# Patient Record
Sex: MALE | NOT HISPANIC OR LATINO | Employment: OTHER | URBAN - METROPOLITAN AREA
[De-identification: names, ages, dates, MRNs, and addresses within clinical notes are randomized per-mention and may not be internally consistent; named-entity substitution may affect disease eponyms.]

---

## 2020-03-18 ENCOUNTER — APPOINTMENT (OUTPATIENT)
Dept: RADIOLOGY | Facility: MEDICAL CENTER | Age: 70
DRG: 313 | End: 2020-03-18
Payer: COMMERCIAL

## 2020-03-18 ENCOUNTER — APPOINTMENT (OUTPATIENT)
Dept: RADIOLOGY | Facility: MEDICAL CENTER | Age: 70
DRG: 313 | End: 2020-03-18
Attending: EMERGENCY MEDICINE
Payer: COMMERCIAL

## 2020-03-18 ENCOUNTER — APPOINTMENT (OUTPATIENT)
Dept: CARDIOLOGY | Facility: MEDICAL CENTER | Age: 70
DRG: 313 | End: 2020-03-18
Attending: STUDENT IN AN ORGANIZED HEALTH CARE EDUCATION/TRAINING PROGRAM
Payer: COMMERCIAL

## 2020-03-18 ENCOUNTER — HOSPITAL ENCOUNTER (INPATIENT)
Facility: MEDICAL CENTER | Age: 70
LOS: 1 days | DRG: 313 | End: 2020-03-19
Attending: EMERGENCY MEDICINE | Admitting: INTERNAL MEDICINE
Payer: COMMERCIAL

## 2020-03-18 DIAGNOSIS — M54.6 ACUTE LEFT-SIDED THORACIC BACK PAIN: ICD-10-CM

## 2020-03-18 PROBLEM — N40.0 BPH (BENIGN PROSTATIC HYPERPLASIA): Status: ACTIVE | Noted: 2020-03-18

## 2020-03-18 PROBLEM — R07.9 PAIN IN THE CHEST: Status: ACTIVE | Noted: 2020-03-18

## 2020-03-18 PROBLEM — E78.5 HLD (HYPERLIPIDEMIA): Status: ACTIVE | Noted: 2020-03-18

## 2020-03-18 PROBLEM — M10.9 GOUT: Status: ACTIVE | Noted: 2020-03-18

## 2020-03-18 PROBLEM — D64.9 ANEMIA: Status: ACTIVE | Noted: 2020-03-18

## 2020-03-18 PROBLEM — M54.9 BACK PAIN: Status: ACTIVE | Noted: 2020-03-18

## 2020-03-18 PROBLEM — N18.9 CKD (CHRONIC KIDNEY DISEASE): Status: ACTIVE | Noted: 2020-03-18

## 2020-03-18 PROBLEM — K25.9 ULCER, STOMACH PEPTIC: Status: ACTIVE | Noted: 2020-03-18

## 2020-03-18 PROBLEM — E87.6 HYPOKALEMIA: Status: ACTIVE | Noted: 2020-03-18

## 2020-03-18 PROBLEM — G47.00 INSOMNIA: Status: ACTIVE | Noted: 2020-03-18

## 2020-03-18 LAB
ALBUMIN SERPL BCP-MCNC: 4.1 G/DL (ref 3.2–4.9)
ALBUMIN/GLOB SERPL: 1.1 G/DL
ALP SERPL-CCNC: 122 U/L (ref 30–99)
ALT SERPL-CCNC: 29 U/L (ref 2–50)
ANION GAP SERPL CALC-SCNC: 18 MMOL/L (ref 7–16)
AST SERPL-CCNC: 56 U/L (ref 12–45)
BASOPHILS # BLD AUTO: 0.2 % (ref 0–1.8)
BASOPHILS # BLD: 0.01 K/UL (ref 0–0.12)
BILIRUB SERPL-MCNC: 0.8 MG/DL (ref 0.1–1.5)
BUN SERPL-MCNC: 65 MG/DL (ref 8–22)
CALCIUM SERPL-MCNC: 8.5 MG/DL (ref 8.5–10.5)
CHLORIDE SERPL-SCNC: 102 MMOL/L (ref 96–112)
CHOLEST SERPL-MCNC: 118 MG/DL (ref 100–199)
CO2 SERPL-SCNC: 18 MMOL/L (ref 20–33)
CREAT SERPL-MCNC: 3.2 MG/DL (ref 0.5–1.4)
EKG IMPRESSION: NORMAL
EOSINOPHIL # BLD AUTO: 0.13 K/UL (ref 0–0.51)
EOSINOPHIL NFR BLD: 2.5 % (ref 0–6.9)
ERYTHROCYTE [DISTWIDTH] IN BLOOD BY AUTOMATED COUNT: 48.1 FL (ref 35.9–50)
FERRITIN SERPL-MCNC: 1823 NG/ML (ref 22–322)
GLOBULIN SER CALC-MCNC: 3.7 G/DL (ref 1.9–3.5)
GLUCOSE SERPL-MCNC: 107 MG/DL (ref 65–99)
HCT VFR BLD AUTO: 30.8 % (ref 42–52)
HDLC SERPL-MCNC: 78 MG/DL
HGB BLD-MCNC: 10.4 G/DL (ref 14–18)
IMM GRANULOCYTES # BLD AUTO: 0.02 K/UL (ref 0–0.11)
IMM GRANULOCYTES NFR BLD AUTO: 0.4 % (ref 0–0.9)
IRON SERPL-MCNC: 65 UG/DL (ref 50–180)
LDLC SERPL CALC-MCNC: 23 MG/DL
LV EJECT FRACT  99904: 50
LV EJECT FRACT MOD 2C 99903: 63.28
LV EJECT FRACT MOD 4C 99902: 51.3
LV EJECT FRACT MOD BP 99901: 54.26
LYMPHOCYTES # BLD AUTO: 1.09 K/UL (ref 1–4.8)
LYMPHOCYTES NFR BLD: 20.8 % (ref 22–41)
MCH RBC QN AUTO: 30.5 PG (ref 27–33)
MCHC RBC AUTO-ENTMCNC: 33.8 G/DL (ref 33.7–35.3)
MCV RBC AUTO: 90.3 FL (ref 81.4–97.8)
MONOCYTES # BLD AUTO: 0.54 K/UL (ref 0–0.85)
MONOCYTES NFR BLD AUTO: 10.3 % (ref 0–13.4)
NEUTROPHILS # BLD AUTO: 3.46 K/UL (ref 1.82–7.42)
NEUTROPHILS NFR BLD: 65.8 % (ref 44–72)
NRBC # BLD AUTO: 0 K/UL
NRBC BLD-RTO: 0 /100 WBC
PLATELET # BLD AUTO: 124 K/UL (ref 164–446)
PMV BLD AUTO: 11.2 FL (ref 9–12.9)
POTASSIUM SERPL-SCNC: 3.1 MMOL/L (ref 3.6–5.5)
PROT SERPL-MCNC: 7.8 G/DL (ref 6–8.2)
RBC # BLD AUTO: 3.41 M/UL (ref 4.7–6.1)
SODIUM SERPL-SCNC: 138 MMOL/L (ref 135–145)
TRIGL SERPL-MCNC: 87 MG/DL (ref 0–149)
TROPONIN T SERPL-MCNC: 52 NG/L (ref 6–19)
TROPONIN T SERPL-MCNC: 53 NG/L (ref 6–19)
TROPONIN T SERPL-MCNC: 58 NG/L (ref 6–19)
WBC # BLD AUTO: 5.3 K/UL (ref 4.8–10.8)

## 2020-03-18 PROCEDURE — 700111 HCHG RX REV CODE 636 W/ 250 OVERRIDE (IP): Performed by: STUDENT IN AN ORGANIZED HEALTH CARE EDUCATION/TRAINING PROGRAM

## 2020-03-18 PROCEDURE — 99223 1ST HOSP IP/OBS HIGH 75: CPT | Mod: GC | Performed by: INTERNAL MEDICINE

## 2020-03-18 PROCEDURE — A9270 NON-COVERED ITEM OR SERVICE: HCPCS | Performed by: EMERGENCY MEDICINE

## 2020-03-18 PROCEDURE — 83036 HEMOGLOBIN GLYCOSYLATED A1C: CPT

## 2020-03-18 PROCEDURE — 96374 THER/PROPH/DIAG INJ IV PUSH: CPT

## 2020-03-18 PROCEDURE — 93306 TTE W/DOPPLER COMPLETE: CPT

## 2020-03-18 PROCEDURE — 93005 ELECTROCARDIOGRAM TRACING: CPT | Performed by: STUDENT IN AN ORGANIZED HEALTH CARE EDUCATION/TRAINING PROGRAM

## 2020-03-18 PROCEDURE — 71045 X-RAY EXAM CHEST 1 VIEW: CPT

## 2020-03-18 PROCEDURE — 93010 ELECTROCARDIOGRAM REPORT: CPT | Mod: 76 | Performed by: INTERNAL MEDICINE

## 2020-03-18 PROCEDURE — 700102 HCHG RX REV CODE 250 W/ 637 OVERRIDE(OP): Performed by: EMERGENCY MEDICINE

## 2020-03-18 PROCEDURE — 84484 ASSAY OF TROPONIN QUANT: CPT | Mod: 91

## 2020-03-18 PROCEDURE — 93005 ELECTROCARDIOGRAM TRACING: CPT | Performed by: EMERGENCY MEDICINE

## 2020-03-18 PROCEDURE — 700101 HCHG RX REV CODE 250: Performed by: EMERGENCY MEDICINE

## 2020-03-18 PROCEDURE — 36415 COLL VENOUS BLD VENIPUNCTURE: CPT

## 2020-03-18 PROCEDURE — 93306 TTE W/DOPPLER COMPLETE: CPT | Mod: 26 | Performed by: INTERNAL MEDICINE

## 2020-03-18 PROCEDURE — A9270 NON-COVERED ITEM OR SERVICE: HCPCS | Performed by: STUDENT IN AN ORGANIZED HEALTH CARE EDUCATION/TRAINING PROGRAM

## 2020-03-18 PROCEDURE — 80061 LIPID PANEL: CPT

## 2020-03-18 PROCEDURE — 80053 COMPREHEN METABOLIC PANEL: CPT

## 2020-03-18 PROCEDURE — 93005 ELECTROCARDIOGRAM TRACING: CPT

## 2020-03-18 PROCEDURE — 700102 HCHG RX REV CODE 250 W/ 637 OVERRIDE(OP): Performed by: STUDENT IN AN ORGANIZED HEALTH CARE EDUCATION/TRAINING PROGRAM

## 2020-03-18 PROCEDURE — 85025 COMPLETE CBC W/AUTO DIFF WBC: CPT

## 2020-03-18 PROCEDURE — 700105 HCHG RX REV CODE 258: Performed by: STUDENT IN AN ORGANIZED HEALTH CARE EDUCATION/TRAINING PROGRAM

## 2020-03-18 PROCEDURE — 82728 ASSAY OF FERRITIN: CPT

## 2020-03-18 PROCEDURE — 99285 EMERGENCY DEPT VISIT HI MDM: CPT

## 2020-03-18 PROCEDURE — 770020 HCHG ROOM/CARE - TELE (206)

## 2020-03-18 PROCEDURE — 93010 ELECTROCARDIOGRAM REPORT: CPT | Performed by: INTERNAL MEDICINE

## 2020-03-18 PROCEDURE — 83540 ASSAY OF IRON: CPT

## 2020-03-18 RX ORDER — LABETALOL HYDROCHLORIDE 5 MG/ML
10 INJECTION, SOLUTION INTRAVENOUS ONCE
Status: COMPLETED | OUTPATIENT
Start: 2020-03-18 | End: 2020-03-18

## 2020-03-18 RX ORDER — POTASSIUM CHLORIDE 20 MEQ/1
40 TABLET, EXTENDED RELEASE ORAL ONCE
Status: DISCONTINUED | OUTPATIENT
Start: 2020-03-18 | End: 2020-03-19

## 2020-03-18 RX ORDER — HYDROMORPHONE HYDROCHLORIDE 1 MG/ML
0.5 INJECTION, SOLUTION INTRAMUSCULAR; INTRAVENOUS; SUBCUTANEOUS
Status: DISCONTINUED | OUTPATIENT
Start: 2020-03-18 | End: 2020-03-19 | Stop reason: HOSPADM

## 2020-03-18 RX ORDER — ATORVASTATIN CALCIUM 80 MG/1
80 TABLET, FILM COATED ORAL EVERY EVENING
Status: DISCONTINUED | OUTPATIENT
Start: 2020-03-18 | End: 2020-03-19 | Stop reason: HOSPADM

## 2020-03-18 RX ORDER — LABETALOL HYDROCHLORIDE 5 MG/ML
10 INJECTION, SOLUTION INTRAVENOUS EVERY 4 HOURS PRN
Status: DISCONTINUED | OUTPATIENT
Start: 2020-03-18 | End: 2020-03-18

## 2020-03-18 RX ORDER — ISOSORBIDE MONONITRATE 30 MG/1
15 TABLET, EXTENDED RELEASE ORAL
Status: DISCONTINUED | OUTPATIENT
Start: 2020-03-18 | End: 2020-03-19 | Stop reason: HOSPADM

## 2020-03-18 RX ORDER — POTASSIUM CHLORIDE 20 MEQ/1
40 TABLET, EXTENDED RELEASE ORAL ONCE
Status: COMPLETED | OUTPATIENT
Start: 2020-03-18 | End: 2020-03-18

## 2020-03-18 RX ORDER — FERROUS GLUCONATE 324(38)MG
324 TABLET ORAL EVERY MORNING
Status: ON HOLD | COMMUNITY
End: 2020-12-01

## 2020-03-18 RX ORDER — SODIUM CHLORIDE, SODIUM LACTATE, POTASSIUM CHLORIDE, CALCIUM CHLORIDE 600; 310; 30; 20 MG/100ML; MG/100ML; MG/100ML; MG/100ML
INJECTION, SOLUTION INTRAVENOUS CONTINUOUS
Status: DISCONTINUED | OUTPATIENT
Start: 2020-03-18 | End: 2020-03-19 | Stop reason: HOSPADM

## 2020-03-18 RX ORDER — COLCHICINE 0.6 MG/1
0.6 TABLET ORAL 2 TIMES DAILY PRN
Status: DISCONTINUED | OUTPATIENT
Start: 2020-03-18 | End: 2020-03-18

## 2020-03-18 RX ORDER — DOCUSATE SODIUM 100 MG/1
100 CAPSULE, LIQUID FILLED ORAL
Status: ON HOLD | COMMUNITY
End: 2020-03-18

## 2020-03-18 RX ORDER — MORPHINE SULFATE 4 MG/ML
2 INJECTION, SOLUTION INTRAMUSCULAR; INTRAVENOUS
Status: DISCONTINUED | OUTPATIENT
Start: 2020-03-18 | End: 2020-03-18

## 2020-03-18 RX ORDER — COLCHICINE 0.6 MG/1
0.6 TABLET ORAL 2 TIMES DAILY PRN
Status: ON HOLD | COMMUNITY
End: 2020-03-19

## 2020-03-18 RX ORDER — ISOSORBIDE MONONITRATE 30 MG/1
15 TABLET, EXTENDED RELEASE ORAL
Status: ON HOLD | COMMUNITY
End: 2020-12-01

## 2020-03-18 RX ORDER — METOPROLOL SUCCINATE 50 MG/1
100 TABLET, EXTENDED RELEASE ORAL EVERY MORNING
Status: DISCONTINUED | OUTPATIENT
Start: 2020-03-18 | End: 2020-03-19 | Stop reason: HOSPADM

## 2020-03-18 RX ORDER — ASPIRIN 325 MG
325 TABLET ORAL ONCE
Status: COMPLETED | OUTPATIENT
Start: 2020-03-18 | End: 2020-03-18

## 2020-03-18 RX ORDER — OMEPRAZOLE 20 MG/1
40 CAPSULE, DELAYED RELEASE ORAL EVERY MORNING
Status: DISCONTINUED | OUTPATIENT
Start: 2020-03-18 | End: 2020-03-19 | Stop reason: HOSPADM

## 2020-03-18 RX ORDER — HYDRALAZINE HYDROCHLORIDE 20 MG/ML
20 INJECTION INTRAMUSCULAR; INTRAVENOUS ONCE
Status: ACTIVE | OUTPATIENT
Start: 2020-03-18 | End: 2020-03-19

## 2020-03-18 RX ORDER — ATORVASTATIN CALCIUM 80 MG/1
80 TABLET, FILM COATED ORAL EVERY EVENING
Status: ON HOLD | COMMUNITY
End: 2020-12-01

## 2020-03-18 RX ORDER — LIDOCAINE 50 MG/G
1 PATCH TOPICAL EVERY 12 HOURS
Status: ON HOLD | COMMUNITY
End: 2020-12-01

## 2020-03-18 RX ORDER — FERROUS GLUCONATE 324(38)MG
324 TABLET ORAL EVERY MORNING
Status: DISCONTINUED | OUTPATIENT
Start: 2020-03-19 | End: 2020-03-19 | Stop reason: HOSPADM

## 2020-03-18 RX ORDER — ALLOPURINOL 100 MG/1
100 TABLET ORAL
COMMUNITY
End: 2023-08-08

## 2020-03-18 RX ORDER — HYDRALAZINE HYDROCHLORIDE 20 MG/ML
20 INJECTION INTRAMUSCULAR; INTRAVENOUS EVERY 6 HOURS PRN
Status: DISCONTINUED | OUTPATIENT
Start: 2020-03-18 | End: 2020-03-19 | Stop reason: HOSPADM

## 2020-03-18 RX ORDER — FUROSEMIDE 20 MG/1
20 TABLET ORAL EVERY MORNING
Status: DISCONTINUED | OUTPATIENT
Start: 2020-03-19 | End: 2020-03-19 | Stop reason: HOSPADM

## 2020-03-18 RX ORDER — TRAZODONE HYDROCHLORIDE 50 MG/1
100 TABLET ORAL
Status: DISCONTINUED | OUTPATIENT
Start: 2020-03-18 | End: 2020-03-19 | Stop reason: HOSPADM

## 2020-03-18 RX ORDER — FUROSEMIDE 20 MG/1
20 TABLET ORAL EVERY MORNING
Status: ON HOLD | COMMUNITY
End: 2020-12-08

## 2020-03-18 RX ORDER — ALLOPURINOL 100 MG/1
100 TABLET ORAL EVERY MORNING
Status: DISCONTINUED | OUTPATIENT
Start: 2020-03-19 | End: 2020-03-19 | Stop reason: HOSPADM

## 2020-03-18 RX ORDER — TRAZODONE HYDROCHLORIDE 100 MG/1
100 TABLET ORAL
Status: ON HOLD | COMMUNITY
End: 2020-12-01

## 2020-03-18 RX ORDER — OMEPRAZOLE 20 MG/1
40 CAPSULE, DELAYED RELEASE ORAL EVERY MORNING
Status: ON HOLD | COMMUNITY
End: 2021-11-23

## 2020-03-18 RX ORDER — METOPROLOL SUCCINATE 100 MG/1
100 TABLET, EXTENDED RELEASE ORAL EVERY MORNING
Status: ON HOLD | COMMUNITY
End: 2020-12-01

## 2020-03-18 RX ORDER — TAMSULOSIN HYDROCHLORIDE 0.4 MG/1
0.4 CAPSULE ORAL EVERY MORNING
COMMUNITY
End: 2022-05-03

## 2020-03-18 RX ORDER — TAMSULOSIN HYDROCHLORIDE 0.4 MG/1
0.4 CAPSULE ORAL EVERY MORNING
Status: DISCONTINUED | OUTPATIENT
Start: 2020-03-19 | End: 2020-03-19 | Stop reason: HOSPADM

## 2020-03-18 RX ADMIN — OMEPRAZOLE 40 MG: 20 CAPSULE, DELAYED RELEASE ORAL at 17:40

## 2020-03-18 RX ADMIN — SODIUM CHLORIDE, POTASSIUM CHLORIDE, SODIUM LACTATE AND CALCIUM CHLORIDE: 600; 310; 30; 20 INJECTION, SOLUTION INTRAVENOUS at 18:06

## 2020-03-18 RX ADMIN — HYDROMORPHONE HYDROCHLORIDE 0.5 MG: 1 INJECTION, SOLUTION INTRAMUSCULAR; INTRAVENOUS; SUBCUTANEOUS at 21:13

## 2020-03-18 RX ADMIN — HYDROMORPHONE HYDROCHLORIDE 0.5 MG: 1 INJECTION, SOLUTION INTRAMUSCULAR; INTRAVENOUS; SUBCUTANEOUS at 17:37

## 2020-03-18 RX ADMIN — POTASSIUM CHLORIDE 40 MEQ: 1500 TABLET, EXTENDED RELEASE ORAL at 19:36

## 2020-03-18 RX ADMIN — TRAZODONE HYDROCHLORIDE 100 MG: 50 TABLET ORAL at 21:12

## 2020-03-18 RX ADMIN — ASPIRIN 81 MG: 81 TABLET, COATED ORAL at 17:40

## 2020-03-18 RX ADMIN — ISOSORBIDE MONONITRATE 15 MG: 30 TABLET, EXTENDED RELEASE ORAL at 17:40

## 2020-03-18 RX ADMIN — METOPROLOL SUCCINATE 100 MG: 50 TABLET, EXTENDED RELEASE ORAL at 17:39

## 2020-03-18 RX ADMIN — LABETALOL HYDROCHLORIDE 10 MG: 5 INJECTION INTRAVENOUS at 14:42

## 2020-03-18 RX ADMIN — ASPIRIN 325 MG: 325 TABLET, FILM COATED ORAL at 14:11

## 2020-03-18 RX ADMIN — ATORVASTATIN CALCIUM 80 MG: 80 TABLET, FILM COATED ORAL at 17:39

## 2020-03-18 ASSESSMENT — LIFESTYLE VARIABLES
ALCOHOL_USE: YES
TOTAL SCORE: 0
EVER HAD A DRINK FIRST THING IN THE MORNING TO STEADY YOUR NERVES TO GET RID OF A HANGOVER: NO
ON A TYPICAL DAY WHEN YOU DRINK ALCOHOL HOW MANY DRINKS DO YOU HAVE: 2
TOTAL SCORE: 0
HAVE YOU EVER FELT YOU SHOULD CUT DOWN ON YOUR DRINKING: NO
HAVE PEOPLE ANNOYED YOU BY CRITICIZING YOUR DRINKING: NO
EVER FELT BAD OR GUILTY ABOUT YOUR DRINKING: NO
EVER_SMOKED: NEVER
SUBSTANCE_ABUSE: 0
DOES PATIENT WANT TO STOP DRINKING: NO
TOTAL SCORE: 0
AVERAGE NUMBER OF DAYS PER WEEK YOU HAVE A DRINK CONTAINING ALCOHOL: 1
HOW MANY TIMES IN THE PAST YEAR HAVE YOU HAD 5 OR MORE DRINKS IN A DAY: 2
CONSUMPTION TOTAL: POSITIVE

## 2020-03-18 ASSESSMENT — COGNITIVE AND FUNCTIONAL STATUS - GENERAL
SUGGESTED CMS G CODE MODIFIER MOBILITY: CH
DAILY ACTIVITIY SCORE: 24
MOBILITY SCORE: 24
SUGGESTED CMS G CODE MODIFIER DAILY ACTIVITY: CH

## 2020-03-18 ASSESSMENT — FIBROSIS 4 INDEX: FIB4 SCORE: 5.62

## 2020-03-18 ASSESSMENT — ENCOUNTER SYMPTOMS
HEMOPTYSIS: 0
PHOTOPHOBIA: 0
NECK PAIN: 0
SPUTUM PRODUCTION: 0
BACK PAIN: 1
BRUISES/BLEEDS EASILY: 0
FEVER: 0
CHILLS: 0
DOUBLE VISION: 0
SHORTNESS OF BREATH: 1
NAUSEA: 0
ABDOMINAL PAIN: 0
TREMORS: 0
TINGLING: 0
PALPITATIONS: 1

## 2020-03-18 ASSESSMENT — PATIENT HEALTH QUESTIONNAIRE - PHQ9
SUM OF ALL RESPONSES TO PHQ9 QUESTIONS 1 AND 2: 0
2. FEELING DOWN, DEPRESSED, IRRITABLE, OR HOPELESS: NOT AT ALL
1. LITTLE INTEREST OR PLEASURE IN DOING THINGS: NOT AT ALL

## 2020-03-18 NOTE — ED PROVIDER NOTES
ED Provider Note    CHIEF COMPLAINT  Chief Complaint   Patient presents with   • Chest Pain     Onset this morning, previous Hx of MI ~ 6 months ago       HPI  Abran Proctor is a 69 y.o. male who presents for evaluation of chest discomfort.  The patient apparently has a history of hypertension dyslipidemia and prior heart disease.  He reports that around a year ago he was hospitalized in [place] for he reports as a mild heart attack.  He apparently did not undergo cardiac catheterization or stenting.  He reports 4 to 5 hours of dull heavy pressure over the left precordium and does not radiate to the shoulder blades.  No associated fever chills cough or congestion.  He also reports some dyspnea and leg pain with ambulation.  No report of back pain black or bloody stools    REVIEW OF SYSTEMS  See HPI for further details.  No high fevers chills productive cough all other systems are negative.     PAST MEDICAL HISTORY  Past Medical History:   Diagnosis Date   • Gout    • Hypertension    • Kidney disease    • MI (myocardial infarction) (HCC)        FAMILY HISTORY  History of heart disease    SOCIAL HISTORY  Social History     Socioeconomic History   • Marital status: Single     Spouse name: Not on file   • Number of children: Not on file   • Years of education: Not on file   • Highest education level: Not on file   Occupational History   • Not on file   Social Needs   • Financial resource strain: Not on file   • Food insecurity     Worry: Not on file     Inability: Not on file   • Transportation needs     Medical: Not on file     Non-medical: Not on file   Tobacco Use   • Smoking status: Former Smoker   • Smokeless tobacco: Never Used   Substance and Sexual Activity   • Alcohol use: Yes   • Drug use: Never   • Sexual activity: Not on file   Lifestyle   • Physical activity     Days per week: Not on file     Minutes per session: Not on file   • Stress: Not on file   Relationships   • Social connections     Talks on phone:  "Not on file     Gets together: Not on file     Attends Alevism service: Not on file     Active member of club or organization: Not on file     Attends meetings of clubs or organizations: Not on file     Relationship status: Not on file   • Intimate partner violence     Fear of current or ex partner: Not on file     Emotionally abused: Not on file     Physically abused: Not on file     Forced sexual activity: Not on file   Other Topics Concern   • Not on file   Social History Narrative   • Not on file     Former smoker  SURGICAL HISTORY  No past surgical history on file.    CURRENT MEDICATIONS  Home Medications    **Home medications have not yet been reviewed for this encounter**     Patient does not know his medications but he reports he is on medicine for hypertension as well as dyslipidemia    ALLERGIES  No Known Allergies    PHYSICAL EXAM  VITAL SIGNS: BP (!) 199/105   Pulse (!) 115   Temp 36.9 °C (98.4 °F) (Temporal)   Resp 19   Ht 1.727 m (5' 8\")   Wt 68 kg (150 lb)   SpO2 99%   BMI 22.81 kg/m²       Constitutional: Well developed, Well nourished, No acute distress, Non-toxic appearance.   HENT: Normocephalic, Atraumatic, Bilateral external ears normal, Oropharynx moist, No oral exudates, Nose normal.   Eyes: PERRLA, EOMI, Conjunctiva normal, No discharge.   Neck: Normal range of motion, No tenderness, Supple, No stridor.   Cardiovascular: Normal heart rate, Normal rhythm, No murmurs, No rubs, No gallops.   Thorax & Lungs: Normal breath sounds, No respiratory distress, No wheezing, No chest tenderness.   Abdomen: Bowel sounds normal, Soft, No tenderness, No masses, No pulsatile masses.   Skin: Warm, Dry, No erythema, No rash.   Extremities: Intact distal pulses, No edema, No tenderness, No cyanosis, No clubbing.   Musculoskeletal: Good range of motion in all major joints. No tenderness to palpation or major deformities noted.   Neurologic: Alert & oriented x 3, Normal motor function, Normal sensory " function, No focal deficits noted.   Psychiatric: Affect normal, Judgment normal, Mood normal.     Results for orders placed or performed during the hospital encounter of 03/18/20   CBC with Differential   Result Value Ref Range    WBC 5.3 4.8 - 10.8 K/uL    RBC 3.41 (L) 4.70 - 6.10 M/uL    Hemoglobin 10.4 (L) 14.0 - 18.0 g/dL    Hematocrit 30.8 (L) 42.0 - 52.0 %    MCV 90.3 81.4 - 97.8 fL    MCH 30.5 27.0 - 33.0 pg    MCHC 33.8 33.7 - 35.3 g/dL    RDW 48.1 35.9 - 50.0 fL    Platelet Count 124 (L) 164 - 446 K/uL    MPV 11.2 9.0 - 12.9 fL    Neutrophils-Polys 65.80 44.00 - 72.00 %    Lymphocytes 20.80 (L) 22.00 - 41.00 %    Monocytes 10.30 0.00 - 13.40 %    Eosinophils 2.50 0.00 - 6.90 %    Basophils 0.20 0.00 - 1.80 %    Immature Granulocytes 0.40 0.00 - 0.90 %    Nucleated RBC 0.00 /100 WBC    Neutrophils (Absolute) 3.46 1.82 - 7.42 K/uL    Lymphs (Absolute) 1.09 1.00 - 4.80 K/uL    Monos (Absolute) 0.54 0.00 - 0.85 K/uL    Eos (Absolute) 0.13 0.00 - 0.51 K/uL    Baso (Absolute) 0.01 0.00 - 0.12 K/uL    Immature Granulocytes (abs) 0.02 0.00 - 0.11 K/uL    NRBC (Absolute) 0.00 K/uL   Complete Metabolic Panel (CMP)   Result Value Ref Range    Sodium 138 135 - 145 mmol/L    Potassium 3.1 (L) 3.6 - 5.5 mmol/L    Chloride 102 96 - 112 mmol/L    Co2 18 (L) 20 - 33 mmol/L    Anion Gap 18.0 (H) 7.0 - 16.0    Glucose 107 (H) 65 - 99 mg/dL    Bun 65 (H) 8 - 22 mg/dL    Creatinine 3.20 (H) 0.50 - 1.40 mg/dL    Calcium 8.5 8.5 - 10.5 mg/dL    AST(SGOT) 56 (H) 12 - 45 U/L    ALT(SGPT) 29 2 - 50 U/L    Alkaline Phosphatase 122 (H) 30 - 99 U/L    Total Bilirubin 0.8 0.1 - 1.5 mg/dL    Albumin 4.1 3.2 - 4.9 g/dL    Total Protein 7.8 6.0 - 8.2 g/dL    Globulin 3.7 (H) 1.9 - 3.5 g/dL    A-G Ratio 1.1 g/dL   Troponin   Result Value Ref Range    Troponin T 53 (H) 6 - 19 ng/L   ESTIMATED GFR   Result Value Ref Range    GFR If  24 (A) >60 mL/min/1.73 m 2    GFR If Non  19 (A) >60 mL/min/1.73 m 2   EKG    Result Value Ref Range    Report       [facility] Emergency Dept.    Test Date:  2020  Pt Name:    JUAN STEINBERG                   Department: ER  MRN:        [id number]                      Room:       RD 06  Gender:     Male                         Technician: [id number]  :        1950                   Requested By:ER TRIAGE PROTOCOL  Order #:    [id number]                    Reading MD:    Measurements  Intervals                                Axis  Rate:       94                           P:          71  HI:         136                          QRS:        29  QRSD:       94                           T:          57  QT:         384  QTc:        481    Interpretive Statements  SINUS RHYTHM  CONSIDER LEFT VENTRICULAR HYPERTROPHY  BORDERLINE PROLONGED QT INTERVAL  No previous ECG available for comparison        DX-CHEST-PORTABLE (1 VIEW)   Final Result      No acute cardiopulmonary abnormality.      Atherosclerosis.          COURSE & MEDICAL DECISION MAKING  Pertinent Labs & Imaging studies reviewed. (See chart for details)  Patient was given an aspirin.  EKG does not clearly demonstrate STEMI but does suggest some possible subtle ischemia.  The patient primarily goes to [facility].  He apparently is aware that he has some kidney dysfunction but does not know any of his numbers including his GFR, baseline creatinine etc.  He will be admitted to [place] [facility] medicine for further work-up of chest pain and renal insufficiency.    FINAL IMPRESSION  1.  Chest pain  2.  Acute on chronic renal insufficiency         Electronically signed by: Antwon Faria M.D., 3/18/2020 1:39 PM

## 2020-03-18 NOTE — H&P
History & Physical Note    Date of Admission: 3/18/2020  Admission Status: Inpatient  [facility] Team: [facility]  Attending: Dr. Colón  Senior Resident: Dr. Newman  Intern: Dr. Chowdhury  Contact Number: [phone]    Chief Complaint: Chest Pain (Onset this morning, previous Hx of MI ~ 6 months ago)       History of Present Illness (HPI): This patient is a 69-year-old male with a past medical history of MI 6 months ago, no history of stents, echocardiogram that he states was done outside 6 months ago, presenting with a history of chest pain that began this morning at 6 AM.  Localizes it to his left sternal border, radiates to his upper back and below his left shoulder blade.  Describes pain as pressure-like and constant.  Was given nitrate he states prior to presenting to the ED.  Also endorsing dyspnea, back pain, chills, weakness, dizziness.  Denies fever, diaphoresis, headache.  Shifting right leg remains his symptoms.  Not aware of any alleviating factors, nor aggravating factors apart from the position changes.  Of note is that patient states that he had an episode of black stool , vomit, and diarrhea 2 days ago.      ED course-  Labs:  Cbc-hb 10.4, platelet 124K  Cmp-3.1 potassium, AG 18, co2 low at 18, gluc 107, bun/creatinine 65/3.2, AST^ at 56, gfr 24  Alk phos ^ at 122  Trop-53 (^)    Imaging:  CXR-unremarkable, atherosclerosis    Ekg- sinus, qtc 481, LVH,     ED meds- labetalol, aspirin    Review of Systems: Review of Systems   Constitutional: Positive for malaise/fatigue. Negative for chills and fever.   HENT: Negative for ear pain and tinnitus.    Eyes: Negative for double vision and photophobia.   Respiratory: Positive for shortness of breath. Negative for hemoptysis and sputum production.    Cardiovascular: Positive for chest pain and palpitations.   Gastrointestinal: Negative for abdominal pain and nausea.   Genitourinary: Negative for frequency and urgency.   Musculoskeletal: Positive for back  pain. Negative for neck pain.   Skin: Negative for itching and rash.   Neurological: Negative for tingling and tremors.   Endo/Heme/Allergies: Negative for environmental allergies. Does not bruise/bleed easily.   Psychiatric/Behavioral: Negative for substance abuse and suicidal ideas.        Past Medical History:    has a past medical history of Gout, Hypertension, Kidney disease, and MI (myocardial infarction) (HCC).    Past Surgical History:none  Medications:   Prior to Admission Medications   Prescriptions Last Dose Informant Patient Reported? Taking?   allopurinol (ZYLOPRIM) 100 MG Tab 3/16/2020 Patient Yes Yes   Sig: Take 100 mg by mouth every morning.   aspirin EC (ECOTRIN) 81 MG Tablet Delayed Response 3/16/2020 Patient Yes Yes   Sig: Take 81 mg by mouth every morning.   atorvastatin (LIPITOR) 80 MG tablet 3/16/2020 Patient Yes Yes   Sig: Take 80 mg by mouth every evening.   colchicine (COLCRYS) 0.6 MG Tab 3/16/2020 Patient Yes Yes   Sig: Take 0.6 mg by mouth 2 times a day as needed (gout attack).   docusate sodium (COLACE) 100 MG Cap 3/16/2020 Patient Yes Yes   Sig: Take 100 mg by mouth every day.   ferrous gluconate (FERGON) 324 (38 Fe) MG Tab 3/16/2020 Patient Yes Yes   Sig: Take 324 mg by mouth every morning.   furosemide (LASIX) 20 MG Tab 3/16/2020 Patient Yes Yes   Sig: Take 20 mg by mouth every morning.   isosorbide mononitrate SR (IMDUR) 30 MG TABLET SR 24 HR 3/16/2020 Patient Yes Yes   Sig: Take 15 mg by mouth every day. Half tablet every morning   lidocaine (LIDODERM) 5 % Patch 3/17/2020 at Unknown time Patient Yes Yes   Sig: Apply 1 Patch to skin as directed every 12 hours.   metoprolol SR (TOPROL XL) 100 MG TABLET SR 24 HR 3/16/2020 at Unknown time Patient Yes Yes   Sig: Take 100 mg by mouth every morning.   omeprazole (PRILOSEC) 20 MG delayed-release capsule unknown at Unknown time Patient Yes Yes   Sig: Take 40 mg by mouth every morning.   tamsulosin (FLOMAX) 0.4 MG capsule unknown at Unknown  time Patient Yes Yes   Sig: Take 0.4 mg by mouth every morning.   traZODone (DESYREL) 100 MG Tab 3/15/2020 at Unknown time Patient Yes Yes   Sig: Take 100 mg by mouth every bedtime.      Facility-Administered Medications: None        Allergies: No Known Allergies    Family History:   Father and maternal grandfather- MI  Mother- no cardiac hx    Social History:   Tobacco: quit 40 years ago   Alcohol: 3 x week 1 glass of wine   Recreational drugs (illegal and prescription):  none   Activity Level: fair   Living situation:  Lives at home by himself locally  Recent travel:  no  Primary Care Provider:  No primary care provider on file.      Vitals:  Temp:  [36.3 °C (97.3 °F)-36.9 °C (98.4 °F)] 36.3 °C (97.3 °F)  Pulse:  [] 92  Resp:  [16-20] 18  BP: (153-199)/() 181/110  SpO2:  [95 %-100 %] 98 %    Physical Exam  Constitutional:       Appearance: Normal appearance.   HENT:      Head: Normocephalic and atraumatic.      Right Ear: Tympanic membrane normal.      Left Ear: Tympanic membrane normal.      Nose: Nose normal.      Mouth/Throat:      Mouth: Mucous membranes are moist.      Pharynx: Oropharynx is clear.   Eyes:      Extraocular Movements: Extraocular movements intact.      Conjunctiva/sclera: Conjunctivae normal.      Pupils: Pupils are equal, round, and reactive to light.   Neck:      Musculoskeletal: No neck rigidity or muscular tenderness.   Cardiovascular:      Rate and Rhythm: Tachycardia present.      Heart sounds: No murmur. No gallop.    Pulmonary:      Effort: No respiratory distress.      Breath sounds: No stridor. No wheezing.   Abdominal:      General: There is no distension.      Tenderness: There is no abdominal tenderness. There is no rebound.   Musculoskeletal:         General: No deformity or signs of injury.      Comments: Significant difficulty with position changes due to back pain, spinal processes aligned   Skin:     Coloration: Skin is not jaundiced or pale.   Neurological:       Mental Status: He is alert.      Motor: No weakness.      Coordination: Coordination normal.         Labs:   Results for orders placed or performed during the hospital encounter of 03/18/20   CBC with Differential   Result Value Ref Range    WBC 5.3 4.8 - 10.8 K/uL    RBC 3.41 (L) 4.70 - 6.10 M/uL    Hemoglobin 10.4 (L) 14.0 - 18.0 g/dL    Hematocrit 30.8 (L) 42.0 - 52.0 %    MCV 90.3 81.4 - 97.8 fL    MCH 30.5 27.0 - 33.0 pg    MCHC 33.8 33.7 - 35.3 g/dL    RDW 48.1 35.9 - 50.0 fL    Platelet Count 124 (L) 164 - 446 K/uL    MPV 11.2 9.0 - 12.9 fL    Neutrophils-Polys 65.80 44.00 - 72.00 %    Lymphocytes 20.80 (L) 22.00 - 41.00 %    Monocytes 10.30 0.00 - 13.40 %    Eosinophils 2.50 0.00 - 6.90 %    Basophils 0.20 0.00 - 1.80 %    Immature Granulocytes 0.40 0.00 - 0.90 %    Nucleated RBC 0.00 /100 WBC    Neutrophils (Absolute) 3.46 1.82 - 7.42 K/uL    Lymphs (Absolute) 1.09 1.00 - 4.80 K/uL    Monos (Absolute) 0.54 0.00 - 0.85 K/uL    Eos (Absolute) 0.13 0.00 - 0.51 K/uL    Baso (Absolute) 0.01 0.00 - 0.12 K/uL    Immature Granulocytes (abs) 0.02 0.00 - 0.11 K/uL    NRBC (Absolute) 0.00 K/uL   Complete Metabolic Panel (CMP)   Result Value Ref Range    Sodium 138 135 - 145 mmol/L    Potassium 3.1 (L) 3.6 - 5.5 mmol/L    Chloride 102 96 - 112 mmol/L    Co2 18 (L) 20 - 33 mmol/L    Anion Gap 18.0 (H) 7.0 - 16.0    Glucose 107 (H) 65 - 99 mg/dL    Bun 65 (H) 8 - 22 mg/dL    Creatinine 3.20 (H) 0.50 - 1.40 mg/dL    Calcium 8.5 8.5 - 10.5 mg/dL    AST(SGOT) 56 (H) 12 - 45 U/L    ALT(SGPT) 29 2 - 50 U/L    Alkaline Phosphatase 122 (H) 30 - 99 U/L    Total Bilirubin 0.8 0.1 - 1.5 mg/dL    Albumin 4.1 3.2 - 4.9 g/dL    Total Protein 7.8 6.0 - 8.2 g/dL    Globulin 3.7 (H) 1.9 - 3.5 g/dL    A-G Ratio 1.1 g/dL   Troponin   Result Value Ref Range    Troponin T 53 (H) 6 - 19 ng/L   ESTIMATED GFR   Result Value Ref Range    GFR If  24 (A) >60 mL/min/1.73 m 2    GFR If Non  19 (A) >60 mL/min/1.73 m 2    EKG   Result Value Ref Range    Report       [facility] Emergency Dept.    Test Date:  2020  Pt Name:    JUAN STEINBERG                   Department: ER  MRN:        [id number]                      Room:       RD 06  Gender:     Male                         Technician: [id number]  :        1950                   Requested By:FUNMILAYO TRIAGE PROTOCOL  Order #:    [id number]                    Reading MD:    Measurements  Intervals                                Axis  Rate:       94                           P:          71  OK:         136                          QRS:        29  QRSD:       94                           T:          57  QT:         384  QTc:        481    Interpretive Statements  SINUS RHYTHM  CONSIDER LEFT VENTRICULAR HYPERTROPHY  BORDERLINE PROLONGED QT INTERVAL  No previous ECG available for comparison     EKG   Result Value Ref Range    Report       [facility]    Test Date:  2020  Pt Name:    JUAN STEINBERG                   Department: ER  MRN:        [id number]                      Room:       T715  Gender:     Male                         Technician: CFR  :        1950                   Requested By:TAO HELM  Order #:    [id number]                    Reading MD:    Measurements  Intervals                                Axis  Rate:       98                           P:          60  OK:         142                          QRS:        33  QRSD:       96                           T:          47  QT:         365  QTc:        467    Interpretive Statements  SINUS RHYTHM  CONSIDER LEFT ATRIAL ENLARGEMENT  PROBABLE LEFT VENTRICULAR HYPERTROPHY  Compared to ECG 2020 12:51:35  No significant changes          EKG:   Results for orders placed or performed during the hospital encounter of 20   EKG   Result Value Ref Range    Report       [facility] Emergency Dept.    Test Date:  2020  Pt Name:    JUAN STEINBERG                    Department: ER  MRN:        [id number]                      Room:       RD 06  Gender:     Male                         Technician: [id number]  :        1950                   Requested By:FUNMILAYO TRIAGE PROTOCOL  Order #:    [id number]                    Reading MD:    Measurements  Intervals                                Axis  Rate:       94                           P:          71  HI:         136                          QRS:        29  QRSD:       94                           T:          57  QT:         384  QTc:        481    Interpretive Statements  SINUS RHYTHM  CONSIDER LEFT VENTRICULAR HYPERTROPHY  BORDERLINE PROLONGED QT INTERVAL  No previous ECG available for comparison     EKG   Result Value Ref Range    Report       [facility]    Test Date:  2020  Pt Name:    JUAN STEINBERG                   Department: ER  MRN:        [id number]                      Room:       T715  Gender:     Male                         Technician: CFR  :        1950                   Requested By:TAO HELM  Order #:    [id number]                    Reading MD:    Measurements  Intervals                                Axis  Rate:       98                           P:          60  HI:         142                          QRS:        33  QRSD:       96                           T:          47  QT:         365  QTc:        467    Interpretive Statements  SINUS RHYTHM  CONSIDER LEFT ATRIAL ENLARGEMENT  PROBABLE LEFT VENTRICULAR HYPERTROPHY  Compared to ECG 2020 12:51:35  No significant changes          Imaging:   EC-ECHOCARDIOGRAM COMPLETE W/O CONT         DX-CHEST-PORTABLE (1 VIEW)   Final Result      No acute cardiopulmonary abnormality.      Atherosclerosis.      NM-CARDIAC STRESS TEST    (Results Pending)        Previous Data Review: Reviewed    * chest pain  Assessment & Plan  This patient is a 69-year-old male with a past medical history of MI 6 months ago, no history of stents, echocardiogram that he states  was done outside 6 months ago, presenting with a history of chest pain that began this morning at 6 AM.  Localizes it to his left sternal border, radiates to his upper back and below his left shoulder blade.  Describes pain as pressure-like and constant.  Was given nitrate he states prior to presenting to the ED.  Also endorsing dyspnea, back pain, chills, weakness, dizziness.  Denies fever, diaphoresis, headache.  Shifting right leg remains his symptoms.  Not aware of any alleviating factors, nor aggravating factors apart from the position changes.  Of note is that patient states that he had an episode of black stool , vomit, and diarrhea 2 days ago.        Trop-53 (^)  Imaging:  CXR-unremarkable, atherosclerosis  Ekg- sinus, qtc 481, LVH,   ED meds- labetalol, aspirin    Plan:  Echocardiogram  NPO at midnight forward  Stress test in A.M.  A1c  Lipid panel  Procalcitonin  Continue home aspirin, metoprolol, and loop diuretic, isosorbide mononitrate  Morphine for pain control switched to hydromorphone due to poor kidney function    Anemia  Assessment & Plan  On admission hb 10.4  Recent hx dark brown/black diarrhea 2 days ago (no bowel movement since)  DVT prophylaxis at this time-SCD  Ordering iron, ferritin, FOBT, reticulocyte, b12, folate    Hypokalemia  Assessment & Plan  On admission Cmp-3.1 potassium, following and repleting    CKD (chronic kidney disease)  Assessment & Plan  Hx CKD  States [facility], I am looking into this to follow up on his nephrology hx and baseline renal function  On admission bun/creatinine 65/3.2, gfr 24        Back pain  Assessment & Plan  Morphine for pain control switched to hydromorphone due to poor kidney function    Ulcer, stomach peptic  Assessment & Plan  PMH gastric ulcer  Continue home proton-pump-inhibitor    HLD (hyperlipidemia)  Assessment & Plan  Continue home atorvastatin    Insomnia  Assessment & Plan  Continue home trazodone    BPH (benign  prostatic hyperplasia)  Assessment & Plan  Continue home tamsulosin    Gout  Assessment & Plan  PMH gout   Continue home allopurinol  Hold colchicine in light of his renal function

## 2020-03-18 NOTE — ED TRIAGE NOTES
Chief Complaint   Patient presents with   • Chest Pain     Onset this morning, previous Hx of MI ~ 6 months ago     Patient has a HX of MI, started with Chest pain and Back pain this morning. States he had similar S/Sx with previous MI.

## 2020-03-18 NOTE — PROGRESS NOTES
Patient transported from ED. Patient a&ox4. Plan of care discussed with patient. Family at bedside. Patient oriented to floor and surroundings. Patient currently on room air. VSS. Patient currently resting in bed, BP: 180/110, paging [facility]. Patient is complaining of pain 8/10 chest pain. Tele box placed. Monitor room notified. 2 rn skin check performed. Patient educated to call for assistance before ambulating. Patient education regarding fall risk. Call light within reach. Fall precautions in place.

## 2020-03-18 NOTE — ASSESSMENT & PLAN NOTE
On admission hb 10.4  Recent hx dark brown/black diarrhea 2 days ago (no bowel movement since)  DVT prophylaxis at this time-SCD  Ordering iron, ferritin, FOBT, reticulocyte, b12, folate

## 2020-03-18 NOTE — ASSESSMENT & PLAN NOTE
Hx CKD  States is being seen by [facility]rology [facility], I am looking into this to follow up on his nephrology hx and baseline renal function  On admission bun/creatinine 65/3.2, gfr 24

## 2020-03-18 NOTE — ED NOTES
Med rec complete per pt at bedside. Phoned [facility] for med list confirmed last dose with pt. NKDA No ABX in last 14 days.  Pt stated can no take Ibuprofen or aspirin due to stomach ulcer.

## 2020-03-19 ENCOUNTER — PATIENT OUTREACH (OUTPATIENT)
Dept: HEALTH INFORMATION MANAGEMENT | Facility: OTHER | Age: 70
End: 2020-03-19

## 2020-03-19 ENCOUNTER — APPOINTMENT (OUTPATIENT)
Dept: RADIOLOGY | Facility: MEDICAL CENTER | Age: 70
DRG: 313 | End: 2020-03-19
Attending: STUDENT IN AN ORGANIZED HEALTH CARE EDUCATION/TRAINING PROGRAM
Payer: COMMERCIAL

## 2020-03-19 VITALS
BODY MASS INDEX: 21.18 KG/M2 | SYSTOLIC BLOOD PRESSURE: 130 MMHG | RESPIRATION RATE: 16 BRPM | DIASTOLIC BLOOD PRESSURE: 76 MMHG | HEIGHT: 68 IN | WEIGHT: 139.77 LBS | TEMPERATURE: 97.3 F | OXYGEN SATURATION: 97 % | HEART RATE: 93 BPM

## 2020-03-19 LAB
ALBUMIN SERPL BCP-MCNC: 3.7 G/DL (ref 3.2–4.9)
ALBUMIN SERPL BCP-MCNC: 3.7 G/DL (ref 3.2–4.9)
ALBUMIN/GLOB SERPL: 1.1 G/DL
ALP SERPL-CCNC: 96 U/L (ref 30–99)
ALT SERPL-CCNC: 27 U/L (ref 2–50)
ANION GAP SERPL CALC-SCNC: 12 MMOL/L (ref 7–16)
AST SERPL-CCNC: 54 U/L (ref 12–45)
BASOPHILS # BLD AUTO: 0.3 % (ref 0–1.8)
BASOPHILS # BLD: 0.02 K/UL (ref 0–0.12)
BILIRUB SERPL-MCNC: 0.7 MG/DL (ref 0.1–1.5)
BUN SERPL-MCNC: 61 MG/DL (ref 8–22)
BUN SERPL-MCNC: 63 MG/DL (ref 8–22)
CALCIUM SERPL-MCNC: 8.1 MG/DL (ref 8.5–10.5)
CALCIUM SERPL-MCNC: 8.2 MG/DL (ref 8.5–10.5)
CHLORIDE SERPL-SCNC: 101 MMOL/L (ref 96–112)
CHLORIDE SERPL-SCNC: 102 MMOL/L (ref 96–112)
CO2 SERPL-SCNC: 19 MMOL/L (ref 20–33)
CO2 SERPL-SCNC: 19 MMOL/L (ref 20–33)
CREAT SERPL-MCNC: 3.4 MG/DL (ref 0.5–1.4)
CREAT SERPL-MCNC: 3.4 MG/DL (ref 0.5–1.4)
CRP SERPL HS-MCNC: <0.3 MG/DL (ref 0–0.75)
EOSINOPHIL # BLD AUTO: 0.32 K/UL (ref 0–0.51)
EOSINOPHIL NFR BLD: 5.1 % (ref 0–6.9)
ERYTHROCYTE [DISTWIDTH] IN BLOOD BY AUTOMATED COUNT: 49.5 FL (ref 35.9–50)
ERYTHROCYTE [SEDIMENTATION RATE] IN BLOOD BY WESTERGREN METHOD: 8 MM/HOUR (ref 0–20)
EST. AVERAGE GLUCOSE BLD GHB EST-MCNC: 91 MG/DL
GLOBULIN SER CALC-MCNC: 3.3 G/DL (ref 1.9–3.5)
GLUCOSE SERPL-MCNC: 107 MG/DL (ref 65–99)
GLUCOSE SERPL-MCNC: 133 MG/DL (ref 65–99)
HBA1C MFR BLD: 4.8 % (ref 0–5.6)
HCT VFR BLD AUTO: 27.8 % (ref 42–52)
HGB BLD-MCNC: 9.1 G/DL (ref 14–18)
IMM GRANULOCYTES # BLD AUTO: 0.02 K/UL (ref 0–0.11)
IMM GRANULOCYTES NFR BLD AUTO: 0.3 % (ref 0–0.9)
LYMPHOCYTES # BLD AUTO: 1.86 K/UL (ref 1–4.8)
LYMPHOCYTES NFR BLD: 29.5 % (ref 22–41)
MAGNESIUM SERPL-MCNC: 1 MG/DL (ref 1.5–2.5)
MCH RBC QN AUTO: 30.3 PG (ref 27–33)
MCHC RBC AUTO-ENTMCNC: 32.7 G/DL (ref 33.7–35.3)
MCV RBC AUTO: 92.7 FL (ref 81.4–97.8)
MONOCYTES # BLD AUTO: 0.62 K/UL (ref 0–0.85)
MONOCYTES NFR BLD AUTO: 9.8 % (ref 0–13.4)
NEUTROPHILS # BLD AUTO: 3.47 K/UL (ref 1.82–7.42)
NEUTROPHILS NFR BLD: 55 % (ref 44–72)
NRBC # BLD AUTO: 0 K/UL
NRBC BLD-RTO: 0 /100 WBC
PHOSPHATE SERPL-MCNC: 2.7 MG/DL (ref 2.5–4.5)
PHOSPHATE SERPL-MCNC: 2.7 MG/DL (ref 2.5–4.5)
PLATELET # BLD AUTO: 103 K/UL (ref 164–446)
PMV BLD AUTO: 10.8 FL (ref 9–12.9)
POTASSIUM SERPL-SCNC: 3 MMOL/L (ref 3.6–5.5)
POTASSIUM SERPL-SCNC: 3.2 MMOL/L (ref 3.6–5.5)
PROT SERPL-MCNC: 7 G/DL (ref 6–8.2)
RBC # BLD AUTO: 3 M/UL (ref 4.7–6.1)
SODIUM SERPL-SCNC: 133 MMOL/L (ref 135–145)
SODIUM SERPL-SCNC: 133 MMOL/L (ref 135–145)
TROPONIN T SERPL-MCNC: 60 NG/L (ref 6–19)
TROPONIN T SERPL-MCNC: 65 NG/L (ref 6–19)
TSH SERPL DL<=0.005 MIU/L-ACNC: 1.4 UIU/ML (ref 0.38–5.33)
WBC # BLD AUTO: 6.3 K/UL (ref 4.8–10.8)

## 2020-03-19 PROCEDURE — 84100 ASSAY OF PHOSPHORUS: CPT

## 2020-03-19 PROCEDURE — 99239 HOSP IP/OBS DSCHRG MGMT >30: CPT | Mod: GC | Performed by: INTERNAL MEDICINE

## 2020-03-19 PROCEDURE — A9502 TC99M TETROFOSMIN: HCPCS

## 2020-03-19 PROCEDURE — 80053 COMPREHEN METABOLIC PANEL: CPT

## 2020-03-19 PROCEDURE — A9270 NON-COVERED ITEM OR SERVICE: HCPCS | Performed by: STUDENT IN AN ORGANIZED HEALTH CARE EDUCATION/TRAINING PROGRAM

## 2020-03-19 PROCEDURE — 84443 ASSAY THYROID STIM HORMONE: CPT

## 2020-03-19 PROCEDURE — 700111 HCHG RX REV CODE 636 W/ 250 OVERRIDE (IP): Performed by: STUDENT IN AN ORGANIZED HEALTH CARE EDUCATION/TRAINING PROGRAM

## 2020-03-19 PROCEDURE — 700111 HCHG RX REV CODE 636 W/ 250 OVERRIDE (IP)

## 2020-03-19 PROCEDURE — 83735 ASSAY OF MAGNESIUM: CPT

## 2020-03-19 PROCEDURE — 80069 RENAL FUNCTION PANEL: CPT

## 2020-03-19 PROCEDURE — 85652 RBC SED RATE AUTOMATED: CPT

## 2020-03-19 PROCEDURE — 36415 COLL VENOUS BLD VENIPUNCTURE: CPT

## 2020-03-19 PROCEDURE — 700105 HCHG RX REV CODE 258

## 2020-03-19 PROCEDURE — 84484 ASSAY OF TROPONIN QUANT: CPT | Mod: 91

## 2020-03-19 PROCEDURE — 86140 C-REACTIVE PROTEIN: CPT

## 2020-03-19 PROCEDURE — 85025 COMPLETE CBC W/AUTO DIFF WBC: CPT

## 2020-03-19 PROCEDURE — 700105 HCHG RX REV CODE 258: Performed by: STUDENT IN AN ORGANIZED HEALTH CARE EDUCATION/TRAINING PROGRAM

## 2020-03-19 PROCEDURE — 700102 HCHG RX REV CODE 250 W/ 637 OVERRIDE(OP): Performed by: STUDENT IN AN ORGANIZED HEALTH CARE EDUCATION/TRAINING PROGRAM

## 2020-03-19 RX ORDER — POTASSIUM CHLORIDE 20 MEQ/1
40 TABLET, EXTENDED RELEASE ORAL ONCE
Status: DISCONTINUED | OUTPATIENT
Start: 2020-03-19 | End: 2020-03-19 | Stop reason: HOSPADM

## 2020-03-19 RX ORDER — POTASSIUM CHLORIDE 7.45 MG/ML
10 INJECTION INTRAVENOUS
Status: DISCONTINUED | OUTPATIENT
Start: 2020-03-19 | End: 2020-03-19

## 2020-03-19 RX ORDER — MAGNESIUM SULFATE HEPTAHYDRATE 40 MG/ML
2 INJECTION, SOLUTION INTRAVENOUS ONCE
Status: COMPLETED | OUTPATIENT
Start: 2020-03-19 | End: 2020-03-19

## 2020-03-19 RX ORDER — OXYCODONE HYDROCHLORIDE AND ACETAMINOPHEN 5; 325 MG/1; MG/1
1-2 TABLET ORAL EVERY 4 HOURS PRN
Qty: 20 TAB | Refills: 0 | Status: SHIPPED | OUTPATIENT
Start: 2020-03-19 | End: 2020-03-29

## 2020-03-19 RX ORDER — REGADENOSON 0.08 MG/ML
0.4 INJECTION, SOLUTION INTRAVENOUS ONCE
Status: COMPLETED | OUTPATIENT
Start: 2020-03-19 | End: 2020-03-19

## 2020-03-19 RX ORDER — REGADENOSON 0.08 MG/ML
INJECTION, SOLUTION INTRAVENOUS
Status: COMPLETED
Start: 2020-03-19 | End: 2020-03-19

## 2020-03-19 RX ORDER — METHYLPREDNISOLONE 4 MG/1
TABLET ORAL
Qty: 21 TAB | Refills: 0 | Status: SHIPPED | OUTPATIENT
Start: 2020-03-19 | End: 2020-03-19

## 2020-03-19 RX ORDER — POTASSIUM CHLORIDE 20 MEQ/1
20 TABLET, EXTENDED RELEASE ORAL DAILY
Qty: 10 TAB | Refills: 0 | Status: SHIPPED | OUTPATIENT
Start: 2020-03-19 | End: 2020-03-19

## 2020-03-19 RX ORDER — POTASSIUM CHLORIDE 7.45 MG/ML
10 INJECTION INTRAVENOUS
Status: COMPLETED | OUTPATIENT
Start: 2020-03-19 | End: 2020-03-19

## 2020-03-19 RX ORDER — POTASSIUM CHLORIDE 20 MEQ/1
20 TABLET, EXTENDED RELEASE ORAL DAILY
Qty: 10 TAB | Refills: 0 | Status: ON HOLD | OUTPATIENT
Start: 2020-03-19 | End: 2020-12-01

## 2020-03-19 RX ORDER — AMOXICILLIN 250 MG
1 CAPSULE ORAL DAILY
Status: DISCONTINUED | OUTPATIENT
Start: 2020-03-19 | End: 2020-03-19 | Stop reason: HOSPADM

## 2020-03-19 RX ORDER — MAGNESIUM SULFATE HEPTAHYDRATE 40 MG/ML
4 INJECTION, SOLUTION INTRAVENOUS ONCE
Status: DISCONTINUED | OUTPATIENT
Start: 2020-03-19 | End: 2020-03-19

## 2020-03-19 RX ORDER — OXYCODONE HYDROCHLORIDE AND ACETAMINOPHEN 5; 325 MG/1; MG/1
1 TABLET ORAL ONCE
Status: COMPLETED | OUTPATIENT
Start: 2020-03-19 | End: 2020-03-19

## 2020-03-19 RX ORDER — OXYCODONE HYDROCHLORIDE AND ACETAMINOPHEN 5; 325 MG/1; MG/1
1-2 TABLET ORAL EVERY 4 HOURS PRN
Qty: 20 TAB | Refills: 0 | Status: SHIPPED | OUTPATIENT
Start: 2020-03-19 | End: 2020-03-19

## 2020-03-19 RX ORDER — METHYLPREDNISOLONE 4 MG/1
TABLET ORAL
Qty: 21 TAB | Refills: 0 | Status: ON HOLD | OUTPATIENT
Start: 2020-03-19 | End: 2020-12-01

## 2020-03-19 RX ORDER — POTASSIUM CHLORIDE 20 MEQ/1
40 TABLET, EXTENDED RELEASE ORAL TWICE DAILY
Status: DISCONTINUED | OUTPATIENT
Start: 2020-03-19 | End: 2020-03-19

## 2020-03-19 RX ORDER — SODIUM CHLORIDE 9 MG/ML
INJECTION, SOLUTION INTRAVENOUS
Status: COMPLETED
Start: 2020-03-19 | End: 2020-03-19

## 2020-03-19 RX ADMIN — TAMSULOSIN HYDROCHLORIDE 0.4 MG: 0.4 CAPSULE ORAL at 05:22

## 2020-03-19 RX ADMIN — SENNOSIDES AND DOCUSATE SODIUM 1 TABLET: 8.6; 5 TABLET ORAL at 06:57

## 2020-03-19 RX ADMIN — POTASSIUM CHLORIDE 10 MEQ: 7.46 INJECTION, SOLUTION INTRAVENOUS at 13:55

## 2020-03-19 RX ADMIN — ATORVASTATIN CALCIUM 80 MG: 80 TABLET, FILM COATED ORAL at 18:15

## 2020-03-19 RX ADMIN — MAGNESIUM SULFATE IN WATER 4 G: 40 INJECTION, SOLUTION INTRAVENOUS at 12:10

## 2020-03-19 RX ADMIN — HYDROMORPHONE HYDROCHLORIDE 0.5 MG: 1 INJECTION, SOLUTION INTRAMUSCULAR; INTRAVENOUS; SUBCUTANEOUS at 00:22

## 2020-03-19 RX ADMIN — HYDROMORPHONE HYDROCHLORIDE 0.5 MG: 1 INJECTION, SOLUTION INTRAMUSCULAR; INTRAVENOUS; SUBCUTANEOUS at 06:57

## 2020-03-19 RX ADMIN — ALLOPURINOL 100 MG: 100 TABLET ORAL at 05:21

## 2020-03-19 RX ADMIN — OMEPRAZOLE 40 MG: 20 CAPSULE, DELAYED RELEASE ORAL at 05:19

## 2020-03-19 RX ADMIN — HYDROMORPHONE HYDROCHLORIDE 0.5 MG: 1 INJECTION, SOLUTION INTRAMUSCULAR; INTRAVENOUS; SUBCUTANEOUS at 10:18

## 2020-03-19 RX ADMIN — OXYCODONE HYDROCHLORIDE AND ACETAMINOPHEN 1 TABLET: 5; 325 TABLET ORAL at 18:15

## 2020-03-19 RX ADMIN — FUROSEMIDE 20 MG: 20 TABLET ORAL at 05:21

## 2020-03-19 RX ADMIN — ISOSORBIDE MONONITRATE 15 MG: 30 TABLET, EXTENDED RELEASE ORAL at 05:19

## 2020-03-19 RX ADMIN — METOPROLOL SUCCINATE 100 MG: 50 TABLET, EXTENDED RELEASE ORAL at 05:23

## 2020-03-19 RX ADMIN — MAGNESIUM SULFATE 2 G: 2 INJECTION INTRAVENOUS at 06:57

## 2020-03-19 RX ADMIN — REGADENOSON 0.4 MG: 0.08 INJECTION, SOLUTION INTRAVENOUS at 08:17

## 2020-03-19 RX ADMIN — SODIUM CHLORIDE 1000 ML: 9 INJECTION, SOLUTION INTRAVENOUS at 16:11

## 2020-03-19 RX ADMIN — POTASSIUM CHLORIDE 10 MEQ: 7.46 INJECTION, SOLUTION INTRAVENOUS at 16:11

## 2020-03-19 RX ADMIN — HYDROMORPHONE HYDROCHLORIDE 0.5 MG: 1 INJECTION, SOLUTION INTRAMUSCULAR; INTRAVENOUS; SUBCUTANEOUS at 14:41

## 2020-03-19 RX ADMIN — SODIUM CHLORIDE, POTASSIUM CHLORIDE, SODIUM LACTATE AND CALCIUM CHLORIDE: 600; 310; 30; 20 INJECTION, SOLUTION INTRAVENOUS at 01:29

## 2020-03-19 RX ADMIN — Medication 400 MG: at 13:54

## 2020-03-19 RX ADMIN — ASPIRIN 81 MG: 81 TABLET, COATED ORAL at 05:23

## 2020-03-19 RX ADMIN — FERROUS GLUCONATE 324 MG: 324 TABLET ORAL at 10:35

## 2020-03-19 NOTE — DISCHARGE PLANNING
Hospital Care Management Discharge Planning       Action:   · This RN CM educated MD that patient will need to take his prescriptions to the [facility] ER to fill his prescriptions as he does not have a secondary insurance.   · Provided BS RN with house SW extension ([phone]) for [facility] transport to [facility] once patient is ready for discharge.       Thank you for allowing me the pleasure of participating in this patient's care coordination and discharge planning.

## 2020-03-19 NOTE — THERAPY
PT orders recieved. Pt currently pending stress test and further work up. Will initiate PT eval as medically appropriate. Thanks    Jasmine Bell, PT, DPT Pager: [phone]

## 2020-03-19 NOTE — CARE PLAN
Problem: Communication  Goal: The ability to communicate needs accurately and effectively will improve  Outcome: PROGRESSING AS EXPECTED  Intervention: [place] patient and significant other/support system to call light to alert staff of needs  Flowsheets (Taken 3/18/2020 1814)  Oriented to:: All of the Following : Location of Bathroom, Visiting Policy, Unit Routine, Call Light and Bedside Controls, Bedside Rail Policy, Smoking Policy, Rights and Responsibilities, Bedside Report, and Patient Education Notebook     Problem: Safety  Goal: Will remain free from injury  Outcome: PROGRESSING AS EXPECTED  Note: Pt safety precautions in place; bed in lowest lock position, 2 side rails up, non slip socks on, call light within reach- educated on when and how to use call light.

## 2020-03-19 NOTE — THERAPY
PT orders received, eval attempted. RN reports pt will be discharging today. Acute PT evaluation not warranted at this time. Current PT orders DC'ed. Thanks    Jasmine Bell, PT, DPT Pager: [phone]

## 2020-03-19 NOTE — PROGRESS NOTES
Assumed care of patient at 0700. Patient alert and oriented, tele on. Plan of care discussed with patient and he verbalized understanding. Electrolytes replaced per MAR. Clarified with MD that electrolytes do not need to be rechecked prior to discharge, patient will be discharged with electrolyte prescription for home. Ok to discharge per MD after electrolyte replacement complete. Bed in low and locked position, call light within reach. Will continue to monitor.

## 2020-03-19 NOTE — DISCHARGE INSTRUCTIONS
Mr. Proctor, thank you for your cooperation with our medical care during your hospital stay. We have done extensive diagnostic measures to determine if your initial symptoms were from acute coronary syndrome, including EKG, nuclear stress testing, and a cardiac echocardiogram. All of the results have come back unremarkable, as we have discussed.. Troponin levels consistent , consistent with poor renal function.  We advise that you follow up with a primary care provider for ongoing measures of blood pressure and heart medication control . A primary care provider may also be seen for ongoing issues of back pain. [facility] During your stay you have had low magnesium and potassium which we have been repleting ; we will be sending you home with Magnesium and Potassium to continue to take .Take medications given on discharge including dose pack + oxycodone-acetaminophen(as needed) + magnesium and potassium. For any acute,new chest pain concerns, advising that you go to the emergency department.     Discharge Instructions    Discharged to home by taxi with self. Discharged via wheelchair, hospital escort: Yes.  Special equipment needed: Not Applicable    Be sure to schedule a follow-up appointment with your primary care doctor or any specialists as instructed.     Discharge Plan:   Influenza Vaccine Indication: Patient Refuses    I understand that a diet low in cholesterol, fat, and sodium is recommended for good health. Unless I have been given specific instructions below for another diet, I accept this instruction as my diet prescription.   Other diet: Healthy    Special Instructions: None    · Is patient discharged on Warfarin / Coumadin?   No     Depression / Suicide Risk    As [facility] are [facility], it is important to learn how to keep safe from harming yourself.    Recognize the warning signs:  · Abrupt changes in personality, positive or negative- including  increase in energy   · Giving away possessions  · Change in eating patterns- significant weight changes-  positive or negative  · Change in sleeping patterns- unable to sleep or sleeping all the time   · Unwillingness or inability to communicate  · Depression  · Unusual sadness, discouragement and loneliness  · Talk of wanting to die  · Neglect of personal appearance   · Rebelliousness- reckless behavior  · Withdrawal from people/activities they love  · Confusion- inability to concentrate     If you or a loved one observes any of these behaviors or has concerns about self-harm, here's what you can do:  · Talk about it- your feelings and reasons for harming yourself  · Remove any means that you might use to hurt yourself (examples: pills, rope, extension cords, firearm)  · Get professional help from the community (Mental Health, Substance Abuse, psychological counseling)  · Do not be alone:Call your Safe Contact- someone whom you trust who will be there for you.  · Call your local CRISIS HOTLINE 144-0665 or 976-532-2881  · Call your local Children's Mobile Crisis Response Team [place] (916) 527-4420 or www.[employer]  · Call the toll free National Suicide Prevention Hotlines   · National Suicide Prevention Lifeline 320-606-GOCS (9925)  · National Hope Line Network 800-SUICIDE (964-3131)

## 2020-03-19 NOTE — DISCHARGE SUMMARY
Discharge Summary    Date of Admission: 3/18/2020  Date of Discharge: 3/19/2020  Discharging Attending: Felix Colón M.D.   Discharging Senior Resident: Dr. Newman  Discharging Intern: Dr. Chowdhury     CHIEF COMPLAINT ON ADMISSION  Chief Complaint   Patient presents with   • Chest Pain     Onset this morning, previous Hx of MI ~ 6 months ago       Reason for Admission  Chest pain     Admission Date  3/18/2020    CODE STATUS  Full Code    HPI & HOSPITAL COURSE  This is a 69 y.o. male here with PMHx of hypertension, hyperlipidemia, history of CAD, CKD stage III, BPH, Iron deficiency anemia presented to the emergency department with symptoms of chest pain.  Patient [facility] care at the [facility].  Patient reports he was admitted to [facility] 6 months ago with symptoms of MI, per patient reports that no cardiac catheterization was done at that time.  During this admission NM cardiac stress test was negative for any acute need for catheterization.  Patient is discharged with 20 pills of Vicodin.  Patient is also has symptoms of back pain, chronic in nature discharged with dose of Medrol pack after ESR, CRP negative.  Patient is discharged with potassium oral supplementation as BMP this morning is 3 mEq.  Patient refused oral potassium in the hospital.  Patient is currently on Lasix for heart failure. He's discharged with oral potassium 20 meq daily.  Patient reported dark stools prior to arrival, refused rectal exam and did not provide a stool sample.[facility]  Therefore, he is discharged in good and stable condition to home with close outpatient follow-up.    The patient recovered much more quickly than anticipated on admission.    Discharge Date  3/19/2020    FOLLOW UP ITEMS POST DISCHARGE  BMP  Chest X ray    DISCHARGE DIAGNOSES  Principal Problem:    chest pain POA: Unknown  Active Problems:    Anemia POA: Unknown    CKD  (chronic kidney disease) POA: Unknown    Hypokalemia POA: Unknown    Gout POA: Unknown    BPH (benign prostatic hyperplasia) POA: Unknown    Insomnia POA: Unknown    HLD (hyperlipidemia) POA: Unknown    Ulcer, stomach peptic POA: Unknown    Back pain POA: Unknown  Resolved Problems:    * No resolved hospital problems. *      FOLLOW UP  No future appointments.  No follow-up provider specified.    MEDICATIONS ON DISCHARGE     Medication List      START taking these medications      Instructions   magnesium chloride 64 MG Tbec  Commonly known as:  MAG-64   Take 1 Tab by mouth 2 Times a Day.  Dose:  64 mg     methylPREDNISolone 4 MG Tbpk  Commonly known as:  MEDROL DOSEPAK   Follow schedule on package instructions.     oxyCODONE-acetaminophen 5-325 MG Tabs  Commonly known as:  PERCOCET   Doctor's comments:  20 tabs total, pharm can adjust order to accomodate 20 tabs  Take 1-2 Tabs by mouth every four hours as needed for up to 10 days.  Dose:  1-2 Tab     potassium chloride SA 20 MEQ Tbcr  Commonly known as:  Kdur   Take 1 Tab by mouth every day.  Dose:  20 mEq        CONTINUE taking these medications      Instructions   allopurinol 100 MG Tabs  Commonly known as:  ZYLOPRIM   Take 100 mg by mouth every morning.  Dose:  100 mg     aspirin EC 81 MG Tbec  Commonly known as:  ECOTRIN   Take 81 mg by mouth every morning.  Dose:  81 mg     atorvastatin 80 MG tablet  Commonly known as:  LIPITOR   Take 80 mg by mouth every evening.  Dose:  80 mg     ferrous gluconate 324 (38 Fe) MG Tabs  Commonly known as:  FERGON   Take 324 mg by mouth every morning.  Dose:  324 mg     furosemide 20 MG Tabs  Commonly known as:  LASIX   Take 20 mg by mouth every morning.  Dose:  20 mg     isosorbide mononitrate SR 30 MG Tb24  Commonly known as:  IMDUR   Take 15 mg by mouth every day. Half tablet every morning  Dose:  15 mg     lidocaine 5 % Ptch  Commonly known as:  LIDODERM   Apply 1 Patch to skin as directed every 12 hours.  Dose:  1 Patch      metoprolol  MG Tb24  Commonly known as:  TOPROL XL   Take 100 mg by mouth every morning.  Dose:  100 mg     omeprazole 20 MG delayed-release capsule  Commonly known as:  PRILOSEC   Take 40 mg by mouth every morning.  Dose:  40 mg     tamsulosin 0.4 MG capsule  Commonly known as:  FLOMAX   Take 0.4 mg by mouth every morning.  Dose:  0.4 mg     traZODone 100 MG Tabs  Commonly known as:  DESYREL   Take 100 mg by mouth every bedtime.  Dose:  100 mg        STOP taking these medications    colchicine 0.6 MG Tabs  Commonly known as:  COLCRYS            Allergies  No Known Allergies    DIET  Orders Placed This Encounter   Procedures   • Diet Order Diabetic     Standing Status:   Standing     Number of Occurrences:   1     Order Specific Question:   Diet:     Answer:   Diabetic [3]       ACTIVITY  As tolerated.  Weight bearing as tolerated    CONSULTATIONS  None    PROCEDURES  None

## 2020-03-19 NOTE — PROGRESS NOTES
Assumed care this pm from day shift RN. Patient in bed . Call bell and personal items within reach, bed locked in low position. Bed exit alarm off . Hourly rounding in place.

## 2020-03-19 NOTE — PROGRESS NOTES
Patient refusing occult blood exam at bedside via rectal exam. Passed bowel movement just before but flushed it down toilet. Informed of reasons why we want to get sample to verify if having bleed (given anemia and history of dark stool 2 days prior to admission), but still refused.     Replenishing electrolytes, then to discharge with oral electrolyte (Mg and K) daily.

## 2020-03-20 NOTE — PROGRESS NOTES
Discharge information discussed with patient at bedside. Prescriptions given to patient. All questions answered at this time. Patient discharged with all belongings. [facility]. CNA escorted patient to front entrance via wheelchair.

## 2020-11-30 ENCOUNTER — HOSPITAL ENCOUNTER (INPATIENT)
Facility: MEDICAL CENTER | Age: 70
LOS: 7 days | DRG: 871 | End: 2020-12-08
Attending: EMERGENCY MEDICINE | Admitting: STUDENT IN AN ORGANIZED HEALTH CARE EDUCATION/TRAINING PROGRAM
Payer: COMMERCIAL

## 2020-11-30 DIAGNOSIS — R79.89 ELEVATED BRAIN NATRIURETIC PEPTIDE (BNP) LEVEL: ICD-10-CM

## 2020-11-30 DIAGNOSIS — N18.4 STAGE 4 CHRONIC KIDNEY DISEASE (HCC): ICD-10-CM

## 2020-11-30 PROCEDURE — 99285 EMERGENCY DEPT VISIT HI MDM: CPT

## 2020-11-30 PROCEDURE — 93005 ELECTROCARDIOGRAM TRACING: CPT

## 2020-11-30 PROCEDURE — 96365 THER/PROPH/DIAG IV INF INIT: CPT

## 2020-11-30 PROCEDURE — 96375 TX/PRO/DX INJ NEW DRUG ADDON: CPT

## 2020-11-30 PROCEDURE — 93005 ELECTROCARDIOGRAM TRACING: CPT | Performed by: EMERGENCY MEDICINE

## 2020-11-30 ASSESSMENT — FIBROSIS 4 INDEX: FIB4 SCORE: 6.86

## 2020-12-01 ENCOUNTER — APPOINTMENT (OUTPATIENT)
Dept: CARDIOLOGY | Facility: MEDICAL CENTER | Age: 70
DRG: 871 | End: 2020-12-01
Attending: STUDENT IN AN ORGANIZED HEALTH CARE EDUCATION/TRAINING PROGRAM
Payer: COMMERCIAL

## 2020-12-01 ENCOUNTER — APPOINTMENT (OUTPATIENT)
Dept: RADIOLOGY | Facility: MEDICAL CENTER | Age: 70
DRG: 871 | End: 2020-12-01
Attending: INTERNAL MEDICINE
Payer: COMMERCIAL

## 2020-12-01 ENCOUNTER — APPOINTMENT (OUTPATIENT)
Dept: RADIOLOGY | Facility: MEDICAL CENTER | Age: 70
DRG: 871 | End: 2020-12-01
Attending: EMERGENCY MEDICINE
Payer: COMMERCIAL

## 2020-12-01 PROBLEM — R79.89 ELEVATED D-DIMER: Status: ACTIVE | Noted: 2020-12-01

## 2020-12-01 PROBLEM — A41.9 SEPSIS (HCC): Status: ACTIVE | Noted: 2020-12-01

## 2020-12-01 PROBLEM — J12.82 PNEUMONIA DUE TO COVID-19 VIRUS: Status: ACTIVE | Noted: 2020-12-01

## 2020-12-01 PROBLEM — E83.42 HYPOMAGNESEMIA: Status: ACTIVE | Noted: 2020-12-01

## 2020-12-01 PROBLEM — U07.1 PNEUMONIA DUE TO COVID-19 VIRUS: Status: ACTIVE | Noted: 2020-12-01

## 2020-12-01 PROBLEM — R79.89 ELEVATED TROPONIN: Status: ACTIVE | Noted: 2020-12-01

## 2020-12-01 PROBLEM — E83.51 HYPOCALCEMIA: Status: ACTIVE | Noted: 2020-12-01

## 2020-12-01 PROBLEM — R79.89 ELEVATED BRAIN NATRIURETIC PEPTIDE (BNP) LEVEL: Status: ACTIVE | Noted: 2020-12-01

## 2020-12-01 LAB
ABO + RH BLD: NORMAL
ABO GROUP BLD: NORMAL
ALBUMIN SERPL BCP-MCNC: 3.5 G/DL (ref 3.2–4.9)
ALBUMIN/GLOB SERPL: 0.9 G/DL
ALP SERPL-CCNC: 144 U/L (ref 30–99)
ALT SERPL-CCNC: 12 U/L (ref 2–50)
ANION GAP SERPL CALC-SCNC: 11 MMOL/L (ref 7–16)
APPEARANCE UR: CLEAR
APTT PPP: 31.9 SEC (ref 24.7–36)
AST SERPL-CCNC: 27 U/L (ref 12–45)
BACTERIA #/AREA URNS HPF: NEGATIVE /HPF
BARCODED ABORH UBTYP: 600
BARCODED PRD CODE UBPRD: NORMAL
BARCODED UNIT NUM UBUNT: NORMAL
BASOPHILS # BLD AUTO: 0.8 % (ref 0–1.8)
BASOPHILS # BLD: 0.04 K/UL (ref 0–0.12)
BILIRUB SERPL-MCNC: 0.3 MG/DL (ref 0.1–1.5)
BILIRUB UR QL STRIP.AUTO: NEGATIVE
BLD GP AB SCN SERPL QL: NORMAL
BUN SERPL-MCNC: 46 MG/DL (ref 8–22)
CALCIUM SERPL-MCNC: 7.6 MG/DL (ref 8.5–10.5)
CHLORIDE SERPL-SCNC: 113 MMOL/L (ref 96–112)
CK SERPL-CCNC: 128 U/L (ref 0–154)
CO2 SERPL-SCNC: 15 MMOL/L (ref 20–33)
COLOR UR: YELLOW
COMPONENT R 8504R: NORMAL
COVID ORDER STATUS COVID19: NORMAL
CREAT SERPL-MCNC: 3.97 MG/DL (ref 0.5–1.4)
CRP SERPL HS-MCNC: 1.1 MG/DL (ref 0–0.75)
D DIMER PPP IA.FEU-MCNC: 2.94 UG/ML (FEU) (ref 0–0.5)
EKG IMPRESSION: NORMAL
EOSINOPHIL # BLD AUTO: 0.15 K/UL (ref 0–0.51)
EOSINOPHIL NFR BLD: 2.9 % (ref 0–6.9)
EPI CELLS #/AREA URNS HPF: NEGATIVE /HPF
ERYTHROCYTE [DISTWIDTH] IN BLOOD BY AUTOMATED COUNT: 63.3 FL (ref 35.9–50)
ERYTHROCYTE [SEDIMENTATION RATE] IN BLOOD BY WESTERGREN METHOD: 30 MM/HOUR (ref 0–20)
FERRITIN SERPL-MCNC: 1440 NG/ML (ref 22–322)
FLUAV RNA SPEC QL NAA+PROBE: NEGATIVE
FLUBV RNA SPEC QL NAA+PROBE: NEGATIVE
FOLATE SERPL-MCNC: 21 NG/ML
GLOBULIN SER CALC-MCNC: 3.7 G/DL (ref 1.9–3.5)
GLUCOSE SERPL-MCNC: 103 MG/DL (ref 65–99)
GLUCOSE UR STRIP.AUTO-MCNC: NEGATIVE MG/DL
HCT VFR BLD AUTO: 24.7 % (ref 42–52)
HGB BLD-MCNC: 7.5 G/DL (ref 14–18)
HYALINE CASTS #/AREA URNS LPF: ABNORMAL /LPF
IMM GRANULOCYTES # BLD AUTO: 0.03 K/UL (ref 0–0.11)
IMM GRANULOCYTES NFR BLD AUTO: 0.6 % (ref 0–0.9)
INR PPP: 1.1 (ref 0.87–1.13)
INR PPP: 1.16 (ref 0.87–1.13)
IRON SATN MFR SERPL: 7 % (ref 15–55)
IRON SERPL-MCNC: 12 UG/DL (ref 50–180)
KETONES UR STRIP.AUTO-MCNC: NEGATIVE MG/DL
LACTATE BLD-SCNC: 1.4 MMOL/L (ref 0.5–2)
LEUKOCYTE ESTERASE UR QL STRIP.AUTO: NEGATIVE
LV EJECT FRACT  99904: 55
LV EJECT FRACT MOD 2C 99903: 49.75
LV EJECT FRACT MOD 4C 99902: 65.09
LV EJECT FRACT MOD BP 99901: 57.07
LYMPHOCYTES # BLD AUTO: 0.72 K/UL (ref 1–4.8)
LYMPHOCYTES NFR BLD: 13.7 % (ref 22–41)
MAGNESIUM SERPL-MCNC: 0.8 MG/DL (ref 1.5–2.5)
MAGNESIUM SERPL-MCNC: 0.8 MG/DL (ref 1.5–2.5)
MAGNESIUM SERPL-MCNC: 1.5 MG/DL (ref 1.5–2.5)
MCH RBC QN AUTO: 29.6 PG (ref 27–33)
MCHC RBC AUTO-ENTMCNC: 30.4 G/DL (ref 33.7–35.3)
MCV RBC AUTO: 97.6 FL (ref 81.4–97.8)
MICRO URNS: ABNORMAL
MONOCYTES # BLD AUTO: 0.33 K/UL (ref 0–0.85)
MONOCYTES NFR BLD AUTO: 6.3 % (ref 0–13.4)
NEUTROPHILS # BLD AUTO: 3.97 K/UL (ref 1.82–7.42)
NEUTROPHILS NFR BLD: 75.7 % (ref 44–72)
NITRITE UR QL STRIP.AUTO: NEGATIVE
NRBC # BLD AUTO: 0 K/UL
NRBC BLD-RTO: 0 /100 WBC
NT-PROBNP SERPL IA-MCNC: ABNORMAL PG/ML (ref 0–125)
PH UR STRIP.AUTO: 5 [PH] (ref 5–8)
PHOSPHATE SERPL-MCNC: 3.1 MG/DL (ref 2.5–4.5)
PLATELET # BLD AUTO: 147 K/UL (ref 164–446)
PMV BLD AUTO: 10.4 FL (ref 9–12.9)
POTASSIUM SERPL-SCNC: 4.2 MMOL/L (ref 3.6–5.5)
PROCALCITONIN SERPL-MCNC: 0.51 NG/ML
PROCALCITONIN SERPL-MCNC: 1.31 NG/ML
PRODUCT TYPE UPROD: NORMAL
PROT SERPL-MCNC: 7.2 G/DL (ref 6–8.2)
PROT UR QL STRIP: 100 MG/DL
PROTHROMBIN TIME: 14.6 SEC (ref 12–14.6)
PROTHROMBIN TIME: 15.2 SEC (ref 12–14.6)
RBC # BLD AUTO: 2.53 M/UL (ref 4.7–6.1)
RBC # URNS HPF: ABNORMAL /HPF
RBC UR QL AUTO: NEGATIVE
RH BLD: NORMAL
RSV RNA SPEC QL NAA+PROBE: NEGATIVE
SARS-COV-2 RNA RESP QL NAA+PROBE: DETECTED
SODIUM SERPL-SCNC: 139 MMOL/L (ref 135–145)
SP GR UR STRIP.AUTO: 1.02
SPECIMEN SOURCE: ABNORMAL
T4 FREE SERPL-MCNC: 0.86 NG/DL (ref 0.93–1.7)
TIBC SERPL-MCNC: 177 UG/DL (ref 250–450)
TRANSFERRIN SERPL-MCNC: 125 MG/DL (ref 200–370)
TROPONIN T SERPL-MCNC: 69 NG/L (ref 6–19)
TROPONIN T SERPL-MCNC: 71 NG/L (ref 6–19)
TROPONIN T SERPL-MCNC: 74 NG/L (ref 6–19)
TSH SERPL DL<=0.005 MIU/L-ACNC: 1.38 UIU/ML (ref 0.38–5.33)
UIBC SERPL-MCNC: 165 UG/DL (ref 110–370)
UNIT STATUS USTAT: NORMAL
UROBILINOGEN UR STRIP.AUTO-MCNC: 0.2 MG/DL
VIT B12 SERPL-MCNC: 580 PG/ML (ref 211–911)
WBC # BLD AUTO: 5.2 K/UL (ref 4.8–10.8)
WBC #/AREA URNS HPF: ABNORMAL /HPF

## 2020-12-01 PROCEDURE — 84439 ASSAY OF FREE THYROXINE: CPT

## 2020-12-01 PROCEDURE — 82607 VITAMIN B-12: CPT

## 2020-12-01 PROCEDURE — 36415 COLL VENOUS BLD VENIPUNCTURE: CPT

## 2020-12-01 PROCEDURE — 86900 BLOOD TYPING SEROLOGIC ABO: CPT

## 2020-12-01 PROCEDURE — 99223 1ST HOSP IP/OBS HIGH 75: CPT | Performed by: STUDENT IN AN ORGANIZED HEALTH CARE EDUCATION/TRAINING PROGRAM

## 2020-12-01 PROCEDURE — A9270 NON-COVERED ITEM OR SERVICE: HCPCS | Performed by: INTERNAL MEDICINE

## 2020-12-01 PROCEDURE — 80053 COMPREHEN METABOLIC PANEL: CPT

## 2020-12-01 PROCEDURE — 86140 C-REACTIVE PROTEIN: CPT

## 2020-12-01 PROCEDURE — 93970 EXTREMITY STUDY: CPT

## 2020-12-01 PROCEDURE — 86901 BLOOD TYPING SEROLOGIC RH(D): CPT

## 2020-12-01 PROCEDURE — 85379 FIBRIN DEGRADATION QUANT: CPT

## 2020-12-01 PROCEDURE — A9270 NON-COVERED ITEM OR SERVICE: HCPCS | Performed by: EMERGENCY MEDICINE

## 2020-12-01 PROCEDURE — 82746 ASSAY OF FOLIC ACID SERUM: CPT

## 2020-12-01 PROCEDURE — 83540 ASSAY OF IRON: CPT

## 2020-12-01 PROCEDURE — 83735 ASSAY OF MAGNESIUM: CPT | Mod: 91

## 2020-12-01 PROCEDURE — 700102 HCHG RX REV CODE 250 W/ 637 OVERRIDE(OP): Performed by: EMERGENCY MEDICINE

## 2020-12-01 PROCEDURE — 83880 ASSAY OF NATRIURETIC PEPTIDE: CPT

## 2020-12-01 PROCEDURE — 83605 ASSAY OF LACTIC ACID: CPT

## 2020-12-01 PROCEDURE — 82550 ASSAY OF CK (CPK): CPT

## 2020-12-01 PROCEDURE — 87086 URINE CULTURE/COLONY COUNT: CPT

## 2020-12-01 PROCEDURE — 87040 BLOOD CULTURE FOR BACTERIA: CPT | Mod: 91

## 2020-12-01 PROCEDURE — 86850 RBC ANTIBODY SCREEN: CPT

## 2020-12-01 PROCEDURE — 700102 HCHG RX REV CODE 250 W/ 637 OVERRIDE(OP): Performed by: STUDENT IN AN ORGANIZED HEALTH CARE EDUCATION/TRAINING PROGRAM

## 2020-12-01 PROCEDURE — 84484 ASSAY OF TROPONIN QUANT: CPT | Mod: 91

## 2020-12-01 PROCEDURE — 700105 HCHG RX REV CODE 258: Performed by: EMERGENCY MEDICINE

## 2020-12-01 PROCEDURE — A9270 NON-COVERED ITEM OR SERVICE: HCPCS | Performed by: STUDENT IN AN ORGANIZED HEALTH CARE EDUCATION/TRAINING PROGRAM

## 2020-12-01 PROCEDURE — 85025 COMPLETE CBC W/AUTO DIFF WBC: CPT

## 2020-12-01 PROCEDURE — 0241U HCHG SARS-COV-2 COVID-19 NFCT DS RESP RNA 4 TRGT MIC: CPT

## 2020-12-01 PROCEDURE — 700111 HCHG RX REV CODE 636 W/ 250 OVERRIDE (IP): Performed by: EMERGENCY MEDICINE

## 2020-12-01 PROCEDURE — 85730 THROMBOPLASTIN TIME PARTIAL: CPT

## 2020-12-01 PROCEDURE — 84466 ASSAY OF TRANSFERRIN: CPT

## 2020-12-01 PROCEDURE — 82728 ASSAY OF FERRITIN: CPT

## 2020-12-01 PROCEDURE — 93306 TTE W/DOPPLER COMPLETE: CPT | Mod: 26 | Performed by: INTERNAL MEDICINE

## 2020-12-01 PROCEDURE — 84443 ASSAY THYROID STIM HORMONE: CPT

## 2020-12-01 PROCEDURE — 700102 HCHG RX REV CODE 250 W/ 637 OVERRIDE(OP): Performed by: INTERNAL MEDICINE

## 2020-12-01 PROCEDURE — 700111 HCHG RX REV CODE 636 W/ 250 OVERRIDE (IP): Performed by: STUDENT IN AN ORGANIZED HEALTH CARE EDUCATION/TRAINING PROGRAM

## 2020-12-01 PROCEDURE — 85652 RBC SED RATE AUTOMATED: CPT

## 2020-12-01 PROCEDURE — 93306 TTE W/DOPPLER COMPLETE: CPT

## 2020-12-01 PROCEDURE — 71045 X-RAY EXAM CHEST 1 VIEW: CPT

## 2020-12-01 PROCEDURE — 85610 PROTHROMBIN TIME: CPT | Mod: 91

## 2020-12-01 PROCEDURE — 83550 IRON BINDING TEST: CPT

## 2020-12-01 PROCEDURE — 700105 HCHG RX REV CODE 258: Performed by: STUDENT IN AN ORGANIZED HEALTH CARE EDUCATION/TRAINING PROGRAM

## 2020-12-01 PROCEDURE — 84100 ASSAY OF PHOSPHORUS: CPT

## 2020-12-01 PROCEDURE — 81001 URINALYSIS AUTO W/SCOPE: CPT

## 2020-12-01 PROCEDURE — 770020 HCHG ROOM/CARE - TELE (206)

## 2020-12-01 PROCEDURE — 84145 PROCALCITONIN (PCT): CPT | Mod: 91

## 2020-12-01 PROCEDURE — 83520 IMMUNOASSAY QUANT NOS NONAB: CPT

## 2020-12-01 RX ORDER — MAGNESIUM SULFATE HEPTAHYDRATE 40 MG/ML
2 INJECTION, SOLUTION INTRAVENOUS ONCE
Status: COMPLETED | OUTPATIENT
Start: 2020-12-01 | End: 2020-12-01

## 2020-12-01 RX ORDER — METOPROLOL SUCCINATE 50 MG/1
100 TABLET, EXTENDED RELEASE ORAL DAILY
Status: DISCONTINUED | OUTPATIENT
Start: 2020-12-01 | End: 2020-12-08 | Stop reason: HOSPADM

## 2020-12-01 RX ORDER — ACETAMINOPHEN 325 MG/1
650 TABLET ORAL ONCE
Status: COMPLETED | OUTPATIENT
Start: 2020-12-01 | End: 2020-12-01

## 2020-12-01 RX ORDER — METOPROLOL SUCCINATE 50 MG/1
100 TABLET, EXTENDED RELEASE ORAL EVERY MORNING
Status: DISCONTINUED | OUTPATIENT
Start: 2020-12-01 | End: 2020-12-01

## 2020-12-01 RX ORDER — ATORVASTATIN CALCIUM 20 MG/1
80 TABLET, FILM COATED ORAL EVERY EVENING
Status: DISCONTINUED | OUTPATIENT
Start: 2020-12-01 | End: 2020-12-01

## 2020-12-01 RX ORDER — METOPROLOL SUCCINATE 100 MG/1
100 TABLET, EXTENDED RELEASE ORAL DAILY
Status: ON HOLD | COMMUNITY
End: 2021-11-23

## 2020-12-01 RX ORDER — ALBUTEROL SULFATE 90 UG/1
2 AEROSOL, METERED RESPIRATORY (INHALATION) EVERY 4 HOURS PRN
Status: DISCONTINUED | OUTPATIENT
Start: 2020-12-01 | End: 2020-12-08 | Stop reason: HOSPADM

## 2020-12-01 RX ORDER — TRAZODONE HYDROCHLORIDE 100 MG/1
100 TABLET ORAL
Status: DISCONTINUED | OUTPATIENT
Start: 2020-12-01 | End: 2020-12-08 | Stop reason: HOSPADM

## 2020-12-01 RX ORDER — HEPARIN SODIUM 5000 [USP'U]/ML
5000 INJECTION, SOLUTION INTRAVENOUS; SUBCUTANEOUS EVERY 8 HOURS
Status: DISCONTINUED | OUTPATIENT
Start: 2020-12-01 | End: 2020-12-02

## 2020-12-01 RX ORDER — MAGNESIUM SULFATE HEPTAHYDRATE 40 MG/ML
2 INJECTION, SOLUTION INTRAVENOUS
Status: DISCONTINUED | OUTPATIENT
Start: 2020-12-01 | End: 2020-12-01

## 2020-12-01 RX ORDER — FUROSEMIDE 10 MG/ML
20 INJECTION INTRAMUSCULAR; INTRAVENOUS ONCE
Status: COMPLETED | OUTPATIENT
Start: 2020-12-01 | End: 2020-12-01

## 2020-12-01 RX ORDER — DEXAMETHASONE 4 MG/1
6 TABLET ORAL DAILY
Status: DISCONTINUED | OUTPATIENT
Start: 2020-12-01 | End: 2020-12-08 | Stop reason: HOSPADM

## 2020-12-01 RX ORDER — MAGNESIUM SULFATE HEPTAHYDRATE 40 MG/ML
2 INJECTION, SOLUTION INTRAVENOUS ONCE
Status: ACTIVE | OUTPATIENT
Start: 2020-12-01 | End: 2020-12-02

## 2020-12-01 RX ORDER — ISOSORBIDE MONONITRATE 30 MG/1
15 TABLET, EXTENDED RELEASE ORAL EVERY MORNING
Status: DISCONTINUED | OUTPATIENT
Start: 2020-12-01 | End: 2020-12-08 | Stop reason: HOSPADM

## 2020-12-01 RX ORDER — AMOXICILLIN 250 MG
2 CAPSULE ORAL 2 TIMES DAILY
Status: DISCONTINUED | OUTPATIENT
Start: 2020-12-01 | End: 2020-12-08 | Stop reason: HOSPADM

## 2020-12-01 RX ORDER — ISOSORBIDE MONONITRATE 30 MG/1
15 TABLET, EXTENDED RELEASE ORAL
Status: DISCONTINUED | OUTPATIENT
Start: 2020-12-01 | End: 2020-12-01

## 2020-12-01 RX ORDER — TRAMADOL HYDROCHLORIDE 50 MG/1
50 TABLET ORAL EVERY 6 HOURS PRN
Status: DISCONTINUED | OUTPATIENT
Start: 2020-12-01 | End: 2020-12-08 | Stop reason: HOSPADM

## 2020-12-01 RX ORDER — TRAZODONE HYDROCHLORIDE 50 MG/1
50 TABLET ORAL
COMMUNITY

## 2020-12-01 RX ORDER — POLYETHYLENE GLYCOL 3350 17 G/17G
1 POWDER, FOR SOLUTION ORAL
Status: DISCONTINUED | OUTPATIENT
Start: 2020-12-01 | End: 2020-12-08 | Stop reason: HOSPADM

## 2020-12-01 RX ORDER — FERROUS GLUCONATE 324(38)MG
324 TABLET ORAL EVERY MORNING
Status: DISCONTINUED | OUTPATIENT
Start: 2020-12-01 | End: 2020-12-01

## 2020-12-01 RX ORDER — ISOSORBIDE MONONITRATE 30 MG/1
15 TABLET, EXTENDED RELEASE ORAL EVERY MORNING
Status: ON HOLD | COMMUNITY
End: 2021-11-23

## 2020-12-01 RX ORDER — ALBUTEROL SULFATE 90 UG/1
2 AEROSOL, METERED RESPIRATORY (INHALATION)
Status: DISCONTINUED | OUTPATIENT
Start: 2020-12-01 | End: 2020-12-01

## 2020-12-01 RX ORDER — TAMSULOSIN HYDROCHLORIDE 0.4 MG/1
0.4 CAPSULE ORAL EVERY MORNING
Status: DISCONTINUED | OUTPATIENT
Start: 2020-12-01 | End: 2020-12-08 | Stop reason: HOSPADM

## 2020-12-01 RX ORDER — BISACODYL 10 MG
10 SUPPOSITORY, RECTAL RECTAL
Status: DISCONTINUED | OUTPATIENT
Start: 2020-12-01 | End: 2020-12-08 | Stop reason: HOSPADM

## 2020-12-01 RX ORDER — AZITHROMYCIN 250 MG/1
500 TABLET, FILM COATED ORAL DAILY
Status: COMPLETED | OUTPATIENT
Start: 2020-12-01 | End: 2020-12-03

## 2020-12-01 RX ORDER — ACETAMINOPHEN 500 MG
500 TABLET ORAL EVERY 6 HOURS PRN
Status: DISCONTINUED | OUTPATIENT
Start: 2020-12-01 | End: 2020-12-08 | Stop reason: HOSPADM

## 2020-12-01 RX ADMIN — MAGNESIUM SULFATE 2 G: 2 INJECTION INTRAVENOUS at 04:02

## 2020-12-01 RX ADMIN — TRAZODONE HYDROCHLORIDE 100 MG: 100 TABLET ORAL at 22:03

## 2020-12-01 RX ADMIN — ACETAMINOPHEN 650 MG: 325 TABLET, FILM COATED ORAL at 00:41

## 2020-12-01 RX ADMIN — TRAMADOL HYDROCHLORIDE 50 MG: 50 TABLET, COATED ORAL at 16:17

## 2020-12-01 RX ADMIN — FUROSEMIDE 20 MG: 10 INJECTION, SOLUTION INTRAMUSCULAR; INTRAVENOUS at 01:55

## 2020-12-01 RX ADMIN — DEXAMETHASONE 6 MG: 4 TABLET ORAL at 05:11

## 2020-12-01 RX ADMIN — TAMSULOSIN HYDROCHLORIDE 0.4 MG: 0.4 CAPSULE ORAL at 05:12

## 2020-12-01 RX ADMIN — METOPROLOL SUCCINATE 100 MG: 50 TABLET, EXTENDED RELEASE ORAL at 12:08

## 2020-12-01 RX ADMIN — MAGNESIUM SULFATE 2 G: 2 INJECTION INTRAVENOUS at 05:12

## 2020-12-01 RX ADMIN — AZITHROMYCIN MONOHYDRATE 500 MG: 250 TABLET ORAL at 05:12

## 2020-12-01 RX ADMIN — CEFTRIAXONE SODIUM 2 G: 2 INJECTION, POWDER, FOR SOLUTION INTRAMUSCULAR; INTRAVENOUS at 00:07

## 2020-12-01 RX ADMIN — SODIUM CHLORIDE 3 G: 900 INJECTION INTRAVENOUS at 17:03

## 2020-12-01 RX ADMIN — ISOSORBIDE MONONITRATE 15 MG: 30 TABLET, EXTENDED RELEASE ORAL at 12:07

## 2020-12-01 ASSESSMENT — ENCOUNTER SYMPTOMS
MYALGIAS: 1
DIZZINESS: 0
WEAKNESS: 0
SORE THROAT: 0
SHORTNESS OF BREATH: 1
LOSS OF CONSCIOUSNESS: 0
CHILLS: 1
CONSTIPATION: 0
VOMITING: 0
DIARRHEA: 0
SPUTUM PRODUCTION: 1
WEIGHT LOSS: 0
NAUSEA: 0
BLOOD IN STOOL: 0
ABDOMINAL PAIN: 0
HEMOPTYSIS: 0
PALPITATIONS: 0
COUGH: 1
EYE PAIN: 0
FEVER: 1
ORTHOPNEA: 0
WHEEZING: 0
DEPRESSION: 0
BLURRED VISION: 0

## 2020-12-01 ASSESSMENT — PAIN DESCRIPTION - PAIN TYPE: TYPE: ACUTE PAIN

## 2020-12-01 ASSESSMENT — FIBROSIS 4 INDEX: FIB4 SCORE: 3.61

## 2020-12-01 NOTE — ASSESSMENT & PLAN NOTE
AST//128--> trending down, monitor   Noted to be +ve for Hep C; need f/u as an op    -- USG of abdomen showed cholelithiasis with sludge, no Evans sign, no tenderness in the right lower quadrant; need follow-up as an outpatient

## 2020-12-01 NOTE — ASSESSMENT & PLAN NOTE
Resented with worsening of shortness of breath associated with green-colored sputum, fever, chills.     --Found to be hypoxic, COVID-19 positive   --We will continue Decadron, supportive care for COVID-19 infection  --Patient antibiotic was changed to Zosyn plus Zyvox once he continues to remain febrile despite being on Unasyn.  --- MRSA nasal swab negative for mRSA, will stop zyvox    We will monitor the patient on Unasyn and see how he does.  His oxygen requirement has significantly better.  Will obtain home O2 eval

## 2020-12-01 NOTE — ASSESSMENT & PLAN NOTE
Ca 7.6, correcting for albumin = 8.0  Pt reports noticing leg cramping for the past year  Mg 0.8  - will require correction of Mg level first as will otherwise prove difficult  - continue to monitor with Mg correction

## 2020-12-01 NOTE — ASSESSMENT & PLAN NOTE
Patient is noted to have iron deficiency anemia status post 1 unit of PRBC transfusion on 12/2   -Provide IV iron   -Need to follow-up with [facility] as an outpatient

## 2020-12-01 NOTE — H&P
"Hospital Medicine History & Physical Note    Date of Service  12/1/2020    Primary Care Physician  No primary care provider on file.    Consultants  None    Code Status  Full Code    Chief Complaint  Chief Complaint   Patient presents with   • Shortness of Breath     \"earlier this evening i started to feel short of breathe\", non productive cough noted   • Palpitations     complaints of \"heart beating fast\" tachycardia noted on arrival       History of Presenting Illness  70 y.o. male who presented 11/30/2020 with complaints of worsening SOB x1 day. Accompanying this SOB, pt reports a productive cough of green sputum that has now resolved,  fever, mild headache, and b/l LE edema. Pt reports that his symptoms have progressively worsened throughout the day until they became so severe he had to call EMS last night. On arrival, EMS noted pt was hypoxic at 69% on RA and required rebreather. Pt reports he has history of CKD, but does not follow a nephrologist. He moved to [place] 1 year ago, but does not follow a PCP.     ED: T101.1, , /106, RR 28, O2 sat 97% on nonrebreather. WBC wnl, BUN/Cr 46/3.97, Lactic acid 1.4, Trop 69,, BNP 29940, D-dimer 2.94, COVID+. CXR performed w/ pulm edema/infiltrates. He was given Acetaminophen 650mg, Rocephin 2g, and Lasix 20mg IV x1. Pt was able to transition to 6L NC, where he is currently saturating at 94%. Vitals have improved to normal limits.     Review of Systems  Review of Systems   Constitutional: Positive for chills, fever and malaise/fatigue. Negative for weight loss.   HENT: Negative for hearing loss and sore throat.    Eyes: Negative for blurred vision and pain.   Respiratory: Positive for cough, sputum production and shortness of breath. Negative for hemoptysis and wheezing.    Cardiovascular: Positive for chest pain (With coughing) and leg swelling. Negative for palpitations and orthopnea.   Gastrointestinal: Negative for abdominal pain, blood in stool, " constipation, diarrhea, nausea and vomiting.   Genitourinary: Negative for dysuria and hematuria.   Musculoskeletal: Positive for myalgias. Negative for joint pain.   Skin: Negative for rash.   Neurological: Negative for dizziness, loss of consciousness and weakness.   Psychiatric/Behavioral: Negative for depression and suicidal ideas.       Past Medical History   has a past medical history of Gout, Hypertension, Kidney disease, and MI (myocardial infarction) (HCC).    Surgical History   has no past surgical history on file.     Family History  family history includes Diabetes in his mother.     Social History   reports that he has quit smoking. He has never used smokeless tobacco. He reports current alcohol use. He reports that he does not use drugs.    Allergies  Allergies   Allergen Reactions   • Motrin [Ibuprofen]      hhx of stomach ulcers       Medications  Prior to Admission Medications   Prescriptions Last Dose Informant Patient Reported? Taking?   allopurinol (ZYLOPRIM) 100 MG Tab  Patient Yes No   Sig: Take 100 mg by mouth every morning.   aspirin EC (ECOTRIN) 81 MG Tablet Delayed Response  Patient Yes No   Sig: Take 81 mg by mouth every morning.   atorvastatin (LIPITOR) 80 MG tablet  Patient Yes No   Sig: Take 80 mg by mouth every evening.   ferrous gluconate (FERGON) 324 (38 Fe) MG Tab  Patient Yes No   Sig: Take 324 mg by mouth every morning.   furosemide (LASIX) 20 MG Tab  Patient Yes No   Sig: Take 20 mg by mouth every morning.   isosorbide mononitrate SR (IMDUR) 30 MG TABLET SR 24 HR  Patient Yes No   Sig: Take 15 mg by mouth every day. Half tablet every morning   lidocaine (LIDODERM) 5 % Patch  Patient Yes No   Sig: Apply 1 Patch to skin as directed every 12 hours.   magnesium chloride (MAG-64) 64 MG Tablet Delayed Response   No No   Sig: Take 1 Tab by mouth 2 Times a Day.   methylPREDNISolone (MEDROL DOSEPAK) 4 MG Tablet Therapy Pack   No No   Sig: Follow schedule on package instructions.    metoprolol SR (TOPROL XL) 100 MG TABLET SR 24 HR  Patient Yes No   Sig: Take 100 mg by mouth every morning.   omeprazole (PRILOSEC) 20 MG delayed-release capsule  Patient Yes No   Sig: Take 40 mg by mouth every morning.   potassium chloride SA (KDUR) 20 MEQ Tab CR   No No   Sig: Take 1 Tab by mouth every day.   tamsulosin (FLOMAX) 0.4 MG capsule  Patient Yes No   Sig: Take 0.4 mg by mouth every morning.   traZODone (DESYREL) 100 MG Tab  Patient Yes No   Sig: Take 100 mg by mouth every bedtime.      Facility-Administered Medications: None       Physical Exam  Temp:  [37.8 °C (100 °F)-38.4 °C (101.1 °F)] 37.8 °C (100 °F)  Pulse:  [] 95  Resp:  [20-30] 20  BP: (147-215)/() 147/78  SpO2:  [97 %-98 %] 97 %    Physical Exam  Vitals signs and nursing note reviewed.   Constitutional:       General: He is not in acute distress.     Appearance: Normal appearance.   HENT:      Mouth/Throat:      Mouth: Mucous membranes are moist.   Eyes:      Extraocular Movements: Extraocular movements intact.   Cardiovascular:      Rate and Rhythm: Normal rate and regular rhythm.      Heart sounds: No murmur. No gallop.    Pulmonary:      Effort: Pulmonary effort is normal.      Breath sounds: Rhonchi (diffuse) and rales (diffuse) present. No wheezing.   Chest:      Chest wall: No tenderness.   Abdominal:      General: Abdomen is flat. Bowel sounds are normal. There is no distension.      Palpations: Abdomen is soft.      Tenderness: There is no abdominal tenderness.   Musculoskeletal: Normal range of motion.         General: No deformity.      Right lower leg: Edema present.      Left lower leg: Edema present.   Neurological:      General: No focal deficit present.      Mental Status: He is alert and oriented to person, place, and time.         Laboratory:  Recent Labs     12/01/20  0009   WBC 5.2   RBC 2.53*   HEMOGLOBIN 7.5*   HEMATOCRIT 24.7*   MCV 97.6   MCH 29.6   MCHC 30.4*   RDW 63.3*   PLATELETCT 147*   MPV 10.4      Recent Labs     12/01/20 0009   SODIUM 139   POTASSIUM 4.2   CHLORIDE 113*   CO2 15*   GLUCOSE 103*   BUN 46*   CREATININE 3.97*   CALCIUM 7.6*     Recent Labs     12/01/20 0009   ALTSGPT 12   ASTSGOT 27   ALKPHOSPHAT 144*   TBILIRUBIN 0.3   GLUCOSE 103*     Recent Labs     12/01/20 0009   APTT 31.9   INR 1.10     Recent Labs     12/01/20 0009   NTPROBNP 38793*         Recent Labs     12/01/20 0009   TROPONINT 69*       Imaging:  DX-CHEST-PORTABLE (1 VIEW)   Final Result         1.  Pulmonary edema and/or infiltrates.      EC-ECHOCARDIOGRAM COMPLETE W/O CONT    (Results Pending)         Assessment/Plan:  I anticipate this patient will require at least two midnights for appropriate medical management, necessitating inpatient admission.    * Pneumonia due to COVID-19 virus- (present on admission)  Assessment & Plan  Worsening SOB x1 day.   COVID+.   CXR w/ pulm edema/infiltrates  Previously requiring non rebreather, now saturatin 96% on 6L NC  S/p Rocphin 2g and lasix 20mg IV  - Conservative use of IVF  - Albuterol, O2 therapy, and proning as needed  - Holding off on further Lasix for now as 20mg given 1 hour prior, c/w Lasix as needed to remove excess fluid.  - Decadron 6mg qd  - Will initiatite Unasyn 3g q6h/Azithro 500mg qd abx, can DC if Procal is negative  - f/u Blood Cultures, Sputum Cx  - Check Ferritin, and CPK    Sepsis (HCC)- (present on admission)  Assessment & Plan  This is Sepsis Present on admission  SIRS criteria identified on my evaluation include: Fever, with temperature greater than 101 deg F and Tachycardia, with heart rate greater than 90 BPM  Source is COVID pneumonia  Sepsis protocol initiated  Fluid resuscitation ordered per protocol  IV antibiotics as appropriate for source of sepsis  While organ dysfunction may be noted elsewhere in this problem list or in the chart, degree of organ dysfunction does not meet CMS criteria for severe sepsis    - same plan as above          CKD (chronic  kidney disease)- (present on admission)  Assessment & Plan  Cr 3.97, previously 3.4 on 3/19/20  - monitor while receiving Lasix  - holding off on IVF for now    Elevated troponin- (present on admission)  Assessment & Plan  Trop 69  Likely demand  - continue to trend  - admit to tele    Elevated d-dimer- (present on admission)  Assessment & Plan  ddimer 2.94  Reports B/L LE swelling that came gradually yesterday  Denies history of PE or clots in the past  Ambulates w/o issue, not bedbound  - Unable to perform CTA chest due to CKD/BENJI  - Possibly related to Sepsis vs DVT/PE  - holding off on full dose AC for now  - f/u with TTE    Elevated brain natriuretic peptide (BNP) level- (present on admission)  Assessment & Plan  BNP 91383  Denies prior history of CHF in the past  Possibly due to severe lung infection -> heart failure  - TTE  - Conservative IVF use  - Daily weights, I/O  - Fluid restrict 1200 CC  - Low Na diet  - Lasix as needed    Hypocalcemia- (present on admission)  Assessment & Plan  Ca 7.6, correcting for albumin = 8.0  Pt reports noticing leg cramping for the past year  Mg 0.8  - will require correction of Mg level first as will otherwise prove difficult  - continue to monitor with Mg correction    Anemia- (present on admission)  Assessment & Plan  Hb 7.5, previously 9.1 3/19/20  Noted to be trending down  Possibly related to CKD, however, pt also with history of gastric ulcers in the past  - f/u with anemia workup    Hypomagnesemia- (present on admission)  Assessment & Plan  Mg 0.8  - Mg Sulfate 2g x2 ordered  - f/u with repeat levels today    VTE: SCD, Heparin

## 2020-12-01 NOTE — ASSESSMENT & PLAN NOTE
Likely secondary to hypertensive nephrosclerosis   --Continue hydralazine plus Imdur plus amlodipine   --His renal function continues to get worse, nephrology consulted

## 2020-12-01 NOTE — DISCHARGE PLANNING
Care Transition Team Assessment  Information obtained by chart review during Covid pandemic.  Pt may need home O2 upon discharge.     Information Source  Orientation : Unable to Assess  Information Given By: Other (Comments)(chart review.)    Readmission Evaluation  Is this a readmission?: No    Elopement Risk  Legal Hold: No    Interdisciplinary Discharge Planning  Does Admitting Nurse Feel This Could be a Complex Discharge?: No  Primary Care Physician: ([facility]  Lives with - Patient's Self Care Capacity: Alone and Able to Care For Self  Support Systems: None  Housing / Facility: 2 Story Apartment / Condo  Able to Return to Previous ADL's: Yes  Mobility Issues: No  Prior Services: None  Assistance Needed: Unknown at this Time  Durable Medical Equipment: Not Applicable    Discharge Preparedness  What are your discharge supports?: Other (comment)(No emergency contact listed.)  Prior Functional Level: Ambulatory    Functional Assesment  Prior Functional Level: Ambulatory    Finances  Financial Barriers to Discharge: No(V/A)  Prescription Coverage: Yes    Advance Directive  Advance Directive?: None  Advance Directive offered?: (Not offered, pt in Covid isolation.)    Domestic Abuse  Have you ever been the victim of abuse or violence?: No    Psychological Assessment  History of Substance Abuse: None  History of Psychiatric Problems: No    Discharge Risks or Barriers  Discharge risks or barriers?: No    Anticipated Discharge Information  Discharge Disposition: Still a Patient (30)  Discharge Address: ([address] [place] [address])  Discharge Contact Phone Number: (No emegency contact listed on face sheet.)

## 2020-12-01 NOTE — ASSESSMENT & PLAN NOTE
This is Sepsis Present on admission  SIRS criteria identified on my evaluation include: Fever, with temperature greater than 101 deg F and Tachycardia, with heart rate greater than 90 BPM  Source is COVID pneumonia  Sepsis protocol initiated  Fluid resuscitation ordered per protocol  IV antibiotics as appropriate for source of sepsis  While organ dysfunction may be noted elsewhere in this problem list or in the chart, degree of organ dysfunction does not meet CMS criteria for severe sepsis    - same plan as above

## 2020-12-01 NOTE — ED PROVIDER NOTES
"ED Provider Note    CHIEF COMPLAINT  Chief Complaint   Patient presents with   • Shortness of Breath     \"earlier this evening i started to feel short of breathe\", non productive cough noted   • Palpitations     complaints of \"heart beating fast\" tachycardia noted on arrival       HPI  Abran Proctor is a 70 y.o. male with a history of hypertension, CKD, CAD, who presents with shortness of breath starting this evening.  He states that he initially had some greenish productive phlegm though has a dry cough now.  Has a mild headache as well.  He is febrile today.  He contacted EMS due to shortness of breath and he was found to be hypoxic to 69% on room air.  He was placed on nonrebreather mask and brought to the emergency department for further evaluation.  The patient states that he has had lower extremity swelling as well.  Known CKD history though no prior history of dialysis.  He does not have a local nephrologist.  He moved to [place] from the [facility] about a year ago.  No known sick contacts.  The patient states that he lives alone.  He has palpitations though no chest pain.      REVIEW OF SYSTEMS  See HPI for further details. All other systems are negative.     PAST MEDICAL HISTORY   has a past medical history of Gout, Hypertension, Kidney disease, and MI (myocardial infarction) (HCC).    SOCIAL HISTORY  Social History     Tobacco Use   • Smoking status: Former Smoker   • Smokeless tobacco: Never Used   Substance and Sexual Activity   • Alcohol use: Yes   • Drug use: Never   • Sexual activity: Not on file       SURGICAL HISTORY  patient denies any surgical history    CURRENT MEDICATIONS  Home Medications    **Home medications have not yet been reviewed for this encounter**         ALLERGIES  Allergies   Allergen Reactions   • Motrin [Ibuprofen]      hhx of stomach ulcers       PHYSICAL EXAM  VITAL SIGNS: BP (!) 215/114   Pulse (!) 127   Temp (!) 38.4 °C (101.1 °F) (Oral)   Resp (!) 30   Ht 1.72 m (5' 7.72\")   Wt " 69 kg (152 lb 1.9 oz)   SpO2 97%   BMI 23.32 kg/m²   Pulse ox interpretation: I interpret this pulse ox as normal.  Constitutional: Alert in mild respiratory distress  HENT: No signs of trauma, Bilateral external ears normal, Nose normal.   Eyes: Pupils are equal and reactive, Conjunctiva normal, Non-icteric.   Neck: Normal range of motion, No tenderness, Supple, No stridor.   Cardiovascular: Regular rate and rhythm.   Thorax & Lungs: Bilateral diffuse rales, mild respiratory distress, No wheezing, No chest tenderness.   Abdomen: Bowel sounds normal, Soft, No tenderness, No masses, No pulsatile masses. No peritoneal signs.  Skin: Warm, Dry, No erythema, No rash.   Back: No bony tenderness, No CVA tenderness.   Extremities: Intact distal pulses, 2+ lower extremity edema, No tenderness, No cyanosis  Musculoskeletal: Good range of motion in all major joints. No tenderness to palpation or major deformities noted.   Neurologic: Alert, No focal deficits noted.       DIAGNOSTIC STUDIES / PROCEDURES    EKG - Physician interpretation  Results for orders placed or performed during the hospital encounter of 20   EKG   Result Value Ref Range    Report       [facility] Emergency Dept.    Test Date:  2020  Pt Name:    JUAN STEINBERG                   Department: ER  MRN:        [id number]                      Room:       RD 01  Gender:     Male                         Technician: [id number]  :        1950                   Requested By:ER TRIAGE PROTOCOL  Order #:    [id number]                    Reading MD: XI MAYNARD MD    Measurements  Intervals                                Axis  Rate:       125                          P:          77  OH:         128                          QRS:        35  QRSD:       82                           T:          71  QT:         308  QTc:        445    Interpretive Statements  SINUS TACHYCARDIA  LEFT ATRIAL ABNORMALITY  BORDERLINE ST DEPRESSION, ANTEROLATERAL  LEADS  BASELINE WANDER IN LEAD(S) V3  Compared to ECG 03/18/2020 18:08:32  Atrial abnormality now present  ST (T wave) deviation now present  Left ventricular hypertrophy no longer present  Electronically Signed On 12-1-2020 0: 11:07 PST by XI MAYNARD MD           LABS  Labs Reviewed   CBC WITH DIFFERENTIAL - Abnormal; Notable for the following components:       Result Value    RBC 2.53 (*)     Hemoglobin 7.5 (*)     Hematocrit 24.7 (*)     MCHC 30.4 (*)     RDW 63.3 (*)     Platelet Count 147 (*)     Neutrophils-Polys 75.70 (*)     Lymphocytes 13.70 (*)     Lymphs (Absolute) 0.72 (*)     All other components within normal limits    Narrative:     Droplet, Contact, and Eye Protection  Indicate which anticoagulants the patient is on:->NONE   COMP METABOLIC PANEL - Abnormal; Notable for the following components:    Chloride 113 (*)     Co2 15 (*)     Glucose 103 (*)     Bun 46 (*)     Creatinine 3.97 (*)     Calcium 7.6 (*)     Alkaline Phosphatase 144 (*)     Globulin 3.7 (*)     All other components within normal limits    Narrative:     Droplet, Contact, and Eye Protection  Indicate which anticoagulants the patient is on:->NONE   URINALYSIS - Abnormal; Notable for the following components:    Protein 100 (*)     All other components within normal limits    Narrative:     Droplet, Contact, and Eye Protection  Indication for culture:->Emergency Room Patient   D-DIMER - Abnormal; Notable for the following components:    D-Dimer Screen 2.94 (*)     All other components within normal limits    Narrative:     Droplet, Contact, and Eye Protection  Indicate which anticoagulants the patient is on:->NONE   CRP QUANTITIVE (NON-CARDIAC) - Abnormal; Notable for the following components:    Stat C-Reactive Protein 1.10 (*)     All other components within normal limits    Narrative:     Droplet, Contact, and Eye Protection  Indicate which anticoagulants the patient is on:->NONE   PROCALCITONIN - Abnormal; Notable for the  following components:    Procalcitonin 0.51 (*)     All other components within normal limits    Narrative:     Droplet, Contact, and Eye Protection  Indicate which anticoagulants the patient is on:->NONE   TROPONIN - Abnormal; Notable for the following components:    Troponin T 69 (*)     All other components within normal limits    Narrative:     Droplet, Contact, and Eye Protection  Indicate which anticoagulants the patient is on:->NONE   MAGNESIUM - Abnormal; Notable for the following components:    Magnesium 0.8 (*)     All other components within normal limits    Narrative:     Droplet, Contact, and Eye Protection  Indicate which anticoagulants the patient is on:->NONE   PROBRAIN NATRIURETIC PEPTIDE, NT - Abnormal; Notable for the following components:    NT-proBNP 13226 (*)     All other components within normal limits    Narrative:     Droplet, Contact, and Eye Protection  Indicate which anticoagulants the patient is on:->NONE   FREE THYROXINE - Abnormal; Notable for the following components:    Free T-4 0.86 (*)     All other components within normal limits    Narrative:     Droplet, Contact, and Eye Protection  Indicate which anticoagulants the patient is on:->NONE   COV-2, FLU A/B, AND RSV BY PCR - Abnormal; Notable for the following components:    SARS-CoV-2 by PCR DETECTED (*)     All other components within normal limits    Narrative:     Droplet, Contact, and Eye Protection  Rule-out COVID-19 panel, includes droplet/contact/eye  isolation order.  Check influenza to order this test only if  specifically indicated.  Is patient being admitted?->Yes  Does this patient meet criteria for Rush/Cepheid per [facility]  Inpatient Workflow? (See workflow link below)->Yes  Expected turn around time?->Rush (Cepheid 2-4 hours)  Have you been in close contact with a person who is suspected  or known to be positive for COVID-19 within the last 30 days  (e.g. last seen that person < 30 days ago)->No   URINE MICROSCOPIC  (W/UA) - Abnormal; Notable for the following components:    WBC 0-2 (*)     RBC 2-5 (*)     All other components within normal limits    Narrative:     Droplet, Contact, and Eye Protection  Indication for culture:->Emergency Room Patient   ESTIMATED GFR - Abnormal; Notable for the following components:    GFR If  18 (*)     GFR If Non  15 (*)     All other components within normal limits    Narrative:     Droplet, Contact, and Eye Protection  Indicate which anticoagulants the patient is on:->NONE   PROTHROMBIN TIME - Abnormal; Notable for the following components:    PT 15.2 (*)     INR 1.16 (*)     All other components within normal limits    Narrative:     Droplet, Contact, and Eye Protection  If not done within the last 4 hours  Indicate which anticoagulants the patient is on:->UNKNOWN   FERRITIN - Abnormal; Notable for the following components:    Ferritin 1440.0 (*)     All other components within normal limits    Narrative:     Droplet, Contact, and Eye Protection  If not done within the last 4 hours  Indicate which anticoagulants the patient is on:->UNKNOWN   IRON/TOTAL IRON BIND - Abnormal; Notable for the following components:    Iron 12 (*)     Total Iron Binding 177 (*)     % Saturation 7 (*)     All other components within normal limits    Narrative:     Droplet, Contact, and Eye Protection  If not done within the last 4 hours  Indicate which anticoagulants the patient is on:->UNKNOWN   TRANSFERRIN - Abnormal; Notable for the following components:    Transferrin 125 (*)     All other components within normal limits    Narrative:     Droplet, Contact, and Eye Protection   SED RATE - Abnormal; Notable for the following components:    Sed Rate Westergren 30 (*)     All other components within normal limits    Narrative:     Droplet, Contact, and Eye Protection  If not done within the last 4 hours  Indicate which anticoagulants the patient is on:->UNKNOWN   PROCALCITONIN -  "Abnormal; Notable for the following components:    Procalcitonin 1.31 (*)     All other components within normal limits    Narrative:     Droplet, Contact, and Eye Protection  If not done within the last 4 hours  Indicate which anticoagulants the patient is on:->UNKNOWN   TROPONIN - Abnormal; Notable for the following components:    Troponin T 74 (*)     All other components within normal limits    Narrative:     Droplet, Contact, and Eye Protection   LACTIC ACID    Narrative:     Droplet, Contact, and Eye Protection   URINE CULTURE(NEW)    Narrative:     Droplet, Contact, and Eye Protection  Indication for culture:->Emergency Room Patient   BLOOD CULTURE    Narrative:     Droplet, Contact, and Eye Protection  Per Hospital Policy: Only change Specimen Src: to \"Line\" if  specified by physician order.   BLOOD CULTURE    Narrative:     Droplet, Contact, and Eye Protection  Per Hospital Policy: Only change Specimen Src: to \"Line\" if  specified by physician order.   COVID/SARS COV-2    Narrative:     Droplet, Contact, and Eye Protection  Rule-out COVID-19 panel, includes droplet/contact/eye  isolation order.  Check influenza to order this test only if  specifically indicated.  Is patient being admitted?->Yes  Does this patient meet criteria for Rush/Cepheid per [facility]  Inpatient Workflow? (See workflow link below)->Yes  Expected turn around time?->Rush (Cepheid 2-4 hours)  Have you been in close contact with a person who is suspected  or known to be positive for COVID-19 within the last 30 days  (e.g. last seen that person < 30 days ago)->No   INTERLEUKIN 6    Narrative:     Droplet, Contact, and Eye Protection   PROTHROMBIN TIME    Narrative:     Droplet, Contact, and Eye Protection  Indicate which anticoagulants the patient is on:->NONE   APTT    Narrative:     Droplet, Contact, and Eye Protection  Indicate which anticoagulants the patient is on:->NONE   TSH    Narrative:     Droplet, Contact, and Eye " Protection  Indicate which anticoagulants the patient is on:->NONE   PHOSPHORUS    Narrative:     Droplet, Contact, and Eye Protection  Indicate which anticoagulants the patient is on:->NONE   COD (ADULT)    Narrative:     Droplet, Contact, and Eye Protection   ABO RH CONFIRM   CULTURE RESPIRATORY W/ GRM STN   FOLATE    Narrative:     Droplet, Contact, and Eye Protection  If not done within the last 4 hours  Indicate which anticoagulants the patient is on:->UNKNOWN   VITAMIN B12    Narrative:     Droplet, Contact, and Eye Protection  If not done within the last 4 hours  Indicate which anticoagulants the patient is on:->UNKNOWN   CREATINE KINASE    Narrative:     Droplet, Contact, and Eye Protection  If not done within the last 4 hours  Indicate which anticoagulants the patient is on:->UNKNOWN   LACTIC ACID   LACTIC ACID   URINALYSIS   TROPONIN         RADIOLOGY  DX-CHEST-PORTABLE (1 VIEW)   Final Result         1.  Pulmonary edema and/or infiltrates.      EC-ECHOCARDIOGRAM COMPLETE W/O CONT    (Results Pending)         COURSE & MEDICAL DECISION MAKING    Medications   cefTRIAXone (ROCEPHIN) 2 g in  mL IVPB (2 g Intravenous New Bag 12/1/20 0007)       Pertinent Labs & Imaging studies reviewed. (See chart for details)  70 y.o. male with shortness of breath, headache, fever, tachycardia.  Has a history of CKD.  He is significantly hypertensive upon arrival.  Has lower extremity swelling.  Denies prior history of dialysis.  The patient states that he continues to make urine.  He is very ill-appearing upon arrival.  He is on nonrebreather mask though is able to maintain adequate oxygenation with that.  Supplemental oxygen was titrated down and he was able to tolerate nasal cannula oxygen.  Given the multiple comorbidities and concerns for sepsis, he was given 1 dose of antibiotic in order to meet sepsis protocol.  He was ultimately found to be positive for Covid.  Symptoms are most consistent with Covid infection  "rather than pneumonia/multifocal pneumonia on x-ray.  There may be a component of pulmonary edema given lower extremity swelling and history of chronic kidney disease in the setting of significant hypertension.  BNP is markedly elevated as well.  He was given a dose of Lasix to see if this should help.    Ultimately, the patient was admitted to the hospitalist service for further management given Covid and hypoxia with respiratory distress and chronic kidney disease.    Spoke with the hospitalist who is agreeable to be hospitalization.    /91   Pulse 92   Temp 37.2 °C (98.9 °F) (Temporal)   Resp 20   Ht 1.727 m (5' 8\")   Wt 70.8 kg (156 lb 1.4 oz)   SpO2 98%   BMI 23.73 kg/m²     The total critical care time on this patient is 35 minutes, resuscitating patient, speaking with admitting physician, and deciphering test results. This 35 minutes is exclusive of separately billable procedures.      FINAL IMPRESSION  COVID-19 pneumonia  Sepsis  Chronic kidney disease  Respiratory failure      Electronically signed by: Elmer Goldberg M.D., 11/30/2020 11:57 PM    "

## 2020-12-01 NOTE — PROGRESS NOTES
Received report from RADHA Billy. Pt arrived to unit at 0340 via gurney escorted by RADHA Rg. Assessment complete. VSS. No signs of distress noted at this time. Tele monitor in place. Monitor room notified. Pt c/o pain 0/10. Fall precautions and appropriate signs in place. Pt oriented to unit routine, call light/phone system within reach. Personal belongings within reach. RN extension number provided. Pt educated regarding fall precautions. Bed alarm is on. isolation precautions in place. Pt denies any further needs at this time.

## 2020-12-02 PROBLEM — R79.89 ELEVATED TROPONIN: Status: RESOLVED | Noted: 2020-12-01 | Resolved: 2020-12-02

## 2020-12-02 PROBLEM — A41.9 SEPSIS (HCC): Status: RESOLVED | Noted: 2020-12-01 | Resolved: 2020-12-02

## 2020-12-02 LAB
ALBUMIN SERPL BCP-MCNC: 3.1 G/DL (ref 3.2–4.9)
ALBUMIN/GLOB SERPL: 0.9 G/DL
ALP SERPL-CCNC: 108 U/L (ref 30–99)
ALT SERPL-CCNC: 9 U/L (ref 2–50)
ANION GAP SERPL CALC-SCNC: 10 MMOL/L (ref 7–16)
AST SERPL-CCNC: 25 U/L (ref 12–45)
BASOPHILS # BLD AUTO: 0.2 % (ref 0–1.8)
BASOPHILS # BLD AUTO: 0.6 % (ref 0–1.8)
BASOPHILS # BLD: 0.01 K/UL (ref 0–0.12)
BASOPHILS # BLD: 0.03 K/UL (ref 0–0.12)
BILIRUB SERPL-MCNC: 0.2 MG/DL (ref 0.1–1.5)
BUN SERPL-MCNC: 49 MG/DL (ref 8–22)
CALCIUM SERPL-MCNC: 8 MG/DL (ref 8.5–10.5)
CHLORIDE SERPL-SCNC: 111 MMOL/L (ref 96–112)
CO2 SERPL-SCNC: 16 MMOL/L (ref 20–33)
CREAT SERPL-MCNC: 4.02 MG/DL (ref 0.5–1.4)
EOSINOPHIL # BLD AUTO: 0.01 K/UL (ref 0–0.51)
EOSINOPHIL # BLD AUTO: 0.01 K/UL (ref 0–0.51)
EOSINOPHIL NFR BLD: 0.2 % (ref 0–6.9)
EOSINOPHIL NFR BLD: 0.2 % (ref 0–6.9)
ERYTHROCYTE [DISTWIDTH] IN BLOOD BY AUTOMATED COUNT: 60.2 FL (ref 35.9–50)
ERYTHROCYTE [DISTWIDTH] IN BLOOD BY AUTOMATED COUNT: 62 FL (ref 35.9–50)
GLOBULIN SER CALC-MCNC: 3.3 G/DL (ref 1.9–3.5)
GLUCOSE SERPL-MCNC: 115 MG/DL (ref 65–99)
HCT VFR BLD AUTO: 21.3 % (ref 42–52)
HCT VFR BLD AUTO: 22.5 % (ref 42–52)
HEMOCCULT STL QL: POSITIVE
HGB BLD-MCNC: 6.5 G/DL (ref 14–18)
HGB BLD-MCNC: 6.9 G/DL (ref 14–18)
IMM GRANULOCYTES # BLD AUTO: 0.01 K/UL (ref 0–0.11)
IMM GRANULOCYTES # BLD AUTO: 0.02 K/UL (ref 0–0.11)
IMM GRANULOCYTES NFR BLD AUTO: 0.2 % (ref 0–0.9)
IMM GRANULOCYTES NFR BLD AUTO: 0.4 % (ref 0–0.9)
LYMPHOCYTES # BLD AUTO: 0.9 K/UL (ref 1–4.8)
LYMPHOCYTES # BLD AUTO: 1.13 K/UL (ref 1–4.8)
LYMPHOCYTES NFR BLD: 20.8 % (ref 22–41)
LYMPHOCYTES NFR BLD: 21.6 % (ref 22–41)
MAGNESIUM SERPL-MCNC: 1.6 MG/DL (ref 1.5–2.5)
MCH RBC QN AUTO: 29 PG (ref 27–33)
MCH RBC QN AUTO: 29.6 PG (ref 27–33)
MCHC RBC AUTO-ENTMCNC: 30.5 G/DL (ref 33.7–35.3)
MCHC RBC AUTO-ENTMCNC: 30.7 G/DL (ref 33.7–35.3)
MCV RBC AUTO: 95.1 FL (ref 81.4–97.8)
MCV RBC AUTO: 96.6 FL (ref 81.4–97.8)
MONOCYTES # BLD AUTO: 0.38 K/UL (ref 0–0.85)
MONOCYTES # BLD AUTO: 0.39 K/UL (ref 0–0.85)
MONOCYTES NFR BLD AUTO: 7.3 % (ref 0–13.4)
MONOCYTES NFR BLD AUTO: 9 % (ref 0–13.4)
NEUTROPHILS # BLD AUTO: 3 K/UL (ref 1.82–7.42)
NEUTROPHILS # BLD AUTO: 3.66 K/UL (ref 1.82–7.42)
NEUTROPHILS NFR BLD: 69.6 % (ref 44–72)
NEUTROPHILS NFR BLD: 69.9 % (ref 44–72)
NRBC # BLD AUTO: 0 K/UL
NRBC # BLD AUTO: 0 K/UL
NRBC BLD-RTO: 0 /100 WBC
NRBC BLD-RTO: 0 /100 WBC
PLATELET # BLD AUTO: 135 K/UL (ref 164–446)
PLATELET # BLD AUTO: 140 K/UL (ref 164–446)
PMV BLD AUTO: 10.4 FL (ref 9–12.9)
PMV BLD AUTO: 10.9 FL (ref 9–12.9)
POTASSIUM SERPL-SCNC: 4.1 MMOL/L (ref 3.6–5.5)
PROT SERPL-MCNC: 6.4 G/DL (ref 6–8.2)
RBC # BLD AUTO: 2.24 M/UL (ref 4.7–6.1)
RBC # BLD AUTO: 2.33 M/UL (ref 4.7–6.1)
SODIUM SERPL-SCNC: 137 MMOL/L (ref 135–145)
WBC # BLD AUTO: 4.3 K/UL (ref 4.8–10.8)
WBC # BLD AUTO: 5.2 K/UL (ref 4.8–10.8)

## 2020-12-02 PROCEDURE — 36430 TRANSFUSION BLD/BLD COMPNT: CPT

## 2020-12-02 PROCEDURE — 700101 HCHG RX REV CODE 250: Performed by: INTERNAL MEDICINE

## 2020-12-02 PROCEDURE — 700102 HCHG RX REV CODE 250 W/ 637 OVERRIDE(OP): Performed by: INTERNAL MEDICINE

## 2020-12-02 PROCEDURE — 30233N1 TRANSFUSION OF NONAUTOLOGOUS RED BLOOD CELLS INTO PERIPHERAL VEIN, PERCUTANEOUS APPROACH: ICD-10-PCS | Performed by: INTERNAL MEDICINE

## 2020-12-02 PROCEDURE — 770020 HCHG ROOM/CARE - TELE (206)

## 2020-12-02 PROCEDURE — 99232 SBSQ HOSP IP/OBS MODERATE 35: CPT | Performed by: INTERNAL MEDICINE

## 2020-12-02 PROCEDURE — A9270 NON-COVERED ITEM OR SERVICE: HCPCS | Performed by: INTERNAL MEDICINE

## 2020-12-02 PROCEDURE — 86923 COMPATIBILITY TEST ELECTRIC: CPT

## 2020-12-02 PROCEDURE — 85025 COMPLETE CBC W/AUTO DIFF WBC: CPT | Mod: 91

## 2020-12-02 PROCEDURE — 82272 OCCULT BLD FECES 1-3 TESTS: CPT

## 2020-12-02 PROCEDURE — 700102 HCHG RX REV CODE 250 W/ 637 OVERRIDE(OP): Performed by: STUDENT IN AN ORGANIZED HEALTH CARE EDUCATION/TRAINING PROGRAM

## 2020-12-02 PROCEDURE — 80053 COMPREHEN METABOLIC PANEL: CPT

## 2020-12-02 PROCEDURE — 700105 HCHG RX REV CODE 258: Performed by: STUDENT IN AN ORGANIZED HEALTH CARE EDUCATION/TRAINING PROGRAM

## 2020-12-02 PROCEDURE — 700111 HCHG RX REV CODE 636 W/ 250 OVERRIDE (IP): Performed by: STUDENT IN AN ORGANIZED HEALTH CARE EDUCATION/TRAINING PROGRAM

## 2020-12-02 PROCEDURE — 36415 COLL VENOUS BLD VENIPUNCTURE: CPT

## 2020-12-02 PROCEDURE — 93970 EXTREMITY STUDY: CPT | Mod: 26 | Performed by: INTERNAL MEDICINE

## 2020-12-02 PROCEDURE — 700111 HCHG RX REV CODE 636 W/ 250 OVERRIDE (IP): Performed by: INTERNAL MEDICINE

## 2020-12-02 PROCEDURE — A9270 NON-COVERED ITEM OR SERVICE: HCPCS | Performed by: STUDENT IN AN ORGANIZED HEALTH CARE EDUCATION/TRAINING PROGRAM

## 2020-12-02 PROCEDURE — P9016 RBC LEUKOCYTES REDUCED: HCPCS

## 2020-12-02 PROCEDURE — 83735 ASSAY OF MAGNESIUM: CPT

## 2020-12-02 RX ORDER — LABETALOL HYDROCHLORIDE 5 MG/ML
10 INJECTION, SOLUTION INTRAVENOUS EVERY 6 HOURS PRN
Status: DISCONTINUED | OUTPATIENT
Start: 2020-12-02 | End: 2020-12-08 | Stop reason: HOSPADM

## 2020-12-02 RX ORDER — OMEPRAZOLE 20 MG/1
40 CAPSULE, DELAYED RELEASE ORAL DAILY
Status: DISCONTINUED | OUTPATIENT
Start: 2020-12-03 | End: 2020-12-05

## 2020-12-02 RX ORDER — LISINOPRIL 5 MG/1
5 TABLET ORAL
Status: DISCONTINUED | OUTPATIENT
Start: 2020-12-02 | End: 2020-12-04

## 2020-12-02 RX ORDER — HYDROCODONE BITARTRATE AND ACETAMINOPHEN 5; 325 MG/1; MG/1
1-2 TABLET ORAL EVERY 8 HOURS PRN
Status: DISCONTINUED | OUTPATIENT
Start: 2020-12-02 | End: 2020-12-08 | Stop reason: HOSPADM

## 2020-12-02 RX ORDER — HYDRALAZINE HYDROCHLORIDE 10 MG/1
10 TABLET, FILM COATED ORAL EVERY 8 HOURS
Status: DISCONTINUED | OUTPATIENT
Start: 2020-12-02 | End: 2020-12-03

## 2020-12-02 RX ORDER — GABAPENTIN 100 MG/1
200 CAPSULE ORAL DAILY
Status: DISCONTINUED | OUTPATIENT
Start: 2020-12-02 | End: 2020-12-08 | Stop reason: HOSPADM

## 2020-12-02 RX ORDER — SODIUM CHLORIDE 9 MG/ML
INJECTION, SOLUTION INTRAVENOUS CONTINUOUS
Status: DISCONTINUED | OUTPATIENT
Start: 2020-12-02 | End: 2020-12-02

## 2020-12-02 RX ORDER — FUROSEMIDE 10 MG/ML
20 INJECTION INTRAMUSCULAR; INTRAVENOUS ONCE
Status: COMPLETED | OUTPATIENT
Start: 2020-12-02 | End: 2020-12-02

## 2020-12-02 RX ADMIN — FUROSEMIDE 20 MG: 10 INJECTION, SOLUTION INTRAMUSCULAR; INTRAVENOUS at 13:44

## 2020-12-02 RX ADMIN — TRAMADOL HYDROCHLORIDE 50 MG: 50 TABLET, COATED ORAL at 20:25

## 2020-12-02 RX ADMIN — ISOSORBIDE MONONITRATE 15 MG: 30 TABLET, EXTENDED RELEASE ORAL at 06:05

## 2020-12-02 RX ADMIN — SODIUM CHLORIDE 3 G: 900 INJECTION INTRAVENOUS at 06:01

## 2020-12-02 RX ADMIN — DEXAMETHASONE 6 MG: 4 TABLET ORAL at 06:01

## 2020-12-02 RX ADMIN — METOPROLOL SUCCINATE 100 MG: 50 TABLET, EXTENDED RELEASE ORAL at 05:59

## 2020-12-02 RX ADMIN — TAMSULOSIN HYDROCHLORIDE 0.4 MG: 0.4 CAPSULE ORAL at 05:59

## 2020-12-02 RX ADMIN — GABAPENTIN 200 MG: 100 CAPSULE ORAL at 09:44

## 2020-12-02 RX ADMIN — ACETAMINOPHEN 500 MG: 500 TABLET ORAL at 20:28

## 2020-12-02 RX ADMIN — HYDROCODONE BITARTRATE AND ACETAMINOPHEN 2 TABLET: 5; 325 TABLET ORAL at 15:44

## 2020-12-02 RX ADMIN — AZITHROMYCIN MONOHYDRATE 500 MG: 250 TABLET ORAL at 05:58

## 2020-12-02 RX ADMIN — LABETALOL HYDROCHLORIDE 10 MG: 5 INJECTION, SOLUTION INTRAVENOUS at 20:25

## 2020-12-02 RX ADMIN — TRAZODONE HYDROCHLORIDE 100 MG: 100 TABLET ORAL at 22:03

## 2020-12-02 RX ADMIN — TRAMADOL HYDROCHLORIDE 50 MG: 50 TABLET, COATED ORAL at 08:12

## 2020-12-02 RX ADMIN — LISINOPRIL 5 MG: 5 TABLET ORAL at 15:44

## 2020-12-02 RX ADMIN — SODIUM CHLORIDE 3 G: 900 INJECTION INTRAVENOUS at 17:14

## 2020-12-02 ASSESSMENT — ENCOUNTER SYMPTOMS
FEVER: 0
ABDOMINAL PAIN: 0
DEPRESSION: 0
COUGH: 1
BLURRED VISION: 0
BLOOD IN STOOL: 0
DIZZINESS: 0
SHORTNESS OF BREATH: 1
NAUSEA: 0
VOMITING: 0
CHILLS: 0
HEADACHES: 0
MYALGIAS: 0
PALPITATIONS: 0

## 2020-12-02 ASSESSMENT — PAIN DESCRIPTION - PAIN TYPE
TYPE: CHRONIC PAIN

## 2020-12-02 NOTE — PROGRESS NOTES
[facility] Medicine Daily Progress Note    Date of Service  12/2/2020    Chief Complaint  70 y.o. male admitted 11/30/2020 with shortness of breath.     Hospital Course  70 y.o. male who presented 11/30/2020 with complaints of worsening SOB x1 day. Accompanying this SOB, pt reports a productive cough of green sputum that has now resolved,  fever, mild headache, and b/l LE edema. Pt reports that his symptoms have progressively worsened throughout the day until they became so severe he had to call EMS last night. On arrival, EMS noted pt was hypoxic at 69% on RA and required rebreather. Pt reports he has history of CKD, but does not follow a nephrologist. He moved to [place] 1 year ago, but does not follow a PCP.     Interval Problem Update  Patient was seen and examined at bedside.  No acute events overnight.  Patient is resting comfortably in bed and in no acute distress.  Hemoglobin of 6.9 this a.m.  Will transfuse 1 unit PRBC.  No gross signs of bleeding, no hematemesis, no melena noted.    Consultants/Specialty  none    Code Status  Full Code    Disposition  Await PT eval    Review of Systems  Review of Systems   Constitutional: Negative for chills and fever.   HENT: Positive for congestion.    Eyes: Negative for blurred vision.   Respiratory: Positive for cough and shortness of breath.    Cardiovascular: Negative for chest pain and palpitations.   Gastrointestinal: Negative for abdominal pain, blood in stool, melena, nausea and vomiting.   Genitourinary: Negative for frequency.   Musculoskeletal: Negative for myalgias.   Skin: Negative for rash.   Neurological: Negative for dizziness and headaches.   Psychiatric/Behavioral: Negative for depression.        Physical Exam  Temp:  [36.8 °C (98.3 °F)-37.9 °C (100.3 °F)] 37 °C (98.6 °F)  Pulse:  [80-96] 92  Resp:  [16-18] 18  BP: (140-174)/() 174/105  SpO2:  [94 %-99 %] 96 %    Physical Exam  Constitutional:       General: He is not in acute distress.     Appearance:  He is not ill-appearing or toxic-appearing.   HENT:      Head: Normocephalic.      Right Ear: External ear normal.      Left Ear: External ear normal.      Nose: Nose normal.      Mouth/Throat:      Mouth: Mucous membranes are moist.      Pharynx: No oropharyngeal exudate.   Eyes:      Extraocular Movements: Extraocular movements intact.      Conjunctiva/sclera: Conjunctivae normal.      Pupils: Pupils are equal, round, and reactive to light.   Neck:      Musculoskeletal: Neck supple. No muscular tenderness.   Cardiovascular:      Rate and Rhythm: Tachycardia present.      Heart sounds: No murmur.   Pulmonary:      Effort: No respiratory distress.      Breath sounds: No wheezing.      Comments: Crackles auscultated in lower lung bases  Abdominal:      Palpations: Abdomen is soft.      Tenderness: There is no abdominal tenderness. There is no guarding or rebound.   Musculoskeletal:         General: No deformity.   Skin:     General: Skin is warm and dry.      Coloration: Skin is not jaundiced.   Neurological:      General: No focal deficit present.      Mental Status: He is alert and oriented to person, place, and time.   Psychiatric:         Mood and Affect: Mood normal.         Behavior: Behavior normal.         Thought Content: Thought content normal.         Fluids    Intake/Output Summary (Last 24 hours) at 12/2/2020 1511  Last data filed at 12/2/2020 1445  Gross per 24 hour   Intake 570 ml   Output 500 ml   Net 70 ml       Laboratory  Recent Labs     12/01/20  0009 12/02/20  0411 12/02/20  0645   WBC 5.2 4.3* 5.2   RBC 2.53* 2.24* 2.33*   HEMOGLOBIN 7.5* 6.5* 6.9*   HEMATOCRIT 24.7* 21.3* 22.5*   MCV 97.6 95.1 96.6   MCH 29.6 29.0 29.6   MCHC 30.4* 30.5* 30.7*   RDW 63.3* 60.2* 62.0*   PLATELETCT 147* 135* 140*   MPV 10.4 10.9 10.4     Recent Labs     12/01/20  0009 12/02/20  0411   SODIUM 139 137   POTASSIUM 4.2 4.1   CHLORIDE 113* 111   CO2 15* 16*   GLUCOSE 103* 115*   BUN 46* 49*   CREATININE 3.97* 4.02*    CALCIUM 7.6* 8.0*     Recent Labs     12/01/20  0009 12/01/20  0254   APTT 31.9  --    INR 1.10 1.16*               Imaging  US-EXTREMITY VENOUS LOWER BILAT   Final Result      EC-ECHOCARDIOGRAM COMPLETE W/O CONT   Final Result      DX-CHEST-PORTABLE (1 VIEW)   Final Result         1.  Pulmonary edema and/or infiltrates.           Assessment/Plan  * Pneumonia due to COVID-19 virus- (present on admission)  Assessment & Plan  Worsening SOB x1 day.   COVID+.   CXR w/ pulm edema bilateral infiltrates consistent with COVID  -proning as needed  - Decadron 6mg qd x10 days  - Unasyn 3g q6h/Azithro 500mg qd abx  - trend inflammatory markers  - Blood Cultures, Sputum Cx - no growth x1 day    CKD (chronic kidney disease)- (present on admission)  Assessment & Plan  Cr 3.97, previously 3.4 on 3/19/20  - monitor while receiving Lasix  - holding off on IVF for now    Elevated d-dimer- (present on admission)  Assessment & Plan  Likely acute phase reacting to COVID  ddimer 2.94  Bilateral LE ultrasound: no evidence of DVT    Elevated brain natriuretic peptide (BNP) level- (present on admission)  Assessment & Plan  BNP 63538  Likely related to CKD  Possibly due to severe lung infection -> heart failure  - TTE: EF 55%, RVSP 30  - Fluid restrict 1200 CC  - Low Na diet    Hypocalcemia- (present on admission)  Assessment & Plan  Ca 7.6, correcting for albumin = 8.0  Pt reports noticing leg cramping for the past year  Mg 0.8  - will require correction of Mg level first as will otherwise prove difficult  - continue to monitor with Mg correction    Anemia- (present on admission)  Assessment & Plan  Hb 6.9, previously 9.1 3/19/20  Noted to be trending down  Possibly related to CKD, however, pt also with history of gastric ulcers in the past  Denies melena - will obtain occult blood  - iron studies consistent with deficiency  - transfuse 1U PRBC  - monitor Hb    Hypomagnesemia- (present on admission)  Assessment & Plan  Replace and  monitor       VTE prophylaxis: Heparin

## 2020-12-02 NOTE — PROGRESS NOTES
Received bedside report from RADHA Denney, pt care assumed, VSS, pt assessment complete. Pt AAOx4, no c/o pain at this time. No signs of acute distress noted at this time. POC discussed with pt and verbalizes no questions. Pt denies any additional needs at this time. Bed in lowest position, bed alarm off as client is up to self, pt educated on fall risk and verbalized understanding, call light within reach, hourly rounding initiated.

## 2020-12-02 NOTE — PROGRESS NOTES
Notified APRN Cyril Ip of patients facial swelling and Hgb of 6.5. Patient's airway not effected and is having no respiratory distress. Patient stated that he occasionally experiences facial swelling with his home medications, but that he is unsure as to which of his home medications causes his face to swell.     Notified APRN and received order to repeat CBC stat and to continue to monitor the patient for worsening signs of edema. Patient resting comfortably without any signs of acute distress.

## 2020-12-03 PROBLEM — E83.51 HYPOCALCEMIA: Status: RESOLVED | Noted: 2020-12-01 | Resolved: 2020-12-03

## 2020-12-03 LAB
ALBUMIN SERPL BCP-MCNC: 3 G/DL (ref 3.2–4.9)
BACTERIA UR CULT: NORMAL
BASOPHILS # BLD AUTO: 0.2 % (ref 0–1.8)
BASOPHILS # BLD: 0.01 K/UL (ref 0–0.12)
BUN SERPL-MCNC: 48 MG/DL (ref 8–22)
CALCIUM SERPL-MCNC: 8 MG/DL (ref 8.5–10.5)
CHLORIDE SERPL-SCNC: 112 MMOL/L (ref 96–112)
CO2 SERPL-SCNC: 18 MMOL/L (ref 20–33)
CREAT SERPL-MCNC: 3.71 MG/DL (ref 0.5–1.4)
CRP SERPL HS-MCNC: 1.37 MG/DL (ref 0–0.75)
D DIMER PPP IA.FEU-MCNC: 2.57 UG/ML (FEU) (ref 0–0.5)
EOSINOPHIL # BLD AUTO: 0 K/UL (ref 0–0.51)
EOSINOPHIL NFR BLD: 0 % (ref 0–6.9)
ERYTHROCYTE [DISTWIDTH] IN BLOOD BY AUTOMATED COUNT: 58.7 FL (ref 35.9–50)
FIBRINOGEN PPP-MCNC: 353 MG/DL (ref 215–460)
GLUCOSE SERPL-MCNC: 138 MG/DL (ref 65–99)
HCT VFR BLD AUTO: 23.1 % (ref 42–52)
HGB BLD-MCNC: 7.3 G/DL (ref 14–18)
HGB RETIC QN AUTO: 21.5 PG/CELL (ref 29–35)
IMM GRANULOCYTES # BLD AUTO: 0.04 K/UL (ref 0–0.11)
IMM GRANULOCYTES NFR BLD AUTO: 0.7 % (ref 0–0.9)
IMM RETICS NFR: 18.1 % (ref 9.3–17.4)
LACTATE BLD-SCNC: 1.7 MMOL/L (ref 0.5–2)
LDH SERPL L TO P-CCNC: 256 U/L (ref 107–266)
LYMPHOCYTES # BLD AUTO: 0.88 K/UL (ref 1–4.8)
LYMPHOCYTES NFR BLD: 15.7 % (ref 22–41)
MAGNESIUM SERPL-MCNC: 1.4 MG/DL (ref 1.5–2.5)
MCH RBC QN AUTO: 29.1 PG (ref 27–33)
MCHC RBC AUTO-ENTMCNC: 31.6 G/DL (ref 33.7–35.3)
MCV RBC AUTO: 92 FL (ref 81.4–97.8)
MONOCYTES # BLD AUTO: 0.4 K/UL (ref 0–0.85)
MONOCYTES NFR BLD AUTO: 7.1 % (ref 0–13.4)
NEUTROPHILS # BLD AUTO: 4.27 K/UL (ref 1.82–7.42)
NEUTROPHILS NFR BLD: 76.3 % (ref 44–72)
NRBC # BLD AUTO: 0 K/UL
NRBC BLD-RTO: 0 /100 WBC
PHOSPHATE SERPL-MCNC: 3 MG/DL (ref 2.5–4.5)
PLATELET # BLD AUTO: 121 K/UL (ref 164–446)
PMV BLD AUTO: 10.6 FL (ref 9–12.9)
POTASSIUM SERPL-SCNC: 4 MMOL/L (ref 3.6–5.5)
PROCALCITONIN SERPL-MCNC: 3.77 NG/ML
RBC # BLD AUTO: 2.51 M/UL (ref 4.7–6.1)
RETICS # AUTO: 0.04 M/UL (ref 0.04–0.06)
RETICS/RBC NFR: 1.4 % (ref 0.8–2.1)
SIGNIFICANT IND 70042: NORMAL
SITE SITE: NORMAL
SODIUM SERPL-SCNC: 139 MMOL/L (ref 135–145)
SOURCE SOURCE: NORMAL
WBC # BLD AUTO: 5.6 K/UL (ref 4.8–10.8)

## 2020-12-03 PROCEDURE — A9270 NON-COVERED ITEM OR SERVICE: HCPCS | Performed by: STUDENT IN AN ORGANIZED HEALTH CARE EDUCATION/TRAINING PROGRAM

## 2020-12-03 PROCEDURE — 85384 FIBRINOGEN ACTIVITY: CPT

## 2020-12-03 PROCEDURE — 85025 COMPLETE CBC W/AUTO DIFF WBC: CPT

## 2020-12-03 PROCEDURE — 700102 HCHG RX REV CODE 250 W/ 637 OVERRIDE(OP): Performed by: INTERNAL MEDICINE

## 2020-12-03 PROCEDURE — 83605 ASSAY OF LACTIC ACID: CPT

## 2020-12-03 PROCEDURE — 770020 HCHG ROOM/CARE - TELE (206)

## 2020-12-03 PROCEDURE — A9270 NON-COVERED ITEM OR SERVICE: HCPCS | Performed by: INTERNAL MEDICINE

## 2020-12-03 PROCEDURE — 700105 HCHG RX REV CODE 258: Performed by: HOSPITALIST

## 2020-12-03 PROCEDURE — 85046 RETICYTE/HGB CONCENTRATE: CPT

## 2020-12-03 PROCEDURE — 700105 HCHG RX REV CODE 258: Performed by: STUDENT IN AN ORGANIZED HEALTH CARE EDUCATION/TRAINING PROGRAM

## 2020-12-03 PROCEDURE — 85379 FIBRIN DEGRADATION QUANT: CPT

## 2020-12-03 PROCEDURE — 36415 COLL VENOUS BLD VENIPUNCTURE: CPT

## 2020-12-03 PROCEDURE — 99232 SBSQ HOSP IP/OBS MODERATE 35: CPT | Performed by: INTERNAL MEDICINE

## 2020-12-03 PROCEDURE — 700102 HCHG RX REV CODE 250 W/ 637 OVERRIDE(OP): Performed by: HOSPITALIST

## 2020-12-03 PROCEDURE — 83010 ASSAY OF HAPTOGLOBIN QUANT: CPT

## 2020-12-03 PROCEDURE — 700111 HCHG RX REV CODE 636 W/ 250 OVERRIDE (IP): Performed by: STUDENT IN AN ORGANIZED HEALTH CARE EDUCATION/TRAINING PROGRAM

## 2020-12-03 PROCEDURE — 700111 HCHG RX REV CODE 636 W/ 250 OVERRIDE (IP): Performed by: HOSPITALIST

## 2020-12-03 PROCEDURE — 97165 OT EVAL LOW COMPLEX 30 MIN: CPT

## 2020-12-03 PROCEDURE — 80069 RENAL FUNCTION PANEL: CPT

## 2020-12-03 PROCEDURE — 97161 PT EVAL LOW COMPLEX 20 MIN: CPT

## 2020-12-03 PROCEDURE — 83615 LACTATE (LD) (LDH) ENZYME: CPT

## 2020-12-03 PROCEDURE — A9270 NON-COVERED ITEM OR SERVICE: HCPCS | Performed by: HOSPITALIST

## 2020-12-03 PROCEDURE — 700102 HCHG RX REV CODE 250 W/ 637 OVERRIDE(OP): Performed by: STUDENT IN AN ORGANIZED HEALTH CARE EDUCATION/TRAINING PROGRAM

## 2020-12-03 PROCEDURE — 86140 C-REACTIVE PROTEIN: CPT

## 2020-12-03 PROCEDURE — 83735 ASSAY OF MAGNESIUM: CPT

## 2020-12-03 PROCEDURE — 84145 PROCALCITONIN (PCT): CPT

## 2020-12-03 PROCEDURE — 83520 IMMUNOASSAY QUANT NOS NONAB: CPT

## 2020-12-03 PROCEDURE — 700111 HCHG RX REV CODE 636 W/ 250 OVERRIDE (IP): Performed by: INTERNAL MEDICINE

## 2020-12-03 RX ORDER — MAGNESIUM SULFATE HEPTAHYDRATE 40 MG/ML
2 INJECTION, SOLUTION INTRAVENOUS ONCE
Status: COMPLETED | OUTPATIENT
Start: 2020-12-03 | End: 2020-12-03

## 2020-12-03 RX ORDER — HYDRALAZINE HYDROCHLORIDE 25 MG/1
25 TABLET, FILM COATED ORAL EVERY 8 HOURS
Status: DISCONTINUED | OUTPATIENT
Start: 2020-12-03 | End: 2020-12-05

## 2020-12-03 RX ORDER — LINEZOLID 600 MG/1
600 TABLET, FILM COATED ORAL EVERY 12 HOURS
Status: COMPLETED | OUTPATIENT
Start: 2020-12-03 | End: 2020-12-06

## 2020-12-03 RX ADMIN — LINEZOLID 600 MG: 600 TABLET, FILM COATED ORAL at 21:00

## 2020-12-03 RX ADMIN — OMEPRAZOLE 40 MG: 20 CAPSULE, DELAYED RELEASE ORAL at 05:46

## 2020-12-03 RX ADMIN — HYDRALAZINE HYDROCHLORIDE 25 MG: 25 TABLET, FILM COATED ORAL at 21:00

## 2020-12-03 RX ADMIN — GABAPENTIN 200 MG: 100 CAPSULE ORAL at 05:47

## 2020-12-03 RX ADMIN — LISINOPRIL 5 MG: 5 TABLET ORAL at 05:47

## 2020-12-03 RX ADMIN — ACETAMINOPHEN 500 MG: 500 TABLET ORAL at 17:13

## 2020-12-03 RX ADMIN — HYDROCODONE BITARTRATE AND ACETAMINOPHEN 2 TABLET: 5; 325 TABLET ORAL at 01:21

## 2020-12-03 RX ADMIN — AZITHROMYCIN MONOHYDRATE 500 MG: 250 TABLET ORAL at 05:47

## 2020-12-03 RX ADMIN — MAGNESIUM SULFATE 2 G: 2 INJECTION INTRAVENOUS at 08:21

## 2020-12-03 RX ADMIN — HYDRALAZINE HYDROCHLORIDE 10 MG: 10 TABLET, FILM COATED ORAL at 01:22

## 2020-12-03 RX ADMIN — SODIUM CHLORIDE 3 G: 900 INJECTION INTRAVENOUS at 17:13

## 2020-12-03 RX ADMIN — TAMSULOSIN HYDROCHLORIDE 0.4 MG: 0.4 CAPSULE ORAL at 05:50

## 2020-12-03 RX ADMIN — HYDRALAZINE HYDROCHLORIDE 25 MG: 25 TABLET, FILM COATED ORAL at 14:35

## 2020-12-03 RX ADMIN — HYDROCODONE BITARTRATE AND ACETAMINOPHEN 1 TABLET: 5; 325 TABLET ORAL at 21:13

## 2020-12-03 RX ADMIN — HYDROCODONE BITARTRATE AND ACETAMINOPHEN 1 TABLET: 5; 325 TABLET ORAL at 21:10

## 2020-12-03 RX ADMIN — METOPROLOL SUCCINATE 100 MG: 50 TABLET, EXTENDED RELEASE ORAL at 05:50

## 2020-12-03 RX ADMIN — SODIUM CHLORIDE 3 G: 900 INJECTION INTRAVENOUS at 05:51

## 2020-12-03 RX ADMIN — HYDROCODONE BITARTRATE AND ACETAMINOPHEN 2 TABLET: 5; 325 TABLET ORAL at 10:37

## 2020-12-03 RX ADMIN — PIPERACILLIN AND TAZOBACTAM 3.38 G: 3; .375 INJECTION, POWDER, LYOPHILIZED, FOR SOLUTION INTRAVENOUS; PARENTERAL at 21:00

## 2020-12-03 RX ADMIN — LABETALOL HYDROCHLORIDE 10 MG: 5 INJECTION, SOLUTION INTRAVENOUS at 20:28

## 2020-12-03 RX ADMIN — TRAZODONE HYDROCHLORIDE 100 MG: 100 TABLET ORAL at 21:00

## 2020-12-03 RX ADMIN — ISOSORBIDE MONONITRATE 15 MG: 30 TABLET, EXTENDED RELEASE ORAL at 05:50

## 2020-12-03 RX ADMIN — HYDRALAZINE HYDROCHLORIDE 10 MG: 10 TABLET, FILM COATED ORAL at 08:22

## 2020-12-03 ASSESSMENT — ENCOUNTER SYMPTOMS
HEADACHES: 0
PALPITATIONS: 0
DEPRESSION: 0
NAUSEA: 0
SHORTNESS OF BREATH: 1
VOMITING: 0
BLURRED VISION: 0
ABDOMINAL PAIN: 0
FEVER: 0
DIZZINESS: 0
BLOOD IN STOOL: 0
MYALGIAS: 0
CHILLS: 0
COUGH: 1

## 2020-12-03 ASSESSMENT — COGNITIVE AND FUNCTIONAL STATUS - GENERAL
MOBILITY SCORE: 24
SUGGESTED CMS G CODE MODIFIER DAILY ACTIVITY: CH
SUGGESTED CMS G CODE MODIFIER MOBILITY: CH
DAILY ACTIVITIY SCORE: 24

## 2020-12-03 ASSESSMENT — ACTIVITIES OF DAILY LIVING (ADL): TOILETING: INDEPENDENT

## 2020-12-03 ASSESSMENT — GAIT ASSESSMENTS
GAIT LEVEL OF ASSIST: SUPERVISED
DISTANCE (FEET): 75

## 2020-12-03 ASSESSMENT — PAIN DESCRIPTION - PAIN TYPE
TYPE: CHRONIC PAIN
TYPE: CHRONIC PAIN

## 2020-12-03 NOTE — PROGRESS NOTES
[facility] Medicine Daily Progress Note    Date of Service  12/3/2020    Chief Complaint  70 y.o. male admitted 11/30/2020 with shortness of breath.     Hospital Course  70 y.o. male who presented 11/30/2020 with complaints of worsening SOB x1 day. Accompanying this SOB, pt reports a productive cough of green sputum that has now resolved,  fever, mild headache, and b/l LE edema. Pt reports that his symptoms have progressively worsened throughout the day until they became so severe he had to call EMS last night. On arrival, EMS noted pt was hypoxic at 69% on RA and required rebreather. Pt reports he has history of CKD, but does not follow a nephrologist.       Interval Problem Update  Patient was seen and examined at bedside.  No acute events overnight.  Patient is resting comfortably in bed and in no acute distress.  Hemoglobin 7.3 following 1 unit PRBC on 12/2.    Consultants/Specialty  none    Code Status  Full Code    Disposition  Await PT eval    Review of Systems  Review of Systems   Constitutional: Negative for chills and fever.   HENT: Positive for congestion.    Eyes: Negative for blurred vision.   Respiratory: Positive for cough and shortness of breath.    Cardiovascular: Negative for chest pain and palpitations.   Gastrointestinal: Negative for abdominal pain, blood in stool, melena, nausea and vomiting.   Genitourinary: Negative for frequency.   Musculoskeletal: Negative for myalgias.   Skin: Negative for rash.   Neurological: Negative for dizziness and headaches.   Psychiatric/Behavioral: Negative for depression.        Physical Exam  Temp:  [36.7 °C (98.1 °F)-37.5 °C (99.5 °F)] 37.3 °C (99.2 °F)  Pulse:  [72-85] 78  Resp:  [18] 18  BP: (149-181)/() 150/89  SpO2:  [93 %-100 %] 97 %    Physical Exam  Constitutional:       General: He is not in acute distress.     Appearance: He is not ill-appearing or toxic-appearing.   HENT:      Head: Normocephalic.      Right Ear: External ear normal.      Left  Ear: External ear normal.      Nose: Nose normal.      Mouth/Throat:      Mouth: Mucous membranes are moist.      Pharynx: No oropharyngeal exudate.   Eyes:      Extraocular Movements: Extraocular movements intact.      Conjunctiva/sclera: Conjunctivae normal.      Pupils: Pupils are equal, round, and reactive to light.   Neck:      Musculoskeletal: Neck supple. No muscular tenderness.   Cardiovascular:      Rate and Rhythm: Tachycardia present.      Heart sounds: No murmur.   Pulmonary:      Effort: No respiratory distress.      Breath sounds: No wheezing.      Comments: Improved aeration in lower lung bases  Abdominal:      Palpations: Abdomen is soft.      Tenderness: There is no abdominal tenderness. There is no guarding or rebound.   Musculoskeletal:         General: No deformity.   Skin:     General: Skin is warm and dry.      Coloration: Skin is not jaundiced.   Neurological:      General: No focal deficit present.      Mental Status: He is alert and oriented to person, place, and time.   Psychiatric:         Mood and Affect: Mood normal.         Behavior: Behavior normal.         Thought Content: Thought content normal.         Fluids    Intake/Output Summary (Last 24 hours) at 12/3/2020 1320  Last data filed at 12/3/2020 0400  Gross per 24 hour   Intake 906 ml   Output 1400 ml   Net -494 ml       Laboratory  Recent Labs     12/02/20  0411 12/02/20  0645 12/03/20  0501   WBC 4.3* 5.2 5.6   RBC 2.24* 2.33* 2.51*   HEMOGLOBIN 6.5* 6.9* 7.3*   HEMATOCRIT 21.3* 22.5* 23.1*   MCV 95.1 96.6 92.0   MCH 29.0 29.6 29.1   MCHC 30.5* 30.7* 31.6*   RDW 60.2* 62.0* 58.7*   PLATELETCT 135* 140* 121*   MPV 10.9 10.4 10.6     Recent Labs     12/01/20  0009 12/02/20  0411 12/03/20  0501   SODIUM 139 137 139   POTASSIUM 4.2 4.1 4.0   CHLORIDE 113* 111 112   CO2 15* 16* 18*   GLUCOSE 103* 115* 138*   BUN 46* 49* 48*   CREATININE 3.97* 4.02* 3.71*   CALCIUM 7.6* 8.0* 8.0*     Recent Labs     12/01/20  0009 12/01/20  0254   APTT  31.9  --    INR 1.10 1.16*               Imaging  US-EXTREMITY VENOUS LOWER BILAT   Final Result      EC-ECHOCARDIOGRAM COMPLETE W/O CONT   Final Result      DX-CHEST-PORTABLE (1 VIEW)   Final Result         1.  Pulmonary edema and/or infiltrates.           Assessment/Plan  * Pneumonia due to COVID-19 virus- (present on admission)  Assessment & Plan  Worsening SOB x1 day.   COVID+.   CXR w/ pulm edema bilateral infiltrates consistent with COVID  -proning as needed  - Decadron 6mg qd x10 days  - Unasyn 3g q6h/Azithro 500mg qd abx  - trend inflammatory markers  - Blood Cultures, Sputum Cx - no growth x1 day    CKD (chronic kidney disease)- (present on admission)  Assessment & Plan  Cr 3.97, previously 3.4 on 3/19/20  - monitor while receiving Lasix      Elevated d-dimer- (present on admission)  Assessment & Plan  Likely acute phase reacting to COVID  ddimer 2.94  Bilateral LE ultrasound: no evidence of DVT    Elevated brain natriuretic peptide (BNP) level- (present on admission)  Assessment & Plan  BNP 99413  Likely related to CKD  Possibly due to severe lung infection -> heart failure  - TTE: EF 55%, RVSP 30  - Fluid restrict 1200 CC  - Low Na diet    Anemia- (present on admission)  Assessment & Plan  Hb 6.9, previously 9.1 3/19/20  Noted to be trending down  Possibly related to CKD, however, pt also with history of gastric ulcers in the past  Denies melena - will obtain occult blood  -S/p 1 unit PRBC on 12/2 with improvement of hemoglobin from 6.9-7.3.  Iron studies consistent with anemia of chronic disease.  Hemolysis  work-up pending    Hypomagnesemia- (present on admission)  Assessment & Plan  Replace and monitor       VTE prophylaxis: Heparin

## 2020-12-03 NOTE — THERAPY
Physical Therapy   Initial Evaluation     Patient Name: Abran Proctor  Age:  70 y.o., Sex:  male  Medical Record #: [id number]  Today's Date: 12/3/2020          Assessment  Patient is 70 y.o. male admitted w/ c/c SOB and HA.  Found to be COVID +.  Hx of CKD.  He lives alone in an apartment, no stairs.  Today, he is able to move in/out of bed and ambulate in his room w/o assistive device and w/o need of assist and w/o loss of balance.  He  Is on 3.5 liters of O2 and maintains his O2 sats greater than 90%.  No acute PT needs.     Plan    Recommend Physical Therapy for Evaluation only     DC Equipment Recommendations: None  Discharge Recommendations: Anticipate that the patient will have no further physical therapy needs after discharge from the hospital         Objective       12/03/20 0742   Prior Living Situation   Housing / Facility 1 Story Apartment / Condo   Steps Into Home 0   Steps In Home 0   Equipment Owned None   Lives with - Patient's Self Care Capacity Alone and Able to Care For Self   Prior Level of Functional Mobility   Bed Mobility Independent   Transfer Status Independent   Ambulation Independent   Assistive Devices Used None   Balance Assessment   Sitting Balance (Static) Fair +   Sitting Balance (Dynamic) Fair +   Standing Balance (Static) Fair +   Standing Balance (Dynamic) Fair +   Weight Shift Sitting Good   Weight Shift Standing Good   Gait Analysis   Gait Level Of Assist Supervised   Assistive Device None   Distance (Feet) 75   Bed Mobility    Supine to Sit Supervised   Sit to Supine Supervised   Scooting Supervised   Functional Mobility   Sit to Stand Supervised   Bed, Chair, Wheelchair Transfer Supervised   Toilet Transfers Supervised   Anticipated Discharge Equipment and Recommendations   DC Equipment Recommendations None   Discharge Recommendations Anticipate that the patient will have no further physical therapy needs after discharge from the hospital

## 2020-12-03 NOTE — PROGRESS NOTES
Assumed care of patient at 0715, received bedside report from night shift RN. Bed is locked and in lowest position with call light within reach. Treaded socks in place. Patient updated on plan of care, no complaints or pain at this time. White board updated. Pt A&O4. Patient's breathing pattern is unlabored. Tele monitor in place and cardiac rhythm being monitored, 3.5L @ NC. All needs met at this time.

## 2020-12-03 NOTE — PROGRESS NOTES
Received bedside report from RN Anne, pt care assumed, VS show marked HTN and all other stats unremarkable, pt assessment complete. Pt AAOx4, c/o 8/10 pain at this time. No signs of acute distress noted at this time. POC discussed with pt and verbalizes no questions. Pt denies any additional needs at this time. Bed in lowest position, bed alarm off, pt educated on fall risk and verbalized understanding, call light within reach, hourly rounding initiated.

## 2020-12-03 NOTE — THERAPY
Occupational Therapy   Initial Evaluation     Patient Name: Abran Proctor  Age:  70 y.o., Sex:  male  Medical Record #: [id number]  Today's Date: 12/3/2020          Assessment  Patient is 70 y.o. male with a diagnosis of COVID-19.   Pt is at or near his/her functional baseline. Pt with no further skilled OT needs in the acute care setting at this time.     Plan     Occupational Therapy for Evaluation only        Discharge Recommendations: (P) Anticipate that the patient will have no further occupational therapy needs after discharge from the hospital        12/03/20 0747   Prior Living Situation   Prior Services None   Housing / Facility 1 Story Apartment / Condo   Steps Into Home 0   Steps In Home 0   Equipment Owned None   Lives with - Patient's Self Care Capacity Alone and Able to Care For Self   Prior Level of ADL Function   Self Feeding Independent   Grooming / Hygiene Independent   Bathing Independent   Dressing Independent   Toileting Independent   ADL Assessment   Grooming Supervision   Upper Body Dressing Supervision   Lower Body Dressing Supervision   Toileting Supervision   Functional Mobility   Sit to Stand Supervised   Bed, Chair, Wheelchair Transfer Supervised   Toilet Transfers Supervised

## 2020-12-04 ENCOUNTER — APPOINTMENT (OUTPATIENT)
Dept: RADIOLOGY | Facility: MEDICAL CENTER | Age: 70
DRG: 871 | End: 2020-12-04
Attending: INTERNAL MEDICINE
Payer: COMMERCIAL

## 2020-12-04 PROBLEM — N18.4 STAGE 4 CHRONIC KIDNEY DISEASE (HCC): Status: ACTIVE | Noted: 2020-03-18

## 2020-12-04 PROBLEM — R50.9 FEVER: Status: ACTIVE | Noted: 2020-12-04

## 2020-12-04 PROBLEM — R79.89 ELEVATED D-DIMER: Status: RESOLVED | Noted: 2020-12-01 | Resolved: 2020-12-04

## 2020-12-04 LAB
ALBUMIN SERPL BCP-MCNC: 2.8 G/DL (ref 3.2–4.9)
ALBUMIN SERPL BCP-MCNC: 3 G/DL (ref 3.2–4.9)
ALBUMIN/GLOB SERPL: 0.8 G/DL
ALP SERPL-CCNC: 85 U/L (ref 30–99)
ALT SERPL-CCNC: 128 U/L (ref 2–50)
ANION GAP SERPL CALC-SCNC: 11 MMOL/L (ref 7–16)
AST SERPL-CCNC: 316 U/L (ref 12–45)
BASE EXCESS BLDA CALC-SCNC: -9 MMOL/L (ref -4–3)
BASOPHILS # BLD AUTO: 0 % (ref 0–1.8)
BASOPHILS # BLD AUTO: 0.4 % (ref 0–1.8)
BASOPHILS # BLD: 0 K/UL (ref 0–0.12)
BASOPHILS # BLD: 0.02 K/UL (ref 0–0.12)
BILIRUB SERPL-MCNC: 0.4 MG/DL (ref 0.1–1.5)
BODY TEMPERATURE: ABNORMAL CENTIGRADE
BUN SERPL-MCNC: 43 MG/DL (ref 8–22)
BUN SERPL-MCNC: 44 MG/DL (ref 8–22)
CALCIUM SERPL-MCNC: 7.9 MG/DL (ref 8.5–10.5)
CALCIUM SERPL-MCNC: 8.1 MG/DL (ref 8.5–10.5)
CHLORIDE SERPL-SCNC: 109 MMOL/L (ref 96–112)
CHLORIDE SERPL-SCNC: 110 MMOL/L (ref 96–112)
CO2 SERPL-SCNC: 14 MMOL/L (ref 20–33)
CO2 SERPL-SCNC: 18 MMOL/L (ref 20–33)
CREAT SERPL-MCNC: 3.39 MG/DL (ref 0.5–1.4)
CREAT SERPL-MCNC: 3.7 MG/DL (ref 0.5–1.4)
EOSINOPHIL # BLD AUTO: 0 K/UL (ref 0–0.51)
EOSINOPHIL # BLD AUTO: 0.02 K/UL (ref 0–0.51)
EOSINOPHIL NFR BLD: 0 % (ref 0–6.9)
EOSINOPHIL NFR BLD: 0.4 % (ref 0–6.9)
ERYTHROCYTE [DISTWIDTH] IN BLOOD BY AUTOMATED COUNT: 57.4 FL (ref 35.9–50)
ERYTHROCYTE [DISTWIDTH] IN BLOOD BY AUTOMATED COUNT: 60.1 FL (ref 35.9–50)
GLOBULIN SER CALC-MCNC: 3.6 G/DL (ref 1.9–3.5)
GLUCOSE SERPL-MCNC: 169 MG/DL (ref 65–99)
GLUCOSE SERPL-MCNC: 99 MG/DL (ref 65–99)
HCO3 BLDA-SCNC: 15 MMOL/L (ref 17–25)
HCT VFR BLD AUTO: 23.4 % (ref 42–52)
HCT VFR BLD AUTO: 24.2 % (ref 42–52)
HGB BLD-MCNC: 7.4 G/DL (ref 14–18)
HGB BLD-MCNC: 7.6 G/DL (ref 14–18)
IMM GRANULOCYTES # BLD AUTO: 0.03 K/UL (ref 0–0.11)
IMM GRANULOCYTES # BLD AUTO: 0.04 K/UL (ref 0–0.11)
IMM GRANULOCYTES NFR BLD AUTO: 0.6 % (ref 0–0.9)
IMM GRANULOCYTES NFR BLD AUTO: 0.7 % (ref 0–0.9)
INHALED O2 FLOW RATE: 1.5 L/MIN (ref 2–10)
LACTATE BLD-SCNC: 1.3 MMOL/L (ref 0.5–2)
LACTATE BLD-SCNC: 1.4 MMOL/L (ref 0.5–2)
LACTATE BLD-SCNC: 1.9 MMOL/L (ref 0.5–2)
LACTATE BLD-SCNC: 2.2 MMOL/L (ref 0.5–2)
LYMPHOCYTES # BLD AUTO: 0.38 K/UL (ref 1–4.8)
LYMPHOCYTES # BLD AUTO: 1.09 K/UL (ref 1–4.8)
LYMPHOCYTES NFR BLD: 19.3 % (ref 22–41)
LYMPHOCYTES NFR BLD: 7.9 % (ref 22–41)
MAGNESIUM SERPL-MCNC: 1.3 MG/DL (ref 1.5–2.5)
MAGNESIUM SERPL-MCNC: 1.4 MG/DL (ref 1.5–2.5)
MCH RBC QN AUTO: 28.8 PG (ref 27–33)
MCH RBC QN AUTO: 30 PG (ref 27–33)
MCHC RBC AUTO-ENTMCNC: 31.4 G/DL (ref 33.7–35.3)
MCHC RBC AUTO-ENTMCNC: 31.6 G/DL (ref 33.7–35.3)
MCV RBC AUTO: 91.7 FL (ref 81.4–97.8)
MCV RBC AUTO: 94.7 FL (ref 81.4–97.8)
MONOCYTES # BLD AUTO: 0.16 K/UL (ref 0–0.85)
MONOCYTES # BLD AUTO: 0.36 K/UL (ref 0–0.85)
MONOCYTES NFR BLD AUTO: 3.3 % (ref 0–13.4)
MONOCYTES NFR BLD AUTO: 6.4 % (ref 0–13.4)
NEUTROPHILS # BLD AUTO: 4.13 K/UL (ref 1.82–7.42)
NEUTROPHILS # BLD AUTO: 4.23 K/UL (ref 1.82–7.42)
NEUTROPHILS NFR BLD: 72.8 % (ref 44–72)
NEUTROPHILS NFR BLD: 88.2 % (ref 44–72)
NRBC # BLD AUTO: 0 K/UL
NRBC # BLD AUTO: 0.02 K/UL
NRBC BLD-RTO: 0 /100 WBC
NRBC BLD-RTO: 0.4 /100 WBC
PCO2 BLDA: 28.2 MMHG (ref 26–37)
PH BLDA: 7.35 [PH] (ref 7.4–7.5)
PHOSPHATE SERPL-MCNC: 2.5 MG/DL (ref 2.5–4.5)
PHOSPHATE SERPL-MCNC: 3 MG/DL (ref 2.5–4.5)
PLATELET # BLD AUTO: 113 K/UL (ref 164–446)
PLATELET # BLD AUTO: 128 K/UL (ref 164–446)
PMV BLD AUTO: 10.6 FL (ref 9–12.9)
PMV BLD AUTO: 11.3 FL (ref 9–12.9)
PO2 BLDA: 101.1 MMHG (ref 64–87)
POTASSIUM SERPL-SCNC: 4.1 MMOL/L (ref 3.6–5.5)
POTASSIUM SERPL-SCNC: 4.5 MMOL/L (ref 3.6–5.5)
PROT SERPL-MCNC: 6.4 G/DL (ref 6–8.2)
RBC # BLD AUTO: 2.47 M/UL (ref 4.7–6.1)
RBC # BLD AUTO: 2.64 M/UL (ref 4.7–6.1)
SAO2 % BLDA: 97 % (ref 93–99)
SODIUM SERPL-SCNC: 135 MMOL/L (ref 135–145)
SODIUM SERPL-SCNC: 136 MMOL/L (ref 135–145)
WBC # BLD AUTO: 4.8 K/UL (ref 4.8–10.8)
WBC # BLD AUTO: 5.7 K/UL (ref 4.8–10.8)

## 2020-12-04 PROCEDURE — 700105 HCHG RX REV CODE 258: Performed by: INTERNAL MEDICINE

## 2020-12-04 PROCEDURE — 85025 COMPLETE CBC W/AUTO DIFF WBC: CPT

## 2020-12-04 PROCEDURE — 700111 HCHG RX REV CODE 636 W/ 250 OVERRIDE (IP): Performed by: HOSPITALIST

## 2020-12-04 PROCEDURE — 700102 HCHG RX REV CODE 250 W/ 637 OVERRIDE(OP): Performed by: HOSPITALIST

## 2020-12-04 PROCEDURE — 84100 ASSAY OF PHOSPHORUS: CPT

## 2020-12-04 PROCEDURE — 700102 HCHG RX REV CODE 250 W/ 637 OVERRIDE(OP): Performed by: INTERNAL MEDICINE

## 2020-12-04 PROCEDURE — 87040 BLOOD CULTURE FOR BACTERIA: CPT

## 2020-12-04 PROCEDURE — 770020 HCHG ROOM/CARE - TELE (206)

## 2020-12-04 PROCEDURE — A9270 NON-COVERED ITEM OR SERVICE: HCPCS | Performed by: STUDENT IN AN ORGANIZED HEALTH CARE EDUCATION/TRAINING PROGRAM

## 2020-12-04 PROCEDURE — 700102 HCHG RX REV CODE 250 W/ 637 OVERRIDE(OP): Performed by: STUDENT IN AN ORGANIZED HEALTH CARE EDUCATION/TRAINING PROGRAM

## 2020-12-04 PROCEDURE — A9270 NON-COVERED ITEM OR SERVICE: HCPCS | Performed by: INTERNAL MEDICINE

## 2020-12-04 PROCEDURE — 99232 SBSQ HOSP IP/OBS MODERATE 35: CPT | Performed by: INTERNAL MEDICINE

## 2020-12-04 PROCEDURE — 83735 ASSAY OF MAGNESIUM: CPT

## 2020-12-04 PROCEDURE — 93005 ELECTROCARDIOGRAM TRACING: CPT | Performed by: INTERNAL MEDICINE

## 2020-12-04 PROCEDURE — 80069 RENAL FUNCTION PANEL: CPT

## 2020-12-04 PROCEDURE — 700105 HCHG RX REV CODE 258: Performed by: HOSPITALIST

## 2020-12-04 PROCEDURE — 80053 COMPREHEN METABOLIC PANEL: CPT

## 2020-12-04 PROCEDURE — 82803 BLOOD GASES ANY COMBINATION: CPT

## 2020-12-04 PROCEDURE — A9270 NON-COVERED ITEM OR SERVICE: HCPCS | Performed by: HOSPITALIST

## 2020-12-04 PROCEDURE — 83605 ASSAY OF LACTIC ACID: CPT

## 2020-12-04 PROCEDURE — 36415 COLL VENOUS BLD VENIPUNCTURE: CPT

## 2020-12-04 PROCEDURE — 71045 X-RAY EXAM CHEST 1 VIEW: CPT

## 2020-12-04 RX ORDER — LISINOPRIL 10 MG/1
10 TABLET ORAL
Status: DISCONTINUED | OUTPATIENT
Start: 2020-12-05 | End: 2020-12-05

## 2020-12-04 RX ORDER — SODIUM CHLORIDE, SODIUM LACTATE, POTASSIUM CHLORIDE, AND CALCIUM CHLORIDE .6; .31; .03; .02 G/100ML; G/100ML; G/100ML; G/100ML
250 INJECTION, SOLUTION INTRAVENOUS ONCE
Status: COMPLETED | OUTPATIENT
Start: 2020-12-04 | End: 2020-12-04

## 2020-12-04 RX ADMIN — DEXAMETHASONE 6 MG: 4 TABLET ORAL at 05:33

## 2020-12-04 RX ADMIN — TAMSULOSIN HYDROCHLORIDE 0.4 MG: 0.4 CAPSULE ORAL at 05:34

## 2020-12-04 RX ADMIN — METOPROLOL SUCCINATE 100 MG: 50 TABLET, EXTENDED RELEASE ORAL at 05:32

## 2020-12-04 RX ADMIN — HYDRALAZINE HYDROCHLORIDE 25 MG: 25 TABLET, FILM COATED ORAL at 05:33

## 2020-12-04 RX ADMIN — ISOSORBIDE MONONITRATE 15 MG: 30 TABLET, EXTENDED RELEASE ORAL at 05:32

## 2020-12-04 RX ADMIN — TRAZODONE HYDROCHLORIDE 100 MG: 100 TABLET ORAL at 20:52

## 2020-12-04 RX ADMIN — HYDROCODONE BITARTRATE AND ACETAMINOPHEN 1 TABLET: 5; 325 TABLET ORAL at 20:59

## 2020-12-04 RX ADMIN — HYDROCODONE BITARTRATE AND ACETAMINOPHEN 2 TABLET: 5; 325 TABLET ORAL at 05:32

## 2020-12-04 RX ADMIN — LINEZOLID 600 MG: 600 TABLET, FILM COATED ORAL at 17:35

## 2020-12-04 RX ADMIN — LISINOPRIL 5 MG: 5 TABLET ORAL at 05:34

## 2020-12-04 RX ADMIN — SODIUM CHLORIDE, POTASSIUM CHLORIDE, SODIUM LACTATE AND CALCIUM CHLORIDE 250 ML: 600; 310; 30; 20 INJECTION, SOLUTION INTRAVENOUS at 18:30

## 2020-12-04 RX ADMIN — GABAPENTIN 200 MG: 100 CAPSULE ORAL at 05:32

## 2020-12-04 RX ADMIN — PIPERACILLIN AND TAZOBACTAM 3.38 G: 3; .375 INJECTION, POWDER, LYOPHILIZED, FOR SOLUTION INTRAVENOUS; PARENTERAL at 15:35

## 2020-12-04 RX ADMIN — PIPERACILLIN AND TAZOBACTAM 3.38 G: 3; .375 INJECTION, POWDER, LYOPHILIZED, FOR SOLUTION INTRAVENOUS; PARENTERAL at 01:59

## 2020-12-04 RX ADMIN — HYDROCODONE BITARTRATE AND ACETAMINOPHEN 1 TABLET: 5; 325 TABLET ORAL at 20:52

## 2020-12-04 RX ADMIN — OMEPRAZOLE 40 MG: 20 CAPSULE, DELAYED RELEASE ORAL at 05:32

## 2020-12-04 RX ADMIN — HYDRALAZINE HYDROCHLORIDE 25 MG: 25 TABLET, FILM COATED ORAL at 15:35

## 2020-12-04 RX ADMIN — HYDROCODONE BITARTRATE AND ACETAMINOPHEN 2 TABLET: 5; 325 TABLET ORAL at 15:35

## 2020-12-04 RX ADMIN — LINEZOLID 600 MG: 600 TABLET, FILM COATED ORAL at 05:33

## 2020-12-04 RX ADMIN — HYDRALAZINE HYDROCHLORIDE 25 MG: 25 TABLET, FILM COATED ORAL at 21:53

## 2020-12-04 ASSESSMENT — ENCOUNTER SYMPTOMS
MYALGIAS: 0
BLOOD IN STOOL: 0
CHILLS: 0
DIZZINESS: 0
DEPRESSION: 0
ABDOMINAL PAIN: 0
COUGH: 1
NAUSEA: 0
VOMITING: 0
FEVER: 0
BLURRED VISION: 0
SHORTNESS OF BREATH: 1
HEADACHES: 0
PALPITATIONS: 0

## 2020-12-04 ASSESSMENT — PAIN DESCRIPTION - PAIN TYPE
TYPE: CHRONIC PAIN
TYPE: CHRONIC PAIN

## 2020-12-04 ASSESSMENT — FIBROSIS 4 INDEX: FIB4 SCORE: 4.43

## 2020-12-04 NOTE — PROGRESS NOTES
Notified by nursing that patient with fever of 102.1 and . Patient meets sepsis criteria. I have changed unasyn to zosyn and zyvox (avoiding vanco d/t renal function), hold IVF bolus due to concurrent covid infection. I have ordered blood cultures and lactic acid.

## 2020-12-04 NOTE — PROGRESS NOTES
[facility] Medicine Daily Progress Note    Date of Service  12/4/2020    Chief Complaint  70 y.o. male admitted 11/30/2020 with shortness of breath.     Hospital Course  70 y.o. male who presented 11/30/2020 with complaints of worsening SOB x1 day. Accompanying this SOB, pt reports a productive cough of green sputum that has now resolved,  fever, mild headache, and b/l LE edema. Pt reports that his symptoms have progressively worsened throughout the day until they became so severe he had to call EMS last night. On arrival, EMS noted pt was hypoxic at 69% on RA and required rebreather. Pt reports he has history of CKD4.     Interval Problem Update  Patient was seen and examined at bedside.  No acute events overnight.  Patient is resting comfortably in bed and in no acute distress.  T-max over last 24 hours of 102.9.  Patient was recultured and started on Zosyn, Zyvox.  Stable from a pulmonary standpoint and continues to require 3.5 L nasal cannula. Hb stable    Consultants/Specialty  none    Code Status  Full Code    Disposition  Await PT eval    Review of Systems  Review of Systems   Constitutional: Negative for chills and fever.   HENT: Positive for congestion.    Eyes: Negative for blurred vision.   Respiratory: Positive for cough and shortness of breath.    Cardiovascular: Negative for chest pain and palpitations.   Gastrointestinal: Negative for abdominal pain, blood in stool, melena, nausea and vomiting.   Genitourinary: Negative for frequency.   Musculoskeletal: Negative for myalgias.   Skin: Negative for rash.   Neurological: Negative for dizziness and headaches.   Psychiatric/Behavioral: Negative for depression.        Physical Exam  Temp:  [37.3 °C (99.2 °F)-39.4 °C (102.9 °F)] 38.1 °C (100.5 °F)  Pulse:  [] 85  Resp:  [18-22] 20  BP: (124-189)/() 129/74  SpO2:  [90 %-97 %] 93 %    Physical Exam  Constitutional:       General: He is not in acute distress.     Appearance: He is not ill-appearing or  toxic-appearing.   HENT:      Head: Normocephalic.      Right Ear: External ear normal.      Left Ear: External ear normal.      Nose: Nose normal.      Mouth/Throat:      Mouth: Mucous membranes are moist.      Pharynx: No oropharyngeal exudate.   Eyes:      Extraocular Movements: Extraocular movements intact.      Conjunctiva/sclera: Conjunctivae normal.      Pupils: Pupils are equal, round, and reactive to light.   Neck:      Musculoskeletal: Neck supple. No muscular tenderness.   Cardiovascular:      Rate and Rhythm: Tachycardia present.      Heart sounds: No murmur.   Pulmonary:      Effort: No respiratory distress.      Breath sounds: No wheezing.      Comments: Faint crackles auscultated in lower lung bases bilaterally  Abdominal:      Palpations: Abdomen is soft.      Tenderness: There is no abdominal tenderness. There is no guarding or rebound.   Musculoskeletal:         General: No deformity.   Skin:     General: Skin is warm and dry.      Coloration: Skin is not jaundiced.   Neurological:      General: No focal deficit present.      Mental Status: He is alert and oriented to person, place, and time.   Psychiatric:         Mood and Affect: Mood normal.         Behavior: Behavior normal.         Thought Content: Thought content normal.         Fluids    Intake/Output Summary (Last 24 hours) at 12/4/2020 1128  Last data filed at 12/4/2020 0820  Gross per 24 hour   Intake 118 ml   Output 900 ml   Net -782 ml       Laboratory  Recent Labs     12/02/20  0645 12/03/20  0501 12/04/20  0018   WBC 5.2 5.6 5.7   RBC 2.33* 2.51* 2.64*   HEMOGLOBIN 6.9* 7.3* 7.6*   HEMATOCRIT 22.5* 23.1* 24.2*   MCV 96.6 92.0 91.7   MCH 29.6 29.1 28.8   MCHC 30.7* 31.6* 31.4*   RDW 62.0* 58.7* 57.4*   PLATELETCT 140* 121* 128*   MPV 10.4 10.6 10.6     Recent Labs     12/02/20  0411 12/03/20  0501 12/04/20  0018   SODIUM 137 139 136   POTASSIUM 4.1 4.0 4.1   CHLORIDE 111 112 109   CO2 16* 18* 18*   GLUCOSE 115* 138* 99   BUN 49* 48*  43*   CREATININE 4.02* 3.71* 3.39*   CALCIUM 8.0* 8.0* 7.9*                   Imaging  DX-CHEST-LIMITED (1 VIEW)   Final Result         Persistent bilateral interstitial opacities consistent with Covid 19 pneumonia without significant change.      Trace right pleural effusion.      US-EXTREMITY VENOUS LOWER BILAT   Final Result      EC-ECHOCARDIOGRAM COMPLETE W/O CONT   Final Result      DX-CHEST-PORTABLE (1 VIEW)   Final Result         1.  Pulmonary edema and/or infiltrates.           Assessment/Plan  * Pneumonia due to COVID-19 virus- (present on admission)  Assessment & Plan  Worsening SOB x1 day.   COVID+.   CXR w/ pulm edema bilateral infiltrates consistent with COVID  -proning as needed  - Decadron 6mg qd x10 days  - Completed Unasyn/Rocephin  - continues to be febrile  - Reculture on 12/4      Stage 4 chronic kidney disease (HCC)- (present on admission)  Assessment & Plan  Likely secondary to hypertensive nephrosclerosis  BP difficult to control throughout hospitalization  Cr 3.4      Fever  Assessment & Plan  Tmax 102.9 over last 24 hours  Blood culture/Urine culture from 12/1: no growth to date  WBC 5.7, procal elevated  Recultured 12/4  CXR: redemonstrates bilateral infiltrates consistent with COVID  Zosyn, Zyvox      Elevated brain natriuretic peptide (BNP) level- (present on admission)  Assessment & Plan  BNP 94007  Likely related to CKD  Possibly due to severe lung infection -> heart failure  - TTE: EF 55%, RVSP 30  - Fluid restrict 1200 CC  - Low Na diet    Anemia- (present on admission)  Assessment & Plan  Stable over last 48 hours  Possibly related to CKD, however, pt also with history of gastric ulcers in the past  Denies melena  Occult positive   -S/p 1 unit PRBC on 12/2 Iron studies consistent with anemia of chronic disease.  Hemolysis  work-up pending    Hypomagnesemia- (present on admission)  Assessment & Plan  Replace and monitor       VTE prophylaxis: Heparin

## 2020-12-04 NOTE — DOCUMENTATION QUERY
[facility]                                                                       Query Response Note      PATIENT:               JUAN STEINBERG  ACCT #:                  [id number]  MRN:                     [id number]  :                      1950  ADMIT DATE:       2020 11:42 PM  DISCH DATE:          RESPONDING  PROVIDER #:        [id number]           QUERY TEXT:    Elevated BNP is documented in the Medical Record. If applicable, can a corresponding diagnosis be provided with acuity to support the clinical picture (includes probable or suspected).    NOTE:  If an appropriate response is not listed below, please respond with a new note.    The patient's Clinical Indicators include:  Clinical indicators-  Per H&P: Presented w progressively worsened SOB & and b/l LE edema. COVID+ PNA. CXR performed w/ pulm edema/infiltrates. Breath sounds: Rhonchi and rales present. Resp Rate: 20-30.  Denies prior history of CHF. Possibly d/t severe lung infection -> heart failure   Echo: LEF 55%; BNP 38815    Treatments-  TTE   Conservative IVF use   Daily weights, I/O   Fluid restrict 1200 CC   Low Na diet   Lasix as needed   Supplemental O2  CXR  Metoprolol  Labetalol    Risks-  [place] of [address]; SOB; B/L LE edema; Hypoxia; +Covid-19 PNA; Sepsis    Thank you,  Radha Knight, CDI RN  Connect via [employer]  Options provided:   -- Acute systolic heart failure   -- Chronic systolic heart failure   -- Acute on chronic systolic heart failure   -- Heart failure ruled out   -- Unable to determine      Query created by: Radha Knight on 2020 10:22 AM    RESPONSE TEXT:    Unable to determine       QUERY TEXT:    Chronic Kidney Disease (CKD) is documented in the Medical Record.  Please specify the disease stage as well as acuity based upon diagnostic findings (includes probable or suspected). Please see below for CKD reference from the [employer]  [employer].    NOTE:  If an appropriate response is not listed below, please respond with a new note.    Stages are defined by the [employer] as follows:  CKD Stage I  GFR >= 90 ml / min per 1.73 m2 and persistent albuminuria  CKD Stage 2 GFR between 60 and 89 with persistent albuminuria  CKD Stage 3a GFR between 45 - 59  CKD Stage 3b GFR between 30-44  CKD Stage 4 GFR between 15 and 29   CKD Stage 5 GFR between <15 or End Stage Renal Disease    The patient's Clinical Indicators include:  Clinical indicators-  Per H&P: Chronic kidney disease; Cr 3.97, previously 3.4 on 3/19/20    PTA 3/19/20: BUN 61, creat 3.40, GFR 18  12/1 BUN 46, creat 3.97, GFR 15  12/2 BUN 49, creat 4.02, GFR 15  12/3 BUN 48, creat 3.71, GFR 16  12/4 BUN 43, creat 3.39, GFR 18    Treatments-  Repeat BMP  Conservative IVF use   Fluid restrict 1200 CC   Low Na diet     Risks-  CKD; +Covid 19 PNA; Sepsis; Elevated BNP & Troponin; Anemia    Thank you,  Radha Knight, CDI RN  Connect via [employer]  Options provided:   -- Acute kidney failure, with chronic kidney disease Stage 1   -- Acute kidney failure, with chronic kidney disease Stage 2   -- Acute kidney failure, with chronic kidney disease Stage 3 unspecified   -- Acute kidney failure, with chronic kidney disease Stage 3a   -- Acute kidney failure, with chronic kidney disease Stage 3b   -- Acute kidney failure, with chronic kidney disease Stage 4   -- Acute kidney failure, with chronic kidney disease Stage 5   -- Chronic kidney disease Stage 1   -- Chronic kidney disease Stage 2   -- Chronic kidney disease Stage 3 unspecified   -- Chronic kidney disease Stage 3a   -- Chronic kidney disease Stage 3b   -- Chronic kidney disease Stage 4   -- Chronic kidney disease Stage 5   -- Unable to determine      Query created by: Radha Knight on 12/4/2020 10:34 AM    RESPONSE TEXT:    Chronic kidney disease Stage 4          Electronically signed by:  RADHA LANDAVERDE MD 12/4/2020 11:07 AM

## 2020-12-04 NOTE — PROGRESS NOTES
Bedside report received and care assumed at start of shift.    Pt is A&Ox4 and on 3.5 L of O2. Pt reports 6 /10 pain. Pain meds administered per MAR. VS stable. Tele monitor in place.    Pt is resting in bed. Call light is in reach and bed is locked in its lowest position. Hourly rounding in place. Assessment complete and all needs are attended to.

## 2020-12-04 NOTE — DOCUMENTATION QUERY
[facility]                                                                       Query Response Note      PATIENT:               JUAN STEINBERG  ACCT #:                  [id number]  MRN:                     [id number]  :                      1950  ADMIT DATE:       2020 11:42 PM  DISCH DATE:          RESPONDING  PROVIDER #:        [id number]           QUERY TEXT:    Elevated D-Dimer and Covid-19 are documented in the medical record. Considering the clinical findings and treatment, can elevated D-Dimer be further specified?    Note:  If an appropriate response is not listed below, please respond with a new note.    The patient's clinical indicators include:  Clinical indicators-  Per H&P: +Covid-19. Elevated d-dimer likely acute phase reacting to COVID   D-Dimer 2.94  Ferritin 1440  CRP 1.10  Procalcitonin 0.51    Treatments-  BLE US  Anticoagulation: Heparin  D-Dimer & Inflammatory markers    Risks-  +Covid-19; D-Dimer 2.94; Ferritin 1440    Thank you,  Radha Knight CDI RN  Connect via [employer]  Options provided:   -- Covid-19 associated coagulopathy   -- Coagulopathy unrelated to Covid-19   -- Findings of no clinical significance   -- Unable to determine      Query created by: Radha Knight on 2020 10:01 AM    RESPONSE TEXT:    Unable to determine       QUERY TEXT:    Based on presentation of symptoms and diagnostic findings, if applicable, can a corresponding diagnosis with acuity be provided to support the clinical picture (includes probable or suspected).    NOTE:  If an appropriate response is not listed below, please respond with a new note.    The patient's clinical indicators include:  Clinical indicators-  Per H&P: Presented w progressively worsened SOB. EMS noted pt was hypoxic at 69% on RA and required nonrebreather. O2 sat 97% on nonrebreather. Productive cough & fever. COVID+. CXR performed w/ pulm  edema/infiltrates. Breath sounds: Rhonchi and rales present. Resp Rate: 20-30    Treatments-  RT protocol & IS  Supplemental O2  CXR  Albuterol  Lasix  Decadron    Risks-  SOB; Hypoxia requiring non rebreather; RR 20-30; Pneumonia due to COVID-19; Sepsis    Thank you,  Radha Knight, CDI RN  Connect via [employer]  Options provided:   -- Acute respiratory failure with hypoxia   -- Respiratory distress only   -- Hypoxia only   -- Unable to determine      Query created by: Radha Knight on 12/4/2020 10:10 AM    RESPONSE TEXT:    Acute respiratory failure with hypoxia          Electronically signed by:  RADHA LANDAVERDE MD 12/4/2020 10:13 AM

## 2020-12-05 ENCOUNTER — APPOINTMENT (OUTPATIENT)
Dept: RADIOLOGY | Facility: MEDICAL CENTER | Age: 70
DRG: 871 | End: 2020-12-05
Attending: HOSPITALIST
Payer: COMMERCIAL

## 2020-12-05 PROBLEM — R74.01 TRANSAMINITIS: Status: ACTIVE | Noted: 2020-12-01

## 2020-12-05 PROBLEM — D69.6 THROMBOCYTOPENIA (HCC): Status: ACTIVE | Noted: 2020-12-05

## 2020-12-05 LAB
ALBUMIN SERPL BCP-MCNC: 2.8 G/DL (ref 3.2–4.9)
ALBUMIN/GLOB SERPL: 0.8 G/DL
ALP SERPL-CCNC: 75 U/L (ref 30–99)
ALT SERPL-CCNC: 94 U/L (ref 2–50)
ANION GAP SERPL CALC-SCNC: 10 MMOL/L (ref 7–16)
AST SERPL-CCNC: 164 U/L (ref 12–45)
BASOPHILS # BLD AUTO: 0.1 % (ref 0–1.8)
BASOPHILS # BLD: 0.01 K/UL (ref 0–0.12)
BILIRUB SERPL-MCNC: 0.3 MG/DL (ref 0.1–1.5)
BUN SERPL-MCNC: 50 MG/DL (ref 8–22)
CALCIUM SERPL-MCNC: 8.5 MG/DL (ref 8.5–10.5)
CHLORIDE SERPL-SCNC: 106 MMOL/L (ref 96–112)
CO2 SERPL-SCNC: 17 MMOL/L (ref 20–33)
CREAT SERPL-MCNC: 3.62 MG/DL (ref 0.5–1.4)
CRP SERPL HS-MCNC: 6.82 MG/DL (ref 0–0.75)
D DIMER PPP IA.FEU-MCNC: 3.2 UG/ML (FEU) (ref 0–0.5)
EKG IMPRESSION: NORMAL
EOSINOPHIL # BLD AUTO: 0 K/UL (ref 0–0.51)
EOSINOPHIL NFR BLD: 0 % (ref 0–6.9)
ERYTHROCYTE [DISTWIDTH] IN BLOOD BY AUTOMATED COUNT: 56.2 FL (ref 35.9–50)
FERRITIN SERPL-MCNC: 5175 NG/ML (ref 22–322)
GLOBULIN SER CALC-MCNC: 3.7 G/DL (ref 1.9–3.5)
GLUCOSE SERPL-MCNC: 158 MG/DL (ref 65–99)
HAPTOGLOB SERPL-MCNC: 191 MG/DL (ref 30–200)
HAV IGM SERPL QL IA: ABNORMAL
HBV CORE IGM SER QL: ABNORMAL
HBV SURFACE AG SER QL: ABNORMAL
HCT VFR BLD AUTO: 24 % (ref 42–52)
HCV AB SER QL: REACTIVE
HGB BLD-MCNC: 7.6 G/DL (ref 14–18)
IMM GRANULOCYTES # BLD AUTO: 0.05 K/UL (ref 0–0.11)
IMM GRANULOCYTES NFR BLD AUTO: 0.7 % (ref 0–0.9)
IRON SATN MFR SERPL: 34 % (ref 15–55)
IRON SERPL-MCNC: 52 UG/DL (ref 50–180)
LYMPHOCYTES # BLD AUTO: 0.74 K/UL (ref 1–4.8)
LYMPHOCYTES NFR BLD: 10.8 % (ref 22–41)
MAGNESIUM SERPL-MCNC: 1.5 MG/DL (ref 1.5–2.5)
MCH RBC QN AUTO: 28.9 PG (ref 27–33)
MCHC RBC AUTO-ENTMCNC: 31.7 G/DL (ref 33.7–35.3)
MCV RBC AUTO: 91.3 FL (ref 81.4–97.8)
MONOCYTES # BLD AUTO: 0.52 K/UL (ref 0–0.85)
MONOCYTES NFR BLD AUTO: 7.6 % (ref 0–13.4)
NEUTROPHILS # BLD AUTO: 5.54 K/UL (ref 1.82–7.42)
NEUTROPHILS NFR BLD: 80.8 % (ref 44–72)
NRBC # BLD AUTO: 0 K/UL
NRBC BLD-RTO: 0 /100 WBC
PHOSPHATE SERPL-MCNC: 3.7 MG/DL (ref 2.5–4.5)
PLATELET # BLD AUTO: 123 K/UL (ref 164–446)
PMV BLD AUTO: 11.5 FL (ref 9–12.9)
POTASSIUM SERPL-SCNC: 3.9 MMOL/L (ref 3.6–5.5)
PROCALCITONIN SERPL-MCNC: 7.52 NG/ML
PROT SERPL-MCNC: 6.5 G/DL (ref 6–8.2)
RBC # BLD AUTO: 2.63 M/UL (ref 4.7–6.1)
SCCMEC + MECA PNL NOSE NAA+PROBE: NEGATIVE
SCCMEC + MECA PNL NOSE NAA+PROBE: NEGATIVE
SODIUM SERPL-SCNC: 133 MMOL/L (ref 135–145)
TIBC SERPL-MCNC: 152 UG/DL (ref 250–450)
UIBC SERPL-MCNC: 100 UG/DL (ref 110–370)
WBC # BLD AUTO: 6.9 K/UL (ref 4.8–10.8)

## 2020-12-05 PROCEDURE — 82728 ASSAY OF FERRITIN: CPT

## 2020-12-05 PROCEDURE — 51798 US URINE CAPACITY MEASURE: CPT

## 2020-12-05 PROCEDURE — 700111 HCHG RX REV CODE 636 W/ 250 OVERRIDE (IP): Performed by: HOSPITALIST

## 2020-12-05 PROCEDURE — 36415 COLL VENOUS BLD VENIPUNCTURE: CPT

## 2020-12-05 PROCEDURE — A9270 NON-COVERED ITEM OR SERVICE: HCPCS | Performed by: HOSPITALIST

## 2020-12-05 PROCEDURE — 700102 HCHG RX REV CODE 250 W/ 637 OVERRIDE(OP): Performed by: INTERNAL MEDICINE

## 2020-12-05 PROCEDURE — 85379 FIBRIN DEGRADATION QUANT: CPT

## 2020-12-05 PROCEDURE — 700102 HCHG RX REV CODE 250 W/ 637 OVERRIDE(OP): Performed by: HOSPITALIST

## 2020-12-05 PROCEDURE — 87641 MR-STAPH DNA AMP PROBE: CPT

## 2020-12-05 PROCEDURE — 76700 US EXAM ABDOM COMPLETE: CPT

## 2020-12-05 PROCEDURE — A9270 NON-COVERED ITEM OR SERVICE: HCPCS | Performed by: INTERNAL MEDICINE

## 2020-12-05 PROCEDURE — 85025 COMPLETE CBC W/AUTO DIFF WBC: CPT

## 2020-12-05 PROCEDURE — 83540 ASSAY OF IRON: CPT

## 2020-12-05 PROCEDURE — 770020 HCHG ROOM/CARE - TELE (206)

## 2020-12-05 PROCEDURE — 700101 HCHG RX REV CODE 250: Performed by: HOSPITALIST

## 2020-12-05 PROCEDURE — 83735 ASSAY OF MAGNESIUM: CPT

## 2020-12-05 PROCEDURE — 84100 ASSAY OF PHOSPHORUS: CPT

## 2020-12-05 PROCEDURE — A9270 NON-COVERED ITEM OR SERVICE: HCPCS | Performed by: STUDENT IN AN ORGANIZED HEALTH CARE EDUCATION/TRAINING PROGRAM

## 2020-12-05 PROCEDURE — 87522 HEPATITIS C REVRS TRNSCRPJ: CPT

## 2020-12-05 PROCEDURE — 86140 C-REACTIVE PROTEIN: CPT

## 2020-12-05 PROCEDURE — 99233 SBSQ HOSP IP/OBS HIGH 50: CPT | Performed by: HOSPITALIST

## 2020-12-05 PROCEDURE — 700102 HCHG RX REV CODE 250 W/ 637 OVERRIDE(OP): Performed by: STUDENT IN AN ORGANIZED HEALTH CARE EDUCATION/TRAINING PROGRAM

## 2020-12-05 PROCEDURE — 83550 IRON BINDING TEST: CPT

## 2020-12-05 PROCEDURE — 80053 COMPREHEN METABOLIC PANEL: CPT

## 2020-12-05 PROCEDURE — 700105 HCHG RX REV CODE 258: Performed by: HOSPITALIST

## 2020-12-05 PROCEDURE — 93010 ELECTROCARDIOGRAM REPORT: CPT | Performed by: STUDENT IN AN ORGANIZED HEALTH CARE EDUCATION/TRAINING PROGRAM

## 2020-12-05 PROCEDURE — 84145 PROCALCITONIN (PCT): CPT

## 2020-12-05 PROCEDURE — 87640 STAPH A DNA AMP PROBE: CPT

## 2020-12-05 PROCEDURE — 80074 ACUTE HEPATITIS PANEL: CPT

## 2020-12-05 RX ORDER — AMLODIPINE BESYLATE 5 MG/1
5 TABLET ORAL 2 TIMES DAILY
Status: DISCONTINUED | OUTPATIENT
Start: 2020-12-05 | End: 2020-12-08 | Stop reason: HOSPADM

## 2020-12-05 RX ORDER — OMEPRAZOLE 20 MG/1
40 CAPSULE, DELAYED RELEASE ORAL 2 TIMES DAILY
Status: DISCONTINUED | OUTPATIENT
Start: 2020-12-05 | End: 2020-12-08 | Stop reason: HOSPADM

## 2020-12-05 RX ORDER — LISINOPRIL 20 MG/1
20 TABLET ORAL
Status: DISCONTINUED | OUTPATIENT
Start: 2020-12-06 | End: 2020-12-05

## 2020-12-05 RX ORDER — MAGNESIUM SULFATE HEPTAHYDRATE 40 MG/ML
2 INJECTION, SOLUTION INTRAVENOUS ONCE
Status: COMPLETED | OUTPATIENT
Start: 2020-12-05 | End: 2020-12-05

## 2020-12-05 RX ORDER — LIDOCAINE 50 MG/G
1 PATCH TOPICAL EVERY 24 HOURS
Status: DISCONTINUED | OUTPATIENT
Start: 2020-12-05 | End: 2020-12-08 | Stop reason: HOSPADM

## 2020-12-05 RX ORDER — HYDRALAZINE HYDROCHLORIDE 50 MG/1
50 TABLET, FILM COATED ORAL EVERY 8 HOURS
Status: DISCONTINUED | OUTPATIENT
Start: 2020-12-05 | End: 2020-12-08 | Stop reason: HOSPADM

## 2020-12-05 RX ADMIN — HYDRALAZINE HYDROCHLORIDE 25 MG: 25 TABLET, FILM COATED ORAL at 05:45

## 2020-12-05 RX ADMIN — HYDROCODONE BITARTRATE AND ACETAMINOPHEN 2 TABLET: 5; 325 TABLET ORAL at 22:19

## 2020-12-05 RX ADMIN — PIPERACILLIN AND TAZOBACTAM 3.38 G: 3; .375 INJECTION, POWDER, LYOPHILIZED, FOR SOLUTION INTRAVENOUS; PARENTERAL at 03:02

## 2020-12-05 RX ADMIN — METOPROLOL SUCCINATE 100 MG: 50 TABLET, EXTENDED RELEASE ORAL at 05:46

## 2020-12-05 RX ADMIN — OMEPRAZOLE 40 MG: 20 CAPSULE, DELAYED RELEASE ORAL at 05:45

## 2020-12-05 RX ADMIN — HYDROCODONE BITARTRATE AND ACETAMINOPHEN 2 TABLET: 5; 325 TABLET ORAL at 05:52

## 2020-12-05 RX ADMIN — HYDRALAZINE HYDROCHLORIDE 50 MG: 50 TABLET, FILM COATED ORAL at 21:15

## 2020-12-05 RX ADMIN — HYDROCODONE BITARTRATE AND ACETAMINOPHEN 2 TABLET: 5; 325 TABLET ORAL at 14:48

## 2020-12-05 RX ADMIN — GABAPENTIN 200 MG: 100 CAPSULE ORAL at 05:45

## 2020-12-05 RX ADMIN — LISINOPRIL 10 MG: 10 TABLET ORAL at 05:45

## 2020-12-05 RX ADMIN — LINEZOLID 600 MG: 600 TABLET, FILM COATED ORAL at 17:29

## 2020-12-05 RX ADMIN — OMEPRAZOLE 40 MG: 20 CAPSULE, DELAYED RELEASE ORAL at 13:34

## 2020-12-05 RX ADMIN — MAGNESIUM SULFATE 2 G: 2 INJECTION INTRAVENOUS at 13:34

## 2020-12-05 RX ADMIN — TRAZODONE HYDROCHLORIDE 100 MG: 100 TABLET ORAL at 21:15

## 2020-12-05 RX ADMIN — TAMSULOSIN HYDROCHLORIDE 0.4 MG: 0.4 CAPSULE ORAL at 05:45

## 2020-12-05 RX ADMIN — HYDRALAZINE HYDROCHLORIDE 50 MG: 50 TABLET, FILM COATED ORAL at 13:34

## 2020-12-05 RX ADMIN — TRAMADOL HYDROCHLORIDE 50 MG: 50 TABLET, COATED ORAL at 10:59

## 2020-12-05 RX ADMIN — AMLODIPINE BESYLATE 5 MG: 5 TABLET ORAL at 17:29

## 2020-12-05 RX ADMIN — ISOSORBIDE MONONITRATE 15 MG: 30 TABLET, EXTENDED RELEASE ORAL at 05:52

## 2020-12-05 RX ADMIN — LINEZOLID 600 MG: 600 TABLET, FILM COATED ORAL at 05:44

## 2020-12-05 RX ADMIN — DEXAMETHASONE 6 MG: 4 TABLET ORAL at 06:40

## 2020-12-05 RX ADMIN — PIPERACILLIN AND TAZOBACTAM 3.38 G: 3; .375 INJECTION, POWDER, LYOPHILIZED, FOR SOLUTION INTRAVENOUS; PARENTERAL at 15:40

## 2020-12-05 RX ADMIN — LIDOCAINE 1 PATCH: 50 PATCH TOPICAL at 14:48

## 2020-12-05 ASSESSMENT — ENCOUNTER SYMPTOMS
DEPRESSION: 0
BLOOD IN STOOL: 0
FEVER: 0
MYALGIAS: 0
DIZZINESS: 0
CHILLS: 0
NERVOUS/ANXIOUS: 0
SHORTNESS OF BREATH: 1
PALPITATIONS: 0
NAUSEA: 0
COUGH: 1
ABDOMINAL PAIN: 0
HEADACHES: 0
VOMITING: 0
BLURRED VISION: 0

## 2020-12-05 ASSESSMENT — PAIN DESCRIPTION - PAIN TYPE
TYPE: CHRONIC PAIN
TYPE: CHRONIC PAIN

## 2020-12-05 NOTE — PROGRESS NOTES
Hospital Medicine Daily Progress Note    Date of Service  12/5/2020    Chief Complaint  70 y.o. male admitted 11/30/2020 with shortness of breath.     Hospital Course  This is 70-year-old male with a past medical significant for hypertension, dyslipidemia, BPH, CAD, CKD stage IV presented to the ER on 11/30/2020 with a complaint of shortness of breath , associated with fever, productive of green-colored sputum and bilateral lower extremity swelling.  Symptoms continued to get worse thus decided to come to ER.  Upon presentation he is noted to be hypoxic at 69% on room air and required nonrebreather.    He is also noted to have transaminitis with AST//94.    He continues to remain febrile from 12/3/2020, T-max at 102.9; blood cultures obtained on 12/1 and 12/4 no growth to date. His abx changes to Zosyn+ Zyvox on 12/4. Has remained afebrile     Also he had a stress test in 3/2020 which did not show any reversible ischemia.    Interval Problem Update   --No acute events overnight, patient remained afebrile after he was started on Zyvox plus Zosyn.  MRSA nasal swab pending   --He is also noted to be hypertensive, adjust antihypertensive medication; will continue amlodipine 5 mg p.o.bid,, hydralazine 50 mg p.o.tid, metoprolol 100 mg p.o.qd    Patient is noted to have iron deficiency anemia, status post monitor PRBC transfusion on 12/2/2020.  He is noted to have fecal occult blood positive.  Will consider calling GI    Consultants/Specialty  none    Code Status  Full Code    Disposition  Await PT eval    Review of Systems  Review of Systems   Constitutional: Negative for chills and fever.   HENT: Positive for congestion.    Eyes: Negative for blurred vision.   Respiratory: Positive for cough and shortness of breath.    Cardiovascular: Negative for chest pain and palpitations.   Gastrointestinal: Negative for abdominal pain, blood in stool, melena, nausea and vomiting.   Genitourinary: Negative for frequency.    Musculoskeletal: Negative for myalgias.   Skin: Negative for rash.   Neurological: Negative for dizziness and headaches.   Psychiatric/Behavioral: Negative for depression. The patient is not nervous/anxious.         Physical Exam  Temp:  [36.5 °C (97.7 °F)-37.4 °C (99.3 °F)] 37 °C (98.6 °F)  Pulse:  [68-79] 68  Resp:  [14-21] 14  BP: (129-181)/(76-97) 166/97  SpO2:  [97 %-99 %] 99 %    Physical Exam  Constitutional:       Appearance: He is not ill-appearing.   HENT:      Head: Normocephalic and atraumatic.   Eyes:      Extraocular Movements: Extraocular movements intact.   Neck:      Musculoskeletal: Neck supple.   Cardiovascular:      Rate and Rhythm: Tachycardia present.      Heart sounds: No murmur.   Pulmonary:      Effort: No respiratory distress.      Breath sounds: No wheezing.      Comments: Bibasilar rales  [place] at the bases   Abdominal:      General: There is no distension.      Palpations: Abdomen is soft. There is no mass.   Musculoskeletal:         General: No deformity.   Skin:     General: Skin is warm and dry.      Coloration: Skin is not jaundiced.   Neurological:      General: No focal deficit present.      Mental Status: He is alert and oriented to person, place, and time.   Psychiatric:         Mood and Affect: Mood normal.         Behavior: Behavior normal.         Fluids    Intake/Output Summary (Last 24 hours) at 12/5/2020 1143  Last data filed at 12/5/2020 0537  Gross per 24 hour   Intake 720 ml   Output --   Net 720 ml       Laboratory  Recent Labs     12/04/20  0018 12/04/20  1622 12/05/20  0531   WBC 5.7 4.8 6.9   RBC 2.64* 2.47* 2.63*   HEMOGLOBIN 7.6* 7.4* 7.6*   HEMATOCRIT 24.2* 23.4* 24.0*   MCV 91.7 94.7 91.3   MCH 28.8 30.0 28.9   MCHC 31.4* 31.6* 31.7*   RDW 57.4* 60.1* 56.2*   PLATELETCT 128* 113* 123*   MPV 10.6 11.3 11.5     Recent Labs     12/04/20  0018 12/04/20  1622 12/05/20  0531   SODIUM 136 135 133*   POTASSIUM 4.1 4.5 3.9   CHLORIDE 109 110 106   CO2 18* 14* 17*    GLUCOSE 99 169* 158*   BUN 43* 44* 50*   CREATININE 3.39* 3.70* 3.62*   CALCIUM 7.9* 8.1* 8.5                   Imaging  DX-CHEST-LIMITED (1 VIEW)   Final Result         Persistent bilateral interstitial opacities consistent with Covid 19 pneumonia without significant change.      Trace right pleural effusion.      US-EXTREMITY VENOUS LOWER BILAT   Final Result      EC-ECHOCARDIOGRAM COMPLETE W/O CONT   Final Result      DX-CHEST-PORTABLE (1 VIEW)   Final Result         1.  Pulmonary edema and/or infiltrates.           Assessment/Plan  * Pneumonia due to COVID-19 virus- (present on admission)  Assessment & Plan    Resented with worsening of shortness of breath associated with green-colored sputum, fever, chills.     --Found to be hypoxic, COVID-19 positive   --We will continue Decadron, supportive care for COVID-19 infection  --He is noted to have superimposed pneumonia consistent with elevated procalcitonin.  He continues remain febrile despite being on Unasyn plus azithromycin, antibiotics were changed to Zosyn plus Zyvox on 12/4/2020      Stage 4 chronic kidney disease (HCC)- (present on admission)  Assessment & Plan  Likely secondary to hypertensive nephrosclerosis   --Continue hydralazine plus Imdur plus amlodipine  We will obtain protein/creatinine ratio, need nephrology follow-up    Thrombocytopenia (HCC)  Assessment & Plan  Platelet  at 123, monitor    Fever  Assessment & Plan  Patient is noted to be afebrile overnight, continue IV antibiotics including Zosyn plus Zyvox.   --MRSA nasal swab pending   --Noted to have elevated procalcitonin at 7; continue to remain febrile, will obtain CT chest without contrast    Transaminitis  Assessment & Plan  AST//128--> trending down, monitor   Obtain hepatitis panel   USG  Abdomen     Elevated brain natriuretic peptide (BNP) level- (present on admission)  Assessment & Plan  BNP 87535  Likely related to CKD  Possibly due to severe lung infection -> heart  failure  - TTE: EF 55%, RVSP 30  - Fluid restrict 1200 CC  - Low Na diet    Anemia- (present on admission)  Assessment & Plan  Patient is noted to be anemic with hemoglobin of 7.6, occult blood positive,   - s/p 1 unit of PRBC    - provide ppi , need f/u with Gi as an op       Hypomagnesemia- (present on admission)  Assessment & Plan  Replace and monitor       VTE prophylaxis: scd

## 2020-12-05 NOTE — HOSPITAL COURSE
This is 70-year-old male with a past medical significant for hypertension, dyslipidemia, BPH, CAD, CKD stage IV presented to the ER on 11/30/2020 with a complaint of shortness of breath , associated with fever, productive of green-colored sputum and bilateral lower extremity swelling.  Symptoms continued to get worse thus decided to come to ER.  Upon presentation he is noted to be hypoxic at 69% on room air and required nonrebreather.    He is also noted to have transaminitis with AST//94.    He continues to remain febrile from 12/3/2020, T-max at 102.9; blood cultures obtained on 12/1 and 12/4 no growth to date. His abx changes to Zosyn+ Zyvox on 12/4

## 2020-12-06 LAB
ALBUMIN SERPL BCP-MCNC: 2.8 G/DL (ref 3.2–4.9)
ALBUMIN/GLOB SERPL: 0.8 G/DL
ALP SERPL-CCNC: 66 U/L (ref 30–99)
ALT SERPL-CCNC: 70 U/L (ref 2–50)
ANION GAP SERPL CALC-SCNC: 12 MMOL/L (ref 7–16)
AST SERPL-CCNC: 104 U/L (ref 12–45)
BACTERIA BLD CULT: NORMAL
BACTERIA BLD CULT: NORMAL
BASOPHILS # BLD AUTO: 0.1 % (ref 0–1.8)
BASOPHILS # BLD: 0.01 K/UL (ref 0–0.12)
BILIRUB SERPL-MCNC: 0.2 MG/DL (ref 0.1–1.5)
BUN SERPL-MCNC: 49 MG/DL (ref 8–22)
CALCIUM SERPL-MCNC: 8.5 MG/DL (ref 8.5–10.5)
CHLORIDE SERPL-SCNC: 108 MMOL/L (ref 96–112)
CO2 SERPL-SCNC: 16 MMOL/L (ref 20–33)
CREAT SERPL-MCNC: 3.69 MG/DL (ref 0.5–1.4)
EOSINOPHIL # BLD AUTO: 0 K/UL (ref 0–0.51)
EOSINOPHIL NFR BLD: 0 % (ref 0–6.9)
ERYTHROCYTE [DISTWIDTH] IN BLOOD BY AUTOMATED COUNT: 58.8 FL (ref 35.9–50)
GLOBULIN SER CALC-MCNC: 3.3 G/DL (ref 1.9–3.5)
GLUCOSE SERPL-MCNC: 105 MG/DL (ref 65–99)
HCT VFR BLD AUTO: 23 % (ref 42–52)
HGB BLD-MCNC: 7.3 G/DL (ref 14–18)
IL6 SERPL-MCNC: 22.4 PG/ML
IMM GRANULOCYTES # BLD AUTO: 0.04 K/UL (ref 0–0.11)
IMM GRANULOCYTES NFR BLD AUTO: 0.5 % (ref 0–0.9)
LYMPHOCYTES # BLD AUTO: 0.8 K/UL (ref 1–4.8)
LYMPHOCYTES NFR BLD: 9.6 % (ref 22–41)
MCH RBC QN AUTO: 29.4 PG (ref 27–33)
MCHC RBC AUTO-ENTMCNC: 31.7 G/DL (ref 33.7–35.3)
MCV RBC AUTO: 92.7 FL (ref 81.4–97.8)
MONOCYTES # BLD AUTO: 0.5 K/UL (ref 0–0.85)
MONOCYTES NFR BLD AUTO: 6 % (ref 0–13.4)
NEUTROPHILS # BLD AUTO: 6.99 K/UL (ref 1.82–7.42)
NEUTROPHILS NFR BLD: 83.8 % (ref 44–72)
NRBC # BLD AUTO: 0 K/UL
NRBC BLD-RTO: 0 /100 WBC
PLATELET # BLD AUTO: 136 K/UL (ref 164–446)
PMV BLD AUTO: 11.1 FL (ref 9–12.9)
POTASSIUM SERPL-SCNC: 4.1 MMOL/L (ref 3.6–5.5)
PROT SERPL-MCNC: 6.1 G/DL (ref 6–8.2)
RBC # BLD AUTO: 2.48 M/UL (ref 4.7–6.1)
SIGNIFICANT IND 70042: NORMAL
SIGNIFICANT IND 70042: NORMAL
SITE SITE: NORMAL
SITE SITE: NORMAL
SODIUM SERPL-SCNC: 136 MMOL/L (ref 135–145)
SOURCE SOURCE: NORMAL
SOURCE SOURCE: NORMAL
WBC # BLD AUTO: 8.3 K/UL (ref 4.8–10.8)

## 2020-12-06 PROCEDURE — 700102 HCHG RX REV CODE 250 W/ 637 OVERRIDE(OP): Performed by: STUDENT IN AN ORGANIZED HEALTH CARE EDUCATION/TRAINING PROGRAM

## 2020-12-06 PROCEDURE — 770020 HCHG ROOM/CARE - TELE (206)

## 2020-12-06 PROCEDURE — 700111 HCHG RX REV CODE 636 W/ 250 OVERRIDE (IP): Performed by: HOSPITALIST

## 2020-12-06 PROCEDURE — 700102 HCHG RX REV CODE 250 W/ 637 OVERRIDE(OP): Performed by: HOSPITALIST

## 2020-12-06 PROCEDURE — 700105 HCHG RX REV CODE 258: Performed by: HOSPITALIST

## 2020-12-06 PROCEDURE — 86160 COMPLEMENT ANTIGEN: CPT | Mod: 91

## 2020-12-06 PROCEDURE — 700102 HCHG RX REV CODE 250 W/ 637 OVERRIDE(OP): Performed by: INTERNAL MEDICINE

## 2020-12-06 PROCEDURE — 99232 SBSQ HOSP IP/OBS MODERATE 35: CPT | Performed by: HOSPITALIST

## 2020-12-06 PROCEDURE — 36415 COLL VENOUS BLD VENIPUNCTURE: CPT

## 2020-12-06 PROCEDURE — 85025 COMPLETE CBC W/AUTO DIFF WBC: CPT

## 2020-12-06 PROCEDURE — A9270 NON-COVERED ITEM OR SERVICE: HCPCS | Performed by: HOSPITALIST

## 2020-12-06 PROCEDURE — A9270 NON-COVERED ITEM OR SERVICE: HCPCS | Performed by: INTERNAL MEDICINE

## 2020-12-06 PROCEDURE — 80053 COMPREHEN METABOLIC PANEL: CPT

## 2020-12-06 PROCEDURE — A9270 NON-COVERED ITEM OR SERVICE: HCPCS | Performed by: STUDENT IN AN ORGANIZED HEALTH CARE EDUCATION/TRAINING PROGRAM

## 2020-12-06 PROCEDURE — 700101 HCHG RX REV CODE 250: Performed by: HOSPITALIST

## 2020-12-06 RX ORDER — LORAZEPAM 0.5 MG/1
0.5 TABLET ORAL 2 TIMES DAILY PRN
Status: DISCONTINUED | OUTPATIENT
Start: 2020-12-06 | End: 2020-12-08 | Stop reason: HOSPADM

## 2020-12-06 RX ADMIN — SODIUM CHLORIDE 125 MG: 9 INJECTION, SOLUTION INTRAVENOUS at 20:09

## 2020-12-06 RX ADMIN — LINEZOLID 600 MG: 600 TABLET, FILM COATED ORAL at 06:09

## 2020-12-06 RX ADMIN — AMLODIPINE BESYLATE 5 MG: 5 TABLET ORAL at 18:17

## 2020-12-06 RX ADMIN — OMEPRAZOLE 40 MG: 20 CAPSULE, DELAYED RELEASE ORAL at 06:10

## 2020-12-06 RX ADMIN — OMEPRAZOLE 40 MG: 20 CAPSULE, DELAYED RELEASE ORAL at 18:14

## 2020-12-06 RX ADMIN — HYDROCODONE BITARTRATE AND ACETAMINOPHEN 2 TABLET: 5; 325 TABLET ORAL at 06:09

## 2020-12-06 RX ADMIN — TAMSULOSIN HYDROCHLORIDE 0.4 MG: 0.4 CAPSULE ORAL at 06:10

## 2020-12-06 RX ADMIN — ISOSORBIDE MONONITRATE 15 MG: 30 TABLET, EXTENDED RELEASE ORAL at 06:09

## 2020-12-06 RX ADMIN — PIPERACILLIN AND TAZOBACTAM 3.38 G: 3; .375 INJECTION, POWDER, LYOPHILIZED, FOR SOLUTION INTRAVENOUS; PARENTERAL at 01:35

## 2020-12-06 RX ADMIN — HYDRALAZINE HYDROCHLORIDE 50 MG: 50 TABLET, FILM COATED ORAL at 06:10

## 2020-12-06 RX ADMIN — HYDROCODONE BITARTRATE AND ACETAMINOPHEN 2 TABLET: 5; 325 TABLET ORAL at 15:08

## 2020-12-06 RX ADMIN — HYDRALAZINE HYDROCHLORIDE 50 MG: 50 TABLET, FILM COATED ORAL at 22:37

## 2020-12-06 RX ADMIN — METOPROLOL SUCCINATE 100 MG: 50 TABLET, EXTENDED RELEASE ORAL at 06:09

## 2020-12-06 RX ADMIN — LORAZEPAM 0.5 MG: 0.5 TABLET ORAL at 20:08

## 2020-12-06 RX ADMIN — HYDROCODONE BITARTRATE AND ACETAMINOPHEN 2 TABLET: 5; 325 TABLET ORAL at 22:37

## 2020-12-06 RX ADMIN — DEXAMETHASONE 6 MG: 4 TABLET ORAL at 06:00

## 2020-12-06 RX ADMIN — PIPERACILLIN AND TAZOBACTAM 3.38 G: 3; .375 INJECTION, POWDER, LYOPHILIZED, FOR SOLUTION INTRAVENOUS; PARENTERAL at 13:54

## 2020-12-06 RX ADMIN — AMLODIPINE BESYLATE 5 MG: 5 TABLET ORAL at 06:09

## 2020-12-06 RX ADMIN — LIDOCAINE 1 PATCH: 50 PATCH TOPICAL at 13:55

## 2020-12-06 RX ADMIN — GABAPENTIN 200 MG: 100 CAPSULE ORAL at 06:10

## 2020-12-06 RX ADMIN — TRAZODONE HYDROCHLORIDE 100 MG: 100 TABLET ORAL at 22:37

## 2020-12-06 ASSESSMENT — ENCOUNTER SYMPTOMS
HEADACHES: 0
VOMITING: 0
FEVER: 0
DEPRESSION: 0
SHORTNESS OF BREATH: 1
MYALGIAS: 0
DIZZINESS: 0
BLOOD IN STOOL: 0
ABDOMINAL PAIN: 0
CHILLS: 0
PALPITATIONS: 0
COUGH: 1
NAUSEA: 0
NERVOUS/ANXIOUS: 0
BLURRED VISION: 0

## 2020-12-06 ASSESSMENT — PAIN DESCRIPTION - PAIN TYPE
TYPE: CHRONIC PAIN

## 2020-12-06 ASSESSMENT — FIBROSIS 4 INDEX: FIB4 SCORE: 6.22

## 2020-12-06 NOTE — PROGRESS NOTES
Received bedside report from RN, pt care assumed, VSS, pt assessment complete. Pt AAOx4, c/o 8/10 pain at this time. No signs of acute distress noted at this time. POC discussed with pt and verbalizes no questions. Pt denies any additional needs at this time. Bed in lowest position, pt educated on fall risk and verbalized understanding, call light within reach, hourly rounding initiated.

## 2020-12-06 NOTE — PROGRESS NOTES
Monitor room updated this RN about patient having beats of Vtach, physician notified. Patient asymptomatic and VT unsustained.

## 2020-12-06 NOTE — PROGRESS NOTES
Report received at bedside from NOC RN, pt care assumed, tele box on. Pt sleeping. Bed in lowest position, call light within reach.

## 2020-12-06 NOTE — PROGRESS NOTES
Hospital Medicine Daily Progress Note    Date of Service  12/6/2020    Chief Complaint  70 y.o. male admitted 11/30/2020 with shortness of breath.     Hospital Course  This is 70-year-old male with a past medical significant for hypertension, dyslipidemia, BPH, CAD, CKD stage IV presented to the ER on 11/30/2020 with a complaint of shortness of breath , associated with fever, productive of green-colored sputum and bilateral lower extremity swelling.  Symptoms continued to get worse thus decided to come to ER.  Upon presentation he is noted to be hypoxic at 69% on room air and required nonrebreather.    He is also noted to have transaminitis with AST//94.    He continues to remain febrile from 12/3/2020, T-max at 102.9; blood cultures obtained on 12/1 and 12/4 no growth to date. His abx changes to Zosyn+ Zyvox on 12/4. Has remained afebrile     Also he had a stress test in 3/2020 which did not show any reversible ischemia.    Interval Problem Update   --No acute events overnight, patient remained afebrile after he was started on Zyvox plus Zosyn.  MRSA nasal swab pending   --He is also noted to be hypertensive, adjust antihypertensive medication; will continue amlodipine 5 mg p.o.bid,, hydralazine 50 mg p.o.tid, metoprolol 100 mg p.o.qd    Patient is noted to have iron deficiency anemia, status post monitor PRBC transfusion on 12/2/2020.  Needs follow up with GI as an op     12/06:  --No Acute events overnight, patient continues to remain afebrile; MRSA nasal swab negative, will stop the Zyvox.    --Patient is currently on Zosyn, will continue  - Do not provide tylenol until patient spikes up temp of 100.4 or greater     Consultants/Specialty  none    Code Status  Full Code    Disposition  Await PT eval    Review of Systems  Review of Systems   Constitutional: Positive for malaise/fatigue. Negative for chills and fever.   HENT: Positive for congestion.    Eyes: Negative for blurred vision.   Respiratory:  Positive for cough and shortness of breath.    Cardiovascular: Negative for chest pain and palpitations.   Gastrointestinal: Negative for abdominal pain, blood in stool, melena, nausea and vomiting.   Genitourinary: Negative for frequency.   Musculoskeletal: Negative for myalgias.   Neurological: Negative for dizziness and headaches.   Psychiatric/Behavioral: Negative for depression. The patient is not nervous/anxious.         Physical Exam  Temp:  [36.6 °C (97.8 °F)-37 °C (98.6 °F)] 36.9 °C (98.5 °F)  Pulse:  [68-77] 77  Resp:  [17-18] 17  BP: (109-165)/(67-92) 109/67  SpO2:  [97 %-100 %] 97 %    Physical Exam  Constitutional:       Appearance: He is not ill-appearing.   HENT:      Head: Normocephalic and atraumatic.   Eyes:      Extraocular Movements: Extraocular movements intact.   Neck:      Musculoskeletal: Neck supple.   Cardiovascular:      Rate and Rhythm: Tachycardia present.      Heart sounds: No murmur.   Pulmonary:      Effort: No respiratory distress.      Breath sounds: No wheezing.      Comments: Bibasilar rales  [place] at the bases   Abdominal:      General: There is no distension.      Palpations: Abdomen is soft. There is no mass.   Musculoskeletal:         General: No deformity.   Skin:     General: Skin is warm and dry.      Coloration: Skin is not jaundiced.   Neurological:      General: No focal deficit present.      Mental Status: He is alert and oriented to person, place, and time.   Psychiatric:         Mood and Affect: Mood normal.         Behavior: Behavior normal.         Fluids    Intake/Output Summary (Last 24 hours) at 12/6/2020 1242  Last data filed at 12/6/2020 0900  Gross per 24 hour   Intake 855 ml   Output 890 ml   Net -35 ml       Laboratory  Recent Labs     12/04/20  1622 12/05/20  0531 12/06/20  0250   WBC 4.8 6.9 8.3   RBC 2.47* 2.63* 2.48*   HEMOGLOBIN 7.4* 7.6* 7.3*   HEMATOCRIT 23.4* 24.0* 23.0*   MCV 94.7 91.3 92.7   MCH 30.0 28.9 29.4   MCHC 31.6* 31.7* 31.7*   RDW 60.1*  56.2* 58.8*   PLATELETCT 113* 123* 136*   MPV 11.3 11.5 11.1     Recent Labs     12/04/20  1622 12/05/20  0531 12/06/20  0250   SODIUM 135 133* 136   POTASSIUM 4.5 3.9 4.1   CHLORIDE 110 106 108   CO2 14* 17* 16*   GLUCOSE 169* 158* 105*   BUN 44* 50* 49*   CREATININE 3.70* 3.62* 3.69*   CALCIUM 8.1* 8.5 8.5                   Imaging  US-ABDOMEN COMPLETE SURVEY   Final Result      1.  Cholelithiasis and sludge within the gallbladder. Minimal gallbladder wall thickening. No overlying tenderness or biliary dilatation.      2.  No suspicious hepatic mass. Benign-appearing hemangioma in the right lobe posteriorly measuring 2.8 cm in maximum dimension.      3.  Bilateral small simple renal cyst. No hydronephrosis.      4.  No free fluid or hydronephrosis.      No follow up imaging is recommended per consensus guidelines of the 2019 ACR Incidental Findings Committee for probably benign incidental simple appearing renal cystic lesion(s) based on imaging criteria.         DX-CHEST-LIMITED (1 VIEW)   Final Result         Persistent bilateral interstitial opacities consistent with Covid 19 pneumonia without significant change.      Trace right pleural effusion.      US-EXTREMITY VENOUS LOWER BILAT   Final Result      EC-ECHOCARDIOGRAM COMPLETE W/O CONT   Final Result      DX-CHEST-PORTABLE (1 VIEW)   Final Result         1.  Pulmonary edema and/or infiltrates.           Assessment/Plan  * Pneumonia due to COVID-19 virus- (present on admission)  Assessment & Plan    Resented with worsening of shortness of breath associated with green-colored sputum, fever, chills.     --Found to be hypoxic, COVID-19 positive   --We will continue Decadron, supportive care for COVID-19 infection  --He is noted to have superimposed pneumonia consistent with elevated procalcitonin.  He continues remain febrile despite being on Unasyn plus azithromycin, antibiotics were changed to Zosyn plus Zyvox on 12/4/2020   --- MRSA nasal swab negative for  mRSA, will stop zyvox    Stage 4 chronic kidney disease (HCC)- (present on admission)  Assessment & Plan  Likely secondary to hypertensive nephrosclerosis   --Continue hydralazine plus Imdur plus amlodipine   - Need follow up with nephrology as an op    Thrombocytopenia (HCC)  Assessment & Plan  Platelet  at 123, monitor    Fever  Assessment & Plan  Was on,IV antibiotics including Zosyn plus Zyvox.   --MRSA nasal swab negative    -- continue zosyn only for a total of 7 days    Transaminitis  Assessment & Plan  AST//128--> trending down, monitor   Noted to be +ve for Hep C; need f/u as an op    -- USG of abdomen showed cholelithiasis with sludge, no Evans sign, no tenderness in the right lower quadrant; need follow-up as an outpatient    Elevated brain natriuretic peptide (BNP) level- (present on admission)  Assessment & Plan  BNP 57536  Likely related to CKD  - TTE: EF 55%, RVSP 30  - Fluid restrict 1200 CC      Anemia- (present on admission)  Assessment & Plan  Patient is noted to be anemic with hemoglobin of 7.6, occult blood positive,   - s/p 1 unit of PRBC    - provide ppi , need f/u with Gi as an op       Hypomagnesemia- (present on admission)  Assessment & Plan  Replace and monitor       VTE prophylaxis: scd

## 2020-12-07 ENCOUNTER — APPOINTMENT (OUTPATIENT)
Dept: RADIOLOGY | Facility: MEDICAL CENTER | Age: 70
DRG: 871 | End: 2020-12-07
Attending: HOSPITALIST
Payer: COMMERCIAL

## 2020-12-07 LAB
ALBUMIN SERPL BCP-MCNC: 2.8 G/DL (ref 3.2–4.9)
BASOPHILS # BLD AUTO: 0.1 % (ref 0–1.8)
BASOPHILS # BLD: 0.01 K/UL (ref 0–0.12)
BUN SERPL-MCNC: 52 MG/DL (ref 8–22)
C3 SERPL-MCNC: 102 MG/DL (ref 87–200)
C4 SERPL-MCNC: 23.9 MG/DL (ref 19–52)
CALCIUM SERPL-MCNC: 8.6 MG/DL (ref 8.5–10.5)
CHLORIDE SERPL-SCNC: 110 MMOL/L (ref 96–112)
CO2 SERPL-SCNC: 16 MMOL/L (ref 20–33)
CREAT SERPL-MCNC: 4.08 MG/DL (ref 0.5–1.4)
CREAT UR-MCNC: 92.97 MG/DL
EOSINOPHIL # BLD AUTO: 0.01 K/UL (ref 0–0.51)
EOSINOPHIL NFR BLD: 0.1 % (ref 0–6.9)
ERYTHROCYTE [DISTWIDTH] IN BLOOD BY AUTOMATED COUNT: 56.4 FL (ref 35.9–50)
GLUCOSE SERPL-MCNC: 98 MG/DL (ref 65–99)
HCT VFR BLD AUTO: 22.5 % (ref 42–52)
HGB BLD-MCNC: 7.2 G/DL (ref 14–18)
IMM GRANULOCYTES # BLD AUTO: 0.05 K/UL (ref 0–0.11)
IMM GRANULOCYTES NFR BLD AUTO: 0.6 % (ref 0–0.9)
LYMPHOCYTES # BLD AUTO: 1.05 K/UL (ref 1–4.8)
LYMPHOCYTES NFR BLD: 11.7 % (ref 22–41)
MCH RBC QN AUTO: 29.1 PG (ref 27–33)
MCHC RBC AUTO-ENTMCNC: 32 G/DL (ref 33.7–35.3)
MCV RBC AUTO: 91.1 FL (ref 81.4–97.8)
MONOCYTES # BLD AUTO: 0.49 K/UL (ref 0–0.85)
MONOCYTES NFR BLD AUTO: 5.5 % (ref 0–13.4)
NEUTROPHILS # BLD AUTO: 7.38 K/UL (ref 1.82–7.42)
NEUTROPHILS NFR BLD: 82 % (ref 44–72)
NRBC # BLD AUTO: 0.02 K/UL
NRBC BLD-RTO: 0.2 /100 WBC
PHOSPHATE SERPL-MCNC: 3.4 MG/DL (ref 2.5–4.5)
PLATELET # BLD AUTO: 169 K/UL (ref 164–446)
PMV BLD AUTO: 11.1 FL (ref 9–12.9)
POTASSIUM SERPL-SCNC: 4.1 MMOL/L (ref 3.6–5.5)
PROCALCITONIN SERPL-MCNC: 4.93 NG/ML
RBC # BLD AUTO: 2.47 M/UL (ref 4.7–6.1)
SODIUM SERPL-SCNC: 136 MMOL/L (ref 135–145)
SODIUM UR-SCNC: 48 MMOL/L
WBC # BLD AUTO: 9 K/UL (ref 4.8–10.8)

## 2020-12-07 PROCEDURE — 85025 COMPLETE CBC W/AUTO DIFF WBC: CPT

## 2020-12-07 PROCEDURE — 82043 UR ALBUMIN QUANTITATIVE: CPT

## 2020-12-07 PROCEDURE — 84156 ASSAY OF PROTEIN URINE: CPT

## 2020-12-07 PROCEDURE — 81001 URINALYSIS AUTO W/SCOPE: CPT

## 2020-12-07 PROCEDURE — A9270 NON-COVERED ITEM OR SERVICE: HCPCS | Performed by: INTERNAL MEDICINE

## 2020-12-07 PROCEDURE — 700102 HCHG RX REV CODE 250 W/ 637 OVERRIDE(OP): Performed by: HOSPITALIST

## 2020-12-07 PROCEDURE — A9270 NON-COVERED ITEM OR SERVICE: HCPCS | Performed by: HOSPITALIST

## 2020-12-07 PROCEDURE — 99222 1ST HOSP IP/OBS MODERATE 55: CPT | Performed by: INTERNAL MEDICINE

## 2020-12-07 PROCEDURE — 84145 PROCALCITONIN (PCT): CPT

## 2020-12-07 PROCEDURE — 36415 COLL VENOUS BLD VENIPUNCTURE: CPT

## 2020-12-07 PROCEDURE — 700111 HCHG RX REV CODE 636 W/ 250 OVERRIDE (IP): Performed by: INTERNAL MEDICINE

## 2020-12-07 PROCEDURE — 82570 ASSAY OF URINE CREATININE: CPT

## 2020-12-07 PROCEDURE — 84300 ASSAY OF URINE SODIUM: CPT

## 2020-12-07 PROCEDURE — 700111 HCHG RX REV CODE 636 W/ 250 OVERRIDE (IP): Performed by: HOSPITALIST

## 2020-12-07 PROCEDURE — 700102 HCHG RX REV CODE 250 W/ 637 OVERRIDE(OP): Performed by: INTERNAL MEDICINE

## 2020-12-07 PROCEDURE — 700101 HCHG RX REV CODE 250: Performed by: HOSPITALIST

## 2020-12-07 PROCEDURE — A9270 NON-COVERED ITEM OR SERVICE: HCPCS | Performed by: STUDENT IN AN ORGANIZED HEALTH CARE EDUCATION/TRAINING PROGRAM

## 2020-12-07 PROCEDURE — 80069 RENAL FUNCTION PANEL: CPT

## 2020-12-07 PROCEDURE — 700105 HCHG RX REV CODE 258: Performed by: HOSPITALIST

## 2020-12-07 PROCEDURE — 99232 SBSQ HOSP IP/OBS MODERATE 35: CPT | Performed by: HOSPITALIST

## 2020-12-07 PROCEDURE — 700102 HCHG RX REV CODE 250 W/ 637 OVERRIDE(OP): Performed by: STUDENT IN AN ORGANIZED HEALTH CARE EDUCATION/TRAINING PROGRAM

## 2020-12-07 PROCEDURE — 770020 HCHG ROOM/CARE - TELE (206)

## 2020-12-07 RX ORDER — FUROSEMIDE 10 MG/ML
80 INJECTION INTRAMUSCULAR; INTRAVENOUS
Status: DISCONTINUED | OUTPATIENT
Start: 2020-12-07 | End: 2020-12-07

## 2020-12-07 RX ORDER — FUROSEMIDE 40 MG/1
160 TABLET ORAL
Status: DISCONTINUED | OUTPATIENT
Start: 2020-12-07 | End: 2020-12-08

## 2020-12-07 RX ORDER — SODIUM BICARBONATE 650 MG/1
1300 TABLET ORAL 2 TIMES DAILY
Status: DISCONTINUED | OUTPATIENT
Start: 2020-12-07 | End: 2020-12-08 | Stop reason: HOSPADM

## 2020-12-07 RX ORDER — SODIUM BICARBONATE 650 MG/1
650 TABLET ORAL 2 TIMES DAILY
Status: DISCONTINUED | OUTPATIENT
Start: 2020-12-07 | End: 2020-12-07

## 2020-12-07 RX ADMIN — LIDOCAINE 1 PATCH: 50 PATCH TOPICAL at 13:31

## 2020-12-07 RX ADMIN — METOPROLOL SUCCINATE 100 MG: 50 TABLET, EXTENDED RELEASE ORAL at 05:15

## 2020-12-07 RX ADMIN — HYDROCODONE BITARTRATE AND ACETAMINOPHEN 2 TABLET: 5; 325 TABLET ORAL at 20:55

## 2020-12-07 RX ADMIN — HYDRALAZINE HYDROCHLORIDE 50 MG: 50 TABLET, FILM COATED ORAL at 05:15

## 2020-12-07 RX ADMIN — AMLODIPINE BESYLATE 5 MG: 5 TABLET ORAL at 16:56

## 2020-12-07 RX ADMIN — HYDROCODONE BITARTRATE AND ACETAMINOPHEN 2 TABLET: 5; 325 TABLET ORAL at 06:27

## 2020-12-07 RX ADMIN — OMEPRAZOLE 40 MG: 20 CAPSULE, DELAYED RELEASE ORAL at 05:15

## 2020-12-07 RX ADMIN — AMLODIPINE BESYLATE 5 MG: 5 TABLET ORAL at 05:15

## 2020-12-07 RX ADMIN — TAMSULOSIN HYDROCHLORIDE 0.4 MG: 0.4 CAPSULE ORAL at 05:15

## 2020-12-07 RX ADMIN — HYDRALAZINE HYDROCHLORIDE 50 MG: 50 TABLET, FILM COATED ORAL at 13:40

## 2020-12-07 RX ADMIN — HYDROCODONE BITARTRATE AND ACETAMINOPHEN 2 TABLET: 5; 325 TABLET ORAL at 13:32

## 2020-12-07 RX ADMIN — GABAPENTIN 200 MG: 100 CAPSULE ORAL at 05:15

## 2020-12-07 RX ADMIN — DEXAMETHASONE 6 MG: 4 TABLET ORAL at 05:14

## 2020-12-07 RX ADMIN — ISOSORBIDE MONONITRATE 15 MG: 30 TABLET, EXTENDED RELEASE ORAL at 05:14

## 2020-12-07 RX ADMIN — TRAZODONE HYDROCHLORIDE 100 MG: 100 TABLET ORAL at 20:55

## 2020-12-07 RX ADMIN — SODIUM BICARBONATE 1300 MG: 650 TABLET ORAL at 17:10

## 2020-12-07 RX ADMIN — FUROSEMIDE 160 MG: 40 TABLET ORAL at 16:55

## 2020-12-07 RX ADMIN — SODIUM CHLORIDE 3 G: 900 INJECTION INTRAVENOUS at 13:31

## 2020-12-07 RX ADMIN — SODIUM CHLORIDE 3 G: 900 INJECTION INTRAVENOUS at 20:55

## 2020-12-07 RX ADMIN — OMEPRAZOLE 40 MG: 20 CAPSULE, DELAYED RELEASE ORAL at 16:55

## 2020-12-07 RX ADMIN — SODIUM BICARBONATE 650 MG: 650 TABLET ORAL at 08:27

## 2020-12-07 RX ADMIN — LORAZEPAM 0.5 MG: 0.5 TABLET ORAL at 13:46

## 2020-12-07 RX ADMIN — HYDRALAZINE HYDROCHLORIDE 50 MG: 50 TABLET, FILM COATED ORAL at 21:23

## 2020-12-07 RX ADMIN — EPOETIN ALFA-EPBX 10000 UNITS: 10000 INJECTION, SOLUTION INTRAVENOUS; SUBCUTANEOUS at 20:55

## 2020-12-07 ASSESSMENT — ENCOUNTER SYMPTOMS
CHILLS: 0
DEPRESSION: 0
NERVOUS/ANXIOUS: 0
DOUBLE VISION: 0
ABDOMINAL PAIN: 0
FEVER: 0
SHORTNESS OF BREATH: 1
DIZZINESS: 0
HEADACHES: 0
BLOOD IN STOOL: 0
PALPITATIONS: 0
NAUSEA: 0
BLURRED VISION: 0
VOMITING: 0
MYALGIAS: 0
SHORTNESS OF BREATH: 0
COUGH: 1

## 2020-12-07 ASSESSMENT — PAIN DESCRIPTION - PAIN TYPE: TYPE: CHRONIC PAIN

## 2020-12-07 ASSESSMENT — FIBROSIS 4 INDEX
FIB4 SCORE: 5
FIB4 SCORE: 5

## 2020-12-07 NOTE — PROGRESS NOTES
Hospital Medicine Daily Progress Note    Date of Service  12/7/2020    Chief Complaint  70 y.o. male admitted 11/30/2020 with shortness of breath.     Hospital Course  This is 70-year-old male with a past medical significant for hypertension, dyslipidemia, BPH, CAD, CKD stage IV presented to the ER on 11/30/2020 with a complaint of shortness of breath , associated with fever, productive of green-colored sputum and bilateral lower extremity swelling.  Symptoms continued to get worse thus decided to come to ER.  Upon presentation he is noted to be hypoxic at 69% on room air and required nonrebreather.    He is also noted to have transaminitis with AST//94.    He continues to remain febrile from 12/3/2020, T-max at 102.9; blood cultures obtained on 12/1 and 12/4 no growth to date. His abx changes to Zosyn+ Zyvox on 12/4. Has remained afebrile     Also he had a stress test in 3/2020 which did not show any reversible ischemia.    Interval Problem Update   --No acute events overnight, patient remained afebrile after he was started on Zyvox plus Zosyn.  MRSA nasal swab pending   --He is also noted to be hypertensive, adjust antihypertensive medication; will continue amlodipine 5 mg p.o.bid,, hydralazine 50 mg p.o.tid, metoprolol 100 mg p.o.qd    Patient is noted to have iron deficiency anemia, status post monitor PRBC transfusion on 12/2/2020.  Needs follow up with GI as an op     12/06:  --No Acute events overnight, patient continues to remain afebrile; MRSA nasal swab negative, will stop the Zyvox.    --Patient is currently on Zosyn, will continue  - Do not provide tylenol until patient spikes up temp of 100.4 or greater     12/7:  No acute events overnight, patient potassium has been stable, patient remained afebrile.  We will continue Unasyn.  --Renal function continued to get worse, nephrology consulted.  He is noted to have iron deficiency anemia along with fecal occult blood positive, patient to follow-up  with [facility] as an outpatient for possible endoscopy/colonoscopy.  Information provided to BÁRBARA Taylor      Consultants/Specialty  none    Code Status  Full Code    Disposition  Await PT eval    Review of Systems  Review of Systems   Constitutional: Positive for malaise/fatigue. Negative for chills and fever.   HENT: Negative for congestion.    Eyes: Negative for blurred vision and double vision.   Respiratory: Positive for cough and shortness of breath.    Cardiovascular: Negative for chest pain and palpitations.   Gastrointestinal: Negative for abdominal pain, blood in stool, melena, nausea and vomiting.   Genitourinary: Negative for frequency.   Musculoskeletal: Negative for myalgias.   Neurological: Negative for dizziness and headaches.   Psychiatric/Behavioral: Negative for depression. The patient is not nervous/anxious.         Physical Exam  Temp:  [36.7 °C (98.1 °F)-37.4 °C (99.3 °F)] 37.1 °C (98.7 °F)  Pulse:  [73-81] 73  Resp:  [17-20] 20  BP: (106-175)/(62-99) 119/62  SpO2:  [92 %-97 %] 95 %    Physical Exam  Constitutional:       Appearance: He is not ill-appearing.   HENT:      Head: Normocephalic and atraumatic.   Eyes:      Extraocular Movements: Extraocular movements intact.   Neck:      Musculoskeletal: Neck supple.   Cardiovascular:      Rate and Rhythm: Tachycardia present.      Heart sounds: No murmur.   Pulmonary:      Effort: No respiratory distress.      Breath sounds: No wheezing.      Comments: Bibasilar rales  [place] at the bases   Abdominal:      General: There is no distension.      Palpations: Abdomen is soft. There is no mass.   Musculoskeletal:      Right lower leg: No edema.      Left lower leg: No edema.   Skin:     General: Skin is warm.      Coloration: Skin is not jaundiced.   Neurological:      General: No focal deficit present.      Mental Status: He is alert and oriented to person, place, and time.   Psychiatric:         Mood and Affect: Mood normal.         Behavior: Behavior  normal.         Fluids    Intake/Output Summary (Last 24 hours) at 12/7/2020 0907  Last data filed at 12/7/2020 0800  Gross per 24 hour   Intake 720 ml   Output 400 ml   Net 320 ml       Laboratory  Recent Labs     12/05/20 0531 12/06/20 0250 12/07/20  0359   WBC 6.9 8.3 9.0   RBC 2.63* 2.48* 2.47*   HEMOGLOBIN 7.6* 7.3* 7.2*   HEMATOCRIT 24.0* 23.0* 22.5*   MCV 91.3 92.7 91.1   MCH 28.9 29.4 29.1   MCHC 31.7* 31.7* 32.0*   RDW 56.2* 58.8* 56.4*   PLATELETCT 123* 136* 169   MPV 11.5 11.1 11.1     Recent Labs     12/05/20 0531 12/06/20 0250 12/07/20 0359   SODIUM 133* 136 136   POTASSIUM 3.9 4.1 4.1   CHLORIDE 106 108 110   CO2 17* 16* 16*   GLUCOSE 158* 105* 98   BUN 50* 49* 52*   CREATININE 3.62* 3.69* 4.08*   CALCIUM 8.5 8.5 8.6                   Imaging  US-ABDOMEN COMPLETE SURVEY   Final Result      1.  Cholelithiasis and sludge within the gallbladder. Minimal gallbladder wall thickening. No overlying tenderness or biliary dilatation.      2.  No suspicious hepatic mass. Benign-appearing hemangioma in the right lobe posteriorly measuring 2.8 cm in maximum dimension.      3.  Bilateral small simple renal cyst. No hydronephrosis.      4.  No free fluid or hydronephrosis.      No follow up imaging is recommended per consensus guidelines of the 2019 ACR Incidental Findings Committee for probably benign incidental simple appearing renal cystic lesion(s) based on imaging criteria.         DX-CHEST-LIMITED (1 VIEW)   Final Result         Persistent bilateral interstitial opacities consistent with Covid 19 pneumonia without significant change.      Trace right pleural effusion.      US-EXTREMITY VENOUS LOWER BILAT   Final Result      EC-ECHOCARDIOGRAM COMPLETE W/O CONT   Final Result      DX-CHEST-PORTABLE (1 VIEW)   Final Result         1.  Pulmonary edema and/or infiltrates.           Assessment/Plan  * Pneumonia due to COVID-19 virus- (present on admission)  Assessment & Plan    Resented with worsening of  shortness of breath associated with green-colored sputum, fever, chills.     --Found to be hypoxic, COVID-19 positive   --We will continue Decadron, supportive care for COVID-19 infection  --Patient antibiotic was changed to Zosyn plus Zyvox once he continues to remain febrile despite being on Unasyn.  --- MRSA nasal swab negative for mRSA, will stop zyvox    We will monitor the patient on Unasyn and see how he does.  His oxygen requirement has significantly better.  Will obtain home O2 eval    Stage 4 chronic kidney disease (HCC)- (present on admission)  Assessment & Plan  Likely secondary to hypertensive nephrosclerosis   --Continue hydralazine plus Imdur plus amlodipine   --His renal function continues to get worse, nephrology consulted    Thrombocytopenia (HCC)  Assessment & Plan  Resolved     Fever  Assessment & Plan  Overnight patient has been afebrile, continue Unasyn  Monitor     Transaminitis  Assessment & Plan  AST//128--> trending down, monitor   Noted to be +ve for Hep C; need f/u as an op    -- USG of abdomen showed cholelithiasis with sludge, no Evans sign, no tenderness in the right lower quadrant; need follow-up as an outpatient    Elevated brain natriuretic peptide (BNP) level- (present on admission)  Assessment & Plan  BNP 30893  Likely related to CKD  - TTE: EF 55%, RVSP 30  - Fluid restrict 1200 CC      Anemia- (present on admission)  Assessment & Plan  Patient is noted to have iron deficiency anemia status post 1 unit of PRBC transfusion on 12/2   -Provide IV iron   -Need to follow-up with [facility] as an outpatient      Hypomagnesemia- (present on admission)  Assessment & Plan  Replace and monitor       VTE prophylaxis: scd    I have performed a physical exam and reviewed and updated ROS and Plan today (12/7/2020). In review of yesterday's note (12/6/2020), there are no changes except as documented above.

## 2020-12-07 NOTE — PROGRESS NOTES
Spoke with Tati GARCIA about labs being out of range and needing Nephology approval to place midline.  She will contact Nephology and get back to me about either midline or PIV.

## 2020-12-07 NOTE — CONSULTS
Nephrology Consultation    Date of Service  12/7/2020    Referring Physician  David Riley M.D.    Consulting Physician  Micheal Hand M.D.    Reason for Consultation  Evaluation and management of chronic kidney disease    History of Presenting Illness  70 y.o. male with a history of hypertension, chronic kidney disease, likely stage IV, who presented 11/30/2020 with lower extremity edema and shortness of breath.    The patient said he was feeling more and more short of breath over the week prior to admission.  He got to the point where he was having trouble breathing on his own at home, and he decided to come into the emergency room.  The patient was also diagnosed with COVID-19 pneumonia.  The patient feels like his breathing is slightly better since admission, but he still complains of some swelling in his legs.  The patient denies taking NSAIDs prior to admission.    Regarding his kidney disease, he has been told in the past that his kidneys are weak, but he denies having seen a nephrologist.  He denies known history of hepatitis C exposure.  He denies history of kidney biopsy.  When discussing the possibility of renal replacement therapy, he thinks he would be interested in transplant evaluation, and peritoneal dialysis if he needed dialysis.    The patient currently denies uremic symptoms such as loss of appetite, headache, nausea, vomiting, metallic taste.  He currently denies chest pain, shortness of breath.    Review of Systems  Review of Systems   Constitutional: Negative for fever.   Respiratory: Negative for shortness of breath.    Cardiovascular: Negative for chest pain.   Gastrointestinal: Negative for abdominal pain.   All other systems reviewed and are negative.      Past Medical History  Past Medical History:   Diagnosis Date   • Gout    • Hypertension    • Kidney disease    • MI (myocardial infarction) (HCC)        Surgical History  History reviewed. No pertinent surgical history.    Family  History  Family History   Problem Relation Age of Onset   • Diabetes Mother        Social History  Social History     Socioeconomic History   • Marital status: Single     Spouse name: Not on file   • Number of children: Not on file   • Years of education: Not on file   • Highest education level: Not on file   Occupational History   • Not on file   Social Needs   • Financial resource strain: Not on file   • Food insecurity     Worry: Not on file     Inability: Not on file   • Transportation needs     Medical: Not on file     Non-medical: Not on file   Tobacco Use   • Smoking status: Former Smoker   • Smokeless tobacco: Never Used   Substance and Sexual Activity   • Alcohol use: Yes   • Drug use: Never   • Sexual activity: Not on file   Lifestyle   • Physical activity     Days per week: Not on file     Minutes per session: Not on file   • Stress: Not on file   Relationships   • Social connections     Talks on phone: Not on file     Gets together: Not on file     Attends [employer] service: Not on file     Active member of club or organization: Not on file     Attends meetings of clubs or organizations: Not on file     Relationship status: Not on file   • Intimate partner violence     Fear of current or ex partner: Not on file     Emotionally abused: Not on file     Physically abused: Not on file     Forced sexual activity: Not on file   Other Topics Concern   • Not on file   Social History Narrative   • Not on file       Medications  Current Facility-Administered Medications   Medication Dose Route Frequency Provider Last Rate Last Admin   • sodium bicarbonate tablet 1,300 mg  1,300 mg Oral BID Micheal Hand M.D.       • furosemide (LASIX) injection 80 mg  80 mg Intravenous BID DIURETIC Micheal Hand M.D.   Stopped at 12/07/20 1200   • ampicillin/sulbactam (UNASYN) 3 g in  mL IVPB  3 g Intravenous Q12HRS David Riley M.D. 200 mL/hr at 12/07/20 1331 3 g at 12/07/20 1331   • LORazepam (ATIVAN) tablet 0.5 mg  0.5 mg  Oral BID PRN David Riley M.D.   0.5 mg at 12/07/20 1346   • hydrALAZINE (APRESOLINE) tablet 50 mg  50 mg Oral Q8HRS Coltonrhys Riley M.D.   50 mg at 12/07/20 1340   • amLODIPine (NORVASC) tablet 5 mg  5 mg Oral BID Coltonani Osvaldo, M.D.   5 mg at 12/07/20 0515   • omeprazole (PRILOSEC) capsule 40 mg  40 mg Oral BID Coltonrhys Riley M.D.   40 mg at 12/07/20 0515   • lidocaine (LIDODERM) 5 % 1 Patch  1 Patch Transdermal Q24HR Coltonrhys Riley M.D.   1 Patch at 12/07/20 1331   • gabapentin (NEURONTIN) capsule 200 mg  200 mg Oral DAILY Jarek Philip D.O.   200 mg at 12/07/20 0515   • labetalol (NORMODYNE/TRANDATE) injection 10 mg  10 mg Intravenous Q6HRS PRN Jarek Philip D.O.   10 mg at 12/03/20 2028   • HYDROcodone-acetaminophen (NORCO) 5-325 MG per tablet 1-2 Tab  1-2 Tab Oral Q8HRS PRN Jarek Philip D.O.   2 Tab at 12/07/20 1332   • tamsulosin (FLOMAX) capsule 0.4 mg  0.4 mg Oral QAM Cyril Novoa M.DIshmael   0.4 mg at 12/07/20 0515   • senna-docusate (PERICOLACE or SENOKOT S) 8.6-50 MG per tablet 2 Tab  2 Tab Oral BID Cyril Novoa, M.D.   Stopped at 12/02/20 1800    And   • polyethylene glycol/lytes (MIRALAX) PACKET 1 Packet  1 Packet Oral QDAY PRN Cyril Novoa M.D.        And   • bisacodyl (DULCOLAX) suppository 10 mg  10 mg Rectal QDAY PRN Cyril Novoa M.D.       • dexamethasone (DECADRON) tablet 6 mg  6 mg Oral DAILY Jarek Philip D.O.   6 mg at 12/07/20 0514   • albuterol inhaler 2 Puff  2 Puff Inhalation Q4HRS PRN Cyril Novoa M.NELLI.       • isosorbide mononitrate SR (IMDUR) tablet 15 mg  15 mg Oral QAM Jarek Philip D.O.   15 mg at 12/07/20 0514   • metoprolol SR (TOPROL XL) tablet 100 mg  100 mg Oral DAILY Jarek Philip D.O.   100 mg at 12/07/20 0515   • acetaminophen (TYLENOL) tablet 500 mg  500 mg Oral Q6HRS PRN Jarek Philip D.O.   500 mg at 12/03/20 1713   • traMADol (ULTRAM) 50 MG tablet 50 mg  50 mg Oral Q6HRS PRN Jarek Philip D.O.   50 mg at 12/05/20 1059   • traZODone (DESYREL) tablet 100 mg  100 mg Oral  QHS Cyril Novoa M.D.   100 mg at 12/06/20 1527       Allergies  Allergies   Allergen Reactions   • Motrin [Ibuprofen]      hhx of stomach ulcers       Physical Exam  Temp:  [36.6 °C (97.8 °F)-37.4 °C (99.3 °F)] 36.6 °C (97.8 °F)  Pulse:  [73-83] 83  Resp:  [17-20] 20  BP: (118-175)/(62-99) 123/70  SpO2:  [92 %-95 %] 94 %    Physical Exam   Constitutional: He is oriented to person, place, and time. He appears well-developed. No distress.   HENT:   Mouth/Throat: Oropharynx is clear and moist. No oropharyngeal exudate.   Eyes: EOM are normal. No scleral icterus.   Neck: No tracheal deviation present.   Cardiovascular: Normal rate and normal heart sounds.   No murmur heard.  Pulmonary/Chest: Effort normal and breath sounds normal. No stridor. He has no rales.   Abdominal: Soft. He exhibits no distension. There is no abdominal tenderness.   Musculoskeletal: Normal range of motion.         General: Edema (1+ bilat LE) present.   Neurological: He is alert and oriented to person, place, and time.   No asterixis   Skin: Skin is warm and dry. He is not diaphoretic.   Psychiatric: He has a normal mood and affect.       Fluids  Date 12/07/20 0700 - 12/08/20 0659   Shift 3534-4409 6535-5904 3866-9830 24 Hour Total   INTAKE   P.O. 1260   1260   Shift Total 1260   1260   OUTPUT   Urine 400   400   Shift Total 400   400   Weight (kg) 71.2 71.2 71.2 71.2       Laboratory  Labs reviewed, pertinent labs below.  Recent Labs     12/05/20  0531 12/06/20  0250 12/07/20  0359   WBC 6.9 8.3 9.0   RBC 2.63* 2.48* 2.47*   HEMOGLOBIN 7.6* 7.3* 7.2*   HEMATOCRIT 24.0* 23.0* 22.5*   MCV 91.3 92.7 91.1   MCH 28.9 29.4 29.1   MCHC 31.7* 31.7* 32.0*   RDW 56.2* 58.8* 56.4*   PLATELETCT 123* 136* 169   MPV 11.5 11.1 11.1     Recent Labs     12/05/20  0531 12/06/20  0250 12/07/20  0359   SODIUM 133* 136 136   POTASSIUM 3.9 4.1 4.1   CHLORIDE 106 108 110   CO2 17* 16* 16*   GLUCOSE 158* 105* 98   BUN 50* 49* 52*   CREATININE 3.62* 3.69* 4.08*   CALCIUM  8.5 8.5 8.6                URINALYSIS:  Lab Results   Component Value Date/Time    COLORURINE Yellow 12/01/2020 0025    CLARITY Clear 12/01/2020 0025    SPECGRAVITY 1.016 12/01/2020 0025    PHURINE 5.0 12/01/2020 0025    KETONES Negative 12/01/2020 0025    PROTEINURIN 100 (A) 12/01/2020 0025    BILIRUBINUR Negative 12/01/2020 0025    UROBILU 0.2 12/01/2020 0025    NITRITE Negative 12/01/2020 0025    LEUKESTERAS Negative 12/01/2020 0025    OCCULTBLOOD Negative 12/01/2020 0025     UPC  No results found for: TOTPROTUR   Lab Results   Component Value Date/Time    CREATININEU 92.97 12/07/2020 0900       Imaging reviewed  IR-US GUIDED PIV   Final Result    Ultrasound-guided PERIPHERAL IV INSERTION performed by    qualified nursing staff as above.      US-ABDOMEN COMPLETE SURVEY   Final Result      1.  Cholelithiasis and sludge within the gallbladder. Minimal gallbladder wall thickening. No overlying tenderness or biliary dilatation.      2.  No suspicious hepatic mass. Benign-appearing hemangioma in the right lobe posteriorly measuring 2.8 cm in maximum dimension.      3.  Bilateral small simple renal cyst. No hydronephrosis.      4.  No free fluid or hydronephrosis.      No follow up imaging is recommended per consensus guidelines of the 2019 ACR Incidental Findings Committee for probably benign incidental simple appearing renal cystic lesion(s) based on imaging criteria.         DX-CHEST-LIMITED (1 VIEW)   Final Result         Persistent bilateral interstitial opacities consistent with Covid 19 pneumonia without significant change.      Trace right pleural effusion.      US-EXTREMITY VENOUS LOWER BILAT   Final Result      EC-ECHOCARDIOGRAM COMPLETE W/O CONT   Final Result      DX-CHEST-PORTABLE (1 VIEW)   Final Result         1.  Pulmonary edema and/or infiltrates.            Assessment/Plan  70 y.o. male with a history of hypertension, chronic kidney disease, likely stage IV, who presented 11/30/2020 with lower  extremity edema and shortness of breath, found to have COVID-19 pneumonia.    1.  CKD stage IV, stable.  Looking back at the patient's labs in care everywhere, he had a creatinine of 5 in October 2020.  Etiology of CKD likely due to hypertension, possibly exacerbated by hepatitis C.  Patient's hepatitis C antibody was negative in March 2019, so hypertension is likely the major  have his CKD.  There is no acute need for dialysis.  Avoid nephrotoxins.  Check labs daily.  Patient might need dialysis in the future, and he thinks he would prefer peritoneal dialysis.  Regardless, recommend preserve arm veins in case AV access is needed in the future.    2.  Shortness of breath, discovered on admission, with lower extremity edema.  Recommend Lasix 80 mg IV twice daily.  If unable to obtain IV access, recommend Lasix 160 mg p.o. twice daily.    3.  Hypertension, uncontrolled, likely due to occult volume overload.  Recommend Lasix as above.    4.  Positive hepatitis C antibody, agree with checking hepatitis C virus PCR.  If positive, patient will need genotyping, and referral to GI for possible treatment.    5.  Metabolic acidosis, due to advanced CKD.  Recommend sodium bicarbonate 1300 mg p.o. twice daily.    6.  Normocytic anemia, worsening, likely anemia of chronic disease.  Patient is iron replete with a ferritin over 1000.  Discontinue IV iron.  Start Epogen 10,000 units subcutaneously weekly on Mondays.    Discussed with Dr. Osvaldo Hand MD  Nephrology

## 2020-12-07 NOTE — PROGRESS NOTES
Bedside report received and patient care assumed. Pt is resting in bed, no S&S of pain, and is on RA. Tele box on. All fall precautions are in place, belongings at bedside table.  Pt call light within reach. Will continue to monitor.

## 2020-12-07 NOTE — DISCHARGE PLANNING
Anticipated Discharge Disposition: [facility]    Action: Chart review completed and patient discussed in IDT rounds.  Per MD, patient needs a home O2 study today in anticipation of dc tomorrow.  Voalte message sent to BS RADHA Duffy to let her know of plan.    Barriers to Discharge: home walk test completion    Plan: RN CM to follow up with MD once walk test complete.

## 2020-12-08 ENCOUNTER — PATIENT OUTREACH (OUTPATIENT)
Dept: HEALTH INFORMATION MANAGEMENT | Facility: OTHER | Age: 70
End: 2020-12-08

## 2020-12-08 ENCOUNTER — PHARMACY VISIT (OUTPATIENT)
Dept: PHARMACY | Facility: MEDICAL CENTER | Age: 70
End: 2020-12-08
Payer: COMMERCIAL

## 2020-12-08 VITALS
WEIGHT: 160.5 LBS | HEIGHT: 68 IN | SYSTOLIC BLOOD PRESSURE: 120 MMHG | DIASTOLIC BLOOD PRESSURE: 64 MMHG | RESPIRATION RATE: 18 BRPM | TEMPERATURE: 98.4 F | BODY MASS INDEX: 24.32 KG/M2 | OXYGEN SATURATION: 96 % | HEART RATE: 88 BPM

## 2020-12-08 PROBLEM — U07.1 PNEUMONIA DUE TO COVID-19 VIRUS: Status: RESOLVED | Noted: 2020-12-01 | Resolved: 2020-12-08

## 2020-12-08 PROBLEM — E83.42 HYPOMAGNESEMIA: Status: RESOLVED | Noted: 2020-12-01 | Resolved: 2020-12-08

## 2020-12-08 PROBLEM — R50.9 FEVER: Status: RESOLVED | Noted: 2020-12-04 | Resolved: 2020-12-08

## 2020-12-08 PROBLEM — R79.89 ELEVATED BRAIN NATRIURETIC PEPTIDE (BNP) LEVEL: Status: RESOLVED | Noted: 2020-12-01 | Resolved: 2020-12-08

## 2020-12-08 PROBLEM — D69.6 THROMBOCYTOPENIA (HCC): Status: RESOLVED | Noted: 2020-12-05 | Resolved: 2020-12-08

## 2020-12-08 PROBLEM — J12.82 PNEUMONIA DUE TO COVID-19 VIRUS: Status: RESOLVED | Noted: 2020-12-01 | Resolved: 2020-12-08

## 2020-12-08 LAB
ALBUMIN SERPL BCP-MCNC: 3 G/DL (ref 3.2–4.9)
ALBUMIN/GLOB SERPL: 0.8 G/DL
ALP SERPL-CCNC: 83 U/L (ref 30–99)
ALT SERPL-CCNC: 60 U/L (ref 2–50)
ANION GAP SERPL CALC-SCNC: 10 MMOL/L (ref 7–16)
APPEARANCE UR: CLEAR
APPEARANCE UR: CLEAR
AST SERPL-CCNC: 68 U/L (ref 12–45)
BACTERIA #/AREA URNS HPF: NEGATIVE /HPF
BASOPHILS # BLD AUTO: 0.3 % (ref 0–1.8)
BASOPHILS # BLD: 0.02 K/UL (ref 0–0.12)
BILIRUB SERPL-MCNC: 0.3 MG/DL (ref 0.1–1.5)
BILIRUB UR QL STRIP.AUTO: NEGATIVE
BILIRUB UR QL STRIP.AUTO: NEGATIVE
BUN SERPL-MCNC: 54 MG/DL (ref 8–22)
CALCIUM SERPL-MCNC: 8.6 MG/DL (ref 8.5–10.5)
CHLORIDE SERPL-SCNC: 108 MMOL/L (ref 96–112)
CO2 SERPL-SCNC: 18 MMOL/L (ref 20–33)
COLOR UR: YELLOW
COLOR UR: YELLOW
CREAT SERPL-MCNC: 3.92 MG/DL (ref 0.5–1.4)
CREAT UR-MCNC: 28.08 MG/DL
CREAT UR-MCNC: 28.39 MG/DL
CREAT UR-MCNC: 87.17 MG/DL
CREAT UR-MCNC: 90.07 MG/DL
EOSINOPHIL # BLD AUTO: 0.04 K/UL (ref 0–0.51)
EOSINOPHIL NFR BLD: 0.6 % (ref 0–6.9)
EPI CELLS #/AREA URNS HPF: NEGATIVE /HPF
ERYTHROCYTE [DISTWIDTH] IN BLOOD BY AUTOMATED COUNT: 56.3 FL (ref 35.9–50)
GLOBULIN SER CALC-MCNC: 3.7 G/DL (ref 1.9–3.5)
GLUCOSE SERPL-MCNC: 87 MG/DL (ref 65–99)
GLUCOSE UR STRIP.AUTO-MCNC: NEGATIVE MG/DL
GLUCOSE UR STRIP.AUTO-MCNC: NEGATIVE MG/DL
HCT VFR BLD AUTO: 24.8 % (ref 42–52)
HGB BLD-MCNC: 7.9 G/DL (ref 14–18)
HYALINE CASTS #/AREA URNS LPF: ABNORMAL /LPF
IMM GRANULOCYTES # BLD AUTO: 0.07 K/UL (ref 0–0.11)
IMM GRANULOCYTES NFR BLD AUTO: 1 % (ref 0–0.9)
KETONES UR STRIP.AUTO-MCNC: NEGATIVE MG/DL
KETONES UR STRIP.AUTO-MCNC: NEGATIVE MG/DL
LEUKOCYTE ESTERASE UR QL STRIP.AUTO: NEGATIVE
LEUKOCYTE ESTERASE UR QL STRIP.AUTO: NEGATIVE
LYMPHOCYTES # BLD AUTO: 1.12 K/UL (ref 1–4.8)
LYMPHOCYTES NFR BLD: 15.7 % (ref 22–41)
MCH RBC QN AUTO: 28.9 PG (ref 27–33)
MCHC RBC AUTO-ENTMCNC: 31.9 G/DL (ref 33.7–35.3)
MCV RBC AUTO: 90.8 FL (ref 81.4–97.8)
MICRO URNS: ABNORMAL
MICRO URNS: NORMAL
MICROALBUMIN UR-MCNC: 16 MG/DL
MICROALBUMIN UR-MCNC: 2.9 MG/DL
MICROALBUMIN/CREAT UR: 103 MG/G (ref 0–30)
MICROALBUMIN/CREAT UR: 178 MG/G (ref 0–30)
MONOCYTES # BLD AUTO: 0.46 K/UL (ref 0–0.85)
MONOCYTES NFR BLD AUTO: 6.5 % (ref 0–13.4)
NEUTROPHILS # BLD AUTO: 5.41 K/UL (ref 1.82–7.42)
NEUTROPHILS NFR BLD: 75.9 % (ref 44–72)
NITRITE UR QL STRIP.AUTO: NEGATIVE
NITRITE UR QL STRIP.AUTO: NEGATIVE
NRBC # BLD AUTO: 0 K/UL
NRBC BLD-RTO: 0 /100 WBC
PH UR STRIP.AUTO: 5 [PH] (ref 5–8)
PH UR STRIP.AUTO: 5.5 [PH] (ref 5–8)
PLATELET # BLD AUTO: 193 K/UL (ref 164–446)
PMV BLD AUTO: 11.1 FL (ref 9–12.9)
POTASSIUM SERPL-SCNC: 3.4 MMOL/L (ref 3.6–5.5)
PROT SERPL-MCNC: 6.7 G/DL (ref 6–8.2)
PROT UR QL STRIP: 100 MG/DL
PROT UR QL STRIP: NEGATIVE MG/DL
PROT UR-MCNC: 65 MG/DL (ref 0–15)
PROT UR-MCNC: 9 MG/DL (ref 0–15)
PROT/CREAT UR: 317 MG/G (ref 15–68)
PROT/CREAT UR: 746 MG/G (ref 15–68)
RBC # BLD AUTO: 2.73 M/UL (ref 4.7–6.1)
RBC # URNS HPF: ABNORMAL /HPF
RBC UR QL AUTO: NEGATIVE
RBC UR QL AUTO: NEGATIVE
SODIUM SERPL-SCNC: 136 MMOL/L (ref 135–145)
SP GR UR STRIP.AUTO: 1.01
SP GR UR STRIP.AUTO: 1.02
UROBILINOGEN UR STRIP.AUTO-MCNC: 0.2 MG/DL
UROBILINOGEN UR STRIP.AUTO-MCNC: 0.2 MG/DL
WBC # BLD AUTO: 7.1 K/UL (ref 4.8–10.8)
WBC #/AREA URNS HPF: ABNORMAL /HPF

## 2020-12-08 PROCEDURE — A9270 NON-COVERED ITEM OR SERVICE: HCPCS | Performed by: INTERNAL MEDICINE

## 2020-12-08 PROCEDURE — 700105 HCHG RX REV CODE 258: Performed by: HOSPITALIST

## 2020-12-08 PROCEDURE — 80053 COMPREHEN METABOLIC PANEL: CPT

## 2020-12-08 PROCEDURE — 84156 ASSAY OF PROTEIN URINE: CPT

## 2020-12-08 PROCEDURE — 36415 COLL VENOUS BLD VENIPUNCTURE: CPT

## 2020-12-08 PROCEDURE — 99239 HOSP IP/OBS DSCHRG MGMT >30: CPT | Performed by: HOSPITALIST

## 2020-12-08 PROCEDURE — 700102 HCHG RX REV CODE 250 W/ 637 OVERRIDE(OP): Performed by: HOSPITALIST

## 2020-12-08 PROCEDURE — A9270 NON-COVERED ITEM OR SERVICE: HCPCS | Performed by: HOSPITALIST

## 2020-12-08 PROCEDURE — 99232 SBSQ HOSP IP/OBS MODERATE 35: CPT | Performed by: INTERNAL MEDICINE

## 2020-12-08 PROCEDURE — 700101 HCHG RX REV CODE 250: Performed by: HOSPITALIST

## 2020-12-08 PROCEDURE — 700102 HCHG RX REV CODE 250 W/ 637 OVERRIDE(OP): Performed by: INTERNAL MEDICINE

## 2020-12-08 PROCEDURE — RXMED WILLOW AMBULATORY MEDICATION CHARGE: Performed by: HOSPITALIST

## 2020-12-08 PROCEDURE — 82570 ASSAY OF URINE CREATININE: CPT | Mod: 91

## 2020-12-08 PROCEDURE — 85025 COMPLETE CBC W/AUTO DIFF WBC: CPT

## 2020-12-08 PROCEDURE — 700102 HCHG RX REV CODE 250 W/ 637 OVERRIDE(OP): Performed by: STUDENT IN AN ORGANIZED HEALTH CARE EDUCATION/TRAINING PROGRAM

## 2020-12-08 PROCEDURE — 700111 HCHG RX REV CODE 636 W/ 250 OVERRIDE (IP): Performed by: HOSPITALIST

## 2020-12-08 PROCEDURE — 81003 URINALYSIS AUTO W/O SCOPE: CPT

## 2020-12-08 PROCEDURE — A9270 NON-COVERED ITEM OR SERVICE: HCPCS | Performed by: STUDENT IN AN ORGANIZED HEALTH CARE EDUCATION/TRAINING PROGRAM

## 2020-12-08 PROCEDURE — 82043 UR ALBUMIN QUANTITATIVE: CPT

## 2020-12-08 RX ORDER — DEXAMETHASONE 6 MG/1
6 TABLET ORAL DAILY
Qty: 2 TAB | Refills: 0 | Status: SHIPPED | OUTPATIENT
Start: 2020-12-09 | End: 2020-12-08 | Stop reason: SDUPTHER

## 2020-12-08 RX ORDER — SODIUM BICARBONATE 650 MG/1
1300 TABLET ORAL 2 TIMES DAILY
Qty: 60 TAB | Refills: 3 | Status: SHIPPED | OUTPATIENT
Start: 2020-12-08 | End: 2021-01-07

## 2020-12-08 RX ORDER — AMLODIPINE BESYLATE 5 MG/1
5 TABLET ORAL 2 TIMES DAILY
Qty: 60 TAB | Refills: 0 | Status: SHIPPED | OUTPATIENT
Start: 2020-12-08 | End: 2021-01-07

## 2020-12-08 RX ORDER — HYDROXYZINE HYDROCHLORIDE 25 MG/1
25 TABLET, FILM COATED ORAL ONCE
Status: COMPLETED | OUTPATIENT
Start: 2020-12-08 | End: 2020-12-08

## 2020-12-08 RX ORDER — FUROSEMIDE 40 MG/1
80 TABLET ORAL
Status: DISCONTINUED | OUTPATIENT
Start: 2020-12-08 | End: 2020-12-08 | Stop reason: HOSPADM

## 2020-12-08 RX ORDER — HYDRALAZINE HYDROCHLORIDE 50 MG/1
50 TABLET, FILM COATED ORAL EVERY 8 HOURS
Qty: 90 TAB | Refills: 0 | Status: SHIPPED | OUTPATIENT
Start: 2020-12-08 | End: 2021-01-07

## 2020-12-08 RX ORDER — SODIUM BICARBONATE 650 MG/1
1300 TABLET ORAL 2 TIMES DAILY
Qty: 60 TAB | Refills: 3 | Status: SHIPPED | OUTPATIENT
Start: 2020-12-08 | End: 2020-12-08 | Stop reason: SDUPTHER

## 2020-12-08 RX ORDER — FUROSEMIDE 80 MG
80 TABLET ORAL 2 TIMES DAILY
Qty: 60 TAB | Refills: 0 | Status: SHIPPED | OUTPATIENT
Start: 2020-12-08 | End: 2021-01-07

## 2020-12-08 RX ORDER — AMLODIPINE BESYLATE 5 MG/1
5 TABLET ORAL 2 TIMES DAILY
Qty: 30 TAB | Refills: 0 | Status: SHIPPED | OUTPATIENT
Start: 2020-12-08 | End: 2020-12-08 | Stop reason: SDUPTHER

## 2020-12-08 RX ORDER — FUROSEMIDE 80 MG
80 TABLET ORAL 2 TIMES DAILY
Qty: 60 TAB | Refills: 0 | Status: SHIPPED | OUTPATIENT
Start: 2020-12-08 | End: 2020-12-08 | Stop reason: SDUPTHER

## 2020-12-08 RX ORDER — DEXAMETHASONE 6 MG/1
6 TABLET ORAL DAILY
Qty: 2 TAB | Refills: 0 | Status: SHIPPED | OUTPATIENT
Start: 2020-12-09 | End: 2020-12-11

## 2020-12-08 RX ORDER — HYDRALAZINE HYDROCHLORIDE 50 MG/1
50 TABLET, FILM COATED ORAL EVERY 8 HOURS
Qty: 90 TAB | Refills: 0 | Status: SHIPPED | OUTPATIENT
Start: 2020-12-08 | End: 2020-12-08 | Stop reason: SDUPTHER

## 2020-12-08 RX ADMIN — OMEPRAZOLE 40 MG: 20 CAPSULE, DELAYED RELEASE ORAL at 16:35

## 2020-12-08 RX ADMIN — HYDROCODONE BITARTRATE AND ACETAMINOPHEN 2 TABLET: 5; 325 TABLET ORAL at 05:05

## 2020-12-08 RX ADMIN — HYDROXYZINE HYDROCHLORIDE 25 MG: 25 TABLET, FILM COATED ORAL at 15:13

## 2020-12-08 RX ADMIN — HYDROCODONE BITARTRATE AND ACETAMINOPHEN 2 TABLET: 5; 325 TABLET ORAL at 13:31

## 2020-12-08 RX ADMIN — SODIUM BICARBONATE 1300 MG: 650 TABLET ORAL at 05:03

## 2020-12-08 RX ADMIN — SODIUM BICARBONATE 1300 MG: 650 TABLET ORAL at 16:35

## 2020-12-08 RX ADMIN — FUROSEMIDE 160 MG: 40 TABLET ORAL at 05:03

## 2020-12-08 RX ADMIN — ISOSORBIDE MONONITRATE 15 MG: 30 TABLET, EXTENDED RELEASE ORAL at 05:04

## 2020-12-08 RX ADMIN — FUROSEMIDE 80 MG: 40 TABLET ORAL at 15:13

## 2020-12-08 RX ADMIN — SODIUM CHLORIDE 3 G: 900 INJECTION INTRAVENOUS at 08:17

## 2020-12-08 RX ADMIN — AMLODIPINE BESYLATE 5 MG: 5 TABLET ORAL at 05:02

## 2020-12-08 RX ADMIN — METOPROLOL SUCCINATE 100 MG: 50 TABLET, EXTENDED RELEASE ORAL at 05:03

## 2020-12-08 RX ADMIN — GABAPENTIN 200 MG: 100 CAPSULE ORAL at 05:03

## 2020-12-08 RX ADMIN — AMLODIPINE BESYLATE 5 MG: 5 TABLET ORAL at 16:35

## 2020-12-08 RX ADMIN — HYDRALAZINE HYDROCHLORIDE 50 MG: 50 TABLET, FILM COATED ORAL at 05:05

## 2020-12-08 RX ADMIN — DEXAMETHASONE 6 MG: 4 TABLET ORAL at 05:05

## 2020-12-08 RX ADMIN — LIDOCAINE 1 PATCH: 50 PATCH TOPICAL at 13:31

## 2020-12-08 RX ADMIN — HYDRALAZINE HYDROCHLORIDE 50 MG: 50 TABLET, FILM COATED ORAL at 13:31

## 2020-12-08 RX ADMIN — OMEPRAZOLE 40 MG: 20 CAPSULE, DELAYED RELEASE ORAL at 05:05

## 2020-12-08 RX ADMIN — TAMSULOSIN HYDROCHLORIDE 0.4 MG: 0.4 CAPSULE ORAL at 05:03

## 2020-12-08 ASSESSMENT — ENCOUNTER SYMPTOMS
ABDOMINAL PAIN: 0
FEVER: 0
SHORTNESS OF BREATH: 0

## 2020-12-08 NOTE — DISCHARGE SUMMARY
"Discharge Summary    CHIEF COMPLAINT ON ADMISSION  Chief Complaint   Patient presents with   • Shortness of Breath     \"earlier this evening i started to feel short of breathe\", non productive cough noted   • Palpitations     complaints of \"heart beating fast\" tachycardia noted on arrival       Reason for Admission  EMS     Admission Date  11/30/2020    CODE STATUS  Full Code    HPI & HOSPITAL COURSE    This is 70-year-old male with a past medical significant for hypertension, dyslipidemia, BPH, CAD, CKD stage IV presented to the ER on 11/30/2020 with a complaint of shortness of breath , associated with fever, productive of green-colored sputum and bilateral lower extremity swelling.  Symptoms continued to get worse thus decided to come to ER.  Upon presentation he is noted to be hypoxic at 69% on room air and required nonrebreather.    During the stay in the hospital, he was started on Decadron, will need a total of 10 days; his oxygen demand continue to improve, currently requiring 1 to 3 L of oxygen.  Home oxygen is delivered.    He continues to receive treatment for PNA with Unasyn+zithromycin; despite this, patient continued to remain febrile, blood culture x2 no growth to date, antibiotics escalated to Zyvox plus Zosyn.  Once MRSA nasal swab negative; Zyvox stopped; patient continued to remain on Unasyn.  Patient did complete total of 7 days of antibiotics.    Also his renal function continued to get worse, nephrology was consulted, started on Lasix 80 mg p.o. twice daily at the time of discharge.  He will follow-up with [facility] Dr. Micheal Hand as an op.  Dr Hand stated that patient doesn't need supplemental K at the time of DC.      He is also noted to have transaminitis with AST//94--> which has improved.  He is also noted to have thrombocytopenia, which is panel positive for hep C.  Hep C RNA ordered, need to follow-up with her GI physician for hepatitis C treatment.     Also he had a " stress test in 3/2020 which did not show any reversible ischemia.   This time patient is medically stable to be discharged to go with home.      Therefore, he is discharged in good and stable condition to home with close outpatient follow-up.    The patient met 2-midnight criteria for an inpatient stay at the time of discharge.    Discharge Date  12/08/2020    FOLLOW UP ITEMS POST DISCHARGE  [facility]  DR Micheal Hand as an op   Dr Taylor of [facility] as an op    DISCHARGE DIAGNOSES  Principal Problem (Resolved):    Pneumonia due to COVID-19 virus POA: Yes  Active Problems:    Stage 4 chronic kidney disease (HCC) POA: Yes    Anemia POA: Yes    Transaminitis POA: Unknown  Resolved Problems:    Sepsis (HCC) POA: Yes    Hypocalcemia POA: Yes    Elevated brain natriuretic peptide (BNP) level POA: Yes    Elevated d-dimer POA: Yes    Fever POA: Unknown    Thrombocytopenia (HCC) POA: Unknown    Hypomagnesemia POA: Yes      FOLLOW UP  No future appointments.  No follow-up provider specified.    MEDICATIONS ON DISCHARGE     Medication List      START taking these medications      Instructions   amLODIPine 5 MG Tabs  Commonly known as: NORVASC   Take 1 Tab by mouth 2 Times a Day for 30 days.  Dose: 5 mg     dexamethasone 6 MG Tabs  Start taking on: December 9, 2020  Commonly known as: DECADRON   Take 1 Tab by mouth every day for 2 days.  Dose: 6 mg     hydrALAZINE 50 MG Tabs  Commonly known as: APRESOLINE   Take 1 Tab by mouth every 8 hours for 30 days.  Dose: 50 mg     sodium bicarbonate 650 MG Tabs  Commonly known as: SODIUM BICARBONATE   Take 2 Tabs by mouth 2 Times a Day for 30 days.  Dose: 1,300 mg        CHANGE how you take these medications      Instructions   furosemide 80 MG Tabs  What changed:   · medication strength  · how much to take  · when to take this  Commonly known as: LASIX   Take 1 Tab by mouth 2 Times a Day for 30 days.  Dose: 80 mg        CONTINUE taking these medications       Instructions   allopurinol 100 MG Tabs  Commonly known as: ZYLOPRIM   Take 100 mg by mouth every morning.  Dose: 100 mg     isosorbide mononitrate SR 30 MG Tb24  Commonly known as: IMDUR   Take 15 mg by mouth every morning.  Dose: 15 mg     metoprolol  MG Tb24  Commonly known as: TOPROL XL   Take 100 mg by mouth every day.  Dose: 100 mg     omeprazole 20 MG delayed-release capsule  Commonly known as: PRILOSEC   Take 40 mg by mouth every morning.  Dose: 40 mg     tamsulosin 0.4 MG capsule  Commonly known as: FLOMAX   Take 0.4 mg by mouth every morning.  Dose: 0.4 mg     traZODone 100 MG Tabs  Commonly known as: DESYREL   Take 100 mg by mouth every evening. Indications: Trouble Sleeping  Dose: 100 mg            Allergies  Allergies   Allergen Reactions   • Motrin [Ibuprofen]      hhx of stomach ulcers       DIET  Orders Placed This Encounter   Procedures   • Diet Order Diet: 2 Gram Sodium; Second Modifier: (optional): Renal; Fluid modifications: (optional): 1200 ml Fluid Restriction     Standing Status:   Standing     Number of Occurrences:   1     Order Specific Question:   Diet:     Answer:   2 Gram Sodium [7]     Order Specific Question:   Second Modifier: (optional)     Answer:   Renal [8]     Order Specific Question:   Fluid modifications: (optional)     Answer:   1200 ml Fluid Restriction [8]       ACTIVITY  As tolerated.  Weight bearing as tolerated    CONSULTATIONS  Nephrology     PROCEDURES  None    LABORATORY  Lab Results   Component Value Date    SODIUM 136 12/08/2020    POTASSIUM 3.4 (L) 12/08/2020    CHLORIDE 108 12/08/2020    CO2 18 (L) 12/08/2020    GLUCOSE 87 12/08/2020    BUN 54 (H) 12/08/2020    CREATININE 3.92 (H) 12/08/2020        Lab Results   Component Value Date    WBC 7.1 12/08/2020    HEMOGLOBIN 7.9 (L) 12/08/2020    HEMATOCRIT 24.8 (L) 12/08/2020    PLATELETCT 193 12/08/2020        Total time of the discharge process exceeds 35 minutes.    I went to the bedside, evaluate the patient,  reviewed extensive data.  Patient is alert and oriented, answering question appropriately at this time patient is medically stable to be discharged to [facility] housing .

## 2020-12-08 NOTE — FACE TO FACE
"Face to Face Note  -  Durable Medical Equipment    David Riley M.D. - NPI: [id number]  I certify that this patient is under my care and that they had a durable medical equipment(DME)face to face encounter by myself that meets the physician DME face-to-face encounter requirements with this patient on:    Date of encounter:   Patient:                    MRN:                       YOB: 2018  Abran Proctor  [id number]  1950     The encounter with the patient was in whole, or in part, for the following medical condition, which is the primary reason for durable medical equipment:  Other - covid    I certify that, based on my findings, the following durable medical equipment is medically necessary:  Oxygen.    HOME O2 Saturation Measurements:(Values must be present for Home Oxygen orders)  Room air sat at rest: 95  Room air sat with amb: 78  With liters of O2: 1, O2 sat at rest with O2: 98  With Liters of O2: 3, O2 sat with amb with O2 : 93  Is the patient mobile?: Yes    My Clinical findings support the need for the above equipment due to:  Hypoxia    Supporting Symptoms: The patient requires supplemental oxygen, as the following interventions have been tried with limited or no improvement: \"Bronchodilators and/or steroid inhalers    If patient feels more short of breath, they can go up to 6 liters per minute and contact healthcare provider.  "

## 2020-12-08 NOTE — PROGRESS NOTES
Nephrology Daily Progress Note    Date of Service  12/8/2020    Chief Complaint  70 y.o. male with a history of hypertension, chronic kidney disease, likely stage IV, who presented 11/30/2020 with lower extremity edema and shortness of breath.    Interval Problem Update  12/8 -patient denies chest pain, shortness of breath, headache, nausea, vomiting.    Review of Systems  Review of Systems   Constitutional: Negative for fever.   Respiratory: Negative for shortness of breath.    Cardiovascular: Negative for chest pain.   Gastrointestinal: Negative for abdominal pain.   All other systems reviewed and are negative.       Physical Exam  Temp:  [37 °C (98.6 °F)-37.5 °C (99.5 °F)] 37.2 °C (98.9 °F)  Pulse:  [79-84] 81  Resp:  [18-20] 18  BP: (110-158)/(70-93) 119/71  SpO2:  [90 %-98 %] 96 %    Physical Exam   Constitutional: He is oriented to person, place, and time. He appears well-developed. No distress.   HENT:   Mouth/Throat: Oropharynx is clear and moist. No oropharyngeal exudate.   Eyes: EOM are normal. No scleral icterus.   Neck: No tracheal deviation present.   Cardiovascular: Normal rate and normal heart sounds.   No murmur heard.  Pulmonary/Chest: Effort normal. No stridor.   Abdominal: Soft. He exhibits no distension. There is no abdominal tenderness.   Musculoskeletal: Normal range of motion.         General: No edema.   Neurological: He is alert and oriented to person, place, and time.   Skin: Skin is warm and dry. He is not diaphoretic.   Psychiatric: He has a normal mood and affect.       Fluids    Intake/Output Summary (Last 24 hours) at 12/8/2020 1456  Last data filed at 12/8/2020 0900  Gross per 24 hour   Intake 240 ml   Output 1400 ml   Net -1160 ml       Laboratory  Labs reviewed, pertinent labs below.  Recent Labs     12/06/20  0250 12/07/20  0359 12/08/20  0642   WBC 8.3 9.0 7.1   RBC 2.48* 2.47* 2.73*   HEMOGLOBIN 7.3* 7.2* 7.9*   HEMATOCRIT 23.0* 22.5* 24.8*   MCV 92.7 91.1 90.8   MCH 29.4 29.1  28.9   MCHC 31.7* 32.0* 31.9*   RDW 58.8* 56.4* 56.3*   PLATELETCT 136* 169 193   MPV 11.1 11.1 11.1     Recent Labs     12/06/20  0250 12/07/20  0359 12/08/20  0642   SODIUM 136 136 136   POTASSIUM 4.1 4.1 3.4*   CHLORIDE 108 110 108   CO2 16* 16* 18*   GLUCOSE 105* 98 87   BUN 49* 52* 54*   CREATININE 3.69* 4.08* 3.92*   CALCIUM 8.5 8.6 8.6               URINALYSIS:  Lab Results   Component Value Date/Time    COLORURINE Yellow 12/08/2020 0944    CLARITY Clear 12/08/2020 0944    SPECGRAVITY 1.010 12/08/2020 0944    PHURINE 5.0 12/08/2020 0944    KETONES Negative 12/08/2020 0944    PROTEINURIN Negative 12/08/2020 0944    BILIRUBINUR Negative 12/08/2020 0944    UROBILU 0.2 12/08/2020 0944    NITRITE Negative 12/08/2020 0944    LEUKESTERAS Negative 12/08/2020 0944    OCCULTBLOOD Negative 12/08/2020 0944     UPC  Lab Results   Component Value Date/Time    TOTPROTUR 9.0 12/08/2020 0943      Lab Results   Component Value Date/Time    CREATININEU 28.39 12/08/2020 0943         Imaging reviewed  IR-US GUIDED PIV   Final Result    Ultrasound-guided PERIPHERAL IV INSERTION performed by    qualified nursing staff as above.      US-ABDOMEN COMPLETE SURVEY   Final Result      1.  Cholelithiasis and sludge within the gallbladder. Minimal gallbladder wall thickening. No overlying tenderness or biliary dilatation.      2.  No suspicious hepatic mass. Benign-appearing hemangioma in the right lobe posteriorly measuring 2.8 cm in maximum dimension.      3.  Bilateral small simple renal cyst. No hydronephrosis.      4.  No free fluid or hydronephrosis.      No follow up imaging is recommended per consensus guidelines of the 2019 ACR Incidental Findings Committee for probably benign incidental simple appearing renal cystic lesion(s) based on imaging criteria.         DX-CHEST-LIMITED (1 VIEW)   Final Result         Persistent bilateral interstitial opacities consistent with Covid 19 pneumonia without significant change.      Trace right  pleural effusion.      US-EXTREMITY VENOUS LOWER BILAT   Final Result      EC-ECHOCARDIOGRAM COMPLETE W/O CONT   Final Result      DX-CHEST-PORTABLE (1 VIEW)   Final Result         1.  Pulmonary edema and/or infiltrates.            Current Facility-Administered Medications   Medication Dose Route Frequency Provider Last Rate Last Admin   • furosemide (LASIX) tablet 80 mg  80 mg Oral BID DIURETIC Micheal Hand M.D.       • hydrOXYzine HCl (ATARAX) tablet 25 mg  25 mg Oral Once David Riley M.D.       • sodium bicarbonate tablet 1,300 mg  1,300 mg Oral BID Micheal Hand M.D.   1,300 mg at 12/08/20 0503   • ampicillin/sulbactam (UNASYN) 3 g in  mL IVPB  3 g Intravenous Q12HRS David Riley M.D.   Stopped at 12/08/20 0847   • epoetin (Retacrit) injection (Non Dialysis Use) 10,000 Units  10,000 Units Subcutaneous Q MONDAY Micheal Hand M.D.   10,000 Units at 12/07/20 2055   • LORazepam (ATIVAN) tablet 0.5 mg  0.5 mg Oral BID PRN MARIS BauerDIshmael   0.5 mg at 12/07/20 1346   • hydrALAZINE (APRESOLINE) tablet 50 mg  50 mg Oral Q8HRS MARIS BauerDIshmael   50 mg at 12/08/20 1331   • amLODIPine (NORVASC) tablet 5 mg  5 mg Oral BID EDGAR Bauer.DIshamel   5 mg at 12/08/20 0502   • omeprazole (PRILOSEC) capsule 40 mg  40 mg Oral BID EDGAR Bauer.DIshmael   40 mg at 12/08/20 0505   • lidocaine (LIDODERM) 5 % 1 Patch  1 Patch Transdermal Q24HR MARIS BauerDIshmael   1 Patch at 12/08/20 1331   • gabapentin (NEURONTIN) capsule 200 mg  200 mg Oral DAILY Jarek Philip, D.O.   200 mg at 12/08/20 0503   • labetalol (NORMODYNE/TRANDATE) injection 10 mg  10 mg Intravenous Q6HRS PRN Jarek Philip, D.O.   10 mg at 12/03/20 2028   • HYDROcodone-acetaminophen (NORCO) 5-325 MG per tablet 1-2 Tab  1-2 Tab Oral Q8HRS PRN Jarek Philip D.O.   2 Tab at 12/08/20 1331   • tamsulosin (FLOMAX) capsule 0.4 mg  0.4 mg Oral SERGIO Novoa M.D.   0.4 mg at 12/08/20 0503   • senna-docusate (PERICOLACE or SENOKOT S) 8.6-50 MG per tablet 2 Tab  2 Tab  Oral BID Cyril Novoa M.D.   Stopped at 12/02/20 1800    And   • polyethylene glycol/lytes (MIRALAX) PACKET 1 Packet  1 Packet Oral QDAY PRN Cyril Novoa M.D.        And   • bisacodyl (DULCOLAX) suppository 10 mg  10 mg Rectal QDAY PRN Cyril Novoa M.D.       • dexamethasone (DECADRON) tablet 6 mg  6 mg Oral DAILY Jarek Philip D.O.   6 mg at 12/08/20 0505   • albuterol inhaler 2 Puff  2 Puff Inhalation Q4HRS PRN Cyril Novoa M.D.       • isosorbide mononitrate SR (IMDUR) tablet 15 mg  15 mg Oral QAM Jarek Philip D.O.   15 mg at 12/08/20 0504   • metoprolol SR (TOPROL XL) tablet 100 mg  100 mg Oral DAILY Jarek Philip D.O.   100 mg at 12/08/20 0503   • acetaminophen (TYLENOL) tablet 500 mg  500 mg Oral Q6HRS PRN Jarek Philip D.O.   500 mg at 12/03/20 1713   • traMADol (ULTRAM) 50 MG tablet 50 mg  50 mg Oral Q6HRS PRN Jarek Philip D.O.   50 mg at 12/05/20 1059   • traZODone (DESYREL) tablet 100 mg  100 mg Oral QHS Cyril Novoa M.D.   100 mg at 12/07/20 2055         Assessment/Plan  70 y.o. male with a history of hypertension, chronic kidney disease, likely stage IV, who presented 11/30/2020 with lower extremity edema and shortness of breath, found to have COVID-19 pneumonia.     1.  CKD stage IV, stable.  CKD likely due to history of hypertension, possibly exacerbated by hepatitis C.  No acute need for dialysis.  Patient thinks he might prefer PD in the future.  Avoid nephrotoxins.  Check labs daily.      2.  Shortness of breath, improving.  Continue Lasix 80 mg p.o. twice daily, even on discharge.     3.  Hypertension, improving with improved diuresis.  Continue Lasix 80 mg p.o. twice daily.     4.  Positive hepatitis C antibody, hepatitis C virus PCR pending.  Patient will need genotyping if positive, and referral to gastroenterology.     5.  Metabolic acidosis, persistent.  Continue sodium bicarbonate 1300 mg p.o. twice daily.     6.  Normocytic anemia, slightly improved.  Likely anemia of chronic disease.   Patient had 1 dose of EPO 10,000 units on Monday, 12/7/2020.  Will monitor hemoglobin as an outpatient, and if remains anemic, will refer to the outpatient infusion center for ongoing Epogen.     Discussed with Dr. Osvaldo Hand MD  Nephrology

## 2020-12-09 LAB
BACTERIA BLD CULT: NORMAL
BACTERIA BLD CULT: NORMAL
HCV RNA SERPL NAA+PROBE-ACNC: NOT DETECTED IU/ML
HCV RNA SERPL NAA+PROBE-LOG IU: NOT DETECTED LOG IU/ML
HCV RNA SERPL QL NAA+PROBE: NOT DETECTED
SIGNIFICANT IND 70042: NORMAL
SIGNIFICANT IND 70042: NORMAL
SITE SITE: NORMAL
SITE SITE: NORMAL
SOURCE SOURCE: NORMAL
SOURCE SOURCE: NORMAL

## 2020-12-09 NOTE — DISCHARGE INSTRUCTIONS
Discharge Instructions per David Riley M.D.  DIET: Resume previous diet  Healthy diet. Plenty of vegetables.  ACTIVITY: Avoid strenuous activity or heavy lifting.   DIAGNOSIS:   Patient Active Problem List   Diagnosis   • chest pain   • Stage 4 chronic kidney disease (HCC)   • Hypokalemia   • Anemia   • Gout   • BPH (benign prostatic hyperplasia)   • Insomnia   • HLD (hyperlipidemia)   • Ulcer, stomach peptic   • Back pain   • Transaminitis     Return to ER in the event of new or worsening symptoms. Please note importance of compliance and the patient has agreed to proceed with all medical recommendations and follow up plan indicated above. All medications come with benefits and risks. Risks may include permanent injury or death and these risks can be minimized with close reassessment and monitoring.  Please follow-up with primary care physician at [facility]    Please follow-up with [facility] for possible endoscopy, colonoscopy DR Taylor  Follow-up with [facility] nephrology Dr. Ludwig Hand          Discharge Instructions    Discharged to home by medical transportation with self. Discharged via wheelchair, hospital escort: Yes.  Special equipment needed: Oxygen    Be sure to schedule a follow-up appointment with your primary care doctor or any specialists as instructed.     Discharge Plan:   Diet Plan: Discussed  Activity Level: Discussed  Confirmed Follow up Appointment: Appointment Scheduled  Confirmed Symptoms Management: Discussed  Medication Reconciliation Updated: Yes    I understand that a diet low in cholesterol, fat, and sodium is recommended for good health. Unless I have been given specific instructions below for another diet, I accept this instruction as my diet prescription.   Other diet: renal, cardiac, low sodium    Special Instructions: None    · Is patient discharged on Warfarin / Coumadin?   No     Depression / Suicide Risk    As you are discharged from this [facility], it is  important to learn how to keep safe from harming yourself.    Recognize the warning signs:  · Abrupt changes in personality, positive or negative- including increase in energy   · Giving away possessions  · Change in eating patterns- significant weight changes-  positive or negative  · Change in sleeping patterns- unable to sleep or sleeping all the time   · Unwillingness or inability to communicate  · Depression  · Unusual sadness, discouragement and loneliness  · Talk of wanting to die  · Neglect of personal appearance   · Rebelliousness- reckless behavior  · Withdrawal from people/activities they love  · Confusion- inability to concentrate     If you or a loved one observes any of these behaviors or has concerns about self-harm, here's what you can do:  · Talk about it- your feelings and reasons for harming yourself  · Remove any means that you might use to hurt yourself (examples: pills, rope, extension cords, firearm)  · Get professional help from the community (Mental Health, Substance Abuse, psychological counseling)  · Do not be alone:Call your Safe Contact- someone whom you trust who will be there for you.  · Call your local CRISIS HOTLINE 081-2868 or 204-125-4402  · Call your local Children's Mobile Crisis Response Team [place] (207) 479-6875 or www.Epicrisis  · Call the toll free National Suicide Prevention Hotlines   · National Suicide Prevention Lifeline 265-312-CETR (5974)  · National Hope Line Network 800-SUICIDE (639-5329)

## 2020-12-09 NOTE — PROGRESS NOTES
Pt discharged to shelter today.   piv and tele box removed.  Instructed pt to continue quarantine until 14 days after testing positive.   Oxygen education provided.   Oxygen to be delivered to pt before leaving.   Med rec completed. Medications given to patient at bedside.   [facility]   Pt verbalized understanding of discharge instructions.

## 2020-12-09 NOTE — DISCHARGE PLANNING
2185  Agency/Facility Name: [facility]   Spoke To: Adam   Outcome: [facility] is booked for the day per Adam     Agency/Facility Name: T   Spoke To: Patricia   Outcome: Will call this [facility] back with a quote .     Received Transport Form @ 1756  Spoke to Mami  @ [facility]   Transport is scheduled for 12/08 @ 4430 going to [address] [place] [address].

## 2020-12-09 NOTE — DISCHARGE PLANNING
Meds-to-Beds: Discharge prescription orders listed below delivered to patient's nurse. Patient not counseled.       Abran Proctor   Home Medication Instructions TIFFANIE:[id number]    Printed on:12/08/20 7901   Medication Information                      amLODIPine (NORVASC) 5 MG Tab  Take 1 Tab by mouth 2 Times a Day for 30 days.             dexamethasone (DECADRON) 6 MG Tab  Take 1 Tablet by mouth every day for 2 days.             furosemide (LASIX) 80 MG Tab  Take 1 Tablet by mouth 2 Times a Day for 30 days.             hydrALAZINE (APRESOLINE) 50 MG Tab  Take 1 Tablet by mouth every 8 hours for 30 days.             sodium bicarbonate (SODIUM BICARBONATE) 650 MG Tab  Take 2 Tablets by mouth 2 Times a Day for 30 days.                 Philly Shrestha, PharmD

## 2020-12-09 NOTE — DISCHARGE PLANNING
Anticipated Discharge Disposition: Home w/ O2    Action: [facility] and [facility] b/c he normally uses public transportation.  Approved services form completed for medications.  Amlodipine  Decadron  Lasix  Hydralazine  Sodium Bicarb  Total $47.60    Patient needing assistance with transportation to shelter.  Transportation form sent to [facility].  Approved services form completed for [facility] to cover cost of transport    Barriers to Discharge: none    Plan: RN CM available for any further dc planning needs.

## 2020-12-26 NOTE — DOCUMENTATION QUERY
"                                                                         [facility]                                                                       Query Response Note      PATIENT:               JUAN STEINBERG  ACCT #:                  [id number]  MRN:                     [id number]  :                      1950  ADMIT DATE:       2020 11:42 PM  DISCH DATE:        2020 5:49 PM  RESPONDING  PROVIDER #:        [id number]           QUERY TEXT:    There is conflicting documentation regarding Sepsis.  Please clarify the following:     *If an appropriate response is not listed below, please respond with a new note:    The patient's Clinical Indicators include:  Presented to ER with COVID positive and shortness of breath.  Temp 101.1 / HR  107  /    BP  192 / 106  /   Resp 28 /O2 sats 97%  WBC 5.2    ED note and H&P state Sepsis  sepsis not mentioned again until PN 12/3.  Progress note 12/3: \"Fever 102.1,  Meets Sepsis criteria. Changed Unasyn to Zyvox    Discharge summary:  Resolved problems:  \" Sepsis\"    IV antibiotics Unasyn and Azithro  Decadron    Risk factors:  COVID, CKD, Pneumonia  Options provided:   -- Sepsis ruled in, present on admit   -- Sepsis ruled in, developed after admit   -- Sepsis ruled out   -- Unable to determine      Query created by: Jennifer Nance on 2020 4:50 PM    RESPONSE TEXT:    Sepsis ruled in, present on admit          Electronically signed by:  MARIAMA GRISSOM MD 2020 5:11 PM              "

## 2021-03-03 DIAGNOSIS — Z23 NEED FOR VACCINATION: ICD-10-CM

## 2021-07-13 ENCOUNTER — HOSPITAL ENCOUNTER (OUTPATIENT)
Dept: RADIOLOGY | Facility: MEDICAL CENTER | Age: 71
End: 2021-07-13
Payer: COMMERCIAL

## 2021-11-17 ENCOUNTER — APPOINTMENT (OUTPATIENT)
Dept: RADIOLOGY | Facility: MEDICAL CENTER | Age: 71
End: 2021-11-17
Attending: EMERGENCY MEDICINE
Payer: COMMERCIAL

## 2021-11-17 ENCOUNTER — HOSPITAL ENCOUNTER (EMERGENCY)
Facility: MEDICAL CENTER | Age: 71
End: 2021-11-17
Attending: EMERGENCY MEDICINE
Payer: COMMERCIAL

## 2021-11-17 VITALS
HEIGHT: 68 IN | WEIGHT: 158.29 LBS | DIASTOLIC BLOOD PRESSURE: 89 MMHG | OXYGEN SATURATION: 98 % | HEART RATE: 70 BPM | BODY MASS INDEX: 23.99 KG/M2 | RESPIRATION RATE: 16 BRPM | SYSTOLIC BLOOD PRESSURE: 140 MMHG | TEMPERATURE: 98 F

## 2021-11-17 DIAGNOSIS — R51.9 ACUTE NONINTRACTABLE HEADACHE, UNSPECIFIED HEADACHE TYPE: ICD-10-CM

## 2021-11-17 DIAGNOSIS — R22.0 FACIAL SWELLING: ICD-10-CM

## 2021-11-17 DIAGNOSIS — R06.02 SHORTNESS OF BREATH: ICD-10-CM

## 2021-11-17 LAB
ALBUMIN SERPL BCP-MCNC: 3.4 G/DL (ref 3.2–4.9)
ALBUMIN/GLOB SERPL: 1 G/DL
ALP SERPL-CCNC: 94 U/L (ref 30–99)
ALT SERPL-CCNC: 9 U/L (ref 2–50)
ANION GAP SERPL CALC-SCNC: 16 MMOL/L (ref 7–16)
AST SERPL-CCNC: 24 U/L (ref 12–45)
BASOPHILS # BLD AUTO: 0.7 % (ref 0–1.8)
BASOPHILS # BLD: 0.02 K/UL (ref 0–0.12)
BILIRUB SERPL-MCNC: 0.7 MG/DL (ref 0.1–1.5)
BUN SERPL-MCNC: 11 MG/DL (ref 8–22)
CALCIUM SERPL-MCNC: 8.7 MG/DL (ref 8.4–10.2)
CHLORIDE SERPL-SCNC: 93 MMOL/L (ref 96–112)
CO2 SERPL-SCNC: 25 MMOL/L (ref 20–33)
CREAT SERPL-MCNC: 6.95 MG/DL (ref 0.5–1.4)
EOSINOPHIL # BLD AUTO: 0 K/UL (ref 0–0.51)
EOSINOPHIL NFR BLD: 0 % (ref 0–6.9)
ERYTHROCYTE [DISTWIDTH] IN BLOOD BY AUTOMATED COUNT: 51.8 FL (ref 35.9–50)
FLUAV RNA SPEC QL NAA+PROBE: NEGATIVE
FLUBV RNA SPEC QL NAA+PROBE: NEGATIVE
GLOBULIN SER CALC-MCNC: 3.4 G/DL (ref 1.9–3.5)
GLUCOSE SERPL-MCNC: 122 MG/DL (ref 65–99)
HCT VFR BLD AUTO: 24.4 % (ref 42–52)
HGB BLD-MCNC: 7.7 G/DL (ref 14–18)
IMM GRANULOCYTES # BLD AUTO: 0.01 K/UL (ref 0–0.11)
IMM GRANULOCYTES NFR BLD AUTO: 0.3 % (ref 0–0.9)
LYMPHOCYTES # BLD AUTO: 0.89 K/UL (ref 1–4.8)
LYMPHOCYTES NFR BLD: 29.8 % (ref 22–41)
MCH RBC QN AUTO: 29.5 PG (ref 27–33)
MCHC RBC AUTO-ENTMCNC: 31.6 G/DL (ref 33.7–35.3)
MCV RBC AUTO: 93.5 FL (ref 81.4–97.8)
MONOCYTES # BLD AUTO: 0.37 K/UL (ref 0–0.85)
MONOCYTES NFR BLD AUTO: 12.4 % (ref 0–13.4)
NEUTROPHILS # BLD AUTO: 1.7 K/UL (ref 1.82–7.42)
NEUTROPHILS NFR BLD: 56.8 % (ref 44–72)
NRBC # BLD AUTO: 0 K/UL
NRBC BLD-RTO: 0 /100 WBC
PLATELET # BLD AUTO: 133 K/UL (ref 164–446)
PMV BLD AUTO: 9.8 FL (ref 9–12.9)
POTASSIUM SERPL-SCNC: 5.2 MMOL/L (ref 3.6–5.5)
PROT SERPL-MCNC: 6.8 G/DL (ref 6–8.2)
RBC # BLD AUTO: 2.61 M/UL (ref 4.7–6.1)
RSV RNA SPEC QL NAA+PROBE: NEGATIVE
SARS-COV-2 RNA RESP QL NAA+PROBE: NOTDETECTED
SODIUM SERPL-SCNC: 134 MMOL/L (ref 135–145)
SPECIMEN SOURCE: NORMAL
TSH SERPL DL<=0.005 MIU/L-ACNC: 2.5 UIU/ML (ref 0.38–5.33)
WBC # BLD AUTO: 3 K/UL (ref 4.8–10.8)

## 2021-11-17 PROCEDURE — 80053 COMPREHEN METABOLIC PANEL: CPT

## 2021-11-17 PROCEDURE — 700111 HCHG RX REV CODE 636 W/ 250 OVERRIDE (IP): Performed by: EMERGENCY MEDICINE

## 2021-11-17 PROCEDURE — C9803 HOPD COVID-19 SPEC COLLECT: HCPCS | Performed by: EMERGENCY MEDICINE

## 2021-11-17 PROCEDURE — 96374 THER/PROPH/DIAG INJ IV PUSH: CPT

## 2021-11-17 PROCEDURE — 99284 EMERGENCY DEPT VISIT MOD MDM: CPT

## 2021-11-17 PROCEDURE — 0241U HCHG SARS-COV-2 COVID-19 NFCT DS RESP RNA 4 TRGT MIC: CPT

## 2021-11-17 PROCEDURE — 700102 HCHG RX REV CODE 250 W/ 637 OVERRIDE(OP): Performed by: EMERGENCY MEDICINE

## 2021-11-17 PROCEDURE — 85025 COMPLETE CBC W/AUTO DIFF WBC: CPT

## 2021-11-17 PROCEDURE — 70450 CT HEAD/BRAIN W/O DYE: CPT

## 2021-11-17 PROCEDURE — 71250 CT THORAX DX C-: CPT

## 2021-11-17 PROCEDURE — 84443 ASSAY THYROID STIM HORMONE: CPT

## 2021-11-17 PROCEDURE — A9270 NON-COVERED ITEM OR SERVICE: HCPCS | Performed by: EMERGENCY MEDICINE

## 2021-11-17 RX ORDER — ONDANSETRON 2 MG/ML
4 INJECTION INTRAMUSCULAR; INTRAVENOUS ONCE
Status: COMPLETED | OUTPATIENT
Start: 2021-11-17 | End: 2021-11-17

## 2021-11-17 RX ORDER — TRAZODONE HYDROCHLORIDE 50 MG/1
50 TABLET ORAL ONCE
Status: COMPLETED | OUTPATIENT
Start: 2021-11-17 | End: 2021-11-17

## 2021-11-17 RX ORDER — HYDROCODONE BITARTRATE AND ACETAMINOPHEN 5; 325 MG/1; MG/1
1 TABLET ORAL ONCE
Status: COMPLETED | OUTPATIENT
Start: 2021-11-17 | End: 2021-11-17

## 2021-11-17 RX ADMIN — HYDROCODONE BITARTRATE AND ACETAMINOPHEN 1 TABLET: 5; 325 TABLET ORAL at 16:48

## 2021-11-17 RX ADMIN — ONDANSETRON 4 MG: 2 INJECTION INTRAMUSCULAR; INTRAVENOUS at 14:27

## 2021-11-17 RX ADMIN — TRAZODONE HYDROCHLORIDE 50 MG: 50 TABLET ORAL at 18:17

## 2021-11-17 ASSESSMENT — FIBROSIS 4 INDEX: FIB4 SCORE: 3.14

## 2021-11-17 NOTE — ED PROVIDER NOTES
ED Provider Note    Chief Complaint:   Fatigue, shortness of breath, headache    HPI:  Abran Proctor is a very pleasant 71-year-old gentleman who presents to the emergency department for evaluation of fatigue, shortness of breath, occipital headache, and facial swelling.  His symptoms began about 4 days ago.  2 weeks ago he had a fall striking his head, [facility] found per his report.  His symptoms seem to improve, and he was doing well until about 4 days ago.  He noticed that he had gradual onset occipital headache with some radiation towards the upper aspect of the cervical spine, he reports no new injuries.  He has not had any significant disequilibrium.  He states he has had some mild facial swelling localized predominantly to his cheeks, he has not noticed any lip swelling, and no airway compromise.  He states that he is not currently on lisinopril.  He does have a history of end-stage renal disease on dialysis, he is dialyzed Tuesdays, Thursdays, and Saturdays, and has not missed any dialysis appointments.  Today is Wednesday, and he reports no concerns with dialysis, nor his dialysis catheter.  He is not had any recent fevers, no unilateral weakness, no facial droop, and no slurred speech.  He reports no associated chest pain, says he has noticed that he is a little more short of breath with exertion than usual, though he does have a history of shortness of breath at baseline.  Aside from exertion, he is unable to identify any reliably exacerbating or alleviating factors.    Review of Systems:  See HPI for pertinent positives and negatives. All other systems negative.    Past Medical History:   has a past medical history of Gout, Hypertension, Kidney disease, and MI (myocardial infarction) ([facility]).    Social History:  Social History     Tobacco Use   • Smoking status: Former Smoker   • Smokeless tobacco: Never Used   Vaping Use   • Vaping Use: Never used   Substance and Sexual  "Activity   • Alcohol use: Yes     Comment: occ   • Drug use: Never   • Sexual activity: Not on file       Surgical History:  patient denies any surgical history    Current Medications:  Home Medications    **Home medications have not yet been reviewed for this encounter**         Allergies:  Allergies   Allergen Reactions   • Motrin [Ibuprofen]      hhx of stomach ulcers       Physical Exam:  Vital Signs: /91   Pulse 73   Temp 37.6 °C (99.6 °F) (Temporal)   Resp 15   Ht 1.727 m (5' 8\")   Wt 71.8 kg (158 lb 4.6 oz)   SpO2 98%   BMI 24.07 kg/m²   Constitutional: Alert, no acute distress  HENT: Atraumatic, mild soft tissue swelling to the cheeks, he reports that his lips are normal in appearance without any abnormal swelling, no evidence of airway compromise, no unilateral edema, no overlying skin changes or ecchymosis.  Eyes: Pupils equal and reactive, normal conjunctiva  Neck: Supple, normal range of motion, no stridor, trachea midline, mild discomfort on palpation of the upper cervical spine  Cardiovascular: Extremities are warm and well perfused, no murmur appreciated, normal cardiac auscultation  Pulmonary: No respiratory distress, normal work of breathing, no accessory muscule usage, breath sounds clear and equal bilaterally, no wheezing, no coarse breath sounds, tunneled dialysis catheter in place without concerning surrounding skin changes.  Abdomen: Soft, non-distended, non-tender to palpation, no peritoneal signs  Skin: Warm, dry, no rashes or lesions  Musculoskeletal: Normal range of motion in all extremities, no swelling or deformity noted  Neurologic: Alert, oriented, normal speech, normal motor function, no facial droop, no slurred speech, moves upper extremities equally, normal extraocular movements, no gaze deficit, no truncal ataxia  Psychiatric: Normal and appropriate mood and affect    Medical records reviewed for continuity of care.  No recent visits for similar symptoms.  Noted that he " was [facility], however [facility].  I did review his discharge summary from 12/8/2020, he presented to the emergency department for palpitations and shortness of breath.  Note of a history of hypertension, dyslipidemia, CAD, CKD stage IV.  He was treated with Decadron and antibiotics of concern for pneumonia.  Noted for transaminitis, and is positive for hepatitis C.  Noted that he should follow-up with his gastroenterologist for hepatitis C treatment.  He did have a negative stress test in 3/2020 which did not show reversible ischemia.  He was discharged home in stable condition.    Labs:  Labs Reviewed   CBC WITH DIFFERENTIAL - Abnormal; Notable for the following components:       Result Value    WBC 3.0 (*)     RBC 2.61 (*)     Hemoglobin 7.7 (*)     Hematocrit 24.4 (*)     MCHC 31.6 (*)     RDW 51.8 (*)     Platelet Count 133 (*)     Neutrophils (Absolute) 1.70 (*)     Lymphs (Absolute) 0.89 (*)     All other components within normal limits   COMP METABOLIC PANEL - Abnormal; Notable for the following components:    Sodium 134 (*)     Chloride 93 (*)     Glucose 122 (*)     Creatinine 6.95 (*)     All other components within normal limits   ESTIMATED GFR - Abnormal; Notable for the following components:    GFR If  10 (*)     GFR If Non  8 (*)     All other components within normal limits   TSH WITH REFLEX TO FT4   COV-2, FLU A/B, AND RSV BY PCR       Radiology:  CT-CHEST (THORAX) W/O   Final Result      1.  No evidence of rib fracture or pneumothorax.      2.  Cardiomegaly, peripheral interstitial prominence and trace effusions suggesting congestive heart failure versus interstitial lung disease.      Fleischner Society pulmonary nodule recommendations:   Not Applicable         CT-HEAD W/O   Final Result      1.  Cerebral atrophy.      2.  White matter lucencies most consistent with small vessel  ischemic change versus demyelination or gliosis.      3. No evidence of acute cerebral infarction, hemorrhage or mass lesion.              ED Medications Administered:  Medications   ondansetron (ZOFRAN) syringe/vial injection 4 mg (4 mg Intravenous Given 11/17/21 1427)       Differential diagnosis:  Electrolyte abnormality, thrombus including SVC syndrome, fluid overload, thyroid disorder, delayed bleed from traumatic head injury    MDM:  Mr. Proctor presents the emergency department today for evaluation of shortness of breath, occipital headache, and facial swelling.  On arrival to the emergency department he has no hypoxia, he is afebrile, he has no tachycardia.  There is no evidence of Sirs or sepsis with regard to his vital signs.  He reports no neurologic deficits, and no focal deficits noted on exam.  He has no evidence of airway compromise.  He is on lisinopril, presentation is less consistent with angioedema, though this does remain in my differential.    On laboratory evaluation his white blood count is 3.0, decreased from 1 year prior at 7.1.  Hemoglobin is 7.7 which is consistent with his baseline values in our system.  Platelet count is slightly low at 133, though again consistent with prior baseline values.  Creatinine is elevated at 6.95 consistent with his history of end-stage renal disease on dialysis.  Potassium is within normal limits, no significant electrolyte abnormalities.  TSH is within normal limits.    CT of the thorax demonstrates no evidence of rib fracture or pneumothorax.  He does have peripheral interstitial prominence and trace effusions suggestive of congestive heart failure versus interstitial lung disease.  No masses identified that would cause SVC syndrome.  CT head demonstrates no evidence of acute hemorrhage.  Cerebral atrophy noted.  No delayed bleed identified.    Decreased white blood count, COVID-19 testing was sent.  That result is pending at this time.  At this time, I do  not believe he requires any further emergent diagnostics nor treatment.  CT is suggestive of some mild fluid overload, however he has no hypoxia, and is scheduled for dialysis tomorrow and may be slightly fluid overloaded at this time.  He remains without any focal neurologic deficits, he has no cerebellar symptoms to suggest posterior circulation infarct or compromise.  Uncertain as to the etiology of his mild facial swelling, however does not appear to be rapidly spreading, does not appear to be infectious, and does not appear to threaten the airway.  He is counseled to follow-up with his primary care physician within 24 hours for complete recheck. Return precautions were discussed with the patient, and provided in written form with the patient's discharge instructions.     Personal protective equipment including N95 surgical respirator, goggles, and gloves were used during this encounter.       Disposition:  Discharge home in stable condition    Final Impression:  1. Acute nonintractable headache, unspecified headache type    2. Facial swelling    3. Shortness of breath        Electronically signed by: Maxine Palomares MD, 11/17/2021 4:40 PM

## 2021-11-17 NOTE — ED TRIAGE NOTES
Neck and head pain with nausea. [facility]. Patient had a GLF 2 weeks ago and hit his head. Patient states that sometimes he has been drooling. He has had these symptoms x 4 days. He was seen and treated when he fell 2 weeks ago.

## 2021-11-18 NOTE — DISCHARGE PLANNING
Anticipated Discharge Disposition: Home    Action: Assistance to home need [employer] will call back to pt or nurses station with  [phone]. Cab slip provided to pt    Barriers to Discharge: Joel Izaguirre eta     Plan: Home when ride arrives

## 2021-11-18 NOTE — ED NOTES
Patient requesting additional pain pill with his trazodone. Pt reminded he had a norco at 1648. Pt agreed with trazodone only. Patient verbalized understanding to plan of care and discharge information. Patient in stable condition. No signs of distress.  Patient wheeled out of ED to waiting area to wait for taxi.

## 2021-11-18 NOTE — DISCHARGE INSTRUCTIONS
Please follow-up with your primary care physician tomorrow for complete recheck.  Call the opening of business hours to schedule a follow-up appointment.  You may continue to take Tylenol as needed for your headache.  Return to the emergency department if you develop any new or worsening symptoms including worsening headache, loss of balance, nausea or vomiting, vision changes, weakness in the arms or legs, or if you have any further concerns.  Additionally, please return if your symptoms are not improving, and you are not able to follow-up with your primary care physician within 24 to 48 hours.

## 2021-11-23 ENCOUNTER — APPOINTMENT (OUTPATIENT)
Dept: RADIOLOGY | Facility: MEDICAL CENTER | Age: 71
DRG: 175 | End: 2021-11-23
Attending: EMERGENCY MEDICINE
Payer: COMMERCIAL

## 2021-11-23 ENCOUNTER — HOSPITAL ENCOUNTER (INPATIENT)
Facility: MEDICAL CENTER | Age: 71
LOS: 1 days | DRG: 175 | End: 2021-11-27
Attending: EMERGENCY MEDICINE | Admitting: INTERNAL MEDICINE
Payer: COMMERCIAL

## 2021-11-23 DIAGNOSIS — J18.9 PNEUMONIA DUE TO INFECTIOUS ORGANISM, UNSPECIFIED LATERALITY, UNSPECIFIED PART OF LUNG: ICD-10-CM

## 2021-11-23 DIAGNOSIS — E86.0 DEHYDRATION: ICD-10-CM

## 2021-11-23 DIAGNOSIS — I26.99 OTHER ACUTE PULMONARY EMBOLISM WITHOUT ACUTE COR PULMONALE (HCC): ICD-10-CM

## 2021-11-23 DIAGNOSIS — A41.9 SEPSIS, DUE TO UNSPECIFIED ORGANISM, UNSPECIFIED WHETHER ACUTE ORGAN DYSFUNCTION PRESENT (HCC): ICD-10-CM

## 2021-11-23 PROBLEM — Z99.2 ESRD ON DIALYSIS (HCC): Chronic | Status: ACTIVE | Noted: 2021-11-23

## 2021-11-23 PROBLEM — N18.6 ESRD ON DIALYSIS (HCC): Chronic | Status: ACTIVE | Noted: 2021-11-23

## 2021-11-23 PROBLEM — I95.9 HYPOTENSION: Status: ACTIVE | Noted: 2021-11-23

## 2021-11-23 LAB
ALBUMIN SERPL BCP-MCNC: 4.1 G/DL (ref 3.2–4.9)
ALBUMIN/GLOB SERPL: 1 G/DL
ALP SERPL-CCNC: 108 U/L (ref 30–99)
ALT SERPL-CCNC: 11 U/L (ref 2–50)
ANION GAP SERPL CALC-SCNC: 25 MMOL/L (ref 7–16)
AST SERPL-CCNC: 11 U/L (ref 12–45)
BASOPHILS # BLD AUTO: 0.4 % (ref 0–1.8)
BASOPHILS # BLD: 0.03 K/UL (ref 0–0.12)
BILIRUB SERPL-MCNC: 0.4 MG/DL (ref 0.1–1.5)
BLOOD CULTURE HOLD CXBCH: NORMAL
BUN SERPL-MCNC: 43 MG/DL (ref 8–22)
CALCIUM SERPL-MCNC: 9.1 MG/DL (ref 8.5–10.5)
CHLORIDE SERPL-SCNC: 90 MMOL/L (ref 96–112)
CO2 SERPL-SCNC: 21 MMOL/L (ref 20–33)
CREAT SERPL-MCNC: 11.88 MG/DL (ref 0.5–1.4)
EOSINOPHIL # BLD AUTO: 0.05 K/UL (ref 0–0.51)
EOSINOPHIL NFR BLD: 0.7 % (ref 0–6.9)
ERYTHROCYTE [DISTWIDTH] IN BLOOD BY AUTOMATED COUNT: 55.8 FL (ref 35.9–50)
GLOBULIN SER CALC-MCNC: 4 G/DL (ref 1.9–3.5)
GLUCOSE SERPL-MCNC: 68 MG/DL (ref 65–99)
HCT VFR BLD AUTO: 27.3 % (ref 42–52)
HGB BLD-MCNC: 8.6 G/DL (ref 14–18)
IMM GRANULOCYTES # BLD AUTO: 0.02 K/UL (ref 0–0.11)
IMM GRANULOCYTES NFR BLD AUTO: 0.3 % (ref 0–0.9)
LACTATE BLD-SCNC: 2.5 MMOL/L (ref 0.5–2)
LYMPHOCYTES # BLD AUTO: 2.14 K/UL (ref 1–4.8)
LYMPHOCYTES NFR BLD: 29.4 % (ref 22–41)
MCH RBC QN AUTO: 29.9 PG (ref 27–33)
MCHC RBC AUTO-ENTMCNC: 31.5 G/DL (ref 33.7–35.3)
MCV RBC AUTO: 94.8 FL (ref 81.4–97.8)
MONOCYTES # BLD AUTO: 0.71 K/UL (ref 0–0.85)
MONOCYTES NFR BLD AUTO: 9.7 % (ref 0–13.4)
NEUTROPHILS # BLD AUTO: 4.34 K/UL (ref 1.82–7.42)
NEUTROPHILS NFR BLD: 59.5 % (ref 44–72)
NRBC # BLD AUTO: 0.03 K/UL
NRBC BLD-RTO: 0.4 /100 WBC
PLATELET # BLD AUTO: 178 K/UL (ref 164–446)
PMV BLD AUTO: 10.5 FL (ref 9–12.9)
POTASSIUM SERPL-SCNC: 4.4 MMOL/L (ref 3.6–5.5)
PROT SERPL-MCNC: 8.1 G/DL (ref 6–8.2)
RBC # BLD AUTO: 2.88 M/UL (ref 4.7–6.1)
SODIUM SERPL-SCNC: 136 MMOL/L (ref 135–145)
WBC # BLD AUTO: 7.3 K/UL (ref 4.8–10.8)

## 2021-11-23 PROCEDURE — G0378 HOSPITAL OBSERVATION PER HR: HCPCS

## 2021-11-23 PROCEDURE — 700111 HCHG RX REV CODE 636 W/ 250 OVERRIDE (IP): Performed by: EMERGENCY MEDICINE

## 2021-11-23 PROCEDURE — 36415 COLL VENOUS BLD VENIPUNCTURE: CPT

## 2021-11-23 PROCEDURE — 85025 COMPLETE CBC W/AUTO DIFF WBC: CPT

## 2021-11-23 PROCEDURE — 86706 HEP B SURFACE ANTIBODY: CPT

## 2021-11-23 PROCEDURE — 83605 ASSAY OF LACTIC ACID: CPT

## 2021-11-23 PROCEDURE — 80053 COMPREHEN METABOLIC PANEL: CPT

## 2021-11-23 PROCEDURE — 87522 HEPATITIS C REVRS TRNSCRPJ: CPT

## 2021-11-23 PROCEDURE — 99219 PR INITIAL OBSERVATION CARE,LEVL II: CPT | Performed by: INTERNAL MEDICINE

## 2021-11-23 PROCEDURE — 80074 ACUTE HEPATITIS PANEL: CPT

## 2021-11-23 PROCEDURE — 700105 HCHG RX REV CODE 258: Performed by: EMERGENCY MEDICINE

## 2021-11-23 PROCEDURE — 99285 EMERGENCY DEPT VISIT HI MDM: CPT

## 2021-11-23 PROCEDURE — 96375 TX/PRO/DX INJ NEW DRUG ADDON: CPT

## 2021-11-23 PROCEDURE — 71045 X-RAY EXAM CHEST 1 VIEW: CPT

## 2021-11-23 PROCEDURE — 96365 THER/PROPH/DIAG IV INF INIT: CPT

## 2021-11-23 PROCEDURE — 700105 HCHG RX REV CODE 258: Performed by: INTERNAL MEDICINE

## 2021-11-23 PROCEDURE — 70450 CT HEAD/BRAIN W/O DYE: CPT

## 2021-11-23 RX ORDER — LIDOCAINE 50 MG/G
3 PATCH TOPICAL DAILY
Status: ON HOLD | COMMUNITY
End: 2023-08-25

## 2021-11-23 RX ORDER — LIDOCAINE 50 MG/G
1 OINTMENT TOPICAL 3 TIMES DAILY
Status: ON HOLD | COMMUNITY
End: 2021-12-10

## 2021-11-23 RX ORDER — LANOLIN ALCOHOL/MO/W.PET/CERES
6 CREAM (GRAM) TOPICAL
COMMUNITY
End: 2022-05-03

## 2021-11-23 RX ORDER — AMOXICILLIN 250 MG
2 CAPSULE ORAL 2 TIMES DAILY
Status: DISCONTINUED | OUTPATIENT
Start: 2021-11-23 | End: 2021-11-27 | Stop reason: HOSPADM

## 2021-11-23 RX ORDER — SODIUM CHLORIDE 9 MG/ML
INJECTION, SOLUTION INTRAVENOUS CONTINUOUS
Status: DISCONTINUED | OUTPATIENT
Start: 2021-11-23 | End: 2021-11-24

## 2021-11-23 RX ORDER — ERGOCALCIFEROL 1.25 MG/1
50000 CAPSULE ORAL
COMMUNITY
End: 2023-08-08

## 2021-11-23 RX ORDER — TRAZODONE HYDROCHLORIDE 50 MG/1
25 TABLET ORAL NIGHTLY
Status: DISCONTINUED | OUTPATIENT
Start: 2021-11-23 | End: 2021-11-24

## 2021-11-23 RX ORDER — CARVEDILOL 6.25 MG/1
6.25 TABLET ORAL 2 TIMES DAILY WITH MEALS
COMMUNITY
End: 2023-08-08

## 2021-11-23 RX ORDER — ACETAMINOPHEN 325 MG/1
650 TABLET ORAL EVERY 6 HOURS PRN
Status: DISCONTINUED | OUTPATIENT
Start: 2021-11-23 | End: 2021-11-27 | Stop reason: HOSPADM

## 2021-11-23 RX ORDER — BUPRENORPHINE 10 UG/H
10 PATCH TRANSDERMAL
COMMUNITY
End: 2022-09-13

## 2021-11-23 RX ORDER — ATORVASTATIN CALCIUM 20 MG/1
20 TABLET, FILM COATED ORAL NIGHTLY
COMMUNITY
End: 2023-08-08

## 2021-11-23 RX ORDER — PREGABALIN 25 MG/1
25 CAPSULE ORAL
COMMUNITY
End: 2022-05-03

## 2021-11-23 RX ORDER — SEVELAMER HYDROCHLORIDE 800 MG/1
800 TABLET, FILM COATED ORAL
COMMUNITY

## 2021-11-23 RX ORDER — ALLOPURINOL 100 MG/1
100 TABLET ORAL
Status: DISCONTINUED | OUTPATIENT
Start: 2021-11-24 | End: 2021-11-27 | Stop reason: HOSPADM

## 2021-11-23 RX ORDER — AZITHROMYCIN 500 MG/1
500 INJECTION, POWDER, LYOPHILIZED, FOR SOLUTION INTRAVENOUS ONCE
Status: COMPLETED | OUTPATIENT
Start: 2021-11-23 | End: 2021-11-23

## 2021-11-23 RX ORDER — HEPARIN SODIUM 5000 [USP'U]/ML
5000 INJECTION, SOLUTION INTRAVENOUS; SUBCUTANEOUS EVERY 8 HOURS
Status: DISCONTINUED | OUTPATIENT
Start: 2021-11-23 | End: 2021-11-25

## 2021-11-23 RX ORDER — BISACODYL 10 MG
10 SUPPOSITORY, RECTAL RECTAL
Status: DISCONTINUED | OUTPATIENT
Start: 2021-11-23 | End: 2021-11-27 | Stop reason: HOSPADM

## 2021-11-23 RX ORDER — POLYETHYLENE GLYCOL 3350 17 G/17G
1 POWDER, FOR SOLUTION ORAL
Status: DISCONTINUED | OUTPATIENT
Start: 2021-11-23 | End: 2021-11-27 | Stop reason: HOSPADM

## 2021-11-23 RX ORDER — ISOSORBIDE DINITRATE 20 MG/1
10 TABLET ORAL 2 TIMES DAILY
COMMUNITY
End: 2022-05-03

## 2021-11-23 RX ORDER — SODIUM CHLORIDE, SODIUM LACTATE, POTASSIUM CHLORIDE, CALCIUM CHLORIDE 600; 310; 30; 20 MG/100ML; MG/100ML; MG/100ML; MG/100ML
1000 INJECTION, SOLUTION INTRAVENOUS ONCE
Status: COMPLETED | OUTPATIENT
Start: 2021-11-23 | End: 2021-11-23

## 2021-11-23 RX ORDER — TAMSULOSIN HYDROCHLORIDE 0.4 MG/1
0.4 CAPSULE ORAL EVERY MORNING
Status: DISCONTINUED | OUTPATIENT
Start: 2021-11-24 | End: 2021-11-27 | Stop reason: HOSPADM

## 2021-11-23 RX ADMIN — AZITHROMYCIN MONOHYDRATE 500 MG: 500 INJECTION, POWDER, LYOPHILIZED, FOR SOLUTION INTRAVENOUS at 21:50

## 2021-11-23 RX ADMIN — SODIUM CHLORIDE, POTASSIUM CHLORIDE, SODIUM LACTATE AND CALCIUM CHLORIDE 1000 ML: 600; 310; 30; 20 INJECTION, SOLUTION INTRAVENOUS at 20:45

## 2021-11-23 RX ADMIN — AMPICILLIN SODIUM AND SULBACTAM SODIUM 3 G: 2; 1 INJECTION, POWDER, FOR SOLUTION INTRAMUSCULAR; INTRAVENOUS at 21:19

## 2021-11-23 RX ADMIN — SODIUM CHLORIDE: 9 INJECTION, SOLUTION INTRAVENOUS at 21:20

## 2021-11-23 ASSESSMENT — ENCOUNTER SYMPTOMS
DIZZINESS: 1
LOSS OF CONSCIOUSNESS: 1
PSYCHIATRIC NEGATIVE: 1
CONSTITUTIONAL NEGATIVE: 1
GASTROINTESTINAL NEGATIVE: 1
FALLS: 1
CARDIOVASCULAR NEGATIVE: 1
RESPIRATORY NEGATIVE: 1
EYES NEGATIVE: 1

## 2021-11-23 ASSESSMENT — LIFESTYLE VARIABLES: DO YOU DRINK ALCOHOL: NO

## 2021-11-23 ASSESSMENT — FIBROSIS 4 INDEX
FIB4 SCORE: 1.29
FIB4 SCORE: 4.15

## 2021-11-23 ASSESSMENT — PAIN DESCRIPTION - PAIN TYPE: TYPE: ACUTE PAIN

## 2021-11-23 ASSESSMENT — PAIN SCALES - WONG BAKER: WONGBAKER_NUMERICALRESPONSE: DOESN'T HURT AT ALL

## 2021-11-24 PROBLEM — R76.8 HEPATITIS C ANTIBODY POSITIVE IN BLOOD: Status: ACTIVE | Noted: 2021-11-24

## 2021-11-24 PROBLEM — J96.01 ACUTE RESPIRATORY FAILURE WITH HYPOXIA (HCC): Status: ACTIVE | Noted: 2021-11-24

## 2021-11-24 LAB
ANION GAP SERPL CALC-SCNC: 18 MMOL/L (ref 7–16)
BUN SERPL-MCNC: 45 MG/DL (ref 8–22)
CALCIUM SERPL-MCNC: 8.1 MG/DL (ref 8.5–10.5)
CHLORIDE SERPL-SCNC: 96 MMOL/L (ref 96–112)
CO2 SERPL-SCNC: 26 MMOL/L (ref 20–33)
CREAT SERPL-MCNC: 11.78 MG/DL (ref 0.5–1.4)
ERYTHROCYTE [DISTWIDTH] IN BLOOD BY AUTOMATED COUNT: 55.7 FL (ref 35.9–50)
GLUCOSE SERPL-MCNC: 99 MG/DL (ref 65–99)
HAV IGM SERPL QL IA: ABNORMAL
HBV CORE IGM SER QL: ABNORMAL
HBV SURFACE AB SERPL IA-ACNC: 172 MIU/ML (ref 0–10)
HBV SURFACE AG SER QL: ABNORMAL
HCT VFR BLD AUTO: 23.9 % (ref 42–52)
HCV AB SER QL: REACTIVE
HGB BLD-MCNC: 7.5 G/DL (ref 14–18)
LACTATE BLD-SCNC: 1 MMOL/L (ref 0.5–2)
MCH RBC QN AUTO: 29.5 PG (ref 27–33)
MCHC RBC AUTO-ENTMCNC: 31.4 G/DL (ref 33.7–35.3)
MCV RBC AUTO: 94.1 FL (ref 81.4–97.8)
PLATELET # BLD AUTO: 153 K/UL (ref 164–446)
PMV BLD AUTO: 10.7 FL (ref 9–12.9)
POTASSIUM SERPL-SCNC: 4.3 MMOL/L (ref 3.6–5.5)
RBC # BLD AUTO: 2.54 M/UL (ref 4.7–6.1)
SODIUM SERPL-SCNC: 140 MMOL/L (ref 135–145)
WBC # BLD AUTO: 7.8 K/UL (ref 4.8–10.8)

## 2021-11-24 PROCEDURE — 90935 HEMODIALYSIS ONE EVALUATION: CPT

## 2021-11-24 PROCEDURE — 5A1D70Z PERFORMANCE OF URINARY FILTRATION, INTERMITTENT, LESS THAN 6 HOURS PER DAY: ICD-10-PCS | Performed by: INTERNAL MEDICINE

## 2021-11-24 PROCEDURE — 80048 BASIC METABOLIC PNL TOTAL CA: CPT

## 2021-11-24 PROCEDURE — 700111 HCHG RX REV CODE 636 W/ 250 OVERRIDE (IP): Performed by: INTERNAL MEDICINE

## 2021-11-24 PROCEDURE — 83605 ASSAY OF LACTIC ACID: CPT

## 2021-11-24 PROCEDURE — 700102 HCHG RX REV CODE 250 W/ 637 OVERRIDE(OP): Performed by: NURSE PRACTITIONER

## 2021-11-24 PROCEDURE — G0378 HOSPITAL OBSERVATION PER HR: HCPCS

## 2021-11-24 PROCEDURE — 96367 TX/PROPH/DG ADDL SEQ IV INF: CPT

## 2021-11-24 PROCEDURE — 700111 HCHG RX REV CODE 636 W/ 250 OVERRIDE (IP)

## 2021-11-24 PROCEDURE — 87040 BLOOD CULTURE FOR BACTERIA: CPT | Mod: 91

## 2021-11-24 PROCEDURE — A9270 NON-COVERED ITEM OR SERVICE: HCPCS | Performed by: INTERNAL MEDICINE

## 2021-11-24 PROCEDURE — 700102 HCHG RX REV CODE 250 W/ 637 OVERRIDE(OP): Performed by: INTERNAL MEDICINE

## 2021-11-24 PROCEDURE — A9270 NON-COVERED ITEM OR SERVICE: HCPCS | Performed by: NURSE PRACTITIONER

## 2021-11-24 PROCEDURE — 700111 HCHG RX REV CODE 636 W/ 250 OVERRIDE (IP): Performed by: NURSE PRACTITIONER

## 2021-11-24 PROCEDURE — 700105 HCHG RX REV CODE 258: Performed by: INTERNAL MEDICINE

## 2021-11-24 PROCEDURE — 36415 COLL VENOUS BLD VENIPUNCTURE: CPT

## 2021-11-24 PROCEDURE — 96372 THER/PROPH/DIAG INJ SC/IM: CPT

## 2021-11-24 PROCEDURE — 85027 COMPLETE CBC AUTOMATED: CPT

## 2021-11-24 RX ORDER — CALCIUM GLUCONATE 20 MG/ML
2 INJECTION, SOLUTION INTRAVENOUS ONCE
Status: COMPLETED | OUTPATIENT
Start: 2021-11-24 | End: 2021-11-24

## 2021-11-24 RX ORDER — MIDODRINE HYDROCHLORIDE 5 MG/1
5 TABLET ORAL
Status: DISCONTINUED | OUTPATIENT
Start: 2021-11-24 | End: 2021-11-25

## 2021-11-24 RX ORDER — HEPARIN SODIUM 1000 [USP'U]/ML
3900 INJECTION, SOLUTION INTRAVENOUS; SUBCUTANEOUS
Status: DISCONTINUED | OUTPATIENT
Start: 2021-11-24 | End: 2021-11-27 | Stop reason: HOSPADM

## 2021-11-24 RX ORDER — HEPARIN SODIUM 1000 [USP'U]/ML
INJECTION, SOLUTION INTRAVENOUS; SUBCUTANEOUS
Status: COMPLETED
Start: 2021-11-24 | End: 2021-11-24

## 2021-11-24 RX ORDER — HEPARIN SODIUM 1000 [USP'U]/ML
2000 INJECTION, SOLUTION INTRAVENOUS; SUBCUTANEOUS
Status: DISCONTINUED | OUTPATIENT
Start: 2021-11-24 | End: 2021-11-27 | Stop reason: HOSPADM

## 2021-11-24 RX ADMIN — CALCIUM GLUCONATE 2 G: 20 INJECTION, SOLUTION INTRAVENOUS at 09:00

## 2021-11-24 RX ADMIN — MIDODRINE HYDROCHLORIDE 5 MG: 5 TABLET ORAL at 08:59

## 2021-11-24 RX ADMIN — ACETAMINOPHEN 650 MG: 325 TABLET, FILM COATED ORAL at 13:10

## 2021-11-24 RX ADMIN — HEPARIN SODIUM 3900 UNITS: 1000 INJECTION, SOLUTION INTRAVENOUS; SUBCUTANEOUS at 12:38

## 2021-11-24 RX ADMIN — MIDODRINE HYDROCHLORIDE 5 MG: 5 TABLET ORAL at 16:54

## 2021-11-24 RX ADMIN — HEPARIN SODIUM 2000 UNITS: 1000 INJECTION, SOLUTION INTRAVENOUS; SUBCUTANEOUS at 09:34

## 2021-11-24 RX ADMIN — MIDODRINE HYDROCHLORIDE 5 MG: 5 TABLET ORAL at 13:10

## 2021-11-24 RX ADMIN — SENNOSIDES AND DOCUSATE SODIUM 2 TABLET: 50; 8.6 TABLET ORAL at 05:31

## 2021-11-24 RX ADMIN — TAMSULOSIN HYDROCHLORIDE 0.4 MG: 0.4 CAPSULE ORAL at 05:31

## 2021-11-24 RX ADMIN — HEPARIN SODIUM 5000 UNITS: 5000 INJECTION, SOLUTION INTRAVENOUS; SUBCUTANEOUS at 13:10

## 2021-11-24 RX ADMIN — ALLOPURINOL 100 MG: 100 TABLET ORAL at 13:10

## 2021-11-24 RX ADMIN — SODIUM CHLORIDE: 9 INJECTION, SOLUTION INTRAVENOUS at 05:31

## 2021-11-24 ASSESSMENT — ENCOUNTER SYMPTOMS
CHILLS: 0
FALLS: 1
DOUBLE VISION: 0
ORTHOPNEA: 0
PALPITATIONS: 0
WHEEZING: 0
SHORTNESS OF BREATH: 0
DIZZINESS: 0
WEAKNESS: 1
DEPRESSION: 0
HEADACHES: 0
FEVER: 0
NAUSEA: 0
VOMITING: 0
SORE THROAT: 0
BLURRED VISION: 0
COUGH: 0
LOSS OF CONSCIOUSNESS: 1
DIARRHEA: 0

## 2021-11-24 ASSESSMENT — PAIN DESCRIPTION - PAIN TYPE
TYPE: ACUTE PAIN

## 2021-11-24 ASSESSMENT — PAIN SCALES - WONG BAKER
WONGBAKER_NUMERICALRESPONSE: DOESN'T HURT AT ALL
WONGBAKER_NUMERICALRESPONSE: DOESN'T HURT AT ALL

## 2021-11-24 NOTE — DISCHARGE PLANNING
Outpatient Dialysis Note    Confirmed patient is active at:    [facility]  [address] [facility] [address]  [place] [address]    Schedule: Tuesday, Thursday, Saturday  Time: 10:45am    Patient is seen by Dr. Knott in HD clinic.    Spoke with Susan at facility who confirmed.    Forwarded records for review.    Chio Hickman- Dialysis Coordinator Ph# [phone]  [facility]

## 2021-11-24 NOTE — HOSPITAL COURSE
Abran Proctor is a 71 y.o. male who presented 11/23/2021 with acute loss of consciousness. Patient states that he has not been feeling well and has not had good oral intake for the past 5-7 days. He did not eat or drink well. He felt weak so did not go to his HD session x1. His  came to check on him today and he went to go answer the door and as he opened the door, he passed out and collapsed. Never had any similar episodes like this. Patient did not hit the ground and did not hit his head. In ED, patient noted to be tachycardic and hypotensive. Chart review reveals that patient is not hypotensive and usually hypertensive. Nephrology consulted, had HD 11/24 but did not tolerate fluid removal. TTE on 11/25 demonstrated right heart strain, CTA chest obtains which revealed right pulmonary embolism. Patient was started on heparin gtt at that time. Patient dialyzed 11/26 and started on apixaban after discussion with pharmacy about oral anticoagulation. Vital signs have improved and patient appears to be improving clinically.

## 2021-11-24 NOTE — CONSULTS
"[facility] -  CONSULTATION NOTE               Author: Afshin Villegas M.D. Date & Time: 11/24/2021  2:08 PM       REASON FOR CONSULTATION:   - Inpatient hemodialysis management.    CHIEF COMPLAINT:   -  \" weakness \"    HISTORY OF PRESENT ILLNESS:    70 y/o man with ESRD HD T,TH,Sat who presnted with increased weakness from prior. Pt stated that he \"ran out of gas\" It has been difficult for him to ambulate and he gets very fatiguied with SHAY.  Pt denies any new medications.    No F/C/N/V/SOB/CP  No melena, hematochezia, hematemesis.  No HA, visual changes, or abdominal pain.    REVIEW OF SYSTEMS:    10 point ROS was performed and is as per HPI or otherwise negative    PAST MEDICAL HISTORY:   - ESRD  - HTN  - CAD  - Anemia    PAST SURGICAL HISTORY:   - tunnel cath in place    FAMILY HISTORY:   - Reviewed and non contributory to current illness    SOCIAL HISTORY:   - No tobacco  - No EtOH  - No illicits    HOME MEDICATIONS:   - Reviewed and documented in chart    LABORATORY STUDIES:   - Reviewed and documented in chart    ALLERGIES:  Motrin [ibuprofen]    VS:  /68   Pulse (!) 56   Temp 36.3 °C (97.3 °F) (Temporal)   Resp 18   Ht 1.753 m (5' 9\")   Wt 74.6 kg (164 lb 7.4 oz)   SpO2 88%   BMI 24.29 kg/m²   Physical Exam  Vitals and nursing note reviewed.   Constitutional:       Appearance: He is ill-appearing.   HENT:      Head: Normocephalic.      Nose: Nose normal.   Eyes:      Pupils: Pupils are equal, round, and reactive to light.   Neck:      Comments: Left IJ tunnel cath with exposed cath, but proximal fibrin sheath in tact  Cardiovascular:      Rate and Rhythm: Tachycardia present.   Pulmonary:      Effort: Pulmonary effort is normal.   Abdominal:      General: Abdomen is flat.   Musculoskeletal:         General: Swelling present. Normal range of motion.   Skin:     General: Skin is warm.   Neurological:      General: No focal deficit present.      Mental Status: He is " alert.   Psychiatric:         Mood and Affect: Mood normal.            FLUID BALANCE:  In: 500 [I.V.:500]  Out: -     LABS:  Recent Labs     11/23/21  1709 11/24/21  0009   SODIUM 136 140   POTASSIUM 4.4 4.3   CHLORIDE 90* 96   CO2 21 26   GLUCOSE 68 99   BUN 43* 45*   CREATININE 11.88* 11.78*   CALCIUM 9.1 8.1*        IMAGING:  - All imaging reviewed from admission to present day    IMPRESSION:  # ESRD  - T,TH,SAT  # Hypotension  - ? Infected tunnel cath (Cuff exposed, but still with fibrin around proximal part) vs cardiac  # Anemia of CKD  -  Check iron stores  # Edema  #Weakness : ? Anemia vs infection    PLAN:  - HD today  - Potential HD in am pending CX/ labs  - echo if CX negative  - BLD CX x 2 inlcuding from tunnel cath  - Dose all meds per ESRD    Thank you for the consultation!

## 2021-11-24 NOTE — PROGRESS NOTES
[facility] Progress Note      Blood cultures drawn from dialysis catheter X2 per Neph MD order. HD ports packed with Heparin 1:1000 per amount in designated limb and applied end caps. Report given to primary RN.

## 2021-11-24 NOTE — ED PROVIDER NOTES
"ED Provider Note    Scribed for Dr. Elmer Amin M.D. by Derek Daly. 11/23/2021  5:03 PM    Primary care provider: No primary care provider noted.  Means of arrival: EMS  History obtained from: Patient  History limited by: None    CHIEF COMPLAINT  Chief Complaint   Patient presents with   • ALOC     pt [facility] and [facility] went to [facility] him, found him altered and lethargic. Pt fell but was able to lower himself to the ground. Per EMS, AOx2 on scene. AOx4 but lethargic on arrival.    • Weakness     fatigue and weakness x 1 wk with poor PO intake. Per EMS, there was little food in the house. Pt states that he \"just hasn't been hungry\" lately.     HPI  Abran Proctor is a 71 y.o. male who presents to the Emergency Department via EMS for acute, mild altered level of consciousness onset prior to arrival. Per patient, [facility] because [facility]. The person that picks him up found him altered and lethargic. He was walking to the door and fell. Patient states that he feels improved since arriving to the ED. He has associated symptoms of loss of appetite, generalized weakness, and bilateral leg swelling but denies fever. No alleviating or exacerbating factors reported    REVIEW OF SYSTEMS  Pertinent positives include altered level of consciousness, generalized weakness, and bilateral leg swelling. Pertinent negatives include no fever. As above, all other systems reviewed and are negative.   See HPI for further details.     PAST MEDICAL HISTORY   has a past medical history of Gout, Hemodialysis patient (HCC), Hypertension, Kidney disease, and MI (myocardial infarction) ([facility]).    SURGICAL HISTORY  patient denies any surgical history    SOCIAL HISTORY  Social History     Tobacco Use   • Smoking status: Former Smoker   • Smokeless tobacco: Never Used   Vaping Use   • Vaping Use: Never used   Substance Use Topics   • " "Alcohol use: Yes     Comment: occ   • Drug use: Never      Social History     Substance and Sexual Activity   Drug Use Never       FAMILY HISTORY  Family History   Problem Relation Age of Onset   • Diabetes Mother        CURRENT MEDICATIONS  Current Outpatient Medications   Medication Instructions   • allopurinol (ZYLOPRIM) 100 mg, Oral, EVERY MORNING   • isosorbide mononitrate SR (IMDUR) 15 mg, Oral, EVERY MORNING   • metoprolol SR (TOPROL XL) 100 mg, Oral, DAILY   • omeprazole (PRILOSEC) 40 mg, Oral, EVERY MORNING   • tamsulosin (FLOMAX) 0.4 mg, Oral, EVERY MORNING   • traZODone (DESYREL) 100 mg, Oral, NIGHTLY     ALLERGIES  Allergies   Allergen Reactions   • Motrin [Ibuprofen]      hhx of stomach ulcers       PHYSICAL EXAM  VITAL SIGNS: /54   Pulse 85   Temp 36.6 °C (97.9 °F)   Resp 14   Ht 1.753 m (5' 9\")   Wt 74.8 kg (165 lb)   SpO2 96%   BMI 24.37 kg/m²     Constitutional: Well developed, Well nourished, No acute distress, Non-toxic appearance.   HENT: Normocephalic, Atraumatic, Bilateral external ears normal, Dry mucous membranes, No oral exudates.   Eyes: PERRLA, EOMI, Conjunctiva normal, No discharge.   Neck: No tenderness, Supple, No stridor.   Lymphatic: No lymphadenopathy noted.   Cardiovascular: Normal heart rate, Normal rhythm.   Thorax & Lungs: Clear to auscultation bilaterally, No respiratory distress, No wheezing, No crackles.   Abdomen: Soft, No tenderness, No masses, No pulsatile masses.   Skin: Warm, Dry, No erythema, No rash.   Extremities:, Bilateral lower extremity edema, No cyanosis.   Musculoskeletal: No tenderness to palpation or major deformities noted.  Intact distal pulses  Neurologic: Awake, alert. Moves all extremities spontaneously.  Psychiatric: Affect normal, Judgment normal, Mood normal.     LABS  Results for orders placed or performed during the hospital encounter of 11/23/21   CBC WITH DIFFERENTIAL   Result Value Ref Range    WBC 7.3 4.8 - 10.8 K/uL    RBC 2.88 (L) " 4.70 - 6.10 M/uL    Hemoglobin 8.6 (L) 14.0 - 18.0 g/dL    Hematocrit 27.3 (L) 42.0 - 52.0 %    MCV 94.8 81.4 - 97.8 fL    MCH 29.9 27.0 - 33.0 pg    MCHC 31.5 (L) 33.7 - 35.3 g/dL    RDW 55.8 (H) 35.9 - 50.0 fL    Platelet Count 178 164 - 446 K/uL    MPV 10.5 9.0 - 12.9 fL    Neutrophils-Polys 59.50 44.00 - 72.00 %    Lymphocytes 29.40 22.00 - 41.00 %    Monocytes 9.70 0.00 - 13.40 %    Eosinophils 0.70 0.00 - 6.90 %    Basophils 0.40 0.00 - 1.80 %    Immature Granulocytes 0.30 0.00 - 0.90 %    Nucleated RBC 0.40 /100 WBC    Neutrophils (Absolute) 4.34 1.82 - 7.42 K/uL    Lymphs (Absolute) 2.14 1.00 - 4.80 K/uL    Monos (Absolute) 0.71 0.00 - 0.85 K/uL    Eos (Absolute) 0.05 0.00 - 0.51 K/uL    Baso (Absolute) 0.03 0.00 - 0.12 K/uL    Immature Granulocytes (abs) 0.02 0.00 - 0.11 K/uL    NRBC (Absolute) 0.03 K/uL   COMP METABOLIC PANEL   Result Value Ref Range    Sodium 136 135 - 145 mmol/L    Potassium 4.4 3.6 - 5.5 mmol/L    Chloride 90 (L) 96 - 112 mmol/L    Co2 21 20 - 33 mmol/L    Anion Gap 25.0 (H) 7.0 - 16.0    Glucose 68 65 - 99 mg/dL    Bun 43 (H) 8 - 22 mg/dL    Creatinine 11.88 (HH) 0.50 - 1.40 mg/dL    Calcium 9.1 8.5 - 10.5 mg/dL    AST(SGOT) 11 (L) 12 - 45 U/L    ALT(SGPT) 11 2 - 50 U/L    Alkaline Phosphatase 108 (H) 30 - 99 U/L    Total Bilirubin 0.4 0.1 - 1.5 mg/dL    Albumin 4.1 3.2 - 4.9 g/dL    Total Protein 8.1 6.0 - 8.2 g/dL    Globulin 4.0 (H) 1.9 - 3.5 g/dL    A-G Ratio 1.0 g/dL   LACTIC ACID   Result Value Ref Range    Lactic Acid 2.5 (H) 0.5 - 2.0 mmol/L   ESTIMATED GFR   Result Value Ref Range    GFR If African American 5 (A) >60 mL/min/1.73 m 2    GFR If Non African American 4 (A) >60 mL/min/1.73 m 2   Blood Culture,Hold   Result Value Ref Range    Blood Culture Hold Collected      All labs reviewed by me.    RADIOLOGY  CT-HEAD W/O   Final Result      No acute intracranial abnormality is identified.      Moderate white matter hypodensity is present.  This is a nonspecific finding which  "usually is found to represent chronic microvascular disease in patient's of this demographic.  Demyelination, age indeterminant ischemia and gliosis are also common    possibilities.      Age-appropriate atrophy      Improving sinus inflammatory disease      DX-CHEST-PORTABLE (1 VIEW)   Final Result      1.  Mild right lateral airspace process which could be scarring or pneumonia      2.  Enlarged cardiac silhouette      3.  Left central venous catheter present which appears appropriately located        The radiologist's interpretation of all radiological studies have been reviewed by me.    COURSE & MEDICAL DECISION MAKING  Pertinent Labs & Imaging studies reviewed. (See chart for details)    5:03 PM - Patient seen and examined at bedside. Ordered Estimated GFR to evaluate. The differential diagnoses include but are not limited to: Hypoglycemia    5:46 PM - Ordered CBC w/ Diff, CMP, and Lactic Acid to evaluate.    7:35 PM - Patient was reevaluated at bedside and feels improved but is \"confused, dizzy, and tired\" which has been happening for a week. I informed him that his labs are reassuring. Patient told me that he currently feels weak. He will be closely monitored pending his chest x-ray. Ordered CT-Head w/out.    8:28 PM - Patient will be treated with Unasyn 3 g IV, Zithromax 500 mg injection, and IV Fluids.    8:34 PM - Paged Hospitalist    8:38 PM - I discussed the patient's case and the above findings with Dr. Etienne (Hospitalist) who agrees to admit the patient for further evaluation.     HYDRATION: Based on the patient's presentation of Sepsis the patient was given IV fluids. IV Hydration was used because oral hydration was not adequate alone. Upon recheck following hydration, the patient was improved.    Decision Makin-year-old male who has been ill for about a week, just generalized weakness.  Today missed his dialysis appointment and the [facility] did send someone out to check him and found him to " be week he was brought to the ER by ambulance.  He may be septic with lactic acid elevated at 2.5 initially hypotensive and tachycardic and with possible pneumonia.  The patient is at the work-up ordered including blood cultures given antibiotics given fluids cautiously the patient is discussed with the hospitalist who will be admitting  DISPOSITION:  Patient will be hospitalized by Dr. Etienne in guarded condition.    FINAL IMPRESSION  1. Sepsis, due to unspecified organism, unspecified whether acute organ dysfunction present (HCC)    2. Pneumonia due to infectious organism, unspecified laterality, unspecified part of lung    3. Dehydration       Derek MOORE (Scribe), am scribing for, and in the presence of, Elmer Amin M.D..    Electronically signed by: Derek Daly (Scribe), 11/23/2021    Elmer MOORE M.D. personally performed the services described in this documentation, as scribed by Derek Daly in my presence, and it is both accurate and complete.    The note accurately reflects work and decisions made by me.  Elmer Amin M.D.  11/23/2021  9:17 PM

## 2021-11-24 NOTE — H&P
"Hospital Medicine History & Physical Note    Date of Service  11/23/2021    Primary Care Physician  No primary care provider on file.    Consultants  nephrology    Specialist Names: call in AM    Code Status  Full Code    Chief Complaint  Chief Complaint   Patient presents with   • ALOC     pt rec[facility] and [facility] went [facility], found him altered and lethargic. Pt fell but was able to lower himself to the ground. Per EMS, AOx2 on scene. AOx4 but lethargic on arrival.    • Weakness     fatigue and weakness x 1 wk with poor PO intake. Per EMS, there was little food in the house. Pt states that he \"just hasn't been hungry\" lately.       History of Presenting Illness  Abran Proctor is a 71 y.o. male who presented 11/23/2021 with acute loss of consciousness. Patient states that he has not been feeling well and has not had good oral intake for the past 5-7 days. He did not eat or drink well. He felt weak so did not go to his HD session x1. His  came to check on him today and he went to go answer the door and as he opened the door, he passed out and collapsed. Never had any similar episodes like this. Patient did not hit the ground and did not hit his head. In ED, patient noted to be tachycardic and hypotensive. Chart review reveals that patient is not hypotensive and usually hypertensive. Will admit patient for orthostatic hypotension and for fluid resuscitation.     I discussed the plan of care with patient.    Review of Systems  Review of Systems   Constitutional: Negative.    HENT: Negative.    Eyes: Negative.    Respiratory: Negative.    Cardiovascular: Negative.    Gastrointestinal: Negative.    Genitourinary: Negative.    Musculoskeletal: Positive for falls.   Skin: Negative.    Neurological: Positive for dizziness and loss of consciousness.   Psychiatric/Behavioral: Negative.        Past Medical History   has a past medical history " of Gout, Hemodialysis patient (HCC), Hypertension, Kidney disease, and MI (myocardial infarction) ([facility]).    Surgical History  Denies pertinent PMH     Family History  family history includes Diabetes in his mother.   Family history reviewed with patient. There is no family history that is pertinent to the chief complaint.     Social History   reports that he has quit smoking. He has never used smokeless tobacco. He reports current alcohol use. He reports that he does not use drugs.    Allergies  Allergies   Allergen Reactions   • Motrin [Ibuprofen]      hhx of stomach ulcers       Medications  Prior to Admission Medications   Prescriptions Last Dose Informant Patient Reported? Taking?   allopurinol (ZYLOPRIM) 100 MG Tab  Patient Yes No   Sig: Take 100 mg by mouth every morning.   isosorbide mononitrate SR (IMDUR) 30 MG TABLET SR 24 HR   Yes No   Sig: Take 15 mg by mouth every morning.   metoprolol SR (TOPROL XL) 100 MG TABLET SR 24 HR   Yes No   Sig: Take 100 mg by mouth every day.   omeprazole (PRILOSEC) 20 MG delayed-release capsule  Patient Yes No   Sig: Take 40 mg by mouth every morning.   tamsulosin (FLOMAX) 0.4 MG capsule  Patient Yes No   Sig: Take 0.4 mg by mouth every morning.   traZODone (DESYREL) 100 MG Tab   Yes No   Sig: Take 100 mg by mouth every evening. Indications: Trouble Sleeping      Facility-Administered Medications: None       Physical Exam  Temp:  [36.6 °C (97.9 °F)] 36.6 °C (97.9 °F)  Pulse:  [] 75  Resp:  [12-25] 15  BP: ()/(53-57) 95/53  SpO2:  [93 %-100 %] 100 %  Blood Pressure : (!) 95/53   Temperature: 36.6 °C (97.9 °F)   Pulse: 75   Respiration: 15   Pulse Oximetry: 100 %       Physical Exam  Vitals and nursing note reviewed.   Constitutional:       Appearance: Normal appearance.   HENT:      Head: Normocephalic and atraumatic.      Mouth/Throat:      Mouth: Mucous membranes are dry.      Pharynx: Oropharynx is clear.   Eyes:      General: No scleral icterus.     Extraocular  Movements: Extraocular movements intact.      Conjunctiva/sclera: Conjunctivae normal.      Pupils: Pupils are equal, round, and reactive to light.   Cardiovascular:      Rate and Rhythm: Normal rate and regular rhythm.      Pulses: Normal pulses.      Heart sounds: Normal heart sounds.   Pulmonary:      Effort: Pulmonary effort is normal. No respiratory distress.      Breath sounds: Normal breath sounds. No wheezing.   Abdominal:      General: Abdomen is flat. Bowel sounds are normal. There is no distension.      Palpations: Abdomen is soft.      Tenderness: There is no abdominal tenderness. There is no guarding.   Musculoskeletal:         General: No swelling or tenderness. Normal range of motion.      Cervical back: Normal range of motion and neck supple.   Skin:     General: Skin is warm and dry.   Neurological:      General: No focal deficit present.      Mental Status: He is alert and oriented to person, place, and time. Mental status is at baseline.         Laboratory:  Recent Labs     11/23/21  1709   WBC 7.3   RBC 2.88*   HEMOGLOBIN 8.6*   HEMATOCRIT 27.3*   MCV 94.8   MCH 29.9   MCHC 31.5*   RDW 55.8*   PLATELETCT 178   MPV 10.5     Recent Labs     11/23/21  1709   SODIUM 136   POTASSIUM 4.4   CHLORIDE 90*   CO2 21   GLUCOSE 68   BUN 43*   CREATININE 11.88*   CALCIUM 9.1     Recent Labs     11/23/21  1709   ALTSGPT 11   ASTSGOT 11*   ALKPHOSPHAT 108*   TBILIRUBIN 0.4   GLUCOSE 68         No results for input(s): NTPROBNP in the last 72 hours.      No results for input(s): TROPONINT in the last 72 hours.    Imaging:  CT-HEAD W/O   Final Result      No acute intracranial abnormality is identified.      Moderate white matter hypodensity is present.  This is a nonspecific finding which usually is found to represent chronic microvascular disease in patient's of this demographic.  Demyelination, age indeterminant ischemia and gliosis are also common    possibilities.      Age-appropriate atrophy      Improving  sinus inflammatory disease      DX-CHEST-PORTABLE (1 VIEW)   Final Result      1.  Mild right lateral airspace process which could be scarring or pneumonia      2.  Enlarged cardiac silhouette      3.  Left central venous catheter present which appears appropriately located          X-Ray:  I have personally reviewed the images and compared with prior images.    Assessment/Plan:  I anticipate this patient is appropriate for observation status at this time.    * Hypotension- (present on admission)  Assessment & Plan  -Due to poor oral intake  -Will hydrate patient cautiously and monitor BP  -Tachycardia improved with fluids  -Monitor respiratory status   -Hold BP meds    ESRD on dialysis (HCC)- (present on admission)  Assessment & Plan  -Consult nephro in AM for HD    Dehydration- (present on admission)  Assessment & Plan  -Due to poor oral intake, will place on IVF and monitor     BPH (benign prostatic hyperplasia)- (present on admission)  Assessment & Plan  -Resume home meds      VTE prophylaxis: heparin ppx

## 2021-11-24 NOTE — PROGRESS NOTES
Transfer bed to chair , chair to toilet stand by assist. BLE swelling. Pain when walking with it.

## 2021-11-24 NOTE — ED NOTES
Med Rec completed: per [facility] pharmacy.    Pt is currently wearing buprenorphine and Lidocaine patches.    No reported ORAL antibiotics in last 30 days    Preferred Pharmacy: [facility]     Pt confirmed following allergies:  Allergies   Allergen Reactions   • Motrin [Ibuprofen]      hhx of stomach ulcers      Pt's home medications:   Medication Sig   • isosorbide dinitrate (ISORDIL) 20 MG Tab Take 10 mg by mouth 2 times a day. Taken 7 hours apart, last dose no later than dinner   Indications: Stable Angina Pectoris   • pregabalin (LYRICA) 25 MG Cap Take 25 mg by mouth at bedtime.   • Etanercept 50 MG/ML Solution Prefilled Syringe Inject 50 mg under the skin every 7 days.   • Buprenorphine 10 MCG/HR PATCH WEEKLY Place 10 mcg on the skin every 7 days.   • aspirin EC (ECOTRIN) 81 MG Tablet Delayed Response Take 81 mg by mouth every day.   • melatonin 3 MG Tab Take 9 mg by mouth at bedtime as needed (sleep).   • Lidocaine 4 % Patch Apply 1 Patch topically every day. Remove after 12 hours   • sevelamer (RENAGEL) 800 MG Tab Take 800 mg by mouth 3 times a day with meals.   • diclofenac sodium (VOLTAREN) 1 % Gel Apply 2 g topically 4 times a day as needed (Osetoarthritis pain).   • vitamin D2, Ergocalciferol, (DRISDOL) 1.25 MG (23285 UT) Cap capsule Take 50,000 Units by mouth every Wednesday.   • lidocaine (XYLOCAINE) 5 % Ointment Apply 1 g topically in the morning, at noon, and at bedtime.   • atorvastatin (LIPITOR) 20 MG Tab Take 20 mg by mouth every evening.   • carvedilol (COREG) 6.25 MG Tab Take 6.25 mg by mouth 2 times a day with meals.   • traZODone (DESYREL) 50 MG Tab Take 25 mg by mouth every evening. Indications: Trouble Sleeping   • allopurinol (ZYLOPRIM) 100 MG Tab Take 100 mg by mouth 3 times a week. Taken post hemodialysis   • tamsulosin (FLOMAX) 0.4 MG capsule Take 0.4 mg by mouth every morning.

## 2021-11-24 NOTE — PROGRESS NOTES
Received from [facility], aox4, sr on monitor, unsteady on his feet. Denies pain or sob. Call light within reach. Needs attended. Plan of care discussed and understood.

## 2021-11-24 NOTE — ED NOTES
".  Chief Complaint   Patient presents with   • ALOC     pt rec[facility] and [facility] went [facility], found him altered and lethargic. Pt fell but was able to lower himself to the ground. Per EMS, AOx2 on scene. AOx4 but lethargic on arrival.    • Weakness     fatigue and weakness x 1 wk with poor PO intake. Per EMS, there was little food in the house. Pt states that he \"just hasn't been hungry\" lately.       "

## 2021-11-24 NOTE — CARE PLAN
The patient is stable         Progress made toward(s) clinical / shift goals: stable vitals, denies syncope      Problem: Syncope Management  Goal: Patient's signs and symptoms of syncope will be assessed and managed  Outcome: Progressing  Goal: Cardiac arrhythmias will be absent or managed  Outcome: Progressing  Goal: Patient's vital signs will be with within normal range or improve  Outcome: Progressing  Goal: Patient's risk for medication side effects that could worsen signs and symptoms of syncope will be decreased  Outcome: Progressing

## 2021-11-24 NOTE — PROGRESS NOTES
[facility] Medicine Daily Progress Note    Date of Service  11/24/2021    Chief Complaint  Junior Proctor is a 71 y.o. male admitted 11/23/2021 with loss of consciousness    Hospital Course  Abran Proctor is a 71 y.o. male who presented 11/23/2021 with acute loss of consciousness. Patient states that he has not been feeling well and has not had good oral intake for the past 5-7 days. He did not eat or drink well. He felt weak so did not go to his HD session x1. His  came to check on him today and he went to go answer the door and as he opened the door, he passed out and collapsed. Never had any similar episodes like this. Patient did not hit the ground and did not hit his head. In ED, patient noted to be tachycardic and hypotensive. Chart review reveals that patient is not hypotensive and usually hypertensive. Will admit patient for orthostatic hypotension and for fluid resuscitation      Interval Problem Update  11/24: Patient seen and examined at bedside. He is a pleasant gentleman who is not in acute distress. Denies chest pain and shortness of breath, main complaint is increasing weakness over 2 weeks and missed HD session.  - Patient hypotensive on admission. Holding all anti-HTN medications. Patient is responsive to judicious IVF, but is in ESRD on HD so avoid volume overload.   - Started midodrine 5mg TID  - Nephrology consulted. Underwent HD today. Intolerant of fluid removal and required boluses to maintain adequate systolic pressures. Will closely monitor CMP  - Increasing supplemental oxygen demands. Increased from 2LNC to 4LNC throughout day. Patient denies using oxygen at baseline  - Per nephrology, tunneled HD cath appears to be somewhat exposed and wants to culture the line due to persistent hypotension.    I have personally seen and examined the patient at bedside. I discussed the plan of care with patient, bedside RN and .    Consultants/Specialty  nephrology    Code Status  Full  Code    Disposition  Patient is not medically cleared.   Anticipate discharge to D.  I have placed the appropriate orders for post-discharge needs.    Review of Systems  Review of Systems   Constitutional: Positive for malaise/fatigue. Negative for chills and fever.   HENT: Negative for congestion, hearing loss, sore throat and tinnitus.    Eyes: Negative for blurred vision and double vision.   Respiratory: Negative for cough, shortness of breath and wheezing.    Cardiovascular: Negative for chest pain, palpitations and orthopnea.   Gastrointestinal: Negative for diarrhea, nausea and vomiting.   Musculoskeletal: Positive for falls.   Skin: Negative.    Neurological: Positive for loss of consciousness and weakness. Negative for dizziness and headaches.   Psychiatric/Behavioral: Negative for depression and suicidal ideas.        Physical Exam  Temp:  [36.2 °C (97.2 °F)-37.1 °C (98.7 °F)] 36.3 °C (97.3 °F)  Pulse:  [] 56  Resp:  [12-28] 18  BP: ()/(40-68) 124/68  SpO2:  [75 %-100 %] 88 %    Physical Exam  Vitals and nursing note reviewed.   Constitutional:       General: He is not in acute distress.     Appearance: Normal appearance. He is ill-appearing.   HENT:      Head: Normocephalic and atraumatic.      Mouth/Throat:      Mouth: Mucous membranes are moist.      Pharynx: Oropharynx is clear.   Eyes:      Extraocular Movements: Extraocular movements intact.      Pupils: Pupils are equal, round, and reactive to light.   Cardiovascular:      Rate and Rhythm: Regular rhythm. Bradycardia present.      Pulses: Normal pulses.      Heart sounds: Normal heart sounds. No murmur heard.  No friction rub. No gallop.    Pulmonary:      Effort: Pulmonary effort is normal. No respiratory distress.      Breath sounds: Normal breath sounds. No wheezing.   Abdominal:      General: Abdomen is flat.      Palpations: Abdomen is soft.      Tenderness: There is no abdominal tenderness. There is no guarding.   Musculoskeletal:          General: No swelling, tenderness or signs of injury.      Cervical back: Neck supple.   Skin:     General: Skin is warm and dry.      Capillary Refill: Capillary refill takes 2 to 3 seconds.   Neurological:      General: No focal deficit present.      Mental Status: He is alert and oriented to person, place, and time.      Cranial Nerves: No cranial nerve deficit.   Psychiatric:         Mood and Affect: Mood normal.         Behavior: Behavior normal.         Fluids    Intake/Output Summary (Last 24 hours) at 11/24/2021 1358  Last data filed at 11/23/2021 2122  Gross per 24 hour   Intake 500 ml   Output --   Net 500 ml       Laboratory  Recent Labs     11/23/21  1709 11/24/21  0009   WBC 7.3 7.8   RBC 2.88* 2.54*   HEMOGLOBIN 8.6* 7.5*   HEMATOCRIT 27.3* 23.9*   MCV 94.8 94.1   MCH 29.9 29.5   MCHC 31.5* 31.4*   RDW 55.8* 55.7*   PLATELETCT 178 153*   MPV 10.5 10.7     Recent Labs     11/23/21  1709 11/24/21  0009   SODIUM 136 140   POTASSIUM 4.4 4.3   CHLORIDE 90* 96   CO2 21 26   GLUCOSE 68 99   BUN 43* 45*   CREATININE 11.88* 11.78*   CALCIUM 9.1 8.1*                   Imaging  CT-HEAD W/O   Final Result      No acute intracranial abnormality is identified.      Moderate white matter hypodensity is present.  This is a nonspecific finding which usually is found to represent chronic microvascular disease in patient's of this demographic.  Demyelination, age indeterminant ischemia and gliosis are also common    possibilities.      Age-appropriate atrophy      Improving sinus inflammatory disease      DX-CHEST-PORTABLE (1 VIEW)   Final Result      1.  Mild right lateral airspace process which could be scarring or pneumonia      2.  Enlarged cardiac silhouette      3.  Left central venous catheter present which appears appropriately located           Assessment/Plan  * Hypotension- (present on admission)  Assessment & Plan  -Due to poor oral intake  -Will hydrate patient cautiously and monitor BP  -Tachycardia  improved with fluids  -Respiratory status stable  -Will add midodrine 5mg TID for now    Acute respiratory failure with hypoxia (HCC)  Assessment & Plan  Does not require oxygen at baseline  Presently requiring 4LNC  Continue to monitor closely, judicious use of IV fluids. Maintain euvolemia.    Hepatitis C antibody positive in blood  Assessment & Plan  HCV Antibody Reactive on viral panel  Await viral load    ESRD on dialysis (HCC)- (present on admission)  Assessment & Plan  Has outpatient chair with Janelle OCONNOR, Th, Sa  Missed Tuesday HD due to weakness and falling  Nephrology consulted, mell recs    Dehydration- (present on admission)  Assessment & Plan  -Patients states poor PO intake x2 weeks  Continue with IVF hydration with close monitoring for respiratory and volume status     BPH (benign prostatic hyperplasia)- (present on admission)  Assessment & Plan  -Resume home meds       VTE prophylaxis: SCDs/TEDs and heparin ppx    I have performed a physical exam and reviewed and updated ROS and Plan today (11/24/2021). In review of yesterday's note (11/23/2021), there are no changes except as documented above.

## 2021-11-25 ENCOUNTER — APPOINTMENT (OUTPATIENT)
Dept: RADIOLOGY | Facility: MEDICAL CENTER | Age: 71
DRG: 175 | End: 2021-11-25
Attending: NURSE PRACTITIONER
Payer: COMMERCIAL

## 2021-11-25 ENCOUNTER — APPOINTMENT (OUTPATIENT)
Dept: CARDIOLOGY | Facility: MEDICAL CENTER | Age: 71
DRG: 175 | End: 2021-11-25
Attending: NURSE PRACTITIONER
Payer: COMMERCIAL

## 2021-11-25 LAB
ALBUMIN SERPL BCP-MCNC: 3 G/DL (ref 3.2–4.9)
ALBUMIN/GLOB SERPL: 0.9 G/DL
ALP SERPL-CCNC: 89 U/L (ref 30–99)
ALT SERPL-CCNC: 9 U/L (ref 2–50)
ANION GAP SERPL CALC-SCNC: 8 MMOL/L (ref 7–16)
APTT PPP: 32.3 SEC (ref 24.7–36)
AST SERPL-CCNC: 20 U/L (ref 12–45)
BASOPHILS # BLD AUTO: 0.2 % (ref 0–1.8)
BASOPHILS # BLD: 0.01 K/UL (ref 0–0.12)
BILIRUB SERPL-MCNC: 0.4 MG/DL (ref 0.1–1.5)
BUN SERPL-MCNC: 24 MG/DL (ref 8–22)
CALCIUM SERPL-MCNC: 8.1 MG/DL (ref 8.5–10.5)
CHLORIDE SERPL-SCNC: 103 MMOL/L (ref 96–112)
CO2 SERPL-SCNC: 30 MMOL/L (ref 20–33)
CREAT SERPL-MCNC: 7.08 MG/DL (ref 0.5–1.4)
EOSINOPHIL # BLD AUTO: 0.06 K/UL (ref 0–0.51)
EOSINOPHIL NFR BLD: 1 % (ref 0–6.9)
ERYTHROCYTE [DISTWIDTH] IN BLOOD BY AUTOMATED COUNT: 59.9 FL (ref 35.9–50)
GLOBULIN SER CALC-MCNC: 3.2 G/DL (ref 1.9–3.5)
GLUCOSE SERPL-MCNC: 118 MG/DL (ref 65–99)
HCT VFR BLD AUTO: 24.9 % (ref 42–52)
HGB BLD-MCNC: 7.6 G/DL (ref 14–18)
IMM GRANULOCYTES # BLD AUTO: 0.02 K/UL (ref 0–0.11)
IMM GRANULOCYTES NFR BLD AUTO: 0.3 % (ref 0–0.9)
INR PPP: 1.12 (ref 0.87–1.13)
LV EJECT FRACT MOD 2C 99903: 46.39
LV EJECT FRACT MOD 4C 99902: 72.14
LV EJECT FRACT MOD BP 99901: 61.53
LYMPHOCYTES # BLD AUTO: 1.44 K/UL (ref 1–4.8)
LYMPHOCYTES NFR BLD: 24 % (ref 22–41)
MAGNESIUM SERPL-MCNC: 1.7 MG/DL (ref 1.5–2.5)
MCH RBC QN AUTO: 29.5 PG (ref 27–33)
MCHC RBC AUTO-ENTMCNC: 30.5 G/DL (ref 33.7–35.3)
MCV RBC AUTO: 96.5 FL (ref 81.4–97.8)
MONOCYTES # BLD AUTO: 0.57 K/UL (ref 0–0.85)
MONOCYTES NFR BLD AUTO: 9.5 % (ref 0–13.4)
NEUTROPHILS # BLD AUTO: 3.91 K/UL (ref 1.82–7.42)
NEUTROPHILS NFR BLD: 65 % (ref 44–72)
NRBC # BLD AUTO: 0 K/UL
NRBC BLD-RTO: 0 /100 WBC
PLATELET # BLD AUTO: 137 K/UL (ref 164–446)
PMV BLD AUTO: 10.6 FL (ref 9–12.9)
POTASSIUM SERPL-SCNC: 4.1 MMOL/L (ref 3.6–5.5)
PROT SERPL-MCNC: 6.2 G/DL (ref 6–8.2)
PROTHROMBIN TIME: 14.1 SEC (ref 12–14.6)
RBC # BLD AUTO: 2.58 M/UL (ref 4.7–6.1)
SODIUM SERPL-SCNC: 141 MMOL/L (ref 135–145)
UFH PPP CHRO-ACNC: 0.78 IU/ML
UFH PPP CHRO-ACNC: <0.1 IU/ML
WBC # BLD AUTO: 6 K/UL (ref 4.8–10.8)

## 2021-11-25 PROCEDURE — 85730 THROMBOPLASTIN TIME PARTIAL: CPT

## 2021-11-25 PROCEDURE — 96366 THER/PROPH/DIAG IV INF ADDON: CPT

## 2021-11-25 PROCEDURE — 71275 CT ANGIOGRAPHY CHEST: CPT

## 2021-11-25 PROCEDURE — 700111 HCHG RX REV CODE 636 W/ 250 OVERRIDE (IP)

## 2021-11-25 PROCEDURE — 99226 PR SUBSEQUENT OBSERVATION CARE,LEVEL III: CPT | Performed by: INTERNAL MEDICINE

## 2021-11-25 PROCEDURE — 85025 COMPLETE CBC W/AUTO DIFF WBC: CPT

## 2021-11-25 PROCEDURE — 85610 PROTHROMBIN TIME: CPT

## 2021-11-25 PROCEDURE — 85520 HEPARIN ASSAY: CPT | Mod: 91

## 2021-11-25 PROCEDURE — 93306 TTE W/DOPPLER COMPLETE: CPT | Mod: 26 | Performed by: STUDENT IN AN ORGANIZED HEALTH CARE EDUCATION/TRAINING PROGRAM

## 2021-11-25 PROCEDURE — G0378 HOSPITAL OBSERVATION PER HR: HCPCS

## 2021-11-25 PROCEDURE — 96375 TX/PRO/DX INJ NEW DRUG ADDON: CPT

## 2021-11-25 PROCEDURE — 700117 HCHG RX CONTRAST REV CODE 255: Performed by: NURSE PRACTITIONER

## 2021-11-25 PROCEDURE — 700102 HCHG RX REV CODE 250 W/ 637 OVERRIDE(OP): Performed by: INTERNAL MEDICINE

## 2021-11-25 PROCEDURE — 700102 HCHG RX REV CODE 250 W/ 637 OVERRIDE(OP): Performed by: NURSE PRACTITIONER

## 2021-11-25 PROCEDURE — A9270 NON-COVERED ITEM OR SERVICE: HCPCS | Performed by: NURSE PRACTITIONER

## 2021-11-25 PROCEDURE — 700111 HCHG RX REV CODE 636 W/ 250 OVERRIDE (IP): Performed by: NURSE PRACTITIONER

## 2021-11-25 PROCEDURE — A9270 NON-COVERED ITEM OR SERVICE: HCPCS | Performed by: INTERNAL MEDICINE

## 2021-11-25 PROCEDURE — 93306 TTE W/DOPPLER COMPLETE: CPT

## 2021-11-25 PROCEDURE — 80053 COMPREHEN METABOLIC PANEL: CPT

## 2021-11-25 PROCEDURE — 90935 HEMODIALYSIS ONE EVALUATION: CPT

## 2021-11-25 PROCEDURE — 83735 ASSAY OF MAGNESIUM: CPT

## 2021-11-25 PROCEDURE — 96367 TX/PROPH/DG ADDL SEQ IV INF: CPT

## 2021-11-25 RX ORDER — HEPARIN SODIUM 1000 [USP'U]/ML
40 INJECTION, SOLUTION INTRAVENOUS; SUBCUTANEOUS PRN
Status: DISCONTINUED | OUTPATIENT
Start: 2021-11-25 | End: 2021-11-26

## 2021-11-25 RX ORDER — MIDODRINE HYDROCHLORIDE 5 MG/1
10 TABLET ORAL
Status: DISCONTINUED | OUTPATIENT
Start: 2021-11-25 | End: 2021-11-27 | Stop reason: HOSPADM

## 2021-11-25 RX ORDER — OXYCODONE HYDROCHLORIDE AND ACETAMINOPHEN 5; 325 MG/1; MG/1
1 TABLET ORAL EVERY 4 HOURS PRN
Status: DISCONTINUED | OUTPATIENT
Start: 2021-11-25 | End: 2021-11-27 | Stop reason: HOSPADM

## 2021-11-25 RX ORDER — CALCIUM GLUCONATE 20 MG/ML
2 INJECTION, SOLUTION INTRAVENOUS ONCE
Status: ACTIVE | OUTPATIENT
Start: 2021-11-25 | End: 2021-11-26

## 2021-11-25 RX ORDER — HEPARIN SODIUM 5000 [USP'U]/100ML
0-30 INJECTION, SOLUTION INTRAVENOUS CONTINUOUS
Status: DISCONTINUED | OUTPATIENT
Start: 2021-11-25 | End: 2021-11-26

## 2021-11-25 RX ORDER — HEPARIN SODIUM 1000 [USP'U]/ML
INJECTION, SOLUTION INTRAVENOUS; SUBCUTANEOUS
Status: COMPLETED
Start: 2021-11-25 | End: 2021-11-25

## 2021-11-25 RX ORDER — HEPARIN SODIUM 1000 [USP'U]/ML
80 INJECTION, SOLUTION INTRAVENOUS; SUBCUTANEOUS ONCE
Status: COMPLETED | OUTPATIENT
Start: 2021-11-25 | End: 2021-11-25

## 2021-11-25 RX ADMIN — ACETAMINOPHEN 650 MG: 325 TABLET, FILM COATED ORAL at 02:32

## 2021-11-25 RX ADMIN — OXYCODONE HYDROCHLORIDE AND ACETAMINOPHEN 1 TABLET: 5; 325 TABLET ORAL at 10:38

## 2021-11-25 RX ADMIN — HEPARIN SODIUM: 1000 INJECTION, SOLUTION INTRAVENOUS; SUBCUTANEOUS at 11:42

## 2021-11-25 RX ADMIN — MIDODRINE HYDROCHLORIDE 5 MG: 5 TABLET ORAL at 07:44

## 2021-11-25 RX ADMIN — TAMSULOSIN HYDROCHLORIDE 0.4 MG: 0.4 CAPSULE ORAL at 05:28

## 2021-11-25 RX ADMIN — MIDODRINE HYDROCHLORIDE 10 MG: 5 TABLET ORAL at 16:40

## 2021-11-25 RX ADMIN — HEPARIN SODIUM 3900 UNITS: 1000 INJECTION, SOLUTION INTRAVENOUS; SUBCUTANEOUS at 13:00

## 2021-11-25 RX ADMIN — HEPARIN SODIUM 18 UNITS/KG/HR: 5000 INJECTION, SOLUTION INTRAVENOUS at 16:31

## 2021-11-25 RX ADMIN — IOHEXOL 80 ML: 350 INJECTION, SOLUTION INTRAVENOUS at 14:00

## 2021-11-25 RX ADMIN — HEPARIN SODIUM 2000 UNITS: 1000 INJECTION, SOLUTION INTRAVENOUS; SUBCUTANEOUS at 11:42

## 2021-11-25 RX ADMIN — HEPARIN SODIUM 6000 UNITS: 1000 INJECTION, SOLUTION INTRAVENOUS; SUBCUTANEOUS at 16:38

## 2021-11-25 RX ADMIN — OXYCODONE HYDROCHLORIDE AND ACETAMINOPHEN 1 TABLET: 5; 325 TABLET ORAL at 22:48

## 2021-11-25 RX ADMIN — MIDODRINE HYDROCHLORIDE 10 MG: 5 TABLET ORAL at 10:38

## 2021-11-25 ASSESSMENT — ENCOUNTER SYMPTOMS
DIZZINESS: 0
WHEEZING: 0
FALLS: 1
BLURRED VISION: 0
DOUBLE VISION: 0
DIARRHEA: 0
DEPRESSION: 0
LOSS OF CONSCIOUSNESS: 1
CHILLS: 0
VOMITING: 0
SHORTNESS OF BREATH: 1
PALPITATIONS: 0
WEAKNESS: 1
FEVER: 0
SORE THROAT: 0
HEADACHES: 0
NAUSEA: 0
COUGH: 0
ORTHOPNEA: 0

## 2021-11-25 ASSESSMENT — PAIN DESCRIPTION - PAIN TYPE
TYPE: ACUTE PAIN

## 2021-11-25 NOTE — PROGRESS NOTES
[facility] Medicine Daily Progress Note    Date of Service  11/25/2021    Chief Complaint  Junior Proctor is a 71 y.o. male admitted 11/23/2021 with loss of consciousness    Hospital Course  Abran Proctor is a 71 y.o. male who presented 11/23/2021 with acute loss of consciousness. Patient states that he has not been feeling well and has not had good oral intake for the past 5-7 days. He did not eat or drink well. He felt weak so did not go to his HD session x1. His  came to check on him today and he went to go answer the door and as he opened the door, he passed out and collapsed. Never had any similar episodes like this. Patient did not hit the ground and did not hit his head. In ED, patient noted to be tachycardic and hypotensive. Chart review reveals that patient is not hypotensive and usually hypertensive. Will admit patient for orthostatic hypotension and for fluid resuscitation      Interval Problem Update  11/25: Patient seen and examined at bedside. He is a pleasant gentleman who is not in acute distress. Denies chest pain and shortness of breath, main complaint is increasing weakness over 2 weeks and missed HD session.  - Patient remains borderline hypotensive. Will increase Midodrine to 10mg TID. Will obtain TTE.  - Clinically appears mildly volume overloaded, IVF d/c'd yesterday, Nephrology following. Plan for HD session today  - Supplemental O2 saturations 88% on RA on evaluation. Improved to >93% on 2LNC  - Blood cultures from Dialysis catheter negative so far. Patient remains afebrile with normal WBC  - Obtained TTE which demonstrates LV EF 65% with normal function compared to previous study, now moderate tricuspid regurgitation with mild pulmonary hypertension and right ventricle appears enlarged.  - Will obtain CTA chest to evaluate for PE due to TTE right heart findings and persistent hypoxia    I have personally seen and examined the patient at bedside. I discussed the plan of care with  patient, bedside RN and .    Consultants/Specialty  nephrology    Code Status  Full Code    Disposition  Patient is not medically cleared.   Anticipate discharge to D.  I have placed the appropriate orders for post-discharge needs.    Review of Systems  Review of Systems   Constitutional: Positive for malaise/fatigue. Negative for chills and fever.   HENT: Negative for congestion, hearing loss, sore throat and tinnitus.    Eyes: Negative for blurred vision and double vision.   Respiratory: Positive for shortness of breath. Negative for cough and wheezing.    Cardiovascular: Negative for chest pain, palpitations and orthopnea.   Gastrointestinal: Negative for diarrhea, nausea and vomiting.   Musculoskeletal: Positive for falls.   Skin: Negative.    Neurological: Positive for loss of consciousness and weakness. Negative for dizziness and headaches.   Psychiatric/Behavioral: Negative for depression and suicidal ideas.        Physical Exam  Temp:  [35.8 °C (96.5 °F)-36.6 °C (97.8 °F)] 36.6 °C (97.8 °F)  Pulse:  [65-77] 75  Resp:  [16-18] 18  BP: ()/(48-59) 93/55  SpO2:  [90 %-100 %] 100 %    Physical Exam  Vitals and nursing note reviewed.   Constitutional:       General: He is not in acute distress.     Appearance: Normal appearance. He is ill-appearing.   HENT:      Head: Normocephalic and atraumatic.      Mouth/Throat:      Mouth: Mucous membranes are moist.      Pharynx: Oropharynx is clear.   Eyes:      Extraocular Movements: Extraocular movements intact.      Pupils: Pupils are equal, round, and reactive to light.   Cardiovascular:      Rate and Rhythm: Regular rhythm. Bradycardia present.      Pulses: Normal pulses.      Heart sounds: Normal heart sounds. No murmur heard.  No friction rub. No gallop.    Pulmonary:      Effort: Pulmonary effort is normal. No respiratory distress.      Breath sounds: Rhonchi (Mild bibasilar ) present. No wheezing.   Abdominal:      General: Abdomen is flat.       Palpations: Abdomen is soft.      Tenderness: There is no abdominal tenderness. There is no guarding.   Musculoskeletal:         General: No swelling, tenderness or signs of injury.      Cervical back: Neck supple.      Right lower leg: Edema present.      Left lower leg: Edema present.      Comments: BLE +1 edema present   Skin:     General: Skin is warm and dry.      Capillary Refill: Capillary refill takes 2 to 3 seconds.   Neurological:      General: No focal deficit present.      Mental Status: He is alert and oriented to person, place, and time.      Cranial Nerves: No cranial nerve deficit.   Psychiatric:         Mood and Affect: Mood normal.         Behavior: Behavior normal.         Fluids  No intake or output data in the 24 hours ending 11/25/21 1329    Laboratory  Recent Labs     11/23/21 1709 11/24/21  0009 11/25/21  0209   WBC 7.3 7.8 6.0   RBC 2.88* 2.54* 2.58*   HEMOGLOBIN 8.6* 7.5* 7.6*   HEMATOCRIT 27.3* 23.9* 24.9*   MCV 94.8 94.1 96.5   MCH 29.9 29.5 29.5   MCHC 31.5* 31.4* 30.5*   RDW 55.8* 55.7* 59.9*   PLATELETCT 178 153* 137*   MPV 10.5 10.7 10.6     Recent Labs     11/23/21 1709 11/24/21  0009 11/25/21  0209   SODIUM 136 140 141   POTASSIUM 4.4 4.3 4.1   CHLORIDE 90* 96 103   CO2 21 26 30   GLUCOSE 68 99 118*   BUN 43* 45* 24*   CREATININE 11.88* 11.78* 7.08*   CALCIUM 9.1 8.1* 8.1*                   Imaging  EC-ECHOCARDIOGRAM COMPLETE W/O CONT   Final Result      CT-HEAD W/O   Final Result      No acute intracranial abnormality is identified.      Moderate white matter hypodensity is present.  This is a nonspecific finding which usually is found to represent chronic microvascular disease in patient's of this demographic.  Demyelination, age indeterminant ischemia and gliosis are also common    possibilities.      Age-appropriate atrophy      Improving sinus inflammatory disease      DX-CHEST-PORTABLE (1 VIEW)   Final Result      1.  Mild right lateral airspace process which could be  scarring or pneumonia      2.  Enlarged cardiac silhouette      3.  Left central venous catheter present which appears appropriately located      CT-CTA CHEST PULMONARY ARTERY W/ RECONS    (Results Pending)        Assessment/Plan  * Hypotension- (present on admission)  Assessment & Plan  -Due to poor oral intake  -Will hydrate patient cautiously and monitor BP  -Tachycardia improved with fluids  -Respiratory status stable  -Increase Midodrine to 10mg TID    Acute respiratory failure with hypoxia (HCC)  Assessment & Plan  Does not require oxygen at baseline  Presently requiring 4LNC  Continue to monitor closely. IVF d/c'd yesterday in setting of worsening O2 requirements  TTE shows right heart strain  Concern for potential PE vs hypoxia secondary to volume overload. Will obtain CTA to evaluate    Hepatitis C antibody positive in blood  Assessment & Plan  HCV Antibody Reactive on viral panel  Await viral load    ESRD on dialysis (HCC)- (present on admission)  Assessment & Plan  Has outpatient chair with Janelle OCONNOR, Th, Sa  Missed Tuesday HD due to weakness and falling  Nephrology consulted, mell recs  HD yesterday, but patient pressures intolerant of fluid removal  Will do HD again today    Dehydration- (present on admission)  Assessment & Plan  -Patients states poor PO intake x2 weeks  -Clinically appears mildly volume overloaded. Closely monitor status     BPH (benign prostatic hyperplasia)- (present on admission)  Assessment & Plan  -Resume home meds       VTE prophylaxis: SCDs/TEDs and heparin ppx    I have performed a physical exam and reviewed and updated ROS and Plan today (11/25/2021). In review of yesterday's note (11/24/2021), there are no changes except as documented above.

## 2021-11-25 NOTE — CARE PLAN
The patient is Watcher - Medium risk of patient condition declining or worsening    Shift Goals  Clinical Goals: Dialysis  Patient Goals: rest    Progress made toward(s) clinical / shift goals:    Problem: Syncope Management  Goal: Patient's signs and symptoms of syncope will be assessed and managed  Outcome: Progressing  Goal: Cardiac arrhythmias will be absent or managed  Outcome: Progressing  Goal: Patient's vital signs will be with within normal range or improve  Outcome: Progressing  Goal: Patient's risk for medication side effects that could worsen signs and symptoms of syncope will be decreased  Outcome: Progressing     Problem: Discharge Barriers/Planning  Goal: Patient's continuum of care needs are met  Outcome: Progressing     Problem: Hemodynamics  Goal: Patient's hemodynamics, fluid balance and neurologic status will be stable or improve  Outcome: Progressing     Problem: Fluid Volume  Goal: Fluid volume balance will be maintained  Outcome: Progressing     Problem: Self Care  Goal: Patient will have the ability to perform ADLs independently or with assistance (bathe, groom, dress, toilet and feed)  Outcome: Progressing       Patient is not progressing towards the following goals:

## 2021-11-25 NOTE — PROGRESS NOTES
4 Eyes Skin Assessment Completed by RADHA Davis and Dominique POLLOCK RN.    Head WDL  Ears WDL  Nose WDL  Mouth WDL  Neck WDL  Breast/Chest WDL  Shoulder Blades WDL  Spine WDL  (R) Arm/Elbow/Hand WDL  (L) Arm/Elbow/Hand WDL  Abdomen WDL  Groin WDL  Scrotum/Coccyx/Buttocks WDL  (R) Leg WDL  (L) Leg WDL  (R) Heel/Foot/Toe WDL  (L) Heel/Foot/Toe WDL          Devices In Places Blood Pressure Cuff and Pulse Ox      Interventions In Place NC W/Ear Foams and Pillows    Possible Skin Injury No    Pictures Uploaded Into Epic N/A  Wound Consult Placed N/A  RN Wound Prevention Protocol Ordered No

## 2021-11-25 NOTE — PROGRESS NOTES
3hr HD started @ 0934 and completed @ 1235,no,net UF +1300ml.No UF pulled,1L NS bolus given as prime per Dr Villegas due to sbp on 60's -70's pre tx and pt was c/o feeling dizzy.MD also notified that TDC cuff was out but looked like it was exposed for sometime,exit site CDI,no c/o pain or tenderness,no redness,dressing changed.VSS post HD report given to Isatu GARCIA,Primary RN to medicate for pain since patient wasnt able to get it during HD due to low bp.Warm pack provided to patient which helped alleviate some of the pain and he was able to sleep during tx.

## 2021-11-25 NOTE — CARE PLAN
The patient is Stable - Low risk of patient condition declining or worsening    Shift Goals  Clinical Goals: Dialysis  Patient Goals: rest    Progress made toward(s) clinical / shift goals:  Yes    Patient is not progressing towards the following goals:None      Problem: Knowledge Deficit - Standard  Goal: Patient and family/care givers will demonstrate understanding of plan of care, disease process/condition, diagnostic tests and medications  Outcome: Met     Problem: Pain - Standard  Goal: Alleviation of pain or a reduction in pain to the patient’s comfort goal  Outcome: Met

## 2021-11-25 NOTE — PROGRESS NOTES
[facility] Progress Note       HD ordered by Dr. Melara. Treatment started at 1146 and ended at 1258.    Net UF removed: 100mL.     Pt tolerated treatment well with no issues. Tx ended early d/t pt needing to go to CTA, OK to discontinue treatment and dialyze again tomorrow per Neph MD. See flow sheet for details. CVC patent, locked with Heparin 1:1000 units; 1.9 mL to RED port and 2.0 mL to BLUE port post dialysis. No s/s of infection present. Dressing in place, CDI. Report given to MARIS Hanks RN.

## 2021-11-25 NOTE — PROGRESS NOTES
"[facility] -  PROGRESS NOTE               Author: Trav Melara M.D. Date & Time: 11/25/2021  8:45 AM     HPI:  70 y/o man with ESRD HD T,TH,Sat who presnted with increased weakness from prior. Pt stated that he \"ran out of gas\" It has been difficult for him to ambulate and he gets very fatiguied with SHAY.  Pt denies any new medications.     No F/C/N/V/SOB/CP  No melena, hematochezia, hematemesis.  No HA, visual changes, or abdominal pain.    DAILY NEPHROLOGY SUMMARY:  11/24: Consult done  11/25: Tolerated HD, ongoing hypotension, requiring 6L 02, neck pain and cramping     PAST FAMILY HISTORY: Reviewed and Unchanged  SOCIAL HISTORY: Reviewed and Unchanged  CURRENT MEDICATIONS: Reviewed  IMAGING STUDIES: Reviewed    ROS  10 point ROS was performed and is as per HPI or otherwise negative    PHYSICAL EXAM  VS:  BP (!) 93/55   Pulse 75   Temp 36.6 °C (97.8 °F) (Temporal)   Resp 18   Ht 1.753 m (5' 9\")   Wt 74.6 kg (164 lb 7.4 oz)   SpO2 100%   BMI 24.29 kg/m²   GENERAL: no acute distress  CV: +pedal edema  RESP: non-labored  GI: Soft  MSK: No joint deformities   SKIN: No concerning rashes  NEURO: AOx3  PSYCH: Cooperative    Fluids:  In: 1300 [Dialysis:1300]  Out: 1300     LABS:  Recent Results (from the past 24 hour(s))   BLOOD CULTURE    Collection Time: 11/24/21  2:15 PM    Specimen: Peripheral; Blood   Result Value Ref Range    Significant Indicator NEG     Source BLD     Site PERIPHERAL     Culture Result       No Growth  Note: Blood cultures are incubated for 5 days and  are monitored continuously.Positive blood cultures  are called to the RN and reported as soon as  they are identified.     BLOOD CULTURE    Collection Time: 11/24/21  2:20 PM    Specimen: Peripheral; Blood   Result Value Ref Range    Significant Indicator NEG     Source BLD     Site PERIPHERAL     Culture Result       No Growth  Note: Blood cultures are incubated for 5 days and  are monitored " continuously.Positive blood cultures  are called to the RN and reported as soon as  they are identified.     Comp Metabolic Panel    Collection Time: 11/25/21  2:09 AM   Result Value Ref Range    Sodium 141 135 - 145 mmol/L    Potassium 4.1 3.6 - 5.5 mmol/L    Chloride 103 96 - 112 mmol/L    Co2 30 20 - 33 mmol/L    Anion Gap 8.0 7.0 - 16.0    Glucose 118 (H) 65 - 99 mg/dL    Bun 24 (H) 8 - 22 mg/dL    Creatinine 7.08 (HH) 0.50 - 1.40 mg/dL    Calcium 8.1 (L) 8.5 - 10.5 mg/dL    AST(SGOT) 20 12 - 45 U/L    ALT(SGPT) 9 2 - 50 U/L    Alkaline Phosphatase 89 30 - 99 U/L    Total Bilirubin 0.4 0.1 - 1.5 mg/dL    Albumin 3.0 (L) 3.2 - 4.9 g/dL    Total Protein 6.2 6.0 - 8.2 g/dL    Globulin 3.2 1.9 - 3.5 g/dL    A-G Ratio 0.9 g/dL   CBC WITH DIFFERENTIAL    Collection Time: 11/25/21  2:09 AM   Result Value Ref Range    WBC 6.0 4.8 - 10.8 K/uL    RBC 2.58 (L) 4.70 - 6.10 M/uL    Hemoglobin 7.6 (L) 14.0 - 18.0 g/dL    Hematocrit 24.9 (L) 42.0 - 52.0 %    MCV 96.5 81.4 - 97.8 fL    MCH 29.5 27.0 - 33.0 pg    MCHC 30.5 (L) 33.7 - 35.3 g/dL    RDW 59.9 (H) 35.9 - 50.0 fL    Platelet Count 137 (L) 164 - 446 K/uL    MPV 10.6 9.0 - 12.9 fL    Neutrophils-Polys 65.00 44.00 - 72.00 %    Lymphocytes 24.00 22.00 - 41.00 %    Monocytes 9.50 0.00 - 13.40 %    Eosinophils 1.00 0.00 - 6.90 %    Basophils 0.20 0.00 - 1.80 %    Immature Granulocytes 0.30 0.00 - 0.90 %    Nucleated RBC 0.00 /100 WBC    Neutrophils (Absolute) 3.91 1.82 - 7.42 K/uL    Lymphs (Absolute) 1.44 1.00 - 4.80 K/uL    Monos (Absolute) 0.57 0.00 - 0.85 K/uL    Eos (Absolute) 0.06 0.00 - 0.51 K/uL    Baso (Absolute) 0.01 0.00 - 0.12 K/uL    Immature Granulocytes (abs) 0.02 0.00 - 0.11 K/uL    NRBC (Absolute) 0.00 K/uL   MAGNESIUM    Collection Time: 11/25/21  2:09 AM   Result Value Ref Range    Magnesium 1.7 1.5 - 2.5 mg/dL   ESTIMATED GFR    Collection Time: 11/25/21  2:09 AM   Result Value Ref Range    GFR If  9 (A) >60 mL/min/1.73 m 2    GFR If Non   8 (A) >60 mL/min/1.73 m 2       (click the triangle to expand results)      ASSESSMENT:  # ESRD  - T,TH,SAT  # Hypotension  - ? Infected tunnel cath (Cuff exposed, but still with fibrin around proximal part) vs cardiac  # Anemia of CKD  -  Check iron stores  # Edema  #Weakness : ? Anemia vs infection     PLAN:  - HD again today given resp status and normal outpt schedule  - infectious work-up  -midodrine  - echo if CX negative  - Dose all meds per ESRD

## 2021-11-26 PROBLEM — I26.99 PULMONARY EMBOLISM (HCC): Status: ACTIVE | Noted: 2021-11-26

## 2021-11-26 LAB
ALBUMIN SERPL BCP-MCNC: 2.6 G/DL (ref 3.2–4.9)
ALBUMIN/GLOB SERPL: 0.7 G/DL
ALP SERPL-CCNC: 82 U/L (ref 30–99)
ALT SERPL-CCNC: 9 U/L (ref 2–50)
ANION GAP SERPL CALC-SCNC: 10 MMOL/L (ref 7–16)
AST SERPL-CCNC: 20 U/L (ref 12–45)
BILIRUB SERPL-MCNC: 0.4 MG/DL (ref 0.1–1.5)
BUN SERPL-MCNC: 24 MG/DL (ref 8–22)
CALCIUM SERPL-MCNC: 8.3 MG/DL (ref 8.5–10.5)
CHLORIDE SERPL-SCNC: 105 MMOL/L (ref 96–112)
CO2 SERPL-SCNC: 24 MMOL/L (ref 20–33)
CREAT SERPL-MCNC: 6.41 MG/DL (ref 0.5–1.4)
ERYTHROCYTE [DISTWIDTH] IN BLOOD BY AUTOMATED COUNT: 64.4 FL (ref 35.9–50)
GLOBULIN SER CALC-MCNC: 3.7 G/DL (ref 1.9–3.5)
GLUCOSE SERPL-MCNC: 91 MG/DL (ref 65–99)
HCT VFR BLD AUTO: 27 % (ref 42–52)
HGB BLD-MCNC: 8.1 G/DL (ref 14–18)
MAGNESIUM SERPL-MCNC: 1.7 MG/DL (ref 1.5–2.5)
MCH RBC QN AUTO: 29.5 PG (ref 27–33)
MCHC RBC AUTO-ENTMCNC: 30 G/DL (ref 33.7–35.3)
MCV RBC AUTO: 98.2 FL (ref 81.4–97.8)
PLATELET # BLD AUTO: 134 K/UL (ref 164–446)
PMV BLD AUTO: 10.9 FL (ref 9–12.9)
POTASSIUM SERPL-SCNC: 5.5 MMOL/L (ref 3.6–5.5)
PROT SERPL-MCNC: 6.3 G/DL (ref 6–8.2)
RBC # BLD AUTO: 2.75 M/UL (ref 4.7–6.1)
SODIUM SERPL-SCNC: 139 MMOL/L (ref 135–145)
UFH PPP CHRO-ACNC: 0.52 IU/ML
WBC # BLD AUTO: 6.4 K/UL (ref 4.8–10.8)

## 2021-11-26 PROCEDURE — A9270 NON-COVERED ITEM OR SERVICE: HCPCS | Performed by: INTERNAL MEDICINE

## 2021-11-26 PROCEDURE — 99232 SBSQ HOSP IP/OBS MODERATE 35: CPT | Performed by: INTERNAL MEDICINE

## 2021-11-26 PROCEDURE — 83735 ASSAY OF MAGNESIUM: CPT

## 2021-11-26 PROCEDURE — 90935 HEMODIALYSIS ONE EVALUATION: CPT

## 2021-11-26 PROCEDURE — 700111 HCHG RX REV CODE 636 W/ 250 OVERRIDE (IP)

## 2021-11-26 PROCEDURE — 700102 HCHG RX REV CODE 250 W/ 637 OVERRIDE(OP): Performed by: NURSE PRACTITIONER

## 2021-11-26 PROCEDURE — 700102 HCHG RX REV CODE 250 W/ 637 OVERRIDE(OP): Performed by: INTERNAL MEDICINE

## 2021-11-26 PROCEDURE — 96366 THER/PROPH/DIAG IV INF ADDON: CPT

## 2021-11-26 PROCEDURE — 80053 COMPREHEN METABOLIC PANEL: CPT

## 2021-11-26 PROCEDURE — 85027 COMPLETE CBC AUTOMATED: CPT

## 2021-11-26 PROCEDURE — 700111 HCHG RX REV CODE 636 W/ 250 OVERRIDE (IP): Performed by: NURSE PRACTITIONER

## 2021-11-26 PROCEDURE — 36415 COLL VENOUS BLD VENIPUNCTURE: CPT

## 2021-11-26 PROCEDURE — A9270 NON-COVERED ITEM OR SERVICE: HCPCS | Performed by: NURSE PRACTITIONER

## 2021-11-26 PROCEDURE — 85520 HEPARIN ASSAY: CPT

## 2021-11-26 PROCEDURE — 770020 HCHG ROOM/CARE - TELE (206)

## 2021-11-26 RX ORDER — CALCIUM CARBONATE 500 MG/1
500 TABLET, CHEWABLE ORAL
Status: DISCONTINUED | OUTPATIENT
Start: 2021-11-26 | End: 2021-11-26

## 2021-11-26 RX ORDER — HEPARIN SODIUM 1000 [USP'U]/ML
INJECTION, SOLUTION INTRAVENOUS; SUBCUTANEOUS
Status: COMPLETED
Start: 2021-11-26 | End: 2021-11-26

## 2021-11-26 RX ORDER — TRAZODONE HYDROCHLORIDE 100 MG/1
100 TABLET ORAL
Status: DISCONTINUED | OUTPATIENT
Start: 2021-11-26 | End: 2021-11-27 | Stop reason: HOSPADM

## 2021-11-26 RX ORDER — CALCIUM GLUCONATE 20 MG/ML
2 INJECTION, SOLUTION INTRAVENOUS ONCE
Status: DISCONTINUED | OUTPATIENT
Start: 2021-11-26 | End: 2021-11-26

## 2021-11-26 RX ADMIN — HEPARIN SODIUM: 1000 INJECTION, SOLUTION INTRAVENOUS; SUBCUTANEOUS at 07:56

## 2021-11-26 RX ADMIN — OXYCODONE HYDROCHLORIDE AND ACETAMINOPHEN 1 TABLET: 5; 325 TABLET ORAL at 16:43

## 2021-11-26 RX ADMIN — OXYCODONE HYDROCHLORIDE AND ACETAMINOPHEN 1 TABLET: 5; 325 TABLET ORAL at 10:51

## 2021-11-26 RX ADMIN — HEPARIN SODIUM 16 UNITS/KG/HR: 5000 INJECTION, SOLUTION INTRAVENOUS at 07:22

## 2021-11-26 RX ADMIN — HEPARIN SODIUM 2000 UNITS: 1000 INJECTION, SOLUTION INTRAVENOUS; SUBCUTANEOUS at 07:56

## 2021-11-26 RX ADMIN — OXYCODONE HYDROCHLORIDE AND ACETAMINOPHEN 1 TABLET: 5; 325 TABLET ORAL at 05:22

## 2021-11-26 RX ADMIN — MIDODRINE HYDROCHLORIDE 10 MG: 5 TABLET ORAL at 10:48

## 2021-11-26 RX ADMIN — HEPARIN SODIUM 16 UNITS/KG/HR: 5000 INJECTION, SOLUTION INTRAVENOUS at 00:02

## 2021-11-26 RX ADMIN — APIXABAN 10 MG: 5 TABLET, FILM COATED ORAL at 16:44

## 2021-11-26 RX ADMIN — APIXABAN 10 MG: 5 TABLET, FILM COATED ORAL at 10:48

## 2021-11-26 RX ADMIN — MIDODRINE HYDROCHLORIDE 10 MG: 5 TABLET ORAL at 16:43

## 2021-11-26 RX ADMIN — ALLOPURINOL 100 MG: 100 TABLET ORAL at 10:48

## 2021-11-26 RX ADMIN — TAMSULOSIN HYDROCHLORIDE 0.4 MG: 0.4 CAPSULE ORAL at 05:22

## 2021-11-26 RX ADMIN — HEPARIN SODIUM 3900 UNITS: 1000 INJECTION, SOLUTION INTRAVENOUS; SUBCUTANEOUS at 10:05

## 2021-11-26 RX ADMIN — OXYCODONE HYDROCHLORIDE AND ACETAMINOPHEN 1 TABLET: 5; 325 TABLET ORAL at 21:01

## 2021-11-26 RX ADMIN — HEPARIN SODIUM 16 UNITS/KG/HR: 5000 INJECTION, SOLUTION INTRAVENOUS at 05:43

## 2021-11-26 RX ADMIN — TRAZODONE HYDROCHLORIDE 100 MG: 100 TABLET ORAL at 21:01

## 2021-11-26 ASSESSMENT — ENCOUNTER SYMPTOMS
PALPITATIONS: 0
HEADACHES: 0
COUGH: 0
FALLS: 1
BLURRED VISION: 0
SHORTNESS OF BREATH: 1
WEAKNESS: 1
DIARRHEA: 0
NAUSEA: 0
VOMITING: 0
SORE THROAT: 0
DIZZINESS: 0
CHILLS: 0
LOSS OF CONSCIOUSNESS: 1
FEVER: 0
ORTHOPNEA: 0
DEPRESSION: 0
WHEEZING: 0
DOUBLE VISION: 0

## 2021-11-26 ASSESSMENT — PAIN DESCRIPTION - PAIN TYPE
TYPE: ACUTE PAIN

## 2021-11-26 ASSESSMENT — COGNITIVE AND FUNCTIONAL STATUS - GENERAL
TURNING FROM BACK TO SIDE WHILE IN FLAT BAD: A LITTLE
STANDING UP FROM CHAIR USING ARMS: A LITTLE
MOBILITY SCORE: 18
DRESSING REGULAR LOWER BODY CLOTHING: A LITTLE
CLIMB 3 TO 5 STEPS WITH RAILING: A LITTLE
DAILY ACTIVITIY SCORE: 21
MOVING TO AND FROM BED TO CHAIR: A LITTLE
MOVING FROM LYING ON BACK TO SITTING ON SIDE OF FLAT BED: A LITTLE
SUGGESTED CMS G CODE MODIFIER DAILY ACTIVITY: CJ
DRESSING REGULAR UPPER BODY CLOTHING: A LITTLE
WALKING IN HOSPITAL ROOM: A LITTLE
TOILETING: A LITTLE
SUGGESTED CMS G CODE MODIFIER MOBILITY: CK

## 2021-11-26 ASSESSMENT — LIFESTYLE VARIABLES
EVER FELT BAD OR GUILTY ABOUT YOUR DRINKING: NO
HAVE YOU EVER FELT YOU SHOULD CUT DOWN ON YOUR DRINKING: NO
TOTAL SCORE: 0
EVER HAD A DRINK FIRST THING IN THE MORNING TO STEADY YOUR NERVES TO GET RID OF A HANGOVER: NO
ON A TYPICAL DAY WHEN YOU DRINK ALCOHOL HOW MANY DRINKS DO YOU HAVE: 0
HAVE PEOPLE ANNOYED YOU BY CRITICIZING YOUR DRINKING: NO
TOTAL SCORE: 0
HOW MANY TIMES IN THE PAST YEAR HAVE YOU HAD 5 OR MORE DRINKS IN A DAY: 0
TOTAL SCORE: 0
ALCOHOL_USE: NO
AVERAGE NUMBER OF DAYS PER WEEK YOU HAVE A DRINK CONTAINING ALCOHOL: 0
CONSUMPTION TOTAL: NEGATIVE

## 2021-11-26 ASSESSMENT — FIBROSIS 4 INDEX: FIB4 SCORE: 3.36

## 2021-11-26 NOTE — PROGRESS NOTES
[facility] Medicine Daily Progress Note    Date of Service  11/26/2021    Chief Complaint  Junior Proctor is a 71 y.o. male admitted 11/23/2021 with loss of consciousness    Hospital Course  Abran Proctor is a 71 y.o. male who presented 11/23/2021 with acute loss of consciousness. Patient states that he has not been feeling well and has not had good oral intake for the past 5-7 days. He did not eat or drink well. He felt weak so did not go to his HD session x1. His  came to check on him today and he went to go answer the door and as he opened the door, he passed out and collapsed. Never had any similar episodes like this. Patient did not hit the ground and did not hit his head. In ED, patient noted to be tachycardic and hypotensive. Chart review reveals that patient is not hypotensive and usually hypertensive. Will admit patient for orthostatic hypotension and for fluid resuscitation      Interval Problem Update  11/26: Patient seen and examined at bedside. He is a pleasant gentleman who is not in acute distress. Denies chest pain and shortness of breath, main complaint is generalized discomfort  - Vital signs improved over previous days. Pressures and oxygenation trending positively  - Clinically appears euvolemic today. Nephrology following. Plan for HD session today  - Supplemental O2 saturations 88% on RA on evaluation. Improved to >93% on 2LNC  - Blood cultures from Dialysis catheter negative so far. Patient remains afebrile with normal WBC  - TTE demonstrated right heart strain. In conjunction with continued hypoxia CTA chest obtained  - CTA chest demonstrated occlusive and nonocclusive pulmonary emboli seen in the left lower lobe pulmonary artery. Patient was started on heparin gtt yesterday afternoon. Improvement in both oxygen saturations and SBP noted this morning  - Discussed bridging options for anticoagulation with pharmacy. Patient is safe for apixaban as a HD patient. Will start this today in  preparation for discharge.    I have personally seen and examined the patient at bedside. I discussed the plan of care with patient, bedside RN and .    Consultants/Specialty  nephrology    Code Status  Full Code    Disposition  Patient is not medically cleared.   Anticipate discharge to [facility].  I have placed the appropriate orders for post-discharge needs.    Review of Systems  Review of Systems   Constitutional: Positive for malaise/fatigue. Negative for chills and fever.   HENT: Negative for congestion, hearing loss, sore throat and tinnitus.    Eyes: Negative for blurred vision and double vision.   Respiratory: Positive for shortness of breath. Negative for cough and wheezing.    Cardiovascular: Negative for chest pain, palpitations and orthopnea.   Gastrointestinal: Negative for diarrhea, nausea and vomiting.   Musculoskeletal: Positive for falls.   Skin: Negative.    Neurological: Positive for loss of consciousness and weakness. Negative for dizziness and headaches.   Psychiatric/Behavioral: Negative for depression and suicidal ideas.        Physical Exam  Temp:  [36.3 °C (97.4 °F)-36.8 °C (98.2 °F)] 36.4 °C (97.5 °F)  Pulse:  [77-87] 81  Resp:  [16-18] 16  BP: ()/(53-63) 121/58  SpO2:  [92 %-100 %] 100 %    Physical Exam  Vitals and nursing note reviewed.   Constitutional:       General: He is not in acute distress.     Appearance: Normal appearance. He is ill-appearing.   HENT:      Head: Normocephalic and atraumatic.      Mouth/Throat:      Mouth: Mucous membranes are moist.      Pharynx: Oropharynx is clear.   Eyes:      Extraocular Movements: Extraocular movements intact.      Pupils: Pupils are equal, round, and reactive to light.   Cardiovascular:      Rate and Rhythm: Regular rhythm. Bradycardia present.      Pulses: Normal pulses.      Heart sounds: Normal heart sounds. No murmur heard.  No friction rub. No gallop.    Pulmonary:      Effort: Pulmonary effort is normal. No respiratory  distress.      Breath sounds: Rhonchi (Mild bibasilar ) present. No wheezing.   Abdominal:      General: Abdomen is flat.      Palpations: Abdomen is soft.      Tenderness: There is no abdominal tenderness. There is no guarding.   Musculoskeletal:         General: No swelling, tenderness or signs of injury.      Cervical back: Neck supple.      Right lower leg: Edema present.      Left lower leg: Edema present.      Comments: BLE +1 edema present   Skin:     General: Skin is warm and dry.      Capillary Refill: Capillary refill takes 2 to 3 seconds.   Neurological:      General: No focal deficit present.      Mental Status: He is alert and oriented to person, place, and time.      Cranial Nerves: No cranial nerve deficit.   Psychiatric:         Mood and Affect: Mood normal.         Behavior: Behavior normal.         Fluids    Intake/Output Summary (Last 24 hours) at 11/26/2021 0954  Last data filed at 11/25/2021 1258  Gross per 24 hour   Intake 500 ml   Output 600 ml   Net -100 ml       Laboratory  Recent Labs     11/23/21  1709 11/24/21  0009 11/25/21  0209   WBC 7.3 7.8 6.0   RBC 2.88* 2.54* 2.58*   HEMOGLOBIN 8.6* 7.5* 7.6*   HEMATOCRIT 27.3* 23.9* 24.9*   MCV 94.8 94.1 96.5   MCH 29.9 29.5 29.5   MCHC 31.5* 31.4* 30.5*   RDW 55.8* 55.7* 59.9*   PLATELETCT 178 153* 137*   MPV 10.5 10.7 10.6     Recent Labs     11/24/21  0009 11/25/21  0209 11/26/21  0509   SODIUM 140 141 139   POTASSIUM 4.3 4.1 5.5   CHLORIDE 96 103 105   CO2 26 30 24   GLUCOSE 99 118* 91   BUN 45* 24* 24*   CREATININE 11.78* 7.08* 6.41*   CALCIUM 8.1* 8.1* 8.3*     Recent Labs     11/25/21  1504   APTT 32.3   INR 1.12               Imaging  CT-CTA CHEST PULMONARY ARTERY W/ RECONS   Final Result   Addendum 1 of 1   Findings were discussed with Dr. Antoine on 11/25/2021 2:05 PM.      Final      1.  Occlusive and nonocclusive pulmonary emboli in the left lower lobe pulmonary artery, its segmental and subsegmental branches. Associated right heart  strain.   2.  Small bilateral pleural effusions, right greater than left, and associated atelectasis.      Attempts to contact MAGGIE CAMACHO regarding potentially critical findings were initiated on 11/25/2021 2:01 PM.            EC-ECHOCARDIOGRAM COMPLETE W/O CONT   Final Result      CT-HEAD W/O   Final Result      No acute intracranial abnormality is identified.      Moderate white matter hypodensity is present.  This is a nonspecific finding which usually is found to represent chronic microvascular disease in patient's of this demographic.  Demyelination, age indeterminant ischemia and gliosis are also common    possibilities.      Age-appropriate atrophy      Improving sinus inflammatory disease      DX-CHEST-PORTABLE (1 VIEW)   Final Result      1.  Mild right lateral airspace process which could be scarring or pneumonia      2.  Enlarged cardiac silhouette      3.  Left central venous catheter present which appears appropriately located           Assessment/Plan  * Hypotension- (present on admission)  Assessment & Plan  -Due to poor oral intake  -Tachycardia improved with fluids  -Respiratory status stable  -Increase Midodrine to 10mg TID    Pulmonary embolism (HCC)  Assessment & Plan  Right heart strain noted on TTE  CTA demonstrates occlusive and nonocclusive pulmonary emboli seen in the left lower lobe pulmonary artery   Started on standard intensity heparin gtt 11/25  Discussed with pharmacy, sree to transition to PO apixaban in setting of chronic HD treatment      Acute respiratory failure with hypoxia (HCC)  Assessment & Plan  Does not require oxygen at baseline  Presently requiring 2LNC  TTE shows right heart strain  CTA demonstrates occlusive and nonocclusive pulmonary emboli are seen in the left lower lobe pulmonary artery. Started on heparin gtt and then transitioned to Eliquis      Hepatitis C antibody positive in blood  Assessment & Plan  HCV Antibody Reactive on viral panel  Await viral  load    ESRD on dialysis (HCC)- (present on admission)  Assessment & Plan  Has outpatient chair with Janelle OCONNOR, Th, Sa  Missed Tuesday HD due to weakness and falling  Nephrology consulted, mell recs  Abbreviated HD session yesterday due to need for stat CTA  Will do HD again today    Dehydration- (present on admission)  Assessment & Plan  -Patients states poor PO intake x2 weeks  -Clinically appears mildly volume overloaded. Closely monitor status     BPH (benign prostatic hyperplasia)- (present on admission)  Assessment & Plan  -Resume home meds       VTE prophylaxis: SCDs/TEDs and heparin ppx    I have performed a physical exam and reviewed and updated ROS and Plan today (11/26/2021). In review of yesterday's note (11/25/2021), there are no changes except as documented above.

## 2021-11-26 NOTE — PROGRESS NOTES
I have received report from day shift RN. Heparin gtt verified with day shift RN. I have assumed care of this patient. Patient has been assessed (see flow sheet) and plan of care has been discussed. Patient verbalizes understanding of plan and denies any further needs at this time. Patient is now resting in bed, bed is in the lowest position and locked, and the call light is within reach.     COVID-19 surge in effect

## 2021-11-26 NOTE — CARE PLAN
The patient is Stable - Low risk of patient condition declining or worsening    Shift Goals  Clinical Goals: comfort, dialysis  Patient Goals: comfort  Family Goals: jace    Progress made toward(s) clinical / shift goals:  HD scheduled, pain med given as needed    Patient is not progressing towards the following goals:

## 2021-11-26 NOTE — PROGRESS NOTES
Patient ambulated up to bathroom with stand by assistance. Patient has staggering gait. Tolerated activity without incident.

## 2021-11-26 NOTE — ASSESSMENT & PLAN NOTE
-Due to poor oral intake  -Tachycardia improved with fluids  -Respiratory status stable  -Increase Midodrine to 10mg TID  
-Patients states poor PO intake x2 weeks  -Clinically appears mildly volume overloaded. Closely monitor status   
-Resume home meds  
Does not require oxygen at baseline  Presently requiring 2LNC  TTE shows right heart strain  CTA demonstrates occlusive and nonocclusive pulmonary emboli are seen in the left lower lobe pulmonary artery. Started on heparin gtt and then transitioned to Eliquis    
HCV Antibody Reactive on viral panel  Await viral load  
Has outpatient chair with Janelle OCONNOR, Th, Sa  Missed Tuesday HD due to weakness and falling  Nephrology consulted, mell recs  Abbreviated HD session yesterday due to need for stat CTA  Will do HD again today  
Right heart strain noted on TTE  CTA demonstrates occlusive and nonocclusive pulmonary emboli seen in the left lower lobe pulmonary artery   Started on standard intensity heparin gtt 11/25  Discussed with pharmacy, okay to transition to PO apixaban in setting of chronic HD treatment    
bilateral  lower extremity Active ROM was WFL (within functional limits)/bilateral upper extremity Active ROM was WFL (within functional limits)

## 2021-11-26 NOTE — CARE PLAN
The patient is Watcher - Medium risk of patient condition declining or worsening    Shift Goals  Clinical Goals: Dialysis  Patient Goals: rest    Progress made toward(s) clinical / shift goals:    Problem: Syncope Management  Goal: Patient's signs and symptoms of syncope will be assessed and managed  11/25/2021 2130 by Susan Peck R.N.  Outcome: Progressing  11/25/2021 2128 by Susan Peck R.N.  Outcome: Progressing  Goal: Cardiac arrhythmias will be absent or managed  11/25/2021 2130 by Susan Peck R.N.  Outcome: Progressing  11/25/2021 2128 by Susan Peck R.N.  Outcome: Progressing  Goal: Patient's vital signs will be with within normal range or improve  11/25/2021 2130 by Susan Peck R.N.  Outcome: Progressing  11/25/2021 2128 by Susan Peck R.N.  Outcome: Progressing  Goal: Patient's risk for medication side effects that could worsen signs and symptoms of syncope will be decreased  11/25/2021 2130 by Susan Peck R.N.  Outcome: Progressing  11/25/2021 2128 by Susan Peck R.N.  Outcome: Progressing     Problem: Discharge Barriers/Planning  Goal: Patient's continuum of care needs are met  11/25/2021 2130 by Susan Peck R.N.  Outcome: Progressing  11/25/2021 2128 by Susan Peck R.N.  Outcome: Progressing     Problem: Hemodynamics  Goal: Patient's hemodynamics, fluid balance and neurologic status will be stable or improve  11/25/2021 2130 by Susan Peck R.N.  Outcome: Progressing  11/25/2021 2128 by Susan Peck R.N.  Outcome: Progressing     Problem: Fluid Volume  Goal: Fluid volume balance will be maintained  11/25/2021 2130 by Susan Peck R.N.  Outcome: Progressing  11/25/2021 2128 by Susan Peck R.N.  Outcome: Progressing     Problem: Self Care  Goal: Patient will have the ability to perform ADLs independently or with assistance (bathe, groom, dress, toilet and feed)  11/25/2021 2130 by MARANDA ReadNIshmael  Outcome:  Progressing  11/25/2021 2128 by Susan Peck R.NIshmael  Outcome: Progressing     Problem: Respiratory  Goal: Patient will achieve/maintain optimum respiratory ventilation and gas exchange  Outcome: Progressing     Problem: Optimal Care of the Patient with VTE  Goal: Peripheral tissue perfusion will improve  Outcome: Progressing  Goal: Complications related to the disease process, condition or treatment will be avoided or minimized  Outcome: Progressing  Goal: Symptoms of redness, swelling, and pain at the site of impaired tissue perfusion will improve  Outcome: Progressing     Problem: Knowledge Deficit - VTE  Goal: Patient and family/care givers will demonstrate understanding of plan of care, disease process/condition, diagnostic tests and medications  Outcome: Progressing       Patient is not progressing towards the following goals:

## 2021-11-26 NOTE — PROGRESS NOTES
"[facility] -  PROGRESS NOTE               Author: Afshin Villegas M.D. Date & Time: 11/26/2021  10:17 AM     HPI:  70 y/o man with ESRD HD T,TH,Sat who presnted with increased weakness from prior. Pt stated that he \"ran out of gas\" It has been difficult for him to ambulate and he gets very fatiguied with SHAY.  Pt denies any new medications.     No F/C/N/V/SOB/CP  No melena, hematochezia, hematemesis.  No HA, visual changes, or abdominal pain.    DAILY NEPHROLOGY SUMMARY:  11/24: Consult done  11/25: Tolerated HD, ongoing hypotension, requiring 6L 02, neck pain and cramping   11/26: CT scan yesterday confirmed PE    PAST FAMILY HISTORY: Reviewed and Unchanged  SOCIAL HISTORY: Reviewed and Unchanged  CURRENT MEDICATIONS: Reviewed  IMAGING STUDIES: Reviewed    ROS  10 point ROS was performed and is as per HPI or otherwise negative    PHYSICAL EXAM  VS:  /58   Pulse 81   Temp 36.4 °C (97.5 °F) (Temporal)   Resp 16   Ht 1.753 m (5' 9\")   Wt 74.6 kg (164 lb 7.4 oz)   SpO2 100%   BMI 24.29 kg/m²   GENERAL: no acute distress  CV: +pedal edema  RESP: non-labored  GI: Soft  MSK: No joint deformities   SKIN: No concerning rashes  NEURO: AOx3  PSYCH: Cooperative    Fluids:  In: 500 [Dialysis:500]  Out: 600     LABS:  Recent Results (from the past 24 hour(s))   aPTT    Collection Time: 11/25/21  3:04 PM   Result Value Ref Range    APTT 32.3 24.7 - 36.0 sec   Prothrombin Time    Collection Time: 11/25/21  3:04 PM   Result Value Ref Range    PT 14.1 12.0 - 14.6 sec    INR 1.12 0.87 - 1.13   Heparin Xa (Unfractionated)    Collection Time: 11/25/21  3:04 PM   Result Value Ref Range    Heparin Xa (UFH) <0.10 IU/mL   Heparin Anti-Xa    Collection Time: 11/25/21 10:43 PM   Result Value Ref Range    Heparin Xa (UFH) 0.78 IU/mL   Comp Metabolic Panel    Collection Time: 11/26/21  5:09 AM   Result Value Ref Range    Sodium 139 135 - 145 mmol/L    Potassium 5.5 3.6 - 5.5 mmol/L    Chloride 105 96 - " 112 mmol/L    Co2 24 20 - 33 mmol/L    Anion Gap 10.0 7.0 - 16.0    Glucose 91 65 - 99 mg/dL    Bun 24 (H) 8 - 22 mg/dL    Creatinine 6.41 (HH) 0.50 - 1.40 mg/dL    Calcium 8.3 (L) 8.5 - 10.5 mg/dL    AST(SGOT) 20 12 - 45 U/L    ALT(SGPT) 9 2 - 50 U/L    Alkaline Phosphatase 82 30 - 99 U/L    Total Bilirubin 0.4 0.1 - 1.5 mg/dL    Albumin 2.6 (L) 3.2 - 4.9 g/dL    Total Protein 6.3 6.0 - 8.2 g/dL    Globulin 3.7 (H) 1.9 - 3.5 g/dL    A-G Ratio 0.7 g/dL   MAGNESIUM    Collection Time: 11/26/21  5:09 AM   Result Value Ref Range    Magnesium 1.7 1.5 - 2.5 mg/dL   Heparin Anti-Xa    Collection Time: 11/26/21  5:09 AM   Result Value Ref Range    Heparin Xa (UFH) 0.52 IU/mL   ESTIMATED GFR    Collection Time: 11/26/21  5:09 AM   Result Value Ref Range    GFR If African American 10 (A) >60 mL/min/1.73 m 2    GFR If Non African American 9 (A) >60 mL/min/1.73 m 2       (click the triangle to expand results)      ASSESSMENT:  # ESRD  - T,TH,SAT  # Hypotension  - blood CX negative  -? PE  # Anemia of CKD  -  Check iron stores  # Edema  #PE     PLAN:  - HD again today Friday  - anticoagualtion  -midodrine  - Dose all meds per ESRD

## 2021-11-26 NOTE — PROGRESS NOTES
Heparin Xa collected and tubed to lab at 2243 and has not yet resulted. I have called lab to check on status of sample, they said it should be fininshed in the next 15 minutes. Will continue to watch chart for results.

## 2021-11-26 NOTE — PROGRESS NOTES
Missed lab draw attempt. Called Michelle in phlebotomy and she acknowledged she will send someone to draw his 2230 lab.

## 2021-11-26 NOTE — PROGRESS NOTES
[facility] Progress Note       HD ordered by Dr. Villegas. Treatment started at 0801 and ended at 1001.    Net UF removed: 1130mL.     Pt tolerated treatment well with no issues, tx ended 2 hours in per Neph MD as pt will receive HD again tomorrow. See flow sheet for details. CVC patent, locked with Heparin 1:1000 units; 1.9 mL to RED port and 2.0 mL to BLUE port post dialysis. No s/s of infection present. Dressing in place, CDI. Report given to JULIÁN De La Torre RN.

## 2021-11-27 VITALS
TEMPERATURE: 97.5 F | RESPIRATION RATE: 16 BRPM | HEIGHT: 69 IN | OXYGEN SATURATION: 95 % | WEIGHT: 164.46 LBS | BODY MASS INDEX: 24.36 KG/M2 | SYSTOLIC BLOOD PRESSURE: 119 MMHG | DIASTOLIC BLOOD PRESSURE: 71 MMHG | HEART RATE: 92 BPM

## 2021-11-27 LAB
HCV RNA SERPL NAA+PROBE-ACNC: NOT DETECTED IU/ML
HCV RNA SERPL NAA+PROBE-LOG IU: NOT DETECTED LOG IU/ML
HCV RNA SERPL QL NAA+PROBE: NOT DETECTED

## 2021-11-27 PROCEDURE — 90935 HEMODIALYSIS ONE EVALUATION: CPT

## 2021-11-27 PROCEDURE — 99239 HOSP IP/OBS DSCHRG MGMT >30: CPT | Performed by: STUDENT IN AN ORGANIZED HEALTH CARE EDUCATION/TRAINING PROGRAM

## 2021-11-27 PROCEDURE — 700102 HCHG RX REV CODE 250 W/ 637 OVERRIDE(OP): Performed by: INTERNAL MEDICINE

## 2021-11-27 PROCEDURE — A9270 NON-COVERED ITEM OR SERVICE: HCPCS | Performed by: INTERNAL MEDICINE

## 2021-11-27 PROCEDURE — 700102 HCHG RX REV CODE 250 W/ 637 OVERRIDE(OP): Performed by: NURSE PRACTITIONER

## 2021-11-27 PROCEDURE — 700111 HCHG RX REV CODE 636 W/ 250 OVERRIDE (IP)

## 2021-11-27 PROCEDURE — A9270 NON-COVERED ITEM OR SERVICE: HCPCS | Performed by: NURSE PRACTITIONER

## 2021-11-27 RX ORDER — MIDODRINE HYDROCHLORIDE 10 MG/1
10 TABLET ORAL
Qty: 42 TABLET | Refills: 0 | Status: SHIPPED | OUTPATIENT
Start: 2021-11-27 | End: 2021-12-11

## 2021-11-27 RX ORDER — MIDODRINE HYDROCHLORIDE 10 MG/1
10 TABLET ORAL
Qty: 42 TABLET | Refills: 0 | Status: SHIPPED | OUTPATIENT
Start: 2021-11-27 | End: 2021-11-27

## 2021-11-27 RX ORDER — OXYCODONE HYDROCHLORIDE AND ACETAMINOPHEN 5; 325 MG/1; MG/1
1 TABLET ORAL EVERY 6 HOURS PRN
Qty: 14 TABLET | Refills: 0 | Status: SHIPPED | OUTPATIENT
Start: 2021-11-27 | End: 2021-11-27

## 2021-11-27 RX ORDER — OXYCODONE HYDROCHLORIDE AND ACETAMINOPHEN 5; 325 MG/1; MG/1
1 TABLET ORAL EVERY 6 HOURS PRN
Qty: 14 TABLET | Refills: 0 | Status: SHIPPED | OUTPATIENT
Start: 2021-11-27 | End: 2021-12-04

## 2021-11-27 RX ORDER — HEPARIN SODIUM 1000 [USP'U]/ML
INJECTION, SOLUTION INTRAVENOUS; SUBCUTANEOUS
Status: COMPLETED
Start: 2021-11-27 | End: 2021-11-27

## 2021-11-27 RX ADMIN — HEPARIN SODIUM 3900 UNITS: 1000 INJECTION, SOLUTION INTRAVENOUS; SUBCUTANEOUS at 11:09

## 2021-11-27 RX ADMIN — OXYCODONE HYDROCHLORIDE AND ACETAMINOPHEN 1 TABLET: 5; 325 TABLET ORAL at 12:05

## 2021-11-27 RX ADMIN — MIDODRINE HYDROCHLORIDE 10 MG: 5 TABLET ORAL at 05:22

## 2021-11-27 RX ADMIN — Medication 400 MG: at 12:05

## 2021-11-27 RX ADMIN — TAMSULOSIN HYDROCHLORIDE 0.4 MG: 0.4 CAPSULE ORAL at 05:22

## 2021-11-27 RX ADMIN — OXYCODONE HYDROCHLORIDE AND ACETAMINOPHEN 1 TABLET: 5; 325 TABLET ORAL at 05:22

## 2021-11-27 RX ADMIN — HEPARIN SODIUM 2000 UNITS: 1000 INJECTION, SOLUTION INTRAVENOUS; SUBCUTANEOUS at 08:01

## 2021-11-27 RX ADMIN — APIXABAN 10 MG: 5 TABLET, FILM COATED ORAL at 05:22

## 2021-11-27 RX ADMIN — MIDODRINE HYDROCHLORIDE 10 MG: 5 TABLET ORAL at 12:05

## 2021-11-27 ASSESSMENT — PAIN DESCRIPTION - PAIN TYPE: TYPE: ACUTE PAIN

## 2021-11-27 NOTE — DISCHARGE SUMMARY
"Discharge Summary    CHIEF COMPLAINT ON ADMISSION  Chief Complaint   Patient presents with   • ALOC     pt [facility] and [facility] went to check [facility], found him altered and lethargic. Pt fell but was able to lower himself to the ground. Per EMS, AOx2 on scene. AOx4 but lethargic on arrival.    • Weakness     fatigue and weakness x 1 wk with poor PO intake. Per EMS, there was little food in the house. Pt states that he \"just hasn't been hungry\" lately.       Reason for Admission  Syncope and collapse     Admission Date  11/23/2021    CODE STATUS  Full Code    HPI & HOSPITAL COURSE  Abran Proctor is a 71 y.o. male who presented 11/23/2021 with acute loss of consciousness. Patient was found to be tachycardic and hypotensive due to acute pulmonary embolism. Patient started on heparin drip and transitioned to eliquis. Patient required minimal nasal cannula oxygen during hospital stay which was able to be weaned off prior to discharge. Regularly scheduled hemodialysis provided during inpatient stay. Patient feeling well, he is to follow up with PCP and dialysis center.      Therefore, he is discharged in good and stable condition to home with close outpatient follow-up.    The patient met 2-midnight criteria for an inpatient stay at the time of discharge.    Discharge Date  11/27/2021    FOLLOW UP ITEMS POST DISCHARGE  Take medications as prescribed.  Follow up with PCP and dialysis center.    DISCHARGE DIAGNOSES  Principal Problem:    Hypotension POA: Yes  Active Problems:    BPH (benign prostatic hyperplasia) POA: Yes    Dehydration POA: Yes    ESRD on dialysis (HCC) (Chronic) POA: Yes    Hepatitis C antibody positive in blood POA: Unknown    Acute respiratory failure with hypoxia (HCC) POA: Unknown    Pulmonary embolism (HCC) POA: Unknown  Resolved Problems:    * No resolved hospital problems. *      FOLLOW UP  No future appointments.  [facility]  [address] [place] [address]  [place] [address]  [phone]  In 1 week        MEDICATIONS ON DISCHARGE     Medication List      START taking these medications      Instructions   apixaban 5mg Tabs  Start taking on: November 27, 2021  Commonly known as: ELIQUIS   Take 2 Tablets by mouth 2 times a day for 6 days, THEN 1 Tablet 2 times a day for 30 days. Indications: DVT/PE     midodrine 10 MG tablet  Commonly known as: PROAMATINE   Take 1 Tablet by mouth 3 times a day with meals for 14 days.  Dose: 10 mg     oxyCODONE-acetaminophen 5-325 MG Tabs  Commonly known as: PERCOCET   Take 1 Tablet by mouth every 6 hours as needed for Severe Pain for up to 7 days.  Dose: 1 Tablet        CONTINUE taking these medications      Instructions   allopurinol 100 MG Tabs  Commonly known as: ZYLOPRIM   Take 100 mg by mouth 3 times a week. Taken post hemodialysis  Dose: 100 mg     aspirin EC 81 MG Tbec  Commonly known as: ECOTRIN   Take 81 mg by mouth every day.  Dose: 81 mg     atorvastatin 20 MG Tabs  Commonly known as: LIPITOR   Take 20 mg by mouth every evening.  Dose: 20 mg     Buprenorphine 10 MCG/HR Ptwk   Place 10 mcg on the skin every 7 days.  Dose: 10 mcg     carvedilol 6.25 MG Tabs  Commonly known as: COREG   Take 6.25 mg by mouth 2 times a day with meals.  Dose: 6.25 mg     diclofenac sodium 1 % Gel  Commonly known as: Voltaren   Apply 2 g topically 4 times a day as needed (Osetoarthritis pain).  Dose: 2 g     Etanercept 50 MG/ML Sosy   Inject 50 mg under the skin every 7 days.  Dose: 50 mg     isosorbide dinitrate 20 MG Tabs  Commonly known as: ISORDIL   Take 10 mg by mouth 2 times a day. Taken 7 hours apart, last dose no later than dinner   Indications: Stable Angina Pectoris  Dose: 10 mg     * Lidocaine 4 % Ptch   Apply 1 Patch topically every day. Remove after 12 hours  Dose: 1 Patch     * lidocaine 5 % Oint  Commonly known as: XYLOCAINE   Apply 1 g topically in the morning, at noon, and at bedtime.  Dose: 1  g     melatonin 3 MG Tabs   Take 9 mg by mouth at bedtime as needed (sleep).  Dose: 9 mg     pregabalin 25 MG Caps  Commonly known as: LYRICA   Take 25 mg by mouth at bedtime.  Dose: 25 mg     sevelamer 800 MG Tabs  Commonly known as: RENAGEL   Take 800 mg by mouth 3 times a day with meals.  Dose: 800 mg     tamsulosin 0.4 MG capsule  Commonly known as: FLOMAX   Take 0.4 mg by mouth every morning.  Dose: 0.4 mg     traZODone 50 MG Tabs  Commonly known as: DESYREL   Take 25 mg by mouth every evening. Indications: Trouble Sleeping  Dose: 25 mg     vitamin D2 (Ergocalciferol) 1.25 MG (92141 UT) Caps capsule  Commonly known as: Drisdol   Take 50,000 Units by mouth every Wednesday.  Dose: 50,000 Units         * This list has 2 medication(s) that are the same as other medications prescribed for you. Read the directions carefully, and ask your doctor or other care provider to review them with you.                Allergies  Allergies   Allergen Reactions   • Motrin [Ibuprofen]      hhx of stomach ulcers       DIET  Orders Placed This Encounter   Procedures   • Diet Order Diet: Renal     Standing Status:   Standing     Number of Occurrences:   1     Order Specific Question:   Diet:     Answer:   Renal [8]       ACTIVITY  As tolerated.  Weight bearing as tolerated    CONSULTATIONS  nephrology    PROCEDURES  none    LABORATORY  Lab Results   Component Value Date    SODIUM 139 11/26/2021    POTASSIUM 5.5 11/26/2021    CHLORIDE 105 11/26/2021    CO2 24 11/26/2021    GLUCOSE 91 11/26/2021    BUN 24 (H) 11/26/2021    CREATININE 6.41 (HH) 11/26/2021        Lab Results   Component Value Date    WBC 6.4 11/26/2021    HEMOGLOBIN 8.1 (L) 11/26/2021    HEMATOCRIT 27.0 (L) 11/26/2021    PLATELETCT 134 (L) 11/26/2021        Total time of the discharge process exceeds 34 minutes.

## 2021-11-27 NOTE — PROGRESS NOTES
4 Eyes Skin Assessment Completed by Loraine BERTRAND & Kerry PICKENS RN and Stanley CABEZAS RN.    Head WDL  Ears Blanching  Nose WDL  Mouth WDL  Neck WDL  Breast/Chest WDL  Shoulder Blades WDL  Spine Blanching, dryness  (R) Arm/Elbow/Hand Blanching  (L) Arm/Elbow/Hand Blanching  Abdomen WDL  Groin WDL  Scrotum/Coccyx/Buttocks Blanching  (R) Leg dry, flakey  (L) Leg dry, flakey  (R) Heel/Foot/Toe Blanching, peeling, dryness  (L) Heel/Foot/Toe Blanching, peeling, dryness           Devices In Places Tele Box and Pulse Ox      Interventions In Place Pillows    Possible Skin Injury No    Pictures Uploaded Into Epic N/A  Wound Consult Placed N/A  RN Wound Prevention Protocol Ordered No

## 2021-11-27 NOTE — DISCHARGE PLANNING
CM notified that patient needs a cab voucher for transportation home. CM provided voucher to Primary nurse.

## 2021-11-27 NOTE — CARE PLAN
The patient is Stable - Low risk of patient condition declining or worsening    Shift Goals  Clinical Goals: Monitor BP  Patient Goals: Comfort    Problem: Knowledge Deficit - VTE  Goal: Patient and family/care givers will demonstrate understanding of plan of care, disease process/condition, diagnostic tests and medications  Outcome: Progressing  Note: Discussed POC and medications with patient.  Patient verbalized understanding.

## 2021-11-27 NOTE — PROGRESS NOTES
"[facility] -  PROGRESS NOTE               Author: DARIN Gonzalez Date & Time: 11/27/2021  11:22 AM     HPI:  72 y/o man with ESRD HD T,TH,Sat who presnted with increased weakness from prior. Pt stated that he \"ran out of gas\" It has been difficult for him to ambulate and he gets very fatiguied with SHAY.  Pt denies any new medications.     No F/C/N/V/SOB/CP  No melena, hematochezia, hematemesis.  No HA, visual changes, or abdominal pain.    DAILY NEPHROLOGY SUMMARY:  11/24: Consult done  11/25: Tolerated HD, ongoing hypotension, requiring 6L 02, neck pain and cramping   11/26: CT scan yesterday confirmed PE  11/27: 1130 mL Net UF. Seen on HD this am, completing HD tx with 1 L net UF. Denies any CP/SOB.      PAST FAMILY HISTORY: Reviewed and Unchanged  SOCIAL HISTORY: Reviewed and Unchanged  CURRENT MEDICATIONS: Reviewed  IMAGING STUDIES: Reviewed    ROS  10 point ROS was performed and is as per HPI or otherwise negative    PHYSICAL EXAM  VS:  /81   Pulse 90   Temp 36.4 °C (97.5 °F) (Temporal)   Resp 18   Ht 1.753 m (5' 9\")   Wt 74.6 kg (164 lb 7.4 oz)   SpO2 96%   BMI 24.29 kg/m²   GENERAL: no acute distress  CV: +pedal edema non pitting   RESP: non-labored  GI: Soft  MSK: No joint deformities   SKIN: No concerning rashes  NEURO: AOx3  PSYCH: Cooperative    Fluids:  In: 858 [P.O.:358; Dialysis:500]  Out: 1630     LABS:  Recent Results (from the past 24 hour(s))   CBC WITHOUT DIFFERENTIAL    Collection Time: 11/26/21  7:03 PM   Result Value Ref Range    WBC 6.4 4.8 - 10.8 K/uL    RBC 2.75 (L) 4.70 - 6.10 M/uL    Hemoglobin 8.1 (L) 14.0 - 18.0 g/dL    Hematocrit 27.0 (L) 42.0 - 52.0 %    MCV 98.2 (H) 81.4 - 97.8 fL    MCH 29.5 27.0 - 33.0 pg    MCHC 30.0 (L) 33.7 - 35.3 g/dL    RDW 64.4 (H) 35.9 - 50.0 fL    Platelet Count 134 (L) 164 - 446 K/uL    MPV 10.9 9.0 - 12.9 fL       (click the triangle to expand results)      ASSESSMENT:  # ESRD  - T,TH,SAT  # Hypotension  - " blood CX negative  - LLL non occlu/occ PE 11/25 CTA  # Anemia of CKD  -  Check iron stores  # Edema  #PE     PLAN:  - HD again today Saturday. Then QTTS and PRN  - Anticoagualtion  - Continue Midodrine  - Dose all meds per ESRD

## 2021-11-27 NOTE — PROGRESS NOTES
Received report from Jackie GUTIERREZ RN. Assumed care of patient with Kerry CABEZAS RN. Monitor confirmed on, HR 98 SR. Fall precautions in place. Assessment complete. Pt states 6/10 pain, will medicate. All questions answered, no needs at this time.

## 2021-11-27 NOTE — CARE PLAN
The patient is Stable - Low risk of patient condition declining or worsening    Shift Goals  Clinical Goals: (P) Monitor BP  Patient Goals: (P) Comfort  Family Goals: jace      Problem: Syncope Management  Goal: Patient's signs and symptoms of syncope will be assessed and managed  Outcome: Progressing  Note: Monitoring of heart monitor & BP for hypotension     Problem: Respiratory  Goal: Patient will achieve/maintain optimum respiratory ventilation and gas exchange  Outcome: Progressing  Note: Pt on RA, using incentive spirometer and deep breathing exercises. O2 sats above 90%

## 2021-11-27 NOTE — DISCHARGE INSTRUCTIONS
Discharge Instructions    Discharged to home by taxi with self. Discharged via wheelchair, hospital escort: Yes.  Special equipment needed: Not Applicable    Be sure to schedule a follow-up appointment with your primary care doctor or any specialists as instructed.     Discharge Plan:   Diet Plan: Discussed  Activity Level: Discussed  Confirmed Follow up Appointment: Patient to Call and Schedule Appointment  Confirmed Symptoms Management: Discussed  Medication Reconciliation Updated: Yes  Influenza Vaccine Indication: Patient Refuses    I understand that a diet low in cholesterol, fat, and sodium is recommended for good health. Unless I have been given specific instructions below for another diet, I accept this instruction as my diet prescription.   Other diet: Renal      Special Instructions: None    · Is patient discharged on Warfarin / Coumadin?   No     Depression / Suicide Risk    As you are discharged from this [facility] facility, it is important to learn how to keep safe from harming yourself.    Recognize the warning signs:  · Abrupt changes in personality, positive or negative- including increase in energy   · Giving away possessions  · Change in eating patterns- significant weight changes-  positive or negative  · Change in sleeping patterns- unable to sleep or sleeping all the time   · Unwillingness or inability to communicate  · Depression  · Unusual sadness, discouragement and loneliness  · Talk of wanting to die  · Neglect of personal appearance   · Rebelliousness- reckless behavior  · Withdrawal from people/activities they love  · Confusion- inability to concentrate     If you or a loved one observes any of these behaviors or has concerns about self-harm, here's what you can do:  · Talk about it- your feelings and reasons for harming yourself  · Remove any means that you might use to hurt yourself (examples: pills, rope, extension cords, firearm)  · Get professional help from the community (Mental  Health, Substance Abuse, psychological counseling)  · Do not be alone:Call your Safe Contact- someone whom you trust who will be there for you.  · Call your local CRISIS HOTLINE 731-4943 or 057-551-4160  · Call your local Children's Mobile Crisis Response Team [place] (122) 050-6112 or www.Locu  · Call the toll free National Suicide Prevention Hotlines   · National Suicide Prevention Lifeline 991-124-DKRL (4349)  · National Hope Line Network 800-SUICIDE (033-2043)    Discharge Instructions    Discharged to home by taxi with escort. Discharged via wheelchair, hospital escort: Yes.  Special equipment needed: Not Applicable    Be sure to schedule a follow-up appointment with your primary care doctor or any specialists as instructed.     Discharge Plan:   Diet Plan: Discussed  Activity Level: Discussed  Confirmed Follow up Appointment: Patient to Call and Schedule Appointment  Confirmed Symptoms Management: Discussed  Medication Reconciliation Updated: Yes  Influenza Vaccine Indication: Patient Refuses    I understand that a diet low in cholesterol, fat, and sodium is recommended for good health. Unless I have been given specific instructions below for another diet, I accept this instruction as my diet prescription.   Other diet: cardiac    Special Instructions: None    · Is patient discharged on Warfarin / Coumadin?   No     Depression / Suicide Risk    As you are discharged from this [facility] facility, it is important to learn how to keep safe from harming yourself.    Recognize the warning signs:  · Abrupt changes in personality, positive or negative- including increase in energy   · Giving away possessions  · Change in eating patterns- significant weight changes-  positive or negative  · Change in sleeping patterns- unable to sleep or sleeping all the time   · Unwillingness or inability to communicate  · Depression  · Unusual sadness, discouragement and loneliness  · Talk of wanting to  die  · Neglect of personal appearance   · Rebelliousness- reckless behavior  · Withdrawal from people/activities they love  · Confusion- inability to concentrate     If you or a loved one observes any of these behaviors or has concerns about self-harm, here's what you can do:  · Talk about it- your feelings and reasons for harming yourself  · Remove any means that you might use to hurt yourself (examples: pills, rope, extension cords, firearm)  · Get professional help from the community (Mental Health, Substance Abuse, psychological counseling)  · Do not be alone:Call your Safe Contact- someone whom you trust who will be there for you.  · Call your local CRISIS HOTLINE 408-2509 or 339-483-9306  · Call your local Children's Mobile Crisis Response Team [place] (559) 442-7459 or www.Quaam  · Call the toll free National Suicide Prevention Hotlines   · National Suicide Prevention Lifeline 924-300-JMUB (7308)  · [place] Line Network 800-SUICIDE (430-7309)        Apixaban oral tablets  What is this medicine?  APIXABAN (a PIX a ban) is an anticoagulant (blood thinner). It is used to lower the chance of stroke in people with a medical condition called atrial fibrillation. It is also used to treat or prevent blood clots in the lungs or in the veins.  This medicine may be used for other purposes; ask your health care provider or pharmacist if you have questions.  COMMON BRAND NAME(S): Eliquis  What should I tell my health care provider before I take this medicine?  They need to know if you have any of these conditions:  · antiphospholipid antibody syndrome  · bleeding disorders  · bleeding in the brain  · blood in your stools (black or tarry stools) or if you have blood in your vomit  · history of blood clots  · history of stomach bleeding  · kidney disease  · liver disease  · mechanical heart valve  · an unusual or allergic reaction to apixaban, other medicines, foods, dyes, or preservatives  · pregnant  or trying to get pregnant  · breast-feeding  How should I use this medicine?  Take this medicine by mouth with a glass of water. Follow the directions on the prescription label. You can take it with or without food. If it upsets your stomach, take it with food. Take your medicine at regular intervals. Do not take it more often than directed. Do not stop taking except on your doctor's advice. Stopping this medicine may increase your risk of a blood clot. Be sure to refill your prescription before you run out of medicine.  Talk to your pediatrician regarding the use of this medicine in children. Special care may be needed.  Overdosage: If you think you have taken too much of this medicine contact a poison control center or emergency room at once.  NOTE: This medicine is only for you. Do not share this medicine with others.  What if I miss a dose?  If you miss a dose, take it as soon as you can. If it is almost time for your next dose, take only that dose. Do not take double or extra doses.  What may interact with this medicine?  This medicine may interact with the following:  · aspirin and aspirin-like medicines  · certain medicines for fungal infections like ketoconazole and itraconazole  · certain medicines for seizures like carbamazepine and phenytoin  · certain medicines that treat or prevent blood clots like warfarin, enoxaparin, and dalteparin  · clarithromycin  · NSAIDs, medicines for pain and inflammation, like ibuprofen or naproxen  · rifampin  · ritonavir  · [place]'s wort  This list may not describe all possible interactions. Give your health care provider a list of all the medicines, herbs, non-prescription drugs, or dietary supplements you use. Also tell them if you smoke, drink alcohol, or use illegal drugs. Some items may interact with your medicine.  What should I watch for while using this medicine?  Visit your healthcare professional for regular checks on your progress. You may need blood work done  while you are taking this medicine. Your condition will be monitored carefully while you are receiving this medicine. It is important not to miss any appointments.  Avoid sports and activities that might cause injury while you are using this medicine. Severe falls or injuries can cause unseen bleeding. Be careful when using sharp tools or knives. Consider using an electric razor. Take special care brushing or flossing your teeth. Report any injuries, bruising, or red spots on the skin to your healthcare professional.  If you are going to need surgery or other procedure, tell your healthcare professional that you are taking this medicine.  Wear a medical ID bracelet or chain. Carry a card that describes your disease and details of your medicine and dosage times.  What side effects may I notice from receiving this medicine?  Side effects that you should report to your doctor or health care professional as soon as possible:  · allergic reactions like skin rash, itching or hives, swelling of the face, lips, or tongue  · signs and symptoms of bleeding such as bloody or black, tarry stools; red or dark-brown urine; spitting up blood or brown material that looks like coffee grounds; red spots on the skin; unusual bruising or bleeding from the eye, gums, or nose  · signs and symptoms of a blood clot such as chest pain; shortness of breath; pain, swelling, or warmth in the leg  · signs and symptoms of a stroke such as changes in vision; confusion; trouble speaking or understanding; severe headaches; sudden numbness or weakness of the face, arm or leg; trouble walking; dizziness; loss of coordination  This list may not describe all possible side effects. Call your doctor for medical advice about side effects. You may report side effects to FDA at 8-951-FDA-7669.  Where should I keep my medicine?  Keep out of the reach of children.  Store at room temperature between 20 and 25 degrees C (68 and 77 degrees F). Throw away any  unused medicine after the expiration date.  NOTE: This sheet is a summary. It may not cover all possible information. If you have questions about this medicine, talk to your doctor, pharmacist, or health care provider.  © 2020 Elsevier/Gold Standard (2019-08-28 17:39:34)        Midodrine tablets  What is this medicine?  MIDODRINE (MI corral dreen) is used to treat low blood pressure in patients who have symptoms like dizziness when going from a sitting to a standing position.  This medicine may be used for other purposes; ask your health care provider or pharmacist if you have questions.  COMMON BRAND NAME(S): Orvaten, ProAmatine  What should I tell my health care provider before I take this medicine?  They need to know if you have any of the following conditions:  · difficulty passing urine  · heart disease  · high blood pressure  · kidney disease  · over active thyroid  · pheochromocytoma  · an unusual or allergic reaction to midodrine, other medicines, foods, dyes, or preservatives  · pregnant or trying to get pregnant  · breast-feeding  How should I use this medicine?  Take this medicine by mouth with a glass of water. Follow the directions on the prescription label. The last dose of this medicine should not be taken after the evening meal or less than 4 hours before bedtime. When you lie down for any length of time after taking this medicine, high blood pressure can occur. Do not take this medicine if you will be lying down for any length of time. Do not take your medicine more often than directed. Do not stop taking except on your doctor's advice.  Talk to your pediatrician regarding the use of this medicine in children. Special care may be needed.  Overdosage: If you think you have taken too much of this medicine contact a poison control center or emergency room at once.  NOTE: This medicine is only for you. Do not share this medicine with others.  What if I miss a dose?  If you miss a dose, take it as soon as  you can. If it is almost time for your next dose, take only that dose. Do not take double or extra doses.  What may interact with this medicine?  Do not take this medicine with any of the following medications:  · MAOIs like Carbex, Eldepryl, Marplan, Nardil, and Parnate  · medicines called ergot alkaloids  · medicines for colds and breathing difficulties or weight loss  · procarbazine  This medicine may also interact with the following medications:  · cimetidine  · digoxin  · flecainide  · fludrocortisone  · metformin  · procainamide  · quinidine  · ranitidine  · triamterene  · medicines called alpha-blockers like doxazosin, prazosin, and terazosin  This list may not describe all possible interactions. Give your health care provider a list of all the medicines, herbs, non-prescription drugs, or dietary supplements you use. Also tell them if you smoke, drink alcohol, or use illegal drugs. Some items may interact with your medicine.  What should I watch for while using this medicine?  Visit your doctor or health care professional for regular checks on your progress.  You may get drowsy or dizzy. Do not drive, use machinery, or do anything that needs mental alertness until you know how this medicine affects you. Do not stand or sit up quickly, especially if you are an older patient. This reduces the risk of dizzy or fainting spells.  Your mouth may get dry. Chewing sugarless gum or sucking hard candy, and drinking plenty of water may help. Contact your doctor if the problem does not go away or is severe.  Do not treat yourself for coughs, colds, or pain while you are taking this medicine without asking your doctor or health care professional for advice. Some ingredients may increase your blood pressure.  What side effects may I notice from receiving this medicine?  Side effects that you should report to your doctor or health care professional as soon as possible:  · awareness of heart beating  · blurred  vision  · headache  · irregular heartbeat, palpitations, or chest pain  · pounding in the ears  · skin rash, hives  Side effects that usually do not require medical attention (report to your doctor or health care professional if they continue or are bothersome):  · change in heart rate  · chills  · goose bumps  · increased need to urinate  · itching  · stomach pain  · tingling in the skin or scalp  This list may not describe all possible side effects. Call your doctor for medical advice about side effects. You may report side effects to FDA at 6-084-SAC-5301.  Where should I keep my medicine?  Keep out of the reach of children.  Store at room temperature between 15 and 30 degrees C (59 and 86 degrees F). Throw away any unused medicine after the expiration date.  NOTE: This sheet is a summary. It may not cover all possible information. If you have questions about this medicine, talk to your doctor, pharmacist, or health care provider.  © 2020 Elsevier/Gold Standard (2009-07-06 13:51:24)      Oxycodone extended-release capsules  What is this medicine?  OXYCODONE (ox i KOE done) is a pain reliever. It is used to treat constant pain that lasts for more than a few days.  This medicine may be used for other purposes; ask your health care provider or pharmacist if you have questions.  COMMON BRAND NAME(S): XTAMPZA  What should I tell my health care provider before I take this medicine?  They need to know if you have any of these conditions:  · [place] disease  · brain tumor  · gallbladder disease  · head injury  · heart disease  · history of a drug or alcohol abuse problem  · if you often drink alcohol  · kidney disease  · liver disease  · lung or breathing disease, like asthma  · mental illness  · pancreatic disease  · seizures  · stomach or intestine problems  · thyroid disease  · an unusual or allergic reaction to oxycodone, codeine, hydrocodone, morphine, other medicines, foods, dyes, or preservatives  · pregnant or  trying to get pregnant  · breast-feeding  How should I use this medicine?  Take this medicine by mouth with a full glass of water. Follow the directions on the prescription label. Take this medicine with food. You should always take it with the same amount of food each time. Take your medicine at regular intervals. Do not take it more often than directed. Do not stop taking except on your doctor's advice.  A special MedGuide will be given to you by the pharmacist with each prescription and refill. Be sure to read this information carefully each time.  Talk to your pediatrician regarding the use of this medicine in children. Special care may be needed.  Overdosage: If you think you have taken too much of this medicine contact a poison control center or emergency room at once.  NOTE: This medicine is only for you. Do not share this medicine with others.  What if I miss a dose?  If you miss a dose, take it as soon as you can. If it is almost time for your next dose, take only that dose. Do not take double or extra doses.  What may interact with this medicine?  This medicine may interact with the following medications:  · alcohol  · antihistamines for allergy, cough and cold  · antiviral medicines for HIV or AIDS  · atropine  · certain antibiotics like clarithromycin, erythromycin, linezolid, rifampin  · certain medicines for anxiety or sleep  · certain medicines for bladder problems like oxybutynin, tolterodine  · certain medicines for depression like amitriptyline, fluoxetine, sertraline  · certain medicines for fungal infections like ketoconazole, itraconazole, voriconazole  · certain medicines for migraine headache like almotriptan, eletriptan, frovatriptan, naratriptan, rizatriptan, sumatriptan, zolmitriptan  · certain medicines for nausea or vomiting like dolasetron, ondansetron, palonosetron  · certain medicines for Parkinson's disease like benztropine, trihexyphenidyl  · certain medicines for seizures like  phenobarbital, phenytoin, primidone  · certain medicines for stomach problems like dicyclomine, hyoscyamine  · certain medicines for travel sickness like scopolamine  · diuretics  · general anesthetics like halothane, isoflurane, methoxyflurane, propofol  · ipratropium  · local anesthetics like lidocaine, pramoxine, tetracaine  · MAOIs like Carbex, Eldepryl, Marplan, Nardil, and Parnate  · medicines that relax muscles for surgery  · methylene blue  · nilotinib  · other narcotic medicines for pain or cough  · phenothiazines like chlorpromazine, mesoridazine, prochlorperazine, thioridazine  This list may not describe all possible interactions. Give your health care provider a list of all the medicines, herbs, non-prescription drugs, or dietary supplements you use. Also tell them if you smoke, drink alcohol, or use illegal drugs. Some items may interact with your medicine.  What should I watch for while using this medicine?  Tell your doctor or health care professional if your pain does not go away, if it gets worse, or if you have new or a different type of pain. You may develop tolerance to the medicine. Tolerance means that you will need a higher dose of the medication for pain relief. Tolerance is normal and is expected if you take this medicine for a long time.  Do not suddenly stop taking your medicine because you may develop a severe reaction. Your body becomes used to the medicine. This does NOT mean you are addicted. Addiction is a behavior related to getting and using a drug for a non-medical reason. If you have pain, you have a medical reason to take pain medicine. Your doctor will tell you how much medicine to take. If your doctor wants you to stop the medicine, the dose will be slowly lowered over time to avoid any side effects.  There are different types of narcotic medicines (opiates). If you take more than one type at the same time or if you are taking another medicine that also causes drowsiness, you  may have more side effects. Give your health care provider a list of all medicines you use. Your doctor will tell you how much medicine to take. Do not take more medicine than directed. Call emergency for help if you have problems breathing or unusual sleepiness.  You may get drowsy or dizzy. Do not drive, use machinery, or do anything that needs mental alertness until you know how the medicine affects you. Do not stand or sit up quickly, especially if you are an older patient. This reduces the risk of dizzy or fainting spells. Alcohol may interfere with the effect of this medicine. Avoid alcoholic drinks.  This medicine will cause constipation. Try to have a bowel movement at least every 2 to 3 days. If you do not have a bowel movement for 3 days, call your doctor or health care professional.  Your mouth may get dry. Chewing sugarless gum or sucking on hard candy, and drinking plenty of water may help. Contact your doctor if the problem does not go away or is severe.  What side effects may I notice from receiving this medicine?  Side effects that you should report to your doctor or health care professional as soon as possible:  · allergic reactions like skin rash, itching or hives, swelling of the face, lips, or tongue  · breathing problems  · confusion  · signs and symptoms of low blood pressure like dizziness; feeling faint or lightheaded, falls; unusually weak or tired  · trouble passing urine or change in the amount of urine  · trouble swallowing  Side effects that usually do not require medical attention (report to your doctor or health care professional if they continue or are bothersome):  · constipation  · dry mouth  · nausea, vomiting  · tiredness  This list may not describe all possible side effects. Call your doctor for medical advice about side effects. You may report side effects to FDA at 6-705-FDA-8806.  Where should I keep my medicine?  Keep out of the reach of children. This medicine can be abused.  Keep your medicine in a safe place to protect it from theft. Do not share this medicine with anyone. Selling or giving away this medicine is dangerous and against the law. Follow the directions in the MedGuide.  Store at room temperature between 15 and 30 degrees C (59 and 86 degrees F). Protect from light. Keep container tightly closed.  This medicine may cause harm and death if it is taken by other adults, children, or pets. Return medicine that has not been used to an official disposal site. Contact the [facility] at 1-748.782.4435 or your [facility] government to find a site. If you cannot return the medicine, flush it down the toilet. Do not use the medicine after the expiration date.  NOTE: This sheet is a summary. It may not cover all possible information. If you have questions about this medicine, talk to your doctor, pharmacist, or health care provider.  © 2020 Elsevier/Gold Standard (2018-04-24 16:09:53)

## 2021-11-27 NOTE — PROGRESS NOTES
Pt of Dr. Cortez.  Hemodialysis net removal 1000. Pt tolerated with a few episodes of hypotension which responded to decreased UF/ and BFR as ordered. Post HD vitals stable. Pt alert & oriented. JOSÉ MIGUEL chest CVC packed with heparin 1.9ml to red port. 2.0 ml to blue port. Dressing changed. VSS. report to EDGAR Dennis RN. Pt returned to room via bed by transport.

## 2021-11-29 LAB
BACTERIA BLD CULT: NORMAL
BACTERIA BLD CULT: NORMAL
SIGNIFICANT IND 70042: NORMAL
SIGNIFICANT IND 70042: NORMAL
SITE SITE: NORMAL
SITE SITE: NORMAL
SOURCE SOURCE: NORMAL
SOURCE SOURCE: NORMAL

## 2021-12-06 ENCOUNTER — HOSPITAL ENCOUNTER (INPATIENT)
Facility: MEDICAL CENTER | Age: 71
LOS: 4 days | DRG: 377 | End: 2021-12-10
Attending: EMERGENCY MEDICINE | Admitting: INTERNAL MEDICINE
Payer: COMMERCIAL

## 2021-12-06 ENCOUNTER — APPOINTMENT (OUTPATIENT)
Dept: RADIOLOGY | Facility: MEDICAL CENTER | Age: 71
DRG: 377 | End: 2021-12-06
Attending: EMERGENCY MEDICINE
Payer: COMMERCIAL

## 2021-12-06 DIAGNOSIS — J96.01 ACUTE RESPIRATORY FAILURE WITH HYPOXIA (HCC): ICD-10-CM

## 2021-12-06 DIAGNOSIS — K62.5 RECTAL BLEEDING: ICD-10-CM

## 2021-12-06 DIAGNOSIS — I26.99 OTHER ACUTE PULMONARY EMBOLISM WITHOUT ACUTE COR PULMONALE (HCC): ICD-10-CM

## 2021-12-06 DIAGNOSIS — K92.2 GASTROINTESTINAL HEMORRHAGE, UNSPECIFIED GASTROINTESTINAL HEMORRHAGE TYPE: ICD-10-CM

## 2021-12-06 DIAGNOSIS — I26.99 PULMONARY EMBOLISM, OTHER, UNSPECIFIED CHRONICITY, UNSPECIFIED WHETHER ACUTE COR PULMONALE PRESENT (HCC): ICD-10-CM

## 2021-12-06 PROBLEM — D62 ACUTE BLOOD LOSS ANEMIA: Status: ACTIVE | Noted: 2021-12-06

## 2021-12-06 LAB
ABO GROUP BLD: NORMAL
ALBUMIN SERPL BCP-MCNC: 3.3 G/DL (ref 3.2–4.9)
ALBUMIN/GLOB SERPL: 0.9 G/DL
ALP SERPL-CCNC: 121 U/L (ref 30–99)
ALT SERPL-CCNC: 9 U/L (ref 2–50)
ANION GAP SERPL CALC-SCNC: 13 MMOL/L (ref 7–16)
ANISOCYTOSIS BLD QL SMEAR: ABNORMAL
APPEARANCE UR: CLEAR
APTT PPP: 36.9 SEC (ref 24.7–36)
AST SERPL-CCNC: 23 U/L (ref 12–45)
BACTERIA #/AREA URNS HPF: NEGATIVE /HPF
BARCODED ABORH UBTYP: 600
BARCODED ABORH UBTYP: 600
BARCODED PRD CODE UBPRD: NORMAL
BARCODED PRD CODE UBPRD: NORMAL
BARCODED UNIT NUM UBUNT: NORMAL
BARCODED UNIT NUM UBUNT: NORMAL
BASOPHILS # BLD AUTO: 0.4 % (ref 0–1.8)
BASOPHILS # BLD: 0.02 K/UL (ref 0–0.12)
BILIRUB SERPL-MCNC: 0.5 MG/DL (ref 0.1–1.5)
BILIRUB UR QL STRIP.AUTO: NEGATIVE
BLD GP AB SCN SERPL QL: NORMAL
BUN SERPL-MCNC: 23 MG/DL (ref 8–22)
CALCIUM SERPL-MCNC: 8.8 MG/DL (ref 8.5–10.5)
CHLORIDE SERPL-SCNC: 97 MMOL/L (ref 96–112)
CO2 SERPL-SCNC: 26 MMOL/L (ref 20–33)
COLOR UR: YELLOW
COMMENT 1642: NORMAL
COMPONENT R 8504R: NORMAL
COMPONENT R 8504R: NORMAL
CREAT SERPL-MCNC: 6.94 MG/DL (ref 0.5–1.4)
EOSINOPHIL # BLD AUTO: 0.14 K/UL (ref 0–0.51)
EOSINOPHIL NFR BLD: 2.9 % (ref 0–6.9)
EPI CELLS #/AREA URNS HPF: NEGATIVE /HPF
ERYTHROCYTE [DISTWIDTH] IN BLOOD BY AUTOMATED COUNT: 67.7 FL (ref 35.9–50)
GLOBULIN SER CALC-MCNC: 3.7 G/DL (ref 1.9–3.5)
GLUCOSE SERPL-MCNC: 116 MG/DL (ref 65–99)
GLUCOSE UR STRIP.AUTO-MCNC: NEGATIVE MG/DL
HCT VFR BLD AUTO: 17.9 % (ref 42–52)
HGB BLD-MCNC: 5.7 G/DL (ref 14–18)
HGB BLD-MCNC: 5.9 G/DL (ref 14–18)
HYALINE CASTS #/AREA URNS LPF: ABNORMAL /LPF
HYPOCHROMIA BLD QL SMEAR: ABNORMAL
IMM GRANULOCYTES # BLD AUTO: 0.01 K/UL (ref 0–0.11)
IMM GRANULOCYTES NFR BLD AUTO: 0.2 % (ref 0–0.9)
INR PPP: 1.63 (ref 0.87–1.13)
KETONES UR STRIP.AUTO-MCNC: NEGATIVE MG/DL
LEUKOCYTE ESTERASE UR QL STRIP.AUTO: NEGATIVE
LYMPHOCYTES # BLD AUTO: 1.41 K/UL (ref 1–4.8)
LYMPHOCYTES NFR BLD: 29 % (ref 22–41)
MACROCYTES BLD QL SMEAR: ABNORMAL
MCH RBC QN AUTO: 31.1 PG (ref 27–33)
MCHC RBC AUTO-ENTMCNC: 33 G/DL (ref 33.7–35.3)
MCV RBC AUTO: 94.2 FL (ref 81.4–97.8)
MICRO URNS: ABNORMAL
MONOCYTES # BLD AUTO: 0.35 K/UL (ref 0–0.85)
MONOCYTES NFR BLD AUTO: 7.2 % (ref 0–13.4)
MORPHOLOGY BLD-IMP: NORMAL
NEUTROPHILS # BLD AUTO: 2.93 K/UL (ref 1.82–7.42)
NEUTROPHILS NFR BLD: 60.3 % (ref 44–72)
NITRITE UR QL STRIP.AUTO: NEGATIVE
NRBC # BLD AUTO: 0 K/UL
NRBC BLD-RTO: 0 /100 WBC
PH UR STRIP.AUTO: 7.5 [PH] (ref 5–8)
PLATELET # BLD AUTO: 173 K/UL (ref 164–446)
PLATELET BLD QL SMEAR: NORMAL
PMV BLD AUTO: 10.8 FL (ref 9–12.9)
POIKILOCYTOSIS BLD QL SMEAR: NORMAL
POLYCHROMASIA BLD QL SMEAR: NORMAL
POTASSIUM SERPL-SCNC: 4.3 MMOL/L (ref 3.6–5.5)
PRODUCT TYPE UPROD: NORMAL
PRODUCT TYPE UPROD: NORMAL
PROT SERPL-MCNC: 7 G/DL (ref 6–8.2)
PROT UR QL STRIP: 30 MG/DL
PROTHROMBIN TIME: 18.8 SEC (ref 12–14.6)
RBC # BLD AUTO: 1.9 M/UL (ref 4.7–6.1)
RBC # URNS HPF: ABNORMAL /HPF
RBC BLD AUTO: PRESENT
RBC UR QL AUTO: ABNORMAL
RH BLD: NORMAL
SODIUM SERPL-SCNC: 136 MMOL/L (ref 135–145)
SP GR UR STRIP.AUTO: 1.01
SPHEROCYTES BLD QL SMEAR: NORMAL
TARGETS BLD QL SMEAR: NORMAL
UNIT STATUS USTAT: NORMAL
UNIT STATUS USTAT: NORMAL
UROBILINOGEN UR STRIP.AUTO-MCNC: 0.2 MG/DL
WBC # BLD AUTO: 4.9 K/UL (ref 4.8–10.8)
WBC #/AREA URNS HPF: ABNORMAL /HPF

## 2021-12-06 PROCEDURE — 74176 CT ABD & PELVIS W/O CONTRAST: CPT

## 2021-12-06 PROCEDURE — 700102 HCHG RX REV CODE 250 W/ 637 OVERRIDE(OP): Performed by: INTERNAL MEDICINE

## 2021-12-06 PROCEDURE — 85610 PROTHROMBIN TIME: CPT

## 2021-12-06 PROCEDURE — 85730 THROMBOPLASTIN TIME PARTIAL: CPT

## 2021-12-06 PROCEDURE — 85018 HEMOGLOBIN: CPT

## 2021-12-06 PROCEDURE — 770001 HCHG ROOM/CARE - MED/SURG/GYN PRIV*

## 2021-12-06 PROCEDURE — A9270 NON-COVERED ITEM OR SERVICE: HCPCS | Performed by: INTERNAL MEDICINE

## 2021-12-06 PROCEDURE — 36415 COLL VENOUS BLD VENIPUNCTURE: CPT

## 2021-12-06 PROCEDURE — 86850 RBC ANTIBODY SCREEN: CPT

## 2021-12-06 PROCEDURE — 30233N1 TRANSFUSION OF NONAUTOLOGOUS RED BLOOD CELLS INTO PERIPHERAL VEIN, PERCUTANEOUS APPROACH: ICD-10-PCS | Performed by: INTERNAL MEDICINE

## 2021-12-06 PROCEDURE — 85025 COMPLETE CBC W/AUTO DIFF WBC: CPT

## 2021-12-06 PROCEDURE — 86900 BLOOD TYPING SEROLOGIC ABO: CPT

## 2021-12-06 PROCEDURE — P9016 RBC LEUKOCYTES REDUCED: HCPCS

## 2021-12-06 PROCEDURE — 86901 BLOOD TYPING SEROLOGIC RH(D): CPT

## 2021-12-06 PROCEDURE — 86923 COMPATIBILITY TEST ELECTRIC: CPT

## 2021-12-06 PROCEDURE — 36430 TRANSFUSION BLD/BLD COMPNT: CPT

## 2021-12-06 PROCEDURE — 81001 URINALYSIS AUTO W/SCOPE: CPT

## 2021-12-06 PROCEDURE — 99285 EMERGENCY DEPT VISIT HI MDM: CPT

## 2021-12-06 PROCEDURE — 700105 HCHG RX REV CODE 258: Performed by: INTERNAL MEDICINE

## 2021-12-06 PROCEDURE — 80053 COMPREHEN METABOLIC PANEL: CPT

## 2021-12-06 PROCEDURE — 99223 1ST HOSP IP/OBS HIGH 75: CPT | Performed by: INTERNAL MEDICINE

## 2021-12-06 RX ORDER — AMOXICILLIN 250 MG
2 CAPSULE ORAL 2 TIMES DAILY
Status: DISCONTINUED | OUTPATIENT
Start: 2021-12-06 | End: 2021-12-07

## 2021-12-06 RX ORDER — TRAZODONE HYDROCHLORIDE 50 MG/1
25 TABLET ORAL NIGHTLY
Status: DISCONTINUED | OUTPATIENT
Start: 2021-12-06 | End: 2021-12-10 | Stop reason: HOSPADM

## 2021-12-06 RX ORDER — LABETALOL HYDROCHLORIDE 5 MG/ML
10 INJECTION, SOLUTION INTRAVENOUS EVERY 4 HOURS PRN
Status: DISCONTINUED | OUTPATIENT
Start: 2021-12-06 | End: 2021-12-10 | Stop reason: HOSPADM

## 2021-12-06 RX ORDER — SODIUM CHLORIDE 9 MG/ML
INJECTION, SOLUTION INTRAVENOUS CONTINUOUS
Status: DISCONTINUED | OUTPATIENT
Start: 2021-12-06 | End: 2021-12-07

## 2021-12-06 RX ORDER — MIDODRINE HYDROCHLORIDE 5 MG/1
10 TABLET ORAL
Status: DISCONTINUED | OUTPATIENT
Start: 2021-12-06 | End: 2021-12-07

## 2021-12-06 RX ORDER — CARVEDILOL 6.25 MG/1
6.25 TABLET ORAL 2 TIMES DAILY WITH MEALS
Status: DISCONTINUED | OUTPATIENT
Start: 2021-12-07 | End: 2021-12-07

## 2021-12-06 RX ORDER — PREGABALIN 25 MG/1
25 CAPSULE ORAL
Status: DISCONTINUED | OUTPATIENT
Start: 2021-12-06 | End: 2021-12-10 | Stop reason: HOSPADM

## 2021-12-06 RX ORDER — CHOLECALCIFEROL (VITAMIN D3) 125 MCG
5 CAPSULE ORAL
Status: DISCONTINUED | OUTPATIENT
Start: 2021-12-06 | End: 2021-12-10 | Stop reason: HOSPADM

## 2021-12-06 RX ORDER — ENALAPRILAT 1.25 MG/ML
1.25 INJECTION INTRAVENOUS EVERY 6 HOURS PRN
Status: DISCONTINUED | OUTPATIENT
Start: 2021-12-06 | End: 2021-12-10 | Stop reason: HOSPADM

## 2021-12-06 RX ORDER — POLYETHYLENE GLYCOL 3350 17 G/17G
1 POWDER, FOR SOLUTION ORAL
Status: DISCONTINUED | OUTPATIENT
Start: 2021-12-06 | End: 2021-12-07

## 2021-12-06 RX ORDER — SEVELAMER CARBONATE 800 MG/1
800 TABLET, FILM COATED ORAL
Status: DISCONTINUED | OUTPATIENT
Start: 2021-12-06 | End: 2021-12-08

## 2021-12-06 RX ORDER — SEVELAMER HYDROCHLORIDE 800 MG/1
800 TABLET, FILM COATED ORAL
Status: DISCONTINUED | OUTPATIENT
Start: 2021-12-06 | End: 2021-12-06

## 2021-12-06 RX ORDER — BISACODYL 10 MG
10 SUPPOSITORY, RECTAL RECTAL
Status: DISCONTINUED | OUTPATIENT
Start: 2021-12-06 | End: 2021-12-07

## 2021-12-06 RX ADMIN — SEVELAMER CARBONATE 800 MG: 800 TABLET, FILM COATED ORAL at 19:49

## 2021-12-06 RX ADMIN — Medication 5 MG: at 22:44

## 2021-12-06 RX ADMIN — MIDODRINE HYDROCHLORIDE 10 MG: 5 TABLET ORAL at 19:49

## 2021-12-06 RX ADMIN — SODIUM CHLORIDE: 9 INJECTION, SOLUTION INTRAVENOUS at 20:10

## 2021-12-06 RX ADMIN — TRAZODONE HYDROCHLORIDE 25 MG: 50 TABLET ORAL at 21:41

## 2021-12-06 RX ADMIN — PREGABALIN 25 MG: 25 CAPSULE ORAL at 21:41

## 2021-12-06 ASSESSMENT — ENCOUNTER SYMPTOMS
SORE THROAT: 0
INSOMNIA: 0
HEMOPTYSIS: 0
NAUSEA: 0
VOMITING: 0
NECK PAIN: 0
MYALGIAS: 0
PALPITATIONS: 0
BRUISES/BLEEDS EASILY: 0
HEADACHES: 0
STRIDOR: 0
DIZZINESS: 0
BLURRED VISION: 0
DEPRESSION: 0
DOUBLE VISION: 0
FEVER: 0
COUGH: 0
WEIGHT LOSS: 0

## 2021-12-06 ASSESSMENT — FIBROSIS 4 INDEX: FIB4 SCORE: 3.43

## 2021-12-06 NOTE — ED TRIAGE NOTES
.  Chief Complaint   Patient presents with   • Bloody Stools     hoa blood      Pt BIB EMS to triage for above complaint. Per Pt onset a few hours ago, Pt has not started Eliquis yet and [facility] to  when the bleeding began. Pt notes mild dizziness.     Pt educated on triage process and returned to lobby.

## 2021-12-07 LAB
ANION GAP SERPL CALC-SCNC: 15 MMOL/L (ref 7–16)
BUN SERPL-MCNC: 25 MG/DL (ref 8–22)
CALCIUM SERPL-MCNC: 8 MG/DL (ref 8.5–10.5)
CHLORIDE SERPL-SCNC: 97 MMOL/L (ref 96–112)
CO2 SERPL-SCNC: 26 MMOL/L (ref 20–33)
CREAT SERPL-MCNC: 7.07 MG/DL (ref 0.5–1.4)
FERRITIN SERPL-MCNC: 657 NG/ML (ref 22–322)
GLUCOSE SERPL-MCNC: 96 MG/DL (ref 65–99)
HGB BLD-MCNC: 6 G/DL (ref 14–18)
HGB BLD-MCNC: 7 G/DL (ref 14–18)
HGB BLD-MCNC: 8 G/DL (ref 14–18)
HGB RETIC QN AUTO: 31.8 PG/CELL (ref 29–35)
IMM RETICS NFR: 29.2 % (ref 9.3–17.4)
IRON SATN MFR SERPL: 88 % (ref 15–55)
IRON SERPL-MCNC: 147 UG/DL (ref 50–180)
PHOSPHATE SERPL-MCNC: 2.9 MG/DL (ref 2.5–4.5)
POTASSIUM SERPL-SCNC: 3.7 MMOL/L (ref 3.6–5.5)
PTH-INTACT SERPL-MCNC: 86.9 PG/ML (ref 14–72)
RETICS # AUTO: 0.03 M/UL (ref 0.04–0.06)
RETICS/RBC NFR: 1.2 % (ref 0.8–2.1)
SODIUM SERPL-SCNC: 138 MMOL/L (ref 135–145)
TIBC SERPL-MCNC: 168 UG/DL (ref 250–450)
UIBC SERPL-MCNC: 21 UG/DL (ref 110–370)

## 2021-12-07 PROCEDURE — 80048 BASIC METABOLIC PNL TOTAL CA: CPT

## 2021-12-07 PROCEDURE — 84100 ASSAY OF PHOSPHORUS: CPT

## 2021-12-07 PROCEDURE — 90935 HEMODIALYSIS ONE EVALUATION: CPT

## 2021-12-07 PROCEDURE — 700101 HCHG RX REV CODE 250: Performed by: STUDENT IN AN ORGANIZED HEALTH CARE EDUCATION/TRAINING PROGRAM

## 2021-12-07 PROCEDURE — 96374 THER/PROPH/DIAG INJ IV PUSH: CPT

## 2021-12-07 PROCEDURE — 83540 ASSAY OF IRON: CPT

## 2021-12-07 PROCEDURE — 99233 SBSQ HOSP IP/OBS HIGH 50: CPT | Performed by: STUDENT IN AN ORGANIZED HEALTH CARE EDUCATION/TRAINING PROGRAM

## 2021-12-07 PROCEDURE — 700102 HCHG RX REV CODE 250 W/ 637 OVERRIDE(OP): Performed by: STUDENT IN AN ORGANIZED HEALTH CARE EDUCATION/TRAINING PROGRAM

## 2021-12-07 PROCEDURE — 700105 HCHG RX REV CODE 258: Performed by: STUDENT IN AN ORGANIZED HEALTH CARE EDUCATION/TRAINING PROGRAM

## 2021-12-07 PROCEDURE — 86923 COMPATIBILITY TEST ELECTRIC: CPT

## 2021-12-07 PROCEDURE — A9270 NON-COVERED ITEM OR SERVICE: HCPCS | Performed by: INTERNAL MEDICINE

## 2021-12-07 PROCEDURE — 82270 OCCULT BLOOD FECES: CPT

## 2021-12-07 PROCEDURE — 83970 ASSAY OF PARATHORMONE: CPT

## 2021-12-07 PROCEDURE — 85046 RETICYTE/HGB CONCENTRATE: CPT

## 2021-12-07 PROCEDURE — P9016 RBC LEUKOCYTES REDUCED: HCPCS

## 2021-12-07 PROCEDURE — 82728 ASSAY OF FERRITIN: CPT

## 2021-12-07 PROCEDURE — A9270 NON-COVERED ITEM OR SERVICE: HCPCS | Performed by: STUDENT IN AN ORGANIZED HEALTH CARE EDUCATION/TRAINING PROGRAM

## 2021-12-07 PROCEDURE — 85018 HEMOGLOBIN: CPT | Mod: 91

## 2021-12-07 PROCEDURE — 36430 TRANSFUSION BLD/BLD COMPNT: CPT

## 2021-12-07 PROCEDURE — 700111 HCHG RX REV CODE 636 W/ 250 OVERRIDE (IP): Performed by: STUDENT IN AN ORGANIZED HEALTH CARE EDUCATION/TRAINING PROGRAM

## 2021-12-07 PROCEDURE — 770001 HCHG ROOM/CARE - MED/SURG/GYN PRIV*

## 2021-12-07 PROCEDURE — 700102 HCHG RX REV CODE 250 W/ 637 OVERRIDE(OP): Performed by: INTERNAL MEDICINE

## 2021-12-07 PROCEDURE — 36415 COLL VENOUS BLD VENIPUNCTURE: CPT

## 2021-12-07 PROCEDURE — 700111 HCHG RX REV CODE 636 W/ 250 OVERRIDE (IP): Performed by: INTERNAL MEDICINE

## 2021-12-07 PROCEDURE — 5A1D70Z PERFORMANCE OF URINARY FILTRATION, INTERMITTENT, LESS THAN 6 HOURS PER DAY: ICD-10-PCS | Performed by: INTERNAL MEDICINE

## 2021-12-07 PROCEDURE — 83550 IRON BINDING TEST: CPT

## 2021-12-07 RX ORDER — HEPARIN SODIUM 1000 [USP'U]/ML
3900 INJECTION, SOLUTION INTRAVENOUS; SUBCUTANEOUS
Status: DISCONTINUED | OUTPATIENT
Start: 2021-12-07 | End: 2021-12-10 | Stop reason: HOSPADM

## 2021-12-07 RX ORDER — CARVEDILOL 6.25 MG/1
6.25 TABLET ORAL
Status: DISCONTINUED | OUTPATIENT
Start: 2021-12-08 | End: 2021-12-08

## 2021-12-07 RX ORDER — LIDOCAINE 50 MG/G
2 PATCH TOPICAL EVERY 24 HOURS
Status: DISCONTINUED | OUTPATIENT
Start: 2021-12-07 | End: 2021-12-10 | Stop reason: HOSPADM

## 2021-12-07 RX ORDER — SODIUM CHLORIDE 9 MG/ML
INJECTION, SOLUTION INTRAVENOUS CONTINUOUS
Status: DISCONTINUED | OUTPATIENT
Start: 2021-12-07 | End: 2021-12-07

## 2021-12-07 RX ORDER — OXYCODONE HYDROCHLORIDE 5 MG/1
5 TABLET ORAL 2 TIMES DAILY
Status: DISCONTINUED | OUTPATIENT
Start: 2021-12-07 | End: 2021-12-10 | Stop reason: HOSPADM

## 2021-12-07 RX ORDER — OXYCODONE HYDROCHLORIDE 5 MG/1
5 TABLET ORAL EVERY 4 HOURS PRN
Status: DISCONTINUED | OUTPATIENT
Start: 2021-12-07 | End: 2021-12-10 | Stop reason: HOSPADM

## 2021-12-07 RX ORDER — BUPRENORPHINE 10 UG/H
10 PATCH TRANSDERMAL
Status: DISCONTINUED | OUTPATIENT
Start: 2021-12-07 | End: 2021-12-07

## 2021-12-07 RX ORDER — MIDODRINE HYDROCHLORIDE 5 MG/1
10 TABLET ORAL 3 TIMES DAILY
Status: DISCONTINUED | OUTPATIENT
Start: 2021-12-07 | End: 2021-12-10

## 2021-12-07 RX ORDER — MORPHINE SULFATE 4 MG/ML
1 INJECTION INTRAVENOUS
Status: DISCONTINUED | OUTPATIENT
Start: 2021-12-07 | End: 2021-12-10 | Stop reason: HOSPADM

## 2021-12-07 RX ADMIN — OXYCODONE 5 MG: 5 TABLET ORAL at 21:00

## 2021-12-07 RX ADMIN — FENTANYL CITRATE 25 MCG: 50 INJECTION INTRAMUSCULAR; INTRAVENOUS at 04:00

## 2021-12-07 RX ADMIN — MIDODRINE HYDROCHLORIDE 10 MG: 5 TABLET ORAL at 07:58

## 2021-12-07 RX ADMIN — SEVELAMER CARBONATE 800 MG: 800 TABLET, FILM COATED ORAL at 17:10

## 2021-12-07 RX ADMIN — SEVELAMER CARBONATE 800 MG: 800 TABLET, FILM COATED ORAL at 11:25

## 2021-12-07 RX ADMIN — SEVELAMER CARBONATE 800 MG: 800 TABLET, FILM COATED ORAL at 07:58

## 2021-12-07 RX ADMIN — SODIUM CHLORIDE: 9 INJECTION, SOLUTION INTRAVENOUS at 02:52

## 2021-12-07 RX ADMIN — OXYCODONE 5 MG: 5 TABLET ORAL at 11:25

## 2021-12-07 RX ADMIN — OXYCODONE 5 MG: 5 TABLET ORAL at 06:14

## 2021-12-07 RX ADMIN — EPOETIN ALFA-EPBX 4000 UNITS: 4000 INJECTION, SOLUTION INTRAVENOUS; SUBCUTANEOUS at 16:51

## 2021-12-07 RX ADMIN — HEPARIN SODIUM 3900 UNITS: 1000 INJECTION, SOLUTION INTRAVENOUS; SUBCUTANEOUS at 17:38

## 2021-12-07 RX ADMIN — PREGABALIN 25 MG: 25 CAPSULE ORAL at 21:00

## 2021-12-07 RX ADMIN — Medication 5 MG: at 21:00

## 2021-12-07 RX ADMIN — LIDOCAINE 2 PATCH: 50 PATCH TOPICAL at 17:10

## 2021-12-07 RX ADMIN — TRAZODONE HYDROCHLORIDE 25 MG: 50 TABLET ORAL at 21:01

## 2021-12-07 RX ADMIN — OXYCODONE 5 MG: 5 TABLET ORAL at 17:10

## 2021-12-07 RX ADMIN — MIDODRINE HYDROCHLORIDE 10 MG: 5 TABLET ORAL at 11:24

## 2021-12-07 ASSESSMENT — PAIN DESCRIPTION - PAIN TYPE
TYPE: ACUTE PAIN;CHRONIC PAIN
TYPE: ACUTE PAIN

## 2021-12-07 ASSESSMENT — COGNITIVE AND FUNCTIONAL STATUS - GENERAL
CLIMB 3 TO 5 STEPS WITH RAILING: A LOT
HELP NEEDED FOR BATHING: A LITTLE
SUGGESTED CMS G CODE MODIFIER MOBILITY: CL
SUGGESTED CMS G CODE MODIFIER DAILY ACTIVITY: CK
TOILETING: A LITTLE
MOVING FROM LYING ON BACK TO SITTING ON SIDE OF FLAT BED: A LOT
STANDING UP FROM CHAIR USING ARMS: A LOT
TURNING FROM BACK TO SIDE WHILE IN FLAT BAD: A LITTLE
MOVING TO AND FROM BED TO CHAIR: A LOT
MOBILITY SCORE: 13
DAILY ACTIVITIY SCORE: 19
WALKING IN HOSPITAL ROOM: A LOT
DRESSING REGULAR UPPER BODY CLOTHING: A LITTLE
DRESSING REGULAR LOWER BODY CLOTHING: A LOT

## 2021-12-07 ASSESSMENT — ENCOUNTER SYMPTOMS
EYES NEGATIVE: 1
RESPIRATORY NEGATIVE: 1
DIARRHEA: 0
HEARTBURN: 0
CONSTIPATION: 0
CARDIOVASCULAR NEGATIVE: 1
VOMITING: 0
ABDOMINAL PAIN: 0
BLOOD IN STOOL: 0
PSYCHIATRIC NEGATIVE: 1
NAUSEA: 0
NEUROLOGICAL NEGATIVE: 1

## 2021-12-07 ASSESSMENT — PATIENT HEALTH QUESTIONNAIRE - PHQ9
1. LITTLE INTEREST OR PLEASURE IN DOING THINGS: NOT AT ALL
2. FEELING DOWN, DEPRESSED, IRRITABLE, OR HOPELESS: NOT AT ALL
SUM OF ALL RESPONSES TO PHQ9 QUESTIONS 1 AND 2: 0

## 2021-12-07 NOTE — ED PROVIDER NOTES
ED Provider Note    Scribed for Dr. Elmer Amin M.D. by Ashu Pena. 12/6/2021  4:05 PM    Primary care provider: No primary care provider noted  Means of arrival: EMS  History obtained from: Patient  History limited by: None    CHIEF COMPLAINT  Chief Complaint   Patient presents with   • Bloody Stools     hoa blood       HPI  Junior Proctor is a 71 y.o. male with history of pulmonary embolism who presents to the Emergency Department via EMS for mild to moderate acute bloody stools onset few hours ago.  He states he was hospitalized a week ago and was diagnosed with pulmonary embolism. He states he was given blood thinners during his stay was discharged on the 11/27. He notes he was prescribed Eliquis. He denies medicating with blood thinners prior to his hospitalization. However, he reports has not yet picked up his prescription for Eliquis. Patient reports he noticed blood in his stools earlier today and was prompted to call EMS. He describes the blood as bright red and notes his blood stools improved after every successive bowel movement. Patient endorses associated ankle pain, difficulty ambulating secondary to pain, and  dizziness, but denies any melena, leg swelling, fevers, chills, nausea, or vomiting. He notes the leg swelling is chronic. No alleviating or exacerbating factors noted.    He also mentions he attends dialysis appointments for his renal failure, he dialyzes on Tuesday Thursday and Saturday has not missed any scheduled dialysis.    REVIEW OF SYSTEMS  Pertinent positives include bloody stools, ankle pain, difficulty ambulating secondary to pain, and  dizziness. Pertinent negatives include no melena, leg swelling, fevers, chills, nausea, or vomiting. As above, all other systems reviewed and are negative.   See HPI for further details.     PAST MEDICAL HISTORY   has a past medical history of Gout, Hemodialysis patient (HCC), Hypertension, Kidney disease, and MI (myocardial infarction)  "([facility]).    SURGICAL HISTORY  patient denies any surgical history    SOCIAL HISTORY  Social History     Tobacco Use   • Smoking status: Former Smoker   • Smokeless tobacco: Never Used   Vaping Use   • Vaping Use: Never used   Substance Use Topics   • Alcohol use: Yes     Comment: occ   • Drug use: Never      Social History     Substance and Sexual Activity   Drug Use Never       FAMILY HISTORY  Family History   Problem Relation Age of Onset   • Diabetes Mother        CURRENT MEDICATIONS  Home Medications     Reviewed by Rishabh Cagle (Pharmacy Tech) on 12/06/21 at 1752  Med List Status: Complete   Medication Last Dose Status   allopurinol (ZYLOPRIM) 100 MG Tab UNK Active   apixaban (ELIQUIS) 5mg Tab UNK Active   aspirin EC (ECOTRIN) 81 MG Tablet Delayed Response UNK Active   atorvastatin (LIPITOR) 20 MG Tab UNK Active   Buprenorphine 10 MCG/HR PATCH WEEKLY UNK Active   carvedilol (COREG) 6.25 MG Tab UNK Active   diclofenac sodium (VOLTAREN) 1 % Gel UNK Active   Etanercept 50 MG/ML Solution Prefilled Syringe UNK Active   isosorbide dinitrate (ISORDIL) 20 MG Tab UNK Active   lidocaine (LIDODERM) 5 % Patch UNK Active   lidocaine (XYLOCAINE) 5 % Ointment UNK Active   melatonin 3 MG Tab UNK Active   midodrine (PROAMATINE) 10 MG tablet UNK Active   pregabalin (LYRICA) 25 MG Cap UNK Active   sevelamer (RENAGEL) 800 MG Tab UNK Active   tamsulosin (FLOMAX) 0.4 MG capsule UNK Active   traZODone (DESYREL) 50 MG Tab UNK Active   vitamin D2, Ergocalciferol, (DRISDOL) 1.25 MG (08771 UT) Cap capsule UNK Active                ALLERGIES  Allergies   Allergen Reactions   • Motrin [Ibuprofen]      hhx of stomach ulcers       PHYSICAL EXAM  VITAL SIGNS: BP (!) 92/54   Pulse 80   Temp 36 °C (96.8 °F) (Temporal)   Resp 14   Ht 1.727 m (5' 8\")   Wt 74.8 kg (165 lb)   SpO2 97%   BMI 25.09 kg/m²     Constitutional: Well developed, Well nourished, no acute distress,   HENT: Normocephalic, Atraumatic, Bilateral external ears normal, " Oropharynx moist, No oral exudates.   Eyes: PERRLA, EOMI, Conjunctiva normal, No discharge.   Neck: No tenderness, Supple, No stridor.   Lymphatic: No lymphadenopathy noted.   Cardiovascular: Normal heart rate, Normal rhythm.   Thorax & Lungs: Clear to auscultation bilaterally, No respiratory distress, No wheezing, No crackles.   Abdomen: Soft, No tenderness, No masses, No pulsatile masses.   Rectal: No stools, or other abnormalities. Small smear of blood on the glove.  Skin: Warm, Dry, No erythema, No rash.   Extremities: Severe bilateral lower extremities edema, non-tender. No cyanosis.   Musculoskeletal: No tenderness to palpation or major deformities noted.  Intact distal pulses  Neurologic: Awake, alert. Moves all extremities spontaneously.  Psychiatric: Affect normal, Judgment normal, Mood normal.     LABS  Results for orders placed or performed during the hospital encounter of 12/06/21   CBC WITH DIFFERENTIAL   Result Value Ref Range    WBC 4.9 4.8 - 10.8 K/uL    RBC 1.90 (L) 4.70 - 6.10 M/uL    Hemoglobin 5.9 (LL) 14.0 - 18.0 g/dL    Hematocrit 17.9 (LL) 42.0 - 52.0 %    MCV 94.2 81.4 - 97.8 fL    MCH 31.1 27.0 - 33.0 pg    MCHC 33.0 (L) 33.7 - 35.3 g/dL    RDW 67.7 (H) 35.9 - 50.0 fL    Platelet Count 173 164 - 446 K/uL    MPV 10.8 9.0 - 12.9 fL    Neutrophils-Polys 60.30 44.00 - 72.00 %    Lymphocytes 29.00 22.00 - 41.00 %    Monocytes 7.20 0.00 - 13.40 %    Eosinophils 2.90 0.00 - 6.90 %    Basophils 0.40 0.00 - 1.80 %    Immature Granulocytes 0.20 0.00 - 0.90 %    Nucleated RBC 0.00 /100 WBC    Neutrophils (Absolute) 2.93 1.82 - 7.42 K/uL    Lymphs (Absolute) 1.41 1.00 - 4.80 K/uL    Monos (Absolute) 0.35 0.00 - 0.85 K/uL    Eos (Absolute) 0.14 0.00 - 0.51 K/uL    Baso (Absolute) 0.02 0.00 - 0.12 K/uL    Immature Granulocytes (abs) 0.01 0.00 - 0.11 K/uL    NRBC (Absolute) 0.00 K/uL    Hypochromia 2+ (A)     Anisocytosis 1+     Macrocytosis 1+    COMP METABOLIC PANEL   Result Value Ref Range    Sodium 136  135 - 145 mmol/L    Potassium 4.3 3.6 - 5.5 mmol/L    Chloride 97 96 - 112 mmol/L    Co2 26 20 - 33 mmol/L    Anion Gap 13.0 7.0 - 16.0    Glucose 116 (H) 65 - 99 mg/dL    Bun 23 (H) 8 - 22 mg/dL    Creatinine 6.94 (HH) 0.50 - 1.40 mg/dL    Calcium 8.8 8.5 - 10.5 mg/dL    AST(SGOT) 23 12 - 45 U/L    ALT(SGPT) 9 2 - 50 U/L    Alkaline Phosphatase 121 (H) 30 - 99 U/L    Total Bilirubin 0.5 0.1 - 1.5 mg/dL    Albumin 3.3 3.2 - 4.9 g/dL    Total Protein 7.0 6.0 - 8.2 g/dL    Globulin 3.7 (H) 1.9 - 3.5 g/dL    A-G Ratio 0.9 g/dL   URINALYSIS    Specimen: Urine, Clean Catch   Result Value Ref Range    Color Yellow     Character Clear     Specific Gravity 1.009 <1.035    Ph 7.5 5.0 - 8.0    Glucose Negative Negative mg/dL    Ketones Negative Negative mg/dL    Protein 30 (A) Negative mg/dL    Bilirubin Negative Negative    Urobilinogen, Urine 0.2 Negative    Nitrite Negative Negative    Leukocyte Esterase Negative Negative    Occult Blood Trace (A) Negative    Micro Urine Req Microscopic    ESTIMATED GFR   Result Value Ref Range    GFR If African American 10 (A) >60 mL/min/1.73 m 2    GFR If Non  8 (A) >60 mL/min/1.73 m 2   PERIPHERAL SMEAR REVIEW   Result Value Ref Range    Peripheral Smear Review see below    PLATELET ESTIMATE   Result Value Ref Range    Plt Estimation Normal    MORPHOLOGY   Result Value Ref Range    RBC Morphology Present     Polychromia 1+     Poikilocytosis 1+     Target Cells 1+     Spherocytes 1+    DIFFERENTIAL COMMENT   Result Value Ref Range    Comments-Diff see below    COD (ADULT)   Result Value Ref Range    ABO Grouping Only A     Rh Grouping Only NEG     Antibody Screen-Cod NEG     Component R       R99                 Red Cells, LR       [id number]   issued       12/06/21   20:51      Product Type R99     Dispense Status issued     Unit Number (Barcoded) [id number]     Product Code (Barcoded) E3627L12     Blood Type (Barcoded) 0600    HGB (Hemoglobin) for 48 hours    Result Value Ref Range    Hemoglobin 5.7 (LL) 14.0 - 18.0 g/dL   APTT   Result Value Ref Range    APTT 36.9 (H) 24.7 - 36.0 sec   Prothrombin Time   Result Value Ref Range    PT 18.8 (H) 12.0 - 14.6 sec    INR 1.63 (H) 0.87 - 1.13   URINE MICROSCOPIC (W/UA)   Result Value Ref Range    WBC 0-2 (A) /hpf    RBC 0-2 (A) /hpf    Bacteria Negative None /hpf    Epithelial Cells Negative /hpf    Hyaline Cast 0-2 /lpf       All labs reviewed by me.    COURSE & MEDICAL DECISION MAKING  Pertinent Labs & Imaging studies reviewed. (See chart for details)    4:05 PM - Patient seen and examined at bedside. Ordered CBC w/ diff, CMP, UA, Peripheral Smear Review, Platelet Estimate, Morphology, Differential Comment to evaluate his symptoms. The differential diagnoses include but are not limited to: GI Bleed vs Anemia vs Blood Loss vs Recurrent Pulmonary Embolism    5:14 PM - Patient was reevaluated at bedside. Discussed lab and radiology results with the patient. I informed the patient his hemoglobin is low. The plan for admission was discussed. Patient and/or family was given the opportunity to ask any questions. Patient and/or verbalizes understanding and agreement to this plan of care.       5:28 PM - Paged Hospitalist    5:32 PM I discussed the patient's case and the above findings with Dr. Mcarthur (Hospitalist) who agrees to evaluate the patient for hospitalization. Orderd COD and CT Abdomen-Pelvis to further evaluate the patient.       Decision Making:  This is a patient presenting with some rectal bleeding, this is painless may be secondary to diverticulitis or rectal pathology such as fissure.  He has had a significant drop in his hemoglobin from when seen about 10 days ago, hemoglobin down to 5.9 will likely require transfusion.  Does not seem to have significant ongoing blood loss at this time however.  The patient is on hemodialysis but does not have any urgent need for dialysis at this time.  Discussed with hospitalist  as above patient will be transfusion observed anticoagulation for recent pulmonary embolus not indicated with some acute bleeding potentially  DISPOSITION:  Patient will be hospitalized by Dr. Mcarthur in guarded condition.     FINAL IMPRESSION  1. Rectal bleeding    2. Gastrointestinal hemorrhage, unspecified gastrointestinal hemorrhage type          I, Ashu Pena (Scribe), am scribing for, and in the presence of, Elmer Amin M.D..    Electronically signed by: Ashu Pena (Alissaibe), 12/6/2021    IElmer M.D. personally performed the services described in this documentation, as scribed by Ashu Pena in my presence, and it is both accurate and complete.    The note accurately reflects work and decisions made by me.  Elmer Amin M.D.  12/6/2021  11:02 PM

## 2021-12-07 NOTE — ASSESSMENT & PLAN NOTE
Nonoliguric, scheduled hemodialysis Tuesday Thursday Saturday.  No evidence for acute volume overload or metabolic derangement justifying emergent dialysis.  Nephrology following, patient for hemodialysis today  Labs in a.m.

## 2021-12-07 NOTE — H&P
Hospital Medicine History & Physical Note    Date of Service  12/6/2021    Primary Care Physician  No primary care provider on file.        Code Status  Full Code    Chief Complaint  Chief Complaint   Patient presents with   • Bloody Stools     hoa blood       History of Presenting Illness  Junior Proctor is a 71 y.o. male with history of pulmonary embolism diagnosed 11/27/2021 prescribed Eliquis but not yet filled, end-stage renal failure on hemodialysis Tuesday Thursday Saturday.  Last dialysis Saturday, November 4 who presented 12/6/2021 with mild to moderate acute bloody stools onset few hours ago.   Patient reports he noticed blood in his stools earlier today and was prompted to call EMS. He describes the blood as bright red and notes his blood stools improved after every successive bowel movement. Patient endorses associated ankle pain, difficulty ambulating secondary to pain, and  dizziness, but denies any melena, leg swelling, fevers, chills, nausea, or vomiting. He notes the leg swelling is chronic. No alleviating or exacerbating factors noted.  At bedside he denies pain or previous episode of GI bleed.    I discussed the plan of care with patient.    Review of Systems  Review of Systems   Constitutional: Negative for fever, malaise/fatigue and weight loss.   HENT: Negative for sore throat and tinnitus.    Eyes: Negative for blurred vision and double vision.   Respiratory: Negative for cough, hemoptysis and stridor.    Cardiovascular: Negative for chest pain and palpitations.   Gastrointestinal: Negative for nausea and vomiting.   Genitourinary: Negative for dysuria and urgency.   Musculoskeletal: Negative for myalgias and neck pain.   Skin: Negative for itching and rash.   Neurological: Negative for dizziness and headaches.   Endo/Heme/Allergies: Does not bruise/bleed easily.   Psychiatric/Behavioral: Negative for depression. The patient does not have insomnia.        Past Medical History   has a past  medical history of Gout, Hemodialysis patient (HCC), Hypertension, Kidney disease, and MI (myocardial infarction) ([facility]).    Surgical History   has no past surgical history on file.     Family History  family history includes Diabetes in his mother.   Family history reviewed with patient. There is no family history that is pertinent to the chief complaint.     Social History   reports that he has quit smoking. He has never used smokeless tobacco. He reports current alcohol use. He reports that he does not use drugs.    Allergies  Allergies   Allergen Reactions   • Motrin [Ibuprofen]      hhx of stomach ulcers       Medications  Prior to Admission Medications   Prescriptions Last Dose Informant Patient Reported? Taking?   Buprenorphine 10 MCG/HR PATCH WEEKLY UNK at [facility] Patient's Home Pharmacy Yes No   Sig: Place 10 mcg on the skin every 7 days.   Etanercept 50 MG/ML Solution Prefilled Syringe UNK at [facility] Patient's Home Pharmacy Yes No   Sig: Inject 50 mg under the skin every 7 days.   allopurinol (ZYLOPRIM) 100 MG Tab UNK at [facility] Patient's Home Pharmacy Yes No   Sig: Take 100 mg by mouth 3 times a week. Taken post hemodialysis   apixaban (ELIQUIS) 5mg Tab UNK at [facility] Patient's Home Pharmacy No No   Sig: Take 2 Tablets by mouth 2 times a day for 6 days, THEN 1 Tablet 2 times a day for 30 days. Indications: DVT/PE   aspirin EC (ECOTRIN) 81 MG Tablet Delayed Response UNK at [facility] Patient's Home Pharmacy Yes No   Sig: Take 81 mg by mouth every day.   atorvastatin (LIPITOR) 20 MG Tab UNK at [facility] Patient's Home Pharmacy Yes No   Sig: Take 20 mg by mouth every evening.   carvedilol (COREG) 6.25 MG Tab UNK at [facility] Patient's Home Pharmacy Yes No   Sig: Take 6.25 mg by mouth 2 times a day with meals.   diclofenac sodium (VOLTAREN) 1 % Gel UNK at [facility] Patient's Home Pharmacy Yes No   Sig: Apply 4 g topically 4 times a day as needed (Osetoarthritis pain).   isosorbide dinitrate (ISORDIL) 20 MG Tab UNK at [facility] Patient's Home Pharmacy Yes  No   Sig: Take 10 mg by mouth 2 times a day. Taken 7 hours apart, last dose no later than dinner   Indications: Stable Angina Pectoris   lidocaine (LIDODERM) 5 % Patch UNK at [facility] Patient's Home Pharmacy Yes No   Sig: Apply 1 Patch topically every day. Remove after 12 hours    lidocaine (XYLOCAINE) 5 % Ointment UNK at [facility] Patient's Home Pharmacy Yes No   Sig: Apply 1 g topically in the morning, at noon, and at bedtime.   melatonin 3 MG Tab UNK at [facility] Patient's Home Pharmacy Yes No   Sig: Take 6 mg by mouth at bedtime as needed (sleep).   midodrine (PROAMATINE) 10 MG tablet UNK at [facility] Patient's Home Pharmacy No No   Sig: Take 1 Tablet by mouth 3 times a day with meals for 14 days.   pregabalin (LYRICA) 25 MG Cap UNK at [facility] Patient's Home Pharmacy Yes No   Sig: Take 25 mg by mouth at bedtime.   sevelamer (RENAGEL) 800 MG Tab UNK at [facility] Patient's Home Pharmacy Yes No   Sig: Take 800 mg by mouth 3 times a day with meals.   tamsulosin (FLOMAX) 0.4 MG capsule UNK at [facility] Patient's Home Pharmacy Yes No   Sig: Take 0.4 mg by mouth every morning.   traZODone (DESYREL) 50 MG Tab UNK at [facility] Patient's Home Pharmacy Yes No   Sig: Take 25 mg by mouth every evening. Indications: Trouble Sleeping   vitamin D2, Ergocalciferol, (DRISDOL) 1.25 MG (39230 UT) Cap capsule UNK at [facility] Patient's Home Pharmacy Yes No   Sig: Take 50,000 Units by mouth every 7 days.      Facility-Administered Medications: None       Physical Exam  Temp:  [36 °C (96.8 °F)-37.1 °C (98.8 °F)] 36.9 °C (98.4 °F)  Pulse:  [72-92] 89  Resp:  [14-23] 20  BP: ()/(49-77) 106/58  SpO2:  [93 %-100 %] 100 %  Blood Pressure : 105/58   Temperature: 36 °C (96.8 °F)   Pulse: 82   Respiration: 18   Pulse Oximetry: 94 %       Physical Exam  Vitals and nursing note reviewed.   Constitutional:       General: He is not in acute distress.     Appearance: Normal appearance. He is normal weight. He is not toxic-appearing.   HENT:      Head: Normocephalic and atraumatic.      Nose:  Nose normal. No congestion or rhinorrhea.      Mouth/Throat:      Mouth: Mucous membranes are moist.      Pharynx: Oropharynx is clear.   Eyes:      Extraocular Movements: Extraocular movements intact.      Conjunctiva/sclera: Conjunctivae normal.      Pupils: Pupils are equal, round, and reactive to light.   Neck:      Vascular: No carotid bruit.   Cardiovascular:      Rate and Rhythm: Normal rate and regular rhythm.      Pulses: Normal pulses.      Heart sounds: Normal heart sounds. No murmur heard.  No gallop.       Comments: Left upper chest hemodialysis catheter in place  Pulmonary:      Effort: No respiratory distress.      Breath sounds: Normal breath sounds. No wheezing or rales.   Abdominal:      General: Abdomen is flat. Bowel sounds are normal. There is no distension.      Palpations: Abdomen is soft. There is no mass.      Tenderness: There is no abdominal tenderness.      Hernia: No hernia is present.   Musculoskeletal:         General: No tenderness or signs of injury.      Cervical back: Normal range of motion and neck supple. No muscular tenderness.      Right lower leg: Edema present.      Left lower leg: Edema present.   Lymphadenopathy:      Cervical: No cervical adenopathy.   Skin:     Capillary Refill: Capillary refill takes less than 2 seconds.      Coloration: Skin is pale. Skin is not jaundiced.      Findings: No bruising.   Neurological:      General: No focal deficit present.      Mental Status: He is alert and oriented to person, place, and time. Mental status is at baseline.      Cranial Nerves: No cranial nerve deficit.      Motor: No weakness.      Coordination: Coordination normal.   Psychiatric:         Mood and Affect: Mood normal.         Thought Content: Thought content normal.         Judgment: Judgment normal.         Laboratory:  Recent Labs     12/06/21  1519 12/06/21  1710   WBC 4.9  --    RBC 1.90*  --    HEMOGLOBIN 5.9* 5.7*   HEMATOCRIT 17.9*  --    MCV 94.2  --    MCH 31.1   --    MCHC 33.0*  --    RDW 67.7*  --    PLATELETCT 173  --    MPV 10.8  --      Recent Labs     12/06/21  1519   SODIUM 136   POTASSIUM 4.3   CHLORIDE 97   CO2 26   GLUCOSE 116*   BUN 23*   CREATININE 6.94*   CALCIUM 8.8     Recent Labs     12/06/21  1519   ALTSGPT 9   ASTSGOT 23   ALKPHOSPHAT 121*   TBILIRUBIN 0.5   GLUCOSE 116*     Recent Labs     12/06/21  1519   APTT 36.9*   INR 1.63*     No results for input(s): NTPROBNP in the last 72 hours.      No results for input(s): TROPONINT in the last 72 hours.    Imaging:  CT-ABDOMEN-PELVIS W/O   Final Result      1.  Limited by lack of IV contrast.   2.  No gross bowel wall thickening or evidence of bowel obstruction.   3.  Low attenuation lesion in the RIGHT lobe liver measuring 3.5 cm, hemangioma versus mass.   4.  Probable small gallstones.   5.  Atrophic pancreas with calcifications suggesting chronic pancreatitis.   6.  Colonic diverticulosis without evidence for diverticulitis.   7.  Normal appendix.   8.  Ill-defined bladder wall.  Cystitis is not excluded.          X-Ray:  I have personally reviewed the images and compared with prior images.    Assessment/Plan:  I anticipate this patient will require at least two midnights for appropriate medical management, necessitating inpatient admission.    * GIB (gastrointestinal bleeding)- (present on admission)  Assessment & Plan  Painless lower GI bright red bleeding likely secondary to internal hemorrhoid versus diverticular bleed, 1 unit of packed red blood cells ordered, follow-up H&H every 6 hours, transfuse as needed hemoglobin less than 7    Acute blood loss anemia  Assessment & Plan  Secondary to lower GI bleed. transfuse hemoglobin as needed less than 7    Pulmonary embolism (HCC)- (present on admission)  Assessment & Plan  Hemodynamically stable off Eliquis since November 27.  Anticoagulation held for acute GI bleed.    ESRD on dialysis (HCC)- (present on admission)  Assessment & Plan  Nonoliguric,  scheduled hemodialysis Tuesday Thursday Saturday.  No evidence for acute volume overload or metabolic derangement justifying emergent dialysis.  Consult nephrology in a.m.      VTE prophylaxis: SCDs/TEDs

## 2021-12-07 NOTE — ASSESSMENT & PLAN NOTE
Secondary to lower GI bleed.  Reporting 1 bout of hematochezia with melena yesterday  H/H q8 hrs, transfuse if hemoglobin less than 7  Labs on AM

## 2021-12-07 NOTE — DISCHARGE PLANNING
Outpatient Dialysis Note    Confirmed patient is active at:    [facility]   [address] [facility] [address]  [place] [address]    Schedule: Tuesday, Thursday, Saturday   Time: 10:45am    Patient is seen by Dr. Knott in HD clinic.    Spoke with Susan at facility who confirmed.    Forwarded records for review.    Chio Hickman- Dialysis Coordinator Ph# [phone]  [facility]

## 2021-12-07 NOTE — CARE PLAN
The patient is Stable - Low risk of patient condition declining or worsening    Shift Goals  Clinical Goals: HD today, monitor H&H  Patient Goals: Dialysis today    Progress made toward(s) clinical / shift goals:  H&H q8 hour checks, patient to go to dialysis today.    Patient is not progressing towards the following goals:

## 2021-12-07 NOTE — ED NOTES
Med rec completed per pt and home pharmacy ([facility])  Allergies reviewed  No PO antibiotics in the last 30 days    Patient states that he last took his medications yesterday   Patient is unsure of what medications he has been taking because he states that he is out of a few of them (unsure what medications he is out of)     Pt does not have any patches on     Per pharmacy pt HAS filled and picked up his Eliquis

## 2021-12-07 NOTE — CONSULTS
[facility] -  CONSULTATION NOTE               Author: Susan Tsang M.D. Date & Time: 12/7/2021  12:57 PM       REASON FOR CONSULTATION:   Inpatient hemodialysis management.    CHIEF COMPLAINT:   Bloody Stools    HISTORY OF PRESENT ILLNESS:    71yoM with PMH significant for ESRD on HD TTS via Left Chest PC, HTN, CAD, Gout, Anemia secondary to CKD, CKD Bone Mineral Disorder/Renal Osteodystrophy admitted with complaints of bloody BM x3 episodes that started yesterday morning. Pt reports that yesterday morning he had acute onset of bloody stool that was bright red. He had a total of 3 episodes of bloody stools and then nothing since admission. He denies any n/v or abd pain associated with episodes. He was diagnosed with acute PE on 11/27/21 and was prescribed eliquis but he did not  the prescription yet. His Hgb was 5.9 on admission and after 1 units PRBC transfusion his Hgb is 7.0 this am  No F/C/N/V/CP/SOB.  No melena, hematochezia, hematemesis.  No HA, visual changes, or abdominal pain.    REVIEW OF SYSTEMS:    10 point ROS was performed and is as per HPI or otherwise negative.    PAST MEDICAL HISTORY:   Past Medical History:   Diagnosis Date   • Gout    • Hemodialysis patient (HCC)    • Hypertension    • Kidney disease    • MI (myocardial infarction) (HCC)    CAD  Anemia secondary to CKD  CKD Bone Mineral Disorder/Renal Osteodystrophy  Chronic LE Edema    PAST SURGICAL HISTORY:   History reviewed. No pertinent surgical history.  Left Chest Permcath   Right arm AVF--immature    FAMILY HISTORY:   Family History   Problem Relation Age of Onset   • Diabetes Mother        SOCIAL HISTORY:   Social History     Tobacco Use   Smoking Status Former Smoker   Smokeless Tobacco Never Used     Social History     Substance and Sexual Activity   Alcohol Use Yes    Comment: occ     Social History     Substance and Sexual Activity   Drug Use Never       HOME MEDICATIONS:   Reviewed and  "documented in chart.    LABORATORY STUDIES:   Recent Labs     12/06/21  1519 12/07/21  0119   SODIUM 136 138   POTASSIUM 4.3 3.7   CHLORIDE 97 97   CO2 26 26   GLUCOSE 116* 96   BUN 23* 25*   CREATININE 6.94* 7.07*   CALCIUM 8.8 8.0*       ALLERGIES:  Motrin [ibuprofen]    VS:  /65   Pulse 83   Temp 36.7 °C (98 °F) (Temporal)   Resp 17   Ht 1.727 m (5' 8\")   Wt 74.8 kg (165 lb)   SpO2 93%   BMI 25.09 kg/m²     Physical Exam  Vitals and nursing note reviewed.   Constitutional:       General: He is not in acute distress.     Appearance: He is ill-appearing.   Eyes:      General: No scleral icterus.  Cardiovascular:      Rate and Rhythm: Normal rate and regular rhythm.      Heart sounds: No murmur heard.      Pulmonary:      Effort: Pulmonary effort is normal.      Breath sounds: Normal breath sounds.      Comments: +L chest PC  Abdominal:      General: Bowel sounds are normal. There is no distension.      Palpations: Abdomen is soft.      Tenderness: There is no abdominal tenderness.   Musculoskeletal:         General: No deformity.      Right lower leg: Edema present.      Left lower leg: Edema present.      Comments: +immature right arm AVF   Skin:     General: Skin is warm and dry.      Findings: No erythema or rash.   Neurological:      General: No focal deficit present.      Mental Status: He is alert and oriented to person, place, and time.   Psychiatric:         Mood and Affect: Mood normal.         Behavior: Behavior normal.            FLUID BALANCE:  In: 564.6 [Blood:564.6]  Out: -     IMAGING:  All imaging reviewed from admission to present day    IMPRESSION:  # ESRD  - On HD TTS via Left Chest PC and has an immature right arm AVF  # Anemia--secondary to combination of acute blood loss anemia from GI bleed and secondary to CKD  - Not at goal  - Transfused overnight  - No bloody BM overnight  # HTN--pt on midodrine outpt  - Goal BP < 140/90  - At goal  - On midodrine  # CKD-MBD/Renal " Osteodystrophy  - Followed at outpt HD unit  - Ca 8.0  - PO4 pen  - On renagel with meals  # CAD--medical management  # PE--diagnosed 11/27/21 and prescribed eliquis which he has not started  - Management per primary svc  # Chronic Bilateral LE Edema  - Will attempt to challenge UF removal with HD but pt reports significant cramping with aggressive UF      PLAN:  - HD today and qTTS   - UF as tolerated, will attempt to challenge  - Serial CBC's and transfuse PRN for Hgb less than 7  - Anti-coagulation held for now  - MADAY with HD  - Check phos level  - Hold carvedilol in am on dialysis days  - Continue midodrine, does not need to be timed with meals  - Continue home phos binders  - No dietary protein restrictions  - Dose all meds per ESRD    Thank you for the consultation!

## 2021-12-07 NOTE — PROGRESS NOTES
Hospital Medicine Daily Progress Note    Date of Service  12/7/2021    Chief Complaint  Junior Proctor is a 71 y.o. male admitted 12/6/2021 with GIB    Hospital Course  71 y.o. male with history of pulmonary embolism diagnosed 11/27/2021 prescribed Eliquis but not yet filled, end-stage renal failure on hemodialysis Tuesday Thursday Saturday.  Last dialysis Saturday, November 4 who presented 12/6/2021 with mild to moderate acute bloody stools onset few hours ago.   Patient reports he noticed blood in his stools earlier today and was prompted to call EMS. He describes the blood as bright red and notes his blood stools improved after every successive bowel movement. Patient endorses associated ankle pain, difficulty ambulating secondary to pain, and  dizziness, but denies any melena, leg swelling, fevers, chills, nausea, or vomiting. He notes the leg swelling is chronic. No alleviating or exacerbating factors noted. At bedside he denies pain or previous episode of GI bleed.    Interval Problem Update  Patient mother at bedside and found to be afebrile and reporting hand and joint pain  Stable vitals and requiring 4 L to maintain saturations  Status post 1 transfusion of PRBC and hemoglobin at 7 this a.m.  No hematochezia or melena today  Iron studies not suggestive of iron deficiency anemia and ferritin high  Likely anemia from CKD  1 PRBC transfusion, hemoglobin is low at 7  Nephrology following for hemodialysis, (patient started on epoetin) appreciate recommendations  HD today per nephrology  Frequent H&H  Labs in a.m.    I have personally seen and examined the patient at bedside. I discussed the plan of care with patient and family.    Consultants/Specialty  nephrology    Code Status  Full Code    Disposition  Patient is not medically cleared.   Anticipate discharge to to home with close outpatient follow-up.  I have placed the appropriate orders for post-discharge needs.    Review of Systems  Review of Systems    Constitutional: Positive for malaise/fatigue.   HENT: Negative.    Eyes: Negative.    Respiratory: Negative.    Cardiovascular: Negative.    Gastrointestinal: Negative for abdominal pain, blood in stool, constipation, diarrhea, heartburn, melena, nausea and vomiting.   Genitourinary: Negative.    Musculoskeletal: Positive for joint pain.   Skin: Negative.    Neurological: Negative.    Endo/Heme/Allergies: Negative.    Psychiatric/Behavioral: Negative.         Physical Exam  Temp:  [36.7 °C (98 °F)-37.3 °C (99.2 °F)] 36.7 °C (98 °F)  Pulse:  [72-92] 83  Resp:  [14-26] 17  BP: ()/(49-77) 101/65  SpO2:  [90 %-100 %] 93 %    Physical Exam  Constitutional:       General: He is not in acute distress.     Appearance: Normal appearance.   HENT:      Head: Normocephalic and atraumatic.      Nose: Nose normal. No congestion.      Mouth/Throat:      Mouth: Mucous membranes are moist.   Eyes:      Extraocular Movements: Extraocular movements intact.      Pupils: Pupils are equal, round, and reactive to light.   Cardiovascular:      Rate and Rhythm: Normal rate and regular rhythm.      Pulses: Normal pulses.      Heart sounds: Normal heart sounds.   Pulmonary:      Effort: Pulmonary effort is normal.      Breath sounds: Normal breath sounds.   Abdominal:      General: Bowel sounds are normal. There is no distension.      Palpations: Abdomen is soft.      Tenderness: There is no abdominal tenderness. There is no guarding or rebound.   Musculoskeletal:         General: No swelling. Normal range of motion.      Cervical back: Normal range of motion and neck supple.      Right lower leg: No edema.      Left lower leg: No edema.      Comments: Bilateral hand stiffness and tenderness   Skin:     General: Skin is warm.      Coloration: Skin is not jaundiced.   Neurological:      General: No focal deficit present.      Mental Status: He is alert and oriented to person, place, and time. Mental status is at baseline.      Cranial  Nerves: No cranial nerve deficit.   Psychiatric:         Mood and Affect: Mood normal.         Behavior: Behavior normal.         Thought Content: Thought content normal.         Judgment: Judgment normal.         Fluids    Intake/Output Summary (Last 24 hours) at 12/7/2021 1512  Last data filed at 12/7/2021 0500  Gross per 24 hour   Intake 564.58 ml   Output --   Net 564.58 ml       Laboratory  Recent Labs     12/06/21  1519 12/06/21  1519 12/06/21  1710 12/07/21  0119 12/07/21  0812   WBC 4.9  --   --   --   --    RBC 1.90*  --   --   --   --    HEMOGLOBIN 5.9*   < > 5.7* 6.0* 7.0*   HEMATOCRIT 17.9*  --   --   --   --    MCV 94.2  --   --   --   --    MCH 31.1  --   --   --   --    MCHC 33.0*  --   --   --   --    RDW 67.7*  --   --   --   --    PLATELETCT 173  --   --   --   --    MPV 10.8  --   --   --   --     < > = values in this interval not displayed.     Recent Labs     12/06/21  1519 12/07/21  0119   SODIUM 136 138   POTASSIUM 4.3 3.7   CHLORIDE 97 97   CO2 26 26   GLUCOSE 116* 96   BUN 23* 25*   CREATININE 6.94* 7.07*   CALCIUM 8.8 8.0*     Recent Labs     12/06/21  1519   APTT 36.9*   INR 1.63*               Imaging  CT-ABDOMEN-PELVIS W/O   Final Result      1.  Limited by lack of IV contrast.   2.  No gross bowel wall thickening or evidence of bowel obstruction.   3.  Low attenuation lesion in the RIGHT lobe liver measuring 3.5 cm, hemangioma versus mass.   4.  Probable small gallstones.   5.  Atrophic pancreas with calcifications suggesting chronic pancreatitis.   6.  Colonic diverticulosis without evidence for diverticulitis.   7.  Normal appendix.   8.  Ill-defined bladder wall.  Cystitis is not excluded.           Assessment/Plan  * GIB (gastrointestinal bleeding)- (present on admission)  Assessment & Plan  Painless lower GI bright red bleeding likely secondary to internal hemorrhoid versus diverticular bleed  No hematochezia or melena today  Status post 1 transfusion of PRBC and hemoglobin at 7  this a.m.  No hematochezia or melena today  Iron studies not suggestive of iron deficiency anemia and ferritin high  Likely anemia from CKD  1 PRBC transfusion, hemoglobin is low at 7  Nephrology following for hemodialysis, (patient started on epoetin) appreciate recommendations  Frequent H&H  Labs in a.m.    Acute blood loss anemia- (present on admission)  Assessment & Plan  Secondary to lower GI bleed.  No hematochezia or melena today  Monitor    Pulmonary embolism (HCC)- (present on admission)  Assessment & Plan  Hemodynamically stable off Eliquis since November 27.    Anticoagulation held for acute GI bleed.    ESRD on dialysis (HCC)- (present on admission)  Assessment & Plan  Nonoliguric, scheduled hemodialysis Tuesday Thursday Saturday.  No evidence for acute volume overload or metabolic derangement justifying emergent dialysis.  Nephrology following, patient for hemodialysis today  Labs in a.m.       VTE prophylaxis: SCDs/TEDs    I have performed a physical exam and reviewed and updated ROS and Plan today (12/7/2021). In review of yesterday's note (12/6/2021), there are no changes except as documented above.

## 2021-12-07 NOTE — PROGRESS NOTES
4 Eyes Skin Assessment Completed by RAHDA Bay and RADHA Billingsley.    Head WDL  Ears WDL  Nose WDL  Mouth WDL  Neck WDL  Breast/Chest WDL  Shoulder Blades WDL  Spine WDL  (R) Arm/Elbow/Hand WDL  (L) Arm/Elbow/Hand WDL  Abdomen WDL  Groin WDL  Scrotum/Coccyx/Buttocks WDL  (R) Leg Scar, Scab and Edema  (L) Leg Scar, Scab and Edema  (R) Heel/Foot/Toe WDL  (L) Heel/Foot/Toe WDL          Devices In Places Blood Pressure Cuff and Pulse Ox      Interventions In Place Pillows and Pressure Redistribution Mattress    Possible Skin Injury No    Pictures Uploaded Into Epic N/A  Wound Consult Placed N/A  RN Wound Prevention Protocol Ordered No

## 2021-12-07 NOTE — PROGRESS NOTES
Received report from RADHA Altman. Patient in [facility] locked in lowest position with call light in reach. POC discussed. All questions answered.     MD to be contacted regarding orders for blood transfusion.

## 2021-12-07 NOTE — ASSESSMENT & PLAN NOTE
Painless lower GI bright red bleeding likely secondary to internal hemorrhoid versus diverticular bleed  Status post 1 transfusion of PRBC on 12/7  Reporting 1 bout of hematochezia with melena yesterday  Hemoglobin trending down to 7.5 from 8  Start IV PPI  Consult gastroenterology (Dr. Sorto), will see today and aim for EGD/Cassel on AM   H/H q8 hrs  Labs on AM

## 2021-12-08 ENCOUNTER — APPOINTMENT (OUTPATIENT)
Dept: RADIOLOGY | Facility: MEDICAL CENTER | Age: 71
DRG: 377 | End: 2021-12-08
Attending: STUDENT IN AN ORGANIZED HEALTH CARE EDUCATION/TRAINING PROGRAM
Payer: COMMERCIAL

## 2021-12-08 LAB
ALBUMIN SERPL BCP-MCNC: 3.4 G/DL (ref 3.2–4.9)
ALBUMIN/GLOB SERPL: 1.5 G/DL
ALP SERPL-CCNC: 108 U/L (ref 30–99)
ALT SERPL-CCNC: 8 U/L (ref 2–50)
ANION GAP SERPL CALC-SCNC: 8 MMOL/L (ref 7–16)
ANISOCYTOSIS BLD QL SMEAR: ABNORMAL
AST SERPL-CCNC: 19 U/L (ref 12–45)
BASOPHILS # BLD AUTO: 0.4 % (ref 0–1.8)
BASOPHILS # BLD: 0.02 K/UL (ref 0–0.12)
BILIRUB SERPL-MCNC: 0.5 MG/DL (ref 0.1–1.5)
BUN SERPL-MCNC: 12 MG/DL (ref 8–22)
CALCIUM SERPL-MCNC: 8.3 MG/DL (ref 8.5–10.5)
CHLORIDE SERPL-SCNC: 98 MMOL/L (ref 96–112)
CO2 SERPL-SCNC: 29 MMOL/L (ref 20–33)
COMMENT 1642: NORMAL
CREAT SERPL-MCNC: 4.98 MG/DL (ref 0.5–1.4)
EOSINOPHIL # BLD AUTO: 0.38 K/UL (ref 0–0.51)
EOSINOPHIL NFR BLD: 8.1 % (ref 0–6.9)
ERYTHROCYTE [DISTWIDTH] IN BLOOD BY AUTOMATED COUNT: 64.2 FL (ref 35.9–50)
ERYTHROCYTE [DISTWIDTH] IN BLOOD BY AUTOMATED COUNT: 65.1 FL (ref 35.9–50)
ERYTHROCYTE [DISTWIDTH] IN BLOOD BY AUTOMATED COUNT: 65.1 FL (ref 35.9–50)
GLOBULIN SER CALC-MCNC: 2.3 G/DL (ref 1.9–3.5)
GLUCOSE SERPL-MCNC: 93 MG/DL (ref 65–99)
HCT VFR BLD AUTO: 22.6 % (ref 42–52)
HCT VFR BLD AUTO: 23.6 % (ref 42–52)
HCT VFR BLD AUTO: 23.6 % (ref 42–52)
HGB BLD-MCNC: 7.2 G/DL (ref 14–18)
HGB BLD-MCNC: 7.3 G/DL (ref 14–18)
HGB BLD-MCNC: 7.5 G/DL (ref 14–18)
IMM GRANULOCYTES # BLD AUTO: 0.01 K/UL (ref 0–0.11)
IMM GRANULOCYTES NFR BLD AUTO: 0.2 % (ref 0–0.9)
LYMPHOCYTES # BLD AUTO: 2.22 K/UL (ref 1–4.8)
LYMPHOCYTES NFR BLD: 47.2 % (ref 22–41)
MACROCYTES BLD QL SMEAR: ABNORMAL
MCH RBC QN AUTO: 28.3 PG (ref 27–33)
MCH RBC QN AUTO: 28.7 PG (ref 27–33)
MCH RBC QN AUTO: 29.1 PG (ref 27–33)
MCHC RBC AUTO-ENTMCNC: 30.9 G/DL (ref 33.7–35.3)
MCHC RBC AUTO-ENTMCNC: 31.8 G/DL (ref 33.7–35.3)
MCHC RBC AUTO-ENTMCNC: 31.9 G/DL (ref 33.7–35.3)
MCV RBC AUTO: 90.4 FL (ref 81.4–97.8)
MCV RBC AUTO: 91.5 FL (ref 81.4–97.8)
MCV RBC AUTO: 91.5 FL (ref 81.4–97.8)
MICROCYTES BLD QL SMEAR: ABNORMAL
MONOCYTES # BLD AUTO: 0.33 K/UL (ref 0–0.85)
MONOCYTES NFR BLD AUTO: 7 % (ref 0–13.4)
MORPHOLOGY BLD-IMP: NORMAL
NEUTROPHILS # BLD AUTO: 1.74 K/UL (ref 1.82–7.42)
NEUTROPHILS NFR BLD: 37.1 % (ref 44–72)
NRBC # BLD AUTO: 0 K/UL
NRBC BLD-RTO: 0 /100 WBC
OVALOCYTES BLD QL SMEAR: NORMAL
PLATELET # BLD AUTO: 119 K/UL (ref 164–446)
PLATELET # BLD AUTO: 137 K/UL (ref 164–446)
PLATELET # BLD AUTO: 143 K/UL (ref 164–446)
PLATELET BLD QL SMEAR: NORMAL
PMV BLD AUTO: 10.3 FL (ref 9–12.9)
PMV BLD AUTO: 10.5 FL (ref 9–12.9)
PMV BLD AUTO: 10.5 FL (ref 9–12.9)
POIKILOCYTOSIS BLD QL SMEAR: NORMAL
POTASSIUM SERPL-SCNC: 4.3 MMOL/L (ref 3.6–5.5)
PROT SERPL-MCNC: 5.7 G/DL (ref 6–8.2)
RBC # BLD AUTO: 2.47 M/UL (ref 4.7–6.1)
RBC # BLD AUTO: 2.58 M/UL (ref 4.7–6.1)
RBC # BLD AUTO: 2.61 M/UL (ref 4.7–6.1)
RBC BLD AUTO: PRESENT
SARS-COV+SARS-COV-2 AG RESP QL IA.RAPID: NOTDETECTED
SODIUM SERPL-SCNC: 135 MMOL/L (ref 135–145)
SPECIMEN SOURCE: NORMAL
TARGETS BLD QL SMEAR: NORMAL
WBC # BLD AUTO: 4.7 K/UL (ref 4.8–10.8)
WBC # BLD AUTO: 5.2 K/UL (ref 4.8–10.8)
WBC # BLD AUTO: 5.3 K/UL (ref 4.8–10.8)

## 2021-12-08 PROCEDURE — 700111 HCHG RX REV CODE 636 W/ 250 OVERRIDE (IP): Performed by: STUDENT IN AN ORGANIZED HEALTH CARE EDUCATION/TRAINING PROGRAM

## 2021-12-08 PROCEDURE — 36415 COLL VENOUS BLD VENIPUNCTURE: CPT

## 2021-12-08 PROCEDURE — A9270 NON-COVERED ITEM OR SERVICE: HCPCS | Performed by: STUDENT IN AN ORGANIZED HEALTH CARE EDUCATION/TRAINING PROGRAM

## 2021-12-08 PROCEDURE — A9270 NON-COVERED ITEM OR SERVICE: HCPCS | Performed by: INTERNAL MEDICINE

## 2021-12-08 PROCEDURE — 700101 HCHG RX REV CODE 250: Performed by: STUDENT IN AN ORGANIZED HEALTH CARE EDUCATION/TRAINING PROGRAM

## 2021-12-08 PROCEDURE — 85027 COMPLETE CBC AUTOMATED: CPT | Mod: 91

## 2021-12-08 PROCEDURE — 700102 HCHG RX REV CODE 250 W/ 637 OVERRIDE(OP): Performed by: INTERNAL MEDICINE

## 2021-12-08 PROCEDURE — 85025 COMPLETE CBC W/AUTO DIFF WBC: CPT

## 2021-12-08 PROCEDURE — 770001 HCHG ROOM/CARE - MED/SURG/GYN PRIV*

## 2021-12-08 PROCEDURE — C9113 INJ PANTOPRAZOLE SODIUM, VIA: HCPCS | Performed by: STUDENT IN AN ORGANIZED HEALTH CARE EDUCATION/TRAINING PROGRAM

## 2021-12-08 PROCEDURE — 87426 SARSCOV CORONAVIRUS AG IA: CPT

## 2021-12-08 PROCEDURE — 99233 SBSQ HOSP IP/OBS HIGH 50: CPT | Performed by: STUDENT IN AN ORGANIZED HEALTH CARE EDUCATION/TRAINING PROGRAM

## 2021-12-08 PROCEDURE — 700102 HCHG RX REV CODE 250 W/ 637 OVERRIDE(OP): Performed by: STUDENT IN AN ORGANIZED HEALTH CARE EDUCATION/TRAINING PROGRAM

## 2021-12-08 PROCEDURE — 76705 ECHO EXAM OF ABDOMEN: CPT

## 2021-12-08 PROCEDURE — 80053 COMPREHEN METABOLIC PANEL: CPT

## 2021-12-08 RX ORDER — PANTOPRAZOLE SODIUM 40 MG/10ML
40 INJECTION, POWDER, LYOPHILIZED, FOR SOLUTION INTRAVENOUS 2 TIMES DAILY
Status: DISCONTINUED | OUTPATIENT
Start: 2021-12-08 | End: 2021-12-10 | Stop reason: HOSPADM

## 2021-12-08 RX ORDER — ATORVASTATIN CALCIUM 20 MG/1
20 TABLET, FILM COATED ORAL NIGHTLY
Status: DISCONTINUED | OUTPATIENT
Start: 2021-12-08 | End: 2021-12-10 | Stop reason: HOSPADM

## 2021-12-08 RX ADMIN — PANTOPRAZOLE SODIUM 40 MG: 40 INJECTION, POWDER, FOR SOLUTION INTRAVENOUS at 16:58

## 2021-12-08 RX ADMIN — MIDODRINE HYDROCHLORIDE 10 MG: 5 TABLET ORAL at 16:58

## 2021-12-08 RX ADMIN — OXYCODONE 5 MG: 5 TABLET ORAL at 16:58

## 2021-12-08 RX ADMIN — POLYETHYLENE GLYCOL 3350, SODIUM SULFATE ANHYDROUS, SODIUM BICARBONATE, SODIUM CHLORIDE, POTASSIUM CHLORIDE 4 L: 236; 22.74; 6.74; 5.86; 2.97 POWDER, FOR SOLUTION ORAL at 16:57

## 2021-12-08 RX ADMIN — TRAZODONE HYDROCHLORIDE 25 MG: 50 TABLET ORAL at 21:17

## 2021-12-08 RX ADMIN — MIDODRINE HYDROCHLORIDE 10 MG: 5 TABLET ORAL at 04:59

## 2021-12-08 RX ADMIN — OXYCODONE 5 MG: 5 TABLET ORAL at 04:58

## 2021-12-08 RX ADMIN — OXYCODONE 5 MG: 5 TABLET ORAL at 07:52

## 2021-12-08 RX ADMIN — CARVEDILOL 6.25 MG: 6.25 TABLET, FILM COATED ORAL at 07:48

## 2021-12-08 RX ADMIN — PREGABALIN 25 MG: 25 CAPSULE ORAL at 21:17

## 2021-12-08 RX ADMIN — MIDODRINE HYDROCHLORIDE 10 MG: 5 TABLET ORAL at 12:22

## 2021-12-08 RX ADMIN — PANTOPRAZOLE SODIUM 40 MG: 40 INJECTION, POWDER, FOR SOLUTION INTRAVENOUS at 12:22

## 2021-12-08 RX ADMIN — SEVELAMER CARBONATE 800 MG: 800 TABLET, FILM COATED ORAL at 07:48

## 2021-12-08 RX ADMIN — ATORVASTATIN CALCIUM 20 MG: 20 TABLET, FILM COATED ORAL at 21:16

## 2021-12-08 RX ADMIN — LIDOCAINE 2 PATCH: 50 PATCH TOPICAL at 12:29

## 2021-12-08 RX ADMIN — MORPHINE SULFATE 1 MG: 4 INJECTION INTRAVENOUS at 21:22

## 2021-12-08 ASSESSMENT — PAIN DESCRIPTION - PAIN TYPE
TYPE: ACUTE PAIN

## 2021-12-08 ASSESSMENT — ENCOUNTER SYMPTOMS
PSYCHIATRIC NEGATIVE: 1
CARDIOVASCULAR NEGATIVE: 1
DIARRHEA: 0
NAUSEA: 0
BLOOD IN STOOL: 1
BACK PAIN: 0
HEARTBURN: 0
RESPIRATORY NEGATIVE: 1
BLOOD IN STOOL: 0
ABDOMINAL PAIN: 0
VOMITING: 0
NEUROLOGICAL NEGATIVE: 1
CONSTIPATION: 0
EYES NEGATIVE: 1

## 2021-12-08 ASSESSMENT — LIFESTYLE VARIABLES: SUBSTANCE_ABUSE: 0

## 2021-12-08 NOTE — ASSESSMENT & PLAN NOTE
History of  Has not received treatment or follow-up with GI  GI will evaluate today  Pending right upper quadrant ultrasound

## 2021-12-08 NOTE — PROGRESS NOTES
"[facility] -  PROGRESS NOTE               Author: DARIN Gonzalez Date & Time: 12/8/2021  11:31 AM     HPI:  71yoM with PMH significant for ESRD on HD TTS via Left Chest PC, HTN, CAD, Gout, Anemia secondary to CKD, CKD Bone Mineral Disorder/Renal Osteodystrophy admitted with complaints of bloody BM x3 episodes that started yesterday morning. Pt reports that yesterday morning he had acute onset of bloody stool that was bright red. He had a total of 3 episodes of bloody stools and then nothing since admission. He denies any n/v or abd pain associated with episodes. He was diagnosed with acute PE on 11/27/21 and was prescribed eliquis but he did not  the prescription yet. His Hgb was 5.9 on admission and after 1 units PRBC transfusion his Hgb is 7.0 this am  No F/C/N/V/CP/SOB.  No melena, hematochezia, hematemesis.  No HA, visual changes, or abdominal pain.    DAILY NEPHROLOGY SUMMARY:  12/8: 2L net UF. Resting in bed, no acute distress. Denies any bloody stools this am. SOB improving. C/o BLE edema improved after HD yesterday. Reports hx of gastric ulceration. PPI was discontinued.     REVIEW OF SYSTEMS:    10 point ROS reviewed and is as per HPI/daily summary or otherwise negative    PMH/PSH/SH/FH:   Reviewed and unchanged since admission note    CURRENT MEDICATIONS:   Reviewed from admission to present day    VS:  /57   Pulse 75   Temp 36.7 °C (98.1 °F) (Temporal)   Resp 18   Ht 1.727 m (5' 8\")   Wt 74.8 kg (165 lb)   SpO2 100%   BMI 25.09 kg/m²     Physical Exam  Constitutional:       General: He is not in acute distress.     Appearance: Normal appearance. He is not diaphoretic.   HENT:      Head: Normocephalic and atraumatic.      Mouth/Throat:      Mouth: Mucous membranes are dry.      Pharynx: Oropharynx is clear.   Eyes:      Extraocular Movements: Extraocular movements intact.      Conjunctiva/sclera: Conjunctivae normal.      Pupils: Pupils are equal, " round, and reactive to light.   Cardiovascular:      Rate and Rhythm: Normal rate and regular rhythm.      Comments: L Chest PC  Pulmonary:      Effort: Pulmonary effort is normal. No respiratory distress.      Breath sounds: Normal breath sounds. No stridor. No wheezing or rhonchi.   Abdominal:      General: Bowel sounds are normal. There is no distension.      Palpations: Abdomen is soft.      Tenderness: There is no abdominal tenderness.   Musculoskeletal:         General: Swelling present. Normal range of motion.      Cervical back: Normal range of motion and neck supple.      Comments: BLE 2-3+ Edema   Immature R AVF    Skin:     General: Skin is warm and dry.      Coloration: Skin is not jaundiced or pale.   Neurological:      Mental Status: He is alert.   Psychiatric:         Mood and Affect: Mood normal.         Behavior: Behavior normal.         Thought Content: Thought content normal.         Judgment: Judgment normal.         Fluids:  In: 500 [Dialysis:500]  Out: 2500     LABS:  Recent Labs     12/06/21  1519 12/07/21  0119 12/08/21  0503   SODIUM 136 138 135   POTASSIUM 4.3 3.7 4.3   CHLORIDE 97 97 98   CO2 26 26 29   GLUCOSE 116* 96 93   BUN 23* 25* 12   CREATININE 6.94* 7.07* 4.98*   CALCIUM 8.8 8.0* 8.3*       IMAGING:   All imaging reviewed from admission to present day    IMPRESSION:  # ESRD  - On HD TTS via Left Chest PC and has an immature right arm AVF  # Anemia--secondary to combination of acute blood loss anemia from GI bleed and secondary to CKD  - Not at goal  - Transfused overnight  - No bloody BM overnight  - GI Consult for Scope  # HTN--pt on midodrine outpt  - Goal BP < 140/90  - At goal  - On midodrine  # CKD-MBD/Renal Osteodystrophy  - Followed at outpt HD unit  - Ca 8.0  - PO4 2.9  - On renagel with meals  # CAD--medical management  # PE--diagnosed 11/27/21 and prescribed eliquis which he has not started  - Management per primary svc  # Chronic Bilateral LE Edema  - Will attempt to  challenge UF removal with HD but pt reports significant cramping with aggressive UF        PLAN:  - No iHD today (WED) and qTTS   - UF as tolerated, will attempt to challenge  - Serial CBC's and transfuse PRN for Hgb less than 7  - Anti-coagulation held for now  - MADAY with HD  - No Phos binder   - Hold carvedilol in am on dialysis days  - Continue midodrine, does not need to be timed with meals  - No dietary protein restrictions   - Dose all meds per ESRD

## 2021-12-08 NOTE — PROGRESS NOTES
HD treatment ordered by Dr Tsang started at 1438 and ended at 1738 with net UF of 2 Liters as bp tolerated. Stable bp entire treatment. Dressing changed, no s/s of infection noted. See flow sheet for details.

## 2021-12-08 NOTE — CARE PLAN
Problem: Knowledge Deficit - Standard  Goal: Patient and family/care givers will demonstrate understanding of plan of care, disease process/condition, diagnostic tests and medications  Outcome: Progressing     Problem: Pain - Standard  Goal: Alleviation of pain or a reduction in pain to the patient’s comfort goal  Outcome: Progressing     Problem: Fall Risk  Goal: Patient will remain free from falls  Outcome: Progressing   The patient is Stable - Low risk of patient condition declining or worsening    Shift Goals  Clinical Goals: stable h/h, obtain stool sample  Patient Goals: pain control, rest  Family Goals: n/a    Progress made toward(s) clinical / shift goals:  Understands POC. Pain reduced with medications. Fall precautions in place.    Patient is not progressing towards the following goals:

## 2021-12-08 NOTE — CARE PLAN
The patient is Stable - Low risk of patient condition declining or worsening    Shift Goals  Clinical Goals: stable H&H, occult stool x3  Patient Goals: stay warm, sleep  Family Goals: no family present     Progress made toward(s) clinical / shift goals:  Pt updated on POC for the night and educated on precautions in place to maintain skin integrity and prevent falls. Pt medicated per MAR for pain control, heat and repositioning used as non-pharmacological adjuncts.    Patient is not progressing towards the following goals: n/a

## 2021-12-08 NOTE — PROGRESS NOTES
Hospital Medicine Daily Progress Note    Date of Service  12/8/2021    Chief Complaint  Junior Proctor is a 71 y.o. male admitted 12/6/2021 with GIB    Hospital Course  71 y.o. male with history of pulmonary embolism diagnosed 11/27/2021 prescribed Eliquis but not yet filled, end-stage renal failure on hemodialysis Tuesday Thursday Saturday.  Last dialysis Saturday, November 4 who presented 12/6/2021 with mild to moderate acute bloody stools onset few hours ago.   Patient reports he noticed blood in his stools earlier today and was prompted to call EMS. He describes the blood as bright red and notes his blood stools improved after every successive bowel movement. Patient endorses associated ankle pain, difficulty ambulating secondary to pain, and  dizziness, but denies any melena, leg swelling, fevers, chills, nausea, or vomiting. He notes the leg swelling is chronic. No alleviating or exacerbating factors noted. At bedside he denies pain or previous episode of GI bleed.    Interval Problem Update  Reporting 1 bout of hematochezia with melena yesterday  Patient at bedside and found to be afebrile and in NAD  Stable vitals and on 4 L NC  Hemoglobin trending down to 7.5 from 8  Start IV PPI  Consult gastroenterology (Dr. Sorto), will see today and aim for EGD/[place] on AM   Nephrology following, appreciate recommendations  H/H q8 hrs  Labs on AM    I have personally seen and examined the patient at bedside. I discussed the plan of care with patient and family.    Consultants/Specialty  nephrology    Code Status  Full Code    Disposition  Patient is not medically cleared.   Anticipate discharge to to home with close outpatient follow-up.  I have placed the appropriate orders for post-discharge needs.    Review of Systems  Review of Systems   Constitutional: Positive for malaise/fatigue.   HENT: Negative.    Eyes: Negative.    Respiratory: Negative.    Cardiovascular: Negative.    Gastrointestinal: Negative for abdominal  pain, blood in stool, constipation, diarrhea, heartburn, melena, nausea and vomiting.   Genitourinary: Negative.    Musculoskeletal: Positive for joint pain.   Skin: Negative.    Neurological: Negative.    Endo/Heme/Allergies: Negative.    Psychiatric/Behavioral: Negative.         Physical Exam  Temp:  [36.6 °C (97.8 °F)-36.9 °C (98.4 °F)] 36.7 °C (98.1 °F)  Pulse:  [75-82] 75  Resp:  [17-18] 18  BP: (103-116)/(57-75) 104/57  SpO2:  [97 %-100 %] 100 %    Physical Exam  Constitutional:       General: He is not in acute distress.     Appearance: Normal appearance.   HENT:      Head: Normocephalic and atraumatic.      Nose: Nose normal. No congestion.      Mouth/Throat:      Mouth: Mucous membranes are moist.   Eyes:      Extraocular Movements: Extraocular movements intact.      Pupils: Pupils are equal, round, and reactive to light.   Cardiovascular:      Rate and Rhythm: Normal rate and regular rhythm.      Pulses: Normal pulses.      Heart sounds: Normal heart sounds.   Pulmonary:      Effort: Pulmonary effort is normal.      Breath sounds: Normal breath sounds.   Abdominal:      General: Bowel sounds are normal. There is no distension.      Palpations: Abdomen is soft.      Tenderness: There is no abdominal tenderness. There is no guarding or rebound.   Musculoskeletal:         General: No swelling. Normal range of motion.      Cervical back: Normal range of motion and neck supple.      Right lower leg: No edema.      Left lower leg: No edema.      Comments: Bilateral hand stiffness and tenderness   Skin:     General: Skin is warm.      Coloration: Skin is not jaundiced.   Neurological:      General: No focal deficit present.      Mental Status: He is alert and oriented to person, place, and time. Mental status is at baseline.      Cranial Nerves: No cranial nerve deficit.   Psychiatric:         Mood and Affect: Mood normal.         Behavior: Behavior normal.         Thought Content: Thought content normal.          Judgment: Judgment normal.         Fluids    Intake/Output Summary (Last 24 hours) at 12/8/2021 1200  Last data filed at 12/8/2021 1000  Gross per 24 hour   Intake 740 ml   Output 2500 ml   Net -1760 ml       Laboratory  Recent Labs     12/06/21  1519 12/06/21  1710 12/07/21  0812 12/07/21  1651 12/08/21  0503   WBC 4.9  --   --   --  4.7*   RBC 1.90*  --   --   --  2.61*   HEMOGLOBIN 5.9*   < > 7.0* 8.0* 7.5*   HEMATOCRIT 17.9*  --   --   --  23.6*   MCV 94.2  --   --   --  90.4   MCH 31.1  --   --   --  28.7   MCHC 33.0*  --   --   --  31.8*   RDW 67.7*  --   --   --  65.1*   PLATELETCT 173  --   --   --  137*   MPV 10.8  --   --   --  10.3    < > = values in this interval not displayed.     Recent Labs     12/06/21  1519 12/07/21  0119 12/08/21  0503   SODIUM 136 138 135   POTASSIUM 4.3 3.7 4.3   CHLORIDE 97 97 98   CO2 26 26 29   GLUCOSE 116* 96 93   BUN 23* 25* 12   CREATININE 6.94* 7.07* 4.98*   CALCIUM 8.8 8.0* 8.3*     Recent Labs     12/06/21  1519   APTT 36.9*   INR 1.63*               Imaging  CT-ABDOMEN-PELVIS W/O   Final Result      1.  Limited by lack of IV contrast.   2.  No gross bowel wall thickening or evidence of bowel obstruction.   3.  Low attenuation lesion in the RIGHT lobe liver measuring 3.5 cm, hemangioma versus mass.   4.  Probable small gallstones.   5.  Atrophic pancreas with calcifications suggesting chronic pancreatitis.   6.  Colonic diverticulosis without evidence for diverticulitis.   7.  Normal appendix.   8.  Ill-defined bladder wall.  Cystitis is not excluded.      US-RUQ    (Results Pending)        Assessment/Plan  * GIB (gastrointestinal bleeding)- (present on admission)  Assessment & Plan  Painless lower GI bright red bleeding likely secondary to internal hemorrhoid versus diverticular bleed  Status post 1 transfusion of PRBC on 12/7  Reporting 1 bout of hematochezia with melena yesterday  Hemoglobin trending down to 7.5 from 8  Start IV PPI  Consult gastroenterology (  Macario), will see today and aim for EGD/[place] on AM   H/H q8 hrs  Labs on AM    Acute blood loss anemia- (present on admission)  Assessment & Plan  Secondary to lower GI bleed.  Reporting 1 bout of hematochezia with melena yesterday  H/H q8 hrs, transfuse if hemoglobin less than 7  Labs on AM    Pulmonary embolism (HCC)- (present on admission)  Assessment & Plan  Hemodynamically stable off Eliquis since November 27.    Anticoagulation held for acute GI bleed.    Acute respiratory failure with hypoxia (HCC)- (present on admission)  Assessment & Plan  Requiring 4 L nasal cannula some desaturation  Secondary to history of PE  Continue holding anticoagulation in the setting of GI bleed  RT/O2  Titrate oxygen as tolerable    ESRD on dialysis (HCC)- (present on admission)  Assessment & Plan  Nonoliguric, scheduled hemodialysis Tuesday Thursday Saturday.  No evidence for acute volume overload or metabolic derangement justifying emergent dialysis.  Nephrology following, patient for hemodialysis today  Labs in a.m.    Hepatitis C antibody positive in blood- (present on admission)  Assessment & Plan  History of  Has not received treatment or follow-up with GI  GI will evaluate today  Pending right upper quadrant ultrasound    HLD (hyperlipidemia)- (present on admission)  Assessment & Plan  Continue home statin    Insomnia- (present on admission)  Assessment & Plan  Continue home trazodone       VTE prophylaxis: SCDs/TEDs    I have performed a physical exam and reviewed and updated ROS and Plan today (12/8/2021). In review of yesterday's note (12/7/2021), there are no changes except as documented above.

## 2021-12-08 NOTE — ASSESSMENT & PLAN NOTE
Requiring 4 L nasal cannula some desaturation  Secondary to history of PE  Continue holding anticoagulation in the setting of GI bleed  RT/O2  Titrate oxygen as tolerable

## 2021-12-08 NOTE — CONSULTS
Gastroenterology Consult Note:    Suraj De La Torre D.O.  Date & Time note created:    12/8/2021   10:43 AM     Patient ID:  Name:             Junior Proctor    YOB: 1950  Age:                 71 y.o.  male  MRN:               [id number]    Referring MD:  Dr. Ferrera                                                             Chief Complaint(s):      Hematochezia and Melena    History of Present Illness:    This is a very pleasant 71 y.o. male  with the past medical history of HCV Ab+ untreated, CKD V on HD T/Th/Sa complicated by chronic anemia, and recent acute PE diagnosed during recent admission discharged 11/27 but did not start oral anticoagulation after discharge who presented with hematochezia.    Patient states that 12/6 he noted bright red blood in his stools which brought him to the ED, noted to have Hgb 5.9 and was given 2U PRBC and has been hemodynamically stable now with Hgb >7. Yesterday 12/7 he stated he had another bowel movement that had small dark component and bright red blood but has not had a bowel movement since. He was noted to have an EGD 2/2021 that was grossly negative and [facility] that showed internal hemorrhoids and diverticulosis non bleeding. He has no abdominal pain and is otherwise well. He denies NSAID use, drinks 5-10 alcoholic drinks/week.    Review of Systems:               Review of Systems   Respiratory: Negative.    Cardiovascular: Negative.    Gastrointestinal: Positive for blood in stool and melena. Negative for abdominal pain, constipation, diarrhea, nausea and vomiting.   Musculoskeletal: Negative for back pain and joint pain.   Psychiatric/Behavioral: Negative for substance abuse.       Past Medical History:   Past Medical History:   Diagnosis Date   • Gout    • Hemodialysis patient (HCC)    • Hypertension    • Kidney disease    • MI (myocardial infarction) ([facility])      Active Hospital Problems    Diagnosis    • GIB  (gastrointestinal bleeding) [K92.2]    • Acute blood loss anemia [D62]    • Pulmonary embolism (HCC) [I26.99]    • ESRD on dialysis (HCC) [N18.6, Z99.2]        Past Surgical History:  History reviewed. No pertinent surgical history.    Hospital Medications:  Current Facility-Administered Medications   Medication Dose Frequency Provider Last Rate Last Admin   • pantoprazole (PROTONIX) injection 40 mg  40 mg BID Ziggy Daniel M.D.       • oxyCODONE immediate-release (ROXICODONE) tablet 5 mg  5 mg BID Abdelrahman West, D.O.   5 mg at 12/08/21 0458   • oxyCODONE immediate-release (ROXICODONE) tablet 5 mg  5 mg Q4HRS PRN Abdelrahman West, D.O.   5 mg at 12/08/21 0752   • morphine 4 mg/mL injection 1 mg  1 mg Q3HRS PRN Abdelrahman West D.O.       • midodrine (PROAMATINE) tablet 10 mg  10 mg TID Susan Tsang M.D.   10 mg at 12/08/21 0459   • epoetin (Retacrit) injection (Dialysis Use Only) 4,000 Units  4,000 Units TUE+THU+SAT Susan Tsang M.D.   4,000 Units at 12/07/21 1651   • lidocaine (LIDODERM) 5 % 2 Patch  2 Patch Q24HR Ziggy Daniel M.D.   2 Patch at 12/07/21 1710   • heparin injection 3,900 Units  3,900 Units ACUTE DIALYSIS PRN Susan Tsang M.D.   3,900 Units at 12/07/21 1738   • carvedilol (COREG) tablet 6.25 mg  6.25 mg Once per day on Sun Mon Wed Fri Jone Mcarthur M.D.   6.25 mg at 12/08/21 0748    And   • carvedilol (COREG) tablet 6.25 mg  6.25 mg Once per day on Sun Mon Wed Fri Jone Mcarthur M.D.       • melatonin tablet 5 mg  5 mg QHS PRN Jone Mcarthur M.D.   5 mg at 12/07/21 2100   • pregabalin (LYRICA) capsule 25 mg  25 mg QHS Jone Mcarthur M.D.   25 mg at 12/07/21 2100   • traZODone (DESYREL) tablet 25 mg  25 mg Nightly Jone Mcarthur M.D.   25 mg at 12/07/21 2101   • enalaprilat (Vasotec) injection 1.25 mg 1 mL  1.25 mg Q6HRS PRN Jone Mcarthur M.D.       • labetalol (NORMODYNE/TRANDATE) injection 10 mg  10 mg Q4HRS PRN Jone Mcarthur M.D.       •  sevelamer carbonate (RENVELA) tablet 800 mg  800 mg TID WITH MEALS Jone Mcarthur M.D.   800 mg at 12/08/21 0748   Last reviewed on 12/6/2021  5:52 PM by Rishabh Cagle      Current Outpatient Medications:  Medications Prior to Admission   Medication Sig Dispense Refill Last Dose   • apixaban (ELIQUIS) 5mg Tab Take 2 Tablets by mouth 2 times a day for 6 days, THEN 1 Tablet 2 times a day for 30 days. Indications: DVT/PE 84 Tablet 0 UNK at UNK   • midodrine (PROAMATINE) 10 MG tablet Take 1 Tablet by mouth 3 times a day with meals for 14 days. 42 Tablet 0 UNK at UNK   • isosorbide dinitrate (ISORDIL) 20 MG Tab Take 10 mg by mouth 2 times a day. Taken 7 hours apart, last dose no later than dinner   Indications: Stable Angina Pectoris   UNK at UNK   • pregabalin (LYRICA) 25 MG Cap Take 25 mg by mouth at bedtime.   UNK at UNK   • Etanercept 50 MG/ML Solution Prefilled Syringe Inject 50 mg under the skin every 7 days.   UNK at UNK   • Buprenorphine 10 MCG/HR PATCH WEEKLY Place 10 mcg on the skin every 7 days.   UNK at UNK   • aspirin EC (ECOTRIN) 81 MG Tablet Delayed Response Take 81 mg by mouth every day.   UNK at UNK   • melatonin 3 MG Tab Take 6 mg by mouth at bedtime as needed (sleep).   UNK at UNK   • lidocaine (LIDODERM) 5 % Patch Apply 1 Patch topically every day. Remove after 12 hours    UNK at UNK   • sevelamer (RENAGEL) 800 MG Tab Take 800 mg by mouth 3 times a day with meals.   UNK at UNK   • diclofenac sodium (VOLTAREN) 1 % Gel Apply 4 g topically 4 times a day as needed (Osetoarthritis pain).   UNK at UNK   • vitamin D2, Ergocalciferol, (DRISDOL) 1.25 MG (14677 UT) Cap capsule Take 50,000 Units by mouth every 7 days.   UNK at UNK   • lidocaine (XYLOCAINE) 5 % Ointment Apply 1 g topically in the morning, at noon, and at bedtime.   UNK at UNK   • atorvastatin (LIPITOR) 20 MG Tab Take 20 mg by mouth every evening.   UNK at UNK   • carvedilol (COREG) 6.25 MG Tab Take 6.25 mg by mouth 2 times a day with meals.    UNK at UNK   • traZODone (DESYREL) 50 MG Tab Take 25 mg by mouth every evening. Indications: Trouble Sleeping   UNK at UNK   • allopurinol (ZYLOPRIM) 100 MG Tab Take 100 mg by mouth 3 times a week. Taken post hemodialysis   UNK at UNK   • tamsulosin (FLOMAX) 0.4 MG capsule Take 0.4 mg by mouth every morning.   UNK at UNK       Medication Allergy:  Allergies   Allergen Reactions   • Motrin [Ibuprofen]      hhx of stomach ulcers       Family History:  Family History   Problem Relation Age of Onset   • Diabetes Mother        Social History:  Social History     Socioeconomic History   • Marital status: Single     Spouse name: Not on file   • Number of children: Not on file   • Years of education: Not on file   • Highest education level: Not on file   Occupational History   • Not on file   Tobacco Use   • Smoking status: Former Smoker   • Smokeless tobacco: Never Used   Vaping Use   • Vaping Use: Never used   Substance and Sexual Activity   • Alcohol use: Yes     Comment: occ   • Drug use: Never   • Sexual activity: Not on file   Other Topics Concern   • Not on file   Social History Narrative   • Not on file     Social Determinants of Health     Financial Resource Strain:    • Difficulty of Paying Living Expenses: Not on file   Food Insecurity:    • Worried About Running Out of Food in the Last Year: Not on file   • Ran Out of Food in the Last Year: Not on file   Transportation Needs:    • Lack of Transportation (Medical): Not on file   • Lack of Transportation (Non-Medical): Not on file   Physical Activity:    • Days of Exercise per Week: Not on file   • Minutes of Exercise per Session: Not on file   Stress:    • Feeling of Stress : Not on file   Social Connections:    • Frequency of Communication with Friends and Family: Not on file   • Frequency of Social Gatherings with Friends and Family: Not on file   • Attends Muslim Services: Not on file   • Active Member of Clubs or Organizations: Not on file   • Attends  "Club or Organization Meetings: Not on file   • Marital Status: Not on file   Intimate Partner Violence:    • Fear of Current or Ex-Partner: Not on file   • Emotionally Abused: Not on file   • Physically Abused: Not on file   • Sexually Abused: Not on file   Housing Stability:    • Unable to Pay for Housing in the Last Year: Not on file   • Number of Places Lived in the Last Year: Not on file   • Unstable Housing in the Last Year: Not on file       Physical Exam:  Weight/BMI: Body mass index is 25.09 kg/m².  /57   Pulse 75   Temp 36.7 °C (98.1 °F) (Temporal)   Resp 18   Ht 1.727 m (5' 8\")   Wt 74.8 kg (165 lb)   SpO2 100%   Vitals:    12/07/21 2000 12/08/21 0000 12/08/21 0400 12/08/21 0720   BP: 103/62 116/68 113/75 104/57   Pulse: 78 78 82 75   Resp: 17 17 17 18   Temp: 36.6 °C (97.8 °F) 36.7 °C (98.1 °F) 36.9 °C (98.4 °F) 36.7 °C (98.1 °F)   TempSrc: Temporal Temporal Temporal Temporal   SpO2: 98% 97% 99% 100%   Weight:       Height:         Oxygen Therapy:  Pulse Oximetry: 100 %, O2 (LPM): 4, O2 Delivery Device: Silicone Nasal Cannula    Intake/Output Summary (Last 24 hours) at 12/8/2021 1043  Last data filed at 12/7/2021 1815  Gross per 24 hour   Intake 500 ml   Output 2500 ml   Net -2000 ml     Physical Exam  Constitutional:       General: He is not in acute distress.     Appearance: Normal appearance. He is not ill-appearing.   Cardiovascular:      Rate and Rhythm: Normal rate and regular rhythm.      Pulses: Normal pulses.      Heart sounds: Normal heart sounds. No murmur heard.      Pulmonary:      Effort: Pulmonary effort is normal. No respiratory distress.      Breath sounds: Normal breath sounds.   Abdominal:      General: Abdomen is flat. Bowel sounds are normal. There is no distension.      Palpations: Abdomen is soft.      Tenderness: There is no abdominal tenderness.   Skin:     General: Skin is warm and dry.      Capillary Refill: Capillary refill takes less than 2 seconds.      Findings: " No erythema or rash.   Neurological:      General: No focal deficit present.      Mental Status: He is alert and oriented to person, place, and time.          MDM (Data Review):     Records reviewed and summarized in current documentation    Lab Data Review:  Recent Results (from the past 24 hour(s))   HGB (Hemoglobin) for 48 hours    Collection Time: 12/07/21  4:51 PM   Result Value Ref Range    Hemoglobin 8.0 (L) 14.0 - 18.0 g/dL   Comp Metabolic Panel    Collection Time: 12/08/21  5:03 AM   Result Value Ref Range    Sodium 135 135 - 145 mmol/L    Potassium 4.3 3.6 - 5.5 mmol/L    Chloride 98 96 - 112 mmol/L    Co2 29 20 - 33 mmol/L    Anion Gap 8.0 7.0 - 16.0    Glucose 93 65 - 99 mg/dL    Bun 12 8 - 22 mg/dL    Creatinine 4.98 (H) 0.50 - 1.40 mg/dL    Calcium 8.3 (L) 8.5 - 10.5 mg/dL    AST(SGOT) 19 12 - 45 U/L    ALT(SGPT) 8 2 - 50 U/L    Alkaline Phosphatase 108 (H) 30 - 99 U/L    Total Bilirubin 0.5 0.1 - 1.5 mg/dL    Albumin 3.4 3.2 - 4.9 g/dL    Total Protein 5.7 (L) 6.0 - 8.2 g/dL    Globulin 2.3 1.9 - 3.5 g/dL    A-G Ratio 1.5 g/dL   CBC WITH DIFFERENTIAL    Collection Time: 12/08/21  5:03 AM   Result Value Ref Range    WBC 4.7 (L) 4.8 - 10.8 K/uL    RBC 2.61 (L) 4.70 - 6.10 M/uL    Hemoglobin 7.5 (L) 14.0 - 18.0 g/dL    Hematocrit 23.6 (L) 42.0 - 52.0 %    MCV 90.4 81.4 - 97.8 fL    MCH 28.7 27.0 - 33.0 pg    MCHC 31.8 (L) 33.7 - 35.3 g/dL    RDW 65.1 (H) 35.9 - 50.0 fL    Platelet Count 137 (L) 164 - 446 K/uL    MPV 10.3 9.0 - 12.9 fL    Neutrophils-Polys 37.10 (L) 44.00 - 72.00 %    Lymphocytes 47.20 (H) 22.00 - 41.00 %    Monocytes 7.00 0.00 - 13.40 %    Eosinophils 8.10 (H) 0.00 - 6.90 %    Basophils 0.40 0.00 - 1.80 %    Immature Granulocytes 0.20 0.00 - 0.90 %    Nucleated RBC 0.00 /100 WBC    Neutrophils (Absolute) 1.74 (L) 1.82 - 7.42 K/uL    Lymphs (Absolute) 2.22 1.00 - 4.80 K/uL    Monos (Absolute) 0.33 0.00 - 0.85 K/uL    Eos (Absolute) 0.38 0.00 - 0.51 K/uL    Baso (Absolute) 0.02 0.00 -  0.12 K/uL    Immature Granulocytes (abs) 0.01 0.00 - 0.11 K/uL    NRBC (Absolute) 0.00 K/uL    Anisocytosis 2+ (A)     Macrocytosis 2+ (A)     Microcytosis 1+    ESTIMATED GFR    Collection Time: 12/08/21  5:03 AM   Result Value Ref Range    GFR If  14 (A) >60 mL/min/1.73 m 2    GFR If Non  12 (A) >60 mL/min/1.73 m 2   MORPHOLOGY    Collection Time: 12/08/21  5:03 AM   Result Value Ref Range    RBC Morphology Present     Poikilocytosis 1+     Ovalocytes 1+     Target Cells 1+    PERIPHERAL SMEAR REVIEW    Collection Time: 12/08/21  5:03 AM   Result Value Ref Range    Peripheral Smear Review see below    PLATELET ESTIMATE    Collection Time: 12/08/21  5:03 AM   Result Value Ref Range    Plt Estimation Decreased    DIFFERENTIAL COMMENT    Collection Time: 12/08/21  5:03 AM   Result Value Ref Range    Comments-Diff see below        MDM (Assessment and Plan):     Active Hospital Problems    Diagnosis    • GIB (gastrointestinal bleeding) [K92.2]    • Acute blood loss anemia [D62]    • Pulmonary embolism (HCC) [I26.99]    • ESRD on dialysis (HCC) [N18.6, Z99.2]        Imaging/Procedures Review:    CT-ABDOMEN-PELVIS W/O   Final Result      1.  Limited by lack of IV contrast.   2.  No gross bowel wall thickening or evidence of bowel obstruction.   3.  Low attenuation lesion in the RIGHT lobe liver measuring 3.5 cm, hemangioma versus mass.   4.  Probable small gallstones.   5.  Atrophic pancreas with calcifications suggesting chronic pancreatitis.   6.  Colonic diverticulosis without evidence for diverticulitis.   7.  Normal appendix.   8.  Ill-defined bladder wall.  Cystitis is not excluded.      US-RUQ    (Results Pending)       Assessment    #Melena  #Hematochezia  #Chronic Anemia  #CKD Stage IV complicated by anemia  #History of Internal Hemorrhoids  #Diverticulosis  -unclear etiology of bleed, unclear if upper or lower.     Plan  -IV PPI okay  -EGD and colonoscopy tomorrow  -Clears today,  golytely this afternoon  -NPO at midnight, sips with meds    Suraj De La Torre D.O.    Core Quality Measures   Reviewed items::  Labs, Medications and Radiology reports reviewed

## 2021-12-09 LAB
ALBUMIN SERPL BCP-MCNC: 2.8 G/DL (ref 3.2–4.9)
ALBUMIN/GLOB SERPL: 1 G/DL
ALP SERPL-CCNC: 99 U/L (ref 30–99)
ALT SERPL-CCNC: 5 U/L (ref 2–50)
ANION GAP SERPL CALC-SCNC: 10 MMOL/L (ref 7–16)
AST SERPL-CCNC: 13 U/L (ref 12–45)
BASOPHILS # BLD AUTO: 0.4 % (ref 0–1.8)
BASOPHILS # BLD: 0.02 K/UL (ref 0–0.12)
BILIRUB SERPL-MCNC: 0.4 MG/DL (ref 0.1–1.5)
BUN SERPL-MCNC: 13 MG/DL (ref 8–22)
CALCIUM SERPL-MCNC: 8 MG/DL (ref 8.5–10.5)
CHLORIDE SERPL-SCNC: 92 MMOL/L (ref 96–112)
CO2 SERPL-SCNC: 26 MMOL/L (ref 20–33)
CREAT SERPL-MCNC: 5.69 MG/DL (ref 0.5–1.4)
EOSINOPHIL # BLD AUTO: 0.56 K/UL (ref 0–0.51)
EOSINOPHIL NFR BLD: 11.5 % (ref 0–6.9)
ERYTHROCYTE [DISTWIDTH] IN BLOOD BY AUTOMATED COUNT: 65.6 FL (ref 35.9–50)
GLOBULIN SER CALC-MCNC: 2.9 G/DL (ref 1.9–3.5)
GLUCOSE SERPL-MCNC: 88 MG/DL (ref 65–99)
HCT VFR BLD AUTO: 23.2 % (ref 42–52)
HEMOCCULT SP1 STL QL: POSITIVE
HGB BLD-MCNC: 7.3 G/DL (ref 14–18)
IMM GRANULOCYTES # BLD AUTO: 0.01 K/UL (ref 0–0.11)
IMM GRANULOCYTES NFR BLD AUTO: 0.2 % (ref 0–0.9)
LYMPHOCYTES # BLD AUTO: 2.13 K/UL (ref 1–4.8)
LYMPHOCYTES NFR BLD: 43.9 % (ref 22–41)
MCH RBC QN AUTO: 29.3 PG (ref 27–33)
MCHC RBC AUTO-ENTMCNC: 31.5 G/DL (ref 33.7–35.3)
MCV RBC AUTO: 93.2 FL (ref 81.4–97.8)
MONOCYTES # BLD AUTO: 0.41 K/UL (ref 0–0.85)
MONOCYTES NFR BLD AUTO: 8.5 % (ref 0–13.4)
MORPHOLOGY BLD-IMP: NORMAL
NEUTROPHILS # BLD AUTO: 1.72 K/UL (ref 1.82–7.42)
NEUTROPHILS NFR BLD: 35.5 % (ref 44–72)
NRBC # BLD AUTO: 0.02 K/UL
NRBC BLD-RTO: 0.4 /100 WBC
PHOSPHATE SERPL-MCNC: 3.2 MG/DL (ref 2.5–4.5)
PLATELET # BLD AUTO: 126 K/UL (ref 164–446)
PMV BLD AUTO: 10.1 FL (ref 9–12.9)
POTASSIUM SERPL-SCNC: 4.3 MMOL/L (ref 3.6–5.5)
PROT SERPL-MCNC: 5.7 G/DL (ref 6–8.2)
RBC # BLD AUTO: 2.49 M/UL (ref 4.7–6.1)
SODIUM SERPL-SCNC: 128 MMOL/L (ref 135–145)
WBC # BLD AUTO: 4.9 K/UL (ref 4.8–10.8)

## 2021-12-09 PROCEDURE — 700111 HCHG RX REV CODE 636 W/ 250 OVERRIDE (IP)

## 2021-12-09 PROCEDURE — 700111 HCHG RX REV CODE 636 W/ 250 OVERRIDE (IP): Performed by: STUDENT IN AN ORGANIZED HEALTH CARE EDUCATION/TRAINING PROGRAM

## 2021-12-09 PROCEDURE — 700102 HCHG RX REV CODE 250 W/ 637 OVERRIDE(OP): Performed by: STUDENT IN AN ORGANIZED HEALTH CARE EDUCATION/TRAINING PROGRAM

## 2021-12-09 PROCEDURE — C9113 INJ PANTOPRAZOLE SODIUM, VIA: HCPCS | Performed by: STUDENT IN AN ORGANIZED HEALTH CARE EDUCATION/TRAINING PROGRAM

## 2021-12-09 PROCEDURE — 700101 HCHG RX REV CODE 250: Performed by: STUDENT IN AN ORGANIZED HEALTH CARE EDUCATION/TRAINING PROGRAM

## 2021-12-09 PROCEDURE — 36415 COLL VENOUS BLD VENIPUNCTURE: CPT

## 2021-12-09 PROCEDURE — 99233 SBSQ HOSP IP/OBS HIGH 50: CPT | Performed by: STUDENT IN AN ORGANIZED HEALTH CARE EDUCATION/TRAINING PROGRAM

## 2021-12-09 PROCEDURE — 770001 HCHG ROOM/CARE - MED/SURG/GYN PRIV*

## 2021-12-09 PROCEDURE — 700102 HCHG RX REV CODE 250 W/ 637 OVERRIDE(OP): Performed by: INTERNAL MEDICINE

## 2021-12-09 PROCEDURE — A9270 NON-COVERED ITEM OR SERVICE: HCPCS | Performed by: STUDENT IN AN ORGANIZED HEALTH CARE EDUCATION/TRAINING PROGRAM

## 2021-12-09 PROCEDURE — A9270 NON-COVERED ITEM OR SERVICE: HCPCS | Performed by: INTERNAL MEDICINE

## 2021-12-09 PROCEDURE — 90935 HEMODIALYSIS ONE EVALUATION: CPT

## 2021-12-09 PROCEDURE — 84100 ASSAY OF PHOSPHORUS: CPT

## 2021-12-09 PROCEDURE — 80053 COMPREHEN METABOLIC PANEL: CPT

## 2021-12-09 PROCEDURE — 85025 COMPLETE CBC W/AUTO DIFF WBC: CPT

## 2021-12-09 RX ORDER — SODIUM CHLORIDE, SODIUM LACTATE, POTASSIUM CHLORIDE, CALCIUM CHLORIDE 600; 310; 30; 20 MG/100ML; MG/100ML; MG/100ML; MG/100ML
INJECTION, SOLUTION INTRAVENOUS CONTINUOUS
Status: DISCONTINUED | OUTPATIENT
Start: 2021-12-09 | End: 2021-12-09

## 2021-12-09 RX ORDER — HEPARIN SODIUM 1000 [USP'U]/ML
INJECTION, SOLUTION INTRAVENOUS; SUBCUTANEOUS
Status: COMPLETED
Start: 2021-12-09 | End: 2021-12-09

## 2021-12-09 RX ADMIN — OXYCODONE 5 MG: 5 TABLET ORAL at 04:44

## 2021-12-09 RX ADMIN — LIDOCAINE 2 PATCH: 50 PATCH TOPICAL at 16:33

## 2021-12-09 RX ADMIN — HEPARIN SODIUM 3900 UNITS: 1000 INJECTION, SOLUTION INTRAVENOUS; SUBCUTANEOUS at 11:55

## 2021-12-09 RX ADMIN — ATORVASTATIN CALCIUM 20 MG: 20 TABLET, FILM COATED ORAL at 21:36

## 2021-12-09 RX ADMIN — PREGABALIN 25 MG: 25 CAPSULE ORAL at 21:36

## 2021-12-09 RX ADMIN — OXYCODONE 5 MG: 5 TABLET ORAL at 18:08

## 2021-12-09 RX ADMIN — EPOETIN ALFA-EPBX 4000 UNITS: 4000 INJECTION, SOLUTION INTRAVENOUS; SUBCUTANEOUS at 11:46

## 2021-12-09 RX ADMIN — OXYCODONE 5 MG: 5 TABLET ORAL at 16:45

## 2021-12-09 RX ADMIN — MIDODRINE HYDROCHLORIDE 10 MG: 5 TABLET ORAL at 12:53

## 2021-12-09 RX ADMIN — OXYCODONE 5 MG: 5 TABLET ORAL at 00:40

## 2021-12-09 RX ADMIN — OXYCODONE 5 MG: 5 TABLET ORAL at 12:53

## 2021-12-09 RX ADMIN — PANTOPRAZOLE SODIUM 40 MG: 40 INJECTION, POWDER, FOR SOLUTION INTRAVENOUS at 04:43

## 2021-12-09 RX ADMIN — TRAZODONE HYDROCHLORIDE 25 MG: 50 TABLET ORAL at 21:34

## 2021-12-09 RX ADMIN — MIDODRINE HYDROCHLORIDE 10 MG: 5 TABLET ORAL at 04:43

## 2021-12-09 RX ADMIN — PANTOPRAZOLE SODIUM 40 MG: 40 INJECTION, POWDER, FOR SOLUTION INTRAVENOUS at 18:09

## 2021-12-09 RX ADMIN — MIDODRINE HYDROCHLORIDE 10 MG: 5 TABLET ORAL at 18:09

## 2021-12-09 RX ADMIN — OXYCODONE 5 MG: 5 TABLET ORAL at 21:35

## 2021-12-09 ASSESSMENT — ENCOUNTER SYMPTOMS
RESPIRATORY NEGATIVE: 1
PSYCHIATRIC NEGATIVE: 1
VOMITING: 0
EYES NEGATIVE: 1
BLOOD IN STOOL: 0
NAUSEA: 0
CONSTIPATION: 0
CARDIOVASCULAR NEGATIVE: 1
ABDOMINAL PAIN: 0
HEARTBURN: 0
BACK PAIN: 0
NEUROLOGICAL NEGATIVE: 1
DIARRHEA: 0

## 2021-12-09 ASSESSMENT — FIBROSIS 4 INDEX: FIB4 SCORE: 3.18

## 2021-12-09 ASSESSMENT — PAIN DESCRIPTION - PAIN TYPE
TYPE: ACUTE PAIN

## 2021-12-09 ASSESSMENT — LIFESTYLE VARIABLES: SUBSTANCE_ABUSE: 0

## 2021-12-09 NOTE — CARE PLAN
The patient is Stable - Low risk of patient condition declining or worsening    Shift Goals  Clinical Goals: dialysis, EGD/colonoscopy  Patient Goals: comfort after procedure  Family Goals: HILARIO    Progress made toward(s) clinical / shift goals:  BP stable at 120s/50s. Tolerates dialysis    Patient is not progressing towards the following goals:Endoscopy cancelled due to inefficient bowel prep.       Problem: Skin Integrity  Goal: Skin integrity is maintained or improved  12/9/2021 1307 by Elin Bell RMICAELA  Outcome: Progressing  Turns self in bed.     Problem: Hemodynamics  Goal: Patient's hemodynamics, fluid balance and neurologic status will be stable or improve  Outcome: Progressing

## 2021-12-09 NOTE — PROGRESS NOTES
Patient was only able to drink 5 cups of golighlty no bowel movement has occurred but pt states he is passing flatus, иван was encouraged every 10 minutes by this RN but pt refusing anymore and is NPO at midnight.

## 2021-12-09 NOTE — PROGRESS NOTES
Hospital Medicine Daily Progress Note    Date of Service  12/9/2021    Chief Complaint  Junior Proctor is a 71 y.o. male admitted 12/6/2021 with GIB    Hospital Course  71 y.o. male with history of pulmonary embolism diagnosed 11/27/2021 prescribed Eliquis but not yet filled, end-stage renal failure on hemodialysis Tuesday Thursday Saturday.  Last dialysis Saturday, November 4 who presented 12/6/2021 with mild to moderate acute bloody stools onset few hours ago.   Patient reports he noticed blood in his stools earlier today and was prompted to call EMS. He describes the blood as bright red and notes his blood stools improved after every successive bowel movement. Patient endorses associated ankle pain, difficulty ambulating secondary to pain, and  dizziness, but denies any melena, leg swelling, fevers, chills, nausea, or vomiting. He notes the leg swelling is chronic. No alleviating or exacerbating factors noted. At bedside he denies pain or previous episode of GI bleed.    Interval Problem Update  Reporting 1 bout of hematochezia with melena yesterday  Patient at bedside and found to be afebrile and in NAD  Stable vitals and on 1 L NC  Hemoglobin stable at 7  FOBT +  Continue IV PPI  Consult gastroenterology (Dr. Sorto), will see today and aim for EGD/[place] on AM   Unable to do endoscopic procedure secondary to poor prep, aim for AM  Nephrology following, appreciate recommendations  Labs on AM    I have personally seen and examined the patient at bedside. I discussed the plan of care with patient and family.    Consultants/Specialty  nephrology    Code Status  Full Code    Disposition  Patient is not medically cleared.   Anticipate discharge to to home with close outpatient follow-up.  I have placed the appropriate orders for post-discharge needs.    Review of Systems  Review of Systems   Constitutional: Positive for malaise/fatigue.   HENT: Negative.    Eyes: Negative.    Respiratory: Negative.    Cardiovascular:  Negative.    Gastrointestinal: Negative for abdominal pain, blood in stool, constipation, diarrhea, heartburn, melena, nausea and vomiting.   Genitourinary: Negative.    Musculoskeletal: Positive for joint pain.   Skin: Negative.    Neurological: Negative.    Endo/Heme/Allergies: Negative.    Psychiatric/Behavioral: Negative.         Physical Exam  Temp:  [36.2 °C (97.2 °F)-36.8 °C (98.2 °F)] 36.5 °C (97.7 °F)  Pulse:  [62-83] 70  Resp:  [17-18] 18  BP: (121-135)/(53-68) 129/63  SpO2:  [95 %-100 %] 100 %    Physical Exam  Constitutional:       General: He is not in acute distress.     Appearance: Normal appearance.   HENT:      Head: Normocephalic and atraumatic.      Nose: Nose normal. No congestion.      Mouth/Throat:      Mouth: Mucous membranes are moist.   Eyes:      Extraocular Movements: Extraocular movements intact.      Pupils: Pupils are equal, round, and reactive to light.   Cardiovascular:      Rate and Rhythm: Normal rate and regular rhythm.      Pulses: Normal pulses.      Heart sounds: Normal heart sounds.   Pulmonary:      Effort: Pulmonary effort is normal.      Breath sounds: Normal breath sounds.   Abdominal:      General: Bowel sounds are normal. There is no distension.      Palpations: Abdomen is soft.      Tenderness: There is no abdominal tenderness. There is no guarding or rebound.   Musculoskeletal:         General: No swelling. Normal range of motion.      Cervical back: Normal range of motion and neck supple.      Right lower leg: No edema.      Left lower leg: No edema.      Comments: Bilateral hand stiffness and tenderness   Skin:     General: Skin is warm.      Coloration: Skin is not jaundiced.   Neurological:      General: No focal deficit present.      Mental Status: He is alert and oriented to person, place, and time. Mental status is at baseline.      Cranial Nerves: No cranial nerve deficit.   Psychiatric:         Mood and Affect: Mood normal.         Behavior: Behavior normal.          Thought Content: Thought content normal.         Judgment: Judgment normal.         Fluids    Intake/Output Summary (Last 24 hours) at 12/9/2021 1618  Last data filed at 12/9/2021 1153  Gross per 24 hour   Intake 1500 ml   Output 2550 ml   Net -1050 ml       Laboratory  Recent Labs     12/08/21  1303 12/08/21  1820 12/09/21  0140   WBC 5.2 5.3 4.9   RBC 2.47* 2.58* 2.49*   HEMOGLOBIN 7.2* 7.3* 7.3*   HEMATOCRIT 22.6* 23.6* 23.2*   MCV 91.5 91.5 93.2   MCH 29.1 28.3 29.3   MCHC 31.9* 30.9* 31.5*   RDW 65.1* 64.2* 65.6*   PLATELETCT 119* 143* 126*   MPV 10.5 10.5 10.1     Recent Labs     12/07/21  0119 12/08/21  0503 12/09/21  0140   SODIUM 138 135 128*   POTASSIUM 3.7 4.3 4.3   CHLORIDE 97 98 92*   CO2 26 29 26   GLUCOSE 96 93 88   BUN 25* 12 13   CREATININE 7.07* 4.98* 5.69*   CALCIUM 8.0* 8.3* 8.0*                   Imaging  US-RUQ   Final Result      1.  Mass in the posterior RIGHT lobe liver measuring 4.5 cm, corresponding to CT findings.   2.  Gallstones and gallbladder wall thickening, possibly due to partially contracted state.  Cholecystitis is felt unlikely but difficult to exclude.   3.  No biliary dilation.         CT-ABDOMEN-PELVIS W/O   Final Result      1.  Limited by lack of IV contrast.   2.  No gross bowel wall thickening or evidence of bowel obstruction.   3.  Low attenuation lesion in the RIGHT lobe liver measuring 3.5 cm, hemangioma versus mass.   4.  Probable small gallstones.   5.  Atrophic pancreas with calcifications suggesting chronic pancreatitis.   6.  Colonic diverticulosis without evidence for diverticulitis.   7.  Normal appendix.   8.  Ill-defined bladder wall.  Cystitis is not excluded.           Assessment/Plan  * GIB (gastrointestinal bleeding)- (present on admission)  Assessment & Plan  Painless lower GI bright red bleeding likely secondary to internal hemorrhoid versus diverticular bleed  Status post 1 transfusion of PRBC on 12/7  Reporting 1 bout of hematochezia with melena  yesterday  Hemoglobin trending down to 7.5 from 8  Start IV PPI  Consult gastroenterology (Dr. Sorto), will see today and aim for EGD/[place] on AM   H/H q8 hrs  Labs on AM    Acute blood loss anemia- (present on admission)  Assessment & Plan  Secondary to lower GI bleed.  Reporting 1 bout of hematochezia with melena yesterday  H/H q8 hrs, transfuse if hemoglobin less than 7  Labs on AM    Pulmonary embolism (HCC)- (present on admission)  Assessment & Plan  Hemodynamically stable off Eliquis since November 27.    Anticoagulation held for acute GI bleed.    Acute respiratory failure with hypoxia (HCC)- (present on admission)  Assessment & Plan  Requiring 4 L nasal cannula some desaturation  Secondary to history of PE  Continue holding anticoagulation in the setting of GI bleed  RT/O2  Titrate oxygen as tolerable    ESRD on dialysis (HCC)- (present on admission)  Assessment & Plan  Nonoliguric, scheduled hemodialysis Tuesday Thursday Saturday.  No evidence for acute volume overload or metabolic derangement justifying emergent dialysis.  Nephrology following, patient for hemodialysis today  Labs in a.m.    Hepatitis C antibody positive in blood- (present on admission)  Assessment & Plan  History of  Has not received treatment or follow-up with GI  GI will evaluate today  Pending right upper quadrant ultrasound    HLD (hyperlipidemia)- (present on admission)  Assessment & Plan  Continue home statin    Insomnia- (present on admission)  Assessment & Plan  Continue home trazodone       VTE prophylaxis: SCDs/TEDs    I have performed a physical exam and reviewed and updated ROS and Plan today (12/9/2021). In review of yesterday's note (12/8/2021), there are no changes except as documented above.

## 2021-12-09 NOTE — PROGRESS NOTES
Gastroenterology Progress Note    Date of Service: 12/9/2021    Chief Complaint: GI Bleed    Consult: Hematochezia and Melena    Interval Events:  -Patient noted to only drink 6 cups of prep and had no bowel movement by midnight. In the morning he was noted to have a soft formed brown bowel movement.    -Hgb 7.2 -> 7.3 today, no further bleeding noted    -EGD/colonscopy to be moved to 12/10, instructed to complete whole new prep tonight    Review of Systems   Respiratory: Negative.    Cardiovascular: Negative.    Gastrointestinal: Negative for abdominal pain, blood in stool, constipation, diarrhea, melena, nausea and vomiting.   Musculoskeletal: Negative for back pain and joint pain.   Psychiatric/Behavioral: Negative for substance abuse.         Objective:    Vitals -   Vitals:    12/09/21 0345 12/09/21 0444 12/09/21 0600 12/09/21 0710   BP: 121/68   121/53   Pulse: 62   68   Resp: 17 17 18 18   Temp: 36.2 °C (97.2 °F)   36.7 °C (98.1 °F)   TempSrc: Temporal   Temporal   SpO2: 95%   96%   Weight:       Height:           Physical Exam  Constitutional:       General: He is not in acute distress.     Appearance: Normal appearance. He is not ill-appearing.   Cardiovascular:      Rate and Rhythm: Normal rate and regular rhythm.      Pulses: Normal pulses.      Heart sounds: Normal heart sounds. No murmur heard.      Pulmonary:      Effort: Pulmonary effort is normal. No respiratory distress.      Breath sounds: Normal breath sounds.   Abdominal:      General: Abdomen is flat. Bowel sounds are normal. There is no distension.      Palpations: Abdomen is soft.      Tenderness: There is no abdominal tenderness.   Skin:     General: Skin is warm and dry.      Capillary Refill: Capillary refill takes less than 2 seconds.      Findings: No erythema or rash.   Neurological:      General: No focal deficit present.      Mental Status: He is alert and oriented to person, place, and time.            Labs:  Lab Results   Component Value Date/Time    SODIUM 128 (L) 12/09/2021 01:40 AM    POTASSIUM 4.3 12/09/2021 01:40 AM    CHLORIDE 92 (L) 12/09/2021 01:40 AM    CO2 26 12/09/2021 01:40 AM    GLUCOSE 88 12/09/2021 01:40 AM    BUN 13 12/09/2021 01:40 AM    CREATININE 5.69 (HH) 12/09/2021 01:40 AM      Lab Results   Component Value Date/Time    WBC 4.9 12/09/2021 01:40 AM    RBC 2.49 (L) 12/09/2021 01:40 AM    HEMOGLOBIN 7.3 (L) 12/09/2021 01:40 AM    HEMATOCRIT 23.2 (L) 12/09/2021 01:40 AM    MCV 93.2 12/09/2021 01:40 AM    MCH 29.3 12/09/2021 01:40 AM    MCHC 31.5 (L) 12/09/2021 01:40 AM    MPV 10.1 12/09/2021 01:40 AM    NEUTSPOLYS 35.50 (L) 12/09/2021 01:40 AM    LYMPHOCYTES 43.90 (H) 12/09/2021 01:40 AM    MONOCYTES 8.50 12/09/2021 01:40 AM    EOSINOPHILS 11.50 (H) 12/09/2021 01:40 AM    BASOPHILS 0.40 12/09/2021 01:40 AM    HYPOCHROMIA 2+ (A) 12/06/2021 03:19 PM    ANISOCYTOSIS 2+ (A) 12/08/2021 05:03 AM      Lab Results   Component Value Date/Time    PROTHROMBTM 18.8 (H) 12/06/2021 03:19 PM    INR 1.63 (H) 12/06/2021 03:19 PM        Assessment:     #Melena  #Hematochezia  #Chronic Anemia  #CKD Stage IV complicated by anemia  #History of Internal Hemorrhoids  #Diverticulosis  -unclear etiology of bleed, unclear if upper or lower.     Plan:  -Continue IV PPI  -EGD/colonoscopy rescheduled to tomorrow  -Needs to complete nearly entire prep with liquid bowel movements tonight  -NPO midnight

## 2021-12-09 NOTE — CARE PLAN
The patient is Stable - Low risk of patient condition declining or worsening    Shift Goals  Clinical Goals: stable h/h, bowel movement/иван  Patient Goals: sleep/pain control  Family Goals: HILARIO    Progress made toward(s) clinical / shift goals:  Patient maintained a stable H/H was educated on care plan,NPO diet/need to drink goloightly and upcoming procedures. Pt was able to have decreased pain with medications and warm packs pt was also able to sleep. Pt has all fall precautions in place. Pt was bale to mobilize x1 assist to commode.      Problem: Knowledge Deficit - Standard  Goal: Patient and family/care givers will demonstrate understanding of plan of care, disease process/condition, diagnostic tests and medications  Outcome: Progressing     Problem: Pain - Standard  Goal: Alleviation of pain or a reduction in pain to the patient’s comfort goal  Outcome: Progressing     Problem: Fall Risk  Goal: Patient will remain free from falls  Outcome: Progressing

## 2021-12-09 NOTE — PROGRESS NOTES
3hr HD started @ 0852 and completed !@ 1153,tx well tolerated,slept almost the whole tx.VSS,net UF = 2000ml as ordered.ANTON TDC dressing CDI,report given to Gabino GARCIA.

## 2021-12-09 NOTE — PROGRESS NOTES
"[facility] -  PROGRESS NOTE               Author: DARIN Gonzalez Date & Time: 12/9/2021  10:10 AM     HPI:  71yoM with PMH significant for ESRD on HD TTS via Left Chest PC, HTN, CAD, Gout, Anemia secondary to CKD, CKD Bone Mineral Disorder/Renal Osteodystrophy admitted with complaints of bloody BM x3 episodes that started yesterday morning. Pt reports that yesterday morning he had acute onset of bloody stool that was bright red. He had a total of 3 episodes of bloody stools and then nothing since admission. He denies any n/v or abd pain associated with episodes. He was diagnosed with acute PE on 11/27/21 and was prescribed eliquis but he did not  the prescription yet. His Hgb was 5.9 on admission and after 1 units PRBC transfusion his Hgb is 7.0 this am  No F/C/N/V/CP/SOB.  No melena, hematochezia, hematemesis.  No HA, visual changes, or abdominal pain.    DAILY NEPHROLOGY SUMMARY:  12/8: 2L net UF. Resting in bed, no acute distress. Denies any bloody stools this am. SOB improving. C/o BLE edema improved after HD yesterday. Reports hx of gastric ulceration. PPI was discontinued.   12/9: Seen on HD this am, tolerating. VSS. Denies abd pain this am.     REVIEW OF SYSTEMS:    10 point ROS reviewed and is as per HPI/daily summary or otherwise negative    PMH/PSH/SH/FH:   Reviewed and unchanged since admission note    CURRENT MEDICATIONS:   Reviewed from admission to present day    VS:  /53   Pulse 68   Temp 36.7 °C (98.1 °F) (Temporal)   Resp 18   Ht 1.727 m (5' 8\")   Wt 74.8 kg (165 lb)   SpO2 96%   BMI 25.09 kg/m²     Physical Exam  Constitutional:       General: He is not in acute distress.     Appearance: Normal appearance. He is not diaphoretic.   HENT:      Head: Normocephalic and atraumatic.      Mouth/Throat:      Mouth: Mucous membranes are dry.      Pharynx: Oropharynx is clear.   Eyes:      Extraocular Movements: Extraocular movements intact.      " Conjunctiva/sclera: Conjunctivae normal.      Pupils: Pupils are equal, round, and reactive to light.   Cardiovascular:      Rate and Rhythm: Normal rate and regular rhythm.      Comments: L Chest PC  Pulmonary:      Effort: Pulmonary effort is normal. No respiratory distress.      Breath sounds: Normal breath sounds. No stridor. No wheezing or rhonchi.   Abdominal:      General: Bowel sounds are normal. There is no distension.      Palpations: Abdomen is soft.      Tenderness: There is no abdominal tenderness.   Musculoskeletal:         General: Swelling present. Normal range of motion.      Cervical back: Normal range of motion and neck supple.      Comments: BLE 2-3+ Edema   Immature R AVF    Skin:     General: Skin is warm and dry.      Coloration: Skin is not jaundiced or pale.   Neurological:      Mental Status: He is alert.   Psychiatric:         Mood and Affect: Mood normal.         Behavior: Behavior normal.         Thought Content: Thought content normal.         Judgment: Judgment normal.         Fluids:  In: 1240 [P.O.:1240]  Out: 50     LABS:  Recent Labs     12/07/21  0119 12/08/21  0503 12/09/21  0140   SODIUM 138 135 128*   POTASSIUM 3.7 4.3 4.3   CHLORIDE 97 98 92*   CO2 26 29 26   GLUCOSE 96 93 88   BUN 25* 12 13   CREATININE 7.07* 4.98* 5.69*   CALCIUM 8.0* 8.3* 8.0*       IMAGING:   All imaging reviewed from admission to present day    Narrative & Impression     12/8/2021 4:05 PM     HISTORY/REASON FOR EXAM:  Abnormal Labs     TECHNIQUE/EXAM DESCRIPTION AND NUMBER OF VIEWS:  Real-time sonography of the liver and biliary tree.     COMPARISON: CT abdomen and pelvis 12/6/2021     FINDINGS:  The liver is normal in contour. .  Heterogeneous mildly hypoechoic lesion in the RIGHT lobe liver posteriorly measuring 4.5 x 4 x 3.9 cm, corresponding to CT findings. The liver measures 16.71 cm.     Gallbladder shows stones and is partially contracted.  The gallbladder wall thickness measures 5.20 mm. There is  no pericholecystic fluid.  The common duct measures 5.60 mm.     Visualized pancreas shows heterogeneous echotexture.  The visualized aorta is normal in caliber with atherosclerotic changes.     Intrahepatic IVC is patent.     The portal vein is patent with hepatopetal flow. The MPV measures 0.90 cm.     The right kidney measures 7.79 cm. Trace adjacent fluid.     Incidental note of RIGHT pleural fluid.     IMPRESSION:     1.  Mass in the posterior RIGHT lobe liver measuring 4.5 cm, corresponding to CT findings.  2.  Gallstones and gallbladder wall thickening, possibly due to partially contracted state.  Cholecystitis is felt unlikely but difficult to exclude.  3.  No biliary dilation.Narrative & Impression     IMPRESSION:  # ESRD  - On HD TTS via Left Chest PC and has an immature right arm AVF  # Anemia--secondary to combination of acute blood loss anemia from GI bleed and secondary to CKD  - Not at goal  - Transfused overnight  - No bloody BM overnight  - GI Consult for Scope today ([facility])  - CT 12/8 revealed R lobe liver Mass vs Hemangioma  # HTN--pt on midodrine outpt  - Goal BP < 140/90  - At goal  - On midodrine  # CKD-MBD/Renal Osteodystrophy  - Followed at outpt HD unit  - Ca 8.0  - PO4 2.9  - On renagel with meals  # CAD--medical management  # PE--diagnosed 11/27/21 and prescribed eliquis which he has not started  - Management per primary svc  # Chronic Bilateral LE Edema  - Will attempt to challenge UF removal with HD but pt reports significant cramping with aggressive UF        PLAN:  - iHD today ([facility]) and qTTS   - UF as tolerated, will attempt to challenge  - Serial CBC's and transfuse PRN for Hgb less than 7  - Anti-coagulation held for now  - MADAY with HD  - No Phos binder   - Hold carvedilol in am on dialysis days  - Continue midodrine, does not need to be timed with meals  - No dietary protein restrictions   - Dose all meds per ESRD  - EGD/Colonoscopy today

## 2021-12-10 ENCOUNTER — PATIENT OUTREACH (OUTPATIENT)
Dept: HEALTH INFORMATION MANAGEMENT | Facility: OTHER | Age: 71
End: 2021-12-10

## 2021-12-10 ENCOUNTER — PHARMACY VISIT (OUTPATIENT)
Dept: PHARMACY | Facility: MEDICAL CENTER | Age: 71
End: 2021-12-10
Payer: COMMERCIAL

## 2021-12-10 ENCOUNTER — ANESTHESIA EVENT (OUTPATIENT)
Dept: SURGERY | Facility: MEDICAL CENTER | Age: 71
DRG: 377 | End: 2021-12-10
Payer: COMMERCIAL

## 2021-12-10 ENCOUNTER — ANESTHESIA (OUTPATIENT)
Dept: SURGERY | Facility: MEDICAL CENTER | Age: 71
DRG: 377 | End: 2021-12-10
Payer: COMMERCIAL

## 2021-12-10 VITALS
WEIGHT: 161.16 LBS | OXYGEN SATURATION: 90 % | SYSTOLIC BLOOD PRESSURE: 129 MMHG | RESPIRATION RATE: 17 BRPM | DIASTOLIC BLOOD PRESSURE: 72 MMHG | HEART RATE: 79 BPM | BODY MASS INDEX: 24.42 KG/M2 | HEIGHT: 68 IN | TEMPERATURE: 98.1 F

## 2021-12-10 PROBLEM — I21.9 MI (MYOCARDIAL INFARCTION) (HCC): Status: ACTIVE | Noted: 2021-12-10

## 2021-12-10 LAB
ALBUMIN SERPL BCP-MCNC: 2.6 G/DL (ref 3.2–4.9)
ALBUMIN/GLOB SERPL: 1 G/DL
ALP SERPL-CCNC: 104 U/L (ref 30–99)
ALT SERPL-CCNC: 6 U/L (ref 2–50)
ANION GAP SERPL CALC-SCNC: 8 MMOL/L (ref 7–16)
AST SERPL-CCNC: 25 U/L (ref 12–45)
BASOPHILS # BLD AUTO: 0.4 % (ref 0–1.8)
BASOPHILS # BLD: 0.02 K/UL (ref 0–0.12)
BILIRUB SERPL-MCNC: 0.4 MG/DL (ref 0.1–1.5)
BUN SERPL-MCNC: 8 MG/DL (ref 8–22)
CALCIUM SERPL-MCNC: 8 MG/DL (ref 8.5–10.5)
CHLORIDE SERPL-SCNC: 95 MMOL/L (ref 96–112)
CO2 SERPL-SCNC: 28 MMOL/L (ref 20–33)
CREAT SERPL-MCNC: 4.25 MG/DL (ref 0.5–1.4)
EOSINOPHIL # BLD AUTO: 0.55 K/UL (ref 0–0.51)
EOSINOPHIL NFR BLD: 10.9 % (ref 0–6.9)
ERYTHROCYTE [DISTWIDTH] IN BLOOD BY AUTOMATED COUNT: 63.7 FL (ref 35.9–50)
GLOBULIN SER CALC-MCNC: 2.7 G/DL (ref 1.9–3.5)
GLUCOSE SERPL-MCNC: 93 MG/DL (ref 65–99)
HCT VFR BLD AUTO: 23.2 % (ref 42–52)
HGB BLD-MCNC: 7.6 G/DL (ref 14–18)
IMM GRANULOCYTES # BLD AUTO: 0.01 K/UL (ref 0–0.11)
IMM GRANULOCYTES NFR BLD AUTO: 0.2 % (ref 0–0.9)
LYMPHOCYTES # BLD AUTO: 1.94 K/UL (ref 1–4.8)
LYMPHOCYTES NFR BLD: 38.6 % (ref 22–41)
MCH RBC QN AUTO: 29.7 PG (ref 27–33)
MCHC RBC AUTO-ENTMCNC: 32.8 G/DL (ref 33.7–35.3)
MCV RBC AUTO: 90.6 FL (ref 81.4–97.8)
MONOCYTES # BLD AUTO: 0.61 K/UL (ref 0–0.85)
MONOCYTES NFR BLD AUTO: 12.1 % (ref 0–13.4)
NEUTROPHILS # BLD AUTO: 1.9 K/UL (ref 1.82–7.42)
NEUTROPHILS NFR BLD: 37.8 % (ref 44–72)
NRBC # BLD AUTO: 0 K/UL
NRBC BLD-RTO: 0 /100 WBC
PATHOLOGY CONSULT NOTE: NORMAL
PHOSPHATE SERPL-MCNC: 2.7 MG/DL (ref 2.5–4.5)
PLATELET # BLD AUTO: 122 K/UL (ref 164–446)
PMV BLD AUTO: 9.9 FL (ref 9–12.9)
POTASSIUM SERPL-SCNC: 4.6 MMOL/L (ref 3.6–5.5)
PROT SERPL-MCNC: 5.3 G/DL (ref 6–8.2)
RBC # BLD AUTO: 2.56 M/UL (ref 4.7–6.1)
SODIUM SERPL-SCNC: 131 MMOL/L (ref 135–145)
WBC # BLD AUTO: 5 K/UL (ref 4.8–10.8)

## 2021-12-10 PROCEDURE — C9113 INJ PANTOPRAZOLE SODIUM, VIA: HCPCS | Performed by: STUDENT IN AN ORGANIZED HEALTH CARE EDUCATION/TRAINING PROGRAM

## 2021-12-10 PROCEDURE — RXMED WILLOW AMBULATORY MEDICATION CHARGE: Performed by: STUDENT IN AN ORGANIZED HEALTH CARE EDUCATION/TRAINING PROGRAM

## 2021-12-10 PROCEDURE — A9270 NON-COVERED ITEM OR SERVICE: HCPCS | Performed by: INTERNAL MEDICINE

## 2021-12-10 PROCEDURE — 700101 HCHG RX REV CODE 250: Performed by: ANESTHESIOLOGY

## 2021-12-10 PROCEDURE — 85025 COMPLETE CBC W/AUTO DIFF WBC: CPT

## 2021-12-10 PROCEDURE — 700102 HCHG RX REV CODE 250 W/ 637 OVERRIDE(OP): Performed by: STUDENT IN AN ORGANIZED HEALTH CARE EDUCATION/TRAINING PROGRAM

## 2021-12-10 PROCEDURE — 700105 HCHG RX REV CODE 258: Performed by: ANESTHESIOLOGY

## 2021-12-10 PROCEDURE — 160048 HCHG OR STATISTICAL LEVEL 1-5: Performed by: INTERNAL MEDICINE

## 2021-12-10 PROCEDURE — 80053 COMPREHEN METABOLIC PANEL: CPT

## 2021-12-10 PROCEDURE — 0DB68ZX EXCISION OF STOMACH, VIA NATURAL OR ARTIFICIAL OPENING ENDOSCOPIC, DIAGNOSTIC: ICD-10-PCS | Performed by: INTERNAL MEDICINE

## 2021-12-10 PROCEDURE — 160203 HCHG ENDO MINUTES - 1ST 30 MINS LEVEL 4: Performed by: INTERNAL MEDICINE

## 2021-12-10 PROCEDURE — 0DBK8ZX EXCISION OF ASCENDING COLON, VIA NATURAL OR ARTIFICIAL OPENING ENDOSCOPIC, DIAGNOSTIC: ICD-10-PCS | Performed by: INTERNAL MEDICINE

## 2021-12-10 PROCEDURE — A9270 NON-COVERED ITEM OR SERVICE: HCPCS | Performed by: STUDENT IN AN ORGANIZED HEALTH CARE EDUCATION/TRAINING PROGRAM

## 2021-12-10 PROCEDURE — 84100 ASSAY OF PHOSPHORUS: CPT

## 2021-12-10 PROCEDURE — 700111 HCHG RX REV CODE 636 W/ 250 OVERRIDE (IP): Performed by: ANESTHESIOLOGY

## 2021-12-10 PROCEDURE — 160035 HCHG PACU - 1ST 60 MINS PHASE I: Performed by: INTERNAL MEDICINE

## 2021-12-10 PROCEDURE — 700102 HCHG RX REV CODE 250 W/ 637 OVERRIDE(OP): Performed by: INTERNAL MEDICINE

## 2021-12-10 PROCEDURE — 160002 HCHG RECOVERY MINUTES (STAT): Performed by: INTERNAL MEDICINE

## 2021-12-10 PROCEDURE — 36415 COLL VENOUS BLD VENIPUNCTURE: CPT

## 2021-12-10 PROCEDURE — 160009 HCHG ANES TIME/MIN: Performed by: INTERNAL MEDICINE

## 2021-12-10 PROCEDURE — 99239 HOSP IP/OBS DSCHRG MGMT >30: CPT | Performed by: STUDENT IN AN ORGANIZED HEALTH CARE EDUCATION/TRAINING PROGRAM

## 2021-12-10 PROCEDURE — 700111 HCHG RX REV CODE 636 W/ 250 OVERRIDE (IP): Performed by: STUDENT IN AN ORGANIZED HEALTH CARE EDUCATION/TRAINING PROGRAM

## 2021-12-10 PROCEDURE — 88305 TISSUE EXAM BY PATHOLOGIST: CPT

## 2021-12-10 PROCEDURE — 88312 SPECIAL STAINS GROUP 1: CPT

## 2021-12-10 RX ORDER — LIDOCAINE HYDROCHLORIDE 20 MG/ML
INJECTION, SOLUTION EPIDURAL; INFILTRATION; INTRACAUDAL; PERINEURAL PRN
Status: DISCONTINUED | OUTPATIENT
Start: 2021-12-10 | End: 2021-12-10 | Stop reason: SURG

## 2021-12-10 RX ORDER — ONDANSETRON 2 MG/ML
4 INJECTION INTRAMUSCULAR; INTRAVENOUS
Status: DISCONTINUED | OUTPATIENT
Start: 2021-12-10 | End: 2021-12-10 | Stop reason: HOSPADM

## 2021-12-10 RX ORDER — SODIUM CHLORIDE 9 MG/ML
INJECTION, SOLUTION INTRAVENOUS
Status: DISCONTINUED | OUTPATIENT
Start: 2021-12-10 | End: 2021-12-10 | Stop reason: SURG

## 2021-12-10 RX ORDER — SODIUM CHLORIDE 9 MG/ML
INJECTION, SOLUTION INTRAVENOUS CONTINUOUS
Status: DISCONTINUED | OUTPATIENT
Start: 2021-12-10 | End: 2021-12-10 | Stop reason: HOSPADM

## 2021-12-10 RX ORDER — OMEPRAZOLE 20 MG/1
20 CAPSULE, DELAYED RELEASE ORAL DAILY
Qty: 30 CAPSULE | Refills: 0 | Status: SHIPPED | OUTPATIENT
Start: 2021-12-10 | End: 2022-05-03

## 2021-12-10 RX ADMIN — PANTOPRAZOLE SODIUM 40 MG: 40 INJECTION, POWDER, FOR SOLUTION INTRAVENOUS at 06:16

## 2021-12-10 RX ADMIN — PROPOFOL 50 MG: 10 INJECTION, EMULSION INTRAVENOUS at 11:19

## 2021-12-10 RX ADMIN — MIDODRINE HYDROCHLORIDE 10 MG: 5 TABLET ORAL at 05:14

## 2021-12-10 RX ADMIN — OXYCODONE 5 MG: 5 TABLET ORAL at 05:13

## 2021-12-10 RX ADMIN — PROPOFOL 50 MG: 10 INJECTION, EMULSION INTRAVENOUS at 11:15

## 2021-12-10 RX ADMIN — LIDOCAINE HYDROCHLORIDE 60 MG: 20 INJECTION, SOLUTION EPIDURAL; INFILTRATION; INTRACAUDAL at 11:05

## 2021-12-10 RX ADMIN — PROPOFOL 70 MG: 10 INJECTION, EMULSION INTRAVENOUS at 11:05

## 2021-12-10 RX ADMIN — SODIUM CHLORIDE: 9 INJECTION, SOLUTION INTRAVENOUS at 10:58

## 2021-12-10 RX ADMIN — PROPOFOL 30 MG: 10 INJECTION, EMULSION INTRAVENOUS at 11:23

## 2021-12-10 ASSESSMENT — PAIN DESCRIPTION - PAIN TYPE
TYPE: ACUTE PAIN

## 2021-12-10 ASSESSMENT — PAIN SCALES - GENERAL: PAIN_LEVEL: 0

## 2021-12-10 NOTE — CARE PLAN
The patient is Stable - Low risk of patient condition declining or worsening    Shift Goals  Clinical Goals: clear BM  Patient Goals: sleep/clear BM  Family Goals: HILARIO    Progress made toward(s) clinical / shift goals:  Patient educated on care plan, upcoming procedures and NPO status. Pt has decreased pan and is able to sleep with lidocaine patches, warm packs and pain medication. Pt calls appropriately and has fall precautions in place. Pt is able to mobilize self in bed and ambulate to bathroom SBA, no signs of skin breakdown. Pt has been able to have watery and semi-clear bowel movements.    Problem: Knowledge Deficit - Standard  Goal: Patient and family/care givers will demonstrate understanding of plan of care, disease process/condition, diagnostic tests and medications  Outcome: Progressing     Problem: Pain - Standard  Goal: Alleviation of pain or a reduction in pain to the patient’s comfort goal  Outcome: Progressing     Problem: Fall Risk  Goal: Patient will remain free from falls  Outcome: Progressing     Problem: Skin Integrity  Goal: Skin integrity is maintained or improved  Outcome: Progressing

## 2021-12-10 NOTE — DISCHARGE SUMMARY
Discharge Summary    CHIEF COMPLAINT ON ADMISSION  Chief Complaint   Patient presents with   • Bloody Stools     hoa blood       Reason for Admission  Lower GI Bleed     Admission Date  12/6/2021    CODE STATUS  Full Code    HPI & HOSPITAL COURSE  71-year-old male with a past medical history of of pulmonary embolism diagnosed 11/27/2021 prescribed Eliquis but not yet filled, end-stage renal failure on hemodialysis, dyslipidemia, insomnia, and hepatitis C was admitted on 12/6/2021 for hematochezia and melena.  Patient required 2 PRBC transfusions.  He was started on IV pantoprazole.  Gastroenterology following for which patient underwent EGD and colonoscopy on 12/10/2021 for which EGD showed erosive gastropahty and portal HTN and he was noted for left diverticulosis and a 2 mm ascending colon polyp which was removed and sent for biopsy.  Patient tolerated procedure well and was restarted on anticoagulation as per gastroenterology's recommendation.  Stable patient with in chronic condition is to be discharged home and to follow-up with his primary care provider within 1 week.  All results and plan of action were discussed with patient for which she voiced understanding and agree with the primary care team.  Patient was instructed to return to emergency department symptoms were to worsen.    Therefore, he is discharged in good and stable condition to home with close outpatient follow-up.    The patient met 2-midnight criteria for an inpatient stay at the time of discharge.    Discharge Date  12/10/2021    FOLLOW UP ITEMS POST DISCHARGE  Primary care provider follow post hospital discharge care    DISCHARGE DIAGNOSES  Principal Problem:    GIB (gastrointestinal bleeding) POA: Yes  Active Problems:    ESRD on dialysis (HCC) (Chronic) POA: Yes    Acute respiratory failure with hypoxia (HCC) POA: Yes    Pulmonary embolism (HCC) POA: Yes    Acute blood loss anemia POA: Yes    MI (myocardial infarction) (HCC) POA:  Unknown    Insomnia POA: Yes    HLD (hyperlipidemia) POA: Yes    Hepatitis C antibody positive in blood POA: Yes  Resolved Problems:    * No resolved hospital problems. *      FOLLOW UP  No future appointments.  No follow-up provider specified.    MEDICATIONS ON DISCHARGE     Medication List      START taking these medications      Instructions   omeprazole 20 MG delayed-release capsule  Commonly known as: PRILOSEC   Take 1 Capsule by mouth every day.  Dose: 20 mg        CHANGE how you take these medications      Instructions   lidocaine 5 % Ptch  What changed: Another medication with the same name was removed. Continue taking this medication, and follow the directions you see here.  Commonly known as: LIDODERM   Apply 1 Patch topically every day. Remove after 12 hours  Dose: 1 Patch        CONTINUE taking these medications      Instructions   allopurinol 100 MG Tabs  Commonly known as: ZYLOPRIM   Take 100 mg by mouth 3 times a week. Taken post hemodialysis  Dose: 100 mg     apixaban 5mg Tabs  Start taking on: November 27, 2021  Commonly known as: ELIQUIS   Take 2 Tablets by mouth 2 times a day for 6 days, THEN 1 Tablet 2 times a day for 30 days. Indications: DVT/PE     atorvastatin 20 MG Tabs  Commonly known as: LIPITOR   Take 20 mg by mouth every evening.  Dose: 20 mg     Buprenorphine 10 MCG/HR Ptwk   Place 10 mcg on the skin every 7 days.  Dose: 10 mcg     carvedilol 6.25 MG Tabs  Commonly known as: COREG   Take 6.25 mg by mouth 2 times a day with meals.  Dose: 6.25 mg     diclofenac sodium 1 % Gel  Commonly known as: Voltaren   Apply 4 g topically 4 times a day as needed (Osetoarthritis pain).  Dose: 4 g     Etanercept 50 MG/ML Sosy   Inject 50 mg under the skin every 7 days.  Dose: 50 mg     isosorbide dinitrate 20 MG Tabs  Commonly known as: ISORDIL   Take 10 mg by mouth 2 times a day. Taken 7 hours apart, last dose no later than dinner   Indications: Stable Angina Pectoris  Dose: 10 mg     melatonin 3 MG  Tabs   Take 6 mg by mouth at bedtime as needed (sleep).  Dose: 6 mg     midodrine 10 MG tablet  Commonly known as: PROAMATINE   Take 1 Tablet by mouth 3 times a day with meals for 14 days.  Dose: 10 mg     pregabalin 25 MG Caps  Commonly known as: LYRICA   Take 25 mg by mouth at bedtime.  Dose: 25 mg     sevelamer 800 MG Tabs  Commonly known as: RENAGEL   Take 800 mg by mouth 3 times a day with meals.  Dose: 800 mg     tamsulosin 0.4 MG capsule  Commonly known as: FLOMAX   Take 0.4 mg by mouth every morning.  Dose: 0.4 mg     traZODone 50 MG Tabs  Commonly known as: DESYREL   Take 25 mg by mouth every evening. Indications: Trouble Sleeping  Dose: 25 mg     vitamin D2 (Ergocalciferol) 1.25 MG (83958 UT) Caps capsule  Commonly known as: Drisdol   Take 50,000 Units by mouth every 7 days.  Dose: 50,000 Units        STOP taking these medications    aspirin EC 81 MG Tbec  Commonly known as: ECOTRIN            Allergies  Allergies   Allergen Reactions   • Motrin [Ibuprofen]      hhx of stomach ulcers       DIET  Orders Placed This Encounter   Procedures   • Diet NPO Restrict to: Sips with Medications     Standing Status:   Standing     Number of Occurrences:   1     Order Specific Question:   Diet NPO Restrict to:     Answer:   Sips with Medications [3]       ACTIVITY  As tolerated.  Weight bearing as tolerated    CONSULTATIONS  Gastroenterology    PROCEDURES  12/10/2021  EGD and colonoscopy    LABORATORY  Lab Results   Component Value Date    SODIUM 131 (L) 12/10/2021    POTASSIUM 4.6 12/10/2021    CHLORIDE 95 (L) 12/10/2021    CO2 28 12/10/2021    GLUCOSE 93 12/10/2021    BUN 8 12/10/2021    CREATININE 4.25 (H) 12/10/2021        Lab Results   Component Value Date    WBC 5.0 12/10/2021    HEMOGLOBIN 7.6 (L) 12/10/2021    HEMATOCRIT 23.2 (L) 12/10/2021    PLATELETCT 122 (L) 12/10/2021        Total time of the discharge process exceeds 38 minutes.

## 2021-12-10 NOTE — ANESTHESIA PREPROCEDURE EVALUATION
Case: [id number] Date/Time: 12/10/21 1045    Procedures:       GASTROSCOPY (N/A Esophagus)      COLONOSCOPY (N/A Esophagus)    Anesthesia type: General    Pre-op diagnosis: HEMATOCHEZIA, MELENA    Location: [facility] ROOM 26 / SURGERY SAME DAY [facility]    Surgeons: Paddy Hooks M.D.        70 yo w/melena/hematochezia    Relevant Problems   ANESTHESIA (within normal limits)      CARDIAC   (positive) MI (myocardial infarction) (HCC)   (positive) Pulmonary embolism (HCC)      GI   (positive) Ulcer, stomach peptic         (positive) ESRD on dialysis (HCC)   (positive) Stage 4 chronic kidney disease (HCC)      Other   (positive) Acute blood loss anemia   (positive) Acute respiratory failure with hypoxia (HCC)   (positive) GIB (gastrointestinal bleeding)   (positive) HLD (hyperlipidemia)   (positive) Hepatitis C antibody positive in blood     Recent PE dx 11/2021 w/right heart enlargement and mild pulmonary HTN by echo    Denies CVA, DM, GERD, URI    Physical Exam    Airway   Mallampati: II  TM distance: >3 FB  Neck ROM: full       Cardiovascular   Rhythm: regular  Rate: normal  (-) murmur     Dental   (+) upper dentures           Pulmonary   Breath sounds clear to auscultation     Abdominal    Neurological - normal exam                 Anesthesia Plan    ASA 3 (recent PE)   ASA physical status 3 criteria: CAD/stents (> 3 months) and other (comment)    Plan - MAC               Induction: intravenous      Pertinent diagnostic labs and testing reviewed    Informed Consent:    Anesthetic plan and risks discussed with patient.

## 2021-12-10 NOTE — OP REPORT
ESOPHAGOGASTRODUODENOSCOPY    DATE OF SERVICE: Dec 10, 2021    ENDOSCOPIST:Paddy Hooks M.D.     ASSISTANTS: Ethel  ANESTHESIOLOGIST: Anesthesiologist: Malaika Mcguire M.D.     ANESTHESIA TYPE: General      INDICATION FOR PROCEDURE: EGD for GIB anemaia  PROCEDURE PERFORMED: EGD   CONSENT:  Informed consent was obtained directly from the patient after   benefits, risks and possible alternatives were discussed.     MEDICATIONS:  The patient received MAC     PROCEDURE DESCRIPTION:  The patient was placed in the left lateral decubitus position and provided with supplemental oxygen via nasal cannula.  Vital signs were monitored continuously throughout the procedure.  When ready, the upper endoscope was placed in the patient's mouth and advanced easily and carefully to the esophagus and subsequently to the stomach and duodenum.The scope was slowly withdrawn and mucosa carefully examined. Retroflexion was performed in the stomach. The patients toleration of this procedure was excellent     FINDINGS:      Esophagus: nl  Stomach: portal HTN gastropathy proximal half stomach  Multiple gastric erosions in antrum non bleeding  Duodenum:nl       COMPLICATIONS:  No complications or blood loss during or in the immediate postoperative period.     IMPRESSION:  1. Erosive gastropahty and portal HTN astropathy   RECOMMENDATION:    Colonoscopy  FU V A system for hep C evaluation and treatment if needed and if viral load present  Avoid aspirin and NSAIDS

## 2021-12-10 NOTE — PROGRESS NOTES
Patient encouraged to drink golytely solution throughout day. Up to bathroom and calls for assistance.

## 2021-12-10 NOTE — OR NURSING
1127 Pt arrived to PACU from OR via gurney. Report received from OR RN and anesthesiologist. Monitor applied. Pt sleeping, respirations even and unlabored. Low BP, placed in Trendelenburg per Dr Mcguire    1134 BP improved, pt arousable, denies pain and nausea    1144 Pt sitting up, /66    1159 VSS, report called to Leta GARCIA    1217 Pt transported back to [facility] via gurney by this RN, Handoff to Leta GARCIA upon arrival

## 2021-12-10 NOTE — PROGRESS NOTES
"[facility] -  PROGRESS NOTE               Author: DARIN Gonzalez Date & Time: 12/10/2021  11:16 AM     HPI:  71yoM with PMH significant for ESRD on HD TTS via Left Chest PC, HTN, CAD, Gout, Anemia secondary to CKD, CKD Bone Mineral Disorder/Renal Osteodystrophy admitted with complaints of bloody BM x3 episodes that started yesterday morning. Pt reports that yesterday morning he had acute onset of bloody stool that was bright red. He had a total of 3 episodes of bloody stools and then nothing since admission. He denies any n/v or abd pain associated with episodes. He was diagnosed with acute PE on 11/27/21 and was prescribed eliquis but he did not  the prescription yet. His Hgb was 5.9 on admission and after 1 units PRBC transfusion his Hgb is 7.0 this am  No F/C/N/V/CP/SOB.  No melena, hematochezia, hematemesis.  No HA, visual changes, or abdominal pain.    DAILY NEPHROLOGY SUMMARY:  12/8: 2L net UF. Resting in bed, no acute distress. Denies any bloody stools this am. SOB improving. C/o BLE edema improved after HD yesterday. Reports hx of gastric ulceration. PPI was discontinued.   12/9: Seen on HD this am, tolerating. VSS. Denies abd pain this am.   12/10:  2L net UF.  EGD rescheduled for today for incomplete prep. Patient to OR for EDG this am. No bloody stools or pRBCs in last 24 hrs reported by primary RN. Bps elevated this am.     REVIEW OF SYSTEMS:  Deferred, patient in OR   10 point ROS reviewed and is as per HPI/daily summary or otherwise negative    PMH/PSH/SH/FH:   Reviewed and unchanged since admission note    CURRENT MEDICATIONS:   Reviewed from admission to present day    VS:  /76   Pulse 74   Temp 36.7 °C (98 °F) (Temporal)   Resp 18   Ht 1.727 m (5' 8\")   Wt 73.1 kg (161 lb 2.5 oz)   SpO2 91%   BMI 24.50 kg/m²     PE Deferred, patient in OR     Physical Exam  Vitals and nursing note reviewed.   Constitutional:       General: He is not in acute " distress.     Appearance: Normal appearance. He is not diaphoretic.   HENT:      Head: Normocephalic and atraumatic.      Mouth/Throat:      Mouth: Mucous membranes are dry.      Pharynx: Oropharynx is clear.   Eyes:      Extraocular Movements: Extraocular movements intact.      Conjunctiva/sclera: Conjunctivae normal.      Pupils: Pupils are equal, round, and reactive to light.   Cardiovascular:      Rate and Rhythm: Normal rate and regular rhythm.      Comments: L Chest PC  Pulmonary:      Effort: Pulmonary effort is normal. No respiratory distress.      Breath sounds: Normal breath sounds. No stridor. No wheezing or rhonchi.   Abdominal:      General: Bowel sounds are normal. There is no distension.      Palpations: Abdomen is soft.      Tenderness: There is no abdominal tenderness.   Musculoskeletal:         General: Swelling present. Normal range of motion.      Cervical back: Normal range of motion and neck supple.      Comments: BLE 2-3+ Edema   Immature R AVF    Skin:     General: Skin is warm and dry.      Coloration: Skin is not jaundiced or pale.   Neurological:      Mental Status: He is alert.   Psychiatric:         Mood and Affect: Mood normal.         Behavior: Behavior normal.         Thought Content: Thought content normal.         Judgment: Judgment normal.         Fluids:  In: 1550 [P.O.:1050; Dialysis:500]  Out: 2500     LABS:  Recent Labs     12/08/21  0503 12/09/21  0140 12/10/21  0044   SODIUM 135 128* 131*   POTASSIUM 4.3 4.3 4.6   CHLORIDE 98 92* 95*   CO2 29 26 28   GLUCOSE 93 88 93   BUN 12 13 8   CREATININE 4.98* 5.69* 4.25*   CALCIUM 8.3* 8.0* 8.0*       IMAGING:   All imaging reviewed from admission to present day    Narrative & Impression     12/8/2021 4:05 PM     HISTORY/REASON FOR EXAM:  Abnormal Labs     TECHNIQUE/EXAM DESCRIPTION AND NUMBER OF VIEWS:  Real-time sonography of the liver and biliary tree.     COMPARISON: CT abdomen and pelvis 12/6/2021     FINDINGS:  The liver is  normal in contour. .  Heterogeneous mildly hypoechoic lesion in the RIGHT lobe liver posteriorly measuring 4.5 x 4 x 3.9 cm, corresponding to CT findings. The liver measures 16.71 cm.     Gallbladder shows stones and is partially contracted.  The gallbladder wall thickness measures 5.20 mm. There is no pericholecystic fluid.  The common duct measures 5.60 mm.     Visualized pancreas shows heterogeneous echotexture.  The visualized aorta is normal in caliber with atherosclerotic changes.     Intrahepatic IVC is patent.     The portal vein is patent with hepatopetal flow. The MPV measures 0.90 cm.     The right kidney measures 7.79 cm. Trace adjacent fluid.     Incidental note of RIGHT pleural fluid.     IMPRESSION:     1.  Mass in the posterior RIGHT lobe liver measuring 4.5 cm, corresponding to CT findings.  2.  Gallstones and gallbladder wall thickening, possibly due to partially contracted state.  Cholecystitis is felt unlikely but difficult to exclude.  3.  No biliary dilation.Narrative & Impression     IMPRESSION:  # ESRD  - On HD TTS via Left Chest PC and has an immature right arm AVF  # Anemia--secondary to combination of acute blood loss anemia from GI bleed and secondary to CKD  - Not at goal  - Transfused 2 pRBCs 12/6  - No bloody BM overnight  -  To OR with GI Consult for EGD today ([facility])  - CT 12/8 revealed R lobe liver Mass vs Hemangioma  - EGD 12/10: Erosive gastropahty and portal HTN gastropathy. Multiple gastric erosions in antrum   # HTN--pt on midodrine outpt  - Goal BP < 140/90  - At goal  - On midodrine  # CKD-MBD/Renal Osteodystrophy  - Followed at outpt HD unit  - Ca 8.0  - PO4 2.9  - On renagel with meals  # CAD--medical management  # PE--diagnosed 11/27/21 and prescribed eliquis which he has not started  - Management per primary svc  # Chronic Bilateral LE Edema  - Will attempt to challenge UF removal with HD but pt reports significant cramping with aggressive UF        PLAN:  - No iHD today  ([facility]) and qTTS   - UF as tolerated, will attempt to challenge  - Transfuse PRN for Hgb less than 7  - Anti-coagulation held for now  - MADAY with HD  - No Phos binder   - Hold carvedilol in am on dialysis days  - Discontinue midodrine  - No dietary protein restrictions   - Dose all meds per ESRD  - GI recs for Colonoscopy

## 2021-12-10 NOTE — DISCHARGE PLANNING
Care Transition Team Discharge Planning    Anticipated Discharge Disposition: DC home    Action:Lsw met with patient to complete CTT assessment. Patient lives alone.in a 2 story home. There are a total of 16 steps to climb. [facility] He is independent with all ADLS and IADLs. Patient needed transportation home. He also stated that [facility] had to [facility] to  [facility]. Emanuelw gave a cab voucher to the nurse. [facility] and then go [facility].    Barriers to Discharge: None    Plan:DC home

## 2021-12-10 NOTE — DISCHARGE SUMMARY
Discharge Summary    CHIEF COMPLAINT ON ADMISSION  Chief Complaint   Patient presents with   • Bloody Stools     hoa blood       Reason for Admission  Lower GI Bleed     Admission Date  12/6/2021    CODE STATUS  Full Code    HPI & HOSPITAL COURSE  71-year-old male with a past medical history of of pulmonary embolism diagnosed 11/27/2021 prescribed Eliquis but not yet filled, end-stage renal failure on hemodialysis, dyslipidemia, insomnia, and hepatitis C was admitted on 12/6/2021 for hematochezia and melena.  Patient required 2 PRBC transfusions.  He was started on IV pantoprazole.  Gastroenterology following for which patient underwent EGD and colonoscopy on 12/10/2021 for which EGD showed erosive gastropahty and portal HTN and he was noted for left diverticulosis and a 2 mm ascending colon polyp which was removed and sent for biopsy.  Patient tolerated procedure well and was restarted on anticoagulation as per gastroenterology's recommendation.  Stable patient with in chronic condition is to be discharged home and to follow-up with his primary care provider within 1 week.  All results and plan of action were discussed with patient for which she voiced understanding and agree with the primary care team.  Patient was instructed to return to emergency department symptoms were to worsen.    Therefore, he is discharged in good and stable condition to home with close outpatient follow-up.    The patient met 2-midnight criteria for an inpatient stay at the time of discharge.    Discharge Date  12/10/2021    FOLLOW UP ITEMS POST DISCHARGE  Primary care provider follow post hospital discharge care    DISCHARGE DIAGNOSES  Principal Problem:    GIB (gastrointestinal bleeding) POA: Yes  Active Problems:    ESRD on dialysis (HCC) (Chronic) POA: Yes    Acute respiratory failure with hypoxia (HCC) POA: Yes    Pulmonary embolism (HCC) POA: Yes    Acute blood loss anemia POA: Yes    MI (myocardial infarction) (HCC) POA: No     Insomnia POA: Yes    HLD (hyperlipidemia) POA: Yes    Hepatitis C antibody positive in blood POA: Yes  Resolved Problems:    * No resolved hospital problems. *      FOLLOW UP  No future appointments.  [facility]  [address] [address]  [phone]    [facility] to schedule an appointment with a primary care provider for a hospital follow up. Thank you.      MEDICATIONS ON DISCHARGE     Medication List      START taking these medications      Instructions   * Apixaban Starter Pack 5 MG Tbpk  Start taking on: December 10, 2021  Replaces: apixaban 5mg Tabs   Take 2 tablets (10 mg) by mouth 2 times a day for 7 days, THEN take 1 tablet  (5 mg)  2 times a day for 30 days. Indications: DVT/PE     * apixaban 5mg Tabs  Start taking on: December 17, 2021  Commonly known as: ELIQUIS   Take 1 Tablet by mouth 2 times a day. Indications: DVT/PE  Dose: 5 mg     omeprazole 20 MG delayed-release capsule  Commonly known as: PRILOSEC   Take 1 Capsule by mouth every day.  Dose: 20 mg         * This list has 2 medication(s) that are the same as other medications prescribed for you. Read the directions carefully, and ask your doctor or other care provider to review them with you.            CHANGE how you take these medications      Instructions   lidocaine 5 % Ptch  What changed: Another medication with the same name was removed. Continue taking this medication, and follow the directions you see here.  Commonly known as: LIDODERM   Apply 1 Patch topically every day. Remove after 12 hours  Dose: 1 Patch        CONTINUE taking these medications      Instructions   allopurinol 100 MG Tabs  Commonly known as: ZYLOPRIM   Take 100 mg by mouth 3 times a week. Taken post hemodialysis  Dose: 100 mg     atorvastatin 20 MG Tabs  Commonly known as: LIPITOR   Take 20 mg by mouth every evening.  Dose: 20 mg     Buprenorphine 10 MCG/HR Ptwk   Place 10 mcg on the skin every 7  days.  Dose: 10 mcg     carvedilol 6.25 MG Tabs  Commonly known as: COREG   Take 6.25 mg by mouth 2 times a day with meals.  Dose: 6.25 mg     diclofenac sodium 1 % Gel  Commonly known as: Voltaren   Apply 4 g topically 4 times a day as needed (Osetoarthritis pain).  Dose: 4 g     Etanercept 50 MG/ML Sosy   Inject 50 mg under the skin every 7 days.  Dose: 50 mg     isosorbide dinitrate 20 MG Tabs  Commonly known as: ISORDIL   Take 10 mg by mouth 2 times a day. Taken 7 hours apart, last dose no later than dinner   Indications: Stable Angina Pectoris  Dose: 10 mg     melatonin 3 MG Tabs   Take 6 mg by mouth at bedtime as needed (sleep).  Dose: 6 mg     midodrine 10 MG tablet  Commonly known as: PROAMATINE   Take 1 Tablet by mouth 3 times a day with meals for 14 days.  Dose: 10 mg     pregabalin 25 MG Caps  Commonly known as: LYRICA   Take 25 mg by mouth at bedtime.  Dose: 25 mg     sevelamer 800 MG Tabs  Commonly known as: RENAGEL   Take 800 mg by mouth 3 times a day with meals.  Dose: 800 mg     tamsulosin 0.4 MG capsule  Commonly known as: FLOMAX   Take 0.4 mg by mouth every morning.  Dose: 0.4 mg     traZODone 50 MG Tabs  Commonly known as: DESYREL   Take 25 mg by mouth every evening. Indications: Trouble Sleeping  Dose: 25 mg     vitamin D2 (Ergocalciferol) 1.25 MG (02262 UT) Caps capsule  Commonly known as: Drisdol   Take 50,000 Units by mouth every 7 days.  Dose: 50,000 Units        STOP taking these medications    apixaban 5mg Tabs  Commonly known as: ELIQUIS  Replaced by: Apixaban Starter Pack 5 MG Tbpk     aspirin EC 81 MG Tbec  Commonly known as: ECOTRIN            Allergies  Allergies   Allergen Reactions   • Motrin [Ibuprofen]      hhx of stomach ulcers       DIET  Orders Placed This Encounter   Procedures   • Diet Order Diet: Renal     Standing Status:   Standing     Number of Occurrences:   1     Order Specific Question:   Diet:     Answer:   Renal [8]       ACTIVITY  As tolerated.  Weight bearing as  tolerated    CONSULTATIONS  Gastroenterology    PROCEDURES  12/10/2021  EGD and colonoscopy    LABORATORY  Lab Results   Component Value Date    SODIUM 131 (L) 12/10/2021    POTASSIUM 4.6 12/10/2021    CHLORIDE 95 (L) 12/10/2021    CO2 28 12/10/2021    GLUCOSE 93 12/10/2021    BUN 8 12/10/2021    CREATININE 4.25 (H) 12/10/2021        Lab Results   Component Value Date    WBC 5.0 12/10/2021    HEMOGLOBIN 7.6 (L) 12/10/2021    HEMATOCRIT 23.2 (L) 12/10/2021    PLATELETCT 122 (L) 12/10/2021        Total time of the discharge process exceeds 38 minutes.

## 2021-12-10 NOTE — ANESTHESIA POSTPROCEDURE EVALUATION
Patient: Junior Proctor    Procedure Summary     Date: 12/10/21 Room / Location: [facility] ROOM 26 / SURGERY SAME DAY [facility]    Anesthesia Start: 1058 Anesthesia Stop: 1131    Procedures:       GASTROSCOPY (N/A Esophagus)      COLONOSCOPY (N/A Esophagus)      GASTROSCOPY, WITH BIOPSY (N/A Esophagus)      COLONOSCOPY, WITH BIOPSY (N/A Anus) Diagnosis: (GASTRIC EROSION, PORTAL HYPERTENSION GASTROPATHY, DIVERTICULOSIS, COLON POLYP)    Surgeons: Paddy Hooks M.D. Responsible Provider: Malaika Mcguire M.D.    Anesthesia Type: MAC ASA Status: 3          Final Anesthesia Type: MAC  Last vitals  BP   Blood Pressure : 129/72    Temp   36.7 °C (98.1 °F)    Pulse   79   Resp   17    SpO2   90 %      Anesthesia Post Evaluation    Patient location during evaluation: PACU  Patient participation: complete - patient participated  Level of consciousness: awake  Pain score: 0    Airway patency: patent  Anesthetic complications: no  Cardiovascular status: adequate  Respiratory status: acceptable  Hydration status: acceptable    PONV: none          No complications documented.     Nurse Pain Score: 0 (NPRS)

## 2021-12-10 NOTE — ANESTHESIA TIME REPORT
Anesthesia Start and Stop Event Times     Date Time Event    12/10/2021 1051 Ready for Procedure     1058 Anesthesia Start     1131 Anesthesia Stop        Responsible Staff  12/10/21    Name Role Begin End    Malaika Mcguire M.D. Anesth 1058 1131        Preop Diagnosis (Free Text):  Pre-op Diagnosis     HEMATOCHEZIA, MELENA        Preop Diagnosis (Codes):    Premium Reason  Non-Premium    Comments:

## 2021-12-10 NOTE — DISCHARGE INSTRUCTIONS
Discharge Instructions    Discharged to home by taxi with self. Discharged via walking, hospital escort: Refused.  Special equipment needed: Not Applicable    Be sure to schedule a follow-up appointment with your primary care doctor or any specialists as instructed.     Discharge Plan:   Diet Plan: Discussed  Activity Level: Discussed  Confirmed Follow up Appointment: Patient to Call and Schedule Appointment  Confirmed Symptoms Management: Discussed  Medication Reconciliation Updated: Yes  Influenza Vaccine Indication: Patient Refuses    I understand that a diet low in cholesterol, fat, and sodium is recommended for good health. Unless I have been given specific instructions below for another diet, I accept this instruction as my diet prescription.   Other diet: Renal    Special Instructions: None    · Is patient discharged on Warfarin / Coumadin?   No     Depression / Suicide Risk    As [facility] are [facility], it is important to learn how to keep safe from harming yourself.    Recognize the warning signs:  · Abrupt changes in personality, positive or negative- including increase in energy   · Giving away possessions  · Change in eating patterns- significant weight changes-  positive or negative  · Change in sleeping patterns- unable to sleep or sleeping all the time   · Unwillingness or inability to communicate  · Depression  · Unusual sadness, discouragement and loneliness  · Talk of wanting to die  · Neglect of personal appearance   · Rebelliousness- reckless behavior  · Withdrawal from people/activities they love  · Confusion- inability to concentrate     If you or a loved one observes any of these behaviors or has concerns about self-harm, here's what you can do:  · Talk about it- your feelings and reasons for harming yourself  · Remove any means that you might use to hurt yourself (examples: pills, rope, extension cords, firearm)  · Get professional help from the community (Mental  Health, Substance Abuse, psychological counseling)  · Do not be alone:Call your Safe Contact- someone whom you trust who will be there for you.  · Call your local CRISIS HOTLINE 121-1534 or 793-584-4410  · Call your local Children's Mobile Crisis Response Team [place] (571) 888-8351 or www.[place]  · Call the toll free National Suicide Prevention Hotlines   · National Suicide Prevention Lifeline 741-452-YHSI (4558)  · National Hope Line Network 800-SUICIDE (450-5187)

## 2021-12-10 NOTE — OP REPORT
COLONOSCOPY PROCEDURE NOTE    DATE OF PROCEDURE::12/10/2021    INDICATION::GI bleed  ENDOSCOPIST:Paddy Hooks M.D.     ASSISTANTS: Rina    ANESTHESIOLOGIST: Anesthesiologist: Malaika Mcguire M.D.     ANESTHESIA TYPE: MAC     PROCEDURE DESCRIPTION:   Prior to the procedure the patient was informed of the risk and benefits of the procedure all question were answered.  The patient was placed in the left lateral decubitus position.  After appropriate sedation was achieved a digital rectal exam was performed. Introduction of the standard Olympus colonoscope was performed and passed to the cecum identified by the appendiceal orifice,ileal cecal valve and the cecal cap. Extubation of the ileocecal valve was performed allowing for gradual withdrawal of the colonoscope. Retroflexion was performed in the rectum to evaluate the rectal anal verge which appeared normal.  The colonoscope was the straightened and the excess air was removed.  The patient tolerated the procedure well.     FINDINGS:  Left diverticulosis  Ascending colon polyp diminutive 2 - 3 mm removed with cold biopsy forceps      SPECIMENS:  Ascending polyp    COMPLICATIONS: NA  RECOMMENDATIONS:  FU pathology  [facility]  Diet renal  No contraindication for anticoagulation if needed

## 2021-12-11 NOTE — PROGRESS NOTES
Pt discharged home via taxi. Eliquis prescription in hand at time of discharge. [facility]. All belongings accounted for at time of discharge including cell phone, cane and sun glasses.

## 2022-05-03 ENCOUNTER — APPOINTMENT (OUTPATIENT)
Dept: RADIOLOGY | Facility: MEDICAL CENTER | Age: 72
DRG: 438 | End: 2022-05-03
Attending: EMERGENCY MEDICINE
Payer: COMMERCIAL

## 2022-05-03 ENCOUNTER — HOSPITAL ENCOUNTER (INPATIENT)
Facility: MEDICAL CENTER | Age: 72
LOS: 11 days | DRG: 438 | End: 2022-05-14
Attending: EMERGENCY MEDICINE | Admitting: INTERNAL MEDICINE
Payer: COMMERCIAL

## 2022-05-03 DIAGNOSIS — J96.01 ACUTE RESPIRATORY FAILURE WITH HYPOXIA (HCC): ICD-10-CM

## 2022-05-03 DIAGNOSIS — K85.90 ACUTE PANCREATITIS, UNSPECIFIED COMPLICATION STATUS, UNSPECIFIED PANCREATITIS TYPE: ICD-10-CM

## 2022-05-03 DIAGNOSIS — K85.20 ALCOHOL-INDUCED ACUTE PANCREATITIS, UNSPECIFIED COMPLICATION STATUS: ICD-10-CM

## 2022-05-03 DIAGNOSIS — R53.81 DEBILITY: ICD-10-CM

## 2022-05-03 DIAGNOSIS — R10.13 EPIGASTRIC PAIN: ICD-10-CM

## 2022-05-03 DIAGNOSIS — K75.9 HEPATITIS: ICD-10-CM

## 2022-05-03 PROBLEM — F10.20 ALCOHOL DEPENDENCE (HCC): Status: ACTIVE | Noted: 2022-05-03

## 2022-05-03 PROBLEM — R07.9 CHEST PAIN: Status: ACTIVE | Noted: 2022-05-03

## 2022-05-03 PROBLEM — R10.11 RIGHT UPPER QUADRANT PAIN: Status: ACTIVE | Noted: 2022-05-03

## 2022-05-03 LAB
ALBUMIN SERPL BCP-MCNC: 3.3 G/DL (ref 3.2–4.9)
ALBUMIN/GLOB SERPL: 0.9 G/DL
ALP SERPL-CCNC: 209 U/L (ref 30–99)
ALT SERPL-CCNC: 677 U/L (ref 2–50)
ANION GAP SERPL CALC-SCNC: 21 MMOL/L (ref 7–16)
ANISOCYTOSIS BLD QL SMEAR: ABNORMAL
AST SERPL-CCNC: 2872 U/L (ref 12–45)
BASOPHILS # BLD AUTO: 0.7 % (ref 0–1.8)
BASOPHILS # BLD: 0.03 K/UL (ref 0–0.12)
BILIRUB SERPL-MCNC: 0.8 MG/DL (ref 0.1–1.5)
BUN SERPL-MCNC: 70 MG/DL (ref 8–22)
BURR CELLS BLD QL SMEAR: NORMAL
CALCIUM SERPL-MCNC: 7.2 MG/DL (ref 8.5–10.5)
CHLORIDE SERPL-SCNC: 90 MMOL/L (ref 96–112)
CO2 SERPL-SCNC: 15 MMOL/L (ref 20–33)
COMMENT 1642: NORMAL
CREAT SERPL-MCNC: 8.92 MG/DL (ref 0.5–1.4)
EKG IMPRESSION: NORMAL
EOSINOPHIL # BLD AUTO: 0.04 K/UL (ref 0–0.51)
EOSINOPHIL NFR BLD: 1 % (ref 0–6.9)
ERYTHROCYTE [DISTWIDTH] IN BLOOD BY AUTOMATED COUNT: 70.6 FL (ref 35.9–50)
ETHANOL BLD-MCNC: <10.1 MG/DL
GFR SERPLBLD CREATININE-BSD FMLA CKD-EPI: 6 ML/MIN/1.73 M 2
GLOBULIN SER CALC-MCNC: 3.7 G/DL (ref 1.9–3.5)
GLUCOSE SERPL-MCNC: 74 MG/DL (ref 65–99)
HCT VFR BLD AUTO: 34 % (ref 42–52)
HGB BLD-MCNC: 10.9 G/DL (ref 14–18)
IMM GRANULOCYTES # BLD AUTO: 0.01 K/UL (ref 0–0.11)
IMM GRANULOCYTES NFR BLD AUTO: 0.2 % (ref 0–0.9)
IRON SATN MFR SERPL: 91 % (ref 15–55)
IRON SERPL-MCNC: 177 UG/DL (ref 50–180)
LACTATE BLD-SCNC: 2.7 MMOL/L (ref 0.5–2)
LIPASE SERPL-CCNC: 587 U/L (ref 11–82)
LYMPHOCYTES # BLD AUTO: 0.27 K/UL (ref 1–4.8)
LYMPHOCYTES NFR BLD: 6.6 % (ref 22–41)
MACROCYTES BLD QL SMEAR: ABNORMAL
MAGNESIUM SERPL-MCNC: 1.8 MG/DL (ref 1.5–2.5)
MCH RBC QN AUTO: 32.2 PG (ref 27–33)
MCHC RBC AUTO-ENTMCNC: 32.1 G/DL (ref 33.7–35.3)
MCV RBC AUTO: 100.6 FL (ref 81.4–97.8)
MONOCYTES # BLD AUTO: 0.08 K/UL (ref 0–0.85)
MONOCYTES NFR BLD AUTO: 2 % (ref 0–13.4)
MORPHOLOGY BLD-IMP: NORMAL
NEUTROPHILS # BLD AUTO: 3.67 K/UL (ref 1.82–7.42)
NEUTROPHILS NFR BLD: 89.5 % (ref 44–72)
NRBC # BLD AUTO: 0 K/UL
NRBC BLD-RTO: 0 /100 WBC
OVALOCYTES BLD QL SMEAR: NORMAL
PHOSPHATE SERPL-MCNC: 8.5 MG/DL (ref 2.5–4.5)
PLATELET # BLD AUTO: 79 K/UL (ref 164–446)
PLATELET BLD QL SMEAR: NORMAL
PMV BLD AUTO: 10.4 FL (ref 9–12.9)
POIKILOCYTOSIS BLD QL SMEAR: NORMAL
POTASSIUM SERPL-SCNC: 5.4 MMOL/L (ref 3.6–5.5)
PROT SERPL-MCNC: 7 G/DL (ref 6–8.2)
RBC # BLD AUTO: 3.38 M/UL (ref 4.7–6.1)
RBC BLD AUTO: PRESENT
SODIUM SERPL-SCNC: 126 MMOL/L (ref 135–145)
TIBC SERPL-MCNC: 194 UG/DL (ref 250–450)
TROPONIN T SERPL-MCNC: 101 NG/L (ref 6–19)
TROPONIN T SERPL-MCNC: 107 NG/L (ref 6–19)
TROPONIN T SERPL-MCNC: 110 NG/L (ref 6–19)
UIBC SERPL-MCNC: <17 UG/DL (ref 110–370)
URATE SERPL-MCNC: 4.5 MG/DL (ref 2.5–8.3)
WBC # BLD AUTO: 4.1 K/UL (ref 4.8–10.8)

## 2022-05-03 PROCEDURE — 700111 HCHG RX REV CODE 636 W/ 250 OVERRIDE (IP): Performed by: EMERGENCY MEDICINE

## 2022-05-03 PROCEDURE — A9270 NON-COVERED ITEM OR SERVICE: HCPCS | Performed by: INTERNAL MEDICINE

## 2022-05-03 PROCEDURE — 84100 ASSAY OF PHOSPHORUS: CPT

## 2022-05-03 PROCEDURE — 83605 ASSAY OF LACTIC ACID: CPT

## 2022-05-03 PROCEDURE — 99223 1ST HOSP IP/OBS HIGH 75: CPT | Performed by: INTERNAL MEDICINE

## 2022-05-03 PROCEDURE — 85025 COMPLETE CBC W/AUTO DIFF WBC: CPT

## 2022-05-03 PROCEDURE — 80053 COMPREHEN METABOLIC PANEL: CPT

## 2022-05-03 PROCEDURE — 83540 ASSAY OF IRON: CPT

## 2022-05-03 PROCEDURE — 96375 TX/PRO/DX INJ NEW DRUG ADDON: CPT

## 2022-05-03 PROCEDURE — 76705 ECHO EXAM OF ABDOMEN: CPT

## 2022-05-03 PROCEDURE — 83735 ASSAY OF MAGNESIUM: CPT

## 2022-05-03 PROCEDURE — 84484 ASSAY OF TROPONIN QUANT: CPT | Mod: 91

## 2022-05-03 PROCEDURE — 99203 OFFICE O/P NEW LOW 30 MIN: CPT | Performed by: SURGERY

## 2022-05-03 PROCEDURE — 93005 ELECTROCARDIOGRAM TRACING: CPT | Performed by: EMERGENCY MEDICINE

## 2022-05-03 PROCEDURE — 700102 HCHG RX REV CODE 250 W/ 637 OVERRIDE(OP)

## 2022-05-03 PROCEDURE — 93005 ELECTROCARDIOGRAM TRACING: CPT

## 2022-05-03 PROCEDURE — 99285 EMERGENCY DEPT VISIT HI MDM: CPT

## 2022-05-03 PROCEDURE — 770020 HCHG ROOM/CARE - TELE (206)

## 2022-05-03 PROCEDURE — 82077 ASSAY SPEC XCP UR&BREATH IA: CPT

## 2022-05-03 PROCEDURE — 700102 HCHG RX REV CODE 250 W/ 637 OVERRIDE(OP): Performed by: INTERNAL MEDICINE

## 2022-05-03 PROCEDURE — 83690 ASSAY OF LIPASE: CPT

## 2022-05-03 PROCEDURE — 83550 IRON BINDING TEST: CPT

## 2022-05-03 PROCEDURE — 71045 X-RAY EXAM CHEST 1 VIEW: CPT

## 2022-05-03 PROCEDURE — 96374 THER/PROPH/DIAG INJ IV PUSH: CPT

## 2022-05-03 PROCEDURE — A9270 NON-COVERED ITEM OR SERVICE: HCPCS

## 2022-05-03 PROCEDURE — 36415 COLL VENOUS BLD VENIPUNCTURE: CPT

## 2022-05-03 PROCEDURE — 96376 TX/PRO/DX INJ SAME DRUG ADON: CPT

## 2022-05-03 PROCEDURE — 84550 ASSAY OF BLOOD/URIC ACID: CPT

## 2022-05-03 RX ORDER — LORAZEPAM 2 MG/1
2 TABLET ORAL
Status: DISCONTINUED | OUTPATIENT
Start: 2022-05-03 | End: 2022-05-06

## 2022-05-03 RX ORDER — ATORVASTATIN CALCIUM 20 MG/1
20 TABLET, FILM COATED ORAL NIGHTLY
Status: DISCONTINUED | OUTPATIENT
Start: 2022-05-03 | End: 2022-05-03

## 2022-05-03 RX ORDER — PANTOPRAZOLE SODIUM 40 MG/1
40 TABLET, DELAYED RELEASE ORAL DAILY
COMMUNITY
End: 2023-09-05

## 2022-05-03 RX ORDER — ASPIRIN 300 MG/1
300 SUPPOSITORY RECTAL DAILY
Status: DISCONTINUED | OUTPATIENT
Start: 2022-05-04 | End: 2022-05-08

## 2022-05-03 RX ORDER — ASPIRIN 300 MG/1
300 SUPPOSITORY RECTAL DAILY
Status: DISCONTINUED | OUTPATIENT
Start: 2022-05-03 | End: 2022-05-03

## 2022-05-03 RX ORDER — ASPIRIN 325 MG
325 TABLET ORAL DAILY
Status: DISCONTINUED | OUTPATIENT
Start: 2022-05-04 | End: 2022-05-08

## 2022-05-03 RX ORDER — LORAZEPAM 1 MG/1
1 TABLET ORAL EVERY 4 HOURS PRN
Status: DISCONTINUED | OUTPATIENT
Start: 2022-05-03 | End: 2022-05-06

## 2022-05-03 RX ORDER — SEVELAMER HYDROCHLORIDE 800 MG/1
800 TABLET, FILM COATED ORAL
Status: DISCONTINUED | OUTPATIENT
Start: 2022-05-03 | End: 2022-05-05

## 2022-05-03 RX ORDER — LORAZEPAM 2 MG/ML
1.5 INJECTION INTRAMUSCULAR
Status: DISCONTINUED | OUTPATIENT
Start: 2022-05-03 | End: 2022-05-06

## 2022-05-03 RX ORDER — BISACODYL 10 MG
10 SUPPOSITORY, RECTAL RECTAL
Status: DISCONTINUED | OUTPATIENT
Start: 2022-05-03 | End: 2022-05-14 | Stop reason: HOSPADM

## 2022-05-03 RX ORDER — LABETALOL 100 MG/1
200 TABLET, FILM COATED ORAL EVERY 6 HOURS PRN
Status: DISCONTINUED | OUTPATIENT
Start: 2022-05-03 | End: 2022-05-06

## 2022-05-03 RX ORDER — LORAZEPAM 2 MG/1
4 TABLET ORAL
Status: DISCONTINUED | OUTPATIENT
Start: 2022-05-03 | End: 2022-05-06

## 2022-05-03 RX ORDER — PREGABALIN 25 MG/1
25 CAPSULE ORAL
Status: DISCONTINUED | OUTPATIENT
Start: 2022-05-03 | End: 2022-05-03

## 2022-05-03 RX ORDER — FOLIC ACID 1 MG/1
1 TABLET ORAL DAILY
Status: COMPLETED | OUTPATIENT
Start: 2022-05-04 | End: 2022-05-07

## 2022-05-03 RX ORDER — GABAPENTIN 300 MG/1
300 CAPSULE ORAL
Status: ON HOLD | COMMUNITY
End: 2023-08-25

## 2022-05-03 RX ORDER — ASPIRIN 81 MG/1
324 TABLET, CHEWABLE ORAL DAILY
Status: DISCONTINUED | OUTPATIENT
Start: 2022-05-04 | End: 2022-05-08

## 2022-05-03 RX ORDER — FOLIC ACID 1 MG/1
1 TABLET ORAL DAILY
COMMUNITY
End: 2023-08-08

## 2022-05-03 RX ORDER — LORAZEPAM 2 MG/ML
0.5 INJECTION INTRAMUSCULAR EVERY 4 HOURS PRN
Status: DISCONTINUED | OUTPATIENT
Start: 2022-05-03 | End: 2022-05-06

## 2022-05-03 RX ORDER — LORAZEPAM 0.5 MG/1
0.5 TABLET ORAL EVERY 4 HOURS PRN
Status: DISCONTINUED | OUTPATIENT
Start: 2022-05-03 | End: 2022-05-06

## 2022-05-03 RX ORDER — AMOXICILLIN 250 MG
2 CAPSULE ORAL 2 TIMES DAILY
Status: DISCONTINUED | OUTPATIENT
Start: 2022-05-04 | End: 2022-05-14 | Stop reason: HOSPADM

## 2022-05-03 RX ORDER — ASPIRIN 325 MG
325 TABLET ORAL DAILY
Status: DISCONTINUED | OUTPATIENT
Start: 2022-05-03 | End: 2022-05-03

## 2022-05-03 RX ORDER — LORAZEPAM 2 MG/ML
1 INJECTION INTRAMUSCULAR
Status: DISCONTINUED | OUTPATIENT
Start: 2022-05-03 | End: 2022-05-06

## 2022-05-03 RX ORDER — ASPIRIN 81 MG/1
324 TABLET, CHEWABLE ORAL DAILY
Status: DISCONTINUED | OUTPATIENT
Start: 2022-05-03 | End: 2022-05-03

## 2022-05-03 RX ORDER — MORPHINE SULFATE 4 MG/ML
4 INJECTION INTRAVENOUS ONCE
Status: COMPLETED | OUTPATIENT
Start: 2022-05-03 | End: 2022-05-03

## 2022-05-03 RX ORDER — CARVEDILOL 6.25 MG/1
6.25 TABLET ORAL 2 TIMES DAILY WITH MEALS
Status: DISCONTINUED | OUTPATIENT
Start: 2022-05-03 | End: 2022-05-04

## 2022-05-03 RX ORDER — LORAZEPAM 2 MG/ML
2 INJECTION INTRAMUSCULAR
Status: DISCONTINUED | OUTPATIENT
Start: 2022-05-03 | End: 2022-05-06

## 2022-05-03 RX ORDER — ISOSORBIDE DINITRATE 10 MG/1
10 TABLET ORAL 2 TIMES DAILY
Status: DISCONTINUED | OUTPATIENT
Start: 2022-05-03 | End: 2022-05-03

## 2022-05-03 RX ORDER — TAMSULOSIN HYDROCHLORIDE 0.4 MG/1
0.4 CAPSULE ORAL EVERY MORNING
Status: DISCONTINUED | OUTPATIENT
Start: 2022-05-04 | End: 2022-05-03

## 2022-05-03 RX ORDER — POLYETHYLENE GLYCOL 3350 17 G/17G
1 POWDER, FOR SOLUTION ORAL
Status: DISCONTINUED | OUTPATIENT
Start: 2022-05-03 | End: 2022-05-14 | Stop reason: HOSPADM

## 2022-05-03 RX ORDER — GAUZE BANDAGE 2" X 2"
100 BANDAGE TOPICAL DAILY
Status: COMPLETED | OUTPATIENT
Start: 2022-05-04 | End: 2022-05-07

## 2022-05-03 RX ORDER — ONDANSETRON 2 MG/ML
4 INJECTION INTRAMUSCULAR; INTRAVENOUS ONCE
Status: COMPLETED | OUTPATIENT
Start: 2022-05-03 | End: 2022-05-03

## 2022-05-03 RX ORDER — OXYCODONE HYDROCHLORIDE 5 MG/1
5 TABLET ORAL ONCE
Status: COMPLETED | OUTPATIENT
Start: 2022-05-03 | End: 2022-05-03

## 2022-05-03 RX ORDER — PREDNISONE 5 MG/1
5-15 TABLET ORAL DAILY
Status: ON HOLD | COMMUNITY
Start: 2022-04-18 | End: 2022-06-06

## 2022-05-03 RX ORDER — TRAZODONE HYDROCHLORIDE 50 MG/1
50 TABLET ORAL NIGHTLY
Status: DISCONTINUED | OUTPATIENT
Start: 2022-05-03 | End: 2022-05-14 | Stop reason: HOSPADM

## 2022-05-03 RX ORDER — LABETALOL HYDROCHLORIDE 5 MG/ML
10 INJECTION, SOLUTION INTRAVENOUS
Status: DISCONTINUED | OUTPATIENT
Start: 2022-05-03 | End: 2022-05-06

## 2022-05-03 RX ADMIN — TRAZODONE HYDROCHLORIDE 50 MG: 50 TABLET ORAL at 20:32

## 2022-05-03 RX ADMIN — ONDANSETRON 4 MG: 2 INJECTION INTRAMUSCULAR; INTRAVENOUS at 14:06

## 2022-05-03 RX ADMIN — MORPHINE SULFATE 4 MG: 4 INJECTION INTRAVENOUS at 16:05

## 2022-05-03 RX ADMIN — OXYCODONE 5 MG: 5 TABLET ORAL at 20:31

## 2022-05-03 RX ADMIN — CARVEDILOL 6.25 MG: 6.25 TABLET, FILM COATED ORAL at 20:39

## 2022-05-03 RX ADMIN — MORPHINE SULFATE 4 MG: 4 INJECTION INTRAVENOUS at 13:59

## 2022-05-03 ASSESSMENT — ENCOUNTER SYMPTOMS
NECK PAIN: 0
HEMOPTYSIS: 0
DEPRESSION: 0
COUGH: 0
NAUSEA: 0
WEIGHT LOSS: 0
FEVER: 0
BRUISES/BLEEDS EASILY: 0
DOUBLE VISION: 0
PALPITATIONS: 0
INSOMNIA: 0
VOMITING: 0
DIZZINESS: 0
BLURRED VISION: 0
MYALGIAS: 0
HEADACHES: 0
SORE THROAT: 0
STRIDOR: 0

## 2022-05-03 ASSESSMENT — PAIN DESCRIPTION - DESCRIPTORS: DESCRIPTORS: ACHING

## 2022-05-03 ASSESSMENT — FIBROSIS 4 INDEX: FIB4 SCORE: 5.77

## 2022-05-03 NOTE — ED PROVIDER NOTES
ED Provider Note    CHIEF COMPLAINT  Chief Complaint   Patient presents with   • Chest Pain        HPI  Junior Proctor is a 71 y.o. male who presents to the emergency department complaints of chest pain.  The patient has a history of end-stage renal disease on dialysis getting dialysis Tuesday Thursday and Saturday.  Today she was at dialysis and apparently her blood pressure dropped significantly and she started having just pain within her chest and within her back.  They stopped the dialysis and brought the patient to the emergency department for evaluation.  Upon arrival here she just describes continued chest discomfort premature all the anterior and posterior aspect of her chest denies any overt fevers chills shortness of breath or any other symptoms and is here for evaluation.    REVIEW OF SYSTEMS  See HPI for further details. All other systems are negative.     PAST MEDICAL HISTORY  Past Medical History:   Diagnosis Date   • Gout    • Hemodialysis patient (HCC)    • Hypertension    • Kidney disease    • MI (myocardial infarction) (HCC)        FAMILY HISTORY  Family History   Problem Relation Age of Onset   • Diabetes Mother        SOCIAL HISTORY  Social History     Socioeconomic History   • Marital status: Single   Tobacco Use   • Smoking status: Former Smoker   • Smokeless tobacco: Never Used   Vaping Use   • Vaping Use: Never used   Substance and Sexual Activity   • Alcohol use: Yes     Comment: occ   • Drug use: Never      No primary care provider on file.      SURGICAL HISTORY  Past Surgical History:   Procedure Laterality Date   • [place] UPPER GI ENDOSCOPY,DIAGNOSIS N/A 12/10/2021    Procedure: GASTROSCOPY;  Surgeon: Paddy Hooks M.D.;  Location: SURGERY SAME DAY [facility];  Service: Gastroenterology   • [place] COLONOSCOPY,DIAGNOSTIC N/A 12/10/2021    Procedure: COLONOSCOPY;  Surgeon: Paddy Hooks M.D.;  Location: SURGERY SAME DAY [facility];  Service: Gastroenterology   • [place] UPPER GI ENDOSCOPY,BIOPSY N/A  12/10/2021    Procedure: GASTROSCOPY, WITH BIOPSY;  Surgeon: Paddy Hooks M.D.;  Location: SURGERY SAME DAY [facility];  Service: Gastroenterology   • [place] COLONOSCOPY,BIOPSY N/A 12/10/2021    Procedure: COLONOSCOPY, WITH BIOPSY;  Surgeon: Paddy Hooks M.D.;  Location: SURGERY SAME DAY [facility];  Service: Gastroenterology       CURRENT MEDICATIONS  Home Medications     Reviewed by Itzel Tyler R.N. (Registered Nurse) on 05/03/22 at 1247  Med List Status: Partial   Medication Last Dose Status   allopurinol (ZYLOPRIM) 100 MG Tab  Active   atorvastatin (LIPITOR) 20 MG Tab  Active   Buprenorphine 10 MCG/HR PATCH WEEKLY  Active   carvedilol (COREG) 6.25 MG Tab  Active   diclofenac sodium (VOLTAREN) 1 % Gel  Active   Etanercept 50 MG/ML Solution Prefilled Syringe  Active   isosorbide dinitrate (ISORDIL) 20 MG Tab  Active   lidocaine (LIDODERM) 5 % Patch  Active   melatonin 3 MG Tab  Active   omeprazole (PRILOSEC) 20 MG delayed-release capsule  Active   pregabalin (LYRICA) 25 MG Cap  Active   sevelamer (RENAGEL) 800 MG Tab  Active   tamsulosin (FLOMAX) 0.4 MG capsule  Active   traZODone (DESYREL) 50 MG Tab  Active   vitamin D2, Ergocalciferol, (DRISDOL) 1.25 MG (70401 UT) Cap capsule  Active                ALLERGIES  Allergies   Allergen Reactions   • Motrin [Ibuprofen]      hhx of stomach ulcers       PHYSICAL EXAM  VITAL SIGNS: BP (!) 167/87   Pulse 97   Temp 36.7 °C (98 °F) (Temporal)   Resp (!) 8   Wt 76.2 kg (168 lb)   SpO2 95%   BMI 25.54 kg/m²    Pulse Oximetry was obtained. It showed a reading of Pulse Oximetry: 99 %.  I interpreted this as nonhypoxic.     Constitutional: Ill-appearing but no acute distress  HENT: Normocephalic, Atraumatic, Bilateral external ears normal, bilateral tympanic membranes normal, Oropharynx moist mucous membranes, No oral exudates, Nose normal.   Eyes:  conjunctiva is normal no icterus, there are no signs of exudate.   Neck: Supple, no cervical lymphadenopathy, no  meningeal signs..   Lymphatic: No lymphadenopathy noted.   Cardiovascular: Regular rate and rhythm without murmurs gallops or rubs.   Thorax & Lungs: Lungs are clear to auscultation bilaterally, there are no wheezes no rales.  Chest wall is tender about the anterior aspect   abdomen: Soft, tender in the right upper quadrant region and epigastric region bowel sounds are present.   Skin: Warm, Dry, No erythema,   Back: Tender but good range of motion  Musculoskeletal: Good range of motion in all major joints. No tenderness to palpation or major deformities noted. Intact distal pulses, no clubbing, no cyanosis, mild 1+ pitting edema lower extremities  Neurologic: Alert & oriented x 3, Normal motor function, Normal sensory function, No focal deficits noted.   Psychiatric: Affect normal, Judgment normal, Mood normal.     EKG  Interpreted below by myself    RADIOLOGY/PROCEDURES  US-RUQ   Final Result      1.  Gallbladder polyp versus nonshadowing gallstone identified.      2.  There is abnormal gallbladder wall thickening which is significantly increased compared to the prior exam. Cholecystitis is a possibility.      DX-CHEST-PORTABLE (1 VIEW)   Final Result         No acute cardiac or pulmonary abnormality is identified.          Results for orders placed or performed during the hospital encounter of 05/03/22   Troponin - STAT Once   Result Value Ref Range    Troponin T 101 (H) 6 - 19 ng/L   CBC with Differential   Result Value Ref Range    WBC 4.1 (L) 4.8 - 10.8 K/uL    RBC 3.38 (L) 4.70 - 6.10 M/uL    Hemoglobin 10.9 (L) 14.0 - 18.0 g/dL    Hematocrit 34.0 (L) 42.0 - 52.0 %    .6 (H) 81.4 - 97.8 fL    MCH 32.2 27.0 - 33.0 pg    MCHC 32.1 (L) 33.7 - 35.3 g/dL    RDW 70.6 (H) 35.9 - 50.0 fL    Platelet Count 79 (L) 164 - 446 K/uL    MPV 10.4 9.0 - 12.9 fL    Neutrophils-Polys 89.50 (H) 44.00 - 72.00 %    Lymphocytes 6.60 (L) 22.00 - 41.00 %    Monocytes 2.00 0.00 - 13.40 %    Eosinophils 1.00 0.00 - 6.90 %     Basophils 0.70 0.00 - 1.80 %    Immature Granulocytes 0.20 0.00 - 0.90 %    Nucleated RBC 0.00 /100 WBC    Neutrophils (Absolute) 3.67 1.82 - 7.42 K/uL    Lymphs (Absolute) 0.27 (L) 1.00 - 4.80 K/uL    Monos (Absolute) 0.08 0.00 - 0.85 K/uL    Eos (Absolute) 0.04 0.00 - 0.51 K/uL    Baso (Absolute) 0.03 0.00 - 0.12 K/uL    Immature Granulocytes (abs) 0.01 0.00 - 0.11 K/uL    NRBC (Absolute) 0.00 K/uL    Anisocytosis 2+ (A)     Macrocytosis 1+    Comp Metabolic Panel   Result Value Ref Range    Sodium 126 (L) 135 - 145 mmol/L    Potassium 5.4 3.6 - 5.5 mmol/L    Chloride 90 (L) 96 - 112 mmol/L    Co2 15 (L) 20 - 33 mmol/L    Anion Gap 21.0 (H) 7.0 - 16.0    Glucose 74 65 - 99 mg/dL    Bun 70 (H) 8 - 22 mg/dL    Creatinine 8.92 (HH) 0.50 - 1.40 mg/dL    Calcium 7.2 (L) 8.5 - 10.5 mg/dL    AST(SGOT) 2872 (HH) 12 - 45 U/L    ALT(SGPT) 677 (H) 2 - 50 U/L    Alkaline Phosphatase 209 (H) 30 - 99 U/L    Total Bilirubin 0.8 0.1 - 1.5 mg/dL    Albumin 3.3 3.2 - 4.9 g/dL    Total Protein 7.0 6.0 - 8.2 g/dL    Globulin 3.7 (H) 1.9 - 3.5 g/dL    A-G Ratio 0.9 g/dL   PERIPHERAL SMEAR REVIEW   Result Value Ref Range    Peripheral Smear Review see below    PLATELET ESTIMATE   Result Value Ref Range    Plt Estimation Decreased    MORPHOLOGY   Result Value Ref Range    RBC Morphology Present     Poikilocytosis 1+     Ovalocytes 1+     Echinocytes 1+    DIFFERENTIAL COMMENT   Result Value Ref Range    Comments-Diff see below    ESTIMATED GFR   Result Value Ref Range    GFR (CKD-EPI) 6 (A) >60 mL/min/1.73 m 2   LIPASE   Result Value Ref Range    Lipase 587 (H) 11 - 82 U/L   EKG   Result Value Ref Range    Report       [facility] Emergency Dept.    Test Date:  2022  Pt Name:    RENE STEINBERG          Department: ER  MRN:        [id number]                      Room:        04  Gender:     Male                         Technician: TCM  :        1950                   Requested By:ER TRIAGE  PROTOCOL  Order #:    [id number]                    Reading MD: BARI GARZA MD    Measurements  Intervals                                Axis  Rate:       70                           P:          73  UT:         167                          QRS:        69  QRSD:       110                          T:          72  QT:         437  QTc:        472    Interpretive Statements  Sinus rhythm  Compared to ECG 12/04/2020 19:17:50  No significant changes  Electronically Signed On 5-3-2022 17:14:26 PDT by BARI GARZA MD           COURSE & MEDICAL DECISION MAKING  Pertinent Labs & Imaging studies reviewed. (See chart for details)  Presents emergency department for evaluation.  Clinically he does have more chest and abdominal type pain initial concerns because he was hypotensive during his dialysis patient was given a fluid bolus by paramedic crew and by the time he arrived to the emergency department he was now actually normotensive if not slightly hypertensive.  Clinically he does have some tenderness to his abdomen and chest wall.  EKG shows no significant changes troponin is elevated but could be elevated secondary to his renal failure.  The patient's liver enzymes are also significantly elevated as well as lipase.  I then initiated an ultrasound which shows possible cholecystitis.  The common biliary duct appears normal.  The patient was given 2 doses of morphine with adequate pain control.  Speak with Dr. Garcia who recommended getting a HIDA scan to evaluate for potential cholecystitis and I will admit the patient to the hospitalist for further treatment and care and pain control.    FINAL IMPRESSION  1. Epigastric pain     2. Acute pancreatitis, unspecified complication status, unspecified pancreatitis type     3. Hepatitis                 Electronically signed by: Bari Garza M.D., 5/3/2022 1:14 PM

## 2022-05-03 NOTE — ED TRIAGE NOTES
"Pt bib ems from dialysis. Pt had syncopal episode during dialysis and had b/p 60/35. Pt did not finish session. Pt states was feeling \"sick\" before dialysis today and \"weak\" pt states c/p is centralized to mid chest non radiating and \"aching\" pt states he \"was unable to see for a little while but that seems to be getting better\" pt normo tensive now.   "

## 2022-05-03 NOTE — ED NOTES
tech from Lab called with critical result of AST 2872 and Troponin 101 at 1446  Critical lab result read back to tech.   Dr. Turk notified of critical lab result at 1448.  Critical lab result read back by Dr. Turk.

## 2022-05-04 ENCOUNTER — APPOINTMENT (OUTPATIENT)
Dept: RADIOLOGY | Facility: MEDICAL CENTER | Age: 72
DRG: 438 | End: 2022-05-04
Attending: INTERNAL MEDICINE
Payer: COMMERCIAL

## 2022-05-04 PROBLEM — R10.9 AP (ABDOMINAL PAIN): Status: ACTIVE | Noted: 2022-05-03

## 2022-05-04 LAB
25(OH)D3 SERPL-MCNC: 23 NG/ML (ref 30–100)
ALBUMIN SERPL BCP-MCNC: 3.3 G/DL (ref 3.2–4.9)
ALBUMIN/GLOB SERPL: 1 G/DL
ALP SERPL-CCNC: 195 U/L (ref 30–99)
ALT SERPL-CCNC: 616 U/L (ref 2–50)
ANION GAP SERPL CALC-SCNC: 23 MMOL/L (ref 7–16)
AST SERPL-CCNC: 2023 U/L (ref 12–45)
BILIRUB SERPL-MCNC: 1.1 MG/DL (ref 0.1–1.5)
BUN SERPL-MCNC: 74 MG/DL (ref 8–22)
CA-I SERPL-SCNC: 0.8 MMOL/L (ref 1.1–1.3)
CA-I SERPL-SCNC: 0.9 MMOL/L (ref 1.1–1.3)
CALCIUM SERPL-MCNC: 6.2 MG/DL (ref 8.5–10.5)
CHLORIDE SERPL-SCNC: 88 MMOL/L (ref 96–112)
CO2 SERPL-SCNC: 18 MMOL/L (ref 20–33)
CREAT SERPL-MCNC: 10.83 MG/DL (ref 0.5–1.4)
EKG IMPRESSION: NORMAL
ERYTHROCYTE [DISTWIDTH] IN BLOOD BY AUTOMATED COUNT: 67.7 FL (ref 35.9–50)
FERRITIN SERPL-MCNC: ABNORMAL NG/ML (ref 22–322)
FOLATE SERPL-MCNC: 37.8 NG/ML
GFR SERPLBLD CREATININE-BSD FMLA CKD-EPI: 5 ML/MIN/1.73 M 2
GLOBULIN SER CALC-MCNC: 3.2 G/DL (ref 1.9–3.5)
GLUCOSE SERPL-MCNC: 88 MG/DL (ref 65–99)
HCT VFR BLD AUTO: 32.4 % (ref 42–52)
HGB BLD-MCNC: 10.5 G/DL (ref 14–18)
HGB RETIC QN AUTO: 29.1 PG/CELL (ref 29–35)
IMM RETICS NFR: 36 % (ref 9.3–17.4)
IRON SATN MFR SERPL: 91 % (ref 15–55)
IRON SERPL-MCNC: 164 UG/DL (ref 50–180)
LACTATE BLD-SCNC: 1.8 MMOL/L (ref 0.5–2)
LACTATE BLD-SCNC: 1.9 MMOL/L (ref 0.5–2)
LIPASE SERPL-CCNC: 798 U/L (ref 11–82)
MAGNESIUM SERPL-MCNC: 1.8 MG/DL (ref 1.5–2.5)
MCH RBC QN AUTO: 31.7 PG (ref 27–33)
MCHC RBC AUTO-ENTMCNC: 32.4 G/DL (ref 33.7–35.3)
MCV RBC AUTO: 97.9 FL (ref 81.4–97.8)
PHOSPHATE SERPL-MCNC: 8.6 MG/DL (ref 2.5–4.5)
PLATELET # BLD AUTO: 93 K/UL (ref 164–446)
PMV BLD AUTO: 11.8 FL (ref 9–12.9)
POTASSIUM SERPL-SCNC: 5.5 MMOL/L (ref 3.6–5.5)
PROT SERPL-MCNC: 6.5 G/DL (ref 6–8.2)
PTH-INTACT SERPL-MCNC: 348 PG/ML (ref 14–72)
RBC # BLD AUTO: 3.31 M/UL (ref 4.7–6.1)
RETICS # AUTO: 0.06 M/UL (ref 0.04–0.06)
RETICS/RBC NFR: 1.7 % (ref 0.8–2.1)
SODIUM SERPL-SCNC: 129 MMOL/L (ref 135–145)
TIBC SERPL-MCNC: 181 UG/DL (ref 250–450)
TROPONIN T SERPL-MCNC: 117 NG/L (ref 6–19)
UIBC SERPL-MCNC: <17 UG/DL (ref 110–370)
VIT B12 SERPL-MCNC: 3818 PG/ML (ref 211–911)
WBC # BLD AUTO: 4.5 K/UL (ref 4.8–10.8)

## 2022-05-04 PROCEDURE — 82746 ASSAY OF FOLIC ACID SERUM: CPT

## 2022-05-04 PROCEDURE — 700111 HCHG RX REV CODE 636 W/ 250 OVERRIDE (IP): Performed by: INTERNAL MEDICINE

## 2022-05-04 PROCEDURE — 82607 VITAMIN B-12: CPT

## 2022-05-04 PROCEDURE — 96376 TX/PRO/DX INJ SAME DRUG ADON: CPT

## 2022-05-04 PROCEDURE — 96375 TX/PRO/DX INJ NEW DRUG ADDON: CPT

## 2022-05-04 PROCEDURE — A9270 NON-COVERED ITEM OR SERVICE: HCPCS | Performed by: INTERNAL MEDICINE

## 2022-05-04 PROCEDURE — 770020 HCHG ROOM/CARE - TELE (206)

## 2022-05-04 PROCEDURE — 83735 ASSAY OF MAGNESIUM: CPT

## 2022-05-04 PROCEDURE — 700102 HCHG RX REV CODE 250 W/ 637 OVERRIDE(OP): Performed by: INTERNAL MEDICINE

## 2022-05-04 PROCEDURE — 83605 ASSAY OF LACTIC ACID: CPT | Mod: 91

## 2022-05-04 PROCEDURE — 87522 HEPATITIS C REVRS TRNSCRPJ: CPT

## 2022-05-04 PROCEDURE — 36415 COLL VENOUS BLD VENIPUNCTURE: CPT

## 2022-05-04 PROCEDURE — 5A1D70Z PERFORMANCE OF URINARY FILTRATION, INTERMITTENT, LESS THAN 6 HOURS PER DAY: ICD-10-PCS | Performed by: INTERNAL MEDICINE

## 2022-05-04 PROCEDURE — 82728 ASSAY OF FERRITIN: CPT

## 2022-05-04 PROCEDURE — 82330 ASSAY OF CALCIUM: CPT

## 2022-05-04 PROCEDURE — 83550 IRON BINDING TEST: CPT

## 2022-05-04 PROCEDURE — A9270 NON-COVERED ITEM OR SERVICE: HCPCS | Performed by: STUDENT IN AN ORGANIZED HEALTH CARE EDUCATION/TRAINING PROGRAM

## 2022-05-04 PROCEDURE — 90935 HEMODIALYSIS ONE EVALUATION: CPT

## 2022-05-04 PROCEDURE — 84484 ASSAY OF TROPONIN QUANT: CPT

## 2022-05-04 PROCEDURE — 93005 ELECTROCARDIOGRAM TRACING: CPT

## 2022-05-04 PROCEDURE — 85027 COMPLETE CBC AUTOMATED: CPT

## 2022-05-04 PROCEDURE — C9113 INJ PANTOPRAZOLE SODIUM, VIA: HCPCS | Performed by: INTERNAL MEDICINE

## 2022-05-04 PROCEDURE — 93010 ELECTROCARDIOGRAM REPORT: CPT | Performed by: INTERNAL MEDICINE

## 2022-05-04 PROCEDURE — 99233 SBSQ HOSP IP/OBS HIGH 50: CPT | Performed by: INTERNAL MEDICINE

## 2022-05-04 PROCEDURE — 83540 ASSAY OF IRON: CPT

## 2022-05-04 PROCEDURE — 700102 HCHG RX REV CODE 250 W/ 637 OVERRIDE(OP): Performed by: STUDENT IN AN ORGANIZED HEALTH CARE EDUCATION/TRAINING PROGRAM

## 2022-05-04 PROCEDURE — 700111 HCHG RX REV CODE 636 W/ 250 OVERRIDE (IP)

## 2022-05-04 PROCEDURE — 700105 HCHG RX REV CODE 258: Performed by: INTERNAL MEDICINE

## 2022-05-04 PROCEDURE — 87040 BLOOD CULTURE FOR BACTERIA: CPT

## 2022-05-04 PROCEDURE — 83970 ASSAY OF PARATHORMONE: CPT

## 2022-05-04 PROCEDURE — 80053 COMPREHEN METABOLIC PANEL: CPT

## 2022-05-04 PROCEDURE — 83690 ASSAY OF LIPASE: CPT

## 2022-05-04 PROCEDURE — 84100 ASSAY OF PHOSPHORUS: CPT

## 2022-05-04 PROCEDURE — 85046 RETICYTE/HGB CONCENTRATE: CPT

## 2022-05-04 PROCEDURE — 82306 VITAMIN D 25 HYDROXY: CPT

## 2022-05-04 RX ORDER — OXYCODONE HYDROCHLORIDE 5 MG/1
2.5 TABLET ORAL
Status: DISCONTINUED | OUTPATIENT
Start: 2022-05-04 | End: 2022-05-12

## 2022-05-04 RX ORDER — OXYCODONE HYDROCHLORIDE 5 MG/1
5 TABLET ORAL
Status: DISCONTINUED | OUTPATIENT
Start: 2022-05-04 | End: 2022-05-12

## 2022-05-04 RX ORDER — CALCIUM CARBONATE 500 MG/1
500 TABLET, CHEWABLE ORAL 3 TIMES DAILY
Status: DISCONTINUED | OUTPATIENT
Start: 2022-05-04 | End: 2022-05-06

## 2022-05-04 RX ORDER — SODIUM CHLORIDE 9 MG/ML
INJECTION, SOLUTION INTRAVENOUS CONTINUOUS
Status: DISCONTINUED | OUTPATIENT
Start: 2022-05-04 | End: 2022-05-05

## 2022-05-04 RX ORDER — HEPARIN SODIUM 1000 [USP'U]/ML
INJECTION, SOLUTION INTRAVENOUS; SUBCUTANEOUS
Status: COMPLETED
Start: 2022-05-04 | End: 2022-05-04

## 2022-05-04 RX ORDER — MORPHINE SULFATE 4 MG/ML
2 INJECTION INTRAVENOUS
Status: DISCONTINUED | OUTPATIENT
Start: 2022-05-04 | End: 2022-05-04

## 2022-05-04 RX ORDER — HYDROMORPHONE HYDROCHLORIDE 1 MG/ML
0.25 INJECTION, SOLUTION INTRAMUSCULAR; INTRAVENOUS; SUBCUTANEOUS
Status: DISCONTINUED | OUTPATIENT
Start: 2022-05-04 | End: 2022-05-12

## 2022-05-04 RX ORDER — HEPARIN SODIUM 1000 [USP'U]/ML
3900 INJECTION, SOLUTION INTRAVENOUS; SUBCUTANEOUS
Status: DISCONTINUED | OUTPATIENT
Start: 2022-05-04 | End: 2022-05-14 | Stop reason: HOSPADM

## 2022-05-04 RX ORDER — PANTOPRAZOLE SODIUM 40 MG/10ML
40 INJECTION, POWDER, LYOPHILIZED, FOR SOLUTION INTRAVENOUS DAILY
Status: DISCONTINUED | OUTPATIENT
Start: 2022-05-04 | End: 2022-05-05

## 2022-05-04 RX ADMIN — CARVEDILOL 6.25 MG: 6.25 TABLET, FILM COATED ORAL at 09:54

## 2022-05-04 RX ADMIN — OXYCODONE 5 MG: 5 TABLET ORAL at 19:34

## 2022-05-04 RX ADMIN — FENTANYL CITRATE 25 MCG: 50 INJECTION, SOLUTION INTRAMUSCULAR; INTRAVENOUS at 10:59

## 2022-05-04 RX ADMIN — MORPHINE SULFATE 2 MG: 4 INJECTION INTRAVENOUS at 01:43

## 2022-05-04 RX ADMIN — CALCIUM CARBONATE 500 MG: 500 TABLET, CHEWABLE ORAL at 11:00

## 2022-05-04 RX ADMIN — PATIROMER 8.4 G: 8.4 POWDER, FOR SUSPENSION ORAL at 00:19

## 2022-05-04 RX ADMIN — HEPARIN SODIUM 3900 UNITS: 1000 INJECTION, SOLUTION INTRAVENOUS; SUBCUTANEOUS at 18:40

## 2022-05-04 RX ADMIN — PIPERACILLIN AND TAZOBACTAM 3.38 G: 3; .375 INJECTION, POWDER, LYOPHILIZED, FOR SOLUTION INTRAVENOUS; PARENTERAL at 19:35

## 2022-05-04 RX ADMIN — TRAZODONE HYDROCHLORIDE 50 MG: 50 TABLET ORAL at 19:34

## 2022-05-04 RX ADMIN — OXYCODONE 5 MG: 5 TABLET ORAL at 23:54

## 2022-05-04 RX ADMIN — SODIUM CHLORIDE: 9 INJECTION, SOLUTION INTRAVENOUS at 13:55

## 2022-05-04 RX ADMIN — CALCIUM CARBONATE 500 MG: 500 TABLET, CHEWABLE ORAL at 19:34

## 2022-05-04 RX ADMIN — MORPHINE SULFATE 2 MG: 4 INJECTION INTRAVENOUS at 04:16

## 2022-05-04 RX ADMIN — FOLIC ACID 1 MG: 1 TABLET ORAL at 06:17

## 2022-05-04 RX ADMIN — THIAMINE HCL TAB 100 MG 100 MG: 100 TAB at 06:17

## 2022-05-04 RX ADMIN — SEVELAMER HYDROCHLORIDE 800 MG: 800 TABLET, FILM COATED ORAL at 17:30

## 2022-05-04 RX ADMIN — HYDROMORPHONE HYDROCHLORIDE 0.25 MG: 1 INJECTION, SOLUTION INTRAMUSCULAR; INTRAVENOUS; SUBCUTANEOUS at 13:47

## 2022-05-04 RX ADMIN — HYDROMORPHONE HYDROCHLORIDE 0.25 MG: 1 INJECTION, SOLUTION INTRAMUSCULAR; INTRAVENOUS; SUBCUTANEOUS at 20:38

## 2022-05-04 RX ADMIN — PANTOPRAZOLE SODIUM 40 MG: 40 INJECTION, POWDER, FOR SOLUTION INTRAVENOUS at 13:52

## 2022-05-04 RX ADMIN — PIPERACILLIN AND TAZOBACTAM 3.38 G: 3; .375 INJECTION, POWDER, LYOPHILIZED, FOR SOLUTION INTRAVENOUS; PARENTERAL at 13:59

## 2022-05-04 RX ADMIN — THERA TABS 1 TABLET: TAB at 06:17

## 2022-05-04 ASSESSMENT — ENCOUNTER SYMPTOMS
PALPITATIONS: 0
BLURRED VISION: 0
ABDOMINAL PAIN: 1
SORE THROAT: 0
COUGH: 0
DIARRHEA: 0
MYALGIAS: 0
SHORTNESS OF BREATH: 0
FOCAL WEAKNESS: 0
WHEEZING: 0
CHILLS: 0
SPUTUM PRODUCTION: 0
VOMITING: 0
BACK PAIN: 0
DIAPHORESIS: 0
FEVER: 0
DIZZINESS: 0
NECK PAIN: 0
BRUISES/BLEEDS EASILY: 0
NAUSEA: 1
BLOOD IN STOOL: 0
FLANK PAIN: 0
SEIZURES: 0
HEADACHES: 0

## 2022-05-04 ASSESSMENT — COGNITIVE AND FUNCTIONAL STATUS - GENERAL
SUGGESTED CMS G CODE MODIFIER DAILY ACTIVITY: CH
MOBILITY SCORE: 23
SUGGESTED CMS G CODE MODIFIER MOBILITY: CI
DAILY ACTIVITIY SCORE: 24
CLIMB 3 TO 5 STEPS WITH RAILING: A LITTLE

## 2022-05-04 ASSESSMENT — LIFESTYLE VARIABLES
ON A TYPICAL DAY WHEN YOU DRINK ALCOHOL HOW MANY DRINKS DO YOU HAVE: 2
SUBSTANCE_ABUSE: 1
PAROXYSMAL SWEATS: NO SWEAT VISIBLE
TOTAL SCORE: 0
DOES PATIENT WANT TO STOP DRINKING: NO
HAVE YOU EVER FELT YOU SHOULD CUT DOWN ON YOUR DRINKING: NO
EVER FELT BAD OR GUILTY ABOUT YOUR DRINKING: NO
ANXIETY: NO ANXIETY (AT EASE)
HOW MANY TIMES IN THE PAST YEAR HAVE YOU HAD 5 OR MORE DRINKS IN A DAY: 30
HAVE PEOPLE ANNOYED YOU BY CRITICIZING YOUR DRINKING: NO
AVERAGE NUMBER OF DAYS PER WEEK YOU HAVE A DRINK CONTAINING ALCOHOL: 4
EVER HAD A DRINK FIRST THING IN THE MORNING TO STEADY YOUR NERVES TO GET RID OF A HANGOVER: NO
TOTAL SCORE: 0
TOTAL SCORE: 0
HEADACHE, FULLNESS IN HEAD: NOT PRESENT
ALCOHOL_USE: YES
AUDITORY DISTURBANCES: NOT PRESENT
VISUAL DISTURBANCES: NOT PRESENT
NAUSEA AND VOMITING: NO NAUSEA AND NO VOMITING
CONSUMPTION TOTAL: POSITIVE
AGITATION: NORMAL ACTIVITY
TOTAL SCORE: 0
TREMOR: NO TREMOR
ORIENTATION AND CLOUDING OF SENSORIUM: ORIENTED AND CAN DO SERIAL ADDITIONS

## 2022-05-04 ASSESSMENT — PAIN DESCRIPTION - PAIN TYPE
TYPE: ACUTE PAIN

## 2022-05-04 ASSESSMENT — PATIENT HEALTH QUESTIONNAIRE - PHQ9
5. POOR APPETITE OR OVEREATING: NOT AT ALL
8. MOVING OR SPEAKING SO SLOWLY THAT OTHER PEOPLE COULD HAVE NOTICED. OR THE OPPOSITE, BEING SO FIGETY OR RESTLESS THAT YOU HAVE BEEN MOVING AROUND A LOT MORE THAN USUAL: NOT AT ALL
1. LITTLE INTEREST OR PLEASURE IN DOING THINGS: SEVERAL DAYS
9. THOUGHTS THAT YOU WOULD BE BETTER OFF DEAD, OR OF HURTING YOURSELF: NOT AT ALL
SUM OF ALL RESPONSES TO PHQ QUESTIONS 1-9: 4
2. FEELING DOWN, DEPRESSED, IRRITABLE, OR HOPELESS: SEVERAL DAYS
4. FEELING TIRED OR HAVING LITTLE ENERGY: SEVERAL DAYS
SUM OF ALL RESPONSES TO PHQ9 QUESTIONS 1 AND 2: 2
7. TROUBLE CONCENTRATING ON THINGS, SUCH AS READING THE NEWSPAPER OR WATCHING TELEVISION: NOT AT ALL
6. FEELING BAD ABOUT YOURSELF - OR THAT YOU ARE A FAILURE OR HAVE LET YOURSELF OR YOUR FAMILY DOWN: NOT AL ALL
3. TROUBLE FALLING OR STAYING ASLEEP OR SLEEPING TOO MUCH: SEVERAL DAYS

## 2022-05-04 ASSESSMENT — FIBROSIS 4 INDEX: FIB4 SCORE: 60.47

## 2022-05-04 NOTE — H&P
Hospital Medicine History & Physical Note    Date of Service  5/3/2022    Primary Care Physician  Pcp Pt States None    Consultants  nephrology    Specialist Names: Dr. Modesto Vargas    Code Status  Full Code    Chief Complaint  Chief Complaint   Patient presents with   • Chest Pain       History of Presenting Illness  Junior Proctor is a 71 y.o. male who presented 5/3/2022 with chest pain.  The patient has a history of end-stage renal disease on dialysis getting dialysis Tuesday Thursday and Saturday.  Today he was at dialysis and apparently her blood pressure dropped significantly and she started having just pain within her chest and within her back.  They stopped the dialysis and brought the patient to the emergency department for evaluation.  Upon arrival here she just describes continued chest discomfort premature all the anterior and posterior aspect of her chest denies any overt fevers chills shortness of breath or any other symptoms and is here for evaluation  His work-up is concerning for metabolic acidosis, hyperkalemia, hepatitis and abnormal troponin.  Surgery is consulted recommending HIDA scan, nephrology is consulted.  And he is referred to the hospitalist for admission.  At bedside he complains of epigastric and right upper quadrant pain.  He denies palpitations, shortness of breath, nausea vomiting.  He denies taking medications regularly he admits daily alcohol but declines to quantify.    I discussed the plan of care with patient and bedside RN.    Review of Systems  Review of Systems   Constitutional: Negative for fever, malaise/fatigue and weight loss.   HENT: Negative for sore throat and tinnitus.    Eyes: Negative for blurred vision and double vision.   Respiratory: Negative for cough, hemoptysis and stridor.    Cardiovascular: Negative for chest pain and palpitations.   Gastrointestinal: Negative for nausea and vomiting.   Genitourinary: Negative for dysuria and urgency.   Musculoskeletal:  Negative for myalgias and neck pain.   Skin: Negative for itching and rash.   Neurological: Negative for dizziness and headaches.   Endo/Heme/Allergies: Does not bruise/bleed easily.   Psychiatric/Behavioral: Negative for depression. The patient does not have insomnia.        Past Medical History   has a past medical history of Gout, Hemodialysis patient (HCC), Hypertension, Kidney disease, and MI (myocardial infarction) ([facility]).    Surgical History   has a past surgical history that includes pr upper gi endoscopy,diagnosis (N/A, 12/10/2021); pr colonoscopy,diagnostic (N/A, 12/10/2021); pr upper gi endoscopy,biopsy (N/A, 12/10/2021); and pr colonoscopy,biopsy (N/A, 12/10/2021).     Family History  family history includes Diabetes in his mother.   Family history reviewed with patient. There is no family history that is pertinent to the chief complaint.     Social History   reports that he has quit smoking. He has never used smokeless tobacco. He reports current alcohol use. He reports that he does not use drugs.    Allergies  Allergies   Allergen Reactions   • Motrin [Ibuprofen]      hhx of stomach ulcers       Medications  Prior to Admission Medications   Prescriptions Last Dose Informant Patient Reported? Taking?   Buprenorphine 10 MCG/HR PATCH WEEKLY UNK at [facility] Patient's Home Pharmacy Yes No   Sig: Place 10 mcg on the skin every 7 days.   Etanercept 50 MG/ML Solution Prefilled Syringe UNK at [facility] Patient's Home Pharmacy Yes No   Sig: Inject 50 mg under the skin every 7 days.   allopurinol (ZYLOPRIM) 100 MG Tab UNK at [facility] Patient's Home Pharmacy Yes No   Sig: Take 100 mg by mouth 3 times a week. Taken post hemodialysis THREE TIMES A WEEK   atorvastatin (LIPITOR) 20 MG Tab UNK at [facility] Patient's Home Pharmacy Yes No   Sig: Take 20 mg by mouth every evening.   carvedilol (COREG) 6.25 MG Tab UNK at [facility] Patient's Home Pharmacy Yes No   Sig: Take 6.25 mg by mouth 2 times a day with meals.   diclofenac sodium (VOLTAREN) 1 %  Gel UNK at [facility] Patient's Home Pharmacy Yes No   Sig: Apply 4 g topically 4 times a day as needed (Osetoarthritis pain).   folic acid (FOLVITE) 1 MG Tab UNK at [facility] Patient's Home Pharmacy Yes Yes   Sig: Take 1 mg by mouth every day.   gabapentin (NEURONTIN) 300 MG Cap UNK at [facility] Patient's Home Pharmacy Yes Yes   Sig: Take 300 mg by mouth at bedtime.   lidocaine (LIDODERM) 5 % Patch UNK at [facility] Patient's Home Pharmacy Yes No   Sig: Apply 1 Patch topically every day. Remove after 12 hours   pantoprazole (PROTONIX) 40 MG Tablet Delayed Response UNK at [facility] Patient's Home Pharmacy Yes Yes   Sig: Take 40 mg by mouth 2 times a day.   predniSONE (DELTASONE) 5 MG Tab UNK at [facility] Patient's Home Pharmacy Yes Yes   Sig: Take 5-15 mg by mouth every day. Pt started a 15 day course of PREDNISONE on 4/18  Taper course   15 mg for 5 days  10 mg for 5 days  5 mg for 5 days   sevelamer (RENAGEL) 800 MG Tab UNK at [facility] Patient's Home Pharmacy Yes No   Sig: Take 800 mg by mouth 3 times a day with meals.   traZODone (DESYREL) 50 MG Tab UNK at [facility] Patient's Home Pharmacy Yes No   Sig: Take 50 mg by mouth every evening. Indications: Trouble Sleeping   vitamin D2, Ergocalciferol, (DRISDOL) 1.25 MG (64678 UT) Cap capsule UNK at [facility] Patient's Home Pharmacy Yes No   Sig: Take 50,000 Units by mouth every 7 days.      Facility-Administered Medications: None       Physical Exam  Temp:  [36.7 °C (98 °F)] 36.7 °C (98 °F)  Pulse:  [] 72  Resp:  [8-22] 17  BP: (145-171)/(74-87) 169/77  SpO2:  [94 %-99 %] 94 %  Blood Pressure : (!) 169/77   Temperature: 36.7 °C (98 °F)   Pulse: 72   Respiration: 17   Pulse Oximetry: 94 %       Physical Exam  Vitals and nursing note reviewed.   Constitutional:       General: He is not in acute distress.     Appearance: Normal appearance. He is normal weight. He is not toxic-appearing.   HENT:      Head: Normocephalic and atraumatic.      Nose: Nose normal. No congestion or rhinorrhea.      Mouth/Throat:      Mouth:  Mucous membranes are moist.      Pharynx: Oropharynx is clear.   Eyes:      Extraocular Movements: Extraocular movements intact.      Conjunctiva/sclera: Conjunctivae normal.      Pupils: Pupils are equal, round, and reactive to light.   Neck:      Vascular: No carotid bruit.   Cardiovascular:      Rate and Rhythm: Normal rate and regular rhythm.      Pulses: Normal pulses.      Heart sounds: Normal heart sounds. No murmur heard.    No gallop.   Pulmonary:      Effort: No respiratory distress.      Breath sounds: Normal breath sounds. No wheezing or rales.   Abdominal:      General: Bowel sounds are normal. There is no distension.      Palpations: Abdomen is soft. There is no mass.      Tenderness: There is abdominal tenderness. There is no guarding or rebound.      Hernia: No hernia is present.      Comments: Right upper quadrant pain with palpation of the liver border, mild epigastric pain to deep palpation.  No guarding or rebound   Musculoskeletal:         General: No tenderness or signs of injury.      Cervical back: Normal range of motion and neck supple. No muscular tenderness.   Lymphadenopathy:      Cervical: No cervical adenopathy.   Skin:     Capillary Refill: Capillary refill takes less than 2 seconds.      Coloration: Skin is not jaundiced or pale.      Findings: No bruising.   Neurological:      General: No focal deficit present.      Mental Status: He is alert and oriented to person, place, and time. Mental status is at baseline.      Cranial Nerves: No cranial nerve deficit.      Motor: No weakness.      Coordination: Coordination normal.   Psychiatric:         Mood and Affect: Mood normal.         Thought Content: Thought content normal.         Judgment: Judgment normal.         Laboratory:  Recent Labs     05/03/22  1315   WBC 4.1*   RBC 3.38*   HEMOGLOBIN 10.9*   HEMATOCRIT 34.0*   .6*   MCH 32.2   MCHC 32.1*   RDW 70.6*   PLATELETCT 79*   MPV 10.4     Recent Labs     05/03/22  1315    SODIUM 126*   POTASSIUM 5.4   CHLORIDE 90*   CO2 15*   GLUCOSE 74   BUN 70*   CREATININE 8.92*   CALCIUM 7.2*     Recent Labs     05/03/22  1315   ALTSGPT 677*   ASTSGOT 2872*   ALKPHOSPHAT 209*   TBILIRUBIN 0.8   LIPASE 587*   GLUCOSE 74         No results for input(s): NTPROBNP in the last 72 hours.      Recent Labs     05/03/22  1315 05/03/22  1736 05/03/22  1948   TROPONINT 101* 110* 107*       Imaging:  US-RUQ   Final Result      1.  Gallbladder polyp versus nonshadowing gallstone identified.      2.  There is abnormal gallbladder wall thickening which is significantly increased compared to the prior exam. Cholecystitis is a possibility.      DX-CHEST-PORTABLE (1 VIEW)   Final Result         No acute cardiac or pulmonary abnormality is identified.      NM-BILIARY (HIDA) SCAN WITH CCK    (Results Pending)       X-Ray:  I have personally reviewed the images and compared with prior images.    Assessment/Plan:  Justification for Admission Status  I anticipate this patient will require at least two midnights for appropriate medical management, necessitating inpatient admission because Hepatitis, acute pancreatitis, end-stage renal failure.    * Chest pain- (present on admission)  Assessment & Plan  Atypical, no current chest pain, no acute ischemic changes on EKG complicated by history of coronary artery, abnormal troponin but also on exam consistent with epigastric and right upper quadrant pain.    Follow-up serial troponin    Right upper quadrant pain  Assessment & Plan  Most likely secondary to alcoholic hepatitis.  Follow-up HIDA scan per surgery recommendations.    Acute pancreatitis  Assessment & Plan  With a negative bilirubin likely secondary to alcohol.  Bowel rest, supportive care, follow-up lipase    Hepatitis  Assessment & Plan  Complicated by history of hepatitis C and alcohol.  Hold atorvastatin  Labs and history suggest alcoholic hepatitis.  Follow-up HIDA      Alcohol dependence (HCC)  Assessment &  Plan  CIWA protocol, as needed Ativan    Hepatitis C antibody positive in blood- (present on admission)  Assessment & Plan  Follow-up quantitative hCv    ESRD on dialysis (HCC)- (present on admission)  Assessment & Plan  scheduled dialysis Tuesday Thursday Saturday.   Does not appear volume overloaded or severely acidotic justifying emergent hemodialysis   obtain nephrology consult in a.m.    Thrombocytopenia (HCC)  Assessment & Plan  Likely secondary to alcohol, hold heparin, follow-up CBC    Anemia- (present on admission)  Assessment & Plan  Likely secondary to end-stage renal failure.  Follow-up anemia labs      VTE prophylaxis: SCDs/TEDs

## 2022-05-04 NOTE — PROGRESS NOTES
Hospital Medicine Daily Progress Note    Date of Service  5/4/2022    Chief Complaint  Junior Proctor is a 71 y.o. male admitted 5/3/2022 with syncope and abdominal pain    Hospital Course  71-year-old male with a past medical history of end-stage renal disease on hemodialysis Tuesday, Thursday and Saturday, peptic ulcer disease, alcohol abuse who presented with a syncopal episode while he was undergoing hemodialysis.  After that the patient developed severe epigastric abdominal pain with radiation to bilateral upper quadrants of his abdomen.  He reported associated nausea.  In the ER he was noted to be tachycardic, afebrile and without leukocytosis.  His initial blood work was significant for sodium of 126, anion gap 21, CO2 15, elevated BUN and creatinine, significantly elevated AST of 2872,  and alkaline phosphatase 209 with a normal total bili.  His lipase was also elevated at 798.  Right upper quadrant ultrasound revealed gallbladder wall thickening.  Surgery was consulted and recommended patient undergo HIDA scan and MR CP for further evaluation    Interval Problem Update  Patient reports ongoing severe epigastric abdominal pain.  He is scheduled to undergo HIDA scan at 3 PM however he states that he cannot wait that long without pain medications.  He is unable to get Tylenol due to his elevated liver enzymes and cannot get NSAIDs due to his history of peptic ulcer disease and end-stage renal disease, leaving opiates as the only other option for pain control.  Patient decided to delay his HIDA scan at this time and have his pain controlled.  I will start the patient on IV fentanyl for pain control.  I have ordered a stat MRCP for evaluation of his gallbladder, biliary system and pancreas.  The patient does report drinking up to half a bottle of wine daily.  He denies any active chest pain or shortness of breath.    I have personally seen and examined the patient at bedside. I discussed the plan of care  with patient.    Consultants/Specialty  general surgery and nephrology    Code Status  Full Code    Disposition  Patient is not medically cleared for discharge.   Anticipate discharge to to home with close outpatient follow-up.  I have placed the appropriate orders for post-discharge needs.    Review of Systems  Review of Systems   Constitutional: Positive for malaise/fatigue. Negative for chills, diaphoresis and fever.   HENT: Negative for hearing loss and sore throat.    Eyes: Negative for blurred vision.   Respiratory: Negative for cough, sputum production, shortness of breath and wheezing.    Cardiovascular: Negative for palpitations and leg swelling.   Gastrointestinal: Positive for abdominal pain and nausea. Negative for blood in stool, diarrhea and vomiting.   Genitourinary: Negative for dysuria, flank pain and urgency.   Musculoskeletal: Negative for back pain, joint pain, myalgias and neck pain.   Skin: Negative for rash.   Neurological: Negative for dizziness, focal weakness, seizures and headaches.   Endo/Heme/Allergies: Does not bruise/bleed easily.   Psychiatric/Behavioral: Positive for substance abuse. Negative for suicidal ideas.   All other systems reviewed and are negative.       Physical Exam  Temp:  [36.7 °C (98 °F)] 36.7 °C (98 °F)  Pulse:  [] 92  Resp:  [8-35] 35  BP: (109-171)/(58-87) 110/60  SpO2:  [91 %-99 %] 96 %    Physical Exam  Vitals and nursing note reviewed.   Constitutional:       General: He is not in acute distress.     Appearance: Normal appearance.   HENT:      Head: Normocephalic and atraumatic.      Nose: Nose normal.      Mouth/Throat:      Mouth: Mucous membranes are moist.   Eyes:      Extraocular Movements: Extraocular movements intact.      Conjunctiva/sclera: Conjunctivae normal.      Pupils: Pupils are equal, round, and reactive to light.   Cardiovascular:      Rate and Rhythm: Regular rhythm. Tachycardia present.      Pulses: Normal pulses.      Heart sounds:  Normal heart sounds.   Pulmonary:      Effort: No respiratory distress.      Breath sounds: No wheezing, rhonchi or rales.   Abdominal:      General: Bowel sounds are normal. There is no distension.      Palpations: Abdomen is soft.      Tenderness: There is abdominal tenderness (Epigastric and right upper quadrant).   Musculoskeletal:         General: No swelling or tenderness. Normal range of motion.      Cervical back: Normal range of motion and neck supple.   Lymphadenopathy:      Cervical: No cervical adenopathy.   Skin:     General: Skin is warm.      Coloration: Skin is not jaundiced.      Findings: No rash.   Neurological:      General: No focal deficit present.      Mental Status: He is alert and oriented to person, place, and time.      Cranial Nerves: No cranial nerve deficit.      Motor: No weakness.   Psychiatric:         Mood and Affect: Mood normal.         Behavior: Behavior normal.         Fluids  No intake or output data in the 24 hours ending 05/04/22 1046    Laboratory  Recent Labs     05/03/22  1315 05/04/22  0014   WBC 4.1* 4.5*   RBC 3.38* 3.31*   HEMOGLOBIN 10.9* 10.5*   HEMATOCRIT 34.0* 32.4*   .6* 97.9*   MCH 32.2 31.7   MCHC 32.1* 32.4*   RDW 70.6* 67.7*   PLATELETCT 79* 93*   MPV 10.4 11.8     Recent Labs     05/03/22  1315 05/04/22  0014   SODIUM 126* 129*   POTASSIUM 5.4 5.5   CHLORIDE 90* 88*   CO2 15* 18*   GLUCOSE 74 88   BUN 70* 74*   CREATININE 8.92* 10.83*   CALCIUM 7.2* 6.2*                   Imaging  US-RUQ   Final Result      1.  Gallbladder polyp versus nonshadowing gallstone identified.      2.  There is abnormal gallbladder wall thickening which is significantly increased compared to the prior exam. Cholecystitis is a possibility.      DX-CHEST-PORTABLE (1 VIEW)   Final Result         No acute cardiac or pulmonary abnormality is identified.      NM-BILIARY (HIDA) SCAN WITH CCK    (Results Pending)   [id number]    (Results Pending)        Assessment/Plan  * AP  (abdominal pain)  Assessment & Plan  Most likely secondary to alcoholic hepatitis and alcohol induced pancreatitis, rule out cholecystitis/cholangitis.  Follow-up HIDA scan per surgery recommendations.  Ordered stat MRCP for further evaluation  I started the patient empirically on IV Zosyn.  Ordered blood cultures  Pain control with IV fentanyl, monitor respiratory status closely    Acute pancreatitis  Assessment & Plan  With a negative bilirubin likely secondary to alcohol.  Bowel rest, supportive care, follow-up lipase  Gentle IVF hydration    Hepatitis  Assessment & Plan  Complicated by history of hepatitis C and alcohol.  Hold atorvastatin  Labs and history suggest alcoholic hepatitis.  Follow-up HIDA      Alcohol dependence (HCC)- (present on admission)  Assessment & Plan  CIWA protocol, as needed Ativan  Counseled on alcohol cessation    Chest pain- (present on admission)  Assessment & Plan  Atypical, no current chest pain, no acute ischemic changes on EKG complicated by history of coronary artery, abnormal troponin but also on exam consistent with epigastric and right upper quadrant pain.    Follow-up serial troponin  Check 2D echo    Hepatitis C antibody positive in blood- (present on admission)  Assessment & Plan  Follow-up quantitative hCv  HCV RNA was previously negative    ESRD on dialysis (HCC)- (present on admission)  Assessment & Plan  scheduled dialysis Tuesday Thursday Saturday.   Does not appear volume overloaded or severely acidotic justifying emergent hemodialysis   obtain nephrology consult in a.m.    Thrombocytopenia (HCC)  Assessment & Plan  Likely secondary to alcohol, hold heparin, follow-up CBC    Anemia- (present on admission)  Assessment & Plan  Likely secondary to end-stage renal failure.  Follow-up anemia labs       VTE prophylaxis: heparin ppx    I have performed a physical exam and reviewed and updated ROS and Plan today (5/4/2022). In review of yesterday's note (5/3/2022), there are  no changes except as documented above.

## 2022-05-04 NOTE — ED NOTES
Med Rec complete per pt and pt's pharmacy  Allergies Reviewed    Pt unable to fully participate in interview

## 2022-05-04 NOTE — ASSESSMENT & PLAN NOTE
Atypical, no current chest pain, no acute ischemic changes on EKG complicated by history of coronary artery, abnormal troponin but also on exam consistent with epigastric and right upper quadrant pain.    Echo completed- EF 65%, right ventricular systolic pressure has decreased from 50mmHg to 30mmHg  Resolved

## 2022-05-04 NOTE — DISCHARGE PLANNING
Outpatient Dialysis Note    Confirmed patient is active at:    [facility]   [address]  [place] [address]    Schedule: Tuesday, Thursday, Saturday   Time: 10:45am     Patient is seen by Dr. Knott in HD clinic.    Spoke with Nikos at facility who confirmed.    Forwarded records for review.    Chio Hickman- Senior Dialysis Coordinator # [phone]  [facility]

## 2022-05-04 NOTE — PROGRESS NOTES
"[facility] -  PROGRESS NOTE               Author: Phillip Ponce M.D. Date & Time: 5/4/2022  10:42 AM     HPI:  Per Dr. Vargas  \"Mr. Proctor is a very pleasant 72 y/o male known to us from outpt dialysis. He gets his treatments at [facility] dialysis unit qTTS via right IJ permcath.  He was at his routine HD treatment earlier today.  After about 30 or so minutes his BP dropped.  He tells me \"I passed out\" but that was not witnessed at the HD unit.  He did develop chest pain radiating to his back.  Dialysis was discontinued and he was referred to the ED at [facility]. At the time of presentation to the ED his BP was now elevated at 171/74.  He is also complaining of ABD pain described as generalized and intermittent.  No change to bowel habits.  No Diarrhea or constipation.  No N/V.  No recent change to meds.  No OTC meds.  Has some LE edema, no recent change.  He does not routinely take his BP meds before HD and did not take this AM.  RUQ U/S showed thickened gallbladder wall.  No stones.  LFTs are elevated without rise in bilirubin.  Elevated Lipase as well.  No F/C.  No cough.  We have been asked to see him regarding his dialysis needs. \"    DAILY NEPHROLOGY SUMMARY:  5/4 - Patient is having upper abdominal pain, nausea. No SOB, palpitations, cough, melena or hematochezia.    REVIEW OF SYSTEMS:    10 point ROS reviewed and is as per HPI/daily summary or otherwise negative    PMH/PSH/SH/FH: Reviewed and unchanged since admission note  CURRENT MEDICATIONS: Reviewed from admission to present day    VS:  /60   Pulse 92   Temp 36.7 °C (98 °F) (Temporal)   Resp (!) 35   Wt 76.2 kg (168 lb)   SpO2 96%   BMI 25.54 kg/m²   Physical Exam  Constitutional:       General: He is not in acute distress.     Appearance: He is not ill-appearing.   HENT:      Head: Normocephalic and atraumatic.      Nose: No rhinorrhea.      Mouth/Throat:      Mouth: Mucous membranes are moist.      Pharynx: No " posterior oropharyngeal erythema.   Eyes:      General: No scleral icterus.     Extraocular Movements: Extraocular movements intact.      Pupils: Pupils are equal, round, and reactive to light.   Cardiovascular:      Rate and Rhythm: Normal rate and regular rhythm.      Heart sounds: No murmur heard.    No friction rub. No gallop.   Pulmonary:      Effort: Pulmonary effort is normal. No respiratory distress.      Breath sounds: No wheezing, rhonchi or rales.   Abdominal:      General: Abdomen is flat. There is no distension.      Tenderness: There is abdominal tenderness (epigastric significant). There is no guarding or rebound.   Musculoskeletal:      Right lower leg: Edema (1+) present.      Left lower leg: Edema (1+) present.   Skin:     Capillary Refill: Capillary refill takes less than 2 seconds.      Coloration: Skin is not jaundiced or pale.      Comments: Wound on left lower extremity   Neurological:      General: No focal deficit present.      Mental Status: He is oriented to person, place, and time.   Psychiatric:         Behavior: Behavior normal.         Fluids:  No intake/output data recorded.    LABS:  Recent Labs     05/03/22  1315 05/04/22  0014   SODIUM 126* 129*   POTASSIUM 5.4 5.5   CHLORIDE 90* 88*   CO2 15* 18*   GLUCOSE 74 88   BUN 70* 74*   CREATININE 8.92* 10.83*   CALCIUM 7.2* 6.2*       IMPRESSION:  · ESRD, due to long standing hypertension, HD TTS via port, has right AVF  · Upper abdominal pain, likely acute pancreatitis  · Hypocalcemia  · Alcoholic hepatitis  · Alcoholic pancreatitis, epigastric pain + lipase >3ULN + alcohol use disorder  · Gallbladder wall thickening  · Elevated liver enzymes, AST>ALT 2  · Inflammatory arthritis, on etanercept  · H/o NSTEMI  · H/o PE  · Hypertension    PLAN:  -RRT indicated, patient got only 45 mins of dialysis, will be conservative with ultrafiltration due to potential ongoing pancreatitis  -Start tums 500 tid. Check lytes. Avoiding IV calcium gluconate  due to risk of calciphylaxis. If PO does not work, will consider at that time.  -Careful with volume resuscitation given ESRD and anuria. Continue with oral resuscitation at this time as long as patient tolerates.  -Pain management per primary team  -Continue home sevelamer.  -Iron studies showing good saturation as well as very high ferritin, no indication for iron therapy at this time.  -PTH elevated. Low Ca and high phos. He is a good candidate for phoslo, however will continue with sevelamer at this time.    Thank you for your consult.  We will continue to follow

## 2022-05-04 NOTE — CONSULTS
Surgical History and Physical    Date of Service: 5/3/2022    Requesting Physician: Bari Turk MD - ER    Reason for Consultation: Abdominal pain    HPI: This is a 71 y.o. male who is presenting from dialysis after he had an episode of hypotension and syncope.  The patient states he has been undergoing dialysis for about the past 6 months and never had an episodes like this before.  He gets dialysis through a tunneled catheter.  When he was more conscious, he noted generalized abdominal pain.  He did not have abdominal pain before dialysis and does not have a history of chronic abdominal pain.  No prior abdominal surgeries.  Denies fevers, chills, nausea, and emesis.  He was brought to the ER and underwent ultrasound imaging which showed non-specific gallbladder wall thickening without clear evidence of cholelithiasis.  His common bile duct was of normal caliber.  His liver function studies were markedly elevated except for total bilirubin which was normal.  His lipase was markedly elevated as well.  His WBC is also normal.  I was consulted to see if his gallbladder was the source of all his presenting symptoms.  The patient states the pain started in the epigastric location and has spread more diffusely.      PAST MEDICAL HISTORY:   Past Medical History:   Diagnosis Date   • Gout    • Hemodialysis patient (HCC)    • Hypertension    • Kidney disease    • MI (myocardial infarction) (HCC)          PAST SURGICAL HISTORY:   Past Surgical History:   Procedure Laterality Date   • DE UPPER GI ENDOSCOPY,DIAGNOSIS N/A 12/10/2021    Procedure: GASTROSCOPY;  Surgeon: Paddy Hooks M.D.;  Location: SURGERY SAME DAY [facility];  Service: Gastroenterology   • DE COLONOSCOPY,DIAGNOSTIC N/A 12/10/2021    Procedure: COLONOSCOPY;  Surgeon: Paddy Hooks M.D.;  Location: SURGERY SAME DAY [facility];  Service: Gastroenterology   • DE UPPER GI ENDOSCOPY,BIOPSY N/A 12/10/2021    Procedure: GASTROSCOPY, WITH BIOPSY;  Surgeon:  Paddy Hooks M.D.;  Location: SURGERY SAME DAY [facility];  Service: Gastroenterology   • [place] COLONOSCOPY,BIOPSY N/A 12/10/2021    Procedure: COLONOSCOPY, WITH BIOPSY;  Surgeon: Paddy Hooks M.D.;  Location: SURGERY SAME DAY [facility];  Service: Gastroenterology          ALLERGIES: Motrin [ibuprofen]       CURRENT MEDICATIONS:   Outpatient Medications Marked as Taking for the 5/3/22 encounter (Hospital Encounter)   Medication Sig   • gabapentin (NEURONTIN) 300 MG Cap Take 300 mg by mouth at bedtime.   • folic acid (FOLVITE) 1 MG Tab Take 1 mg by mouth every day.   • pantoprazole (PROTONIX) 40 MG Tablet Delayed Response Take 40 mg by mouth 2 times a day.   • predniSONE (DELTASONE) 5 MG Tab Take 5-15 mg by mouth every day. Pt started a 15 day course of PREDNISONE on 4/18  Taper course   15 mg for 5 days  10 mg for 5 days  5 mg for 5 days         FAMILY HISTORY: family history includes Diabetes in his mother.      SOCIAL HISTORY:  reports that he has quit smoking. He has never used smokeless tobacco. He reports current alcohol use. He reports that he does not use drugs.      Review of Systems:  Constitutional: Negative for fever, chills, weight loss, malaise/fatigue and diaphoresis.   HENT: Negative for hearing loss, ear pain, nosebleeds, congestion, sore throat, neck pain, tinnitus and ear discharge.    Eyes: Negative for blurred vision, double vision, photophobia, pain, discharge and redness.   Respiratory: Negative for cough, hemoptysis, sputum production, shortness of breath, wheezing and stridor.    Cardiovascular: Negative for chest pain, palpitations, orthopnea, claudication, leg swelling and PND.   Gastrointestinal: Negative for heartburn, nausea, vomiting, abdominal pain, diarrhea, constipation, blood in stool and melena.   Genitourinary: Negative for dysuria, urgency, frequency, hematuria and flank pain.   Musculoskeletal: Negative for myalgias, back pain, joint pain and falls.   Skin: Negative for  itching and rash.  Neurological: Negative for dizziness, tingling, tremors, sensory change, speech change, focal weakness, seizures, loss of consciousness, weakness and headaches.   Endo/Heme/Allergies: Negative for environmental allergies and polydipsia. Does not bruise/bleed easily.   Psychiatric/Behavioral: Negative for depression, suicidal ideas, hallucinations, memory loss and substance abuse. The patient is not nervous/anxious and does not have insomnia.    Physical Exam:  /74   Pulse 72   Temp 36.7 °C (98 °F) (Temporal)   Resp 15   Wt 76.2 kg (168 lb)   SpO2 93%   Vitals:    05/03/22 2105   BP: 146/74   Pulse: 72   Resp: 15   Temp:    SpO2: 93%     GENERAL:  Otherwise healthy-appearing and in no acute distress  HEENT:  Atraumatic, normocephalic.  Normal pinna bilaterally.  External auditory canals are without discharge.  Conjunctivae and sclerae are clear. Extraocular movements are full. Pupils are equal, round, and reactive to light.  Oral mucosa is moist.  NECK:  Soft and supple without lymphadenopathy. No masses are noted.  Thyroid is of normal size and texture.  Trachea is midline.  CHEST:  Lungs are clear to auscultation bilaterally.  No masses, lesions, or signs of trauma were noted.     CARDIOVASCULAR:  Regular rate and rhythm.  No murmurs appreciated.  No JVD.  Palpable pulses present in all four extremities.    ABDOMEN:  Soft, tender diffusely without peritoneal signs, mildly distended.  Non-tympanitic.  No hepatomegaly or splenomegaly.  No incisional, umbilical, or inguinal hernias were appreciated.  GENITOURINARY:  Normal external reproductive anatomy.  MUSCULOSKELETAL: Normal range of motion x4 extremities.    SKIN:  Warm and well perfused. No rashes.  NEUROLOGIC:  Alert and oriented. Cranial nerves II through XII are grossly intact. Motor and sensory exams are normal in all four extremities. Motor and sensory reflexes are 2+ and symmetric with bilateral plantar responses.  PSYCHIATRIC:  Affect and mood is appropriate for age and condition.    Labs:  Recent Labs     05/03/22  1315   WBC 4.1*   RBC 3.38*   HEMOGLOBIN 10.9*   HEMATOCRIT 34.0*   .6*   MCH 32.2   MCHC 32.1*   RDW 70.6*   PLATELETCT 79*   MPV 10.4     Recent Labs     05/03/22  1315   SODIUM 126*   POTASSIUM 5.4   CHLORIDE 90*   CO2 15*   GLUCOSE 74   BUN 70*   CREATININE 8.92*   CALCIUM 7.2*         Recent Labs     05/03/22  1315   ASTSGOT 2872*   ALTSGPT 677*   TBILIRUBIN 0.8   ALKPHOSPHAT 209*   GLOBULIN 3.7*       Radiology:  US-RUQ   Final Result      1.  Gallbladder polyp versus nonshadowing gallstone identified.      2.  There is abnormal gallbladder wall thickening which is significantly increased compared to the prior exam. Cholecystitis is a possibility.      DX-CHEST-PORTABLE (1 VIEW)   Final Result         No acute cardiac or pulmonary abnormality is identified.      NM-BILIARY (HIDA) SCAN WITH CCK    (Results Pending)       Assessment/Plan:   1) Abdominal Pain:    Significant hepatocellular dysfunction is suggested by his LFTs, the degree of which is not often seen in just acute cholecystitis.  He also has a rising lipase, so there could be a consideration for gallstone pancreatitis, though his common bile duct was not dilated on ultrasound and his total bilirubin is normal.  Ascending cholangitis could give these laboratory findings, but his WBC and lactic acid is normal and doesn't appear as ill as patients get with cholangitis.      Recommend:  -HIDA to rule out cholecystitis  -MRCP to evaluate any further common bile duct abnormalities and the pancreas    I independently reviewed pertinent clinical lab tests since admission and ordered additional follow up clinical lab tests.  I independently reviewed pertinent radiographic images and the radiologist's reports since admission and ordered additional follow up radiographic studies.  The details of the available patient records in [facility] (including laboratory tests,  culture data, medications, imaging, and other pertinent diagnostic tests) and that information was utilized as warranted in today's medical decision making for this patient.  I personally evaluated the patient condition at bedside.    Care interventions include:   Review of interval medical and surgical history, current medications and outpatient medication reconciliation, interval imaging studies and radiologist interpretation and interval laboratory values.    Aggregated care time spent evaluating, reassessing, reviewing documentation, providing care, and managing this patient exclusive of procedures: 50 minutes  ____________________________________   Chris Herrera MD, FACS   JRU / NTS     DD: 5/3/2022   DT: 11:40 PM

## 2022-05-04 NOTE — ASSESSMENT & PLAN NOTE
Likely secondary to alcohol vs steroids  Lipase trending down and pain improving  Diet advanced today  Trial pancreatic enzymes  Oral hydration in setting of ESRD

## 2022-05-04 NOTE — CONSULTS
"[facility] -  CONSULTATION NOTE      DATE & TIME:   5/3/2022  7:48 PM               AUTHOR:  Modesto Vargas D.O.      REASON FOR CONSULTATION:   - Inpatient hemodialysis management.      CHIEF COMPLAINT:   -  \"Weakness\"      HISTORY OF PRESENT ILLNESS:    Mr. Proctor is a very pleasant 70 y/o male known to us from outpt dialysis. He gets his treatments at [facility] dialysis unit qTTS via right IJ permcath.  He was at his routine HD treatment earlier today.  After about 30 or so minutes his BP dropped.  He tells me \"I passed out\" but that was not witnessed at the HD unit.  He did develop chest pain radiating to his back.  Dialysis was discontinued and he was referred to the ED at [facility]. At the time of presentation to the ED his BP was now elevated at 171/74.  He is also complaining of ABD pain described as generalized and intermittent.  No change to bowel habits.  No Diarrhea or constipation.  No N/V.  No recent change to meds.  No OTC meds.  Has some LE edema, no recent change.  He does not routinely take his BP meds before HD and did not take this AM.  RUQ U/S showed thickened gallbladder wall.  No stones.  LFTs are elevated without rise in bilirubin.  Elevated Lipase as well.  No F/C.  No cough.  We have been asked to see him regarding his dialysis needs.     REVIEW OF SYSTEMS:    10 point ROS was performed and is as per HPI or otherwise negative      PAST MEDICAL HISTORY:   - ESRD  - HTN  - Anemia of CKD  - CKD-MBD  Past Medical History:   Diagnosis Date   • Gout    • Hemodialysis patient (HCC)    • Hypertension    • Kidney disease    • MI (myocardial infarction) ([facility])         PAST SURGICAL HISTORY:   - Dialysis Access Surgery  Past Surgical History:   Procedure Laterality Date   • [place] UPPER GI ENDOSCOPY,DIAGNOSIS N/A 12/10/2021    Procedure: GASTROSCOPY;  Surgeon: Paddy Hooks M.D.;  Location: SURGERY SAME DAY [facility];  Service: Gastroenterology   • NV " COLONOSCOPY,DIAGNOSTIC N/A 12/10/2021    Procedure: COLONOSCOPY;  Surgeon: Paddy Hooks M.D.;  Location: SURGERY SAME DAY [facility];  Service: Gastroenterology   • OR UPPER GI ENDOSCOPY,BIOPSY N/A 12/10/2021    Procedure: GASTROSCOPY, WITH BIOPSY;  Surgeon: Paddy Hooks M.D.;  Location: SURGERY SAME DAY [facility];  Service: Gastroenterology   • OR COLONOSCOPY,BIOPSY N/A 12/10/2021    Procedure: COLONOSCOPY, WITH BIOPSY;  Surgeon: Paddy Hooks M.D.;  Location: SURGERY SAME DAY [facility];  Service: Gastroenterology       FAMILY HISTORY:   - Reviewed and non contributory to current illness  Family History   Problem Relation Age of Onset   • Diabetes Mother           SOCIAL HISTORY:   Social History     Tobacco Use   • Smoking status: Former Smoker   • Smokeless tobacco: Never Used   Vaping Use   • Vaping Use: Never used   Substance Use Topics   • Alcohol use: Yes     Comment: occ   • Drug use: Never         HOME MEDICATIONS:   - Reviewed and documented in chart      ALLERGIES:  Motrin [ibuprofen]      VITAL SIGNS:  BP (!) 164/80   Pulse 78   Temp 36.7 °C (98 °F) (Temporal)   Resp 17   Wt 76.2 kg (168 lb)   SpO2 95%   BMI 25.54 kg/m²   Physical Exam  Nursing note reviewed.   Constitutional:       Appearance: Normal appearance.   HENT:      Head: Normocephalic and atraumatic.      Nose: Nose normal.      Mouth/Throat:      Mouth: Mucous membranes are dry.   Eyes:      General: No scleral icterus.     Extraocular Movements: Extraocular movements intact.      Pupils: Pupils are equal, round, and reactive to light.   Neck:      Comments: Right IJ Permcath  Cardiovascular:      Rate and Rhythm: Normal rate and regular rhythm.      Pulses: Normal pulses.      Heart sounds: Normal heart sounds.   Pulmonary:      Effort: Pulmonary effort is normal. No respiratory distress.      Breath sounds: Rales present. No wheezing or rhonchi.   Abdominal:      General: There is no distension.   Musculoskeletal:          General: No swelling, tenderness or deformity.      Right lower leg: Edema present.      Left lower leg: Edema present.   Skin:     General: Skin is warm and dry.      Findings: No rash.   Neurological:      General: No focal deficit present.      Mental Status: He is alert and oriented to person, place, and time.   Psychiatric:         Behavior: Behavior normal.            FLUID BALANCE:  No intake/output data recorded.      LABS:  Recent Labs     05/03/22  1315   SODIUM 126*   POTASSIUM 5.4   CHLORIDE 90*   CO2 15*   GLUCOSE 74   BUN 70*   CREATININE 8.92*   CALCIUM 7.2*      Recent Labs     05/03/22  1315   SODIUM 126*   POTASSIUM 5.4   CHLORIDE 90*   CO2 15*   GLUCOSE 74   BUN 70*   CREATININE 8.92*          IMAGING:  - All imaging reviewed from admission to present day      ASSESSMENTS:   # ESRD    Secondary to Hypertension    Maintenance dialysis qTTS and as needed    Right IJ Permcath    No emergent need for additional dialysis today (TUES)    Will require additional dialysis tomorrow (WED)   # Hyperkalemia    Secondary to ESRD, Acidosis    Low K diet    Veltassa PO   # ABD pain      Likely due to acute pancreatitis / acute cholecystitis   # Elevated LFTs   # Hypertension    Continue current medications    Volume off with dialysis as BP tolerates   # Anemia    Check iron stores    MADAY with HD to goal Hgb 10-11    Transfuse PRN   # CKD-MBD    Low calcium    Check PTH and Vit D    Check Phos   # Low Serum Albumin    No dietary protein restrictions    Goal: 1.5g Protein/kg/day   # Low serum albumin   # Elevated troponin    SUGGESTIONS:   No emergent need for additional dialysis today (TUES)    Will require additional dialysis tomorrow (WED)   Maintenance dialysis qTTS and PRN   Volume off with dialysis as BP tolerates   Continue usual anti-hypertensive medications   Low K diet   Veltassa  X 3 doses   Check iron stores   MADAY with HD to goal Hgb 10-11   Transfuse PRN   Check PTH and Vit D   Check Phos   No dietary  protein restrctions    Goal: 1.5g Protein/kg/day   Follow labs with further recommendations to follow    Thank you for this interesting consult and for allowing us to participate in his care.  Will follow with you.

## 2022-05-04 NOTE — ASSESSMENT & PLAN NOTE
Scheduled dialysis TRS   Nephrology following  RUE AF US with reduced flow volumes, to follow up with vascular at [facility] as an outpatient   Avoid IV hydration setting of pancreatitis with ESRD

## 2022-05-05 ENCOUNTER — APPOINTMENT (OUTPATIENT)
Dept: CARDIOLOGY | Facility: MEDICAL CENTER | Age: 72
DRG: 438 | End: 2022-05-05
Attending: INTERNAL MEDICINE
Payer: COMMERCIAL

## 2022-05-05 ENCOUNTER — APPOINTMENT (OUTPATIENT)
Dept: RADIOLOGY | Facility: MEDICAL CENTER | Age: 72
DRG: 438 | End: 2022-05-05
Attending: INTERNAL MEDICINE
Payer: COMMERCIAL

## 2022-05-05 PROBLEM — M19.90 ARTHRITIS: Status: ACTIVE | Noted: 2022-05-05

## 2022-05-05 LAB
ALBUMIN SERPL BCP-MCNC: 2.9 G/DL (ref 3.2–4.9)
ALBUMIN/GLOB SERPL: 0.9 G/DL
ALP SERPL-CCNC: 149 U/L (ref 30–99)
ALT SERPL-CCNC: 269 U/L (ref 2–50)
ANION GAP SERPL CALC-SCNC: 15 MMOL/L (ref 7–16)
AST SERPL-CCNC: 395 U/L (ref 12–45)
BASOPHILS # BLD AUTO: 0.3 % (ref 0–1.8)
BASOPHILS # BLD: 0.02 K/UL (ref 0–0.12)
BILIRUB SERPL-MCNC: 2 MG/DL (ref 0.1–1.5)
BUN SERPL-MCNC: 41 MG/DL (ref 8–22)
CALCIUM SERPL-MCNC: 6.5 MG/DL (ref 8.5–10.5)
CHLORIDE SERPL-SCNC: 96 MMOL/L (ref 96–112)
CO2 SERPL-SCNC: 21 MMOL/L (ref 20–33)
CREAT SERPL-MCNC: 8.89 MG/DL (ref 0.5–1.4)
EKG IMPRESSION: NORMAL
EOSINOPHIL # BLD AUTO: 0.06 K/UL (ref 0–0.51)
EOSINOPHIL NFR BLD: 1 % (ref 0–6.9)
ERYTHROCYTE [DISTWIDTH] IN BLOOD BY AUTOMATED COUNT: 65.9 FL (ref 35.9–50)
GFR SERPLBLD CREATININE-BSD FMLA CKD-EPI: 6 ML/MIN/1.73 M 2
GLOBULIN SER CALC-MCNC: 3.3 G/DL (ref 1.9–3.5)
GLUCOSE SERPL-MCNC: 97 MG/DL (ref 65–99)
HCT VFR BLD AUTO: 25.9 % (ref 42–52)
HCV RNA SERPL NAA+PROBE-ACNC: NOT DETECTED IU/ML
HCV RNA SERPL NAA+PROBE-LOG IU: NOT DETECTED LOG IU/ML
HCV RNA SERPL QL NAA+PROBE: NOT DETECTED
HGB BLD-MCNC: 8.8 G/DL (ref 14–18)
IMM GRANULOCYTES # BLD AUTO: 0.06 K/UL (ref 0–0.11)
IMM GRANULOCYTES NFR BLD AUTO: 1 % (ref 0–0.9)
LIPASE SERPL-CCNC: 366 U/L (ref 11–82)
LV EJECT FRACT  99904: 65
LV EJECT FRACT MOD 4C 99902: 67.8
LYMPHOCYTES # BLD AUTO: 0.96 K/UL (ref 1–4.8)
LYMPHOCYTES NFR BLD: 16.3 % (ref 22–41)
MCH RBC QN AUTO: 32.1 PG (ref 27–33)
MCHC RBC AUTO-ENTMCNC: 34 G/DL (ref 33.7–35.3)
MCV RBC AUTO: 94.5 FL (ref 81.4–97.8)
MONOCYTES # BLD AUTO: 0.8 K/UL (ref 0–0.85)
MONOCYTES NFR BLD AUTO: 13.6 % (ref 0–13.4)
NEUTROPHILS # BLD AUTO: 3.99 K/UL (ref 1.82–7.42)
NEUTROPHILS NFR BLD: 67.8 % (ref 44–72)
NRBC # BLD AUTO: 0 K/UL
NRBC BLD-RTO: 0 /100 WBC
PLATELET # BLD AUTO: 62 K/UL (ref 164–446)
PMV BLD AUTO: 10.9 FL (ref 9–12.9)
POTASSIUM SERPL-SCNC: 3.8 MMOL/L (ref 3.6–5.5)
PROT SERPL-MCNC: 6.2 G/DL (ref 6–8.2)
RBC # BLD AUTO: 2.74 M/UL (ref 4.7–6.1)
SODIUM SERPL-SCNC: 132 MMOL/L (ref 135–145)
WBC # BLD AUTO: 5.9 K/UL (ref 4.8–10.8)

## 2022-05-05 PROCEDURE — 700105 HCHG RX REV CODE 258: Performed by: INTERNAL MEDICINE

## 2022-05-05 PROCEDURE — 99231 SBSQ HOSP IP/OBS SF/LOW 25: CPT | Performed by: SURGERY

## 2022-05-05 PROCEDURE — 36415 COLL VENOUS BLD VENIPUNCTURE: CPT

## 2022-05-05 PROCEDURE — 83690 ASSAY OF LIPASE: CPT

## 2022-05-05 PROCEDURE — A9270 NON-COVERED ITEM OR SERVICE: HCPCS | Performed by: INTERNAL MEDICINE

## 2022-05-05 PROCEDURE — A9270 NON-COVERED ITEM OR SERVICE: HCPCS | Performed by: STUDENT IN AN ORGANIZED HEALTH CARE EDUCATION/TRAINING PROGRAM

## 2022-05-05 PROCEDURE — 80053 COMPREHEN METABOLIC PANEL: CPT

## 2022-05-05 PROCEDURE — 93306 TTE W/DOPPLER COMPLETE: CPT

## 2022-05-05 PROCEDURE — 700111 HCHG RX REV CODE 636 W/ 250 OVERRIDE (IP)

## 2022-05-05 PROCEDURE — 78226 HEPATOBILIARY SYSTEM IMAGING: CPT | Mod: MF

## 2022-05-05 PROCEDURE — 90935 HEMODIALYSIS ONE EVALUATION: CPT

## 2022-05-05 PROCEDURE — 700102 HCHG RX REV CODE 250 W/ 637 OVERRIDE(OP): Performed by: INTERNAL MEDICINE

## 2022-05-05 PROCEDURE — 85025 COMPLETE CBC W/AUTO DIFF WBC: CPT

## 2022-05-05 PROCEDURE — C9113 INJ PANTOPRAZOLE SODIUM, VIA: HCPCS | Performed by: INTERNAL MEDICINE

## 2022-05-05 PROCEDURE — 770020 HCHG ROOM/CARE - TELE (206)

## 2022-05-05 PROCEDURE — 700102 HCHG RX REV CODE 250 W/ 637 OVERRIDE(OP): Performed by: STUDENT IN AN ORGANIZED HEALTH CARE EDUCATION/TRAINING PROGRAM

## 2022-05-05 PROCEDURE — 99233 SBSQ HOSP IP/OBS HIGH 50: CPT | Mod: FS | Performed by: NURSE PRACTITIONER

## 2022-05-05 PROCEDURE — 93010 ELECTROCARDIOGRAM REPORT: CPT | Performed by: INTERNAL MEDICINE

## 2022-05-05 PROCEDURE — 93306 TTE W/DOPPLER COMPLETE: CPT | Mod: 26 | Performed by: INTERNAL MEDICINE

## 2022-05-05 PROCEDURE — 700111 HCHG RX REV CODE 636 W/ 250 OVERRIDE (IP): Performed by: INTERNAL MEDICINE

## 2022-05-05 RX ORDER — OXYCODONE HYDROCHLORIDE 5 MG/1
5 TABLET ORAL
Status: CANCELLED | OUTPATIENT
Start: 2022-05-05

## 2022-05-05 RX ORDER — OMEPRAZOLE 20 MG/1
20 CAPSULE, DELAYED RELEASE ORAL DAILY
Status: DISCONTINUED | OUTPATIENT
Start: 2022-05-06 | End: 2022-05-12

## 2022-05-05 RX ORDER — CALCIUM ACETATE 667 MG/1
1334 TABLET ORAL
Status: DISCONTINUED | OUTPATIENT
Start: 2022-05-05 | End: 2022-05-14 | Stop reason: HOSPADM

## 2022-05-05 RX ORDER — SODIUM CHLORIDE 9 MG/ML
500 INJECTION, SOLUTION INTRAVENOUS ONCE
Status: COMPLETED | OUTPATIENT
Start: 2022-05-05 | End: 2022-05-05

## 2022-05-05 RX ORDER — ACETAMINOPHEN 500 MG
1000 TABLET ORAL EVERY 6 HOURS PRN
Status: CANCELLED | OUTPATIENT
Start: 2022-05-10

## 2022-05-05 RX ORDER — OXYCODONE HYDROCHLORIDE 5 MG/1
2.5 TABLET ORAL
Status: CANCELLED | OUTPATIENT
Start: 2022-05-05

## 2022-05-05 RX ORDER — HYDROMORPHONE HYDROCHLORIDE 1 MG/ML
0.25 INJECTION, SOLUTION INTRAMUSCULAR; INTRAVENOUS; SUBCUTANEOUS
Status: CANCELLED | OUTPATIENT
Start: 2022-05-05

## 2022-05-05 RX ORDER — HEPARIN SODIUM 1000 [USP'U]/ML
INJECTION, SOLUTION INTRAVENOUS; SUBCUTANEOUS
Status: COMPLETED
Start: 2022-05-05 | End: 2022-05-05

## 2022-05-05 RX ORDER — ACETAMINOPHEN 500 MG
1000 TABLET ORAL EVERY 6 HOURS
Status: CANCELLED | OUTPATIENT
Start: 2022-05-05 | End: 2022-05-10

## 2022-05-05 RX ADMIN — OXYCODONE 5 MG: 5 TABLET ORAL at 19:32

## 2022-05-05 RX ADMIN — PIPERACILLIN AND TAZOBACTAM 3.38 G: 3; .375 INJECTION, POWDER, LYOPHILIZED, FOR SOLUTION INTRAVENOUS; PARENTERAL at 05:56

## 2022-05-05 RX ADMIN — FOLIC ACID 1 MG: 1 TABLET ORAL at 05:57

## 2022-05-05 RX ADMIN — CALCIUM CARBONATE 500 MG: 500 TABLET, CHEWABLE ORAL at 18:23

## 2022-05-05 RX ADMIN — OXYCODONE 5 MG: 5 TABLET ORAL at 11:31

## 2022-05-05 RX ADMIN — HEPARIN SODIUM 3900 UNITS: 1000 INJECTION, SOLUTION INTRAVENOUS; SUBCUTANEOUS at 17:50

## 2022-05-05 RX ADMIN — TRAZODONE HYDROCHLORIDE 50 MG: 50 TABLET ORAL at 23:31

## 2022-05-05 RX ADMIN — HYDROMORPHONE HYDROCHLORIDE 0.25 MG: 1 INJECTION, SOLUTION INTRAMUSCULAR; INTRAVENOUS; SUBCUTANEOUS at 21:37

## 2022-05-05 RX ADMIN — PANTOPRAZOLE SODIUM 40 MG: 40 INJECTION, POWDER, FOR SOLUTION INTRAVENOUS at 05:56

## 2022-05-05 RX ADMIN — CALCIUM CARBONATE 500 MG: 500 TABLET, CHEWABLE ORAL at 11:31

## 2022-05-05 RX ADMIN — SODIUM CHLORIDE 500 ML: 9 INJECTION, SOLUTION INTRAVENOUS at 08:40

## 2022-05-05 RX ADMIN — THIAMINE HCL TAB 100 MG 100 MG: 100 TAB at 05:57

## 2022-05-05 RX ADMIN — HYDROMORPHONE HYDROCHLORIDE 0.25 MG: 1 INJECTION, SOLUTION INTRAMUSCULAR; INTRAVENOUS; SUBCUTANEOUS at 14:16

## 2022-05-05 RX ADMIN — CALCIUM CARBONATE 500 MG: 500 TABLET, CHEWABLE ORAL at 05:57

## 2022-05-05 RX ADMIN — THERA TABS 1 TABLET: TAB at 05:56

## 2022-05-05 ASSESSMENT — PAIN DESCRIPTION - PAIN TYPE
TYPE: ACUTE PAIN

## 2022-05-05 ASSESSMENT — LIFESTYLE VARIABLES
PAROXYSMAL SWEATS: NO SWEAT VISIBLE
ANXIETY: NO ANXIETY (AT EASE)
TOTAL SCORE: 0
ANXIETY: NO ANXIETY (AT EASE)
AGITATION: NORMAL ACTIVITY
TREMOR: NO TREMOR
AUDITORY DISTURBANCES: NOT PRESENT
ORIENTATION AND CLOUDING OF SENSORIUM: ORIENTED AND CAN DO SERIAL ADDITIONS
TOTAL SCORE: 0
TREMOR: NO TREMOR
AGITATION: NORMAL ACTIVITY
NAUSEA AND VOMITING: NO NAUSEA AND NO VOMITING
PAROXYSMAL SWEATS: NO SWEAT VISIBLE
AUDITORY DISTURBANCES: NOT PRESENT
HEADACHE, FULLNESS IN HEAD: NOT PRESENT
VISUAL DISTURBANCES: NOT PRESENT
NAUSEA AND VOMITING: NO NAUSEA AND NO VOMITING
ORIENTATION AND CLOUDING OF SENSORIUM: ORIENTED AND CAN DO SERIAL ADDITIONS
HEADACHE, FULLNESS IN HEAD: NOT PRESENT
VISUAL DISTURBANCES: NOT PRESENT

## 2022-05-05 ASSESSMENT — ENCOUNTER SYMPTOMS
HEADACHES: 0
SORE THROAT: 0
NAUSEA: 0
ABDOMINAL PAIN: 1
SHORTNESS OF BREATH: 1
COUGH: 0
MUSCULOSKELETAL NEGATIVE: 1
ORTHOPNEA: 0
CHILLS: 0
NERVOUS/ANXIOUS: 0
FOCAL WEAKNESS: 0
EYES NEGATIVE: 1
DEPRESSION: 0
VOMITING: 0

## 2022-05-05 NOTE — PROGRESS NOTES
"[facility] -  PROGRESS NOTE               Author: Harrison Melgar M.D. Date & Time: 5/5/2022  9:18 AM     HPI:  Per Dr. Vargas  \"Mr. Proctor is a very pleasant 70 y/o male known to us from outpt dialysis. He gets his treatments at [facility] dialysis unit qTTS via right IJ permcath.  He was at his routine HD treatment earlier today.  After about 30 or so minutes his BP dropped.  He tells me \"I passed out\" but that was not witnessed at the HD unit.  He did develop chest pain radiating to his back.  Dialysis was discontinued and he was referred to the ED at [facility]. At the time of presentation to the ED his BP was now elevated at 171/74.  He is also complaining of ABD pain described as generalized and intermittent.  No change to bowel habits.  No Diarrhea or constipation.  No N/V.  No recent change to meds.  No OTC meds.  Has some LE edema, no recent change.  He does not routinely take his BP meds before HD and did not take this AM.  RUQ U/S showed thickened gallbladder wall.  No stones.  LFTs are elevated without rise in bilirubin.  Elevated Lipase as well.  No F/C.  No cough.  We have been asked to see him regarding his dialysis needs. \"    DAILY NEPHROLOGY SUMMARY:  5/4 - Patient is having upper abdominal pain, nausea. No SOB, palpitations, cough, melena or hematochezia.  5/5 - States he's hungry.  Still having abdominal pain.      REVIEW OF SYSTEMS:    10 point ROS reviewed and is as per HPI/daily summary or otherwise negative    PMH/PSH/SH/FH: Reviewed and unchanged since admission note  CURRENT MEDICATIONS: Reviewed from admission to present day    VS:  BP (!) 85/35 Comment: Rn notified   Pulse 81   Temp 37.4 °C (99.3 °F) (Temporal)   Resp 16   Wt 79.1 kg (174 lb 6.1 oz)   SpO2 98%   BMI 26.51 kg/m²   Physical Exam  Constitutional:       General: He is not in acute distress.     Appearance: He is not ill-appearing.   HENT:      Head: Normocephalic and atraumatic.      Nose: No " rhinorrhea.      Mouth/Throat:      Mouth: Mucous membranes are moist.      Pharynx: No posterior oropharyngeal erythema.   Eyes:      General: No scleral icterus.     Extraocular Movements: Extraocular movements intact.      Pupils: Pupils are equal, round, and reactive to light.   Cardiovascular:      Rate and Rhythm: Normal rate and regular rhythm.      Heart sounds: No murmur heard.    No friction rub. No gallop.   Pulmonary:      Effort: Pulmonary effort is normal. No respiratory distress.      Breath sounds: No wheezing, rhonchi or rales.   Abdominal:      General: Abdomen is flat. There is no distension.      Tenderness: There is abdominal tenderness (epigastric significant). There is no guarding or rebound.   Musculoskeletal:      Right lower leg: Edema (1+) present.      Left lower leg: Edema (1+) present.   Skin:     Capillary Refill: Capillary refill takes less than 2 seconds.      Coloration: Skin is not jaundiced or pale.      Comments: Wound on left lower extremity   Neurological:      General: No focal deficit present.      Mental Status: He is oriented to person, place, and time.   Psychiatric:         Behavior: Behavior normal.         Fluids:  In: 500 [Dialysis:500]  Out: 1500     LABS:  Recent Labs     05/03/22  1315 05/04/22  0014   SODIUM 126* 129*   POTASSIUM 5.4 5.5   CHLORIDE 90* 88*   CO2 15* 18*   GLUCOSE 74 88   BUN 70* 74*   CREATININE 8.92* 10.83*   CALCIUM 7.2* 6.2*       IMPRESSION:  · ESRD, due to long standing hypertension, HD TTS via port, has right AVF  · Upper abdominal pain, likely acute pancreatitis  · Hypocalcemia  · Alcoholic hepatitis  · Alcoholic pancreatitis, epigastric pain + lipase >3ULN + alcohol use disorder  · Gallbladder wall thickening  · Elevated liver enzymes, AST>ALT 2  · Inflammatory arthritis, on etanercept  · H/o NSTEMI  · H/o PE  · Hypertension    PLAN:  -Repeat HD today for HD qTTS, with high calcium bath  -Tums 500 tid. Replete lytes as needed  -Check iCa,  replete as needed  -Careful with volume resuscitation given ESRD and anuria. Continue with oral resuscitation at this time as long as patient tolerates.  -Pain management per primary team  -PTH elevated. Low Ca and high phos  -Changed to PhosLo 1334mg PO TIDWM    Thank you for your consult.  We will continue to follow

## 2022-05-05 NOTE — PROGRESS NOTES
[facility] Services Progress Note    Hemodialysis treatment ordered today per Dr. Melgar x 3 hours. Treatment initiated at 1534 and ended at 1834.  Pt A/Ox3, soft BP noted , SBP : 90's prior to start of HD, Pt asymptomatic.     Pt tolerated treatment, BP improved during tx, VSS post tx, denies any discomfort.; see e-flow sheets for details.    Net UF 1,000 mL    Post tx, CVC flushed with saline then locked with heparin 1000 units/mL per designated amount in each wing then clamped and capped. Aspirate heparin prior to next CVC use.    Report given to Primary RN.

## 2022-05-05 NOTE — PROGRESS NOTES
Hospital Medicine Daily Progress Note    Date of Service  5/5/2022    Chief Complaint  Junior Proctor is a 71 y.o. male admitted 5/3/2022 with abdominal pain    Hospital Course  Abran Proctor is  a 71-year-old male with a past medical history of end-stage renal disease on hemodialysis Tuesday, Thursday and Saturday, peptic ulcer disease, alcohol abuse who presented with a syncopal episode while he was undergoing hemodialysis.  After that the patient developed severe epigastric abdominal pain with radiation to bilateral upper quadrants of his abdomen. He was brought to the hospital for evaluation. Patient tachycardic, afebrile, and without leukocytosis. Transaminitis noted with elevated lipase. RUQ US with gallbladder wall thickening.  Surgery was consulted and recommended patient undergo HIDA scan.      Interval Problem Update  No evidence of cholecystitis on HIDA scan. Surgery signed off. Pt reports pain is slightly improved and is requesting food, will trial PO intake. Plan for HD today.     I have personally seen and examined the patient at bedside. I discussed the plan of care with patient, bedside RN, charge RN and Dr. Philip.    Consultants/Specialty  general surgery    Code Status  Full Code     Disposition  Patient is not medically cleared for discharge.   Anticipate discharge to to home with close outpatient follow-up.  I have placed the appropriate orders for post-discharge needs.    Review of Systems  Review of Systems   Constitutional: Positive for malaise/fatigue. Negative for chills.   HENT: Negative for congestion and sore throat.    Eyes: Negative.    Respiratory: Positive for shortness of breath. Negative for cough.    Cardiovascular: Negative for chest pain and orthopnea.   Gastrointestinal: Positive for abdominal pain. Negative for nausea and vomiting.   Genitourinary:        Anuric     Musculoskeletal: Negative.    Skin: Negative.    Neurological: Negative for focal weakness and headaches.    Psychiatric/Behavioral: Negative for depression. The patient is not nervous/anxious.    All other systems reviewed and are negative.       Physical Exam  Temp:  [36.6 °C (97.9 °F)-37.4 °C (99.3 °F)] 37.1 °C (98.8 °F)  Pulse:  [67-89] 89  Resp:  [15-22] 16  BP: ()/(35-64) 83/46  SpO2:  [94 %-98 %] 97 %    Physical Exam  Vitals and nursing note reviewed.   Constitutional:       General: He is not in acute distress.     Appearance: He is ill-appearing.   HENT:      Head: Normocephalic and atraumatic.      Right Ear: External ear normal.      Left Ear: External ear normal.      Nose: Nose normal. No congestion.      Mouth/Throat:      Mouth: Mucous membranes are moist.      Pharynx: Oropharynx is clear.   Eyes:      General:         Right eye: No discharge.         Left eye: No discharge.      Pupils: Pupils are equal, round, and reactive to light.   Cardiovascular:      Rate and Rhythm: Normal rate and regular rhythm.      Pulses: Normal pulses.      Heart sounds: Normal heart sounds.   Pulmonary:      Effort: Pulmonary effort is normal.      Breath sounds: Normal breath sounds.   Abdominal:      General: Bowel sounds are normal. There is no distension.      Palpations: Abdomen is soft.      Tenderness: There is abdominal tenderness in the right upper quadrant and epigastric area. There is guarding.   Musculoskeletal:      Cervical back: No tenderness.      Right lower leg: No edema.      Left lower leg: No edema.   Skin:     General: Skin is warm and dry.      Capillary Refill: Capillary refill takes less than 2 seconds.      Coloration: Skin is not jaundiced.   Neurological:      General: No focal deficit present.      Mental Status: He is alert and oriented to person, place, and time. Mental status is at baseline.   Psychiatric:         Mood and Affect: Mood normal.         Behavior: Behavior normal.         Fluids    Intake/Output Summary (Last 24 hours) at 5/5/2022 9448  Last data filed at 5/4/2022  1855  Gross per 24 hour   Intake 500 ml   Output 1500 ml   Net -1000 ml       Laboratory  Recent Labs     05/03/22  1315 05/04/22  0014   WBC 4.1* 4.5*   RBC 3.38* 3.31*   HEMOGLOBIN 10.9* 10.5*   HEMATOCRIT 34.0* 32.4*   .6* 97.9*   MCH 32.2 31.7   MCHC 32.1* 32.4*   RDW 70.6* 67.7*   PLATELETCT 79* 93*   MPV 10.4 11.8     Recent Labs     05/03/22  1315 05/04/22  0014   SODIUM 126* 129*   POTASSIUM 5.4 5.5   CHLORIDE 90* 88*   CO2 15* 18*   GLUCOSE 74 88   BUN 70* 74*   CREATININE 8.92* 10.83*   CALCIUM 7.2* 6.2*                   Imaging  NM-BILIARY HIDA SCAN FATTY MEAL   Final Result      Normal hepatobiliary scan.      This study was obtained utilizing fatty meal challenge to induce gallbladder contraction due to national shortage of cholecystokinin.  There are no national standards for gallbladder ejection fraction calculated utilizing fatty meal challenge.  An    ejection fraction greater than 35% is most likely normal.  Gallbladder ejection fraction less than 35% may be abnormal but could be falsely positive.  Therefore, this test could be falsely positive.  Consider repeat imaging once cholecystokinin becomes    available.      US-RUQ   Final Result      1.  Gallbladder polyp versus nonshadowing gallstone identified.      2.  There is abnormal gallbladder wall thickening which is significantly increased compared to the prior exam. Cholecystitis is a possibility.      DX-CHEST-PORTABLE (1 VIEW)   Final Result         No acute cardiac or pulmonary abnormality is identified.      EC-ECHOCARDIOGRAM COMPLETE W/O CONT    (Results Pending)        Assessment/Plan  * Acute pancreatitis- (present on admission)  Assessment & Plan  Likely secondary to alcohol  Lipase 798  Bilirubin 1.1  Gentle hydration in setting of ESRD  Pt requesting food, trial renal diet      AP (abdominal pain)- (present on admission)  Assessment & Plan  Most likely secondary to alcoholic hepatitis and alcohol induced pancreatitis  No  evidence of cholecystitis on HIDA scan  Zosyn discontinued   Blood cultures pending  Pain control with IV fentanyl, monitor respiratory status closely    Hepatitis- (present on admission)  Assessment & Plan  Complicated by history of hepatitis C and alcohol  Hold atorvastatin  Labs and history suggest alcoholic hepatitis  HIDA without evidence of obstruction      Alcohol dependence (HCC)- (present on admission)  Assessment & Plan  CIWA protocol, as needed Ativan  Counseled on alcohol cessation    Chest pain- (present on admission)  Assessment & Plan  Atypical, no current chest pain, no acute ischemic changes on EKG complicated by history of coronary artery, abnormal troponin but also on exam consistent with epigastric and right upper quadrant pain.    Troponin 117  Echo pending    Hepatitis C antibody positive in blood- (present on admission)  Assessment & Plan  Follow-up quantitative hCv  HCV RNA was previously negative    ESRD on dialysis (HCC)- (present on admission)  Assessment & Plan  Scheduled dialysis TRS   Nephrology following, plan for HD today  Careful hydration in setting of pancreatitis with ESRD    Thrombocytopenia (HCC)- (present on admission)  Assessment & Plan  Likely secondary to alcohol  Improving, up to 93  Trend CBC    Transaminitis- (present on admission)  Assessment & Plan  AST 2023, , trending down from admission  Nonobstructive pattern  HIDA scan completed, no evidence of cholecystitis   Continue to trend      Anemia- (present on admission)  Assessment & Plan  Secondary to end-stage renal failure  Macrocytic hypochromic   Folate WNL  B12 3818    Arthritis- (present on admission)  Assessment & Plan  Hold home Etanercept         VTE prophylaxis: SCDs/TEDs    I have performed a physical exam and reviewed and updated ROS and Plan today (5/5/2022). In review of yesterday's note (5/4/2022), there are no changes except as documented above.

## 2022-05-05 NOTE — DISCHARGE PLANNING
Anticipated Discharge Disposition: home, self-care    Action: discussed in IDT rounds. Not yet medically cleared for discharge. 6 clicks is 24/23. Do not anticipate any needs at discharge.     Barriers to Discharge: medical clearance.     Plan: continue to follow with medical team for discharge needs.

## 2022-05-05 NOTE — PROGRESS NOTES
HIDA scan completed and there is no sign of acute cholecystitis or delayed emptying that would indicate treatment with cholecystectomy.    Likely gallbladder wall thickening related to generalized edema that may be secondary to his multiple medical comorbidities and mild pancreatitis.    Recommend advance diet as tolerated.    Surgical service signing off.    Please contact us if there is any question or concern

## 2022-05-05 NOTE — HOSPITAL COURSE
Abran Proctor is  a 71-year-old male with a past medical history of end-stage renal disease on hemodialysis Tuesday, Thursday and Saturday, peptic ulcer disease, alcohol abuse who presented with a syncopal episode while he was undergoing hemodialysis.  After that the patient developed severe epigastric abdominal pain with radiation to bilateral upper quadrants of his abdomen. He was brought to the hospital for evaluation. Patient tachycardic, afebrile, and without leukocytosis. Transaminitis noted with elevated lipase. RUQ US with gallbladder wall thickening. HIDA scan without evidence of cholecystitis.

## 2022-05-05 NOTE — CARE PLAN
The patient is Stable - Low risk of patient condition declining or worsening    Shift Goals  Clinical Goals: MRI 5/5, admit profile, pain control  Patient Goals: pain control, sleep  Family Goals: na    Progress made toward(s) clinical / shift goals:  Yes    Problem: Pain - Standard  Goal: Alleviation of pain or a reduction in pain to the patient’s comfort goal  Outcome: Progressing     Problem: Knowledge Deficit - Standard  Goal: Patient and family/care givers will demonstrate understanding of plan of care, disease process/condition, diagnostic tests and medications  Outcome: Progressing     Problem: Psychosocial  Goal: Patient's level of anxiety will decrease  Outcome: Progressing     Problem: Fall Risk  Goal: Patient will remain free from falls  Outcome: Progressing       Patient is not progressing towards the following goals:

## 2022-05-06 ENCOUNTER — APPOINTMENT (OUTPATIENT)
Dept: RADIOLOGY | Facility: MEDICAL CENTER | Age: 72
DRG: 438 | End: 2022-05-06
Attending: STUDENT IN AN ORGANIZED HEALTH CARE EDUCATION/TRAINING PROGRAM
Payer: COMMERCIAL

## 2022-05-06 LAB
ALBUMIN SERPL BCP-MCNC: 3.3 G/DL (ref 3.2–4.9)
ALBUMIN/GLOB SERPL: 1.1 G/DL
ALP SERPL-CCNC: 152 U/L (ref 30–99)
ALT SERPL-CCNC: 248 U/L (ref 2–50)
ANION GAP SERPL CALC-SCNC: 13 MMOL/L (ref 7–16)
AST SERPL-CCNC: 317 U/L (ref 12–45)
BASOPHILS # BLD AUTO: 0.2 % (ref 0–1.8)
BASOPHILS # BLD: 0.01 K/UL (ref 0–0.12)
BILIRUB SERPL-MCNC: 2.2 MG/DL (ref 0.1–1.5)
BUN SERPL-MCNC: 25 MG/DL (ref 8–22)
CA-I SERPL-SCNC: 1 MMOL/L (ref 1.1–1.3)
CALCIUM SERPL-MCNC: 7.7 MG/DL (ref 8.5–10.5)
CHLORIDE SERPL-SCNC: 97 MMOL/L (ref 96–112)
CO2 SERPL-SCNC: 26 MMOL/L (ref 20–33)
CREAT SERPL-MCNC: 6.35 MG/DL (ref 0.5–1.4)
EKG IMPRESSION: NORMAL
EOSINOPHIL # BLD AUTO: 0.06 K/UL (ref 0–0.51)
EOSINOPHIL NFR BLD: 1.1 % (ref 0–6.9)
ERYTHROCYTE [DISTWIDTH] IN BLOOD BY AUTOMATED COUNT: 68.4 FL (ref 35.9–50)
GFR SERPLBLD CREATININE-BSD FMLA CKD-EPI: 9 ML/MIN/1.73 M 2
GLOBULIN SER CALC-MCNC: 3 G/DL (ref 1.9–3.5)
GLUCOSE SERPL-MCNC: 66 MG/DL (ref 65–99)
HCT VFR BLD AUTO: 28.3 % (ref 42–52)
HGB BLD-MCNC: 9.4 G/DL (ref 14–18)
IMM GRANULOCYTES # BLD AUTO: 0.11 K/UL (ref 0–0.11)
IMM GRANULOCYTES NFR BLD AUTO: 2 % (ref 0–0.9)
LIPASE SERPL-CCNC: 300 U/L (ref 11–82)
LYMPHOCYTES # BLD AUTO: 0.89 K/UL (ref 1–4.8)
LYMPHOCYTES NFR BLD: 15.9 % (ref 22–41)
MAGNESIUM SERPL-MCNC: 1.7 MG/DL (ref 1.5–2.5)
MCH RBC QN AUTO: 31.6 PG (ref 27–33)
MCHC RBC AUTO-ENTMCNC: 33.2 G/DL (ref 33.7–35.3)
MCV RBC AUTO: 95.3 FL (ref 81.4–97.8)
MONOCYTES # BLD AUTO: 0.7 K/UL (ref 0–0.85)
MONOCYTES NFR BLD AUTO: 12.5 % (ref 0–13.4)
NEUTROPHILS # BLD AUTO: 3.84 K/UL (ref 1.82–7.42)
NEUTROPHILS NFR BLD: 68.3 % (ref 44–72)
NRBC # BLD AUTO: 0 K/UL
NRBC BLD-RTO: 0 /100 WBC
PLATELET # BLD AUTO: 66 K/UL (ref 164–446)
PMV BLD AUTO: 10.9 FL (ref 9–12.9)
POTASSIUM SERPL-SCNC: 3.4 MMOL/L (ref 3.6–5.5)
PROT SERPL-MCNC: 6.3 G/DL (ref 6–8.2)
RBC # BLD AUTO: 2.97 M/UL (ref 4.7–6.1)
SODIUM SERPL-SCNC: 136 MMOL/L (ref 135–145)
WBC # BLD AUTO: 5.6 K/UL (ref 4.8–10.8)

## 2022-05-06 PROCEDURE — 85025 COMPLETE CBC W/AUTO DIFF WBC: CPT

## 2022-05-06 PROCEDURE — A9270 NON-COVERED ITEM OR SERVICE: HCPCS | Performed by: INTERNAL MEDICINE

## 2022-05-06 PROCEDURE — 99233 SBSQ HOSP IP/OBS HIGH 50: CPT | Mod: FS | Performed by: NURSE PRACTITIONER

## 2022-05-06 PROCEDURE — 83690 ASSAY OF LIPASE: CPT

## 2022-05-06 PROCEDURE — 36415 COLL VENOUS BLD VENIPUNCTURE: CPT

## 2022-05-06 PROCEDURE — 93010 ELECTROCARDIOGRAM REPORT: CPT | Performed by: INTERNAL MEDICINE

## 2022-05-06 PROCEDURE — 80053 COMPREHEN METABOLIC PANEL: CPT

## 2022-05-06 PROCEDURE — 700111 HCHG RX REV CODE 636 W/ 250 OVERRIDE (IP): Performed by: INTERNAL MEDICINE

## 2022-05-06 PROCEDURE — A9270 NON-COVERED ITEM OR SERVICE: HCPCS | Performed by: STUDENT IN AN ORGANIZED HEALTH CARE EDUCATION/TRAINING PROGRAM

## 2022-05-06 PROCEDURE — 700111 HCHG RX REV CODE 636 W/ 250 OVERRIDE (IP): Performed by: NURSE PRACTITIONER

## 2022-05-06 PROCEDURE — 700102 HCHG RX REV CODE 250 W/ 637 OVERRIDE(OP): Performed by: STUDENT IN AN ORGANIZED HEALTH CARE EDUCATION/TRAINING PROGRAM

## 2022-05-06 PROCEDURE — 700111 HCHG RX REV CODE 636 W/ 250 OVERRIDE (IP)

## 2022-05-06 PROCEDURE — 82330 ASSAY OF CALCIUM: CPT

## 2022-05-06 PROCEDURE — 700102 HCHG RX REV CODE 250 W/ 637 OVERRIDE(OP): Performed by: INTERNAL MEDICINE

## 2022-05-06 PROCEDURE — 770020 HCHG ROOM/CARE - TELE (206)

## 2022-05-06 PROCEDURE — 93990 DOPPLER FLOW TESTING: CPT

## 2022-05-06 PROCEDURE — 93990 DOPPLER FLOW TESTING: CPT | Mod: 26 | Performed by: INTERNAL MEDICINE

## 2022-05-06 PROCEDURE — 83735 ASSAY OF MAGNESIUM: CPT

## 2022-05-06 PROCEDURE — 90935 HEMODIALYSIS ONE EVALUATION: CPT

## 2022-05-06 PROCEDURE — 93005 ELECTROCARDIOGRAM TRACING: CPT | Performed by: NURSE PRACTITIONER

## 2022-05-06 RX ORDER — CALCIUM GLUCONATE 20 MG/ML
1 INJECTION, SOLUTION INTRAVENOUS ONCE
Status: COMPLETED | OUTPATIENT
Start: 2022-05-06 | End: 2022-05-06

## 2022-05-06 RX ORDER — HEPARIN SODIUM 1000 [USP'U]/ML
INJECTION, SOLUTION INTRAVENOUS; SUBCUTANEOUS
Status: COMPLETED
Start: 2022-05-06 | End: 2022-05-06

## 2022-05-06 RX ORDER — LOPERAMIDE HYDROCHLORIDE 2 MG/1
2 CAPSULE ORAL 4 TIMES DAILY PRN
Status: DISCONTINUED | OUTPATIENT
Start: 2022-05-06 | End: 2022-05-12

## 2022-05-06 RX ADMIN — THERA TABS 1 TABLET: TAB at 05:09

## 2022-05-06 RX ADMIN — OMEPRAZOLE 20 MG: 20 CAPSULE, DELAYED RELEASE ORAL at 05:09

## 2022-05-06 RX ADMIN — FOLIC ACID 1 MG: 1 TABLET ORAL at 05:09

## 2022-05-06 RX ADMIN — HYDROMORPHONE HYDROCHLORIDE 0.25 MG: 1 INJECTION, SOLUTION INTRAMUSCULAR; INTRAVENOUS; SUBCUTANEOUS at 17:53

## 2022-05-06 RX ADMIN — CALCIUM CARBONATE 500 MG: 500 TABLET, CHEWABLE ORAL at 05:09

## 2022-05-06 RX ADMIN — OXYCODONE 5 MG: 5 TABLET ORAL at 09:48

## 2022-05-06 RX ADMIN — THIAMINE HCL TAB 100 MG 100 MG: 100 TAB at 05:09

## 2022-05-06 RX ADMIN — HYDROMORPHONE HYDROCHLORIDE 0.25 MG: 1 INJECTION, SOLUTION INTRAMUSCULAR; INTRAVENOUS; SUBCUTANEOUS at 12:57

## 2022-05-06 RX ADMIN — OXYCODONE 5 MG: 5 TABLET ORAL at 21:06

## 2022-05-06 RX ADMIN — HEPARIN SODIUM 3900 UNITS: 1000 INJECTION, SOLUTION INTRAVENOUS; SUBCUTANEOUS at 15:39

## 2022-05-06 RX ADMIN — TRAZODONE HYDROCHLORIDE 50 MG: 50 TABLET ORAL at 22:15

## 2022-05-06 RX ADMIN — OXYCODONE 5 MG: 5 TABLET ORAL at 05:03

## 2022-05-06 RX ADMIN — HYDROMORPHONE HYDROCHLORIDE 0.25 MG: 1 INJECTION, SOLUTION INTRAMUSCULAR; INTRAVENOUS; SUBCUTANEOUS at 06:39

## 2022-05-06 RX ADMIN — HYDROMORPHONE HYDROCHLORIDE 0.25 MG: 1 INJECTION, SOLUTION INTRAMUSCULAR; INTRAVENOUS; SUBCUTANEOUS at 22:15

## 2022-05-06 RX ADMIN — CALCIUM GLUCONATE 1 G: 20 INJECTION, SOLUTION INTRAVENOUS at 09:44

## 2022-05-06 ASSESSMENT — PAIN DESCRIPTION - PAIN TYPE
TYPE: ACUTE PAIN

## 2022-05-06 ASSESSMENT — ENCOUNTER SYMPTOMS
ORTHOPNEA: 0
ABDOMINAL PAIN: 1
EYES NEGATIVE: 1
MUSCULOSKELETAL NEGATIVE: 1
DEPRESSION: 0
CHILLS: 0
SHORTNESS OF BREATH: 1
VOMITING: 0
FOCAL WEAKNESS: 0
SORE THROAT: 0
NAUSEA: 0
NERVOUS/ANXIOUS: 0
COUGH: 0
DIARRHEA: 1
HEADACHES: 0

## 2022-05-06 ASSESSMENT — LIFESTYLE VARIABLES
NAUSEA AND VOMITING: NO NAUSEA AND NO VOMITING
VISUAL DISTURBANCES: NOT PRESENT
HEADACHE, FULLNESS IN HEAD: NOT PRESENT
AGITATION: NORMAL ACTIVITY
PAROXYSMAL SWEATS: NO SWEAT VISIBLE
ORIENTATION AND CLOUDING OF SENSORIUM: ORIENTED AND CAN DO SERIAL ADDITIONS
HEADACHE, FULLNESS IN HEAD: NOT PRESENT
TOTAL SCORE: 0
TREMOR: NO TREMOR
PAROXYSMAL SWEATS: NO SWEAT VISIBLE
ANXIETY: NO ANXIETY (AT EASE)
AUDITORY DISTURBANCES: NOT PRESENT
ORIENTATION AND CLOUDING OF SENSORIUM: ORIENTED AND CAN DO SERIAL ADDITIONS
VISUAL DISTURBANCES: NOT PRESENT
AUDITORY DISTURBANCES: NOT PRESENT
TOTAL SCORE: 0
AGITATION: NORMAL ACTIVITY
ANXIETY: NO ANXIETY (AT EASE)
NAUSEA AND VOMITING: NO NAUSEA AND NO VOMITING
TREMOR: NO TREMOR

## 2022-05-06 ASSESSMENT — FIBROSIS 4 INDEX: FIB4 SCORE: 21.04

## 2022-05-06 NOTE — PROGRESS NOTES
Tech from Lab called with critical result of calcium 6.5 at 1610. Critical lab result read back to tech.   Dr. Philip notified of critical lab result at 1616.  Critical lab result read back by Dr. Philip.

## 2022-05-06 NOTE — PROGRESS NOTES
"[facility] Services Progress Note    Hemodialysis treatment ordered today per Dr. Melgar x 3 hours. Treatment initiated at 1328 and ended at 1533.  Pt A/Ox4, VSS, no pain or SOB reported, called primary RN for lunch tray requested by Pt.    Pt requested to terminate Tx 55 min early, Pt stated \" I had dialysis yesterday and will have another one tomorrow\" Dr Melgar notified and okayed it.  Primary RN called at 1229 reporting Pt went on Vtach for 10-12 sec., Pt  asymptomatic, watching TV, denies chest pain, BP : 129/67 HR : 72 bpm; see e- flow sheets for details.    Net UF 1,200 mL    Post tx, CVC flushed with saline then locked with heparin 1000 units/mL per designated amount in each wing then clamped and capped. Aspirate heparin prior to next CVC use.    Report given to Primary RN.                           "

## 2022-05-06 NOTE — PROGRESS NOTES
"[facility] -  PROGRESS NOTE               Author: Harrison Melgar M.D. Date & Time: 5/6/2022  8:37 AM     HPI:  Per Dr. Vargas  \"Mr. Proctor is a very pleasant 70 y/o male known to us from outpt dialysis. He gets his treatments at [facility] dialysis unit qTTS via right IJ permcath.  He was at his routine HD treatment earlier today.  After about 30 or so minutes his BP dropped.  He tells me \"I passed out\" but that was not witnessed at the HD unit.  He did develop chest pain radiating to his back.  Dialysis was discontinued and he was referred to the ED at [facility]. At the time of presentation to the ED his BP was now elevated at 171/74.  He is also complaining of ABD pain described as generalized and intermittent.  No change to bowel habits.  No Diarrhea or constipation.  No N/V.  No recent change to meds.  No OTC meds.  Has some LE edema, no recent change.  He does not routinely take his BP meds before HD and did not take this AM.  RUQ U/S showed thickened gallbladder wall.  No stones.  LFTs are elevated without rise in bilirubin.  Elevated Lipase as well.  No F/C.  No cough.  We have been asked to see him regarding his dialysis needs. \"    DAILY NEPHROLOGY SUMMARY:  5/4 - Patient is having upper abdominal pain, nausea. No SOB, palpitations, cough, melena or hematochezia.  5/5 - States he's hungry.  Still having abdominal pain.    5/6 - Dialysis cut short yesterday due to diarrhea and clotting of machine.  Edema today.  Stll with abdominal pain.    REVIEW OF SYSTEMS:    10 point ROS reviewed and is as per HPI/daily summary or otherwise negative    PMH/PSH/SH/FH: Reviewed and unchanged since admission note  CURRENT MEDICATIONS: Reviewed from admission to present day    VS:  BP (!) 94/56 Comment: Rn notified   Pulse 98   Temp 37.5 °C (99.5 °F) (Temporal)   Resp 20   Wt 79.1 kg (174 lb 6.1 oz)   SpO2 95%   BMI 26.51 kg/m²   Physical Exam  Constitutional:       General: He is not in " acute distress.     Appearance: He is not ill-appearing.   HENT:      Head: Normocephalic and atraumatic.      Nose: No rhinorrhea.      Mouth/Throat:      Mouth: Mucous membranes are moist.      Pharynx: No posterior oropharyngeal erythema.   Eyes:      General: No scleral icterus.     Extraocular Movements: Extraocular movements intact.      Pupils: Pupils are equal, round, and reactive to light.   Cardiovascular:      Rate and Rhythm: Normal rate and regular rhythm.      Heart sounds: No murmur heard.    No friction rub. No gallop.   Pulmonary:      Effort: Pulmonary effort is normal. No respiratory distress.      Breath sounds: No wheezing, rhonchi or rales.   Abdominal:      General: Abdomen is flat. There is no distension.      Tenderness: There is abdominal tenderness (epigastric significant). There is no guarding or rebound.   Musculoskeletal:      Right lower leg: Edema (1+) present.      Left lower leg: Edema (1+) present.   Skin:     Capillary Refill: Capillary refill takes less than 2 seconds.      Coloration: Skin is not jaundiced or pale.      Comments: Wound on left lower extremity   Neurological:      General: No focal deficit present.      Mental Status: He is oriented to person, place, and time.   Psychiatric:         Behavior: Behavior normal.         Fluids:  In: 1040 [P.O.:340; Dialysis:700]  Out: 1255     LABS:  Recent Labs     05/04/22  0014 05/05/22  1516 05/06/22  0032   SODIUM 129* 132* 136   POTASSIUM 5.5 3.8 3.4*   CHLORIDE 88* 96 97   CO2 18* 21 26   GLUCOSE 88 97 66   BUN 74* 41* 25*   CREATININE 10.83* 8.89* 6.35*   CALCIUM 6.2* 6.5* 7.7*       IMPRESSION:  · ESRD, due to long standing hypertension, HD TTS via port, has right AVF  · Upper abdominal pain, likely acute pancreatitis  · Hypocalcemia  · Alcoholic hepatitis  · Alcoholic pancreatitis, epigastric pain + lipase >3ULN + alcohol use disorder  · Gallbladder wall thickening  · Elevated liver enzymes, AST>ALT 2  · Inflammatory  arthritis, on etanercept  · H/o NSTEMI  · H/o PE  · Hypertension    PLAN:  -Repeat HD today then HD qTTS, with high calcium bath  -RUE AVF ultrasound  -Dc'ed Tums  -Careful with volume resuscitation given ESRD and anuria. Continue with oral resuscitation at this time as long as patient tolerates.  -Pain management per primary team  -PTH elevated. Low Ca and high phos  -Changed to PhosLo 1334mg PO TIDWM    Thank you for your consult.  We will continue to follow

## 2022-05-06 NOTE — PROGRESS NOTES
Hospital Medicine Daily Progress Note    Date of Service  5/6/2022    Chief Complaint  Junior Proctor is a 71 y.o. male admitted 5/3/2022 with abdominal pain    Hospital Course  Abran Proctor is  a 71-year-old male with a past medical history of end-stage renal disease on hemodialysis Tuesday, Thursday and Saturday, peptic ulcer disease, alcohol abuse who presented with a syncopal episode while he was undergoing hemodialysis.  After that the patient developed severe epigastric abdominal pain with radiation to bilateral upper quadrants of his abdomen. He was brought to the hospital for evaluation. Patient tachycardic, afebrile, and without leukocytosis. Transaminitis noted with elevated lipase. RUQ US with gallbladder wall thickening. HIDA scan without evidence of cholecystitis.       Interval Problem Update  Patient reports some improvement in abdominal pain. He is tolerating his diet. AST/ALT trending down. Patient complaining of weakness, PT/OT consult for discharge planning.     I have personally seen and examined the patient at bedside. I discussed the plan of care with patient, bedside RN, charge RN and Dr. Philip.    Consultants/Specialty  general surgery and nephrology    Code Status  Full Code     Disposition  Patient is not medically cleared for discharge.   Anticipate discharge to to home with close outpatient follow-up.  I have placed the appropriate orders for post-discharge needs.    Review of Systems  Review of Systems   Constitutional: Positive for malaise/fatigue. Negative for chills.   HENT: Negative for congestion and sore throat.    Eyes: Negative.    Respiratory: Positive for shortness of breath. Negative for cough.    Cardiovascular: Negative for chest pain and orthopnea.   Gastrointestinal: Positive for abdominal pain and diarrhea. Negative for nausea and vomiting.   Genitourinary:        Anuric     Musculoskeletal: Negative.    Skin: Negative.    Neurological: Negative for focal weakness and  headaches.   Psychiatric/Behavioral: Negative for depression. The patient is not nervous/anxious.    All other systems reviewed and are negative.       Physical Exam  Temp:  [36.7 °C (98 °F)-37.5 °C (99.5 °F)] 37.5 °C (99.5 °F)  Pulse:  [78-98] 98  Resp:  [16-20] 20  BP: ()/(46-83) 94/56  SpO2:  [95 %-97 %] 95 %    Physical Exam  Vitals and nursing note reviewed.   Constitutional:       General: He is not in acute distress.     Appearance: He is ill-appearing.   HENT:      Head: Normocephalic and atraumatic.      Right Ear: External ear normal.      Left Ear: External ear normal.      Nose: Nose normal. No congestion.      Mouth/Throat:      Mouth: Mucous membranes are moist.      Pharynx: Oropharynx is clear.   Eyes:      General:         Right eye: No discharge.         Left eye: No discharge.      Pupils: Pupils are equal, round, and reactive to light.   Cardiovascular:      Rate and Rhythm: Normal rate and regular rhythm.      Pulses: Normal pulses.      Heart sounds: Normal heart sounds.   Pulmonary:      Effort: Pulmonary effort is normal.      Breath sounds: Normal breath sounds.   Abdominal:      General: Bowel sounds are normal. There is no distension.      Palpations: Abdomen is soft.      Tenderness: There is abdominal tenderness in the right upper quadrant and epigastric area.   Musculoskeletal:      Cervical back: No tenderness.      Right lower le+ Edema present.      Left lower le+ Edema present.   Skin:     General: Skin is warm and dry.      Capillary Refill: Capillary refill takes less than 2 seconds.      Coloration: Skin is not jaundiced.   Neurological:      General: No focal deficit present.      Mental Status: He is alert and oriented to person, place, and time. Mental status is at baseline.   Psychiatric:         Mood and Affect: Mood normal.         Behavior: Behavior normal.         Fluids    Intake/Output Summary (Last 24 hours) at 2022 1033  Last data filed at 2022  1013  Gross per 24 hour   Intake 1280 ml   Output 1255 ml   Net 25 ml       Laboratory  Recent Labs     05/04/22  0014 05/05/22  1516 05/06/22  0032   WBC 4.5* 5.9 5.6   RBC 3.31* 2.74* 2.97*   HEMOGLOBIN 10.5* 8.8* 9.4*   HEMATOCRIT 32.4* 25.9* 28.3*   MCV 97.9* 94.5 95.3   MCH 31.7 32.1 31.6   MCHC 32.4* 34.0 33.2*   RDW 67.7* 65.9* 68.4*   PLATELETCT 93* 62* 66*   MPV 11.8 10.9 10.9     Recent Labs     05/04/22  0014 05/05/22  1516 05/06/22  0032   SODIUM 129* 132* 136   POTASSIUM 5.5 3.8 3.4*   CHLORIDE 88* 96 97   CO2 18* 21 26   GLUCOSE 88 97 66   BUN 74* 41* 25*   CREATININE 10.83* 8.89* 6.35*   CALCIUM 6.2* 6.5* 7.7*                   Imaging  US-HEMODIALYSIS GRAFT DUPLEX COMP UPPER EXTREMITY   Final Result      EC-ECHOCARDIOGRAM COMPLETE W/O CONT   Final Result      NM-BILIARY HIDA SCAN FATTY MEAL   Final Result      Normal hepatobiliary scan.      This study was obtained utilizing fatty meal challenge to induce gallbladder contraction due to national shortage of cholecystokinin.  There are no national standards for gallbladder ejection fraction calculated utilizing fatty meal challenge.  An    ejection fraction greater than 35% is most likely normal.  Gallbladder ejection fraction less than 35% may be abnormal but could be falsely positive.  Therefore, this test could be falsely positive.  Consider repeat imaging once cholecystokinin becomes    available.      US-RUQ   Final Result      1.  Gallbladder polyp versus nonshadowing gallstone identified.      2.  There is abnormal gallbladder wall thickening which is significantly increased compared to the prior exam. Cholecystitis is a possibility.      DX-CHEST-PORTABLE (1 VIEW)   Final Result         No acute cardiac or pulmonary abnormality is identified.           Assessment/Plan  * Acute pancreatitis- (present on admission)  Assessment & Plan  Likely secondary to alcohol vs steroids  Lipase trending down and pain improving  Patient tolerating renal diet    Oral hydration in setting of ESRD        AP (abdominal pain)- (present on admission)  Assessment & Plan  Most likely secondary to alcoholic hepatitis and alcohol induced pancreatitis  No evidence of cholecystitis on HIDA scan  Zosyn discontinued   Blood cultures pending  Pain control with IV fentanyl, monitor respiratory status closely    Hepatitis- (present on admission)  Assessment & Plan  Complicated by history of hepatitis C and alcohol  Hold atorvastatin  Labs and history suggest alcoholic hepatitis  HIDA without evidence of obstruction      Alcohol dependence (HCC)- (present on admission)  Assessment & Plan  CIWA scores 0, protocol discontinued  Counseled on alcohol cessation    Chest pain- (present on admission)  Assessment & Plan  Atypical, no current chest pain, no acute ischemic changes on EKG complicated by history of coronary artery, abnormal troponin but also on exam consistent with epigastric and right upper quadrant pain.    Troponin 117  Echo completed- EF 65%, right ventricular systolic pressure has decreased from 50mmHg to 30mmHg    Hepatitis C antibody positive in blood- (present on admission)  Assessment & Plan  hCv not detected   HCV RNA was previously negative    ESRD on dialysis (HCC)- (present on admission)  Assessment & Plan  Scheduled dialysis TRS   Nephrology following  HD cut short yesterday, per neph repeat today then back on TRS schedule  Oral hydration in setting of pancreatitis with ESRD    Thrombocytopenia (HCC)- (present on admission)  Assessment & Plan  Likely secondary to alcohol  66 today  Hold heparin  Trend CBC    Transaminitis- (present on admission)  Assessment & Plan  Trending down from admission  Nonobstructive pattern  HIDA scan completed, no evidence of cholecystitis   Continue to trend      Anemia- (present on admission)  Assessment & Plan  Secondary to end-stage renal failure  Macrocytic hypochromic   Folate WNL  B12 3818    Arthritis- (present on  admission)  Assessment & Plan  Hold home Etanercept         VTE prophylaxis: SCDs/TEDs    I have performed a physical exam and reviewed and updated ROS and Plan today (5/6/2022). In review of yesterday's note (5/5/2022), there are no changes except as documented above.

## 2022-05-06 NOTE — PROGRESS NOTES
Hemodialysis ordered by Dr. Melgar. Treatment started at 1514 and ended at 1737 d/t clotting dialyzer and lines. Blood returned, new setting placed. Pt went to restroom once before HD and while new setting place.Tx restarted. Multiple air alarm tried to reset alarms but pt wants off tx bc he has not eat good for 4 days. And want to eat and off tx for now. Dr. Melgar notified and ok to stop tx 57 mins early. 2 hrs and 3 mins this tx. AMA signed. Pt stable, vss, no c/o post tx. Net  ml. Report to JENNIFER Anthony RN.

## 2022-05-06 NOTE — PROGRESS NOTES
Monitor room notified RN of 30 second round of vtach up to 200. Pt in dialysis. Dialysis RN notified. Reported pt asymptomatic and VS'S. MD notified. Orders for EKG and mag draw.

## 2022-05-06 NOTE — CARE PLAN
The patient is Watcher - Medium risk of patient condition declining or worsening    Shift Goals  Clinical Goals: hida scan  Patient Goals: pain management  Family Goals: na    Progress made toward(s) clinical / shift goals:    Problem: Pain - Standard  Goal: Alleviation of pain or a reduction in pain to the patient’s comfort goal  Outcome: Progressing     Problem: Knowledge Deficit - Standard  Goal: Patient and family/care givers will demonstrate understanding of plan of care, disease process/condition, diagnostic tests and medications  Outcome: Progressing       Patient is not progressing towards the following goals:

## 2022-05-06 NOTE — PROGRESS NOTES
Pt refusing bed alarm. States pt likes to move around and sit up and bed alarm is always going off. Pt does state will use call light if he wants to go to bathroom. Explained risks of not having bed alarm, pt understands and refuses.

## 2022-05-07 LAB
ALBUMIN SERPL BCP-MCNC: 2.9 G/DL (ref 3.2–4.9)
ALBUMIN/GLOB SERPL: 1.1 G/DL
ALP SERPL-CCNC: 130 U/L (ref 30–99)
ALT SERPL-CCNC: 133 U/L (ref 2–50)
ANION GAP SERPL CALC-SCNC: 13 MMOL/L (ref 7–16)
AST SERPL-CCNC: 106 U/L (ref 12–45)
BASOPHILS # BLD AUTO: 0.3 % (ref 0–1.8)
BASOPHILS # BLD: 0.02 K/UL (ref 0–0.12)
BILIRUB SERPL-MCNC: 1.1 MG/DL (ref 0.1–1.5)
BUN SERPL-MCNC: 29 MG/DL (ref 8–22)
CALCIUM SERPL-MCNC: 7.8 MG/DL (ref 8.5–10.5)
CHLORIDE SERPL-SCNC: 101 MMOL/L (ref 96–112)
CO2 SERPL-SCNC: 24 MMOL/L (ref 20–33)
CREAT SERPL-MCNC: 6.84 MG/DL (ref 0.5–1.4)
EOSINOPHIL # BLD AUTO: 0.12 K/UL (ref 0–0.51)
EOSINOPHIL NFR BLD: 2 % (ref 0–6.9)
ERYTHROCYTE [DISTWIDTH] IN BLOOD BY AUTOMATED COUNT: 70.2 FL (ref 35.9–50)
GFR SERPLBLD CREATININE-BSD FMLA CKD-EPI: 8 ML/MIN/1.73 M 2
GLOBULIN SER CALC-MCNC: 2.7 G/DL (ref 1.9–3.5)
GLUCOSE SERPL-MCNC: 112 MG/DL (ref 65–99)
HCT VFR BLD AUTO: 24.1 % (ref 42–52)
HGB BLD-MCNC: 8.2 G/DL (ref 14–18)
IMM GRANULOCYTES # BLD AUTO: 0.05 K/UL (ref 0–0.11)
IMM GRANULOCYTES NFR BLD AUTO: 0.8 % (ref 0–0.9)
LYMPHOCYTES # BLD AUTO: 1.49 K/UL (ref 1–4.8)
LYMPHOCYTES NFR BLD: 24.3 % (ref 22–41)
MCH RBC QN AUTO: 32.5 PG (ref 27–33)
MCHC RBC AUTO-ENTMCNC: 34 G/DL (ref 33.7–35.3)
MCV RBC AUTO: 95.6 FL (ref 81.4–97.8)
MONOCYTES # BLD AUTO: 0.83 K/UL (ref 0–0.85)
MONOCYTES NFR BLD AUTO: 13.5 % (ref 0–13.4)
NEUTROPHILS # BLD AUTO: 3.62 K/UL (ref 1.82–7.42)
NEUTROPHILS NFR BLD: 59.1 % (ref 44–72)
NRBC # BLD AUTO: 0 K/UL
NRBC BLD-RTO: 0 /100 WBC
PLATELET # BLD AUTO: 61 K/UL (ref 164–446)
PMV BLD AUTO: 10.5 FL (ref 9–12.9)
POTASSIUM SERPL-SCNC: 3.9 MMOL/L (ref 3.6–5.5)
PROT SERPL-MCNC: 5.6 G/DL (ref 6–8.2)
RBC # BLD AUTO: 2.52 M/UL (ref 4.7–6.1)
SODIUM SERPL-SCNC: 138 MMOL/L (ref 135–145)
WBC # BLD AUTO: 6.1 K/UL (ref 4.8–10.8)

## 2022-05-07 PROCEDURE — 36415 COLL VENOUS BLD VENIPUNCTURE: CPT

## 2022-05-07 PROCEDURE — 700102 HCHG RX REV CODE 250 W/ 637 OVERRIDE(OP): Performed by: INTERNAL MEDICINE

## 2022-05-07 PROCEDURE — 80053 COMPREHEN METABOLIC PANEL: CPT

## 2022-05-07 PROCEDURE — A9270 NON-COVERED ITEM OR SERVICE: HCPCS | Performed by: INTERNAL MEDICINE

## 2022-05-07 PROCEDURE — 99232 SBSQ HOSP IP/OBS MODERATE 35: CPT | Mod: FS | Performed by: NURSE PRACTITIONER

## 2022-05-07 PROCEDURE — 700111 HCHG RX REV CODE 636 W/ 250 OVERRIDE (IP): Performed by: INTERNAL MEDICINE

## 2022-05-07 PROCEDURE — 90935 HEMODIALYSIS ONE EVALUATION: CPT

## 2022-05-07 PROCEDURE — 770020 HCHG ROOM/CARE - TELE (206)

## 2022-05-07 PROCEDURE — 97162 PT EVAL MOD COMPLEX 30 MIN: CPT

## 2022-05-07 PROCEDURE — 85025 COMPLETE CBC W/AUTO DIFF WBC: CPT

## 2022-05-07 PROCEDURE — 700111 HCHG RX REV CODE 636 W/ 250 OVERRIDE (IP)

## 2022-05-07 RX ORDER — HEPARIN SODIUM 1000 [USP'U]/ML
INJECTION, SOLUTION INTRAVENOUS; SUBCUTANEOUS
Status: COMPLETED
Start: 2022-05-07 | End: 2022-05-07

## 2022-05-07 RX ADMIN — HEPARIN SODIUM 3900 UNITS: 1000 INJECTION, SOLUTION INTRAVENOUS; SUBCUTANEOUS at 15:23

## 2022-05-07 RX ADMIN — HYDROMORPHONE HYDROCHLORIDE 0.25 MG: 1 INJECTION, SOLUTION INTRAMUSCULAR; INTRAVENOUS; SUBCUTANEOUS at 04:42

## 2022-05-07 RX ADMIN — OXYCODONE 5 MG: 5 TABLET ORAL at 08:32

## 2022-05-07 RX ADMIN — FOLIC ACID 1 MG: 1 TABLET ORAL at 04:43

## 2022-05-07 RX ADMIN — THIAMINE HCL TAB 100 MG 100 MG: 100 TAB at 04:43

## 2022-05-07 RX ADMIN — OXYCODONE 5 MG: 5 TABLET ORAL at 03:35

## 2022-05-07 RX ADMIN — OXYCODONE 5 MG: 5 TABLET ORAL at 16:12

## 2022-05-07 RX ADMIN — OMEPRAZOLE 20 MG: 20 CAPSULE, DELAYED RELEASE ORAL at 04:43

## 2022-05-07 RX ADMIN — OXYCODONE 5 MG: 5 TABLET ORAL at 23:41

## 2022-05-07 RX ADMIN — TRAZODONE HYDROCHLORIDE 50 MG: 50 TABLET ORAL at 23:41

## 2022-05-07 RX ADMIN — HYDROMORPHONE HYDROCHLORIDE 0.25 MG: 1 INJECTION, SOLUTION INTRAMUSCULAR; INTRAVENOUS; SUBCUTANEOUS at 20:38

## 2022-05-07 RX ADMIN — THERA TABS 1 TABLET: TAB at 04:43

## 2022-05-07 RX ADMIN — HYDROMORPHONE HYDROCHLORIDE 0.25 MG: 1 INJECTION, SOLUTION INTRAMUSCULAR; INTRAVENOUS; SUBCUTANEOUS at 09:32

## 2022-05-07 RX ADMIN — HYDROMORPHONE HYDROCHLORIDE 0.25 MG: 1 INJECTION, SOLUTION INTRAMUSCULAR; INTRAVENOUS; SUBCUTANEOUS at 17:31

## 2022-05-07 ASSESSMENT — PAIN DESCRIPTION - PAIN TYPE
TYPE: ACUTE PAIN

## 2022-05-07 ASSESSMENT — ENCOUNTER SYMPTOMS
WEAKNESS: 1
NERVOUS/ANXIOUS: 0
ORTHOPNEA: 0
SHORTNESS OF BREATH: 1
NAUSEA: 0
EYES NEGATIVE: 1
DIARRHEA: 0
HEADACHES: 0
DEPRESSION: 0
VOMITING: 0
CHILLS: 0
MUSCULOSKELETAL NEGATIVE: 1
SORE THROAT: 0
COUGH: 0
ABDOMINAL PAIN: 1

## 2022-05-07 ASSESSMENT — GAIT ASSESSMENTS
DISTANCE (FEET): 2
GAIT LEVEL OF ASSIST: CONTACT GUARD ASSIST

## 2022-05-07 ASSESSMENT — COGNITIVE AND FUNCTIONAL STATUS - GENERAL
MOVING TO AND FROM BED TO CHAIR: UNABLE
SUGGESTED CMS G CODE MODIFIER MOBILITY: CL
STANDING UP FROM CHAIR USING ARMS: A LITTLE
MOBILITY SCORE: 14
WALKING IN HOSPITAL ROOM: A LITTLE
CLIMB 3 TO 5 STEPS WITH RAILING: A LOT
MOVING FROM LYING ON BACK TO SITTING ON SIDE OF FLAT BED: UNABLE

## 2022-05-07 ASSESSMENT — FIBROSIS 4 INDEX: FIB4 SCORE: 10.4

## 2022-05-07 NOTE — PROGRESS NOTES
Hospital Medicine Daily Progress Note    Date of Service  5/7/2022    Chief Complaint  Junior Proctor is a 71 y.o. male admitted 5/3/2022 with abdominal pain    Hospital Course  Abran Proctor is  a 71-year-old male with a past medical history of end-stage renal disease on hemodialysis Tuesday, Thursday and Saturday, peptic ulcer disease, alcohol abuse who presented with a syncopal episode while he was undergoing hemodialysis.  After that the patient developed severe epigastric abdominal pain with radiation to bilateral upper quadrants of his abdomen. He was brought to the hospital for evaluation. Patient tachycardic, afebrile, and without leukocytosis. Transaminitis noted with elevated lipase. RUQ US with gallbladder wall thickening. HIDA scan without evidence of cholecystitis.       Interval Problem Update  Pt seen and examined this morning. AST/ALT trending down, pt tolerating diet. AVF with reduced flow volumes, vascular consulted. Pt continues to complain of weakness. Evaluated by PT today, recommending SNF.     I have personally seen and examined the patient at bedside. I discussed the plan of care with patient, bedside RN, charge RN and Dr. Philip.    Consultants/Specialty  general surgery, nephrology and vascular surgery    Code Status  Full Code     Disposition  Patient is not medically cleared for discharge.   Anticipate discharge to to skilled nursing facility.  I have placed the appropriate orders for post-discharge needs.    Review of Systems  Review of Systems   Constitutional: Positive for malaise/fatigue. Negative for chills.   HENT: Negative for congestion and sore throat.    Eyes: Negative.    Respiratory: Positive for shortness of breath. Negative for cough.    Cardiovascular: Negative for chest pain and orthopnea.   Gastrointestinal: Positive for abdominal pain. Negative for diarrhea, nausea and vomiting.   Genitourinary:        Anuric     Musculoskeletal: Negative.    Skin: Negative.    Neurological:  Positive for weakness. Negative for headaches.   Psychiatric/Behavioral: Negative for depression. The patient is not nervous/anxious.    All other systems reviewed and are negative.       Physical Exam  Temp:  [36.3 °C (97.4 °F)-38.2 °C (100.8 °F)] 37 °C (98.6 °F)  Pulse:  [76-96] 83  Resp:  [16-18] 16  BP: (100-137)/(48-78) 113/61  SpO2:  [91 %-99 %] 97 %    Physical Exam  Vitals and nursing note reviewed.   Constitutional:       General: He is not in acute distress.     Appearance: He is ill-appearing.   HENT:      Head: Normocephalic and atraumatic.      Right Ear: External ear normal.      Left Ear: External ear normal.      Nose: Nose normal. No congestion.      Mouth/Throat:      Mouth: Mucous membranes are moist.      Pharynx: Oropharynx is clear.   Eyes:      General:         Right eye: No discharge.         Left eye: No discharge.      Pupils: Pupils are equal, round, and reactive to light.   Cardiovascular:      Rate and Rhythm: Normal rate and regular rhythm.      Pulses: Normal pulses.      Heart sounds: Normal heart sounds.   Pulmonary:      Effort: Pulmonary effort is normal.      Breath sounds: Normal breath sounds.   Abdominal:      General: Bowel sounds are normal. There is no distension.      Palpations: Abdomen is soft.      Tenderness: There is abdominal tenderness in the right upper quadrant and epigastric area.   Musculoskeletal:      Cervical back: No tenderness.      Right lower le+ Edema present.      Left lower le+ Edema present.   Skin:     General: Skin is warm and dry.      Capillary Refill: Capillary refill takes less than 2 seconds.      Coloration: Skin is not jaundiced.   Neurological:      General: No focal deficit present.      Mental Status: He is alert and oriented to person, place, and time. Mental status is at baseline.   Psychiatric:         Mood and Affect: Mood normal.         Behavior: Behavior normal.         Fluids    Intake/Output Summary (Last 24 hours) at  5/7/2022 1243  Last data filed at 5/7/2022 0400  Gross per 24 hour   Intake 740 ml   Output 1700 ml   Net -960 ml       Laboratory  Recent Labs     05/05/22 1516 05/06/22  0032 05/07/22  0506   WBC 5.9 5.6 6.1   RBC 2.74* 2.97* 2.52*   HEMOGLOBIN 8.8* 9.4* 8.2*   HEMATOCRIT 25.9* 28.3* 24.1*   MCV 94.5 95.3 95.6   MCH 32.1 31.6 32.5   MCHC 34.0 33.2* 34.0   RDW 65.9* 68.4* 70.2*   PLATELETCT 62* 66* 61*   MPV 10.9 10.9 10.5     Recent Labs     05/05/22 1516 05/06/22 0032 05/07/22  0506   SODIUM 132* 136 138   POTASSIUM 3.8 3.4* 3.9   CHLORIDE 96 97 101   CO2 21 26 24   GLUCOSE 97 66 112*   BUN 41* 25* 29*   CREATININE 8.89* 6.35* 6.84*   CALCIUM 6.5* 7.7* 7.8*                   Imaging  US-HEMODIALYSIS GRAFT DUPLEX COMP UPPER EXTREMITY   Final Result      EC-ECHOCARDIOGRAM COMPLETE W/O CONT   Final Result      NM-BILIARY HIDA SCAN FATTY MEAL   Final Result      Normal hepatobiliary scan.      This study was obtained utilizing fatty meal challenge to induce gallbladder contraction due to national shortage of cholecystokinin.  There are no national standards for gallbladder ejection fraction calculated utilizing fatty meal challenge.  An    ejection fraction greater than 35% is most likely normal.  Gallbladder ejection fraction less than 35% may be abnormal but could be falsely positive.  Therefore, this test could be falsely positive.  Consider repeat imaging once cholecystokinin becomes    available.      US-RUQ   Final Result      1.  Gallbladder polyp versus nonshadowing gallstone identified.      2.  There is abnormal gallbladder wall thickening which is significantly increased compared to the prior exam. Cholecystitis is a possibility.      DX-CHEST-PORTABLE (1 VIEW)   Final Result         No acute cardiac or pulmonary abnormality is identified.           Assessment/Plan  * Acute pancreatitis- (present on admission)  Assessment & Plan  Likely secondary to alcohol vs steroids  Lipase trending down and pain  improving  Patient tolerating renal diet   Oral hydration in setting of ESRD        AP (abdominal pain)- (present on admission)  Assessment & Plan  Most likely secondary to alcoholic hepatitis and alcohol induced pancreatitis  No evidence of cholecystitis on HIDA scan  Blood cultures negative  Pain control with IV fentanyl, monitor respiratory status closely    Hepatitis- (present on admission)  Assessment & Plan  Complicated by history of hepatitis C and alcohol  Hold atorvastatin  Labs and history suggest alcoholic hepatitis  HIDA without evidence of obstruction      Alcohol dependence (HCC)- (present on admission)  Assessment & Plan  Counseled on alcohol cessation    Chest pain- (present on admission)  Assessment & Plan  Atypical, no current chest pain, no acute ischemic changes on EKG complicated by history of coronary artery, abnormal troponin but also on exam consistent with epigastric and right upper quadrant pain.    Echo completed- EF 65%, right ventricular systolic pressure has decreased from 50mmHg to 30mmHg    Hepatitis C antibody positive in blood- (present on admission)  Assessment & Plan  hCv not detected   HCV RNA was previously negative    ESRD on dialysis (HCC)- (present on admission)  Assessment & Plan  Scheduled dialysis TRS   Nephrology following  HD today  RUE AF US with reduced flow volumes, vascular consulted   Oral hydration in setting of pancreatitis with ESRD    Thrombocytopenia (HCC)- (present on admission)  Assessment & Plan  Likely secondary to alcohol  61 today  Hold heparin  Trend CBC    Transaminitis- (present on admission)  Assessment & Plan  Trending down  Nonobstructive pattern  HIDA scan completed, no evidence of cholecystitis   Continue to trend      Anemia- (present on admission)  Assessment & Plan  Secondary to end-stage renal failure  Macrocytic hypochromic   Folate WNL  B12 3818    Arthritis- (present on admission)  Assessment & Plan  Hold home Etanercept         VTE  prophylaxis: SCDs/TEDs    I have performed a physical exam and reviewed and updated ROS and Plan today (5/7/2022). In review of yesterday's note (5/6/2022), there are no changes except as documented above.

## 2022-05-07 NOTE — PROGRESS NOTES
"[facility] -  PROGRESS NOTE               Author: Harrison Melgar M.D. Date & Time: 5/7/2022  9:43 AM     HPI:  Per Dr. Vargas  \"Mr. Proctor is a very pleasant 72 y/o male known to us from outpt dialysis. He gets his treatments at [facility] dialysis unit qTTS via right IJ permcath.  He was at his routine HD treatment earlier today.  After about 30 or so minutes his BP dropped.  He tells me \"I passed out\" but that was not witnessed at the HD unit.  He did develop chest pain radiating to his back.  Dialysis was discontinued and he was referred to the ED at [facility]. At the time of presentation to the ED his BP was now elevated at 171/74.  He is also complaining of ABD pain described as generalized and intermittent.  No change to bowel habits.  No Diarrhea or constipation.  No N/V.  No recent change to meds.  No OTC meds.  Has some LE edema, no recent change.  He does not routinely take his BP meds before HD and did not take this AM.  RUQ U/S showed thickened gallbladder wall.  No stones.  LFTs are elevated without rise in bilirubin.  Elevated Lipase as well.  No F/C.  No cough.  We have been asked to see him regarding his dialysis needs. \"    DAILY NEPHROLOGY SUMMARY:  5/4 - Patient is having upper abdominal pain, nausea. No SOB, palpitations, cough, melena or hematochezia.  5/5 - States he's hungry.  Still having abdominal pain.    5/6 - Dialysis cut short yesterday due to diarrhea and clotting of machine.  Edema today.  Stll with abdominal pain.  5/7 - Complaining of abdominal pain.  +edema but improved    REVIEW OF SYSTEMS:    10 point ROS reviewed and is as per HPI/daily summary or otherwise negative    PMH/PSH/SH/FH: Reviewed and unchanged since admission note  CURRENT MEDICATIONS: Reviewed from admission to present day    VS:  /72   Pulse 82   Temp 37.6 °C (99.7 °F) (Temporal)   Resp 16   Wt 79.5 kg (175 lb 4.3 oz)   SpO2 96%   BMI 26.65 kg/m²   Physical " Exam  Constitutional:       General: He is not in acute distress.     Appearance: He is not ill-appearing.   HENT:      Head: Normocephalic and atraumatic.      Nose: No rhinorrhea.      Mouth/Throat:      Mouth: Mucous membranes are moist.      Pharynx: No posterior oropharyngeal erythema.   Eyes:      General: No scleral icterus.     Extraocular Movements: Extraocular movements intact.      Pupils: Pupils are equal, round, and reactive to light.   Cardiovascular:      Rate and Rhythm: Normal rate and regular rhythm.      Heart sounds: No murmur heard.    No friction rub. No gallop.   Pulmonary:      Effort: Pulmonary effort is normal. No respiratory distress.      Breath sounds: No wheezing, rhonchi or rales.   Abdominal:      General: Abdomen is flat. There is no distension.      Tenderness: There is abdominal tenderness (epigastric significant). There is no guarding or rebound.   Musculoskeletal:      Right lower leg: Edema (1+) present.      Left lower leg: Edema (1+) present.   Skin:     Capillary Refill: Capillary refill takes less than 2 seconds.      Coloration: Skin is not jaundiced or pale.      Comments: Wound on left lower extremity   Neurological:      General: No focal deficit present.      Mental Status: He is oriented to person, place, and time.   Psychiatric:         Behavior: Behavior normal.         Fluids:  In: 980 [P.O.:480; Dialysis:500]  Out: 1700     LABS:  Recent Labs     05/05/22  1516 05/06/22  0032 05/07/22  0506   SODIUM 132* 136 138   POTASSIUM 3.8 3.4* 3.9   CHLORIDE 96 97 101   CO2 21 26 24   GLUCOSE 97 66 112*   BUN 41* 25* 29*   CREATININE 8.89* 6.35* 6.84*   CALCIUM 6.5* 7.7* 7.8*       IMPRESSION:  · ESRD, due to long standing hypertension, HD TTS via port, has right AVF  · Upper abdominal pain, likely acute pancreatitis  · Hypocalcemia  · Alcoholic hepatitis  · Alcoholic pancreatitis, epigastric pain + lipase >3ULN + alcohol use disorder  · Gallbladder wall  thickening  · Elevated liver enzymes, AST>ALT 2  · Inflammatory arthritis, on etanercept  · H/o NSTEMI  · H/o PE  · Hypertension    PLAN:  -Repeat HD today then HD qTTS, with high calcium bath  -RUE AVF ultrasound with reduced flow volumes  -Please consult vascular surgery if for AVF if remains inpatient  -Careful with volume resuscitation given ESRD and anuria. Continue with oral resuscitation at this time as long as patient tolerates.  -Pain management per primary team  -PTH elevated. Low Ca and high phos  -Changed to PhosLo 1334mg PO TIDWM    Thank you for your consult.  We will continue to follow

## 2022-05-07 NOTE — CARE PLAN
The patient is Watcher - Medium risk of patient condition declining or worsening    Shift Goals  Clinical Goals: Dialysis, pain management  Patient Goals: pain management  Family Goals: na    Progress made toward(s) clinical / shift goals:    Problem: Pain - Standard  Goal: Alleviation of pain or a reduction in pain to the patient’s comfort goal  Outcome: Progressing     Problem: Knowledge Deficit - Standard  Goal: Patient and family/care givers will demonstrate understanding of plan of care, disease process/condition, diagnostic tests and medications  Outcome: Progressing       Patient is not progressing towards the following goals:

## 2022-05-07 NOTE — PROGRESS NOTES
Assumed care of patient at bedside report from Duy GARCIA. Pt is AOx4, on RA, with c/o 9/10 abdominal pain. Updated on POC. Call light within reach. Whiteboard updated. Fall precautions in place. Bed locked and in lowest position. All questions answered. No other needs indicated at this time.

## 2022-05-07 NOTE — CARE PLAN
The patient is Stable - Low risk of patient condition declining or worsening    Shift Goals  Clinical Goals: Dialysis, pain management  Patient Goals: pain management  Family Goals: na    Progress made toward(s) clinical / shift goals:  Patient updated on POC, all questions answered. Pt medicated for pain, per MAR    Problem: Pain - Standard  Goal: Alleviation of pain or a reduction in pain to the patient’s comfort goal  Outcome: Progressing     Problem: Knowledge Deficit - Standard  Goal: Patient and family/care givers will demonstrate understanding of plan of care, disease process/condition, diagnostic tests and medications  Outcome: Progressing

## 2022-05-07 NOTE — THERAPY
"Physical Therapy   Initial Evaluation     Patient Name: Junior Proctor  Age:  71 y.o., Sex:  male  Medical Record #: [id number]  Today's Date: 5/7/2022     Precautions  Precautions: (P) Fall Risk    Assessment  Patient is 71 y.o. male with admitting diagnosis of chest pain, acute pancreatitis. PMHx includes  ESRD on Hemodialysis T/Th/Sat, Hep C, Alcohol dependency, anemia, gout, MI. Prior to admission pt lived independently in 2nd floor apartment with flight of stairs to access, reports difficulty with stairs and with accessing tub/shower, pt ambulates without AD, independent with ADLs/self care.     Pt requires mod assist for sit to/from supine, min assist for sit to stand at EOB and for side stepping to HOB. Pt declines/refuses other ambulation at this time due to pain in abdomen/low back and L LE. Significant pt education regarding importance of participation in mobility/therapy, supine and seated therex taught, encouraged to sit up for meals. Nursing aware of POC. Pt will benefit from continued PT services in acute setting, as well as in post acute setting.         Plan    Recommend Physical Therapy 4 times per week until therapy goals are met for the following treatments:  Bed Mobility, Gait Training, Neuro Re-Education / Balance, Stair Training, Therapeutic Activities, and Therapeutic Exercises    DC Equipment Recommendations: (P) Unable to determine at this time  Discharge Recommendations: (P) Recommend post-acute placement for additional physical therapy services prior to discharge home       Subjective    \"I'm not getting up and moving. I'm in pain.\"       05/07/22 1102   Initial Contact Note    Initial Contact Note Order Received and Verified, Physical Therapy Evaluation in Progress with Full Report to Follow.   Precautions   Precautions Fall Risk   Vitals   O2 (LPM) 0   O2 Delivery Device None - Room Air   Pain 0 - 10 Group   Location Abdomen;Back;Leg   Location Orientation Lower;Left   Therapist Pain " Assessment Post Activity Pain Same as Prior to Activity;7   Prior Living Situation   Prior Services Home-Independent   Housing / Facility 2 Story Apartment / Condo   Steps Into Home 12   Bathroom Set up Bathtub / Shower Combination   Equipment Owned Single Point Cane   Lives with - Patient's Self Care Capacity Alone and Able to Care For Self   Comments pt reports  he has had difficulty with steps to enter home, as well as with getting into/out of the tub. pt says he plans to move to first floor   Prior Level of Functional Mobility   Bed Mobility Independent   Transfer Status Independent   Ambulation Independent   Assistive Devices Used None   Stairs Required Assist   History of Falls   History of Falls No   Cognition    Cognition / Consciousness WDL   Level of Consciousness Alert   Comments resistant to therapy participation   Strength Lower Body   Lower Body Strength  WDL   Strength Upper Body   Upper Body Strength  WDL   Balance Assessment   Sitting Balance (Static) Fair +   Sitting Balance (Dynamic) Fair   Standing Balance (Static) Fair -   Standing Balance (Dynamic) Fair -   Weight Shift Sitting Fair   Weight Shift Standing Fair   Gait Analysis   Gait Level Of Assist Contact Guard Assist   Assistive Device None   Distance (Feet) 2   # of Times Distance was Traveled 1   Comments sidestep to head of bed, pt declines other ambulation   Bed Mobility    Supine to Sit Moderate Assist   Sit to Supine Minimal Assist   Scooting Standby Assist   Functional Mobility   Sit to Stand Contact Guard Assist   Bed, Chair, Wheelchair Transfer Refused   Toilet Transfers Refused   Mobility supine to/from sit EOB, stand at EOB, sidestep to HOB, pt declines other mobility   How much difficulty does the patient currently have...   Turning over in bed (including adjusting bedclothes, sheets and blankets)? 4   Sitting down on and standing up from a chair with arms (e.g., wheelchair, bedside commode, etc.) 1   Moving from lying on back to  sitting on the side of the bed? 1   How much help from another person does the patient currently need...   Moving to and from a bed to a chair (including a wheelchair)? 3   Need to walk in a hospital room? 3   Climbing 3-5 steps with a railing? 2   6 clicks Mobility Score 14   Activity Tolerance   Sitting in Chair NT   Sitting Edge of Bed 10 min   Standing 1 min   Short Term Goals    Short Term Goal # 1 supine to/from sit EOB with HOB flat with supervision in 6 visits   Short Term Goal # 2 sit to stand at EOB, toilet and chair with SBA in 6 visits   Short Term Goal # 3 ambulation with LRD and CGA 50 feet in 6 visits   Short Term Goal # 4 ascend/descend 12 steps with use of railing and CGA in 6 visits   Education Group   Education Provided Role of Physical Therapist;Transfer Status   Role of Physical Therapist Patient Response Patient;Acceptance;Explanation;Verbal Demonstration   Transfer Status Patient Response Patient;Acceptance;Explanation;Verbal Demonstration;Action Demonstration   Problem List    Problems Impaired Bed Mobility;Impaired Transfers;Impaired Ambulation;Functional Strength Deficit;Impaired Balance;Decreased Activity Tolerance   Anticipated Discharge Equipment and Recommendations   DC Equipment Recommendations Unable to determine at this time   Discharge Recommendations Recommend post-acute placement for additional physical therapy services prior to discharge home       Roseann Murphy DPT

## 2022-05-07 NOTE — PROGRESS NOTES
[facility] Progress Note      HD today x 3 hours per Dr. Melgar .   Initiated at 1220 and ended at 1520.   Patient stable throughout tx. No hypotensive episodes. Denies pain. No SOB. VSS.     UF Net: 2,000 mL    L permacath intact and patent with good flow during dialysis. No s/sx of infection, no redness nor bleeding noted on the CVC site. CVC dressing clean, dry and intact.   Blood returned and CVC port flushed with NS and Heparin 1000 units/mL used  to lock catheter given per designated amount. CVC port clamped, capped and labeled accordingly.     Dressing changed per protocol.        Report given to Primary RAULITO Morales RN.

## 2022-05-07 NOTE — PROGRESS NOTES
Monitor summary    SR-ST w/ pvc, pac, pt had 34 beats of vtach iin dialysis.   Rate   .17/.08/.36

## 2022-05-07 NOTE — PROGRESS NOTES
Assumed care of patient at bedside report from NOC RN. Updated on POC. Call light within reach. Whiteboard updated. Fall precautions in place. Bed locked and in lowest position. All questions answered. No other needs indicated at this time.

## 2022-05-08 LAB
ALBUMIN SERPL BCP-MCNC: 3.2 G/DL (ref 3.2–4.9)
ALBUMIN/GLOB SERPL: 1 G/DL
ALP SERPL-CCNC: 132 U/L (ref 30–99)
ALT SERPL-CCNC: 101 U/L (ref 2–50)
ANION GAP SERPL CALC-SCNC: 12 MMOL/L (ref 7–16)
AST SERPL-CCNC: 52 U/L (ref 12–45)
BILIRUB SERPL-MCNC: 1.1 MG/DL (ref 0.1–1.5)
BUN SERPL-MCNC: 25 MG/DL (ref 8–22)
CALCIUM SERPL-MCNC: 8.9 MG/DL (ref 8.5–10.5)
CHLORIDE SERPL-SCNC: 97 MMOL/L (ref 96–112)
CO2 SERPL-SCNC: 25 MMOL/L (ref 20–33)
CREAT SERPL-MCNC: 6.03 MG/DL (ref 0.5–1.4)
GFR SERPLBLD CREATININE-BSD FMLA CKD-EPI: 9 ML/MIN/1.73 M 2
GLOBULIN SER CALC-MCNC: 3.2 G/DL (ref 1.9–3.5)
GLUCOSE SERPL-MCNC: 84 MG/DL (ref 65–99)
POTASSIUM SERPL-SCNC: 4.5 MMOL/L (ref 3.6–5.5)
PROT SERPL-MCNC: 6.4 G/DL (ref 6–8.2)
SODIUM SERPL-SCNC: 134 MMOL/L (ref 135–145)

## 2022-05-08 PROCEDURE — 99221 1ST HOSP IP/OBS SF/LOW 40: CPT | Performed by: SURGERY

## 2022-05-08 PROCEDURE — 700111 HCHG RX REV CODE 636 W/ 250 OVERRIDE (IP): Performed by: INTERNAL MEDICINE

## 2022-05-08 PROCEDURE — 700102 HCHG RX REV CODE 250 W/ 637 OVERRIDE(OP): Performed by: INTERNAL MEDICINE

## 2022-05-08 PROCEDURE — A9270 NON-COVERED ITEM OR SERVICE: HCPCS | Performed by: INTERNAL MEDICINE

## 2022-05-08 PROCEDURE — 770020 HCHG ROOM/CARE - TELE (206)

## 2022-05-08 PROCEDURE — 36415 COLL VENOUS BLD VENIPUNCTURE: CPT

## 2022-05-08 PROCEDURE — 80053 COMPREHEN METABOLIC PANEL: CPT

## 2022-05-08 PROCEDURE — 99232 SBSQ HOSP IP/OBS MODERATE 35: CPT | Mod: FS | Performed by: NURSE PRACTITIONER

## 2022-05-08 RX ADMIN — HYDROMORPHONE HYDROCHLORIDE 0.25 MG: 1 INJECTION, SOLUTION INTRAMUSCULAR; INTRAVENOUS; SUBCUTANEOUS at 20:48

## 2022-05-08 RX ADMIN — HYDROMORPHONE HYDROCHLORIDE 0.25 MG: 1 INJECTION, SOLUTION INTRAMUSCULAR; INTRAVENOUS; SUBCUTANEOUS at 10:13

## 2022-05-08 RX ADMIN — OXYCODONE 5 MG: 5 TABLET ORAL at 09:07

## 2022-05-08 RX ADMIN — OXYCODONE 5 MG: 5 TABLET ORAL at 19:34

## 2022-05-08 RX ADMIN — OMEPRAZOLE 20 MG: 20 CAPSULE, DELAYED RELEASE ORAL at 05:03

## 2022-05-08 RX ADMIN — OXYCODONE 5 MG: 5 TABLET ORAL at 05:03

## 2022-05-08 RX ADMIN — HYDROMORPHONE HYDROCHLORIDE 0.25 MG: 1 INJECTION, SOLUTION INTRAMUSCULAR; INTRAVENOUS; SUBCUTANEOUS at 00:55

## 2022-05-08 RX ADMIN — HYDROMORPHONE HYDROCHLORIDE 0.25 MG: 1 INJECTION, SOLUTION INTRAMUSCULAR; INTRAVENOUS; SUBCUTANEOUS at 15:03

## 2022-05-08 RX ADMIN — TRAZODONE HYDROCHLORIDE 50 MG: 50 TABLET ORAL at 22:42

## 2022-05-08 RX ADMIN — HYDROMORPHONE HYDROCHLORIDE 0.25 MG: 1 INJECTION, SOLUTION INTRAMUSCULAR; INTRAVENOUS; SUBCUTANEOUS at 06:42

## 2022-05-08 RX ADMIN — OXYCODONE 5 MG: 5 TABLET ORAL at 13:45

## 2022-05-08 ASSESSMENT — ENCOUNTER SYMPTOMS
ORTHOPNEA: 0
DIARRHEA: 0
HEADACHES: 0
SORE THROAT: 0
CHILLS: 0
WEAKNESS: 1
VOMITING: 0
COUGH: 0
MUSCULOSKELETAL NEGATIVE: 1
ABDOMINAL PAIN: 1
NAUSEA: 0
DEPRESSION: 0
SHORTNESS OF BREATH: 0
NERVOUS/ANXIOUS: 0
EYES NEGATIVE: 1

## 2022-05-08 ASSESSMENT — FIBROSIS 4 INDEX: FIB4 SCORE: 5.85

## 2022-05-08 NOTE — CARE PLAN
The patient is Watcher - Medium risk of patient condition declining or worsening    Shift Goals  Clinical Goals: pain mangement; sleep      Progress made toward(s) clinical / shift goals:  Patient still requiring pain medications every 3 hours.     Patient is not progressing towards the following goals:      Problem: Knowledge Deficit - Standard  Goal: Patient and family/care givers will demonstrate understanding of plan of care, disease process/condition, diagnostic tests and medications  Outcome: Progressing     Problem: Fall Risk  Goal: Patient will remain free from falls  Outcome: Progressing

## 2022-05-08 NOTE — CONSULTS
VASCULAR SURGERY CONSULTATION      DATE OF CONSULTATION: 5/8/2022     REFERRING PHYSICIAN: Jarek Philip MD     CONSULTING PHYSICIAN: Jone Hilton M.D.    REASON FOR CONSULTATION: Evaluate patient with abnormal AV fistula duplex    HISTORY OF PRESENT ILLNESS: The patient is a 71 y.o. alcoholic gentleman who was admitted with pancreatitis and multiple medical problems.  The patient has a fistula in his right upper extremity which was created at the [facility].  The patient has also has a PermCath in the left internal jugular vein.  Patient's fistula is not being accessed due to use small size and it being in the process of maturing.  [facility]  A duplex was performed of the fistula during this hospitalization which demonstrates no obvious stenosis but there is some decreased flow.  I was consulted for this abnormal fistula duplex.    PAST MEDICAL HISTORY:  has a past medical history of Gout, Hemodialysis patient (HCC), Hypertension, Kidney disease, and MI (myocardial infarction) ([facility]).     PAST SURGICAL HISTORY:  has a past surgical history that includes upper gi endoscopy,diagnosis (N/A, 12/10/2021); colonoscopy,diagnostic (N/A, 12/10/2021); upper gi endoscopy,biopsy (N/A, 12/10/2021); and colonoscopy,biopsy (N/A, 12/10/2021).     ALLERGIES:   Allergies   Allergen Reactions   • Motrin [Ibuprofen]      hhx of stomach ulcers        CURRENT MEDICATIONS:   Home Medications     Reviewed by Rishabh Dillon (Pharmacy Tech) on 05/03/22 at 1910  Med List Status: Complete   Medication Last Dose Status   allopurinol (ZYLOPRIM) 100 MG Tab UNK Active   atorvastatin (LIPITOR) 20 MG Tab UNK Active   Buprenorphine 10 MCG/HR PATCH WEEKLY UNK Active   carvedilol (COREG) 6.25 MG Tab UNK Active   diclofenac sodium (VOLTAREN) 1 % Gel UNK Active   Etanercept 50 MG/ML Solution Prefilled Syringe UNK Active   folic acid (FOLVITE) 1 MG Tab UNK Active   gabapentin  (NEURONTIN) 300 MG Cap UNK Active   lidocaine (LIDODERM) 5 % Patch UNK Active   pantoprazole (PROTONIX) 40 MG Tablet Delayed Response UNK Active   predniSONE (DELTASONE) 5 MG Tab UNK Active   sevelamer (RENAGEL) 800 MG Tab UNK Active   traZODone (DESYREL) 50 MG Tab UNK Active   vitamin D2, Ergocalciferol, (DRISDOL) 1.25 MG (42536 UT) Cap capsule UNK Active                FAMILY HISTORY:   Family History   Problem Relation Age of Onset   • Diabetes Mother        History reviewed. No pertinent family history.       SOCIAL HISTORY:   Social History     Tobacco Use   • Smoking status: Former Smoker   • Smokeless tobacco: Never Used   Vaping Use   • Vaping Use: Never used   Substance and Sexual Activity   • Alcohol use: Yes     Comment: occ   • Drug use: Never   • Sexual activity: Not on file       ROS   Patient reports abdominal pain  All other systems were reviewed and are negative (AMA/CMS criteria)                Physical Exam  Constitutional:       Appearance: Normal appearance.   HENT:      Head: Normocephalic and atraumatic.      Nose: Nose normal.      Mouth/Throat:      Mouth: Mucous membranes are moist.   Eyes:      Pupils: Pupils are equal, round, and reactive to light.   Cardiovascular:      Rate and Rhythm: Normal rate and regular rhythm.      Comments: AV fistula right upper extremity palpable thrill  Pulmonary:      Effort: Pulmonary effort is normal.      Breath sounds: Normal breath sounds.   Abdominal:      Palpations: Abdomen is soft.      Tenderness: There is abdominal tenderness.   Skin:     General: Skin is warm.             LABORATORY VALUES:   Recent Labs     05/05/22  1516 05/06/22  0032 05/07/22  0506   WBC 5.9 5.6 6.1   RBC 2.74* 2.97* 2.52*   HEMOGLOBIN 8.8* 9.4* 8.2*   HEMATOCRIT 25.9* 28.3* 24.1*   MCV 94.5 95.3 95.6   MCH 32.1 31.6 32.5   MCHC 34.0 33.2* 34.0   RDW 65.9* 68.4* 70.2*   PLATELETCT 62* 66* 61*   MPV 10.9 10.9 10.5     Recent Labs     05/05/22  1516 05/06/22  0032  05/07/22  0506   SODIUM 132* 136 138   POTASSIUM 3.8 3.4* 3.9   CHLORIDE 96 97 101   CO2 21 26 24   GLUCOSE 97 66 112*   BUN 41* 25* 29*   CREATININE 8.89* 6.35* 6.84*   CALCIUM 6.5* 7.7* 7.8*     Recent Labs     05/05/22  1516 05/06/22  0032 05/07/22  0506   ASTSGOT 395* 317* 106*   ALTSGPT 269* 248* 133*   TBILIRUBIN 2.0* 2.2* 1.1   ALKPHOSPHAT 149* 152* 130*   GLOBULIN 3.3 3.0 2.7            IMAGING:   US-HEMODIALYSIS GRAFT DUPLEX COMP UPPER EXTREMITY   Final Result      EC-ECHOCARDIOGRAM COMPLETE W/O CONT   Final Result      NM-BILIARY HIDA SCAN FATTY MEAL   Final Result      Normal hepatobiliary scan.      This study was obtained utilizing fatty meal challenge to induce gallbladder contraction due to national shortage of cholecystokinin.  There are no national standards for gallbladder ejection fraction calculated utilizing fatty meal challenge.  An    ejection fraction greater than 35% is most likely normal.  Gallbladder ejection fraction less than 35% may be abnormal but could be falsely positive.  Therefore, this test could be falsely positive.  Consider repeat imaging once cholecystokinin becomes    available.      US-RUQ   Final Result      1.  Gallbladder polyp versus nonshadowing gallstone identified.      2.  There is abnormal gallbladder wall thickening which is significantly increased compared to the prior exam. Cholecystitis is a possibility.      DX-CHEST-PORTABLE (1 VIEW)   Final Result         No acute cardiac or pulmonary abnormality is identified.          IMPRESSION AND PLAN:     Active Hospital Problems    Diagnosis    • Arthritis [M19.90]    • Chest pain [R07.9]    • Alcohol dependence (HCC) [F10.20]    • Hepatitis [K75.9]    • Acute pancreatitis [K85.90]    • AP (abdominal pain) [R10.9]    • Hepatitis C antibody positive in blood [R76.8]    • ESRD on dialysis (HCC) [N18.6, Z99.2]    • Thrombocytopenia (HCC) [D69.6]    • Transaminitis [R74.01]    • Anemia [D64.9]      Abnormal AV fistula  duplex of unknown significance.  The fistula is patent and is not something that will be managed during this acute hospitalization.  The patient is not using the fistula for dialysis as the patient has a PermCath in place  This patient is currently being managed by the [facility] vascular surgeons and this is an outpatient issue.  Recommend follow-up with the patient's access surgeons as an outpatient after current acute illnesses have improved  Vascular surgery signing off  ____________________________________   Jone Hilton M.D.          DD: 5/8/2022   DT: 8:54 AM

## 2022-05-08 NOTE — CARE PLAN
The patient is Watcher - Medium risk of patient condition declining or worsening    Shift Goals  Clinical Goals: Pain management  Patient Goals: pain management  Family Goals: na    Progress made toward(s) clinical / shift goals: Patient updated on POC, all questions answered. Patient has remained free from falls. All fall precautions in place: non-slip socks, bed locked and in lowest position, bed alarm on, call light within reach. Pt medicated for pain per MAR.       Problem: Pain - Standard  Goal: Alleviation of pain or a reduction in pain to the patient’s comfort goal  Outcome: Progressing     Problem: Knowledge Deficit - Standard  Goal: Patient and family/care givers will demonstrate understanding of plan of care, disease process/condition, diagnostic tests and medications  Outcome: Progressing     Problem: Fall Risk  Goal: Patient will remain free from falls  Outcome: Progressing

## 2022-05-08 NOTE — PROGRESS NOTES
Hospital Medicine Daily Progress Note    Date of Service  5/8/2022    Chief Complaint  Junior Proctor is a 71 y.o. male admitted 5/3/2022 with abdominal pain    Hospital Course  Abran Proctor is  a 71-year-old male with a past medical history of end-stage renal disease on hemodialysis Tuesday, Thursday and Saturday, peptic ulcer disease, alcohol abuse who presented with a syncopal episode while he was undergoing hemodialysis.  After that the patient developed severe epigastric abdominal pain with radiation to bilateral upper quadrants of his abdomen. He was brought to the hospital for evaluation. Patient tachycardic, afebrile, and without leukocytosis. Transaminitis noted with elevated lipase. RUQ US with gallbladder wall thickening. HIDA scan without evidence of cholecystitis.       Interval Problem Update  Pt complaining of abdominal pain this morning, diet changed to clear liquid until this resolves. Evaluated by PT/OT, recommending post acute placement on discharge.     I have personally seen and examined the patient at bedside. I discussed the plan of care with patient, bedside RN, charge RN and Dr. Philip.    Consultants/Specialty  general surgery, nephrology and vascular surgery    Code Status  Full Code     Disposition  Patient is not medically cleared for discharge.   Anticipate discharge to to skilled nursing facility.  I have placed the appropriate orders for post-discharge needs.    Review of Systems  Review of Systems   Constitutional: Positive for malaise/fatigue. Negative for chills.   HENT: Negative for congestion and sore throat.    Eyes: Negative.    Respiratory: Negative for cough and shortness of breath.    Cardiovascular: Negative for chest pain and orthopnea.   Gastrointestinal: Positive for abdominal pain. Negative for diarrhea, nausea and vomiting.   Genitourinary:        Anuric     Musculoskeletal: Negative.    Skin: Negative.    Neurological: Positive for weakness. Negative for headaches.    Psychiatric/Behavioral: Negative for depression. The patient is not nervous/anxious.    All other systems reviewed and are negative.       Physical Exam  Temp:  [37 °C (98.6 °F)-37.2 °C (99 °F)] 37 °C (98.6 °F)  Pulse:  [83-97] 85  Resp:  [16-19] 16  BP: ()/(53-68) 137/65  SpO2:  [94 %-98 %] 97 %    Physical Exam  Vitals and nursing note reviewed.   Constitutional:       General: He is not in acute distress.     Appearance: He is ill-appearing.   HENT:      Head: Normocephalic and atraumatic.      Right Ear: External ear normal.      Left Ear: External ear normal.      Nose: Nose normal. No congestion.      Mouth/Throat:      Mouth: Mucous membranes are moist.      Pharynx: Oropharynx is clear.   Eyes:      General:         Right eye: No discharge.         Left eye: No discharge.      Pupils: Pupils are equal, round, and reactive to light.   Cardiovascular:      Rate and Rhythm: Normal rate and regular rhythm.      Pulses: Normal pulses.      Heart sounds: Normal heart sounds.   Pulmonary:      Effort: Pulmonary effort is normal.      Breath sounds: Normal breath sounds.   Abdominal:      General: Bowel sounds are normal. There is no distension.      Palpations: Abdomen is soft.      Tenderness: There is abdominal tenderness in the right upper quadrant and epigastric area.   Musculoskeletal:      Cervical back: No tenderness.      Right lower le+ Edema present.      Left lower le+ Edema present.   Skin:     General: Skin is warm and dry.      Capillary Refill: Capillary refill takes less than 2 seconds.      Coloration: Skin is not jaundiced.   Neurological:      General: No focal deficit present.      Mental Status: He is alert and oriented to person, place, and time. Mental status is at baseline.   Psychiatric:         Mood and Affect: Mood normal.         Behavior: Behavior normal.         Fluids    Intake/Output Summary (Last 24 hours) at 2022 1021  Last data filed at 2022 204  Gross per  24 hour   Intake 620 ml   Output 2500 ml   Net -1880 ml       Laboratory  Recent Labs     05/05/22  1516 05/06/22  0032 05/07/22  0506   WBC 5.9 5.6 6.1   RBC 2.74* 2.97* 2.52*   HEMOGLOBIN 8.8* 9.4* 8.2*   HEMATOCRIT 25.9* 28.3* 24.1*   MCV 94.5 95.3 95.6   MCH 32.1 31.6 32.5   MCHC 34.0 33.2* 34.0   RDW 65.9* 68.4* 70.2*   PLATELETCT 62* 66* 61*   MPV 10.9 10.9 10.5     Recent Labs     05/06/22  0032 05/07/22  0506 05/08/22  0811   SODIUM 136 138 134*   POTASSIUM 3.4* 3.9 4.5   CHLORIDE 97 101 97   CO2 26 24 25   GLUCOSE 66 112* 84   BUN 25* 29* 25*   CREATININE 6.35* 6.84* 6.03*   CALCIUM 7.7* 7.8* 8.9                   Imaging  US-HEMODIALYSIS GRAFT DUPLEX COMP UPPER EXTREMITY   Final Result      EC-ECHOCARDIOGRAM COMPLETE W/O CONT   Final Result      NM-BILIARY HIDA SCAN FATTY MEAL   Final Result      Normal hepatobiliary scan.      This study was obtained utilizing fatty meal challenge to induce gallbladder contraction due to national shortage of cholecystokinin.  There are no national standards for gallbladder ejection fraction calculated utilizing fatty meal challenge.  An    ejection fraction greater than 35% is most likely normal.  Gallbladder ejection fraction less than 35% may be abnormal but could be falsely positive.  Therefore, this test could be falsely positive.  Consider repeat imaging once cholecystokinin becomes    available.      US-RUQ   Final Result      1.  Gallbladder polyp versus nonshadowing gallstone identified.      2.  There is abnormal gallbladder wall thickening which is significantly increased compared to the prior exam. Cholecystitis is a possibility.      DX-CHEST-PORTABLE (1 VIEW)   Final Result         No acute cardiac or pulmonary abnormality is identified.           Assessment/Plan  * Acute pancreatitis- (present on admission)  Assessment & Plan  Likely secondary to alcohol vs steroids  Lipase trending down and pain improving  Patient tolerating renal diet   Oral hydration in  setting of ESRD        AP (abdominal pain)- (present on admission)  Assessment & Plan  Most likely secondary to alcoholic hepatitis and alcohol induced pancreatitis  No evidence of cholecystitis on HIDA scan  Blood cultures negative  Pain control with IV fentanyl, monitor respiratory status closely    Hepatitis- (present on admission)  Assessment & Plan  Complicated by history of hepatitis C and alcohol  Hold atorvastatin  Labs and history suggest alcoholic hepatitis  HIDA without evidence of obstruction      Alcohol dependence (HCC)- (present on admission)  Assessment & Plan  Counseled on alcohol cessation    Chest pain- (present on admission)  Assessment & Plan  Atypical, no current chest pain, no acute ischemic changes on EKG complicated by history of coronary artery, abnormal troponin but also on exam consistent with epigastric and right upper quadrant pain.    Echo completed- EF 65%, right ventricular systolic pressure has decreased from 50mmHg to 30mmHg    Hepatitis C antibody positive in blood- (present on admission)  Assessment & Plan  hCv not detected   HCV RNA was previously negative    ESRD on dialysis (HCC)- (present on admission)  Assessment & Plan  Scheduled dialysis TRS   Nephrology following  HD today  RUE AF US with reduced flow volumes, [facility]   Oral hydration in setting of pancreatitis with ESRD    Thrombocytopenia (HCC)- (present on admission)  Assessment & Plan  Likely secondary to alcohol  Hold heparin  Trend CBC    Transaminitis- (present on admission)  Assessment & Plan  Trending down  Nonobstructive pattern  HIDA scan completed, no evidence of cholecystitis   Continue to trend      Anemia- (present on admission)  Assessment & Plan  Secondary to end-stage renal failure  Macrocytic hypochromic   Folate WNL  B12 [id number]    Arthritis- (present on admission)  Assessment & Plan  Hold home Etanercept         VTE prophylaxis: SCDs/TEDs    I have performed a  physical exam and reviewed and updated ROS and Plan today (5/8/2022). In review of yesterday's note (5/7/2022), there are no changes except as documented above.

## 2022-05-08 NOTE — PROGRESS NOTES
"[facility] -  PROGRESS NOTE               Author: Harrison Melgar M.D. Date & Time: 5/8/2022  10:28 AM     HPI:  Per Dr. Vargas  \"Mr. Proctor is a very pleasant 70 y/o male known to us from outpt dialysis. He gets his treatments at [facility] dialysis unit qTTS via right IJ permcath.  He was at his routine HD treatment earlier today.  After about 30 or so minutes his BP dropped.  He tells me \"I passed out\" but that was not witnessed at the HD unit.  He did develop chest pain radiating to his back.  Dialysis was discontinued and he was referred to the ED at [facility]. At the time of presentation to the ED his BP was now elevated at 171/74.  He is also complaining of ABD pain described as generalized and intermittent.  No change to bowel habits.  No Diarrhea or constipation.  No N/V.  No recent change to meds.  No OTC meds.  Has some LE edema, no recent change.  He does not routinely take his BP meds before HD and did not take this AM.  RUQ U/S showed thickened gallbladder wall.  No stones.  LFTs are elevated without rise in bilirubin.  Elevated Lipase as well.  No F/C.  No cough.  We have been asked to see him regarding his dialysis needs. \"    DAILY NEPHROLOGY SUMMARY:  5/4 - Patient is having upper abdominal pain, nausea. No SOB, palpitations, cough, melena or hematochezia.  5/5 - States he's hungry.  Still having abdominal pain.    5/6 - Dialysis cut short yesterday due to diarrhea and clotting of machine.  Edema today.  Stll with abdominal pain.  5/7 - Complaining of abdominal pain.  +edema but improved  5/8 - Still with abdominal pain.  Not happy about being on liquid diet.  HD done yesterday with 2.5L UF    REVIEW OF SYSTEMS:    10 point ROS reviewed and is as per HPI/daily summary or otherwise negative    PMH/PSH/SH/FH: Reviewed and unchanged since admission note  CURRENT MEDICATIONS: Reviewed from admission to present day    VS:  /65   Pulse 85   Temp 37 °C (98.6 °F) " (Temporal)   Resp 16   Wt 79.4 kg (175 lb 0.7 oz)   SpO2 97%   BMI 26.62 kg/m²   Physical Exam  Constitutional:       General: He is not in acute distress.     Appearance: He is not ill-appearing.   HENT:      Head: Normocephalic and atraumatic.      Nose: No rhinorrhea.      Mouth/Throat:      Mouth: Mucous membranes are moist.      Pharynx: No posterior oropharyngeal erythema.   Eyes:      General: No scleral icterus.     Extraocular Movements: Extraocular movements intact.      Pupils: Pupils are equal, round, and reactive to light.   Cardiovascular:      Rate and Rhythm: Normal rate and regular rhythm.      Heart sounds: No murmur heard.    No friction rub. No gallop.   Pulmonary:      Effort: Pulmonary effort is normal. No respiratory distress.      Breath sounds: No wheezing, rhonchi or rales.   Abdominal:      General: Abdomen is flat. There is no distension.      Tenderness: There is abdominal tenderness (epigastric significant). There is no guarding or rebound.   Musculoskeletal:      Right lower leg: Edema (1+) present.      Left lower leg: Edema (1+) present.   Skin:     Capillary Refill: Capillary refill takes less than 2 seconds.      Coloration: Skin is not jaundiced or pale.      Comments: Wound on left lower extremity   Neurological:      General: No focal deficit present.      Mental Status: He is oriented to person, place, and time.   Psychiatric:         Behavior: Behavior normal.         Fluids:  In: 620 [P.O.:120; Dialysis:500]  Out: 2500     LABS:  Recent Labs     05/06/22  0032 05/07/22  0506 05/08/22  0811   SODIUM 136 138 134*   POTASSIUM 3.4* 3.9 4.5   CHLORIDE 97 101 97   CO2 26 24 25   GLUCOSE 66 112* 84   BUN 25* 29* 25*   CREATININE 6.35* 6.84* 6.03*   CALCIUM 7.7* 7.8* 8.9       IMPRESSION:  · ESRD, due to long standing hypertension, HD TTS via port, has right AVF  · Upper abdominal pain, likely acute pancreatitis  · Hypocalcemia  · Alcoholic hepatitis  · Alcoholic pancreatitis,  epigastric pain + lipase >3ULN + alcohol use disorder  · Gallbladder wall thickening  · Elevated liver enzymes, AST>ALT 2  · Inflammatory arthritis, on etanercept  · H/o NSTEMI  · H/o PE  · Hypertension    PLAN:  -HD qTTS  -RUE AVF ultrasound with reduced flow volumes  -Please consult vascular surgery if for AVF if remains inpatient  -Careful with volume resuscitation given ESRD and anuria. Continue with oral resuscitation at this time as long as patient tolerates.  -Pain management per primary team  -PTH elevated. Low Ca and high phos  -Changed to PhosLo 1334mg PO TIDWM    Thank you for your consult.  We will continue to follow

## 2022-05-08 NOTE — PROGRESS NOTES
Assumed care of patient at 1905. Patient is Aox 4. Patient is resting in bed. Call light within reach, hourly rounding in place, personal belongings in reach, upper bed rails up, bed in lowest and locked position. No further needs at this time.

## 2022-05-09 LAB
ALBUMIN SERPL BCP-MCNC: 3 G/DL (ref 3.2–4.9)
ALBUMIN/GLOB SERPL: 0.9 G/DL
ALP SERPL-CCNC: 128 U/L (ref 30–99)
ALT SERPL-CCNC: 78 U/L (ref 2–50)
ANION GAP SERPL CALC-SCNC: 14 MMOL/L (ref 7–16)
AST SERPL-CCNC: 42 U/L (ref 12–45)
BILIRUB SERPL-MCNC: 0.8 MG/DL (ref 0.1–1.5)
BUN SERPL-MCNC: 34 MG/DL (ref 8–22)
CA-I SERPL-SCNC: 1.1 MMOL/L (ref 1.1–1.3)
CALCIUM SERPL-MCNC: 8.4 MG/DL (ref 8.5–10.5)
CHLORIDE SERPL-SCNC: 96 MMOL/L (ref 96–112)
CO2 SERPL-SCNC: 24 MMOL/L (ref 20–33)
CREAT SERPL-MCNC: 7.79 MG/DL (ref 0.5–1.4)
EKG IMPRESSION: NORMAL
ERYTHROCYTE [DISTWIDTH] IN BLOOD BY AUTOMATED COUNT: 70.4 FL (ref 35.9–50)
GFR SERPLBLD CREATININE-BSD FMLA CKD-EPI: 7 ML/MIN/1.73 M 2
GLOBULIN SER CALC-MCNC: 3.3 G/DL (ref 1.9–3.5)
GLUCOSE SERPL-MCNC: 85 MG/DL (ref 65–99)
HCT VFR BLD AUTO: 24 % (ref 42–52)
HGB BLD-MCNC: 7.8 G/DL (ref 14–18)
LIPASE SERPL-CCNC: 116 U/L (ref 11–82)
MCH RBC QN AUTO: 32 PG (ref 27–33)
MCHC RBC AUTO-ENTMCNC: 32.5 G/DL (ref 33.7–35.3)
MCV RBC AUTO: 98.4 FL (ref 81.4–97.8)
PLATELET # BLD AUTO: 84 K/UL (ref 164–446)
PMV BLD AUTO: 10.4 FL (ref 9–12.9)
POTASSIUM SERPL-SCNC: 5.3 MMOL/L (ref 3.6–5.5)
PROT SERPL-MCNC: 6.3 G/DL (ref 6–8.2)
RBC # BLD AUTO: 2.44 M/UL (ref 4.7–6.1)
SODIUM SERPL-SCNC: 134 MMOL/L (ref 135–145)
WBC # BLD AUTO: 6.3 K/UL (ref 4.8–10.8)

## 2022-05-09 PROCEDURE — A9270 NON-COVERED ITEM OR SERVICE: HCPCS | Performed by: INTERNAL MEDICINE

## 2022-05-09 PROCEDURE — 80053 COMPREHEN METABOLIC PANEL: CPT

## 2022-05-09 PROCEDURE — A9270 NON-COVERED ITEM OR SERVICE: HCPCS | Performed by: STUDENT IN AN ORGANIZED HEALTH CARE EDUCATION/TRAINING PROGRAM

## 2022-05-09 PROCEDURE — 36415 COLL VENOUS BLD VENIPUNCTURE: CPT

## 2022-05-09 PROCEDURE — 82330 ASSAY OF CALCIUM: CPT

## 2022-05-09 PROCEDURE — 97166 OT EVAL MOD COMPLEX 45 MIN: CPT

## 2022-05-09 PROCEDURE — 700111 HCHG RX REV CODE 636 W/ 250 OVERRIDE (IP): Performed by: INTERNAL MEDICINE

## 2022-05-09 PROCEDURE — 700102 HCHG RX REV CODE 250 W/ 637 OVERRIDE(OP): Performed by: INTERNAL MEDICINE

## 2022-05-09 PROCEDURE — 97116 GAIT TRAINING THERAPY: CPT

## 2022-05-09 PROCEDURE — 83690 ASSAY OF LIPASE: CPT

## 2022-05-09 PROCEDURE — 93010 ELECTROCARDIOGRAM REPORT: CPT | Performed by: INTERNAL MEDICINE

## 2022-05-09 PROCEDURE — 770001 HCHG ROOM/CARE - MED/SURG/GYN PRIV*

## 2022-05-09 PROCEDURE — 97530 THERAPEUTIC ACTIVITIES: CPT

## 2022-05-09 PROCEDURE — 93005 ELECTROCARDIOGRAM TRACING: CPT | Performed by: NURSE PRACTITIONER

## 2022-05-09 PROCEDURE — 700102 HCHG RX REV CODE 250 W/ 637 OVERRIDE(OP): Performed by: STUDENT IN AN ORGANIZED HEALTH CARE EDUCATION/TRAINING PROGRAM

## 2022-05-09 PROCEDURE — 85027 COMPLETE CBC AUTOMATED: CPT

## 2022-05-09 PROCEDURE — 99232 SBSQ HOSP IP/OBS MODERATE 35: CPT | Mod: FS | Performed by: NURSE PRACTITIONER

## 2022-05-09 PROCEDURE — 97535 SELF CARE MNGMENT TRAINING: CPT

## 2022-05-09 RX ADMIN — OXYCODONE 5 MG: 5 TABLET ORAL at 04:49

## 2022-05-09 RX ADMIN — Medication 1334 MG: at 17:51

## 2022-05-09 RX ADMIN — Medication 1334 MG: at 13:07

## 2022-05-09 RX ADMIN — OXYCODONE 5 MG: 5 TABLET ORAL at 23:08

## 2022-05-09 RX ADMIN — OXYCODONE 5 MG: 5 TABLET ORAL at 13:09

## 2022-05-09 RX ADMIN — PANCRELIPASE 3000 UNITS: 15000; 3000; 9500 CAPSULE, DELAYED RELEASE ORAL at 13:07

## 2022-05-09 RX ADMIN — Medication 1334 MG: at 06:11

## 2022-05-09 RX ADMIN — HYDROMORPHONE HYDROCHLORIDE 0.25 MG: 1 INJECTION, SOLUTION INTRAMUSCULAR; INTRAVENOUS; SUBCUTANEOUS at 19:38

## 2022-05-09 RX ADMIN — HYDROMORPHONE HYDROCHLORIDE 0.25 MG: 1 INJECTION, SOLUTION INTRAMUSCULAR; INTRAVENOUS; SUBCUTANEOUS at 14:33

## 2022-05-09 RX ADMIN — OXYCODONE 5 MG: 5 TABLET ORAL at 18:30

## 2022-05-09 RX ADMIN — PANCRELIPASE 3000 UNITS: 15000; 3000; 9500 CAPSULE, DELAYED RELEASE ORAL at 17:51

## 2022-05-09 RX ADMIN — OXYCODONE 5 MG: 5 TABLET ORAL at 08:31

## 2022-05-09 RX ADMIN — HYDROMORPHONE HYDROCHLORIDE 0.25 MG: 1 INJECTION, SOLUTION INTRAMUSCULAR; INTRAVENOUS; SUBCUTANEOUS at 06:11

## 2022-05-09 RX ADMIN — OMEPRAZOLE 20 MG: 20 CAPSULE, DELAYED RELEASE ORAL at 05:23

## 2022-05-09 RX ADMIN — HYDROMORPHONE HYDROCHLORIDE 0.25 MG: 1 INJECTION, SOLUTION INTRAMUSCULAR; INTRAVENOUS; SUBCUTANEOUS at 09:26

## 2022-05-09 ASSESSMENT — GAIT ASSESSMENTS
DISTANCE (FEET): 35
ASSISTIVE DEVICE: FRONT WHEEL WALKER
DEVIATION: DECREASED BASE OF SUPPORT
GAIT LEVEL OF ASSIST: CONTACT GUARD ASSIST

## 2022-05-09 ASSESSMENT — COGNITIVE AND FUNCTIONAL STATUS - GENERAL
SUGGESTED CMS G CODE MODIFIER DAILY ACTIVITY: CK
TOILETING: A LITTLE
MOBILITY SCORE: 17
DRESSING REGULAR LOWER BODY CLOTHING: A LITTLE
DAILY ACTIVITIY SCORE: 18
CLIMB 3 TO 5 STEPS WITH RAILING: A LOT
MOVING TO AND FROM BED TO CHAIR: A LITTLE
TURNING FROM BACK TO SIDE WHILE IN FLAT BAD: A LITTLE
MOVING FROM LYING ON BACK TO SITTING ON SIDE OF FLAT BED: A LITTLE
STANDING UP FROM CHAIR USING ARMS: A LITTLE
HELP NEEDED FOR BATHING: A LOT
DRESSING REGULAR UPPER BODY CLOTHING: A LITTLE
PERSONAL GROOMING: A LITTLE
WALKING IN HOSPITAL ROOM: A LITTLE
SUGGESTED CMS G CODE MODIFIER MOBILITY: CK

## 2022-05-09 ASSESSMENT — ENCOUNTER SYMPTOMS
DEPRESSION: 0
NAUSEA: 0
SORE THROAT: 0
ABDOMINAL PAIN: 1
MUSCULOSKELETAL NEGATIVE: 1
EYES NEGATIVE: 1
CHILLS: 0
WEAKNESS: 1
COUGH: 0
NERVOUS/ANXIOUS: 0
ORTHOPNEA: 0
VOMITING: 0
HEADACHES: 0
SHORTNESS OF BREATH: 0
DIARRHEA: 0

## 2022-05-09 ASSESSMENT — PAIN DESCRIPTION - PAIN TYPE
TYPE: ACUTE PAIN

## 2022-05-09 ASSESSMENT — ACTIVITIES OF DAILY LIVING (ADL): TOILETING: INDEPENDENT

## 2022-05-09 NOTE — PROGRESS NOTES
Hospital Medicine Daily Progress Note    Date of Service  5/9/2022    Chief Complaint  Junior Proctor is a 71 y.o. male admitted 5/3/2022 with abdominal pain    Hospital Course  Abran Proctor is  a 71-year-old male with a past medical history of end-stage renal disease on hemodialysis Tuesday, Thursday and Saturday, peptic ulcer disease, alcohol abuse who presented with a syncopal episode while he was undergoing hemodialysis.  After that the patient developed severe epigastric abdominal pain with radiation to bilateral upper quadrants of his abdomen. He was brought to the hospital for evaluation. Patient tachycardic, afebrile, and without leukocytosis. Transaminitis noted with elevated lipase. RUQ US with gallbladder wall thickening. HIDA scan without evidence of cholecystitis.       Interval Problem Update  Pt reports improved abdominal pain today, advanced diet with pancreatic enzymes. Awaiting SNF placement.     I have personally seen and examined the patient at bedside. I discussed the plan of care with patient, bedside RN, charge RN and Dr. Philip.    Consultants/Specialty  general surgery, nephrology and vascular surgery    Code Status  Full Code     Disposition  Patient is medically cleared for discharge.   Anticipate discharge to to skilled nursing facility.  I have placed the appropriate orders for post-discharge needs.    Review of Systems  Review of Systems   Constitutional: Positive for malaise/fatigue. Negative for chills.   HENT: Negative for congestion and sore throat.    Eyes: Negative.    Respiratory: Negative for cough and shortness of breath.    Cardiovascular: Negative for chest pain and orthopnea.   Gastrointestinal: Positive for abdominal pain. Negative for diarrhea, nausea and vomiting.   Genitourinary:        Anuric     Musculoskeletal: Negative.    Skin: Negative.    Neurological: Positive for weakness. Negative for headaches.   Psychiatric/Behavioral: Negative for depression. The patient is not  nervous/anxious.    All other systems reviewed and are negative.       Physical Exam  Temp:  [36.6 °C (97.9 °F)-37.3 °C (99.1 °F)] 37.3 °C (99.1 °F)  Pulse:  [] 97  Resp:  [16] 16  BP: (102-121)/(56-77) 102/75  SpO2:  [95 %-98 %] 95 %    Physical Exam  Vitals and nursing note reviewed.   Constitutional:       General: He is not in acute distress.     Appearance: He is ill-appearing.   HENT:      Head: Normocephalic and atraumatic.      Right Ear: External ear normal.      Left Ear: External ear normal.      Nose: Nose normal. No congestion.      Mouth/Throat:      Mouth: Mucous membranes are moist.      Pharynx: Oropharynx is clear.   Eyes:      General:         Right eye: No discharge.         Left eye: No discharge.      Pupils: Pupils are equal, round, and reactive to light.   Cardiovascular:      Rate and Rhythm: Normal rate and regular rhythm.      Pulses: Normal pulses.      Heart sounds: Normal heart sounds.   Pulmonary:      Effort: Pulmonary effort is normal.      Breath sounds: Normal breath sounds.   Abdominal:      General: Bowel sounds are normal. There is no distension.      Palpations: Abdomen is soft.      Tenderness: There is no abdominal tenderness.   Musculoskeletal:      Cervical back: No tenderness.      Right lower le+ Edema present.      Left lower le+ Edema present.   Skin:     General: Skin is warm and dry.      Capillary Refill: Capillary refill takes less than 2 seconds.      Coloration: Skin is not jaundiced.   Neurological:      General: No focal deficit present.      Mental Status: He is alert and oriented to person, place, and time. Mental status is at baseline.   Psychiatric:         Mood and Affect: Mood normal.         Behavior: Behavior normal.         Fluids    Intake/Output Summary (Last 24 hours) at 2022 1219  Last data filed at 2022 1059  Gross per 24 hour   Intake 240 ml   Output --   Net 240 ml       Laboratory  Recent Labs     22  5897  05/09/22  0139   WBC 6.1 6.3   RBC 2.52* 2.44*   HEMOGLOBIN 8.2* 7.8*   HEMATOCRIT 24.1* 24.0*   MCV 95.6 98.4*   MCH 32.5 32.0   MCHC 34.0 32.5*   RDW 70.2* 70.4*   PLATELETCT 61* 84*   MPV 10.5 10.4     Recent Labs     05/07/22  0506 05/08/22  0811 05/09/22  0139   SODIUM 138 134* 134*   POTASSIUM 3.9 4.5 5.3   CHLORIDE 101 97 96   CO2 24 25 24   GLUCOSE 112* 84 85   BUN 29* 25* 34*   CREATININE 6.84* 6.03* 7.79*   CALCIUM 7.8* 8.9 8.4*                   Imaging  US-HEMODIALYSIS GRAFT DUPLEX COMP UPPER EXTREMITY   Final Result      EC-ECHOCARDIOGRAM COMPLETE W/O CONT   Final Result      NM-BILIARY HIDA SCAN FATTY MEAL   Final Result      Normal hepatobiliary scan.      This study was obtained utilizing fatty meal challenge to induce gallbladder contraction due to national shortage of cholecystokinin.  There are no national standards for gallbladder ejection fraction calculated utilizing fatty meal challenge.  An    ejection fraction greater than 35% is most likely normal.  Gallbladder ejection fraction less than 35% may be abnormal but could be falsely positive.  Therefore, this test could be falsely positive.  Consider repeat imaging once cholecystokinin becomes    available.      US-RUQ   Final Result      1.  Gallbladder polyp versus nonshadowing gallstone identified.      2.  There is abnormal gallbladder wall thickening which is significantly increased compared to the prior exam. Cholecystitis is a possibility.      DX-CHEST-PORTABLE (1 VIEW)   Final Result         No acute cardiac or pulmonary abnormality is identified.           Assessment/Plan  * Acute pancreatitis- (present on admission)  Assessment & Plan  Likely secondary to alcohol vs steroids  Lipase trending down and pain improving  Diet advanced today  Trial pancreatic enzymes  Oral hydration in setting of ESRD        AP (abdominal pain)- (present on admission)  Assessment & Plan  Most likely secondary to alcoholic hepatitis and alcohol induced  pancreatitis  No evidence of cholecystitis on HIDA scan  Blood cultures negative  Pain control with IV fentanyl, monitor respiratory status closely    Hepatitis- (present on admission)  Assessment & Plan  Complicated by history of hepatitis C and alcohol  Hold atorvastatin  Labs and history suggest alcoholic hepatitis  HIDA without evidence of obstruction      Alcohol dependence (HCC)- (present on admission)  Assessment & Plan  Counseled on alcohol cessation    Chest pain- (present on admission)  Assessment & Plan  Atypical, no current chest pain, no acute ischemic changes on EKG complicated by history of coronary artery, abnormal troponin but also on exam consistent with epigastric and right upper quadrant pain.    Echo completed- EF 65%, right ventricular systolic pressure has decreased from 50mmHg to 30mmHg  Resolved      Hepatitis C antibody positive in blood- (present on admission)  Assessment & Plan  hCv not detected   HCV RNA was previously negative    ESRD on dialysis (HCC)- (present on admission)  Assessment & Plan  Scheduled dialysis TRS   Nephrology following  RUE AF US with reduced flow volumes, [facility]   Avoid IV hydration setting of pancreatitis with ESRD    Thrombocytopenia (HCC)- (present on admission)  Assessment & Plan  Likely secondary to alcohol  Hold heparin  Trend CBC    Transaminitis- (present on admission)  Assessment & Plan  Continues to trend down  Nonobstructive pattern  HIDA scan completed, no evidence of cholecystitis         Anemia- (present on admission)  Assessment & Plan  Secondary to end-stage renal failure        Arthritis- (present on admission)  Assessment & Plan  Hold home Etanercept  Resume outpatient         VTE prophylaxis: SCDs/TEDs    I have performed a physical exam and reviewed and updated ROS and Plan today (5/9/2022). In review of yesterday's note (5/8/2022), there are no changes except as documented above.

## 2022-05-09 NOTE — THERAPY
"Physical Therapy   Daily Treatment     Patient Name: Junior Proctor  Age:  71 y.o., Sex:  male  Medical Record #: [id number]  Today's Date: 5/9/2022          Assessment    Pt remains resistant re getting OOB, but agreeable with education about how important it is to keep moving to maintain strength. Pt needed education re log roll for OOB to prevent increased abdominal pain, needs this reinforced as he struggles to follow. Pt is most limited by endurance issues due to pain, once pain is controlled, will benefit from consistent OOB for ambulation using FWW to build endurance. PT to follow.    Plan    Continue current treatment plan.    DC Equipment Recommendations: Unable to determine at this time  Discharge Recommendations: Recommend post-acute placement for additional physical therapy services prior to discharge home         Objective       05/09/22 1412   Charge Group   Charges  Yes   PT Gait Training 1   PT Therapeutic Activities 1   Total Time Spent   PT Total Time Yes   PT Gait Training Time Spent (Mins) 13   PT Therapeutic Activities Time Spent (Mins) 13   PT Total Time Spent (Calculated) 26   Vitals   O2 Delivery Device None - Room Air   Pain 0 - 10 Group   Therapist Pain Assessment During Activity;Nurse Notified  (c/o abdominal pain, LBP, \"joint pain\" all over.)   Cognition    Level of Consciousness Alert   Comments resistant to OOB with therapy   Active ROM Lower Body    Active ROM Lower Body  WDL   Strength Lower Body   Lower Body Strength  WDL   Comments functional for transfers, but lacks endurance due to pain   Balance   Sitting Balance (Static) Fair   Sitting Balance (Dynamic) Fair   Standing Balance (Static) Fair -   Standing Balance (Dynamic) Fair -   Weight Shift Sitting Fair   Weight Shift Standing Fair   Skilled Intervention Verbal Cuing   Comments withFWW   Gait Analysis   Gait Level Of Assist Contact Guard Assist   Assistive Device Front Wheel Walker   Distance (Feet) 35   # of Times Distance was " Traveled 1   Deviation Decreased Base Of Support  (reports unable to walk longer)   # of Stairs Climbed 0   Weight Bearing Status no restrictions   Skilled Intervention Verbal Cuing   Bed Mobility    Supine to Sit Minimal Assist  (ed done re log roll, but not following well, needs repeated ed.)   Sit to Supine Minimal Assist   Scooting Standby Assist   Rolling Minimum Assist to Lt.   Skilled Intervention Verbal Cuing   Functional Mobility   Sit to Stand Contact Guard Assist   Bed, Chair, Wheelchair Transfer Refused   Transfer Method Stand Pivot   How much difficulty does the patient currently have...   Turning over in bed (including adjusting bedclothes, sheets and blankets)? 3   Sitting down on and standing up from a chair with arms (e.g., wheelchair, bedside commode, etc.) 3   Moving from lying on back to sitting on the side of the bed? 3   How much help from another person does the patient currently need...   Moving to and from a bed to a chair (including a wheelchair)? 3   Need to walk in a hospital room? 3   Climbing 3-5 steps with a railing? 2   6 clicks Mobility Score 17   Activity Tolerance   Standing 5 min   Short Term Goals    Short Term Goal # 1 supine to/from sit EOB with HOB flat with supervision in 6 visits   Goal Outcome # 1 goal not met   Short Term Goal # 2 sit to stand at EOB, toilet and chair with SBA in 6 visits   Goal Outcome # 2 Goal not met   Short Term Goal # 3 ambulation with LRD and CGA 50 feet in 6 visits   Goal Outcome # 3 Goal not met   Short Term Goal # 4 ascend/descend 12 steps with use of railing and CGA in 6 visits   Goal Outcome # 4 Goal not met   Education Group   Role of Physical Therapist Patient Response Patient;Acceptance;Explanation;Reinforcement Needed   Anticipated Discharge Equipment and Recommendations   DC Equipment Recommendations Unable to determine at this time   Discharge Recommendations Recommend post-acute placement for additional physical therapy services prior to  discharge home   Interdisciplinary Plan of Care Collaboration   IDT Collaboration with  Nursing   Patient Position at End of Therapy In Bed;Call Light within Reach;Tray Table within Reach;Phone within Reach;Bed Alarm On   Collaboration Comments nsg updated   Session Information   Date / Session Number  5/9-2 (2/4, 5/13)

## 2022-05-09 NOTE — PROGRESS NOTES
"[facility] -  PROGRESS NOTE               Author: DARIN Blanco Date & Time: 5/9/2022  8:44 AM     HPI:  Per Dr. Vargas  \"Mr. Proctor is a very pleasant 70 y/o male known to us from outpt dialysis. He gets his treatments at [facility] dialysis unit qTTS via right IJ permcath.  He was at his routine HD treatment earlier today.  After about 30 or so minutes his BP dropped.  He tells me \"I passed out\" but that was not witnessed at the HD unit.  He did develop chest pain radiating to his back.  Dialysis was discontinued and he was referred to the ED at [facility]. At the time of presentation to the ED his BP was now elevated at 171/74.  He is also complaining of ABD pain described as generalized and intermittent.  No change to bowel habits.  No Diarrhea or constipation.  No N/V.  No recent change to meds.  No OTC meds.  Has some LE edema, no recent change.  He does not routinely take his BP meds before HD and did not take this AM.  RUQ U/S showed thickened gallbladder wall.  No stones.  LFTs are elevated without rise in bilirubin.  Elevated Lipase as well.  No F/C.  No cough.  We have been asked to see him regarding his dialysis needs. \"    DAILY NEPHROLOGY SUMMARY:  5/4 - Patient is having upper abdominal pain, nausea. No SOB, palpitations, cough, melena or hematochezia.  5/5 - States he's hungry.  Still having abdominal pain.    5/6 - Dialysis cut short yesterday due to diarrhea and clotting of machine.  Edema today.  Stll with abdominal pain.  5/7 - Complaining of abdominal pain.  +edema but improved  5/8 - Still with abdominal pain.  Not happy about being on liquid diet.  HD done yesterday with 2.5L UF  5/9 -  VSS RA, no CP no SOB.  +abdominal pain but improving. Lipase decreasing.      REVIEW OF SYSTEMS:    10 point ROS reviewed and is as per HPI/daily summary or otherwise negative    PMH/PSH/SH/FH: Reviewed and unchanged since admission note  CURRENT MEDICATIONS: " Reviewed from admission to present day    VS:  /67   Pulse 93   Temp 37.3 °C (99.1 °F) (Temporal)   Resp 16   Wt 79 kg (174 lb 2.6 oz)   SpO2 95%   BMI 26.48 kg/m²   Physical Exam  Vitals and nursing note reviewed.   Constitutional:       Appearance: Normal appearance.   HENT:      Head: Normocephalic.   Eyes:      General: No scleral icterus.  Cardiovascular:      Rate and Rhythm: Normal rate and regular rhythm.      Comments: No edema  Pulmonary:      Effort: Pulmonary effort is normal.   Abdominal:      General: There is no distension.      Tenderness: There is abdominal tenderness.   Musculoskeletal:         General: No deformity.      Right lower leg: No edema.      Left lower leg: No edema.   Skin:     Findings: No rash.   Neurological:      Mental Status: He is alert.   Psychiatric:         Behavior: Behavior normal.         Fluids:  No intake/output data recorded.    LABS:  Recent Labs     05/07/22  0506 05/08/22  0811 05/09/22  0139   SODIUM 138 134* 134*   POTASSIUM 3.9 4.5 5.3   CHLORIDE 101 97 96   CO2 24 25 24   GLUCOSE 112* 84 85   BUN 29* 25* 34*   CREATININE 6.84* 6.03* 7.79*   CALCIUM 7.8* 8.9 8.4*       IMPRESSION:  · ESRD, due to long standing hypertension, HD TTS via port, has right AVF  -RUE AVF ultrasound with reduced flow volumes  -Vascular surgery Dr Hilton evaluated 5/8 and [facility]   · Upper abdominal pain, likely acute pancreatitis  · Hypocalcemia  -PTH elevated. Low Ca and high phos  -Changed to PhosLo 1334mg PO TIDWM  · Alcoholic hepatitis  · Alcoholic pancreatitis, epigastric pain + lipase >3ULN + alcohol use disorder  · Gallbladder wall thickening  · Elevated liver enzymes, AST>ALT 2  · Inflammatory arthritis, on etanercept  · H/o NSTEMI  · H/o PE  · Hypertension    PLAN:  -HD qTTS, next tx tomorrow (TUES)  -Careful with volume resuscitation given ESRD and anuria. Continue with oral resuscitation at this time as long as patient  tolerates.  -Pain management per primary team        Thank you for your consult.  We will continue to follow

## 2022-05-09 NOTE — THERAPY
Occupational Therapy   Initial Evaluation     Patient Name: Junior Proctor  Age:  71 y.o., Sex:  male  Medical Record #: [id number]  Today's Date: 5/9/2022     Precautions  Precautions: Fall Risk    Assessment  Patient is 71 y.o. male admitted for chest and abdominal pain and syncope after dialysis, found to have pancreatitis, thrombocytopenia, anemia, and reduced flow of RUE AVF (to be managed OP). PMHx of ESRD w/HD, peptic ulcer disease, arthritis, NSTEMI, ETOH abuse, HTN, and Hep C. Spent time educating pt on purpose of (temporary) post acute care, AROM for extremities for strengthening, importance of continued OOB activity and getting to BR for toileting, safety/use of FWW, and began edu on BR DME for home safety. Pt reports he doesn't have anyone who can assist him, but plans to move to first floor when he is able. Currently limited by decreased functional mobility, activity tolerance, cognition, strength, balance, and pain which are currently affecting pt's ability to complete ADLs/IADLs at baseline. Will continue to follow.     Plan    Recommend Occupational Therapy 4 times per week until therapy goals are met for the following treatments:  Adaptive Equipment, Neuro Re-Education / Balance, Self Care/Activities of Daily Living, Therapeutic Activities, and Therapeutic Exercises.    DC Equipment Recommendations: Unable to determine at this time  Discharge Recommendations: Recommend post-acute placement for additional occupational therapy services prior to discharge home      Objective     05/09/22 1151   Prior Living Situation   Prior Services Home-Independent   Housing / Facility 3 Story Apartment / Condo   Steps Into Home   (2 flights)   Bathroom Set up Bathtub / Shower Combination   Equipment Owned Single Point Cane   Lives with - Patient's Self Care Capacity Alone and Able to Care For Self   Comments Pt reports he doesn't have anyone who can assist him, but plans to move to first floor when he is able.   Prior  Level of ADL Function   Self Feeding Independent   Grooming / Hygiene Independent   Bathing Independent   Dressing Independent   Toileting Independent   Prior Level of IADL Function   Medication Management Independent   Laundry Independent   Kitchen Mobility Independent   Finances Independent   Home Management Independent   Shopping Independent   Prior Level Of Mobility Independent With Device in Community;Independent Without Device in Home   Precautions   Precautions Fall Risk   Vitals   O2 Delivery Device None - Room Air   Vitals Comments Tachycardia in AM, but not during session; Per RN report   Pain 0 - 10 Group   Therapist Pain Assessment During Activity;Post Activity Pain Same as Prior to Activity;Nurse Notified;0  (no c/o pain)   Cognition    Cognition / Consciousness X   Level of Consciousness Alert   Safety Awareness Impaired   New Learning Impaired   Comments pleasant and cooperative; motivated to get OOB   Passive ROM Upper Body   Passive ROM Upper Body WDL   Active ROM Upper Body   Active ROM Upper Body  WDL   Strength Upper Body   Upper Body Strength  X   Gross Strength Generalized Weakness, Equal Bilaterally.    Comments reports overall is much weaker than baseline   Balance Assessment   Sitting Balance (Static) Fair +   Sitting Balance (Dynamic) Fair   Standing Balance (Static) Fair -   Standing Balance (Dynamic) Fair -   Weight Shift Sitting Good   Weight Shift Standing Fair   Comments w/FWW; initially with HHA and heavy min A for balance-improved with fww   Bed Mobility    Scooting Supervised   Comments found and left seated at EOB   ADL Assessment   Eating Supervision   Grooming Supervision;Seated  (donning hat)   Upper Body Dressing Minimal Assist  (gown)   Lower Body Dressing Minimal Assist  (slippers)   Toileting   (declined need)   Comments Spent time educating pt on purpose of (temporary) post acute care, AROM for extremities for strengthening, importance of continued OOB activity and getting  to BR for toileting, safety/use of FWW, and began edu on BR DME for home safety.   Functional Mobility   Sit to Stand Contact Guard Assist   Bed, Chair, Wheelchair Transfer Minimal Assist   Toilet Transfers Minimal Assist   Transfer Method Stand Step   Mobility w/HHA to toilet and w/FWW back to EOB. improved with FWW; edu on use/safety. Pt reports feels BLEs very weak; edu on continued OOB activity   Edema / Skin Assessment   Edema / Skin  Not Assessed   Comments reports BLE edema   Activity Tolerance   Comments limited by fatigue and weakness in standing; left at EOB   Patient / Family Goals   Patient / Family Goal #1 to go home   Short Term Goals   Short Term Goal # 1 LB dressing with SPV   Short Term Goal # 2 standing g/h with min A and RBs PRN   Short Term Goal # 3 toilet txf with SPV   Education Group   Education Provided Role of Occupational Therapist;Transfers;Home Safety;Activities of Daily Living;Adaptive Equipment;Pathology of bedrest   Role of Occupational Therapist Patient Response Patient;Acceptance;Explanation;Verbal Demonstration;Reinforcement Needed   Home Safety Patient Response Patient;Acceptance;Explanation;Reinforcement Needed;No Learning Evidence   Transfers Patient Response Patient;Acceptance;Explanation;Verbal Demonstration;Reinforcement Needed   ADL Patient Response Patient;Acceptance;Explanation;Verbal Demonstration;Reinforcement Needed   Adaptive Equipment Patient Response Patient;Acceptance;Explanation;Verbal Demonstration;Reinforcement Needed   Pathology of Bedrest Patient Response Patient;Acceptance;Explanation;Verbal Demonstration;Reinforcement Needed

## 2022-05-09 NOTE — DISCHARGE PLANNING
Received Choice form at 1440  Agency/Facility Name: [facility]  Referral sent per Choice form @ 1445     Agency/Facility Name: [facility]  Referral sent per RN CM @ 1535     0915-  Ag[facility]  Spoke To: Barbara  Outcome: DPA requesting for referral to be reviewed, Barbara to contact this DPA with updated referral status.     0920-  Agency/Facility Name: [facility]  Outcome: DPA left v-mail regarding referral status with request for callback.     0940-  Agency/Facility Name: [facility]   Spoke To: Fatou  Outcome: SNF is full, there are no adult beds available at this time.

## 2022-05-09 NOTE — DISCHARGE PLANNING
Anticipated Discharge Disposition: SNF vs home with HHC.     Action: discussed SNF placement with patient. He said he is unsure about making a SNF choice until he speaks with PT/OT. He has concerns about living up two flights of stairs but is unsure if he wants SNF. He is also a [place] and [facility]. [facility] the [facility] to inquire about [facility] connection.     1315- Elly called, patient has no service connection. Will continue to with SNF choice without [facility].     1430- spoke with patient again who agrees he need to go to a SNF. Obtained choice based upon locations in [place]; 1)[place], 2) [place], 3) [place]. Left him the SNF names so he could look them up on his phone.     Barriers to Discharge: patient choice, SNF acceptance.    Plan: follow up with patient this afternoon.

## 2022-05-09 NOTE — CARE PLAN
The patient is Stable - Low risk of patient condition declining or worsening    Shift Goals  Clinical Goals: Pain management  Patient Goals: rest  Family Goals: rest    Progress made toward(s) clinical / shift goals:  Pain controlled with PRN medications    Problem: Pain - Standard  Goal: Alleviation of pain or a reduction in pain to the patient’s comfort goal  Outcome: Progressing     Problem: Knowledge Deficit - Standard  Goal: Patient and family/care givers will demonstrate understanding of plan of care, disease process/condition, diagnostic tests and medications  Outcome: Progressing     Problem: Fall Risk  Goal: Patient will remain free from falls  Outcome: Progressing       Patient is not progressing towards the following goals: N/A

## 2022-05-10 LAB
ALBUMIN SERPL BCP-MCNC: 2.9 G/DL (ref 3.2–4.9)
ALBUMIN/GLOB SERPL: 0.8 G/DL
ALP SERPL-CCNC: 132 U/L (ref 30–99)
ALT SERPL-CCNC: 61 U/L (ref 2–50)
ANION GAP SERPL CALC-SCNC: 16 MMOL/L (ref 7–16)
AST SERPL-CCNC: 28 U/L (ref 12–45)
BACTERIA BLD CULT: NORMAL
BACTERIA BLD CULT: NORMAL
BILIRUB SERPL-MCNC: 0.6 MG/DL (ref 0.1–1.5)
BUN SERPL-MCNC: 44 MG/DL (ref 8–22)
CALCIUM SERPL-MCNC: 8.5 MG/DL (ref 8.5–10.5)
CHLORIDE SERPL-SCNC: 94 MMOL/L (ref 96–112)
CO2 SERPL-SCNC: 22 MMOL/L (ref 20–33)
CREAT SERPL-MCNC: 10.42 MG/DL (ref 0.5–1.4)
ERYTHROCYTE [DISTWIDTH] IN BLOOD BY AUTOMATED COUNT: 67.8 FL (ref 35.9–50)
GFR SERPLBLD CREATININE-BSD FMLA CKD-EPI: 5 ML/MIN/1.73 M 2
GLOBULIN SER CALC-MCNC: 3.7 G/DL (ref 1.9–3.5)
GLUCOSE SERPL-MCNC: 100 MG/DL (ref 65–99)
HCT VFR BLD AUTO: 23.1 % (ref 42–52)
HGB BLD-MCNC: 7.6 G/DL (ref 14–18)
MCH RBC QN AUTO: 32.2 PG (ref 27–33)
MCHC RBC AUTO-ENTMCNC: 32.9 G/DL (ref 33.7–35.3)
MCV RBC AUTO: 97.9 FL (ref 81.4–97.8)
PLATELET # BLD AUTO: 92 K/UL (ref 164–446)
PMV BLD AUTO: 10.2 FL (ref 9–12.9)
POTASSIUM SERPL-SCNC: 5.1 MMOL/L (ref 3.6–5.5)
PROT SERPL-MCNC: 6.6 G/DL (ref 6–8.2)
RBC # BLD AUTO: 2.36 M/UL (ref 4.7–6.1)
SIGNIFICANT IND 70042: NORMAL
SIGNIFICANT IND 70042: NORMAL
SITE SITE: NORMAL
SITE SITE: NORMAL
SODIUM SERPL-SCNC: 132 MMOL/L (ref 135–145)
SOURCE SOURCE: NORMAL
SOURCE SOURCE: NORMAL
WBC # BLD AUTO: 5.7 K/UL (ref 4.8–10.8)

## 2022-05-10 PROCEDURE — 85027 COMPLETE CBC AUTOMATED: CPT

## 2022-05-10 PROCEDURE — A9270 NON-COVERED ITEM OR SERVICE: HCPCS | Performed by: STUDENT IN AN ORGANIZED HEALTH CARE EDUCATION/TRAINING PROGRAM

## 2022-05-10 PROCEDURE — A9270 NON-COVERED ITEM OR SERVICE: HCPCS | Performed by: INTERNAL MEDICINE

## 2022-05-10 PROCEDURE — 700102 HCHG RX REV CODE 250 W/ 637 OVERRIDE(OP): Performed by: STUDENT IN AN ORGANIZED HEALTH CARE EDUCATION/TRAINING PROGRAM

## 2022-05-10 PROCEDURE — 700102 HCHG RX REV CODE 250 W/ 637 OVERRIDE(OP): Performed by: INTERNAL MEDICINE

## 2022-05-10 PROCEDURE — 700111 HCHG RX REV CODE 636 W/ 250 OVERRIDE (IP)

## 2022-05-10 PROCEDURE — 770001 HCHG ROOM/CARE - MED/SURG/GYN PRIV*

## 2022-05-10 PROCEDURE — 90935 HEMODIALYSIS ONE EVALUATION: CPT

## 2022-05-10 PROCEDURE — 80053 COMPREHEN METABOLIC PANEL: CPT

## 2022-05-10 PROCEDURE — 99232 SBSQ HOSP IP/OBS MODERATE 35: CPT | Performed by: INTERNAL MEDICINE

## 2022-05-10 PROCEDURE — 700111 HCHG RX REV CODE 636 W/ 250 OVERRIDE (IP): Performed by: INTERNAL MEDICINE

## 2022-05-10 PROCEDURE — 36415 COLL VENOUS BLD VENIPUNCTURE: CPT

## 2022-05-10 RX ORDER — HEPARIN SODIUM 1000 [USP'U]/ML
INJECTION, SOLUTION INTRAVENOUS; SUBCUTANEOUS
Status: COMPLETED
Start: 2022-05-10 | End: 2022-05-10

## 2022-05-10 RX ADMIN — HEPARIN SODIUM 3900 UNITS: 1000 INJECTION, SOLUTION INTRAVENOUS; SUBCUTANEOUS at 13:08

## 2022-05-10 RX ADMIN — TRAZODONE HYDROCHLORIDE 50 MG: 50 TABLET ORAL at 23:50

## 2022-05-10 RX ADMIN — HYDROMORPHONE HYDROCHLORIDE 0.25 MG: 1 INJECTION, SOLUTION INTRAMUSCULAR; INTRAVENOUS; SUBCUTANEOUS at 15:12

## 2022-05-10 RX ADMIN — OXYCODONE 5 MG: 5 TABLET ORAL at 22:43

## 2022-05-10 RX ADMIN — HYDROMORPHONE HYDROCHLORIDE 0.25 MG: 1 INJECTION, SOLUTION INTRAMUSCULAR; INTRAVENOUS; SUBCUTANEOUS at 07:49

## 2022-05-10 RX ADMIN — OMEPRAZOLE 20 MG: 20 CAPSULE, DELAYED RELEASE ORAL at 06:40

## 2022-05-10 RX ADMIN — HYDROMORPHONE HYDROCHLORIDE 0.25 MG: 1 INJECTION, SOLUTION INTRAMUSCULAR; INTRAVENOUS; SUBCUTANEOUS at 19:41

## 2022-05-10 RX ADMIN — PANCRELIPASE 3000 UNITS: 15000; 3000; 9500 CAPSULE, DELAYED RELEASE ORAL at 07:49

## 2022-05-10 RX ADMIN — HYDROMORPHONE HYDROCHLORIDE 0.25 MG: 1 INJECTION, SOLUTION INTRAMUSCULAR; INTRAVENOUS; SUBCUTANEOUS at 00:31

## 2022-05-10 RX ADMIN — PANCRELIPASE 3000 UNITS: 15000; 3000; 9500 CAPSULE, DELAYED RELEASE ORAL at 18:05

## 2022-05-10 RX ADMIN — OXYCODONE 5 MG: 5 TABLET ORAL at 18:31

## 2022-05-10 RX ADMIN — Medication 1334 MG: at 18:05

## 2022-05-10 RX ADMIN — TRAZODONE HYDROCHLORIDE 50 MG: 50 TABLET ORAL at 00:34

## 2022-05-10 RX ADMIN — OXYCODONE 5 MG: 5 TABLET ORAL at 06:40

## 2022-05-10 RX ADMIN — Medication 1334 MG: at 07:49

## 2022-05-10 RX ADMIN — SENNOSIDES AND DOCUSATE SODIUM 2 TABLET: 50; 8.6 TABLET ORAL at 06:40

## 2022-05-10 RX ADMIN — HYDROMORPHONE HYDROCHLORIDE 0.25 MG: 1 INJECTION, SOLUTION INTRAMUSCULAR; INTRAVENOUS; SUBCUTANEOUS at 23:49

## 2022-05-10 RX ADMIN — OXYCODONE 5 MG: 5 TABLET ORAL at 13:57

## 2022-05-10 ASSESSMENT — ENCOUNTER SYMPTOMS
EYES NEGATIVE: 1
MUSCULOSKELETAL NEGATIVE: 1
WEAKNESS: 1
NERVOUS/ANXIOUS: 0
VOMITING: 0
ABDOMINAL PAIN: 1
HEADACHES: 0
ORTHOPNEA: 0
CHILLS: 0
DIARRHEA: 0
NAUSEA: 0
COUGH: 0
SHORTNESS OF BREATH: 0
DEPRESSION: 0
SORE THROAT: 0

## 2022-05-10 ASSESSMENT — PAIN DESCRIPTION - PAIN TYPE
TYPE: ACUTE PAIN

## 2022-05-10 ASSESSMENT — FIBROSIS 4 INDEX: FIB4 SCORE: 2.69

## 2022-05-10 NOTE — PROGRESS NOTES
[facility] Services Progress Note      HD today x 3 hours per Dr. Cortez .   Initiated at 1006 and ended at 1248.   Patient stable during treatment. Denies pain. No SOB.     Terminated 16 minutes prior to end. Patient wanted to use the restroom and refused to use a bedpan. Dr. Cortez notified and okay to stop tx.     UF Net: 1801 mL      L chest catheter intact and patent with good flow during dialysis. No s/sx of infection, no redness nor bleeding noted on the CVC site. CVC dressing clean, dry and intact.   Blood returned and CVC port flushed with NS and Heparin 1000 units/mL used  to lock catheter given per designated amount. CVC port clamped, capped and labeled accordingly.     Dressing changed per protocol.    Report given to Lisbeth Bullock RN.

## 2022-05-10 NOTE — PROGRESS NOTES
Hospital Medicine Daily Progress Note    Date of Service  5/10/2022    Chief Complaint  Junior Proctor is a 71 y.o. male admitted 5/3/2022 with abdominal pain    Hospital Course  Abran Proctor is  a 71-year-old male with a past medical history of end-stage renal disease on hemodialysis Tuesday, Thursday and Saturday, peptic ulcer disease, alcohol abuse who presented with a syncopal episode while he was undergoing hemodialysis.  After that the patient developed severe epigastric abdominal pain with radiation to bilateral upper quadrants of his abdomen. He was brought to the hospital for evaluation. Patient tachycardic, afebrile, and without leukocytosis. Transaminitis noted with elevated lipase. RUQ US with gallbladder wall thickening. HIDA scan without evidence of cholecystitis.       Interval Problem Update  Pt seen and examined afebrile, no overnight events resting in bed reports improved abdominal pain today, advanced diet with pancreatic enzymes. Awaiting SNF placement.     I have personally seen and examined the patient at bedside. I discussed the plan of care with patient, bedside RN and charge RN.    Consultants/Specialty  general surgery, nephrology and vascular surgery    Code Status  Full Code     Disposition  Patient is medically cleared for discharge.   Anticipate discharge to to skilled nursing facility.  I have placed the appropriate orders for post-discharge needs.    Review of Systems  Review of Systems   Constitutional: Positive for malaise/fatigue. Negative for chills.   HENT: Negative for congestion and sore throat.    Eyes: Negative.    Respiratory: Negative for cough and shortness of breath.    Cardiovascular: Negative for chest pain and orthopnea.   Gastrointestinal: Positive for abdominal pain. Negative for diarrhea, nausea and vomiting.   Genitourinary:        Anuric     Musculoskeletal: Negative.    Skin: Negative.    Neurological: Positive for weakness. Negative for headaches.    Psychiatric/Behavioral: Negative for depression. The patient is not nervous/anxious.    All other systems reviewed and are negative.       Physical Exam  Temp:  [36.6 °C (97.9 °F)-37.3 °C (99.1 °F)] 37 °C (98.6 °F)  Pulse:  [] 109  Resp:  [15-18] 16  BP: ()/(48-72) 125/72  SpO2:  [92 %-98 %] 98 %    Physical Exam  Vitals and nursing note reviewed.   Constitutional:       General: He is not in acute distress.     Appearance: He is ill-appearing.   HENT:      Head: Normocephalic and atraumatic.      Right Ear: External ear normal.      Left Ear: External ear normal.      Nose: Nose normal. No congestion.      Mouth/Throat:      Mouth: Mucous membranes are moist.      Pharynx: Oropharynx is clear.   Eyes:      General:         Right eye: No discharge.         Left eye: No discharge.      Pupils: Pupils are equal, round, and reactive to light.   Cardiovascular:      Rate and Rhythm: Normal rate and regular rhythm.      Pulses: Normal pulses.      Heart sounds: Normal heart sounds.   Pulmonary:      Effort: Pulmonary effort is normal.      Breath sounds: Normal breath sounds.   Abdominal:      General: Bowel sounds are normal. There is no distension.      Palpations: Abdomen is soft.      Tenderness: There is no abdominal tenderness.   Musculoskeletal:      Cervical back: No tenderness.      Right lower le+ Edema present.      Left lower le+ Edema present.   Skin:     General: Skin is warm and dry.      Capillary Refill: Capillary refill takes less than 2 seconds.      Coloration: Skin is not jaundiced.   Neurological:      General: No focal deficit present.      Mental Status: He is alert and oriented to person, place, and time. Mental status is at baseline.   Psychiatric:         Mood and Affect: Mood normal.         Behavior: Behavior normal.         Fluids    Intake/Output Summary (Last 24 hours) at 5/10/2022 1221  Last data filed at 5/10/2022 0800  Gross per 24 hour   Intake 340 ml   Output --    Net 340 ml       Laboratory  Recent Labs     05/09/22  0139 05/10/22  0312   WBC 6.3 5.7   RBC 2.44* 2.36*   HEMOGLOBIN 7.8* 7.6*   HEMATOCRIT 24.0* 23.1*   MCV 98.4* 97.9*   MCH 32.0 32.2   MCHC 32.5* 32.9*   RDW 70.4* 67.8*   PLATELETCT 84* 92*   MPV 10.4 10.2     Recent Labs     05/08/22  0811 05/09/22 0139 05/10/22  0312   SODIUM 134* 134* 132*   POTASSIUM 4.5 5.3 5.1   CHLORIDE 97 96 94*   CO2 25 24 22   GLUCOSE 84 85 100*   BUN 25* 34* 44*   CREATININE 6.03* 7.79* 10.42*   CALCIUM 8.9 8.4* 8.5                   Imaging  US-HEMODIALYSIS GRAFT DUPLEX COMP UPPER EXTREMITY   Final Result      EC-ECHOCARDIOGRAM COMPLETE W/O CONT   Final Result      NM-BILIARY HIDA SCAN FATTY MEAL   Final Result      Normal hepatobiliary scan.      This study was obtained utilizing fatty meal challenge to induce gallbladder contraction due to national shortage of cholecystokinin.  There are no national standards for gallbladder ejection fraction calculated utilizing fatty meal challenge.  An    ejection fraction greater than 35% is most likely normal.  Gallbladder ejection fraction less than 35% may be abnormal but could be falsely positive.  Therefore, this test could be falsely positive.  Consider repeat imaging once cholecystokinin becomes    available.      US-RUQ   Final Result      1.  Gallbladder polyp versus nonshadowing gallstone identified.      2.  There is abnormal gallbladder wall thickening which is significantly increased compared to the prior exam. Cholecystitis is a possibility.      DX-CHEST-PORTABLE (1 VIEW)   Final Result         No acute cardiac or pulmonary abnormality is identified.           Assessment/Plan  * Acute pancreatitis- (present on admission)  Assessment & Plan  Likely secondary to alcohol vs steroids  Lipase trending down and pain improving  Diet advanced today  Trial pancreatic enzymes  Oral hydration in setting of ESRD        Arthritis- (present on admission)  Assessment & Plan  Hold home  Etanercept  Resume outpatient      AP (abdominal pain)- (present on admission)  Assessment & Plan  Most likely secondary to alcoholic hepatitis and alcohol induced pancreatitis  No evidence of cholecystitis on HIDA scan  Blood cultures negative  Pain control with IV fentanyl, monitor respiratory status closely    Hepatitis- (present on admission)  Assessment & Plan  Complicated by history of hepatitis C and alcohol  Hold atorvastatin  Labs and history suggest alcoholic hepatitis  HIDA without evidence of obstruction      Alcohol dependence (HCC)- (present on admission)  Assessment & Plan  Counseled on alcohol cessation    Chest pain- (present on admission)  Assessment & Plan  Atypical, no current chest pain, no acute ischemic changes on EKG complicated by history of coronary artery, abnormal troponin but also on exam consistent with epigastric and right upper quadrant pain.    Echo completed- EF 65%, right ventricular systolic pressure has decreased from 50mmHg to 30mmHg  Resolved      Hepatitis C antibody positive in blood- (present on admission)  Assessment & Plan  hCv not detected   HCV RNA was previously negative    ESRD on dialysis (HCC)- (present on admission)  Assessment & Plan  Scheduled dialysis TRS   Nephrology following  RUE AF US with reduced flow volumes, [facility]   Avoid IV hydration setting of pancreatitis with ESRD    Thrombocytopenia (HCC)- (present on admission)  Assessment & Plan  Likely secondary to alcohol  Hold heparin  Trend CBC    Transaminitis- (present on admission)  Assessment & Plan  Continues to trend down  Nonobstructive pattern  HIDA scan completed, no evidence of cholecystitis         Anemia- (present on admission)  Assessment & Plan  Secondary to end-stage renal failure           VTE prophylaxis: SCDs/TEDs    I have performed a physical exam and reviewed and updated ROS and Plan today (5/10/2022). In review of yesterday's note (5/9/2022),  there are no changes except as documented above.

## 2022-05-10 NOTE — PROGRESS NOTES
"[facility] -  PROGRESS NOTE               Author: DARIN Blanco Date & Time: 5/10/2022  10:03 AM     HPI:  Per Dr. Vargas  \"Mr. Proctor is a very pleasant 72 y/o male known to us from outpt dialysis. He gets his treatments at [facility] dialysis unit qTTS via right IJ permcath.  He was at his routine HD treatment earlier today.  After about 30 or so minutes his BP dropped.  He tells me \"I passed out\" but that was not witnessed at the HD unit.  He did develop chest pain radiating to his back.  Dialysis was discontinued and he was referred to the ED at [facility]. At the time of presentation to the ED his BP was now elevated at 171/74.  He is also complaining of ABD pain described as generalized and intermittent.  No change to bowel habits.  No Diarrhea or constipation.  No N/V.  No recent change to meds.  No OTC meds.  Has some LE edema, no recent change.  He does not routinely take his BP meds before HD and did not take this AM.  RUQ U/S showed thickened gallbladder wall.  No stones.  LFTs are elevated without rise in bilirubin.  Elevated Lipase as well.  No F/C.  No cough.  We have been asked to see him regarding his dialysis needs. \"    DAILY NEPHROLOGY SUMMARY:  5/4 - Patient is having upper abdominal pain, nausea. No SOB, palpitations, cough, melena or hematochezia.  5/5 - States he's hungry.  Still having abdominal pain.    5/6 - Dialysis cut short yesterday due to diarrhea and clotting of machine.  Edema today.  Stll with abdominal pain.  5/7 - Complaining of abdominal pain.  +edema but improved  5/8 - Still with abdominal pain.  Not happy about being on liquid diet.  HD done yesterday with 2.5L UF  5/9 -  VSS RA, no CP no SOB.  +abdominal pain but improving. Lipase decreasing.    5/10 - VSS RA, no CP SOB.  iHD today.        REVIEW OF SYSTEMS:    10 point ROS reviewed and is as per HPI/daily summary or otherwise negative    PMH/PSH/SH/FH: Reviewed and " "unchanged since admission note  CURRENT MEDICATIONS: Reviewed from admission to present day    VS:  /72   Pulse (!) 109   Temp 37 °C (98.6 °F) (Temporal)   Resp 16   Ht 1.727 m (5' 8\")   Wt 79 kg (174 lb 2.6 oz)   SpO2 98%   BMI 26.48 kg/m²   Physical Exam  Vitals and nursing note reviewed.   Constitutional:       Appearance: Normal appearance.   HENT:      Head: Normocephalic.   Eyes:      General: No scleral icterus.  Cardiovascular:      Rate and Rhythm: Regular rhythm. Tachycardia present.      Comments: No edema  Pulmonary:      Effort: Pulmonary effort is normal.   Abdominal:      General: There is no distension.      Tenderness: There is abdominal tenderness.   Musculoskeletal:         General: No deformity.      Right lower leg: No edema.      Left lower leg: No edema.   Skin:     Findings: No rash.   Neurological:      Mental Status: He is alert.   Psychiatric:         Behavior: Behavior normal.         Fluids:  In: 340 [P.O.:340]  Out: -     LABS:  Recent Labs     05/08/22  0811 05/09/22  0139 05/10/22  0312   SODIUM 134* 134* 132*   POTASSIUM 4.5 5.3 5.1   CHLORIDE 97 96 94*   CO2 25 24 22   GLUCOSE 84 85 100*   BUN 25* 34* 44*   CREATININE 6.03* 7.79* 10.42*   CALCIUM 8.9 8.4* 8.5       IMPRESSION:  · ESRD, due to long standing hypertension, HD TTS via port, has right AVF  -RUE AVF ultrasound with reduced flow volumes  -Vascular surgery Dr Hilton evaluated 5/8 and determined [facility]   · Upper abdominal pain, likely acute pancreatitis  · Hypocalcemia  -PTH elevated. Low Ca and high phos  -Changed to PhosLo 1334mg PO TIDWM  · Alcoholic hepatitis  · Alcoholic pancreatitis, epigastric pain + lipase >3ULN + alcohol use disorder  · Gallbladder wall thickening  · Elevated liver enzymes, AST>ALT 2  · Inflammatory arthritis, on etanercept  · H/o NSTEMI  · H/o PE  · Hypertension    PLAN:  -HD qTTS, next tx today (TUES)  -Careful with volume resuscitation given ESRD and " anuria. Continue with oral resuscitation at this time as long as patient tolerates.  -Pain management per primary team  - OK to discharge from a renal standpoint once medically cleared and resume outpt HD.       Thank you for your consult.  We will continue to follow

## 2022-05-10 NOTE — DISCHARGE PLANNING
Agency[facility]ility Name[facility]  Spoke To: Barbara  Outcome: Referral is pending DON review due to dialysis needs, Barbara to contact DPA with updated referral status.     1510-  [facility]  Spoke To: Tamanna  Outcome: Referral is under review, Tamanna to contact this DPA with referral update.

## 2022-05-10 NOTE — CARE PLAN
The patient is Stable - Low risk of patient condition declining or worsening    Shift Goals  Clinical Goals: Pain control  Patient Goals: rest  Family Goals: rest    Progress made toward(s) clinical / shift goals:  Pain managed with PRN medications    Problem: Pain - Standard  Goal: Alleviation of pain or a reduction in pain to the patient’s comfort goal  Outcome: Progressing     Problem: Knowledge Deficit - Standard  Goal: Patient and family/care givers will demonstrate understanding of plan of care, disease process/condition, diagnostic tests and medications  Outcome: Progressing     Problem: Fall Risk  Goal: Patient will remain free from falls  Outcome: Progressing       Patient is not progressing towards the following goals: N/A

## 2022-05-10 NOTE — DISCHARGE PLANNING
Anticipated Discharge Disposition: SNF    Action: discussed progress or lack thereof for SNF placement with patient. [facility] are still looking at patient but all others have declined. Will follow up with patient as I learn more.     Barriers to Discharge: SNF acceptance.     Plan: continue to follow for discharge needs, SNF acceptance.

## 2022-05-11 LAB
ANION GAP SERPL CALC-SCNC: 13 MMOL/L (ref 7–16)
BUN SERPL-MCNC: 28 MG/DL (ref 8–22)
CALCIUM SERPL-MCNC: 8.7 MG/DL (ref 8.5–10.5)
CHLORIDE SERPL-SCNC: 97 MMOL/L (ref 96–112)
CO2 SERPL-SCNC: 25 MMOL/L (ref 20–33)
CREAT SERPL-MCNC: 8.57 MG/DL (ref 0.5–1.4)
ERYTHROCYTE [DISTWIDTH] IN BLOOD BY AUTOMATED COUNT: 68.5 FL (ref 35.9–50)
GFR SERPLBLD CREATININE-BSD FMLA CKD-EPI: 6 ML/MIN/1.73 M 2
GLUCOSE SERPL-MCNC: 103 MG/DL (ref 65–99)
HCT VFR BLD AUTO: 23.9 % (ref 42–52)
HGB BLD-MCNC: 7.7 G/DL (ref 14–18)
MCH RBC QN AUTO: 32 PG (ref 27–33)
MCHC RBC AUTO-ENTMCNC: 32.2 G/DL (ref 33.7–35.3)
MCV RBC AUTO: 99.2 FL (ref 81.4–97.8)
PLATELET # BLD AUTO: 112 K/UL (ref 164–446)
PMV BLD AUTO: 9.9 FL (ref 9–12.9)
POTASSIUM SERPL-SCNC: 4.4 MMOL/L (ref 3.6–5.5)
RBC # BLD AUTO: 2.41 M/UL (ref 4.7–6.1)
SODIUM SERPL-SCNC: 135 MMOL/L (ref 135–145)
WBC # BLD AUTO: 5.2 K/UL (ref 4.8–10.8)

## 2022-05-11 PROCEDURE — 700111 HCHG RX REV CODE 636 W/ 250 OVERRIDE (IP): Performed by: INTERNAL MEDICINE

## 2022-05-11 PROCEDURE — 700102 HCHG RX REV CODE 250 W/ 637 OVERRIDE(OP): Performed by: INTERNAL MEDICINE

## 2022-05-11 PROCEDURE — 85027 COMPLETE CBC AUTOMATED: CPT

## 2022-05-11 PROCEDURE — 700102 HCHG RX REV CODE 250 W/ 637 OVERRIDE(OP): Performed by: STUDENT IN AN ORGANIZED HEALTH CARE EDUCATION/TRAINING PROGRAM

## 2022-05-11 PROCEDURE — 80048 BASIC METABOLIC PNL TOTAL CA: CPT

## 2022-05-11 PROCEDURE — A9270 NON-COVERED ITEM OR SERVICE: HCPCS | Performed by: INTERNAL MEDICINE

## 2022-05-11 PROCEDURE — A9270 NON-COVERED ITEM OR SERVICE: HCPCS | Performed by: STUDENT IN AN ORGANIZED HEALTH CARE EDUCATION/TRAINING PROGRAM

## 2022-05-11 PROCEDURE — 97116 GAIT TRAINING THERAPY: CPT

## 2022-05-11 PROCEDURE — 99231 SBSQ HOSP IP/OBS SF/LOW 25: CPT | Performed by: INTERNAL MEDICINE

## 2022-05-11 PROCEDURE — 770001 HCHG ROOM/CARE - MED/SURG/GYN PRIV*

## 2022-05-11 PROCEDURE — 36415 COLL VENOUS BLD VENIPUNCTURE: CPT

## 2022-05-11 RX ADMIN — PANCRELIPASE 3000 UNITS: 15000; 3000; 9500 CAPSULE, DELAYED RELEASE ORAL at 17:42

## 2022-05-11 RX ADMIN — HYDROMORPHONE HYDROCHLORIDE 0.25 MG: 1 INJECTION, SOLUTION INTRAMUSCULAR; INTRAVENOUS; SUBCUTANEOUS at 23:51

## 2022-05-11 RX ADMIN — PANCRELIPASE 3000 UNITS: 15000; 3000; 9500 CAPSULE, DELAYED RELEASE ORAL at 08:18

## 2022-05-11 RX ADMIN — PANCRELIPASE 3000 UNITS: 15000; 3000; 9500 CAPSULE, DELAYED RELEASE ORAL at 13:20

## 2022-05-11 RX ADMIN — SENNOSIDES AND DOCUSATE SODIUM 2 TABLET: 50; 8.6 TABLET ORAL at 08:21

## 2022-05-11 RX ADMIN — HYDROMORPHONE HYDROCHLORIDE 0.25 MG: 1 INJECTION, SOLUTION INTRAMUSCULAR; INTRAVENOUS; SUBCUTANEOUS at 19:39

## 2022-05-11 RX ADMIN — OXYCODONE 5 MG: 5 TABLET ORAL at 18:09

## 2022-05-11 RX ADMIN — Medication 1334 MG: at 17:41

## 2022-05-11 RX ADMIN — HYDROMORPHONE HYDROCHLORIDE 0.25 MG: 1 INJECTION, SOLUTION INTRAMUSCULAR; INTRAVENOUS; SUBCUTANEOUS at 09:41

## 2022-05-11 RX ADMIN — OXYCODONE 5 MG: 5 TABLET ORAL at 13:20

## 2022-05-11 RX ADMIN — Medication 1334 MG: at 13:19

## 2022-05-11 RX ADMIN — OXYCODONE 5 MG: 5 TABLET ORAL at 08:18

## 2022-05-11 RX ADMIN — OXYCODONE 5 MG: 5 TABLET ORAL at 22:43

## 2022-05-11 RX ADMIN — HYDROMORPHONE HYDROCHLORIDE 0.25 MG: 1 INJECTION, SOLUTION INTRAMUSCULAR; INTRAVENOUS; SUBCUTANEOUS at 14:34

## 2022-05-11 RX ADMIN — Medication 1334 MG: at 08:18

## 2022-05-11 RX ADMIN — OMEPRAZOLE 20 MG: 20 CAPSULE, DELAYED RELEASE ORAL at 08:18

## 2022-05-11 RX ADMIN — TRAZODONE HYDROCHLORIDE 50 MG: 50 TABLET ORAL at 23:51

## 2022-05-11 ASSESSMENT — ENCOUNTER SYMPTOMS
DEPRESSION: 0
ABDOMINAL PAIN: 1
ORTHOPNEA: 0
SORE THROAT: 0
CHILLS: 0
HEADACHES: 0
NERVOUS/ANXIOUS: 0
VOMITING: 0
SHORTNESS OF BREATH: 0
COUGH: 0
WEAKNESS: 1
DIARRHEA: 0
MUSCULOSKELETAL NEGATIVE: 1
NAUSEA: 0
EYES NEGATIVE: 1

## 2022-05-11 ASSESSMENT — COGNITIVE AND FUNCTIONAL STATUS - GENERAL
MOBILITY SCORE: 18
MOVING FROM LYING ON BACK TO SITTING ON SIDE OF FLAT BED: A LITTLE
STANDING UP FROM CHAIR USING ARMS: A LITTLE
CLIMB 3 TO 5 STEPS WITH RAILING: A LOT
WALKING IN HOSPITAL ROOM: A LITTLE
MOVING TO AND FROM BED TO CHAIR: A LITTLE
SUGGESTED CMS G CODE MODIFIER MOBILITY: CK

## 2022-05-11 ASSESSMENT — GAIT ASSESSMENTS
ASSISTIVE DEVICE: FRONT WHEEL WALKER
DEVIATION: STEP TO;BRADYKINETIC;DECREASED HEEL STRIKE;DECREASED TOE OFF
GAIT LEVEL OF ASSIST: CONTACT GUARD ASSIST
DISTANCE (FEET): 40

## 2022-05-11 ASSESSMENT — PAIN DESCRIPTION - PAIN TYPE
TYPE: ACUTE PAIN

## 2022-05-11 NOTE — THERAPY
"Physical Therapy   Daily Treatment     Patient Name: Junior Proctor  Age:  71 y.o., Sex:  male  Medical Record #: [id number]  Today's Date: 5/11/2022     Precautions  Precautions: (P) Fall Risk    Assessment    Pt tolerates treatment session well; is agreeable to participation, no pain reported, no LOB. Pt does report mild to moderate fatigue/shortness of breath during second ambulation session, O2 on room air remains at 98%. Pt demonstrates bed mobility with supervision, CGA for ambulation, pt reports increased fatigue/muscle soreness in B UE due to increased WB through FWW during ambulation, walker height shortened to address this issue, nursing aware. Significant pt education regarding skilled rehab facility versus SNF and potential DC locations. Pt will benefit from continued PT services in acute setting, as well as in post acute setting.     Plan    Continue current treatment plan.    DC Equipment Recommendations: (P) Unable to determine at this time  Discharge Recommendations: (P) Recommend post-acute placement for additional physical therapy services prior to discharge home      Subjective    \"What do you want me to do? Last time you saw me I could barely move\"       05/11/22 1402   Precautions   Precautions Fall Risk   Vitals   Room Air Oximetry 98   O2 Delivery Device None - Room Air   Pain 0 - 10 Group   Therapist Pain Assessment 0;Post Activity Pain Same as Prior to Activity   Cognition    Cognition / Consciousness X   Level of Consciousness Alert   Safety Awareness Impulsive   New Learning Impaired   Strength Lower Body   Lower Body Strength  WDL   Balance   Sitting Balance (Static) Fair   Sitting Balance (Dynamic) Fair   Standing Balance (Static) Fair -   Standing Balance (Dynamic) Fair -   Weight Shift Sitting Fair   Weight Shift Standing Fair   Gait Analysis   Gait Level Of Assist Contact Guard Assist   Assistive Device Front Wheel Walker   Distance (Feet) 40   # of Times Distance was Traveled 2 "   Deviation Step To;Bradykinetic;Decreased Heel Strike;Decreased Toe Off   Skilled Intervention Verbal Cuing;Sequencing   Bed Mobility    Supine to Sit Supervised   Sit to Supine Supervised   Scooting Supervised   Skilled Intervention Verbal Cuing   Functional Mobility   Sit to Stand Standby Assist   How much difficulty does the patient currently have...   Turning over in bed (including adjusting bedclothes, sheets and blankets)? 4   Sitting down on and standing up from a chair with arms (e.g., wheelchair, bedside commode, etc.) 3   Moving from lying on back to sitting on the side of the bed? 3   How much help from another person does the patient currently need...   Moving to and from a bed to a chair (including a wheelchair)? 3   Need to walk in a hospital room? 3   Climbing 3-5 steps with a railing? 2   6 clicks Mobility Score 18   Activity Tolerance   Sitting in Chair NT   Sitting Edge of Bed 10 min   Standing 7 min   Short Term Goals    Short Term Goal # 1 supine to/from sit EOB with HOB flat with supervision in 6 visits   Goal Outcome # 1 Progressing as expected   Short Term Goal # 2 sit to stand at EOB, toilet and chair with SBA in 6 visits   Goal Outcome # 2 Progressing as expected   Short Term Goal # 3 ambulation with LRD and CGA 50 feet in 6 visits   Goal Outcome # 3 Progressing as expected   Short Term Goal # 4 ascend/descend 12 steps with use of railing and CGA in 6 visits   Goal Outcome # 4 Goal not met   Education Group   Education Provided Gait Training   Gait Training Patient Response Patient;Acceptance;Explanation;Verbal Demonstration;Action Demonstration   Anticipated Discharge Equipment and Recommendations   DC Equipment Recommendations Unable to determine at this time   Discharge Recommendations Recommend post-acute placement for additional physical therapy services prior to discharge home       Roseann Murphy DPT

## 2022-05-11 NOTE — DISCHARGE PLANNING
Agency/Facility Name: [facility]  Spoke To: Lesley  Outcome: DPA notified RTOC manager Pt is medically cleared at this time [facility] of updated [facility] status.    Received Choice form at 1420  Agency/Facility Name: [facility]  Referral not sent as HH order is not complete at this time.

## 2022-05-11 NOTE — PROGRESS NOTES
"[facility] -  PROGRESS NOTE               Author: Megan Waddell M.D. Date & Time: 5/11/2022  9:01 AM     HPI:  Per Dr. Vargas  \"Mr. Proctor is a very pleasant 70 y/o male known to us from outpt dialysis. He gets his treatments at [facility] dialysis unit qTTS via right IJ permcath.  He was at his routine HD treatment earlier today.  After about 30 or so minutes his BP dropped.  He tells me \"I passed out\" but that was not witnessed at the HD unit.  He did develop chest pain radiating to his back.  Dialysis was discontinued and he was referred to the ED at [facility]. At the time of presentation to the ED his BP was now elevated at 171/74.  He is also complaining of ABD pain described as generalized and intermittent.  No change to bowel habits.  No Diarrhea or constipation.  No N/V.  No recent change to meds.  No OTC meds.  Has some LE edema, no recent change.  He does not routinely take his BP meds before HD and did not take this AM.  RUQ U/S showed thickened gallbladder wall.  No stones.  LFTs are elevated without rise in bilirubin.  Elevated Lipase as well.  No F/C.  No cough.  We have been asked to see him regarding his dialysis needs. \"    DAILY NEPHROLOGY SUMMARY:  5/4 - Patient is having upper abdominal pain, nausea. No SOB, palpitations, cough, melena or hematochezia.  5/5 - States he's hungry.  Still having abdominal pain.    5/6 - Dialysis cut short yesterday due to diarrhea and clotting of machine.  Edema today.  Still with abdominal pain.  5/7 - Complaining of abdominal pain.  +edema but improved  5/8 - Still with abdominal pain.  Not happy about being on liquid diet.  HD done yesterday with 2.5L UF  5/9 -  VSS RA, no CP no SOB.  +abdominal pain but improving. Lipase decreasing.    5/10 - VSS RA, no CP SOB.  iHD today  5/11 - NAEO, no complaints, feels good this AM      REVIEW OF SYSTEMS:    10 point ROS reviewed and is as per HPI/daily summary or otherwise " "negative    PMH/PSH/SH/FH: Reviewed and unchanged since admission note  CURRENT MEDICATIONS: Reviewed from admission to present day    VS:  /64   Pulse 89   Temp 36.4 °C (97.5 °F) (Temporal)   Resp 16   Ht 1.727 m (5' 8\")   Wt 77.1 kg (169 lb 15.6 oz)   SpO2 98%   BMI 25.84 kg/m²   Physical Exam  Vitals and nursing note reviewed.   Constitutional:       Appearance: Normal appearance.   HENT:      Head: Normocephalic.   Eyes:      General: No scleral icterus.  Cardiovascular:      Comments: No edema  Pulmonary:      Effort: Pulmonary effort is normal.   Abdominal:      General: There is no distension.      Tenderness: There is abdominal tenderness.   Musculoskeletal:         General: No deformity.   Skin:     Findings: No rash.   Neurological:      General: No focal deficit present.      Mental Status: He is alert.   Psychiatric:         Behavior: Behavior normal.       Fluids:  In: 740 [P.O.:240; Dialysis:500]  Out: 2301     LABS:  Recent Labs     05/09/22  0139 05/10/22  0312 05/11/22  0146   SODIUM 134* 132* 135   POTASSIUM 5.3 5.1 4.4   CHLORIDE 96 94* 97   CO2 24 22 25   GLUCOSE 85 100* 103*   BUN 34* 44* 28*   CREATININE 7.79* 10.42* 8.57*   CALCIUM 8.4* 8.5 8.7       IMPRESSION:  · ESRD, due to long standing hypertension, HD TTS via port, has right AVF  -RUE AVF ultrasound with reduced flow volumes  -Vascular surgery Dr Hilton evaluated 5/8 and [facility]   · Upper abdominal pain, likely acute pancreatitis  · Hypocalcemia  -PTH elevated. Low Ca and high phos  -Changed to PhosLo 1334mg PO TIDWM  · Alcoholic hepatitis  · Alcoholic pancreatitis, epigastric pain + lipase >3ULN + alcohol use disorder  · Gallbladder wall thickening  · Elevated liver enzymes, AST>ALT 2  · Inflammatory arthritis, on etanercept  · H/o NSTEMI  · H/o PE  · Hypertension    PLAN:  -TTS iHD  - UF as tolerated  - Bolus IVF for hemodynamics as needed  - Dose all meds per ESRD  - Ok to " transition to OP care from renal standpoint  - Awaiting placement

## 2022-05-11 NOTE — FACE TO FACE
Face to Face Supporting Documentation - Home Health    The encounter with this patient was in whole or in part the primary reason for home health admission.    Date of encounter:   Patient:                    MRN:                       YOB: 2020  Junior Proctor  [id number]  1950     Home health to see patient for:  Skilled Nursing care for assessment, interventions & education, Physical Therapy evaluation and treatment and Occupational therapy evaluation and treatment    Skilled need for:  Comment: medication managment     Skilled nursing interventions to include:  Comment: medication managment     Homebound status evidenced by:  Need the aid of supportive devices such as crutches, canes, wheelchairs or walkers. Leaving home requires a considerable and taxing effort. There is a normal inability to leave the home.    Community Physician to provide follow up care: Pcp Pt States None     Optional Interventions? No      I certify the face to face encounter for this home health care referral meets the CMS requirements and the encounter/clinical assessment with the patient was, in whole, or in part, for the medical condition(s) listed above, which is the primary reason for home health care. Based on my clinical findings: the service(s) are medically necessary, support the need for home health care, and the homebound criteria are met.  I certify that this patient has had a face to face encounter by myself.  Oc Adkins M.D. - NPI: [id number]

## 2022-05-11 NOTE — DISCHARGE PLANNING
Anticipated Discharge Disposition: home with HHC.     Action: notified by [facility] RTOC manager that [facility] has declined. There are no more SNF's in the area who haven't declined. Spoke with patient about home with HHC. He reports he is being moved to a ground floor apartment but does not know when. He agreed to [facility] to [facility] agencies. [facility] given to LUIS Medina. Requested HHC order/F2F from Dr. Adkins.     Barriers to Discharge: medical clearance, HHC acceptance.     Plan: continue to follow with medical team for discharge needs. Follow for HHC acceptance and potential move to ground floor.

## 2022-05-11 NOTE — CARE PLAN
The patient is Watcher - Medium risk of patient condition declining or worsening    Shift Goals  Clinical Goals: Pain management  Patient Goals: rest  Family Goals: rest    Progress made toward(s) clinical / shift goals:  Pain managed by PRN medications    Problem: Pain - Standard  Goal: Alleviation of pain or a reduction in pain to the patient’s comfort goal  Outcome: Progressing     Problem: Knowledge Deficit - Standard  Goal: Patient and family/care givers will demonstrate understanding of plan of care, disease process/condition, diagnostic tests and medications  Outcome: Progressing     Problem: Fall Risk  Goal: Patient will remain free from falls  Outcome: Progressing       Patient is not progressing towards the following goals: N/A

## 2022-05-11 NOTE — PROGRESS NOTES
Hospital Medicine Daily Progress Note    Date of Service  5/11/2022    Chief Complaint  Junior Proctor is a 71 y.o. male admitted 5/3/2022 with abdominal pain    Hospital Course  Abran Proctor is  a 71-year-old male with a past medical history of end-stage renal disease on hemodialysis Tuesday, Thursday and Saturday, peptic ulcer disease, alcohol abuse who presented with a syncopal episode while he was undergoing hemodialysis.  After that the patient developed severe epigastric abdominal pain with radiation to bilateral upper quadrants of his abdomen. He was brought to the hospital for evaluation. Patient tachycardic, afebrile, and without leukocytosis. Transaminitis noted with elevated lipase. RUQ US with gallbladder wall thickening. HIDA scan without evidence of cholecystitis.       Interval Problem Update  Afebrile, no overnight events resting in bed reports improved abdominal pain today, advanced diet with pancreatic enzymes. Awaiting SNF placement.     I have personally seen and examined the patient at bedside. I discussed the plan of care with patient, bedside RN and charge RN.    Consultants/Specialty  general surgery, nephrology and vascular surgery    Code Status  Full Code     Disposition  Patient is medically cleared for discharge.   Anticipate discharge to to skilled nursing facility.  I have placed the appropriate orders for post-discharge needs.    Review of Systems  Review of Systems   Constitutional: Positive for malaise/fatigue. Negative for chills.   HENT: Negative for congestion and sore throat.    Eyes: Negative.    Respiratory: Negative for cough and shortness of breath.    Cardiovascular: Negative for chest pain and orthopnea.   Gastrointestinal: Positive for abdominal pain. Negative for diarrhea, nausea and vomiting.   Genitourinary:        Anuric     Musculoskeletal: Negative.    Skin: Negative.    Neurological: Positive for weakness. Negative for headaches.   Psychiatric/Behavioral: Negative for  depression. The patient is not nervous/anxious.    All other systems reviewed and are negative.       Physical Exam  Temp:  [36.3 °C (97.3 °F)-37.2 °C (99 °F)] 36.4 °C (97.5 °F)  Pulse:  [] 89  Resp:  [16-20] 16  BP: ()/(50-64) 109/64  SpO2:  [98 %-100 %] 98 %    Physical Exam  Vitals and nursing note reviewed.   Constitutional:       General: He is not in acute distress.     Appearance: He is ill-appearing.   HENT:      Head: Normocephalic and atraumatic.      Right Ear: External ear normal.      Left Ear: External ear normal.      Nose: Nose normal. No congestion.      Mouth/Throat:      Mouth: Mucous membranes are moist.      Pharynx: Oropharynx is clear.   Eyes:      General:         Right eye: No discharge.         Left eye: No discharge.      Pupils: Pupils are equal, round, and reactive to light.   Cardiovascular:      Rate and Rhythm: Normal rate and regular rhythm.      Pulses: Normal pulses.      Heart sounds: Normal heart sounds.   Pulmonary:      Effort: Pulmonary effort is normal.      Breath sounds: Normal breath sounds.   Abdominal:      General: Bowel sounds are normal. There is no distension.      Palpations: Abdomen is soft.      Tenderness: There is no abdominal tenderness.   Musculoskeletal:      Cervical back: No tenderness.      Right lower le+ Edema present.      Left lower le+ Edema present.   Skin:     General: Skin is warm and dry.      Capillary Refill: Capillary refill takes less than 2 seconds.      Coloration: Skin is not jaundiced.   Neurological:      General: No focal deficit present.      Mental Status: He is alert and oriented to person, place, and time. Mental status is at baseline.   Psychiatric:         Mood and Affect: Mood normal.         Behavior: Behavior normal.         Fluids    Intake/Output Summary (Last 24 hours) at 2022 1114  Last data filed at 5/10/2022 1315  Gross per 24 hour   Intake 500 ml   Output 2301 ml   Net -1801 ml        Laboratory  Recent Labs     05/09/22  0139 05/10/22  0312 05/11/22  0146   WBC 6.3 5.7 5.2   RBC 2.44* 2.36* 2.41*   HEMOGLOBIN 7.8* 7.6* 7.7*   HEMATOCRIT 24.0* 23.1* 23.9*   MCV 98.4* 97.9* 99.2*   MCH 32.0 32.2 32.0   MCHC 32.5* 32.9* 32.2*   RDW 70.4* 67.8* 68.5*   PLATELETCT 84* 92* 112*   MPV 10.4 10.2 9.9     Recent Labs     05/09/22  0139 05/10/22  0312 05/11/22  0146   SODIUM 134* 132* 135   POTASSIUM 5.3 5.1 4.4   CHLORIDE 96 94* 97   CO2 24 22 25   GLUCOSE 85 100* 103*   BUN 34* 44* 28*   CREATININE 7.79* 10.42* 8.57*   CALCIUM 8.4* 8.5 8.7                   Imaging  US-HEMODIALYSIS GRAFT DUPLEX COMP UPPER EXTREMITY   Final Result      EC-ECHOCARDIOGRAM COMPLETE W/O CONT   Final Result      NM-BILIARY HIDA SCAN FATTY MEAL   Final Result      Normal hepatobiliary scan.      This study was obtained utilizing fatty meal challenge to induce gallbladder contraction due to national shortage of cholecystokinin.  There are no national standards for gallbladder ejection fraction calculated utilizing fatty meal challenge.  An    ejection fraction greater than 35% is most likely normal.  Gallbladder ejection fraction less than 35% may be abnormal but could be falsely positive.  Therefore, this test could be falsely positive.  Consider repeat imaging once cholecystokinin becomes    available.      US-RUQ   Final Result      1.  Gallbladder polyp versus nonshadowing gallstone identified.      2.  There is abnormal gallbladder wall thickening which is significantly increased compared to the prior exam. Cholecystitis is a possibility.      DX-CHEST-PORTABLE (1 VIEW)   Final Result         No acute cardiac or pulmonary abnormality is identified.           Assessment/Plan  * Acute pancreatitis- (present on admission)  Assessment & Plan  Likely secondary to alcohol vs steroids  Lipase trending down and pain improving  Diet advanced today  Trial pancreatic enzymes  Oral hydration in setting of ESRD        Arthritis-  (present on admission)  Assessment & Plan  Hold home Etanercept  Resume outpatient      AP (abdominal pain)- (present on admission)  Assessment & Plan  Most likely secondary to alcoholic hepatitis and alcohol induced pancreatitis  No evidence of cholecystitis on HIDA scan  Blood cultures negative  Pain control with IV fentanyl, monitor respiratory status closely    Hepatitis- (present on admission)  Assessment & Plan  Complicated by history of hepatitis C and alcohol  Hold atorvastatin  Labs and history suggest alcoholic hepatitis  HIDA without evidence of obstruction      Alcohol dependence (HCC)- (present on admission)  Assessment & Plan  Counseled on alcohol cessation    Chest pain- (present on admission)  Assessment & Plan  Atypical, no current chest pain, no acute ischemic changes on EKG complicated by history of coronary artery, abnormal troponin but also on exam consistent with epigastric and right upper quadrant pain.    Echo completed- EF 65%, right ventricular systolic pressure has decreased from 50mmHg to 30mmHg  Resolved      Hepatitis C antibody positive in blood- (present on admission)  Assessment & Plan  hCv not detected   HCV RNA was previously negative    ESRD on dialysis (HCC)- (present on admission)  Assessment & Plan  Scheduled dialysis TRS   Nephrology following  RUE AF US with reduced flow volumes, [facility]   Avoid IV hydration setting of pancreatitis with ESRD    Thrombocytopenia (HCC)- (present on admission)  Assessment & Plan  Likely secondary to alcohol  Hold heparin  Trend CBC    Transaminitis- (present on admission)  Assessment & Plan  Continues to trend down  Nonobstructive pattern  HIDA scan completed, no evidence of cholecystitis         Anemia- (present on admission)  Assessment & Plan  Secondary to end-stage renal failure           VTE prophylaxis: SCDs/TEDs    I have performed a physical exam and reviewed and updated ROS and Plan today  (5/11/2022). In review of yesterday's note (5/10/2022), there are no changes except as documented above.

## 2022-05-12 LAB
ALBUMIN SERPL BCP-MCNC: 3.3 G/DL (ref 3.2–4.9)
ALBUMIN/GLOB SERPL: 1 G/DL
ALP SERPL-CCNC: 130 U/L (ref 30–99)
ALT SERPL-CCNC: 47 U/L (ref 2–50)
ANION GAP SERPL CALC-SCNC: 18 MMOL/L (ref 7–16)
AST SERPL-CCNC: 31 U/L (ref 12–45)
BILIRUB SERPL-MCNC: 0.6 MG/DL (ref 0.1–1.5)
BUN SERPL-MCNC: 29 MG/DL (ref 8–22)
CALCIUM SERPL-MCNC: 8.6 MG/DL (ref 8.5–10.5)
CHLORIDE SERPL-SCNC: 97 MMOL/L (ref 96–112)
CO2 SERPL-SCNC: 21 MMOL/L (ref 20–33)
CREAT SERPL-MCNC: 7.55 MG/DL (ref 0.5–1.4)
GFR SERPLBLD CREATININE-BSD FMLA CKD-EPI: 7 ML/MIN/1.73 M 2
GLOBULIN SER CALC-MCNC: 3.3 G/DL (ref 1.9–3.5)
GLUCOSE SERPL-MCNC: 93 MG/DL (ref 65–99)
LIPASE SERPL-CCNC: 121 U/L (ref 11–82)
POTASSIUM SERPL-SCNC: 4.6 MMOL/L (ref 3.6–5.5)
PROT SERPL-MCNC: 6.6 G/DL (ref 6–8.2)
SODIUM SERPL-SCNC: 136 MMOL/L (ref 135–145)

## 2022-05-12 PROCEDURE — 90935 HEMODIALYSIS ONE EVALUATION: CPT

## 2022-05-12 PROCEDURE — 83690 ASSAY OF LIPASE: CPT

## 2022-05-12 PROCEDURE — A9270 NON-COVERED ITEM OR SERVICE: HCPCS | Performed by: INTERNAL MEDICINE

## 2022-05-12 PROCEDURE — 700111 HCHG RX REV CODE 636 W/ 250 OVERRIDE (IP): Performed by: INTERNAL MEDICINE

## 2022-05-12 PROCEDURE — 99232 SBSQ HOSP IP/OBS MODERATE 35: CPT | Performed by: STUDENT IN AN ORGANIZED HEALTH CARE EDUCATION/TRAINING PROGRAM

## 2022-05-12 PROCEDURE — 700102 HCHG RX REV CODE 250 W/ 637 OVERRIDE(OP): Performed by: INTERNAL MEDICINE

## 2022-05-12 PROCEDURE — 80053 COMPREHEN METABOLIC PANEL: CPT

## 2022-05-12 PROCEDURE — A9270 NON-COVERED ITEM OR SERVICE: HCPCS | Performed by: STUDENT IN AN ORGANIZED HEALTH CARE EDUCATION/TRAINING PROGRAM

## 2022-05-12 PROCEDURE — 770001 HCHG ROOM/CARE - MED/SURG/GYN PRIV*

## 2022-05-12 PROCEDURE — 700102 HCHG RX REV CODE 250 W/ 637 OVERRIDE(OP): Performed by: STUDENT IN AN ORGANIZED HEALTH CARE EDUCATION/TRAINING PROGRAM

## 2022-05-12 PROCEDURE — 36415 COLL VENOUS BLD VENIPUNCTURE: CPT

## 2022-05-12 PROCEDURE — 700111 HCHG RX REV CODE 636 W/ 250 OVERRIDE (IP)

## 2022-05-12 RX ORDER — HEPARIN SODIUM 1000 [USP'U]/ML
INJECTION, SOLUTION INTRAVENOUS; SUBCUTANEOUS
Status: COMPLETED
Start: 2022-05-12 | End: 2022-05-12

## 2022-05-12 RX ORDER — OXYCODONE HYDROCHLORIDE 5 MG/1
2.5 TABLET ORAL EVERY 6 HOURS PRN
Status: DISCONTINUED | OUTPATIENT
Start: 2022-05-12 | End: 2022-05-12

## 2022-05-12 RX ORDER — OXYCODONE HYDROCHLORIDE 5 MG/1
2.5 TABLET ORAL EVERY 4 HOURS PRN
Status: DISCONTINUED | OUTPATIENT
Start: 2022-05-12 | End: 2022-05-14 | Stop reason: HOSPADM

## 2022-05-12 RX ORDER — ACETAMINOPHEN 500 MG
1000 TABLET ORAL 3 TIMES DAILY
Status: DISCONTINUED | OUTPATIENT
Start: 2022-05-12 | End: 2022-05-14 | Stop reason: HOSPADM

## 2022-05-12 RX ORDER — OMEPRAZOLE 20 MG/1
20 CAPSULE, DELAYED RELEASE ORAL 2 TIMES DAILY
Status: DISCONTINUED | OUTPATIENT
Start: 2022-05-12 | End: 2022-05-14 | Stop reason: HOSPADM

## 2022-05-12 RX ADMIN — HEPARIN SODIUM 3900 UNITS: 1000 INJECTION, SOLUTION INTRAVENOUS; SUBCUTANEOUS at 12:42

## 2022-05-12 RX ADMIN — PANCRELIPASE 3000 UNITS: 15000; 3000; 9500 CAPSULE, DELAYED RELEASE ORAL at 08:54

## 2022-05-12 RX ADMIN — OMEPRAZOLE 20 MG: 20 CAPSULE, DELAYED RELEASE ORAL at 05:31

## 2022-05-12 RX ADMIN — SENNOSIDES AND DOCUSATE SODIUM 2 TABLET: 50; 8.6 TABLET ORAL at 05:31

## 2022-05-12 RX ADMIN — OXYCODONE 5 MG: 5 TABLET ORAL at 08:54

## 2022-05-12 RX ADMIN — OXYCODONE 2.5 MG: 5 TABLET ORAL at 14:46

## 2022-05-12 RX ADMIN — OMEPRAZOLE 20 MG: 20 CAPSULE, DELAYED RELEASE ORAL at 17:43

## 2022-05-12 RX ADMIN — PANCRELIPASE 3000 UNITS: 15000; 3000; 9500 CAPSULE, DELAYED RELEASE ORAL at 17:43

## 2022-05-12 RX ADMIN — OXYCODONE 5 MG: 5 TABLET ORAL at 05:30

## 2022-05-12 RX ADMIN — OXYCODONE 2.5 MG: 5 TABLET ORAL at 18:50

## 2022-05-12 RX ADMIN — Medication 1334 MG: at 08:54

## 2022-05-12 RX ADMIN — PANCRELIPASE 3000 UNITS: 15000; 3000; 9500 CAPSULE, DELAYED RELEASE ORAL at 12:56

## 2022-05-12 RX ADMIN — HYDROMORPHONE HYDROCHLORIDE 0.25 MG: 1 INJECTION, SOLUTION INTRAMUSCULAR; INTRAVENOUS; SUBCUTANEOUS at 06:41

## 2022-05-12 RX ADMIN — Medication 1334 MG: at 17:44

## 2022-05-12 RX ADMIN — TRAZODONE HYDROCHLORIDE 50 MG: 50 TABLET ORAL at 22:37

## 2022-05-12 RX ADMIN — Medication 1334 MG: at 12:56

## 2022-05-12 ASSESSMENT — ENCOUNTER SYMPTOMS
HEADACHES: 0
MUSCULOSKELETAL NEGATIVE: 1
CHILLS: 0
DEPRESSION: 0
COUGH: 0
SHORTNESS OF BREATH: 0
DIARRHEA: 0
NERVOUS/ANXIOUS: 0
SORE THROAT: 0
VOMITING: 0
WEAKNESS: 1
EYES NEGATIVE: 1
NAUSEA: 0
ORTHOPNEA: 0
ABDOMINAL PAIN: 1

## 2022-05-12 ASSESSMENT — PAIN DESCRIPTION - PAIN TYPE
TYPE: CHRONIC PAIN
TYPE: CHRONIC PAIN

## 2022-05-12 ASSESSMENT — FIBROSIS 4 INDEX: FIB4 SCORE: 2.79

## 2022-05-12 ASSESSMENT — PAIN SCALES - WONG BAKER: WONGBAKER_NUMERICALRESPONSE: HURTS A WHOLE LOT

## 2022-05-12 NOTE — PROGRESS NOTES
[facility] Progress Note:     HD treatment ordered 5/12/2022 by Dr. Cortez.     Pt transported to HD multi-treatment room, pt on RA, sating 97%, denied CP, SOB. LE edema noted.     L chest catheter accessed, heparin lock aspirated, lines flushed, patent, secured. Tx started 0937.     Pt completed 3 hr HD treatment, VSS throughout treatment, no distress noted. All blood returned without issue.     UF Net: 2L     L chest catheter flushed, heparin locked (A 1.9mL, V 2 mL), capped, clamped, labeled.     Report given to primary RN PAT Ramos, see electronic flowsheet for additional information.

## 2022-05-12 NOTE — PROGRESS NOTES
"[facility] -  PROGRESS NOTE               Author: DARIN Blanco Date & Time: 5/12/2022  11:17 AM     HPI:  Per Dr. Vargas  \"Mr. Proctor is a very pleasant 72 y/o male known to us from outpt dialysis. He gets his treatments at [facility] dialysis unit qTTS via right IJ permcath.  He was at his routine HD treatment earlier today.  After about 30 or so minutes his BP dropped.  He tells me \"I passed out\" but that was not witnessed at the HD unit.  He did develop chest pain radiating to his back.  Dialysis was discontinued and he was referred to the ED at [facility]. At the time of presentation to the ED his BP was now elevated at 171/74.  He is also complaining of ABD pain described as generalized and intermittent.  No change to bowel habits.  No Diarrhea or constipation.  No N/V.  No recent change to meds.  No OTC meds.  Has some LE edema, no recent change.  He does not routinely take his BP meds before HD and did not take this AM.  RUQ U/S showed thickened gallbladder wall.  No stones.  LFTs are elevated without rise in bilirubin.  Elevated Lipase as well.  No F/C.  No cough.  We have been asked to see him regarding his dialysis needs. \"    DAILY NEPHROLOGY SUMMARY:  5/4 - Patient is having upper abdominal pain, nausea. No SOB, palpitations, cough, melena or hematochezia.  5/5 - States he's hungry.  Still having abdominal pain.    5/6 - Dialysis cut short yesterday due to diarrhea and clotting of machine.  Edema today.  Still with abdominal pain.  5/7 - Complaining of abdominal pain.  +edema but improved  5/8 - Still with abdominal pain.  Not happy about being on liquid diet.  HD done yesterday with 2.5L UF  5/9 -  VSS RA, no CP no SOB.  +abdominal pain but improving. Lipase decreasing.    5/10 - VSS RA, no CP SOB.  iHD today  5/11 - NAEO, no complaints, feels good this AM  5/12 -  Seen on iHD, no complaints.  VSS.       REVIEW OF SYSTEMS:    10 point ROS reviewed and " "is as per HPI/daily summary or otherwise negative    PMH/PSH/SH/FH: Reviewed and unchanged since admission note  CURRENT MEDICATIONS: Reviewed from admission to present day    VS:  /84   Pulse (!) 102 Comment: nurse aware  Temp 36.9 °C (98.4 °F) (Temporal)   Resp 19   Ht 1.727 m (5' 8\")   Wt 77.1 kg (169 lb 15.6 oz)   SpO2 98%   BMI 25.84 kg/m²   Physical Exam  Vitals and nursing note reviewed.   Constitutional:       Appearance: Normal appearance.   HENT:      Head: Normocephalic.   Eyes:      General: No scleral icterus.  Cardiovascular:      Comments: No edema  Pulmonary:      Effort: Pulmonary effort is normal.   Abdominal:      General: There is no distension.      Tenderness: There is abdominal tenderness.   Musculoskeletal:         General: No deformity.   Skin:     Findings: No rash.   Neurological:      General: No focal deficit present.      Mental Status: He is alert.   Psychiatric:         Behavior: Behavior normal.       Fluids:  No intake/output data recorded.    LABS:  Recent Labs     05/10/22  0312 05/11/22  0146   SODIUM 132* 135   POTASSIUM 5.1 4.4   CHLORIDE 94* 97   CO2 22 25   GLUCOSE 100* 103*   BUN 44* 28*   CREATININE 10.42* 8.57*   CALCIUM 8.5 8.7       IMPRESSION:  · ESRD, due to long standing hypertension, HD TTS via port, has right AVF  -RUE AVF ultrasound with reduced flow volumes  -Vascular surgery Dr Hilton evaluated 5/8 and [facility]   · Upper abdominal pain, likely acute pancreatitis  · Hypocalcemia  -PTH elevated. Low Ca and high phos  -Changed to PhosLo 1334mg PO TIDWM  · Alcoholic hepatitis  · Alcoholic pancreatitis, epigastric pain + lipase >3ULN + alcohol use disorder  · Gallbladder wall thickening  · Elevated liver enzymes, AST>ALT 2  · Inflammatory arthritis, on etanercept  · H/o NSTEMI  · H/o PE  · Hypertension    PLAN:  -TTS iHD, next ([facility]AT)  - UF as tolerated  - Bolus IVF for hemodynamics as needed  - Dose all meds per " ESRD  - Ok to transition to OP care from renal standpoint  - Awaiting placement

## 2022-05-12 NOTE — DISCHARGE PLANNING
Agency/Facility Name: [facility]  Referral sent per Choice form @ [phone]  Agency/Facility Name: [facility]  Spoke To: [place]   Outcome: HH liaison to contact agency office regarding referral status and will update this DPA on referral.

## 2022-05-12 NOTE — PROGRESS NOTES
Hospital Medicine Daily Progress Note    Date of Service  5/12/2022    Chief Complaint  Junior Proctor is a 71 y.o. male admitted 5/3/2022 with abdominal pain    Hospital Course  Abran Proctor is  a 71-year-old male with a past medical history of end-stage renal disease on hemodialysis Tuesday, Thursday and Saturday, peptic ulcer disease, alcohol abuse who presented with a syncopal episode while he was undergoing hemodialysis.  After that the patient developed severe epigastric abdominal pain with radiation to bilateral upper quadrants of his abdomen. He was brought to the hospital for evaluation. Patient tachycardic, afebrile, and without leukocytosis. Transaminitis noted with elevated lipase. RUQ US with gallbladder wall thickening. HIDA scan without evidence of cholecystitis.     Interval Problem Update  Afebrile, no overnight events.  Patient had hemodialysis today and reporting some abdominal discomfort but tolerating meals.  Adjusted pain regimen.  Up to chair for all meals.  Medically clear, pending SNF    I have personally seen and examined the patient at bedside. I discussed the plan of care with patient, bedside RN and charge RN.    Consultants/Specialty  general surgery, nephrology and vascular surgery    Code Status  Full Code     Disposition  Patient is medically cleared for discharge.   Anticipate discharge to to skilled nursing facility.  I have placed the appropriate orders for post-discharge needs.    Review of Systems  Review of Systems   Constitutional: Positive for malaise/fatigue. Negative for chills.   HENT: Negative for congestion and sore throat.    Eyes: Negative.    Respiratory: Negative for cough and shortness of breath.    Cardiovascular: Negative for chest pain and orthopnea.   Gastrointestinal: Positive for abdominal pain. Negative for diarrhea, nausea and vomiting.   Genitourinary:        Anuric     Musculoskeletal: Negative.    Skin: Negative.    Neurological: Positive for weakness.  Negative for headaches.   Psychiatric/Behavioral: Negative for depression. The patient is not nervous/anxious.    All other systems reviewed and are negative.       Physical Exam  Temp:  [36.2 °C (97.1 °F)-36.9 °C (98.4 °F)] 36.2 °C (97.1 °F)  Pulse:  [] 102  Resp:  [14-20] 20  BP: (101-128)/(39-84) 110/52  SpO2:  [92 %-100 %] 98 %    Physical Exam  Vitals and nursing note reviewed.   Constitutional:       General: He is not in acute distress.     Appearance: He is ill-appearing.   HENT:      Head: Normocephalic and atraumatic.      Right Ear: External ear normal.      Left Ear: External ear normal.      Nose: Nose normal. No congestion.      Mouth/Throat:      Mouth: Mucous membranes are moist.      Pharynx: Oropharynx is clear.   Eyes:      General:         Right eye: No discharge.         Left eye: No discharge.      Pupils: Pupils are equal, round, and reactive to light.   Cardiovascular:      Rate and Rhythm: Normal rate and regular rhythm.      Pulses: Normal pulses.      Heart sounds: Normal heart sounds.   Pulmonary:      Effort: Pulmonary effort is normal.      Breath sounds: Normal breath sounds.   Abdominal:      General: Bowel sounds are normal. There is no distension.      Palpations: Abdomen is soft.      Tenderness: There is no abdominal tenderness.   Musculoskeletal:      Cervical back: No tenderness.      Right lower le+ Edema present.      Left lower le+ Edema present.   Skin:     General: Skin is warm and dry.      Capillary Refill: Capillary refill takes less than 2 seconds.      Coloration: Skin is not jaundiced.   Neurological:      General: No focal deficit present.      Mental Status: He is alert and oriented to person, place, and time. Mental status is at baseline.   Psychiatric:         Mood and Affect: Mood normal.         Behavior: Behavior normal.         Fluids    Intake/Output Summary (Last 24 hours) at 2022 1425  Last data filed at 2022 1247  Gross per 24 hour    Intake 740 ml   Output 2500 ml   Net -1760 ml       Laboratory  Recent Labs     05/10/22  0312 05/11/22  0146   WBC 5.7 5.2   RBC 2.36* 2.41*   HEMOGLOBIN 7.6* 7.7*   HEMATOCRIT 23.1* 23.9*   MCV 97.9* 99.2*   MCH 32.2 32.0   MCHC 32.9* 32.2*   RDW 67.8* 68.5*   PLATELETCT 92* 112*   MPV 10.2 9.9     Recent Labs     05/10/22  0312 05/11/22  0146 05/12/22  0146   SODIUM 132* 135 136   POTASSIUM 5.1 4.4 4.6   CHLORIDE 94* 97 97   CO2 22 25 21   GLUCOSE 100* 103* 93   BUN 44* 28* 29*   CREATININE 10.42* 8.57* 7.55*   CALCIUM 8.5 8.7 8.6                   Imaging  US-HEMODIALYSIS GRAFT DUPLEX COMP UPPER EXTREMITY   Final Result      EC-ECHOCARDIOGRAM COMPLETE W/O CONT   Final Result      NM-BILIARY HIDA SCAN FATTY MEAL   Final Result      Normal hepatobiliary scan.      This study was obtained utilizing fatty meal challenge to induce gallbladder contraction due to national shortage of cholecystokinin.  There are no national standards for gallbladder ejection fraction calculated utilizing fatty meal challenge.  An    ejection fraction greater than 35% is most likely normal.  Gallbladder ejection fraction less than 35% may be abnormal but could be falsely positive.  Therefore, this test could be falsely positive.  Consider repeat imaging once cholecystokinin becomes    available.      US-RUQ   Final Result      1.  Gallbladder polyp versus nonshadowing gallstone identified.      2.  There is abnormal gallbladder wall thickening which is significantly increased compared to the prior exam. Cholecystitis is a possibility.      DX-CHEST-PORTABLE (1 VIEW)   Final Result         No acute cardiac or pulmonary abnormality is identified.           Assessment/Plan  * Acute pancreatitis- (present on admission)  Assessment & Plan  Likely secondary to alcohol vs steroids  Lipase trending down and pain improving  Diet advanced today  Trial pancreatic enzymes  Oral hydration in setting of ESRD    ESRD on dialysis (HCC)- (present on  admission)  Assessment & Plan  Scheduled dialysis TRS   Nephrology following  RUE AF US with reduced flow volumes, [facility]   Avoid IV hydration setting of pancreatitis with ESRD    Arthritis- (present on admission)  Assessment & Plan  Hold home Etanercept  Resume outpatient      AP (abdominal pain)- (present on admission)  Assessment & Plan  Secondary to acute pancreatitis  Improving  Tolerating meals  Monitor    Hepatitis- (present on admission)  Assessment & Plan  Resolved    Alcohol dependence (HCC)- (present on admission)  Assessment & Plan  Counseled on alcohol cessation    Chest pain- (present on admission)  Assessment & Plan  Atypical, no current chest pain, no acute ischemic changes on EKG complicated by history of coronary artery, abnormal troponin but also on exam consistent with epigastric and right upper quadrant pain.    Echo completed- EF 65%, right ventricular systolic pressure has decreased from 50mmHg to 30mmHg  Resolved      Thrombocytopenia (HCC)- (present on admission)  Assessment & Plan  Likely secondary to alcohol  Hold heparin  Trend CBC    Transaminitis- (present on admission)  Assessment & Plan  Resolved    Anemia- (present on admission)  Assessment & Plan  Secondary to end-stage renal failure    Hepatitis C antibody positive in blood- (present on admission)  Assessment & Plan  hCv not detected   HCV RNA was previously negative       VTE prophylaxis: SCDs/TEDs    I have performed a physical exam and reviewed and updated ROS and Plan today (5/12/2022). In review of yesterday's note (5/11/2022), there are no changes except as documented above.

## 2022-05-13 LAB
ALBUMIN SERPL BCP-MCNC: 3.4 G/DL (ref 3.2–4.9)
ALBUMIN/GLOB SERPL: 1.1 G/DL
ALP SERPL-CCNC: 121 U/L (ref 30–99)
ALT SERPL-CCNC: 26 U/L (ref 2–50)
ANION GAP SERPL CALC-SCNC: 10 MMOL/L (ref 7–16)
ANISOCYTOSIS BLD QL SMEAR: ABNORMAL
AST SERPL-CCNC: 21 U/L (ref 12–45)
BASOPHILS # BLD AUTO: 0.9 % (ref 0–1.8)
BASOPHILS # BLD: 0.04 K/UL (ref 0–0.12)
BILIRUB SERPL-MCNC: 0.5 MG/DL (ref 0.1–1.5)
BUN SERPL-MCNC: 19 MG/DL (ref 8–22)
CALCIUM SERPL-MCNC: 9.1 MG/DL (ref 8.5–10.5)
CHLORIDE SERPL-SCNC: 100 MMOL/L (ref 96–112)
CO2 SERPL-SCNC: 27 MMOL/L (ref 20–33)
CREAT SERPL-MCNC: 7.58 MG/DL (ref 0.5–1.4)
EOSINOPHIL # BLD AUTO: 0.24 K/UL (ref 0–0.51)
EOSINOPHIL NFR BLD: 5.3 % (ref 0–6.9)
ERYTHROCYTE [DISTWIDTH] IN BLOOD BY AUTOMATED COUNT: 71.3 FL (ref 35.9–50)
GFR SERPLBLD CREATININE-BSD FMLA CKD-EPI: 7 ML/MIN/1.73 M 2
GIANT PLATELETS BLD QL SMEAR: NORMAL
GLOBULIN SER CALC-MCNC: 3.2 G/DL (ref 1.9–3.5)
GLUCOSE SERPL-MCNC: 92 MG/DL (ref 65–99)
HCT VFR BLD AUTO: 25.6 % (ref 42–52)
HGB BLD-MCNC: 7.8 G/DL (ref 14–18)
HYPOCHROMIA BLD QL SMEAR: ABNORMAL
LYMPHOCYTES # BLD AUTO: 1.26 K/UL (ref 1–4.8)
LYMPHOCYTES NFR BLD: 27.4 % (ref 22–41)
MACROCYTES BLD QL SMEAR: ABNORMAL
MANUAL DIFF BLD: NORMAL
MCH RBC QN AUTO: 31.8 PG (ref 27–33)
MCHC RBC AUTO-ENTMCNC: 30.5 G/DL (ref 33.7–35.3)
MCV RBC AUTO: 104.5 FL (ref 81.4–97.8)
MICROCYTES BLD QL SMEAR: ABNORMAL
MONOCYTES # BLD AUTO: 0.37 K/UL (ref 0–0.85)
MONOCYTES NFR BLD AUTO: 8 % (ref 0–13.4)
MORPHOLOGY BLD-IMP: NORMAL
NEUTROPHILS # BLD AUTO: 2.69 K/UL (ref 1.82–7.42)
NEUTROPHILS NFR BLD: 58.4 % (ref 44–72)
NRBC # BLD AUTO: 0 K/UL
NRBC BLD-RTO: 0 /100 WBC
PLATELET # BLD AUTO: 163 K/UL (ref 164–446)
PLATELET BLD QL SMEAR: NORMAL
PMV BLD AUTO: 10.2 FL (ref 9–12.9)
POIKILOCYTOSIS BLD QL SMEAR: NORMAL
POTASSIUM SERPL-SCNC: 4.6 MMOL/L (ref 3.6–5.5)
PROT SERPL-MCNC: 6.6 G/DL (ref 6–8.2)
RBC # BLD AUTO: 2.45 M/UL (ref 4.7–6.1)
RBC BLD AUTO: PRESENT
SMUDGE CELLS BLD QL SMEAR: NORMAL
SODIUM SERPL-SCNC: 137 MMOL/L (ref 135–145)
TARGETS BLD QL SMEAR: NORMAL
WBC # BLD AUTO: 4.6 K/UL (ref 4.8–10.8)

## 2022-05-13 PROCEDURE — 80053 COMPREHEN METABOLIC PANEL: CPT

## 2022-05-13 PROCEDURE — 97530 THERAPEUTIC ACTIVITIES: CPT

## 2022-05-13 PROCEDURE — A9270 NON-COVERED ITEM OR SERVICE: HCPCS | Performed by: STUDENT IN AN ORGANIZED HEALTH CARE EDUCATION/TRAINING PROGRAM

## 2022-05-13 PROCEDURE — A9270 NON-COVERED ITEM OR SERVICE: HCPCS | Performed by: INTERNAL MEDICINE

## 2022-05-13 PROCEDURE — 700102 HCHG RX REV CODE 250 W/ 637 OVERRIDE(OP): Performed by: INTERNAL MEDICINE

## 2022-05-13 PROCEDURE — A9270 NON-COVERED ITEM OR SERVICE: HCPCS | Performed by: NURSE PRACTITIONER

## 2022-05-13 PROCEDURE — 85007 BL SMEAR W/DIFF WBC COUNT: CPT

## 2022-05-13 PROCEDURE — 99232 SBSQ HOSP IP/OBS MODERATE 35: CPT | Performed by: STUDENT IN AN ORGANIZED HEALTH CARE EDUCATION/TRAINING PROGRAM

## 2022-05-13 PROCEDURE — 700102 HCHG RX REV CODE 250 W/ 637 OVERRIDE(OP): Performed by: STUDENT IN AN ORGANIZED HEALTH CARE EDUCATION/TRAINING PROGRAM

## 2022-05-13 PROCEDURE — 770001 HCHG ROOM/CARE - MED/SURG/GYN PRIV*

## 2022-05-13 PROCEDURE — 700102 HCHG RX REV CODE 250 W/ 637 OVERRIDE(OP): Performed by: NURSE PRACTITIONER

## 2022-05-13 PROCEDURE — 85025 COMPLETE CBC W/AUTO DIFF WBC: CPT

## 2022-05-13 PROCEDURE — 97116 GAIT TRAINING THERAPY: CPT

## 2022-05-13 PROCEDURE — 36415 COLL VENOUS BLD VENIPUNCTURE: CPT

## 2022-05-13 RX ADMIN — SENNOSIDES AND DOCUSATE SODIUM 2 TABLET: 50; 8.6 TABLET ORAL at 18:17

## 2022-05-13 RX ADMIN — PANCRELIPASE 3000 UNITS: 15000; 3000; 9500 CAPSULE, DELAYED RELEASE ORAL at 18:14

## 2022-05-13 RX ADMIN — PANCRELIPASE 3000 UNITS: 15000; 3000; 9500 CAPSULE, DELAYED RELEASE ORAL at 12:57

## 2022-05-13 RX ADMIN — PANCRELIPASE 3000 UNITS: 15000; 3000; 9500 CAPSULE, DELAYED RELEASE ORAL at 09:02

## 2022-05-13 RX ADMIN — Medication 667 MG: at 12:57

## 2022-05-13 RX ADMIN — TRAZODONE HYDROCHLORIDE 50 MG: 50 TABLET ORAL at 22:21

## 2022-05-13 RX ADMIN — OMEPRAZOLE 20 MG: 20 CAPSULE, DELAYED RELEASE ORAL at 18:14

## 2022-05-13 RX ADMIN — Medication 1334 MG: at 18:14

## 2022-05-13 RX ADMIN — OXYCODONE 2.5 MG: 5 TABLET ORAL at 05:30

## 2022-05-13 RX ADMIN — OXYCODONE 2.5 MG: 5 TABLET ORAL at 15:13

## 2022-05-13 RX ADMIN — Medication 1334 MG: at 09:02

## 2022-05-13 RX ADMIN — OMEPRAZOLE 20 MG: 20 CAPSULE, DELAYED RELEASE ORAL at 05:31

## 2022-05-13 RX ADMIN — OXYCODONE 2.5 MG: 5 TABLET ORAL at 19:47

## 2022-05-13 RX ADMIN — OXYCODONE 2.5 MG: 5 TABLET ORAL at 09:55

## 2022-05-13 ASSESSMENT — COGNITIVE AND FUNCTIONAL STATUS - GENERAL
CLIMB 3 TO 5 STEPS WITH RAILING: A LITTLE
WALKING IN HOSPITAL ROOM: A LITTLE
SUGGESTED CMS G CODE MODIFIER MOBILITY: CJ
STANDING UP FROM CHAIR USING ARMS: A LITTLE
MOBILITY SCORE: 21

## 2022-05-13 ASSESSMENT — ENCOUNTER SYMPTOMS
VOMITING: 0
EYES NEGATIVE: 1
NERVOUS/ANXIOUS: 0
MUSCULOSKELETAL NEGATIVE: 1
WEAKNESS: 1
COUGH: 0
ORTHOPNEA: 0
DIARRHEA: 0
CHILLS: 0
HEADACHES: 0
DEPRESSION: 0
ABDOMINAL PAIN: 1
SORE THROAT: 0
SHORTNESS OF BREATH: 0
NAUSEA: 0

## 2022-05-13 ASSESSMENT — FIBROSIS 4 INDEX: FIB4 SCORE: 1.74

## 2022-05-13 ASSESSMENT — GAIT ASSESSMENTS
DISTANCE (FEET): 20
GAIT LEVEL OF ASSIST: STANDBY ASSIST

## 2022-05-13 NOTE — PROGRESS NOTES
"Pt is upset, states his pain is not being managed well. FACES pain scale is low, assessing minor grimacing w/ movement but pt reports verbal scale 8/10 abd pain.    This RN consulted with Dr. Maisha MD concerned that pancreatitis may be worsening if pain is not subsiding. Diet has been switched to clear liquids, Lipase lab ordered, and oxy 2.5mg frequency was increased to q4h but pt upset about this. Pt asked to speak to charge RADHA. RADHA Angel in to see pt. Pt remains dissatisfied w/ pain management plan. Will give pain med at next due time and alert MD on call.    Pt refusing ATC Tylenol. He says \"that wont do anything\"  "

## 2022-05-13 NOTE — DISCHARGE PLANNING
Case Management Discharge Planning    Admission Date: 5/3/2022  GMLOS: 4.6  ALOS: 10    6-Clicks ADL Score: 18  6-Clicks Mobility Score: 18      Anticipated Discharge Dispo: Discharge Disposition: D/T to home under [facility]HA care in anticipation of covered skilled care (06)    DME Needed: no    Action(s) Taken: updated patient that we are awaiting acceptance by [facility]. He requested and received a copy of his Facesheet so he would have his Medicare #. Explained that PT will work with him on stairs but if he discharges this weekend I will follow up with C Monday.     Escalations Completed: NA    Medically Clear: No    Next Steps: follow for [facility] acceptance and clearance for home by PT/OT.     Barriers to Discharge: stairs, weakness, HHC acceptance.     Is the patient up for discharge tomorrow: if medically cleared.

## 2022-05-13 NOTE — CARE PLAN
The patient is Stable - Low risk of patient condition declining or worsening    Shift Goals  Clinical Goals: monitor VS, labs, and pain  Patient Goals: get dialysis and rest  Family Goals: rest    Progress made toward(s) clinical / shift goals:  Pt had 2L removed at HD this am. Labs stable. Lipase redrawn to see if pancreatitis improving since abd pain persisting. Pt not satisfied w/ level of pain control. MD aware. Pt advised to sit up to chair w/ meals and inform if pain worse after eating. PRN Oxy 2.5 available q4h    Problem: Knowledge Deficit - Standard  Goal: Patient and family/care givers will demonstrate understanding of plan of care, disease process/condition, diagnostic tests and medications  5/12/2022 1801 by Radha Ramos R.N.  Outcome: Progressing  5/12/2022 1757 by Radha Ramos, R.N.  Outcome: Progressing     Problem: Optimal Care for Alcohol Withdrawal  Goal: Optimal Care for the alcohol withdrawal patient  5/12/2022 1801 by Radha Ramos R.N.  Outcome: Progressing  5/12/2022 1757 by Radha Ramos, R.N.  Outcome: Progressing     Problem: Seizure Precautions  Goal: Implementation of seizure precautions  5/12/2022 1801 by Radha Ramos, R.N.  Outcome: Progressing  5/12/2022 1757 by Radha Ramos, R.N.  Outcome: Progressing     Problem: Lifestyle Changes  Goal: Patient's ability to identify lifestyle changes and available resources to help reduce recurrence of condition will improve  5/12/2022 1801 by Radha Ramos, R.N.  Outcome: Progressing  5/12/2022 1757 by Radha Ramos, R.N.  Outcome: Progressing     Problem: Psychosocial  Goal: Patient's level of anxiety will decrease  5/12/2022 1801 by Radha Ramos, R.N.  Outcome: Progressing  5/12/2022 1757 by Radha Ramos, R.N.  Outcome: Progressing  Goal: Spiritual and cultural needs incorporated into hospitalization  5/12/2022 1801 by PRESTON Cook.N.  Outcome: Progressing  5/12/2022 1757 by Radha Ramos R.N.  Outcome: Progressing      Problem: Risk for Aspiration  Goal: Patient's risk for aspiration will be absent or decrease  5/12/2022 1801 by Radha Ramos R.N.  Outcome: Progressing  5/12/2022 1757 by PRESTON Cook.N.  Outcome: Progressing     Problem: Fall Risk  Goal: Patient will remain free from falls  5/12/2022 1801 by Radha Ramos R.N.  Outcome: Progressing  5/12/2022 1757 by PRESTON Cook.N.  Outcome: Progressing       Patient is not progressing towards the following goals:  Problem: Pain - Standard  Goal: Alleviation of pain or a reduction in pain to the patient’s comfort goal  5/12/2022 1801 by Radha Ramos, R.N.  Outcome: Not Progressing  5/12/2022 1757 by Radha Ramos, R.N.  Outcome: Progressing

## 2022-05-13 NOTE — PROGRESS NOTES
"[facility] -  PROGRESS NOTE               Author: DARIN Stephenson, CNN-NP Date & Time: 5/13/2022  9:34 AM     HPI:  Per Dr. Vargas  \"Mr. Proctor is a very pleasant 72 y/o male known to us from outpt dialysis. He gets his treatments at [facility] dialysis unit qTTS via right IJ permcath.  He was at his routine HD treatment earlier today.  After about 30 or so minutes his BP dropped.  He tells me \"I passed out\" but that was not witnessed at the HD unit.  He did develop chest pain radiating to his back.  Dialysis was discontinued and he was referred to the ED at [facility]. At the time of presentation to the ED his BP was now elevated at 171/74.  He is also complaining of ABD pain described as generalized and intermittent.  No change to bowel habits.  No Diarrhea or constipation.  No N/V.  No recent change to meds.  No OTC meds.  Has some LE edema, no recent change.  He does not routinely take his BP meds before HD and did not take this AM.  RUQ U/S showed thickened gallbladder wall.  No stones.  LFTs are elevated without rise in bilirubin.  Elevated Lipase as well.  No F/C.  No cough.  We have been asked to see him regarding his dialysis needs. \"    DAILY NEPHROLOGY SUMMARY:  5/4 - Patient is having upper abdominal pain, nausea. No SOB, palpitations, cough, melena or hematochezia.  5/5 - States he's hungry.  Still having abdominal pain.    5/6 - Dialysis cut short yesterday due to diarrhea and clotting of machine.  Edema today.  Still with abdominal pain.  5/7 - Complaining of abdominal pain.  +edema but improved  5/8 - Still with abdominal pain.  Not happy about being on liquid diet.  HD done yesterday with 2.5L UF  5/9 -  VSS RA, no CP no SOB.  +abdominal pain but improving. Lipase decreasing.    5/10 - VSS RA, no CP SOB.  iHD today  5/11 - NAEO, no complaints, feels good this AM  5/12 -  Seen on iHD, no complaints.  VSS.    5/13 - sitting up in bed, abdominal pain is improving, " "tolerated iHD yesterday UF: 2.0L, anxious to go home     REVIEW OF SYSTEMS:    10 point ROS reviewed and is as per HPI/daily summary or otherwise negative    PMH/PSH/SH/FH: Reviewed and unchanged since admission note  CURRENT MEDICATIONS: Reviewed from admission to present day    VS:  /59   Pulse 97   Temp 37.6 °C (99.7 °F) (Temporal)   Resp 16   Ht 1.727 m (5' 8\")   Wt 72.2 kg (159 lb 2.8 oz)   SpO2 100%   BMI 24.20 kg/m²   Physical Exam  Vitals and nursing note reviewed.   Constitutional:       Appearance: Normal appearance.   HENT:      Head: Normocephalic.   Eyes:      General: No scleral icterus.  Cardiovascular:      Comments: No edema  Pulmonary:      Effort: Pulmonary effort is normal.   Abdominal:      General: There is no distension.      Tenderness: There is abdominal tenderness.   Musculoskeletal:         General: No deformity.   Skin:     Findings: No rash.   Neurological:      General: No focal deficit present.      Mental Status: He is alert.   Psychiatric:         Behavior: Behavior normal.       Fluids:  In: 980 [P.O.:480; Dialysis:500]  Out: 2500     LABS:  Recent Labs     05/11/22  0146 05/12/22  0146 05/13/22  0024   SODIUM 135 136 137   POTASSIUM 4.4 4.6 4.6   CHLORIDE 97 97 100   CO2 25 21 27   GLUCOSE 103* 93 92   BUN 28* 29* 19   CREATININE 8.57* 7.55* 7.58*   CALCIUM 8.7 8.6 9.1       IMPRESSION:  · ESRD, due to long standing hypertension, HD TTS via port, has right AVF   -RUE AVF ultrasound with reduced flow volumes   -Vascular surgery Dr Hilton evaluated 5/8 and determined [facility]   · Upper abdominal pain, likely acute pancreatitis, improving  · Hypocalcemia, corrected    -PTH elevated. Low Ca and high phos   -Changed to PhosLo 1334mg PO TIDWM  · Alcoholic hepatitis  · Alcoholic pancreatitis, epigastric pain + lipase >3ULN + alcohol use disorder   - lipase trending down  · Gallbladder wall thickening  · Elevated liver enzymes, AST>ALT " 2  · Inflammatory arthritis, on etanercept  · H/o NSTEMI  · H/o PE  · Hypertension, controlled   · Thrombocytopenia   · Anemia     PLAN:  -TTS iHD, next (SAT)  - UF as tolerated  - Bolus IVF for hemodynamics as needed  - Dose all meds per ESRD  - Ok to transition to OP care from renal standpoint  - Awaiting placement

## 2022-05-13 NOTE — PROGRESS NOTES
Hospital Medicine Daily Progress Note    Date of Service  5/13/2022    Chief Complaint  Junior Proctor is a 71 y.o. male admitted 5/3/2022 with abdominal pain    Hospital Course  Abran Proctor is  a 71-year-old male with a past medical history of end-stage renal disease on hemodialysis Tuesday, Thursday and Saturday, peptic ulcer disease, alcohol abuse who presented with a syncopal episode while he was undergoing hemodialysis.  After that the patient developed severe epigastric abdominal pain with radiation to bilateral upper quadrants of his abdomen. He was brought to the hospital for evaluation. Patient tachycardic, afebrile, and without leukocytosis. Transaminitis noted with elevated lipase. RUQ US with gallbladder wall thickening. HIDA scan without evidence of cholecystitis.     Interval Problem Update  Afebrile, no overnight events.    Abdominal pain at baseline  Up to chair for all meals  Medically clear  Pending SNF    I have personally seen and examined the patient at bedside. I discussed the plan of care with patient, bedside RN and charge RN.    Consultants/Specialty  general surgery, nephrology and vascular surgery    Code Status  Full Code     Disposition  Patient is medically cleared for discharge.   Anticipate discharge to to skilled nursing facility.  I have placed the appropriate orders for post-discharge needs.    Review of Systems  Review of Systems   Constitutional: Positive for malaise/fatigue. Negative for chills.   HENT: Negative for congestion and sore throat.    Eyes: Negative.    Respiratory: Negative for cough and shortness of breath.    Cardiovascular: Negative for chest pain and orthopnea.   Gastrointestinal: Positive for abdominal pain. Negative for diarrhea, nausea and vomiting.   Genitourinary:        Anuric     Musculoskeletal: Negative.    Skin: Negative.    Neurological: Positive for weakness. Negative for headaches.   Psychiatric/Behavioral: Negative for depression. The patient is not  nervous/anxious.    All other systems reviewed and are negative.       Physical Exam  Temp:  [36.9 °C (98.4 °F)-37.6 °C (99.7 °F)] 37.6 °C (99.7 °F)  Pulse:  [70-98] 97  Resp:  [15-17] 16  BP: (107-136)/(59-67) 107/59  SpO2:  [97 %-100 %] 100 %    Physical Exam  Vitals and nursing note reviewed.   Constitutional:       General: He is not in acute distress.     Appearance: He is ill-appearing.   HENT:      Head: Normocephalic and atraumatic.      Right Ear: External ear normal.      Left Ear: External ear normal.      Nose: Nose normal. No congestion.      Mouth/Throat:      Mouth: Mucous membranes are moist.      Pharynx: Oropharynx is clear.   Eyes:      General:         Right eye: No discharge.         Left eye: No discharge.      Pupils: Pupils are equal, round, and reactive to light.   Cardiovascular:      Rate and Rhythm: Normal rate and regular rhythm.      Pulses: Normal pulses.      Heart sounds: Normal heart sounds.   Pulmonary:      Effort: Pulmonary effort is normal.      Breath sounds: Normal breath sounds.   Abdominal:      General: Bowel sounds are normal. There is no distension.      Palpations: Abdomen is soft.      Tenderness: There is no abdominal tenderness.   Musculoskeletal:      Cervical back: No tenderness.      Right lower le+ Edema present.      Left lower le+ Edema present.   Skin:     General: Skin is warm and dry.      Capillary Refill: Capillary refill takes less than 2 seconds.      Coloration: Skin is not jaundiced.   Neurological:      General: No focal deficit present.      Mental Status: He is alert and oriented to person, place, and time. Mental status is at baseline.   Psychiatric:         Mood and Affect: Mood normal.         Behavior: Behavior normal.         Fluids    Intake/Output Summary (Last 24 hours) at 2022 1500  Last data filed at 2022 0939  Gross per 24 hour   Intake 360 ml   Output --   Net 360 ml       Laboratory  Recent Labs     22  0147  05/13/22  0024   WBC 5.2 4.6*   RBC 2.41* 2.45*   HEMOGLOBIN 7.7* 7.8*   HEMATOCRIT 23.9* 25.6*   MCV 99.2* 104.5*   MCH 32.0 31.8   MCHC 32.2* 30.5*   RDW 68.5* 71.3*   PLATELETCT 112* 163*   MPV 9.9 10.2     Recent Labs     05/11/22  0146 05/12/22  0146 05/13/22  0024   SODIUM 135 136 137   POTASSIUM 4.4 4.6 4.6   CHLORIDE 97 97 100   CO2 25 21 27   GLUCOSE 103* 93 92   BUN 28* 29* 19   CREATININE 8.57* 7.55* 7.58*   CALCIUM 8.7 8.6 9.1                   Imaging  US-HEMODIALYSIS GRAFT DUPLEX COMP UPPER EXTREMITY   Final Result      EC-ECHOCARDIOGRAM COMPLETE W/O CONT   Final Result      NM-BILIARY HIDA SCAN FATTY MEAL   Final Result      Normal hepatobiliary scan.      This study was obtained utilizing fatty meal challenge to induce gallbladder contraction due to national shortage of cholecystokinin.  There are no national standards for gallbladder ejection fraction calculated utilizing fatty meal challenge.  An    ejection fraction greater than 35% is most likely normal.  Gallbladder ejection fraction less than 35% may be abnormal but could be falsely positive.  Therefore, this test could be falsely positive.  Consider repeat imaging once cholecystokinin becomes    available.      US-RUQ   Final Result      1.  Gallbladder polyp versus nonshadowing gallstone identified.      2.  There is abnormal gallbladder wall thickening which is significantly increased compared to the prior exam. Cholecystitis is a possibility.      DX-CHEST-PORTABLE (1 VIEW)   Final Result         No acute cardiac or pulmonary abnormality is identified.           Assessment/Plan  * Acute pancreatitis- (present on admission)  Assessment & Plan  Likely secondary to alcohol vs steroids  Lipase trending down and pain improving  Diet advanced today  Trial pancreatic enzymes  Oral hydration in setting of ESRD    ESRD on dialysis (HCC)- (present on admission)  Assessment & Plan  Scheduled dialysis TRS   Nephrology following  RUE AF US with reduced  flow volumes, [facility]   Avoid IV hydration setting of pancreatitis with ESRD    Arthritis- (present on admission)  Assessment & Plan  Hold home Etanercept  Resume outpatient      AP (abdominal pain)- (present on admission)  Assessment & Plan  Secondary to acute pancreatitis  Improving  Tolerating meals  Monitor    Hepatitis- (present on admission)  Assessment & Plan  Resolved    Alcohol dependence (HCC)- (present on admission)  Assessment & Plan  Counseled on alcohol cessation    Chest pain- (present on admission)  Assessment & Plan  Atypical, no current chest pain, no acute ischemic changes on EKG complicated by history of coronary artery, abnormal troponin but also on exam consistent with epigastric and right upper quadrant pain.    Echo completed- EF 65%, right ventricular systolic pressure has decreased from 50mmHg to 30mmHg  Resolved      Thrombocytopenia (HCC)- (present on admission)  Assessment & Plan  Likely secondary to alcohol  Hold heparin  Trend CBC    Transaminitis- (present on admission)  Assessment & Plan  Resolved    Anemia- (present on admission)  Assessment & Plan  Secondary to end-stage renal failure    Hepatitis C antibody positive in blood- (present on admission)  Assessment & Plan  hCv not detected   HCV RNA was previously negative       VTE prophylaxis: SCDs/TEDs    I have performed a physical exam and reviewed and updated ROS and Plan today (5/13/2022). In review of yesterday's note (5/12/2022), there are no changes except as documented above.

## 2022-05-13 NOTE — PROGRESS NOTES
Assumed care of patient at bedside report from day RN. Updated on POC. Patient currently A & O x 4; on room air; up x1 with front wheel walker; without complaints of acute pain. Assessment completed Call light within reach. Whiteboard updated. Fall precautions in place. Bed locked and in lowest position. All questions answered. No other needs indicated at this time.

## 2022-05-13 NOTE — DISCHARGE PLANNING
Agency/Facility Name: [facility]  Spoke To: Madison   Outcome: There is not an update regarding this referral at this time, Madison to speak with intake office and will notify DPA of referral status.     1140-  Agency/Facility Name: [facility]  Spoke To: Madison  Outcome: Referral is still under review at this time.    1505-  Agency/Facility Name: [facility]  Spoke To: Madison  Outcome: Pt accepted to agency services.

## 2022-05-13 NOTE — CARE PLAN
The patient is Watcher - Medium risk of patient condition declining or worsening    Shift Goals manage pain  Clinical Goals: manage pain  Patient Goals: sleep  Family Goals: n/a    Progress made toward(s) clinical / shift goals:    Problem: Pain - Standard  Goal: Alleviation of pain or a reduction in pain to the patient’s comfort goal  Outcome: Progressing   Pt educated on pain scale. RN aware of pt's goal pain. PRN medication orders followed.     Patient is not progressing towards the following goals:

## 2022-05-14 ENCOUNTER — PHARMACY VISIT (OUTPATIENT)
Dept: PHARMACY | Facility: MEDICAL CENTER | Age: 72
End: 2022-05-14
Payer: COMMERCIAL

## 2022-05-14 VITALS
WEIGHT: 168.21 LBS | HEIGHT: 68 IN | RESPIRATION RATE: 18 BRPM | TEMPERATURE: 97.7 F | BODY MASS INDEX: 25.49 KG/M2 | SYSTOLIC BLOOD PRESSURE: 144 MMHG | OXYGEN SATURATION: 99 % | HEART RATE: 81 BPM | DIASTOLIC BLOOD PRESSURE: 69 MMHG

## 2022-05-14 LAB
ALBUMIN SERPL BCP-MCNC: 3.3 G/DL (ref 3.2–4.9)
ALBUMIN/GLOB SERPL: 0.9 G/DL
ALP SERPL-CCNC: 133 U/L (ref 30–99)
ALT SERPL-CCNC: 22 U/L (ref 2–50)
ANION GAP SERPL CALC-SCNC: 12 MMOL/L (ref 7–16)
AST SERPL-CCNC: 19 U/L (ref 12–45)
BILIRUB SERPL-MCNC: 0.4 MG/DL (ref 0.1–1.5)
BUN SERPL-MCNC: 30 MG/DL (ref 8–22)
CALCIUM SERPL-MCNC: 9.3 MG/DL (ref 8.5–10.5)
CHLORIDE SERPL-SCNC: 94 MMOL/L (ref 96–112)
CO2 SERPL-SCNC: 26 MMOL/L (ref 20–33)
CREAT SERPL-MCNC: 10.14 MG/DL (ref 0.5–1.4)
GFR SERPLBLD CREATININE-BSD FMLA CKD-EPI: 5 ML/MIN/1.73 M 2
GLOBULIN SER CALC-MCNC: 3.8 G/DL (ref 1.9–3.5)
GLUCOSE SERPL-MCNC: 94 MG/DL (ref 65–99)
POTASSIUM SERPL-SCNC: 4.4 MMOL/L (ref 3.6–5.5)
PROT SERPL-MCNC: 7.1 G/DL (ref 6–8.2)
SODIUM SERPL-SCNC: 132 MMOL/L (ref 135–145)

## 2022-05-14 PROCEDURE — 700102 HCHG RX REV CODE 250 W/ 637 OVERRIDE(OP): Performed by: STUDENT IN AN ORGANIZED HEALTH CARE EDUCATION/TRAINING PROGRAM

## 2022-05-14 PROCEDURE — 80053 COMPREHEN METABOLIC PANEL: CPT

## 2022-05-14 PROCEDURE — 700111 HCHG RX REV CODE 636 W/ 250 OVERRIDE (IP)

## 2022-05-14 PROCEDURE — 700102 HCHG RX REV CODE 250 W/ 637 OVERRIDE(OP): Performed by: HOSPITALIST

## 2022-05-14 PROCEDURE — 99239 HOSP IP/OBS DSCHRG MGMT >30: CPT | Performed by: HOSPITALIST

## 2022-05-14 PROCEDURE — A9270 NON-COVERED ITEM OR SERVICE: HCPCS | Performed by: NURSE PRACTITIONER

## 2022-05-14 PROCEDURE — A9270 NON-COVERED ITEM OR SERVICE: HCPCS | Performed by: STUDENT IN AN ORGANIZED HEALTH CARE EDUCATION/TRAINING PROGRAM

## 2022-05-14 PROCEDURE — 700102 HCHG RX REV CODE 250 W/ 637 OVERRIDE(OP): Performed by: NURSE PRACTITIONER

## 2022-05-14 PROCEDURE — 700102 HCHG RX REV CODE 250 W/ 637 OVERRIDE(OP): Performed by: INTERNAL MEDICINE

## 2022-05-14 PROCEDURE — A9270 NON-COVERED ITEM OR SERVICE: HCPCS | Performed by: INTERNAL MEDICINE

## 2022-05-14 PROCEDURE — A9270 NON-COVERED ITEM OR SERVICE: HCPCS | Performed by: HOSPITALIST

## 2022-05-14 PROCEDURE — RXMED WILLOW AMBULATORY MEDICATION CHARGE: Performed by: HOSPITALIST

## 2022-05-14 PROCEDURE — 36415 COLL VENOUS BLD VENIPUNCTURE: CPT

## 2022-05-14 RX ORDER — HEPARIN SODIUM 1000 [USP'U]/ML
INJECTION, SOLUTION INTRAVENOUS; SUBCUTANEOUS
Status: COMPLETED
Start: 2022-05-14 | End: 2022-05-14

## 2022-05-14 RX ORDER — OXYCODONE HYDROCHLORIDE 5 MG/1
5 TABLET ORAL EVERY 4 HOURS PRN
Qty: 30 TABLET | Refills: 0 | Status: SHIPPED | OUTPATIENT
Start: 2022-05-14 | End: 2022-05-14 | Stop reason: SDUPTHER

## 2022-05-14 RX ORDER — OXYCODONE HYDROCHLORIDE 5 MG/1
5 TABLET ORAL ONCE
Status: COMPLETED | OUTPATIENT
Start: 2022-05-14 | End: 2022-05-14

## 2022-05-14 RX ORDER — OXYCODONE HYDROCHLORIDE 5 MG/1
5 TABLET ORAL 4 TIMES DAILY
Qty: 30 TABLET | Refills: 0 | Status: ON HOLD | OUTPATIENT
Start: 2022-05-14 | End: 2022-06-06

## 2022-05-14 RX ADMIN — Medication 1334 MG: at 09:09

## 2022-05-14 RX ADMIN — OXYCODONE 2.5 MG: 5 TABLET ORAL at 05:47

## 2022-05-14 RX ADMIN — HEPARIN SODIUM 3900 UNITS: 1000 INJECTION, SOLUTION INTRAVENOUS; SUBCUTANEOUS at 13:07

## 2022-05-14 RX ADMIN — OXYCODONE 5 MG: 5 TABLET ORAL at 09:09

## 2022-05-14 RX ADMIN — PANCRELIPASE 3000 UNITS: 15000; 3000; 9500 CAPSULE, DELAYED RELEASE ORAL at 14:28

## 2022-05-14 RX ADMIN — OXYCODONE 2.5 MG: 5 TABLET ORAL at 14:28

## 2022-05-14 RX ADMIN — ASPIRIN 81 MG: 81 TABLET, COATED ORAL at 04:30

## 2022-05-14 RX ADMIN — OMEPRAZOLE 20 MG: 20 CAPSULE, DELAYED RELEASE ORAL at 04:30

## 2022-05-14 RX ADMIN — OXYCODONE 2.5 MG: 5 TABLET ORAL at 01:02

## 2022-05-14 RX ADMIN — PANCRELIPASE 3000 UNITS: 15000; 3000; 9500 CAPSULE, DELAYED RELEASE ORAL at 09:09

## 2022-05-14 ASSESSMENT — PAIN SCALES - WONG BAKER: WONGBAKER_NUMERICALRESPONSE: HURTS A LITTLE MORE

## 2022-05-14 ASSESSMENT — PAIN DESCRIPTION - PAIN TYPE: TYPE: CHRONIC PAIN

## 2022-05-14 NOTE — DISCHARGE PLANNING
0925 - [facility] voucher provided for pt transport home, pt is in a wheelchair, has no money for transport. Voucher given to RADHA Mosley.

## 2022-05-14 NOTE — PROGRESS NOTES
"[facility] -  PROGRESS NOTE               Author: Tristen Cortez M.D. Date & Time: 5/14/2022  11:45 AM     HPI:  Per Dr. Vargas  \"Mr. Proctor is a very pleasant 72 y/o male known to us from outpt dialysis. He gets his treatments at [facility] dialysis unit qTTS via right IJ permcath.  He was at his routine HD treatment earlier today.  After about 30 or so minutes his BP dropped.  He tells me \"I passed out\" but that was not witnessed at the HD unit.  He did develop chest pain radiating to his back.  Dialysis was discontinued and he was referred to the ED at [facility]. At the time of presentation to the ED his BP was now elevated at 171/74.  He is also complaining of ABD pain described as generalized and intermittent.  No change to bowel habits.  No Diarrhea or constipation.  No N/V.  No recent change to meds.  No OTC meds.  Has some LE edema, no recent change.  He does not routinely take his BP meds before HD and did not take this AM.  RUQ U/S showed thickened gallbladder wall.  No stones.  LFTs are elevated without rise in bilirubin.  Elevated Lipase as well.  No F/C.  No cough.  We have been asked to see him regarding his dialysis needs. \"    DAILY NEPHROLOGY SUMMARY:  5/4 - Patient is having upper abdominal pain, nausea. No SOB, palpitations, cough, melena or hematochezia.  5/5 - States he's hungry.  Still having abdominal pain.    5/6 - Dialysis cut short yesterday due to diarrhea and clotting of machine.  Edema today.  Still with abdominal pain.  5/7 - Complaining of abdominal pain.  +edema but improved  5/8 - Still with abdominal pain.  Not happy about being on liquid diet.  HD done yesterday with 2.5L UF  5/9 -  VSS RA, no CP no SOB.  +abdominal pain but improving. Lipase decreasing.    5/10 - VSS RA, no CP SOB.  iHD today  5/11 - NAEO, no complaints, feels good this AM  5/12 -  Seen on iHD, no complaints.  VSS.    5/13 - sitting up in bed, abdominal pain is improving, tolerated " "iHD yesterday UF: 2.0L, anxious to go home   5/14 - No new overnight renal events. Currently on HD.    REVIEW OF SYSTEMS:    10 point ROS reviewed and is as per HPI/daily summary or otherwise negative    PMH/PSH/SH/FH: Reviewed and unchanged since admission note  CURRENT MEDICATIONS: Reviewed from admission to present day    VS:  /49   Pulse 92   Temp 37.7 °C (99.9 °F) (Temporal)   Resp 14   Ht 1.727 m (5' 8\")   Wt 76.3 kg (168 lb 3.4 oz)   SpO2 99%   BMI 25.58 kg/m²   Physical Exam  Vitals and nursing note reviewed.   Constitutional:       Appearance: Normal appearance.   HENT:      Head: Normocephalic.      Mouth/Throat:      Mouth: Mucous membranes are moist.      Pharynx: Oropharynx is clear.   Eyes:      General: No scleral icterus.  Cardiovascular:      Comments: No edema  Pulmonary:      Effort: Pulmonary effort is normal.   Abdominal:      General: There is no distension.   Musculoskeletal:         General: No deformity.   Skin:     Findings: No rash.   Neurological:      General: No focal deficit present.      Mental Status: He is alert.   Psychiatric:         Mood and Affect: Mood normal.         Behavior: Behavior normal.       Fluids:  In: 360 [P.O.:360]  Out: -     LABS:  Recent Labs     05/12/22  0146 05/13/22  0024 05/14/22  0554   SODIUM 136 137 132*   POTASSIUM 4.6 4.6 4.4   CHLORIDE 97 100 94*   CO2 21 27 26   GLUCOSE 93 92 94   BUN 29* 19 30*   CREATININE 7.55* 7.58* 10.14*   CALCIUM 8.6 9.1 9.3       IMPRESSION:  · ESRD, due to long standing hypertension, HD TTS via port, has right AVF   -RUE AVF ultrasound with reduced flow volumes   -Vascular surgery Dr Hilton evaluated 5/8 and determined [facility]   · Upper abdominal pain, likely acute pancreatitis, improving  · Hypocalcemia, corrected    -PTH elevated. Low Ca and high phos   -Changed to PhosLo 1334mg PO TIDWM  · Alcoholic hepatitis  · Alcoholic pancreatitis, epigastric pain + lipase >3ULN + alcohol " use disorder   - lipase trending down  · Gallbladder wall thickening  · Elevated liver enzymes, AST>ALT 2  · Inflammatory arthritis, on etanercept  · H/o NSTEMI  · H/o PE  · Hypertension, controlled   · Thrombocytopenia   · Anemia     PLAN:  -TTS iHD, HD today (SAT)  - UF as tolerated  - Bolus IVF for hemodynamics as needed  - Dose all meds per ESRD  - Ok to transition to OP care from renal standpoint  - Ok to dc after HD today.

## 2022-05-14 NOTE — THERAPY
"Physical Therapy   Daily Treatment     Patient Name: Junior Proctor  Age:  71 y.o., Sex:  male  Medical Record #: [id number]  Today's Date: 5/13/2022     Precautions  Precautions: (P) Other (See Comments) (pt desaturating with standing mobility)    Assessment    Pt tolerates treatment session well; is agreeable and pleasant, participates with enthusiasm and demonstrates good understanding/reception to education topics including pacing, safety, pulse oximetry and oxygen desaturation with activity. [facility]. Pt demonstrates bed mobility with supervision, sit to stand and ambulation in room without AD with SBA, completes mobile steps x9, x6, x3 with seated rest breaks due to SOB and fatigue. Pt desaturates to 76% during stair climbing, recovers to mid 96% within 1 minute of seated rest break. Nursing alerted. Pt will benefit from continued PT services in acute setting, recommend home health PT following DC if O2 sats continue to limit pt's mobility at DC.         Plan    Continue current treatment plan.   Plan to continue to assess O2 saturation during activity at next PT session.     DC Equipment Recommendations: (P) None  Discharge Recommendations: (P) Recommend home health for continued physical therapy services      Subjective    \"I can just sit on the steps if I get tired. Once I make it into the apartment I'm not going to leave unless I have to.\"        05/13/22 1532   Precautions   Precautions Other (See Comments)  (pt desaturating with standing mobility)   Vitals   Room Air Oximetry 98   O2 Delivery Device None - Room Air   Vitals Comments pt 98 at rest, desaturates to 76 during stairs, recovers quickly   Pain 0 - 10 Group   Therapist Pain Assessment 0;Post Activity Pain Same as Prior to Activity   Cognition    Cognition / Consciousness WDL   Level of Consciousness Alert   Comments pleasant and coopertive!   Strength Lower Body   Lower Body " Strength  WDL   Balance   Sitting Balance (Static) Good   Sitting Balance (Dynamic) Fair +   Standing Balance (Static) Fair   Standing Balance (Dynamic) Fair   Weight Shift Sitting Fair   Weight Shift Standing Fair   Gait Analysis   Gait Level Of Assist Standby Assist   Assistive Device None   Distance (Feet) 20   # of Times Distance was Traveled 3   # of Stairs Climbed 18   Level of Assist with Stairs Standby Assist   Skilled Intervention Verbal Cuing;Sequencing   Comments pt completes 9 steps, 6 steps, 3 steps with seated rest breaks between   Bed Mobility    Supine to Sit Supervised   Sit to Supine Supervised   Functional Mobility   Sit to Stand Supervised   How much difficulty does the patient currently have...   Turning over in bed (including adjusting bedclothes, sheets and blankets)? 4   Sitting down on and standing up from a chair with arms (e.g., wheelchair, bedside commode, etc.) 4   Moving from lying on back to sitting on the side of the bed? 4   How much help from another person does the patient currently need...   Moving to and from a bed to a chair (including a wheelchair)? 3   Need to walk in a hospital room? 3   Climbing 3-5 steps with a railing? 3   6 clicks Mobility Score 21   Activity Tolerance   Sitting in Chair NT   Sitting Edge of Bed 10 min   Standing 10 min total   Short Term Goals    Short Term Goal # 1 supine to/from sit EOB with HOB flat with supervision in 6 visits   Goal Outcome # 1 Goal met   Short Term Goal # 2 sit to stand at EOB, toilet and chair with SBA in 6 visits   Goal Outcome # 2 Goal met   Short Term Goal # 3 ambulation with LRD and CGA 50 feet in 6 visits   Goal Outcome # 3 Progressing as expected   Short Term Goal # 4 ascend/descend 12 steps with use of railing and CGA in 6 visits   Goal Outcome # 4 Progressing as expected   Education Group   Education Provided Stair Training   Gait Training Patient Response Patient;Acceptance;Explanation;Verbal Demonstration;Action  Demonstration   Stair Training Patient Response Patient;Acceptance;Explanation;Verbal Demonstration;Action Demonstration   Additional Comments pt education regarding pulse oximetry and desaturation   Anticipated Discharge Equipment and Recommendations   DC Equipment Recommendations None   Discharge Recommendations Recommend home health for continued physical therapy services       Roseann Murphy DPT

## 2022-05-14 NOTE — DISCHARGE INSTRUCTIONS
Discharge Instructions    Discharged to home by bus with self. Discharged via wheelchair, hospital escort: Yes.  Special equipment needed: N/A    Be sure to schedule a follow-up appointment with your primary care doctor or any specialists as instructed.     Discharge Plan:   Diet Plan: Discussed  Activity Level: Discussed  Confirmed Follow up Appointment: Patient to Call and Schedule Appointment  Confirmed Symptoms Management: Discussed  Medication Reconciliation Updated: Yes    I understand that a diet low in cholesterol, fat, and sodium is recommended for good health. Unless I have been given specific instructions below for another diet, I accept this instruction as my diet prescription.   Other diet: Renal    Special Instructions: None    Is patient discharged on Warfarin / Coumadin?   No     Depression / Suicide Risk    As you are discharged from this [facility] facility, it is important to learn how to keep safe from harming yourself.    Recognize the warning signs:  Abrupt changes in personality, positive or negative- including increase in energy   Giving away possessions  Change in eating patterns- significant weight changes-  positive or negative  Change in sleeping patterns- unable to sleep or sleeping all the time   Unwillingness or inability to communicate  Depression  Unusual sadness, discouragement and loneliness  Talk of wanting to die  Neglect of personal appearance   Rebelliousness- reckless behavior  Withdrawal from people/activities they love  Confusion- inability to concentrate     If you or a loved one observes any of these behaviors or has concerns about self-harm, here's what you can do:  Talk about it- your feelings and reasons for harming yourself  Remove any means that you might use to hurt yourself (examples: pills, rope, extension cords, firearm)  Get professional help from the community (Mental Health, Substance Abuse, psychological counseling)  Do not be alone:Call your Safe Contact-  someone whom you trust who will be there for you.  Call your local CRISIS HOTLINE 045-7983 or 057-097-3314  Call your local Children's Mobile Crisis Response Team [place] (093) 481-9657 or www.[place]  Call the toll free National Suicide Prevention Hotlines   National Suicide Prevention Lifeline 579-893-NPBQ (2292)  [facility] Line Network 800-QRLKSTH (236-7945)

## 2022-05-14 NOTE — DISCHARGE SUMMARY
Discharge Summary    CHIEF COMPLAINT ON ADMISSION  Chief Complaint   Patient presents with   • Chest Pain       Reason for Admission  ems     Admission Date  5/3/2022    CODE STATUS  Full Code      HPI & HOSPITAL COURSE  Mr. Abran Proctor is a 71-year-old gentleman who comes in with a syncopal episode while he was undergoing hemodialysis.  The patient was noted to have severe abdominal pain and thus reported to the emergency room at [facility].  The patient was evaluated and found to have epigastric abdominal pain with radiation into the upper quadrants.  The patient's pain was secondary to acute pancreatitis.  The patient's pancreatitis was treated with clear liquid diet, fluid resuscitation and pain management.  Slowly over time the patient's pancreatitis improved.  Lipase levels were subsequently coming down.  His pain also improved and his pain management was decreased down.  He is currently down to 2-1/2 to 5 mg of oxycodone every 6 hours.  The patient had a HIDA scan which is negative for any evidence of acute cholecystitis.  Initial right upper quadrant ultrasound did show some gallbladder wall thickening.  The patient may have passed a stone.  The patient's initial liver enzymes were slightly elevated which have also now stabilized.  Overall patient's condition has improved.  He is in end-stage renal failure patient on hemodialysis every Tuesday Thursday Saturday.  He supposed to get his hemodialysis today as well.  The patient at this point can either do his hemodialysis and then discharge or be discharging then report his hemodialysis.  Patient otherwise at this point has been instructed on a diet as well as exercise.  He has been advised on no drinking and smoking.  Patient is also advised to follow-up with [facility] primary [facility] in 2 to 3 days.    Therefore, he is discharged in good and stable condition to home with close outpatient follow-up.    The patient met 2-midnight criteria for an inpatient stay at  the time of discharge.    Discharge Date  5/14/2022    FOLLOW UP ITEMS POST DISCHARGE  Follow-up with the primary care physician in 2 to 3 days  Resume hemodialysis every Tuesday Thursday and Saturday  Follow-up with nephrology as scheduled    DISCHARGE DIAGNOSES  Principal Problem:    Acute pancreatitis POA: Yes  Active Problems:    Anemia POA: Yes    Transaminitis POA: Yes    Thrombocytopenia (HCC) POA: Yes    ESRD on dialysis (HCC) (Chronic) POA: Yes    Hepatitis C antibody positive in blood POA: Yes    Chest pain POA: Yes    Alcohol dependence (HCC) POA: Yes    Hepatitis POA: Yes    AP (abdominal pain) POA: Yes    Arthritis POA: Yes  Resolved Problems:    * No resolved hospital problems. *      FOLLOW UP  No future appointments.  [id number]  [facility] [address]  [phone]          MEDICATIONS ON DISCHARGE     Medication List      START taking these medications      Instructions   oxyCODONE immediate-release 5 MG Tabs  Commonly known as: ROXICODONE   Take 1 Tablet by mouth every four hours as needed for Severe Pain for up to 7 days.  Dose: 5 mg     pancrelipase (Lip-Prot-Amyl) 1875-7253 units Cpep  Commonly known as: CREON 3000   Take 1 Capsule by mouth 3 times a day with meals for 30 days.  Dose: 1 Capsule        CONTINUE taking these medications      Instructions   allopurinol 100 MG Tabs  Commonly known as: ZYLOPRIM   Take 100 mg by mouth 3 times a week. Taken post hemodialysis THREE TIMES A WEEK  Dose: 100 mg     atorvastatin 20 MG Tabs  Commonly known as: LIPITOR   Take 20 mg by mouth every evening.  Dose: 20 mg     Buprenorphine 10 MCG/HR Ptwk   Place 10 mcg on the skin every 7 days.  Dose: 10 mcg     carvedilol 6.25 MG Tabs  Commonly known as: COREG   Take 6.25 mg by mouth 2 times a day with meals.  Dose: 6.25 mg     diclofenac sodium 1 % Gel  Commonly known as: Voltaren   Apply 4 g topically 4 times a day as needed (Osetoarthritis pain).  Dose: 4 g     Etanercept  50 MG/ML Sosy   Inject 50 mg under the skin every 7 days.  Dose: 50 mg     folic acid 1 MG Tabs  Commonly known as: FOLVITE   Take 1 mg by mouth every day.  Dose: 1 mg     gabapentin 300 MG Caps  Commonly known as: NEURONTIN   Take 300 mg by mouth at bedtime.  Dose: 300 mg     lidocaine 5 % Ptch  Commonly known as: LIDODERM   Apply 1 Patch topically every day. Remove after 12 hours  Dose: 1 Patch     pantoprazole 40 MG Tbec  Commonly known as: PROTONIX   Take 40 mg by mouth 2 times a day.  Dose: 40 mg     predniSONE 5 MG Tabs  Commonly known as: DELTASONE   Take 5-15 mg by mouth every day. Pt started a 15 day course of PREDNISONE on 4/18  Taper course   15 mg for 5 days  10 mg for 5 days  5 mg for 5 days  Dose: 5-15 mg     sevelamer 800 MG Tabs  Commonly known as: RENAGEL   Take 800 mg by mouth 3 times a day with meals.  Dose: 800 mg     traZODone 50 MG Tabs  Commonly known as: DESYREL   Take 50 mg by mouth every evening. Indications: Trouble Sleeping  Dose: 50 mg     vitamin D2 (Ergocalciferol) 1.25 MG (09280 UT) Caps capsule  Commonly known as: Drisdol   Take 50,000 Units by mouth every 7 days.  Dose: 50,000 Units            Allergies  Allergies   Allergen Reactions   • Motrin [Ibuprofen]      hhx of stomach ulcers       DIET  Orders Placed This Encounter   Procedures   • Diet Order Diet: Low Fiber(GI Soft)     Standing Status:   Standing     Number of Occurrences:   1     Order Specific Question:   Diet:     Answer:   Low Fiber(GI Soft) [2]       ACTIVITY  As tolerated.  Weight bearing as tolerated    CONSULTATIONS  Nephrology    PROCEDURES  Hemodialysis    LABORATORY  Lab Results   Component Value Date    SODIUM 132 (L) 05/14/2022    POTASSIUM 4.4 05/14/2022    CHLORIDE 94 (L) 05/14/2022    CO2 26 05/14/2022    GLUCOSE 94 05/14/2022    BUN 30 (H) 05/14/2022    CREATININE 10.14 (HH) 05/14/2022        Lab Results   Component Value Date    WBC 4.6 (L) 05/13/2022    HEMOGLOBIN 7.8 (L) 05/13/2022    HEMATOCRIT 25.6  (L) 05/13/2022    PLATELETCT 163 (L) 05/13/2022        Total time of the discharge process exceeds 37 minutes.

## 2022-05-15 NOTE — PROGRESS NOTES
Hemodialysis ordered by Dr. Cortez. Treatment started at 1019 and ended at 1319. Pt stable, vss, no c/o post tx. Net UF 1.0 L. Report to JENNIFER Ralph RN.

## 2022-06-03 NOTE — PROGRESS NOTES
[facility] HOSPITALIST TRIAGE OFFICER DIRECT ADMISSION REPORT  Transferring facility: [facility]  Transferring physician: Dr Nazario  [facility]  Chief complaint: Abd pain  Pertinent history & patient course: Pt with acute interstitial pncreatitis  Pertinent imaging & lab results: Patient with abdominal pain and elevated WBC count of 16,000 found to have acute interstitial pancreatitis along with multiple stones and dilated pancreatic duct patient is close to being unable to handle an MRI according to GI there Dr. Davis needs a ERCP  Code Status: Full per transferring provider, I personally verified with the transferring provider patient's code status and the transferring provider has confirmed this with the patient.  Further work up or recommendations per triage officer prior to transfer: ERCP  Consultants called prior to transfer and pertinent input from consultants: GI-[facility] to [facility] to [facility]  Patient accepted for transfer: Yes  Consultants to be called upon arrival: GI  Admission status: Inpatient.   Floor requested: Medical  If ICU transfer, name of intensivist case discussed with and pertinent input from critical care: None    Please inform the triage officer upon arrival of the patient to [facility] for assignment of a hospitalist to perform admission.     For any question or concerns regarding the care of this patient, please reach out to the assigned hospitalist.

## 2022-06-04 ENCOUNTER — APPOINTMENT (OUTPATIENT)
Dept: RADIOLOGY | Facility: MEDICAL CENTER | Age: 72
DRG: 438 | End: 2022-06-04
Attending: INTERNAL MEDICINE
Payer: COMMERCIAL

## 2022-06-04 ENCOUNTER — HOSPITAL ENCOUNTER (INPATIENT)
Facility: MEDICAL CENTER | Age: 72
LOS: 2 days | DRG: 438 | End: 2022-06-06
Attending: INTERNAL MEDICINE | Admitting: INTERNAL MEDICINE
Payer: COMMERCIAL

## 2022-06-04 DIAGNOSIS — K80.50 PANCREATITIS DUE TO COMMON BILE DUCT STONE: ICD-10-CM

## 2022-06-04 DIAGNOSIS — K85.90 PANCREATITIS DUE TO COMMON BILE DUCT STONE: ICD-10-CM

## 2022-06-04 PROCEDURE — 700111 HCHG RX REV CODE 636 W/ 250 OVERRIDE (IP): Performed by: INTERNAL MEDICINE

## 2022-06-04 PROCEDURE — 99223 1ST HOSP IP/OBS HIGH 75: CPT | Performed by: INTERNAL MEDICINE

## 2022-06-04 PROCEDURE — 700102 HCHG RX REV CODE 250 W/ 637 OVERRIDE(OP): Performed by: INTERNAL MEDICINE

## 2022-06-04 PROCEDURE — A9270 NON-COVERED ITEM OR SERVICE: HCPCS | Performed by: INTERNAL MEDICINE

## 2022-06-04 PROCEDURE — 770001 HCHG ROOM/CARE - MED/SURG/GYN PRIV*

## 2022-06-04 PROCEDURE — 700105 HCHG RX REV CODE 258: Performed by: INTERNAL MEDICINE

## 2022-06-04 PROCEDURE — 76705 ECHO EXAM OF ABDOMEN: CPT

## 2022-06-04 RX ORDER — ACETAMINOPHEN 325 MG/1
650 TABLET ORAL EVERY 6 HOURS PRN
Status: DISCONTINUED | OUTPATIENT
Start: 2022-06-04 | End: 2022-06-06 | Stop reason: HOSPADM

## 2022-06-04 RX ORDER — BISACODYL 10 MG
10 SUPPOSITORY, RECTAL RECTAL
Status: DISCONTINUED | OUTPATIENT
Start: 2022-06-04 | End: 2022-06-06 | Stop reason: HOSPADM

## 2022-06-04 RX ORDER — SEVELAMER CARBONATE 800 MG/1
800 TABLET, FILM COATED ORAL
Status: DISCONTINUED | OUTPATIENT
Start: 2022-06-05 | End: 2022-06-06 | Stop reason: HOSPADM

## 2022-06-04 RX ORDER — TRAZODONE HYDROCHLORIDE 50 MG/1
50 TABLET ORAL NIGHTLY
Status: DISCONTINUED | OUTPATIENT
Start: 2022-06-04 | End: 2022-06-06 | Stop reason: HOSPADM

## 2022-06-04 RX ORDER — HYDRALAZINE HYDROCHLORIDE 20 MG/ML
10 INJECTION INTRAMUSCULAR; INTRAVENOUS EVERY 4 HOURS PRN
Status: DISCONTINUED | OUTPATIENT
Start: 2022-06-04 | End: 2022-06-06 | Stop reason: HOSPADM

## 2022-06-04 RX ORDER — OXYCODONE HYDROCHLORIDE 5 MG/1
5 TABLET ORAL
Status: DISCONTINUED | OUTPATIENT
Start: 2022-06-04 | End: 2022-06-04

## 2022-06-04 RX ORDER — ONDANSETRON 4 MG/1
4 TABLET, ORALLY DISINTEGRATING ORAL EVERY 4 HOURS PRN
Status: DISCONTINUED | OUTPATIENT
Start: 2022-06-04 | End: 2022-06-06 | Stop reason: HOSPADM

## 2022-06-04 RX ORDER — OMEPRAZOLE 20 MG/1
20 CAPSULE, DELAYED RELEASE ORAL 2 TIMES DAILY
Status: DISCONTINUED | OUTPATIENT
Start: 2022-06-04 | End: 2022-06-06 | Stop reason: HOSPADM

## 2022-06-04 RX ORDER — CARVEDILOL 6.25 MG/1
6.25 TABLET ORAL 2 TIMES DAILY WITH MEALS
Status: DISCONTINUED | OUTPATIENT
Start: 2022-06-04 | End: 2022-06-06 | Stop reason: HOSPADM

## 2022-06-04 RX ORDER — ATORVASTATIN CALCIUM 20 MG/1
20 TABLET, FILM COATED ORAL NIGHTLY
Status: DISCONTINUED | OUTPATIENT
Start: 2022-06-04 | End: 2022-06-06 | Stop reason: HOSPADM

## 2022-06-04 RX ORDER — OXYCODONE HYDROCHLORIDE 5 MG/1
5 TABLET ORAL
Status: DISCONTINUED | OUTPATIENT
Start: 2022-06-04 | End: 2022-06-06 | Stop reason: HOSPADM

## 2022-06-04 RX ORDER — ERGOCALCIFEROL 1.25 MG/1
50000 CAPSULE ORAL
Status: DISCONTINUED | OUTPATIENT
Start: 2022-06-05 | End: 2022-06-06 | Stop reason: HOSPADM

## 2022-06-04 RX ORDER — HYDROMORPHONE HYDROCHLORIDE 1 MG/ML
0.25 INJECTION, SOLUTION INTRAMUSCULAR; INTRAVENOUS; SUBCUTANEOUS
Status: DISCONTINUED | OUTPATIENT
Start: 2022-06-04 | End: 2022-06-04

## 2022-06-04 RX ORDER — ALLOPURINOL 100 MG/1
100 TABLET ORAL
Status: DISCONTINUED | OUTPATIENT
Start: 2022-06-04 | End: 2022-06-06 | Stop reason: HOSPADM

## 2022-06-04 RX ORDER — HEPARIN SODIUM 5000 [USP'U]/ML
5000 INJECTION, SOLUTION INTRAVENOUS; SUBCUTANEOUS EVERY 8 HOURS
Status: DISCONTINUED | OUTPATIENT
Start: 2022-06-04 | End: 2022-06-06 | Stop reason: HOSPADM

## 2022-06-04 RX ORDER — OXYCODONE HYDROCHLORIDE 5 MG/1
2.5 TABLET ORAL
Status: DISCONTINUED | OUTPATIENT
Start: 2022-06-04 | End: 2022-06-04

## 2022-06-04 RX ORDER — ONDANSETRON 2 MG/ML
4 INJECTION INTRAMUSCULAR; INTRAVENOUS EVERY 4 HOURS PRN
Status: DISCONTINUED | OUTPATIENT
Start: 2022-06-04 | End: 2022-06-06 | Stop reason: HOSPADM

## 2022-06-04 RX ORDER — FOLIC ACID 1 MG/1
1 TABLET ORAL DAILY
Status: DISCONTINUED | OUTPATIENT
Start: 2022-06-05 | End: 2022-06-06 | Stop reason: HOSPADM

## 2022-06-04 RX ORDER — POLYETHYLENE GLYCOL 3350 17 G/17G
1 POWDER, FOR SOLUTION ORAL
Status: DISCONTINUED | OUTPATIENT
Start: 2022-06-04 | End: 2022-06-06 | Stop reason: HOSPADM

## 2022-06-04 RX ORDER — OXYCODONE HYDROCHLORIDE 10 MG/1
10 TABLET ORAL
Status: DISCONTINUED | OUTPATIENT
Start: 2022-06-04 | End: 2022-06-06 | Stop reason: HOSPADM

## 2022-06-04 RX ORDER — LORAZEPAM 2 MG/ML
1 INJECTION INTRAMUSCULAR
Status: DISCONTINUED | OUTPATIENT
Start: 2022-06-04 | End: 2022-06-06 | Stop reason: HOSPADM

## 2022-06-04 RX ORDER — AMOXICILLIN 250 MG
2 CAPSULE ORAL 2 TIMES DAILY
Status: DISCONTINUED | OUTPATIENT
Start: 2022-06-04 | End: 2022-06-06 | Stop reason: HOSPADM

## 2022-06-04 RX ORDER — GABAPENTIN 300 MG/1
300 CAPSULE ORAL
Status: DISCONTINUED | OUTPATIENT
Start: 2022-06-04 | End: 2022-06-06 | Stop reason: HOSPADM

## 2022-06-04 RX ORDER — HYDROMORPHONE HYDROCHLORIDE 1 MG/ML
0.5 INJECTION, SOLUTION INTRAMUSCULAR; INTRAVENOUS; SUBCUTANEOUS
Status: DISCONTINUED | OUTPATIENT
Start: 2022-06-04 | End: 2022-06-06 | Stop reason: HOSPADM

## 2022-06-04 RX ORDER — BUPRENORPHINE 10 UG/H
10 PATCH TRANSDERMAL
Status: DISCONTINUED | OUTPATIENT
Start: 2022-06-04 | End: 2022-06-04

## 2022-06-04 RX ADMIN — OMEPRAZOLE 20 MG: 20 CAPSULE, DELAYED RELEASE ORAL at 23:23

## 2022-06-04 RX ADMIN — OXYCODONE HYDROCHLORIDE 10 MG: 10 TABLET ORAL at 21:29

## 2022-06-04 RX ADMIN — PIPERACILLIN AND TAZOBACTAM 3.38 G: 3; .375 INJECTION, POWDER, LYOPHILIZED, FOR SOLUTION INTRAVENOUS; PARENTERAL at 22:23

## 2022-06-04 RX ADMIN — ALLOPURINOL 100 MG: 100 TABLET ORAL at 22:28

## 2022-06-04 RX ADMIN — CARVEDILOL 6.25 MG: 6.25 TABLET, FILM COATED ORAL at 23:23

## 2022-06-04 RX ADMIN — GABAPENTIN 300 MG: 300 CAPSULE ORAL at 23:23

## 2022-06-04 RX ADMIN — ATORVASTATIN CALCIUM 20 MG: 20 TABLET, FILM COATED ORAL at 23:23

## 2022-06-04 RX ADMIN — TRAZODONE HYDROCHLORIDE 50 MG: 50 TABLET ORAL at 23:23

## 2022-06-04 ASSESSMENT — PAIN DESCRIPTION - PAIN TYPE
TYPE: ACUTE PAIN
TYPE: ACUTE PAIN

## 2022-06-04 ASSESSMENT — FIBROSIS 4 INDEX: FIB4 SCORE: 1.71

## 2022-06-05 ENCOUNTER — ANESTHESIA EVENT (OUTPATIENT)
Dept: SURGERY | Facility: MEDICAL CENTER | Age: 72
DRG: 438 | End: 2022-06-05
Payer: COMMERCIAL

## 2022-06-05 ENCOUNTER — ANESTHESIA (OUTPATIENT)
Dept: SURGERY | Facility: MEDICAL CENTER | Age: 72
DRG: 438 | End: 2022-06-05
Payer: COMMERCIAL

## 2022-06-05 ENCOUNTER — APPOINTMENT (OUTPATIENT)
Dept: RADIOLOGY | Facility: MEDICAL CENTER | Age: 72
DRG: 438 | End: 2022-06-05
Attending: INTERNAL MEDICINE
Payer: COMMERCIAL

## 2022-06-05 PROBLEM — K83.8 DILATION OF BILIARY TRACT: Status: ACTIVE | Noted: 2022-06-05

## 2022-06-05 LAB
25(OH)D3 SERPL-MCNC: 59 NG/ML (ref 30–100)
ALBUMIN SERPL BCP-MCNC: 2.9 G/DL (ref 3.2–4.9)
ALBUMIN/GLOB SERPL: 1 G/DL
ALP SERPL-CCNC: 150 U/L (ref 30–99)
ALT SERPL-CCNC: 5 U/L (ref 2–50)
ANION GAP SERPL CALC-SCNC: 11 MMOL/L (ref 7–16)
AST SERPL-CCNC: 19 U/L (ref 12–45)
BASOPHILS # BLD AUTO: 0.9 % (ref 0–1.8)
BASOPHILS # BLD: 0.08 K/UL (ref 0–0.12)
BILIRUB SERPL-MCNC: 0.4 MG/DL (ref 0.1–1.5)
BUN SERPL-MCNC: 20 MG/DL (ref 8–22)
CALCIUM SERPL-MCNC: 8.3 MG/DL (ref 8.5–10.5)
CALCIUM SERPL-MCNC: 8.5 MG/DL (ref 8.5–10.5)
CHLORIDE SERPL-SCNC: 100 MMOL/L (ref 96–112)
CO2 SERPL-SCNC: 24 MMOL/L (ref 20–33)
CREAT SERPL-MCNC: 8.87 MG/DL (ref 0.5–1.4)
EOSINOPHIL # BLD AUTO: 1.14 K/UL (ref 0–0.51)
EOSINOPHIL NFR BLD: 13 % (ref 0–6.9)
ERYTHROCYTE [DISTWIDTH] IN BLOOD BY AUTOMATED COUNT: 56.3 FL (ref 35.9–50)
FERRITIN SERPL-MCNC: 1687 NG/ML (ref 22–322)
GFR SERPLBLD CREATININE-BSD FMLA CKD-EPI: 6 ML/MIN/1.73 M 2
GLOBULIN SER CALC-MCNC: 3 G/DL (ref 1.9–3.5)
GLUCOSE SERPL-MCNC: 80 MG/DL (ref 65–99)
HCT VFR BLD AUTO: 29.6 % (ref 42–52)
HGB BLD-MCNC: 9.4 G/DL (ref 14–18)
IMM GRANULOCYTES # BLD AUTO: 0.05 K/UL (ref 0–0.11)
IMM GRANULOCYTES NFR BLD AUTO: 0.6 % (ref 0–0.9)
INR PPP: 1.14 (ref 0.87–1.13)
IRON SATN MFR SERPL: 19 % (ref 15–55)
IRON SERPL-MCNC: 26 UG/DL (ref 50–180)
LIPASE SERPL-CCNC: 420 U/L (ref 11–82)
LYMPHOCYTES # BLD AUTO: 2.4 K/UL (ref 1–4.8)
LYMPHOCYTES NFR BLD: 27.3 % (ref 22–41)
MCH RBC QN AUTO: 31.4 PG (ref 27–33)
MCHC RBC AUTO-ENTMCNC: 31.8 G/DL (ref 33.7–35.3)
MCV RBC AUTO: 99 FL (ref 81.4–97.8)
MONOCYTES # BLD AUTO: 1.09 K/UL (ref 0–0.85)
MONOCYTES NFR BLD AUTO: 12.4 % (ref 0–13.4)
NEUTROPHILS # BLD AUTO: 4.04 K/UL (ref 1.82–7.42)
NEUTROPHILS NFR BLD: 45.8 % (ref 44–72)
NRBC # BLD AUTO: 0 K/UL
NRBC BLD-RTO: 0 /100 WBC
PLATELET # BLD AUTO: 180 K/UL (ref 164–446)
PMV BLD AUTO: 10.4 FL (ref 9–12.9)
POTASSIUM SERPL-SCNC: 5.3 MMOL/L (ref 3.6–5.5)
PROT SERPL-MCNC: 5.9 G/DL (ref 6–8.2)
PROTHROMBIN TIME: 14.3 SEC (ref 12–14.6)
PTH-INTACT SERPL-MCNC: 267 PG/ML (ref 14–72)
RBC # BLD AUTO: 2.99 M/UL (ref 4.7–6.1)
SODIUM SERPL-SCNC: 135 MMOL/L (ref 135–145)
TIBC SERPL-MCNC: 137 UG/DL (ref 250–450)
UIBC SERPL-MCNC: 111 UG/DL (ref 110–370)
WBC # BLD AUTO: 8.8 K/UL (ref 4.8–10.8)

## 2022-06-05 PROCEDURE — 0FCD8ZZ EXTIRPATION OF MATTER FROM PANCREATIC DUCT, VIA NATURAL OR ARTIFICIAL OPENING ENDOSCOPIC: ICD-10-PCS | Performed by: INTERNAL MEDICINE

## 2022-06-05 PROCEDURE — 700111 HCHG RX REV CODE 636 W/ 250 OVERRIDE (IP): Performed by: INTERNAL MEDICINE

## 2022-06-05 PROCEDURE — 82728 ASSAY OF FERRITIN: CPT

## 2022-06-05 PROCEDURE — A9270 NON-COVERED ITEM OR SERVICE: HCPCS | Performed by: INTERNAL MEDICINE

## 2022-06-05 PROCEDURE — 160035 HCHG PACU - 1ST 60 MINS PHASE I: Performed by: INTERNAL MEDICINE

## 2022-06-05 PROCEDURE — 0F758ZZ DILATION OF RIGHT HEPATIC DUCT, VIA NATURAL OR ARTIFICIAL OPENING ENDOSCOPIC: ICD-10-PCS | Performed by: INTERNAL MEDICINE

## 2022-06-05 PROCEDURE — 00732 ANES UPR GI NDSC PX ERCP: CPT | Performed by: ANESTHESIOLOGY

## 2022-06-05 PROCEDURE — 0F7D8DZ DILATION OF PANCREATIC DUCT WITH INTRALUMINAL DEVICE, VIA NATURAL OR ARTIFICIAL OPENING ENDOSCOPIC: ICD-10-PCS | Performed by: INTERNAL MEDICINE

## 2022-06-05 PROCEDURE — C1889 IMPLANT/INSERT DEVICE, NOC: HCPCS | Performed by: INTERNAL MEDICINE

## 2022-06-05 PROCEDURE — 700105 HCHG RX REV CODE 258: Performed by: ANESTHESIOLOGY

## 2022-06-05 PROCEDURE — 700105 HCHG RX REV CODE 258: Performed by: INTERNAL MEDICINE

## 2022-06-05 PROCEDURE — 160048 HCHG OR STATISTICAL LEVEL 1-5: Performed by: INTERNAL MEDICINE

## 2022-06-05 PROCEDURE — 83690 ASSAY OF LIPASE: CPT

## 2022-06-05 PROCEDURE — 85025 COMPLETE CBC W/AUTO DIFF WBC: CPT

## 2022-06-05 PROCEDURE — 83550 IRON BINDING TEST: CPT

## 2022-06-05 PROCEDURE — 700111 HCHG RX REV CODE 636 W/ 250 OVERRIDE (IP)

## 2022-06-05 PROCEDURE — 160002 HCHG RECOVERY MINUTES (STAT): Performed by: INTERNAL MEDICINE

## 2022-06-05 PROCEDURE — 700102 HCHG RX REV CODE 250 W/ 637 OVERRIDE(OP): Performed by: INTERNAL MEDICINE

## 2022-06-05 PROCEDURE — 83540 ASSAY OF IRON: CPT

## 2022-06-05 PROCEDURE — 700117 HCHG RX CONTRAST REV CODE 255: Performed by: INTERNAL MEDICINE

## 2022-06-05 PROCEDURE — 82306 VITAMIN D 25 HYDROXY: CPT

## 2022-06-05 PROCEDURE — 0F768ZZ DILATION OF LEFT HEPATIC DUCT, VIA NATURAL OR ARTIFICIAL OPENING ENDOSCOPIC: ICD-10-PCS | Performed by: INTERNAL MEDICINE

## 2022-06-05 PROCEDURE — 99232 SBSQ HOSP IP/OBS MODERATE 35: CPT | Performed by: NURSE PRACTITIONER

## 2022-06-05 PROCEDURE — 83970 ASSAY OF PARATHORMONE: CPT

## 2022-06-05 PROCEDURE — 700101 HCHG RX REV CODE 250: Performed by: ANESTHESIOLOGY

## 2022-06-05 PROCEDURE — 160206 HCHG ENDO MINUTES - EA ADDL 1 MIN LEVEL 2: Performed by: INTERNAL MEDICINE

## 2022-06-05 PROCEDURE — 160201 HCHG ENDO MINUTES - 1ST 30 MINS LEVEL 2: Performed by: INTERNAL MEDICINE

## 2022-06-05 PROCEDURE — 700111 HCHG RX REV CODE 636 W/ 250 OVERRIDE (IP): Performed by: ANESTHESIOLOGY

## 2022-06-05 PROCEDURE — 36415 COLL VENOUS BLD VENIPUNCTURE: CPT

## 2022-06-05 PROCEDURE — C2617 STENT, NON-COR, TEM W/O DEL: HCPCS | Performed by: INTERNAL MEDICINE

## 2022-06-05 PROCEDURE — 80053 COMPREHEN METABOLIC PANEL: CPT

## 2022-06-05 PROCEDURE — 85610 PROTHROMBIN TIME: CPT

## 2022-06-05 PROCEDURE — 770001 HCHG ROOM/CARE - MED/SURG/GYN PRIV*

## 2022-06-05 PROCEDURE — 160009 HCHG ANES TIME/MIN: Performed by: INTERNAL MEDICINE

## 2022-06-05 PROCEDURE — 90935 HEMODIALYSIS ONE EVALUATION: CPT

## 2022-06-05 PROCEDURE — 5A1D70Z PERFORMANCE OF URINARY FILTRATION, INTERMITTENT, LESS THAN 6 HOURS PER DAY: ICD-10-PCS | Performed by: STUDENT IN AN ORGANIZED HEALTH CARE EDUCATION/TRAINING PROGRAM

## 2022-06-05 DEVICE — IMPLANTABLE DEVICE: Type: IMPLANTABLE DEVICE | Site: ESOPHAGUS | Status: FUNCTIONAL

## 2022-06-05 RX ORDER — ONDANSETRON 2 MG/ML
INJECTION INTRAMUSCULAR; INTRAVENOUS PRN
Status: DISCONTINUED | OUTPATIENT
Start: 2022-06-05 | End: 2022-06-05 | Stop reason: SURG

## 2022-06-05 RX ORDER — PHENYLEPHRINE HCL IN 0.9% NACL 0.5 MG/5ML
SYRINGE (ML) INTRAVENOUS PRN
Status: DISCONTINUED | OUTPATIENT
Start: 2022-06-05 | End: 2022-06-05 | Stop reason: SURG

## 2022-06-05 RX ORDER — SODIUM CHLORIDE 9 MG/ML
INJECTION, SOLUTION INTRAVENOUS
Status: DISCONTINUED | OUTPATIENT
Start: 2022-06-05 | End: 2022-06-05 | Stop reason: SURG

## 2022-06-05 RX ORDER — HYDRALAZINE HYDROCHLORIDE 20 MG/ML
5 INJECTION INTRAMUSCULAR; INTRAVENOUS
Status: DISCONTINUED | OUTPATIENT
Start: 2022-06-05 | End: 2022-06-05 | Stop reason: HOSPADM

## 2022-06-05 RX ORDER — SODIUM CHLORIDE, SODIUM LACTATE, POTASSIUM CHLORIDE, CALCIUM CHLORIDE 600; 310; 30; 20 MG/100ML; MG/100ML; MG/100ML; MG/100ML
INJECTION, SOLUTION INTRAVENOUS CONTINUOUS
Status: DISCONTINUED | OUTPATIENT
Start: 2022-06-05 | End: 2022-06-05 | Stop reason: HOSPADM

## 2022-06-05 RX ORDER — OXYCODONE HCL 5 MG/5 ML
5 SOLUTION, ORAL ORAL
Status: DISCONTINUED | OUTPATIENT
Start: 2022-06-05 | End: 2022-06-05 | Stop reason: HOSPADM

## 2022-06-05 RX ORDER — HALOPERIDOL 5 MG/ML
1 INJECTION INTRAMUSCULAR
Status: DISCONTINUED | OUTPATIENT
Start: 2022-06-05 | End: 2022-06-05 | Stop reason: HOSPADM

## 2022-06-05 RX ORDER — ROCURONIUM BROMIDE 10 MG/ML
INJECTION, SOLUTION INTRAVENOUS PRN
Status: DISCONTINUED | OUTPATIENT
Start: 2022-06-05 | End: 2022-06-05 | Stop reason: SURG

## 2022-06-05 RX ORDER — LIDOCAINE HYDROCHLORIDE 10 MG/ML
INJECTION, SOLUTION EPIDURAL; INFILTRATION; INTRACAUDAL; PERINEURAL
Status: COMPLETED
Start: 2022-06-05 | End: 2022-06-05

## 2022-06-05 RX ORDER — HEPARIN SODIUM 1000 [USP'U]/ML
INJECTION, SOLUTION INTRAVENOUS; SUBCUTANEOUS
Status: COMPLETED
Start: 2022-06-05 | End: 2022-06-05

## 2022-06-05 RX ORDER — OXYCODONE HCL 5 MG/5 ML
10 SOLUTION, ORAL ORAL
Status: DISCONTINUED | OUTPATIENT
Start: 2022-06-05 | End: 2022-06-05 | Stop reason: HOSPADM

## 2022-06-05 RX ORDER — MIDAZOLAM HYDROCHLORIDE 1 MG/ML
INJECTION INTRAMUSCULAR; INTRAVENOUS PRN
Status: DISCONTINUED | OUTPATIENT
Start: 2022-06-05 | End: 2022-06-05 | Stop reason: SURG

## 2022-06-05 RX ORDER — ALBUTEROL SULFATE 2.5 MG/3ML
2.5 SOLUTION RESPIRATORY (INHALATION)
Status: DISCONTINUED | OUTPATIENT
Start: 2022-06-05 | End: 2022-06-05 | Stop reason: HOSPADM

## 2022-06-05 RX ORDER — ONDANSETRON 2 MG/ML
4 INJECTION INTRAMUSCULAR; INTRAVENOUS
Status: DISCONTINUED | OUTPATIENT
Start: 2022-06-05 | End: 2022-06-05 | Stop reason: HOSPADM

## 2022-06-05 RX ORDER — CEFAZOLIN SODIUM 1 G/3ML
INJECTION, POWDER, FOR SOLUTION INTRAMUSCULAR; INTRAVENOUS PRN
Status: DISCONTINUED | OUTPATIENT
Start: 2022-06-05 | End: 2022-06-05 | Stop reason: SURG

## 2022-06-05 RX ORDER — HEPARIN SODIUM 1000 [USP'U]/ML
3900 INJECTION, SOLUTION INTRAVENOUS; SUBCUTANEOUS
Status: DISCONTINUED | OUTPATIENT
Start: 2022-06-05 | End: 2022-06-06 | Stop reason: HOSPADM

## 2022-06-05 RX ORDER — INDOMETHACIN 50 MG/1
100 SUPPOSITORY RECTAL ONCE
Status: COMPLETED | OUTPATIENT
Start: 2022-06-05 | End: 2022-06-05

## 2022-06-05 RX ORDER — DIPHENHYDRAMINE HYDROCHLORIDE 50 MG/ML
12.5 INJECTION INTRAMUSCULAR; INTRAVENOUS
Status: DISCONTINUED | OUTPATIENT
Start: 2022-06-05 | End: 2022-06-05 | Stop reason: HOSPADM

## 2022-06-05 RX ADMIN — OXYCODONE HYDROCHLORIDE 10 MG: 10 TABLET ORAL at 20:39

## 2022-06-05 RX ADMIN — OMEPRAZOLE 20 MG: 20 CAPSULE, DELAYED RELEASE ORAL at 20:39

## 2022-06-05 RX ADMIN — HYDROMORPHONE HYDROCHLORIDE 0.5 MG: 1 INJECTION, SOLUTION INTRAMUSCULAR; INTRAVENOUS; SUBCUTANEOUS at 16:06

## 2022-06-05 RX ADMIN — TRAZODONE HYDROCHLORIDE 50 MG: 50 TABLET ORAL at 23:43

## 2022-06-05 RX ADMIN — INDOMETHACIN 100 MG: 50 SUPPOSITORY RECTAL at 12:09

## 2022-06-05 RX ADMIN — OXYCODONE HYDROCHLORIDE 10 MG: 10 TABLET ORAL at 05:31

## 2022-06-05 RX ADMIN — HEPARIN SODIUM 3900 UNITS: 1000 INJECTION, SOLUTION INTRAVENOUS; SUBCUTANEOUS at 19:30

## 2022-06-05 RX ADMIN — PIPERACILLIN AND TAZOBACTAM 3.38 G: 3; .375 INJECTION, POWDER, LYOPHILIZED, FOR SOLUTION INTRAVENOUS; PARENTERAL at 02:33

## 2022-06-05 RX ADMIN — ERGOCALCIFEROL 50000 UNITS: 1.25 CAPSULE ORAL at 05:30

## 2022-06-05 RX ADMIN — ATORVASTATIN CALCIUM 20 MG: 20 TABLET, FILM COATED ORAL at 20:38

## 2022-06-05 RX ADMIN — MIDAZOLAM HYDROCHLORIDE 2 MG: 1 INJECTION, SOLUTION INTRAMUSCULAR; INTRAVENOUS at 10:42

## 2022-06-05 RX ADMIN — GABAPENTIN 300 MG: 300 CAPSULE ORAL at 20:39

## 2022-06-05 RX ADMIN — Medication 100 MCG: at 10:57

## 2022-06-05 RX ADMIN — OMEPRAZOLE 20 MG: 20 CAPSULE, DELAYED RELEASE ORAL at 05:30

## 2022-06-05 RX ADMIN — CEFAZOLIN 2 G: 330 INJECTION, POWDER, FOR SOLUTION INTRAMUSCULAR; INTRAVENOUS at 10:49

## 2022-06-05 RX ADMIN — PROPOFOL 100 MG: 10 INJECTION, EMULSION INTRAVENOUS at 10:46

## 2022-06-05 RX ADMIN — PIPERACILLIN AND TAZOBACTAM 3.38 G: 3; .375 INJECTION, POWDER, LYOPHILIZED, FOR SOLUTION INTRAVENOUS; PARENTERAL at 13:25

## 2022-06-05 RX ADMIN — ROCURONIUM BROMIDE 50 MG: 10 INJECTION, SOLUTION INTRAVENOUS at 10:46

## 2022-06-05 RX ADMIN — OXYCODONE HYDROCHLORIDE 10 MG: 10 TABLET ORAL at 01:10

## 2022-06-05 RX ADMIN — ONDANSETRON 4 MG: 2 INJECTION INTRAMUSCULAR; INTRAVENOUS at 10:46

## 2022-06-05 RX ADMIN — OXYCODONE HYDROCHLORIDE 10 MG: 10 TABLET ORAL at 08:44

## 2022-06-05 RX ADMIN — FOLIC ACID 1 MG: 1 TABLET ORAL at 05:31

## 2022-06-05 RX ADMIN — CARVEDILOL 6.25 MG: 6.25 TABLET, FILM COATED ORAL at 08:50

## 2022-06-05 RX ADMIN — SODIUM CHLORIDE: 9 INJECTION, SOLUTION INTRAVENOUS at 10:41

## 2022-06-05 RX ADMIN — SUGAMMADEX 200 MG: 100 INJECTION, SOLUTION INTRAVENOUS at 11:22

## 2022-06-05 RX ADMIN — OXYCODONE HYDROCHLORIDE 10 MG: 10 TABLET ORAL at 13:25

## 2022-06-05 RX ADMIN — OXYCODONE HYDROCHLORIDE 10 MG: 10 TABLET ORAL at 23:43

## 2022-06-05 ASSESSMENT — LIFESTYLE VARIABLES
TOTAL SCORE: 0
ALCOHOL_USE: YES
HOW MANY TIMES IN THE PAST YEAR HAVE YOU HAD 5 OR MORE DRINKS IN A DAY: 0
TOTAL SCORE: 0
SUBSTANCE_ABUSE: 0
EVER FELT BAD OR GUILTY ABOUT YOUR DRINKING: NO
CONSUMPTION TOTAL: NEGATIVE
TOTAL SCORE: 0
HAVE YOU EVER FELT YOU SHOULD CUT DOWN ON YOUR DRINKING: NO
AVERAGE NUMBER OF DAYS PER WEEK YOU HAVE A DRINK CONTAINING ALCOHOL: 2
HAVE PEOPLE ANNOYED YOU BY CRITICIZING YOUR DRINKING: NO
ON A TYPICAL DAY WHEN YOU DRINK ALCOHOL HOW MANY DRINKS DO YOU HAVE: 3
EVER HAD A DRINK FIRST THING IN THE MORNING TO STEADY YOUR NERVES TO GET RID OF A HANGOVER: NO

## 2022-06-05 ASSESSMENT — ENCOUNTER SYMPTOMS
SHORTNESS OF BREATH: 0
HALLUCINATIONS: 0
NERVOUS/ANXIOUS: 0
NECK PAIN: 0
CONSTIPATION: 0
FOCAL WEAKNESS: 0
MUSCULOSKELETAL NEGATIVE: 1
FLANK PAIN: 0
PALPITATIONS: 0
BLURRED VISION: 0
NAUSEA: 0
DOUBLE VISION: 0
DIARRHEA: 0
WEAKNESS: 0
SPUTUM PRODUCTION: 0
CHILLS: 0
POLYDIPSIA: 0
INSOMNIA: 1
NEUROLOGICAL NEGATIVE: 1
RESPIRATORY NEGATIVE: 1
HEARTBURN: 0
TREMORS: 0
FEVER: 0
GASTROINTESTINAL NEGATIVE: 1
BRUISES/BLEEDS EASILY: 0
PSYCHIATRIC NEGATIVE: 1
EYES NEGATIVE: 1
PHOTOPHOBIA: 0
SPEECH CHANGE: 0
COUGH: 0
DIZZINESS: 1
BACK PAIN: 0
CARDIOVASCULAR NEGATIVE: 1
HEMOPTYSIS: 0
WEIGHT LOSS: 0
HEADACHES: 0
FALLS: 0
SENSORY CHANGE: 0
ORTHOPNEA: 0
ABDOMINAL PAIN: 1
VOMITING: 0
MYALGIAS: 0

## 2022-06-05 ASSESSMENT — PAIN DESCRIPTION - PAIN TYPE
TYPE: ACUTE PAIN

## 2022-06-05 ASSESSMENT — COGNITIVE AND FUNCTIONAL STATUS - GENERAL
DAILY ACTIVITIY SCORE: 24
MOBILITY SCORE: 24
SUGGESTED CMS G CODE MODIFIER MOBILITY: CH
SUGGESTED CMS G CODE MODIFIER DAILY ACTIVITY: CH

## 2022-06-05 ASSESSMENT — PAIN SCALES - GENERAL: PAIN_LEVEL: 3

## 2022-06-05 NOTE — H&P
Hospital Medicine History & Physical Note    Date of Service  6/4/2022    Primary Care Physician  Pcp Pt States None    Consultants  GI consultants Dr. Daly  Nephrology Dr. Waddell    Code Status  Full Code    Chief Complaint  Abdominal pain    History of Presenting Illness  Junior Proctor is a 71 y.o. male with past medical history of chronic pancreatitis, CAD, rheumatoid arthritis, end-stage renal disease on hemodialysis Tuesday Thursday Saturday, gout, hypertension, dyslipidemia, migratory polyarthritis, homelessness, CHF, who presented 6/4/2022 as a transfer from [facility] with biliary pancreatitis, for GI consult and ERCP  Patient spent around 1 week at [facility], where he initially presented with orthostatic dizziness, abdominal pain in the left upper quadrant, dull, constant, up to 8 out of 10, without nausea without alleviating aggravating factors.  CT of the abdomen and pelvis with contrast showed CBD dilation at 11 mm, multiple gallstones.unable to obtain MRCP due to claustrophobia.  Blood work showed elevated lipase.  Patient has been receiving hemodialysis through tunneled catheter on the left chest as he is AV fistula is not working.  After arrival to this hospital, he continues having abdominal pain in the left upper quadrant, at the same time he feels hungry, does not want clear liquid diet, requested regular diet.  .    I discussed the plan of care with patient.    Review of Systems  Review of Systems   Constitutional: Negative for chills, fever and weight loss.   HENT: Negative for ear pain, hearing loss and tinnitus.    Eyes: Negative for blurred vision, double vision and photophobia.   Respiratory: Negative for cough, hemoptysis and sputum production.    Cardiovascular: Negative for chest pain, palpitations and orthopnea.   Gastrointestinal: Positive for abdominal pain. Negative for constipation, diarrhea, heartburn, nausea and vomiting.   Genitourinary: Negative for dysuria, flank pain,  frequency and hematuria.   Musculoskeletal: Positive for joint pain. Negative for back pain and neck pain.   Skin: Negative for itching and rash.   Neurological: Positive for dizziness. Negative for tremors, speech change, focal weakness and headaches.   Endo/Heme/Allergies: Negative for environmental allergies and polydipsia. Does not bruise/bleed easily.   Psychiatric/Behavioral: Negative for hallucinations and substance abuse. The patient is not nervous/anxious.        Past Medical History   has a past medical history of Gout, Hemodialysis patient ([facility]), Hypertension, Kidney disease, and MI (myocardial infarction) ([facility]).    Surgical History   has a past surgical history that includes pr upper gi endoscopy,diagnosis (N/A, 12/10/2021); pr colonoscopy,diagnostic (N/A, 12/10/2021); pr upper gi endoscopy,biopsy (N/A, 12/10/2021); and pr colonoscopy,biopsy (N/A, 12/10/2021).     Family History     Family history reviewed with patient. There is no family history that is pertinent to the chief complaint.     Social History   reports that he has quit smoking. He has never used smokeless tobacco. He reports current alcohol use. He reports that he does not use drugs.    Allergies  Allergies   Allergen Reactions   • Motrin [Ibuprofen]      hhx of stomach ulcers       Medications  Prior to Admission Medications   Prescriptions Last Dose Informant Patient Reported? Taking?   Buprenorphine 10 MCG/HR PATCH WEEKLY  Patient's Home Pharmacy Yes No   Sig: Place 10 mcg on the skin every 7 days.   Etanercept 50 MG/ML Solution Prefilled Syringe   No No   Sig: Inject 50 mg under the skin every 7 days.   allopurinol (ZYLOPRIM) 100 MG Tab  Patient's Home Pharmacy Yes No   Sig: Take 100 mg by mouth 3 times a week. Taken post hemodialysis THREE TIMES A WEEK   atorvastatin (LIPITOR) 20 MG Tab  Patient's Home Pharmacy Yes No   Sig: Take 20 mg by mouth every evening.   carvedilol (COREG) 6.25 MG Tab  Patient's Home Pharmacy Yes No   Sig: Take  6.25 mg by mouth 2 times a day with meals.   diclofenac sodium (VOLTAREN) 1 % Gel  Patient's Home Pharmacy Yes No   Sig: Apply 4 g topically 4 times a day as needed (Osetoarthritis pain).   folic acid (FOLVITE) 1 MG Tab  Patient's Home Pharmacy Yes No   Sig: Take 1 mg by mouth every day.   gabapentin (NEURONTIN) 300 MG Cap  Patient's Home Pharmacy Yes No   Sig: Take 300 mg by mouth at bedtime.   lidocaine (LIDODERM) 5 % Patch  Patient's Home Pharmacy Yes No   Sig: Apply 1 Patch topically every day. Remove after 12 hours   oxyCODONE immediate-release (ROXICODONE) 5 MG Tab   No No   Sig: Take 1 Tablet by mouth every six hours as needed for Severe Pain for up to 7 days.   pancrelipase, Lip-Prot-Amyl, (CREON 3000) 6597-4983 units Cap DR Particles   No No   Sig: Take 1 Capsule by mouth 3 times a day  (every 8 hrs) with meals for 30 days.   pantoprazole (PROTONIX) 40 MG Tablet Delayed Response  Patient's Home Pharmacy Yes No   Sig: Take 40 mg by mouth 2 times a day.   predniSONE (DELTASONE) 5 MG Tab  Patient's Home Pharmacy Yes No   Sig: Take 5-15 mg by mouth every day. Pt started a 15 day course of PREDNISONE on 4/18  Taper course   15 mg for 5 days  10 mg for 5 days  5 mg for 5 days   sevelamer (RENAGEL) 800 MG Tab  Patient's Home Pharmacy Yes No   Sig: Take 800 mg by mouth 3 times a day with meals.   traZODone (DESYREL) 50 MG Tab  Patient's Home Pharmacy Yes No   Sig: Take 50 mg by mouth every evening. Indications: Trouble Sleeping   vitamin D2, Ergocalciferol, (DRISDOL) 1.25 MG (94035 UT) Cap capsule  Patient's Home Pharmacy Yes No   Sig: Take 50,000 Units by mouth every 7 days.      Facility-Administered Medications: None       Physical Exam  Temp:  [36.1 °C (97 °F)] 36.1 °C (97 °F)  Pulse:  [99] 99  Resp:  [17] 17  BP: (164)/(105) 164/105  SpO2:  [94 %] 94 %  Blood Pressure : (!) 164/105   Temperature: 36.1 °C (97 °F)   Pulse: 99   Respiration: 17   Pulse Oximetry: 94 %       Physical Exam  Vitals and nursing note  reviewed.   Constitutional:       General: He is not in acute distress.     Appearance: Normal appearance.   HENT:      Head: Normocephalic and atraumatic.      Nose: Nose normal.      Mouth/Throat:      Mouth: Mucous membranes are moist.   Eyes:      Extraocular Movements: Extraocular movements intact.      Pupils: Pupils are equal, round, and reactive to light.   Cardiovascular:      Rate and Rhythm: Normal rate and regular rhythm.   Pulmonary:      Effort: Pulmonary effort is normal.      Breath sounds: Normal breath sounds.      Comments: Left upper chest tunneled catheter  Abdominal:      General: Abdomen is flat. There is no distension.      Tenderness: There is abdominal tenderness in the left upper quadrant. There is no guarding or rebound.   Musculoskeletal:         General: No swelling or deformity. Normal range of motion.      Cervical back: Normal range of motion and neck supple.   Skin:     General: Skin is warm and dry.      Comments: Dusky skin color   Neurological:      General: No focal deficit present.      Mental Status: He is alert and oriented to person, place, and time.   Psychiatric:         Mood and Affect: Mood normal.         Behavior: Behavior normal.         Laboratory:          No results for input(s): ALTSGPT, ASTSGOT, ALKPHOSPHAT, TBILIRUBIN, DBILIRUBIN, GAMMAGT, AMYLASE, LIPASE, ALB, PREALBUMIN, GLUCOSE in the last 72 hours.      No results for input(s): NTPROBNP in the last 72 hours.      No results for input(s): TROPONINT in the last 72 hours.    Imaging:  No orders to display         Assessment/Plan:  Justification for Admission Status  I anticipate this patient will require at least two midnights for appropriate medical management, necessitating inpatient admission because Treatment plan includes specialist involvement, ERCP is not monitoring after ERCP for 24 hours    * Pancreatitis due to common bile duct stone- (present on admission)  Assessment & Plan  CT of the abdomen at  outside facility showed CBD dilation 11 mm and multiple CBD stones  Right upper quadrant ultrasound: Cholelithiasis and CBD dilation without definite stone  Plan: MRCP  Discussed with Dr. Dlay/GI consultant, who will follow with the patient tomorrow  Continue Creon  Diet as tolerated  Continue antiacid  Zofran as needed    Dilation of biliary tract  Assessment & Plan  Started on IV Zosyn per GI recommendation  Pending MRCP and GI consult    ESRD on dialysis (HCC)- (present on admission)  Assessment & Plan  Due for dialysis Tuesday Thursday Saturday.  Discussed with nephrologist Dr. Waddell, who will see patient tomorrow  Pending chemistry to see potassium level, however patient does not appear to be in need for urgent hemodialysis at this time  Adjust dose of medications renally      VTE prophylaxis: heparin ppx

## 2022-06-05 NOTE — PROGRESS NOTES
Hospital Medicine Daily Progress Note    Date of Service  6/5/2022    Chief Complaint  Junior Proctor is a 71 y.o. male admitted 6/4/2022 with LUQ abdominal pain    Hospital Course  Junior Proctor is a 71 y.o. male with past medical history of chronic pancreatitis, CAD, rheumatoid arthritis, end-stage renal disease on hemodialysis Tuesday Thursday Saturday, gout, hypertension, dyslipidemia, migratory polyarthritis, homelessness, CHF, who presented 6/4/2022 as a transfer from [facility] with biliary pancreatitis, for GI consult and ERCP  Patient spent around 1 week at [facility], where he initially presented with orthostatic dizziness, abdominal pain in the left upper quadrant, dull, constant, up to 8 out of 10, without nausea without alleviating aggravating factors.  CT of the abdomen and pelvis with contrast showed CBD dilation at 11 mm, multiple gallstones.unable to obtain MRCP due to claustrophobia.  Blood work showed elevated lipase.  Patient has been receiving hemodialysis through tunneled catheter on the left chest as he is AV fistula is not working. He was transferred to [facility] for further treatment and GI consultation.      Interval Problem Update  6/5/2022: [facility]. K 5.3 Nephrology consulted. Patient clear from nephrology to undergo ERCP with GI today and will be dialyzed afterwards then [facility]. Pancreatic stone removed and stent placed by pancreatic duct per GI- patient to return for stent removal in 3 days per GI. Will discuss with general surgery regarding gallbladder wall thickening.     I have personally seen and examined the patient at bedside. I discussed the plan of care with patient, bedside RN and general surgery, GI and nephrology.    Consultants/Specialty  general surgery, GI and nephrology    Code Status  Full Code    Disposition  Patient is not medically cleared for discharge.   Anticipate discharge to To be determined.  I have placed the  appropriate orders for post-discharge needs.    Review of Systems  Review of Systems   Constitutional: Negative for chills and fever.   Respiratory: Negative for cough, sputum production and shortness of breath.    Cardiovascular: Negative for chest pain, palpitations and leg swelling.   Gastrointestinal: Positive for abdominal pain. Negative for constipation, diarrhea, nausea and vomiting.   Genitourinary: Negative for dysuria and flank pain.   Musculoskeletal: Negative for falls and myalgias.   Skin: Negative for itching and rash.   Neurological: Negative for sensory change, focal weakness, weakness and headaches.   Psychiatric/Behavioral: Negative for substance abuse. The patient has insomnia. The patient is not nervous/anxious.    All other systems reviewed and are negative.       Physical Exam  Temp:  [36.1 °C (97 °F)-37.4 °C (99.4 °F)] 36.5 °C (97.7 °F)  Pulse:  [] 92  Resp:  [16-33] 18  BP: (136-179)/() 146/100  SpO2:  [90 %-97 %] 95 %    Physical Exam  Vitals and nursing note reviewed.   Constitutional:       General: He is not in acute distress.     Appearance: Normal appearance. He is overweight. He is not ill-appearing or toxic-appearing.   HENT:      Head: Normocephalic.      Nose: Nose normal.      Mouth/Throat:      Mouth: Mucous membranes are moist.      Pharynx: Oropharynx is clear.   Eyes:      General: No scleral icterus.     Extraocular Movements: Extraocular movements intact.      Conjunctiva/sclera: Conjunctivae normal.   Cardiovascular:      Rate and Rhythm: Normal rate and regular rhythm.      Pulses: Normal pulses.      Heart sounds: Normal heart sounds. No murmur heard.    No gallop.   Pulmonary:      Effort: Pulmonary effort is normal. No respiratory distress.      Breath sounds: Normal breath sounds. No wheezing.      Comments: Tunneled catheter to left chest. Dressing CDI  Chest:      Chest wall: No tenderness.   Abdominal:      General: Abdomen is flat. Bowel sounds are  normal. There is no distension.      Palpations: Abdomen is soft.      Tenderness: There is abdominal tenderness (LUQ and epigastriuc pain with palpation).   Musculoskeletal:         General: No tenderness.      Right lower leg: No edema.      Left lower leg: No edema.   Skin:     General: Skin is warm and dry.      Capillary Refill: Capillary refill takes less than 2 seconds.      Coloration: Skin is not jaundiced.   Neurological:      General: No focal deficit present.      Mental Status: He is alert and oriented to person, place, and time. Mental status is at baseline.   Psychiatric:         Mood and Affect: Mood normal.         Behavior: Behavior normal.         Thought Content: Thought content normal.         Judgment: Judgment normal.         Fluids    Intake/Output Summary (Last 24 hours) at 6/5/2022 1314  Last data filed at 6/5/2022 0714  Gross per 24 hour   Intake --   Output 0 ml   Net 0 ml       Laboratory  Recent Labs     06/05/22  0606   WBC 8.8   RBC 2.99*   HEMOGLOBIN 9.4*   HEMATOCRIT 29.6*   MCV 99.0*   MCH 31.4   MCHC 31.8*   RDW 56.3*   PLATELETCT 180   MPV 10.4     Recent Labs     06/05/22  0606   SODIUM 135   POTASSIUM 5.3   CHLORIDE 100   CO2 24   GLUCOSE 80   BUN 20   CREATININE 8.87*   CALCIUM 8.5     Recent Labs     06/05/22  0606   INR 1.14*               Imaging  DX-PORTABLE FLUORO > 1 HOUR   Final Result      Fluoroscopy provided for ERCP.      US-RUQ   Final Result      1.  Nonshadowing echogenic focus in the gallbladder is grossly similar to the prior exam and may represent a small adherent calculus. Gallbladder wall thickening is again noted. Acute cholecystitis could have a similar appearance in the appropriate    clinical setting      2.  Common bile duct dilatation, not noted on the previous exam. No choledocholithiasis identified sonographically. There is concern for biliary obstruction, consider MRCP.      3.  Right renal atrophy with an echogenic cortex, consistent with medical  renal disease.           Assessment/Plan  * Pancreatitis due to common bile duct stone- (present on admission)  Assessment & Plan  CT of the abdomen at outside facility showed CBD dilation 11 mm and multiple CBD stones  Right upper quadrant ultrasound: Cholelithiasis and CBD dilation without definite stone  Patient unable to tolerate MRCP despite [facility].   Seen by GI for ERCP today. Stone removed from pancreatic duct and stent placed.   No evidence of choledocholithiasis, non-dilated per GI findings.   GI plans to repeat ERCP in about 3 days to remove pancreas stent and stone, due to concern about patient being lost to follow-up.  MRCP dc'd.   Continue Creon and prilosec  Clear liquid Diet as tolerated.   Continue antiacid  Antiemetics and pain management orders in place.   Will discuss with General surgery regarding any follow up/care needed for gallbladder wall thickening seen on US.     Dilation of biliary tract  Assessment & Plan  Started on IV Zosyn per GI recommendation  See plan for Pancreatitis     ESRD on dialysis (HCC)- (present on admission)  Assessment & Plan  Receives HD Tuesday Thursday Saturday.    Nephrology involved and following.   Patient to received HD today post-procedurally then TTS.  Avoid nephrotoxic medicatioons  Adjust dose of medications renally    HLD (hyperlipidemia)- (present on admission)  Assessment & Plan  Continue home atorvastatin    Insomnia- (present on admission)  Assessment & Plan  Continue home trazodone    Gout- (present on admission)  Assessment & Plan  Continue home allopurinol    Anemia- (present on admission)  Assessment & Plan  Likely of chronic disease due to ESRD.   Stable.   Continue to monitor.   Transfuse for hgb <7       VTE prophylaxis: heparin ppx    I have performed a physical exam and reviewed and updated ROS and Plan today (6/5/2022). In review of yesterday's note (6/4/2022), there are no changes except as documented above.

## 2022-06-05 NOTE — CARE PLAN
Problem: Knowledge Deficit - Standard  Goal: Patient and family/care givers will demonstrate understanding of plan of care, disease process/condition, diagnostic tests and medications  Outcome: Progressing   Pt educated on medication regimen, plan for MCRP and what that entails, MRI/diagnostics, pain control, and overall POC. Pt verbalizes understanding    Problem: Pain - Standard  Goal: Alleviation of pain or a reduction in pain to the patient’s comfort goal  Outcome: Progressing     The patient is Stable - Low risk of patient condition declining or worsening    Shift Goals  Clinical Goals: MRCP  Patient Goals: pain control    Progress made toward(s) clinical / shift goals:  GI consulted for MRCP in a.m. Pain controlled on orals    Patient is not progressing towards the following goals:

## 2022-06-05 NOTE — ANESTHESIA PROCEDURE NOTES
Airway    Date/Time: 6/5/2022 10:47 AM  Performed by: Brea Galarza M.D.  Authorized by: Brea Galarza M.D.     Location:  OR  Urgency:  Elective  Indications for Airway Management:  Anesthesia      Spontaneous Ventilation: absent    Sedation Level:  Deep  Preoxygenated: Yes    Patient Position:  Sniffing  Mask Difficulty Assessment:  1 - vent by mask  Final Airway Type:  Endotracheal airway  Final Endotracheal Airway:  ETT  Cuffed: Yes    Technique Used for Successful ETT Placement:  Direct laryngoscopy    Insertion Site:  Oral  Blade Type:  Anne  Laryngoscope Blade/Videolaryngoscope Blade Size:  2  ETT Size (mm):  7.0  Measured from:  Lips  ETT to Lips (cm):  21  Placement Verified by: capnometry    Cormack-Lehane Classification:  Grade I - full view of glottis

## 2022-06-05 NOTE — ANESTHESIA TIME REPORT
Anesthesia Start and Stop Event Times     Date Time Event    6/5/2022 1040 Ready for Procedure     1041 Anesthesia Start     1137 Anesthesia Stop        Responsible Staff  06/05/22    Name Role Begin End    Brea Galarza M.D. Anesth 1041 1137        Overtime Reason:  no overtime (within assigned shift)    Comments:

## 2022-06-05 NOTE — ANESTHESIA PREPROCEDURE EVALUATION
Case: [id number] Date/Time: 06/05/22 1125    Procedure: ERCP (ENDOSCOPIC RETROGRADE CHOLANGIOPANCREATOGRAPHY) (N/A Esophagus)    Anesthesia type: General    Pre-op diagnosis: CBD dilatation    Location: [facility] OR 06 / [facility]    Surgeons: Ibrahima Daly M.D.        71yoM with PMhx of CAD s/p MI, PE, ESRD on HD, HLD, Hep C, BPH, GOU      NPO  No AC - pain with propofol   Allergies to motrin  Relevant Problems   CARDIAC   (positive) MI (myocardial infarction) (HCC)   (positive) Pulmonary embolism (HCC)      GI   (positive) Ulcer, stomach peptic         (positive) ESRD on dialysis (HCC)   (positive) Hepatitis   (positive) Stage 4 chronic kidney disease (HCC)      Other   (positive) Arthritis       Physical Exam    Airway   Mallampati: II       Cardiovascular - normal exam     Dental - normal exam           Pulmonary - normal exam     Abdominal - normal exam     Neurological - normal exam                 Anesthesia Plan    ASA 3   ASA physical status 3 criteria: other (comment) and ESRD undergoing regularly scheduled dialysis    Plan - general       Airway plan will be ETT          Induction: intravenous    Postoperative Plan: Postoperative administration of opioids is intended.    Pertinent diagnostic labs and testing reviewed    Informed Consent:    Anesthetic plan and risks discussed with patient.

## 2022-06-05 NOTE — CONSULTS
"[facility] -  CONSULTATION NOTE               Author: Harrison Melgar M.D. Date & Time: 6/5/2022  9:08 AM       REASON FOR CONSULTATION:   Inpatient hemodialysis management.    CHIEF COMPLAINT:   \"Dialysis management\"    HISTORY OF PRESENT ILLNESS:    Patient is a 71 year old male with a PMHx of chronic pancreatitis, CAD, RA, ESRD on HD TTS, gout, HTN, CHF [facility] he's [facility] for biliary pancreatitis for ERCP with GI.  [facility] with CBD [facility] at 11mm with multiple stones, couldn't obtain MRCP due to claustrophobia.  Continues to have abdominal pain today.  [facility]. Nephrology consulted for maintenance HD    REVIEW OF SYSTEMS:    10 point ROS was performed and is as per HPI or otherwise negative.    PAST MEDICAL HISTORY:   Past Medical History:   Diagnosis Date   • Gout    • Hemodialysis patient (HCC)    • Hypertension    • Kidney disease    • MI (myocardial infarction) (HCC)        PAST SURGICAL HISTORY:   Past Surgical History:   Procedure Laterality Date   • PA UPPER GI ENDOSCOPY,DIAGNOSIS N/A 12/10/2021    Procedure: GASTROSCOPY;  Surgeon: Paddy Hooks M.D.;  Location: SURGERY SAME DAY [facility];  Service: Gastroenterology   • PA COLONOSCOPY,DIAGNOSTIC N/A 12/10/2021    Procedure: COLONOSCOPY;  Surgeon: Paddy Hooks M.D.;  Location: SURGERY SAME DAY [facility];  Service: Gastroenterology   • PA UPPER GI ENDOSCOPY,BIOPSY N/A 12/10/2021    Procedure: GASTROSCOPY, WITH BIOPSY;  Surgeon: Paddy Hooks M.D.;  Location: SURGERY SAME DAY [facility];  Service: Gastroenterology   • PA COLONOSCOPY,BIOPSY N/A 12/10/2021    Procedure: COLONOSCOPY, WITH BIOPSY;  Surgeon: Paddy Hooks M.D.;  Location: SURGERY SAME DAY [facility];  Service: Gastroenterology       FAMILY HISTORY:   Family History   Problem Relation Age of Onset   • Diabetes Mother        SOCIAL HISTORY:   Social History     Tobacco Use   Smoking Status Former Smoker " "  Smokeless Tobacco Never Used     Social History     Substance and Sexual Activity   Alcohol Use Yes    Comment: occ     Social History     Substance and Sexual Activity   Drug Use Never       HOME MEDICATIONS:   Reviewed and documented in chart.    LABORATORY STUDIES:   Recent Labs     06/05/22  0606   SODIUM 135   POTASSIUM 5.3   CHLORIDE 100   CO2 24   GLUCOSE 80   BUN 20   CREATININE 8.87*   CALCIUM 8.5       ALLERGIES:  Motrin [ibuprofen]    VS:  BP (!) 162/90   Pulse (!) 101   Temp 36.8 °C (98.3 °F) (Temporal)   Resp 17   Ht 1.727 m (5' 8\")   Wt 81.1 kg (178 lb 12.7 oz)   SpO2 91%   BMI 27.19 kg/m²     Physical Exam  HENT:      Head: Normocephalic.      Right Ear: External ear normal.      Left Ear: External ear normal.      Nose: Nose normal.      Mouth/Throat:      Mouth: Mucous membranes are moist.   Eyes:      General:         Right eye: No discharge.         Left eye: No discharge.   Cardiovascular:      Rate and Rhythm: Normal rate.      Pulses: Normal pulses.   Pulmonary:      Effort: Pulmonary effort is normal.   Abdominal:      General: Abdomen is flat.   Musculoskeletal:         General: Swelling present.      Cervical back: Normal range of motion.   Skin:     General: Skin is warm.   Neurological:      General: No focal deficit present.      Mental Status: He is alert.   Psychiatric:         Mood and Affect: Mood normal.      Left chest TDC in place      FLUID BALANCE:  No intake/output data recorded.    IMAGING:  All imaging reviewed from admission to present day    IMPRESSION:  # ESRD  - Has left chest TDC  - Dialysis schedule qTTS  # Pancreatitis due to stone  - Per GI recs  # HTN  - Goal BP < 140/90  - Not at goal possibly some component of volume  # Anemia of CKD  - Goal Hgb 10-11  - Iron panel  # CKD-MBD  - PTH pen  - Vit D pen  - Ca 8.5  - PO4 pen    PLAN:  - HD today 6/5 then qTTS iHD during stay  - UF as tolerated  - Planned for ERCP per GI  - No dietary protein restrictions  - Dose " all meds per ESRD    Thank you for the consultation!

## 2022-06-05 NOTE — PROGRESS NOTES
Details of GI consult per Denis APRN note.  Patient seen and examined before proceeding with ERCP sphincterotomy bile duct stones extraction under fluoroscopy with anesthesia possible temporary stent placement, biopsies and/or other endotherapy. Risks, benefits, and alternatives of aforementioned procedures were discussed with patient in detail before proceeding.  Patient was given opportunities to ask questions and discuss other options.  Risks including but not limited to pancreatitis, contrast reaction, radiation exposure, stent if placed patient responsibility to schedule/faciltate repeat outpatient ERCP within 3 months or less, retained and/or recurrent choledocholithiasis, perforation, infection, bleeding, missed lesion(s), possible need for surgery(ies) and/or interventional radiology, possible need for repeat procedure(s) and/or additional testing, hospitalization possibly prolonged, cardiac and/or pulmonary event, aspiration, hypoxia, stroke, medication (s) and/or anesthesia reaction(s), indefinite diagnosis, discomfort/pain, unsuccessful and/or incomplete procedure, ineffective therapy, persistent symptoms, damage to adjacent organs/structure and/or vascular, and other adverse event(s) possibly life-threatening.  Interactive discussion was undertaken with Layman's terms.  I answered questions in full and to satisfaction.  I gave opportunity to cancel, delay and/or reschedule if not completely comfortable with proceeding.  Patient stated understanding and acceptance of these risks, and wished to proceed.   Informed consent was given in clear state of mind and paper permit was confirmed to have been signed before proceeding.

## 2022-06-05 NOTE — ANESTHESIA POSTPROCEDURE EVALUATION
Patient: Junior Proctor    Procedure Summary     Date: 06/05/22 Room / Location: [facility] OR 06 / SURGERY [facility]    Anesthesia Start: 1041 Anesthesia Stop: 1137    Procedure: ERCP (ENDOSCOPIC RETROGRADE CHOLANGIOPANCREATOGRAPHY) (N/A Esophagus) Diagnosis: (chronic pancreatitis)    Surgeons: Ibrahima Daly M.D. Responsible Provider: Brea Galarza M.D.    Anesthesia Type: general ASA Status: 3          Final Anesthesia Type: general  Last vitals  BP   Blood Pressure : (!) 158/92    Temp   36.4 °C (97.5 °F)    Pulse   86   Resp   18    SpO2   96 %      Anesthesia Post Evaluation    Patient location during evaluation: PACU  Patient participation: complete - patient participated  Level of consciousness: awake and alert  Pain score: 3    Airway patency: patent  Anesthetic complications: no  Cardiovascular status: adequate  Respiratory status: acceptable  Hydration status: acceptable    PONV: none          No complications documented.     Nurse Pain Score: 8 (NPRS)

## 2022-06-05 NOTE — PROGRESS NOTES
MRI screening form complete    Admission complete  Skin assessment/interventions complete  Old PIV from previous facility removed. New PIV placed for abx  Med rec complete, regimen clarified  RUQ US scan completed by US  Pain medicated per MAR  NPO since MN

## 2022-06-05 NOTE — PROGRESS NOTES
4 Eyes Skin Assessment Completed by RADHA Elmore and RADHA Figueredo.    Head WDL  Ears WDL  Nose WDL  Mouth WDL  Neck WDL  Breast/Chest WDL perm cath to left chest  Shoulder Blades WDL  Spine WDL  (R) Arm/Elbow/Hand WDL  (L) Arm/Elbow/Hand blister to left wrist, dressing in place.  Abdomen WDL  Groin WDL  Scrotum/Coccyx/Buttocks WDL  (R) Leg WDL  (L) Leg WDL  (R) Heel/Foot/Toe Discoloration  (L) Heel/Foot/Toe Discoloration          Devices In Places Blood Pressure Cuff, Pulse Ox and Nasal Cannula      Interventions In Place N/A    Possible Skin Injury No    Pictures Uploaded Into Epic N/A  Wound Consult Placed N/A  RN Wound Prevention Protocol Ordered No

## 2022-06-05 NOTE — ASSESSMENT & PLAN NOTE
Receives HD Tuesday Thursday Saturday.    Nephrology involved and following.   Patient to received HD today post-procedurally then TTS.  Avoid nephrotoxic medicatioons  Adjust dose of medications renally

## 2022-06-05 NOTE — PROGRESS NOTES
[facility] Services Progress Note     Initially called patient for HD; however pt was scheduled for surgery at 1000. Will plan to do HD after the procedure.  Transport cancelled

## 2022-06-05 NOTE — ASSESSMENT & PLAN NOTE
CT of the abdomen at outside facility showed CBD dilation 11 mm and multiple CBD stones  Right upper quadrant ultrasound: Cholelithiasis and CBD dilation without definite stone  Patient unable to tolerate MRCP [facility].   s/p ERCP 6/5. Stone removed from pancreatic duct and stent placed.   No evidence of choledocholithiasis, non-dilated per GI findings.   GI plans to repeat ERCP tentatively 6/8/2022 days to remove pancreas stent and stone, due to concern about patient being lost to follow-up.  Continue Creon and prilosec  Clear liquid Diet as tolerated per GI  Continue antiacid  Antiemetics and pain management orders in place.

## 2022-06-05 NOTE — CONSULTS
Gastroenterology Consult Note:    DARIN Sweet  Date & Time note created:    6/5/2022   10:07 AM     Referring MD:  Bebo PLATT    Patient ID:  Name:             Junior Proctor     YOB: 1950  Age:                 71 y.o.  male   MRN:               [id number]                                                             Reason for Consult:      Abdominal pain  CBD dilatation    History of Present Illness:    This a 72 yo male PMH chronic pancreatitis, CAD, RA, ESRD on HD (tues-thurs-Sat), gout, HTN, hyperlipidemia, CHF, who presented 6/4/22 as a transfer from [facility] with biliary pancreatitis. CT scan abdomen/pelvis showed CBD dilation at 11 mm, multiple gallstones. Unable to do MRCP due to claustrophobia. He has been complaining of abdominal pain (RUQ/LUQ) for the past month. No vomiting, changes in bowel habits, rectal bleeding.      HIDA scan with CCK 5/5/22: Fatty meal. Ejection fraction 61%    Review of Systems:      Review of Systems   Constitutional: Positive for malaise/fatigue.   HENT: Negative.    Eyes: Negative.    Respiratory: Negative.    Cardiovascular: Negative.    Gastrointestinal: Negative.    Genitourinary: Negative.    Musculoskeletal: Negative.    Skin: Negative.    Neurological: Negative.    Psychiatric/Behavioral: Negative.              Physical Exam:  Vitals/ General Appearance:   Weight/BMI: Body mass index is 27.19 kg/m².    Vitals:    06/04/22 2000 06/05/22 0000 06/05/22 0400 06/05/22 0714   BP: (!) 170/103 (!) 155/98 (!) 136/91 (!) 162/90   Pulse: 63 94 96 (!) 101   Resp: 16 17 17 17   Temp: 36.7 °C (98.1 °F) 37.4 °C (99.4 °F) 37.1 °C (98.7 °F) 36.8 °C (98.3 °F)   TempSrc: Temporal Temporal Temporal Temporal   SpO2: 90% 93% 90% 91%   Weight:       Height:         Oxygen Therapy:  Pulse Oximetry: 91 %, O2 (LPM): 0, O2 Delivery Device: None - Room Air    Physical Exam  Constitutional:       Appearance: Normal appearance.   HENT:      Mouth/Throat:       Mouth: Mucous membranes are moist.   Eyes:      Extraocular Movements: Extraocular movements intact.      Pupils: Pupils are equal, round, and reactive to light.   Cardiovascular:      Rate and Rhythm: Normal rate and regular rhythm.      Pulses: Normal pulses.      Heart sounds: Normal heart sounds.   Pulmonary:      Effort: Pulmonary effort is normal.      Breath sounds: Normal breath sounds.   Abdominal:      General: Bowel sounds are normal.      Palpations: Abdomen is soft.   Musculoskeletal:         General: Normal range of motion.   Skin:     General: Skin is warm and dry.   Neurological:      General: No focal deficit present.      Mental Status: He is alert and oriented to person, place, and time.         Past Medical History:   Past Medical History:   Diagnosis Date   • Gout    • Hemodialysis patient (HCC)    • Hypertension    • Kidney disease    • MI (myocardial infarction) (HCC)        Past Surgical History:  Past Surgical History:   Procedure Laterality Date   • [place] UPPER GI ENDOSCOPY,DIAGNOSIS N/A 12/10/2021    Procedure: GASTROSCOPY;  Surgeon: Paddy Hooks M.D.;  Location: SURGERY SAME DAY [facility];  Service: Gastroenterology   • [place] COLONOSCOPY,DIAGNOSTIC N/A 12/10/2021    Procedure: COLONOSCOPY;  Surgeon: Paddy Hooks M.D.;  Location: SURGERY SAME DAY [facility];  Service: Gastroenterology   • [place] UPPER GI ENDOSCOPY,BIOPSY N/A 12/10/2021    Procedure: GASTROSCOPY, WITH BIOPSY;  Surgeon: Paddy Hooks M.D.;  Location: SURGERY SAME DAY [facility];  Service: Gastroenterology   • [place] COLONOSCOPY,BIOPSY N/A 12/10/2021    Procedure: COLONOSCOPY, WITH BIOPSY;  Surgeon: Paddy Hooks M.D.;  Location: SURGERY SAME DAY [facility];  Service: Gastroenterology       Hospital Medications:    Current Facility-Administered Medications:   •  senna-docusate (PERICOLACE or SENOKOT S) 8.6-50 MG per tablet 2 Tablet, 2 Tablet, Oral, BID **AND** polyethylene glycol/lytes (MIRALAX) PACKET 1 Packet, 1 Packet, Oral,  QDAY PRN **AND** magnesium hydroxide (MILK OF MAGNESIA) suspension 30 mL, 30 mL, Oral, QDAY PRN **AND** bisacodyl (DULCOLAX) suppository 10 mg, 10 mg, Rectal, QDAY PRN, Genaro Glover M.D.  •  [Held by provider] heparin injection 5,000 Units, 5,000 Units, Subcutaneous, Q8HRS, Genaro Glover M.D.  •  acetaminophen (Tylenol) tablet 650 mg, 650 mg, Oral, Q6HRS PRN, Genaro Glover M.D.  •  hydrALAZINE (APRESOLINE) injection 10 mg, 10 mg, Intravenous, Q4HRS PRN, Genaro Glover M.D.  •  ondansetron (ZOFRAN) syringe/vial injection 4 mg, 4 mg, Intravenous, Q4HRS PRN, Genaro Glover M.D.  •  ondansetron (ZOFRAN ODT) dispertab 4 mg, 4 mg, Oral, Q4HRS PRN, Genaro Glover M.D.  •  Notify provider if pain remains uncontrolled, , , CONTINUOUS **AND** Use the Numeric Rating Scale (NRS), Cruz-Baker Faces (WBF), or FLACC on regular floors and Critical-Care Pain Observation Tool (CPOT) on ICUs/Trauma to assess pain, , , CONTINUOUS **AND** Pulse Ox, , , CONTINUOUS **AND** Pharmacy Consult Request ...Pain Management Review 1 Each, 1 Each, Other, PHARMACY TO DOSE **AND** If patient difficult to arouse and/or has respiratory depression (respiratory rate of 10 or less), stop any opiates that are currently infusing and call a Rapid Response., , , CONTINUOUS, Genaro Glover M.D.  •  allopurinol (ZYLOPRIM) tablet 100 mg, 100 mg, Oral, 3x/week, Genaro Glover M.D., 100 mg at 06/04/22 2228  •  atorvastatin (LIPITOR) tablet 20 mg, 20 mg, Oral, Nightly, Genaro Glover M.D., 20 mg at 06/04/22 2323  •  carvedilol (COREG) tablet 6.25 mg, 6.25 mg, Oral, BID WITH MEALS, Genaro Glover M.D., 6.25 mg at 06/05/22 0850  •  folic acid (FOLVITE) tablet 1 mg, 1 mg, Oral, DAILY, Genaro Glover M.D., 1 mg at 06/05/22 0531  •  gabapentin (NEURONTIN) capsule 300 mg, 300 mg, Oral, QHS, Genaro Glover M.D., 300 mg at 06/04/22 2323  •  pancrelipase (Lip-Prot-Amyl) (CREON 3000) 3449-1590 units capsule 3,000 Units, 1 Capsule,  Oral, TID WITH MEALS, Genaro Glover M.D.  •  omeprazole (PRILOSEC) capsule 20 mg, 20 mg, Oral, BID, Genaro Glover M.D., 20 mg at 06/05/22 0530  •  sevelamer carbonate (RENVELA) tablet 800 mg, 800 mg, Oral, TID WITH MEALS, Genaro Glover M.D.  •  traZODone (DESYREL) tablet 50 mg, 50 mg, Oral, Nightly, Genaro Glover M.D., 50 mg at 06/04/22 2323  •  vitamin D2 (Ergocalciferol) (Drisdol) capsule 50,000 Units, 50,000 Units, Oral, Q7 DAYS, Genaro Glover M.D., 50,000 Units at 06/05/22 0530  •  oxyCODONE immediate-release (ROXICODONE) tablet 5 mg, 5 mg, Oral, Q3HRS PRN **OR** oxyCODONE immediate release (ROXICODONE) tablet 10 mg, 10 mg, Oral, Q3HRS PRN, 10 mg at 06/05/22 0844 **OR** HYDROmorphone (Dilaudid) injection 0.5 mg, 0.5 mg, Intravenous, Q3HRS PRN, Genaro Glover M.D.  •  LORazepam (ATIVAN) injection 1 mg, 1 mg, Intravenous, Once PRN, Genaro Glover M.D.  •  [COMPLETED] piperacillin-tazobactam (ZOSYN) 3.375 g in  mL IVPB, 3.375 g, Intravenous, Once, Stopped at 06/04/22 2253 **AND** piperacillin-tazobactam (ZOSYN) 3.375 g in  mL IVPB, 3.375 g, Intravenous, Q12HRS, Genaro Glover M.D., Stopped at 06/05/22 0633    Current Outpatient Medications:  Current Facility-Administered Medications   Medication Dose Route Frequency Provider Last Rate Last Admin   • senna-docusate (PERICOLACE or SENOKOT S) 8.6-50 MG per tablet 2 Tablet  2 Tablet Oral BID Genaro Glover M.D.        And   • polyethylene glycol/lytes (MIRALAX) PACKET 1 Packet  1 Packet Oral QDAY PRN Genaro Glover M.D.        And   • magnesium hydroxide (MILK OF MAGNESIA) suspension 30 mL  30 mL Oral QDAY PRN Genaro Glover M.D.        And   • bisacodyl (DULCOLAX) suppository 10 mg  10 mg Rectal QDAY PRN Genaro Glover M.D.       • [Held by provider] heparin injection 5,000 Units  5,000 Units Subcutaneous Q8HRS Genaro Glover M.D.       • acetaminophen (Tylenol) tablet 650 mg  650 mg Oral Q6HRS PRN Genaro  SAMIR Glover       • hydrALAZINE (APRESOLINE) injection 10 mg  10 mg Intravenous Q4HRS PRN Genaro Glover M.D.       • ondansetron (ZOFRAN) syringe/vial injection 4 mg  4 mg Intravenous Q4HRS PRN Genaro Glover M.D.       • ondansetron (ZOFRAN ODT) dispertab 4 mg  4 mg Oral Q4HRS PRN Genaro Glover M.D.       • Pharmacy Consult Request ...Pain Management Review 1 Each  1 Each Other PHARMACY TO DOSE Genaro Glover M.D.       • allopurinol (ZYLOPRIM) tablet 100 mg  100 mg Oral 3x/week Genaro Glover M.D.   100 mg at 06/04/22 2228   • atorvastatin (LIPITOR) tablet 20 mg  20 mg Oral Nightly Genaro Glover M.D.   20 mg at 06/04/22 2323   • carvedilol (COREG) tablet 6.25 mg  6.25 mg Oral BID WITH MEALS Genaro Glover M.D.   6.25 mg at 06/05/22 0850   • folic acid (FOLVITE) tablet 1 mg  1 mg Oral DAILY Genaro Glover M.D.   1 mg at 06/05/22 0531   • gabapentin (NEURONTIN) capsule 300 mg  300 mg Oral QHS Genaro Glover M.D.   300 mg at 06/04/22 2323   • pancrelipase (Lip-Prot-Amyl) (CREON 3000) 4556-4183 units capsule 3,000 Units  1 Capsule Oral TID WITH MEALS Genaro Glover M.D.       • omeprazole (PRILOSEC) capsule 20 mg  20 mg Oral BID Genaro Glover M.D.   20 mg at 06/05/22 0530   • sevelamer carbonate (RENVELA) tablet 800 mg  800 mg Oral TID WITH MEALS Genaro Glover M.D.       • traZODone (DESYREL) tablet 50 mg  50 mg Oral Nightly Genaro Glover M.D.   50 mg at 06/04/22 2323   • vitamin D2 (Ergocalciferol) (Drisdol) capsule 50,000 Units  50,000 Units Oral Q7 DAYS Genaro Glover M.D.   50,000 Units at 06/05/22 0530   • HYDROmorphone (Dilaudid) injection 0.5 mg  0.5 mg Intravenous Q3HRS PRN Genaro Glover M.D.        Or   • oxyCODONE immediate-release (ROXICODONE) tablet 5 mg  5 mg Oral Q3HRS PRN Genaro Glover M.D.        Or   • oxyCODONE immediate release (ROXICODONE) tablet 10 mg  10 mg Oral Q3HRS PRN Genaro Glover M.D.   10 mg at 06/05/22 0844   • LORazepam  (ATIVAN) injection 1 mg  1 mg Intravenous Once PRN Genaro Glover M.D.       • piperacillin-tazobactam (ZOSYN) 3.375 g in  mL IVPB  3.375 g Intravenous Q12HRS Genaro Glover M.D.   Stopped at 06/05/22 0633       Medication Allergy:  Allergies   Allergen Reactions   • Motrin [Ibuprofen]      hhx of stomach ulcers       Family History:  Family History   Problem Relation Age of Onset   • Diabetes Mother        Social History:  Social History     Socioeconomic History   • Marital status: Single     Spouse name: Not on file   • Number of children: Not on file   • Years of education: Not on file   • Highest education level: Not on file   Occupational History   • Not on file   Tobacco Use   • Smoking status: Former Smoker   • Smokeless tobacco: Never Used   Vaping Use   • Vaping Use: Never used   Substance and Sexual Activity   • Alcohol use: Yes     Comment: occ   • Drug use: Never   • Sexual activity: Not on file   Other Topics Concern   • Not on file   Social History Narrative   • Not on file     Social Determinants of Health     Financial Resource Strain: Not on file   Food Insecurity: Not on file   Transportation Needs: Not on file   Physical Activity: Not on file   Stress: Not on file   Social Connections: Not on file   Intimate Partner Violence: Not on file   Housing Stability: Not on file         MDM (Data Review):     Records reviewed and summarized in current documentation    Lab Data Review:  Recent Results (from the past 24 hour(s))   Prothrombin Time    Collection Time: 06/05/22  6:06 AM   Result Value Ref Range    PT 14.3 12.0 - 14.6 sec    INR 1.14 (H) 0.87 - 1.13   CBC with Differential    Collection Time: 06/05/22  6:06 AM   Result Value Ref Range    WBC 8.8 4.8 - 10.8 K/uL    RBC 2.99 (L) 4.70 - 6.10 M/uL    Hemoglobin 9.4 (L) 14.0 - 18.0 g/dL    Hematocrit 29.6 (L) 42.0 - 52.0 %    MCV 99.0 (H) 81.4 - 97.8 fL    MCH 31.4 27.0 - 33.0 pg    MCHC 31.8 (L) 33.7 - 35.3 g/dL    RDW 56.3 (H) 35.9 -  50.0 fL    Platelet Count 180 164 - 446 K/uL    MPV 10.4 9.0 - 12.9 fL    Neutrophils-Polys 45.80 44.00 - 72.00 %    Lymphocytes 27.30 22.00 - 41.00 %    Monocytes 12.40 0.00 - 13.40 %    Eosinophils 13.00 (H) 0.00 - 6.90 %    Basophils 0.90 0.00 - 1.80 %    Immature Granulocytes 0.60 0.00 - 0.90 %    Nucleated RBC 0.00 /100 WBC    Neutrophils (Absolute) 4.04 1.82 - 7.42 K/uL    Lymphs (Absolute) 2.40 1.00 - 4.80 K/uL    Monos (Absolute) 1.09 (H) 0.00 - 0.85 K/uL    Eos (Absolute) 1.14 (H) 0.00 - 0.51 K/uL    Baso (Absolute) 0.08 0.00 - 0.12 K/uL    Immature Granulocytes (abs) 0.05 0.00 - 0.11 K/uL    NRBC (Absolute) 0.00 K/uL   Comp Metabolic Panel (CMP)    Collection Time: 06/05/22  6:06 AM   Result Value Ref Range    Sodium 135 135 - 145 mmol/L    Potassium 5.3 3.6 - 5.5 mmol/L    Chloride 100 96 - 112 mmol/L    Co2 24 20 - 33 mmol/L    Anion Gap 11.0 7.0 - 16.0    Glucose 80 65 - 99 mg/dL    Bun 20 8 - 22 mg/dL    Creatinine 8.87 (HH) 0.50 - 1.40 mg/dL    Calcium 8.5 8.5 - 10.5 mg/dL    AST(SGOT) 19 12 - 45 U/L    ALT(SGPT) 5 2 - 50 U/L    Alkaline Phosphatase 150 (H) 30 - 99 U/L    Total Bilirubin 0.4 0.1 - 1.5 mg/dL    Albumin 2.9 (L) 3.2 - 4.9 g/dL    Total Protein 5.9 (L) 6.0 - 8.2 g/dL    Globulin 3.0 1.9 - 3.5 g/dL    A-G Ratio 1.0 g/dL   LIPASE    Collection Time: 06/05/22  6:06 AM   Result Value Ref Range    Lipase 420 (H) 11 - 82 U/L   ESTIMATED GFR    Collection Time: 06/05/22  6:06 AM   Result Value Ref Range    GFR (CKD-EPI) 6 (A) >60 mL/min/1.73 m 2       Imaging/Procedures Review:      US-RUQ   Final Result      1.  Nonshadowing echogenic focus in the gallbladder is grossly similar to the prior exam and may represent a small adherent calculus. Gallbladder wall thickening is again noted. Acute cholecystitis could have a similar appearance in the appropriate    clinical setting      2.  Common bile duct dilatation, not noted on the previous exam. No choledocholithiasis identified sonographically.  There is concern for biliary obstruction, consider MRCP.      3.  Right renal atrophy with an echogenic cortex, consistent with medical renal disease.      XV-PIBXFUS-G/O    (Results Pending)   DX-PORTABLE FLUORO > 1 HOUR    (Results Pending)        MDM (Assessment and Plan):     Patient Active Problem List    Diagnosis Date Noted   • Dilation of biliary tract 06/05/2022   • Pancreatitis due to common bile duct stone 06/04/2022   • Arthritis 05/05/2022   • Chest pain 05/03/2022   • Alcohol dependence (HCC) 05/03/2022   • Hepatitis 05/03/2022   • Acute pancreatitis 05/03/2022   • AP (abdominal pain) 05/03/2022   • MI (myocardial infarction) (HCC) 12/10/2021   • GIB (gastrointestinal bleeding) 12/06/2021   • Acute blood loss anemia 12/06/2021   • Pulmonary embolism (HCC) 11/26/2021   • Hepatitis C antibody positive in blood 11/24/2021   • Acute respiratory failure with hypoxia (HCC) 11/24/2021   • Hypotension 11/23/2021   • Dehydration 11/23/2021   • ESRD on dialysis (HCC) 11/23/2021   • Thrombocytopenia (HCC) 12/05/2020   • Transaminitis 12/01/2020   • chest pain 03/18/2020   • Stage 4 chronic kidney disease (HCC) 03/18/2020   • Hypokalemia 03/18/2020   • Anemia 03/18/2020   • Gout 03/18/2020   • BPH (benign prostatic hyperplasia) 03/18/2020   • Insomnia 03/18/2020   • HLD (hyperlipidemia) 03/18/2020   • Ulcer, stomach peptic 03/18/2020   • Back pain 03/18/2020     This is a 72 yo male with a history of chronic pancreatitis who was transferred from the [facility] 6/4/22 with a one month history of abdominal pain. Abdominal US revealed CBD dilatation. NO choledocholithiasis. Unable to do MRCP due to claustrophobia. Findings of biliary pancreatitis. Labs: TB: 0.4. ALP: 150.Lipase: 420. Potassium: 5.3     ASSESSMENT:  1. Abdominal pain  2. Chronic pancreatitis  3. Possible cholecystitis  4. Elevated alkaline phosphatase  5. Hyperkalemia    PLAN:  The risk and benefits of the ERCP, including but not limited to bleeding,  perforation, infection, cholangitis, missed lesions, pancreatitis mild to severe, prolonged hospitalization, need for surgery were all discussed with the patient and she verbalizes understanding.  She wishes to proceed forward with the plan of care.    NPO  Continue IV Zosyn  Consider general surgery for possible cholecystectomy at optimal timing           Thank your for the opportunity to assist in the care of your patient.  Please call for any questions or concerns.    DELICIA Sweet.

## 2022-06-05 NOTE — HOSPITAL COURSE
Junior Proctor is a 71 y.o. male with past medical history of chronic pancreatitis, CAD, rheumatoid arthritis, end-stage renal disease on hemodialysis Tuesday Thursday Saturday, gout, hypertension, dyslipidemia, migratory polyarthritis, homelessness, CHF, who presented 6/4/2022 as a transfer from [facility] with biliary pancreatitis, for GI consult and ERCP  Patient spent around 1 week at [facility], where he initially presented with orthostatic dizziness, abdominal pain in the left upper quadrant, dull, constant, up to 8 out of 10, without nausea without alleviating aggravating factors.  CT of the abdomen and pelvis with contrast showed CBD dilation at 11 mm, multiple gallstones. He was unable to obtain MRCP due to claustrophobia with intravenous anxiolytic.  Blood work showed elevated lipase, moreso than Patient has been receiving hemodialysis through tunneled catheter on the left chest as he is AV fistula is not working. He was transferred to [facility] for further treatment and GI consultation.  Nephrology was consulted, was cleared to undergo ERCP with hemodialysis to follow afterwards, then resumed on his normal schedule.   He underwent ERCP, pancreatic sphincterotomy, stone removal to biliary/pancreatic ducts, and stent placement to pancreatic duct by Dr. Daly 6/5/2022. Two pancreatic stones were seen but only one was able to be removed as the other was deep into the body. A stent was placed in the  pancreatic duct and patient was initially to return for stent removal in 3 days per GI. However, repeat ERCP not available due lack of scope time availability in OR. GI deferred to outpatient next week. GI schedulers office will contact patient to get the ERCP scheduled at this facility outpatient. On day of discharge, patient awake, alert, oriented x4. In the morning, he was mildly tachycardic, increased oxygen needs to 2LPM NC, and mildly hypertensive. EKG was done showed normal sinus rhythm  at a rate of 86, chest xray with mild pulmonary congestion and minimal left pleural effusion. No leukocytosis. Lipase returned to normal at 42. No electrolyte abnormalities, and patient's renal functions at baseline. Patient denied chest discomfort, dyspnea, lower extremity edema and was able to wean off nasal canula. His vital signs were stable thereafter. Plan for discharge with follow up with GI this upcoming week for stent removal was discussed with patient and he his agreeable.   Prior to considering prescriptions for controlled medications, the prescription monitoring program database was searched. Narcotic score 461, Overdose score 300. Patient has received narcotics while admitted and shown ability to tolerate opioids. A limited supply of ox[facility]codone [facility] on [facility]'s request.

## 2022-06-05 NOTE — PROCEDURES
Pre-procedure Diagnoses   Alcohol-induced chronic pancreatitis (HCC) [K86.0]   Epigastric pain [R10.13]   Alkaline phosphatase elevation [R74.8]     Post-procedure Diagnoses   Injury of body of pancreas, sequela [S36.201S]   Alcohol-induced chronic pancreatitis (HCC) [K86.0]     Procedures   ENDOSCOPIC RETROGRADE CHOLANGIOPANCREATOGRAPHY ERCP PANCREATIC SPHINCTEROTOMY   ENDOSCOPIC RETROGRADE CHOLANGIOPANCREATOGRAPHY ERCP,WITH REMOVAL STONE FROM PANCREAS DUCTS   ENDOSCOPIC RETROGRADE CHOLANGIOPANCREATOGRAPHY ERCP PANCREAS DUCT STENT PLACEMENT     Endoscopist: Ibrahima Daly MD, [facility], [facility]    Anesthesiologist (general): Brea Galarza M.D.    Premedications: ancef 2 grams IV    Consent:  Patient seen and examined before proceeding. Risks, benefits, and alternatives of aforementioned procedures were discussed with patient in detail before proceeding.  Patient was given opportunities to ask questions and discuss other options.  Risks including but not limited to pancreatitis, contrast reaction, radiation exposure, stent if placed patient responsibility to schedule/faciltate repeat outpatient ERCP within 3 months or less, retained and/or recurrent choledocholithiasis, perforation, infection, bleeding, missed lesion(s), possible need for surgery(ies) and/or interventional radiology, possible need for repeat procedure(s) and/or additional testing, hospitalization possibly prolonged, cardiac and/or pulmonary event, aspiration, hypoxia, stroke, medication (s) and/or anesthesia reaction(s), indefinite diagnosis, discomfort/pain, unsuccessful and/or incomplete procedure, ineffective therapy, persistent symptoms, damage to adjacent organs/structure and/or vascular, and other adverse event(s) possibly life-threatening.  Interactive discussion was undertaken with Layman's terms.  I answered questions in full and to satisfaction.  I gave opportunity to cancel, delay and/or reschedule if not completely comfortable with  "proceeding.  Patient stated understanding and acceptance of these risks, and wished to proceed.   Informed consent was given in clear state of mind and paper permit was confirmed to have been signed before proceeding.    Endoscopic procedures in detail: Olympus side-viewing ERCP flexible duodenoscope was inserted from mouth to 2nd portion of the duodenum.  Cholangiopancreatography images were obtained. Pancreas duct was selectively cannulated on the first attempt, successful free cannulation was achieved.  Pancreatic sphincterotomy was performed with a bow papillotome with subsequent one pancreas duct stone extraction/retrieval, clearance was incomplete with residual stone in the body, thus 7-Armenian 9cm pancreatic stent was placed. The C-arm was moved and rotated on rainbow axis to optimize imaging of intrahepatic biliary systems in different angles to avoid missing lesions/strictures with ductal overlap and/or image angulation. The balloon was inflated within the right and left intrahepatic ducts and dragged through the entire length of the bile duct out the biliary os multiple times in order to minimize risk of retained stones. The ERCP scope was removed from duodenum to also image the common bile duct \"behind\" the ERCP scope. Of note, no radiologist was present to help obtain nor read ERCP images.  I was the sole physician who obtained and performed interpretation of static and dynamic ERCP fluoroscopic x-ray images, no over-read was requested from the radiologist nor was it necessary.  I suctioned insufflated air and stomach fluid contents upon removal.    Procedure times:  - In-room 10:40  - Start 10:55  - Completed 11:22  - Out of room per nursing records    Endoscopic Retrograde CholangioPancreatography Findings:  - Ampulla of Vater: located in 2nd portion of duodenum, endoscopically unremarkable.  - Cholangiogram: no evidence of choledocholithiasis, non-dilated.  - Pancreatogram: dilated to greater than 8 mm " with two pancreas duct stones.  One was extracted/retrieved and the other was too far upstream in body and too large to remove thus 7-Albanian 9cm single-pigtail pancreas stent was placed after pancreatic sphincterotomy was performed.    Impression: Chronic pancreatitis with pancreas duct stones, one removed, the other in body, stented to soften    Recommendations:   1.  Routine post-endoscopy anesthesia recovery care.  Transfer back to prior hospital room when recovery criteria are met.  Aspiration and fall precautions x 24 hours.   2.  Clear liquid diet.  3.  Continue Creon and prilosec.  4.  Alcohol cessation  5.  Repeat ERCP in about 3 days to remove pancreas stent and stone, as I am worried about patient being lost to follow-up.  6.  Dr. Lopez and Wilmar Gallardo NP will be taking over inpatient GIC service tomorrow.  7.  I spoke to patient and recovery nurse about impression, diagnosis and recommendations.  I answered questions in full and to satisfaction

## 2022-06-06 ENCOUNTER — APPOINTMENT (OUTPATIENT)
Dept: RADIOLOGY | Facility: MEDICAL CENTER | Age: 72
DRG: 438 | End: 2022-06-06
Attending: NURSE PRACTITIONER
Payer: COMMERCIAL

## 2022-06-06 VITALS
RESPIRATION RATE: 17 BRPM | TEMPERATURE: 97.7 F | HEART RATE: 103 BPM | SYSTOLIC BLOOD PRESSURE: 130 MMHG | OXYGEN SATURATION: 90 % | HEIGHT: 68 IN | DIASTOLIC BLOOD PRESSURE: 79 MMHG | BODY MASS INDEX: 27.1 KG/M2 | WEIGHT: 178.79 LBS

## 2022-06-06 PROBLEM — K82.8 THICKENING OF WALL OF GALLBLADDER: Status: ACTIVE | Noted: 2022-06-06

## 2022-06-06 LAB
ANION GAP SERPL CALC-SCNC: 11 MMOL/L (ref 7–16)
BUN SERPL-MCNC: 10 MG/DL (ref 8–22)
CALCIUM SERPL-MCNC: 8.5 MG/DL (ref 8.5–10.5)
CHLORIDE SERPL-SCNC: 100 MMOL/L (ref 96–112)
CO2 SERPL-SCNC: 25 MMOL/L (ref 20–33)
CREAT SERPL-MCNC: 6.48 MG/DL (ref 0.5–1.4)
EKG IMPRESSION: NORMAL
ERYTHROCYTE [DISTWIDTH] IN BLOOD BY AUTOMATED COUNT: 57.5 FL (ref 35.9–50)
GFR SERPLBLD CREATININE-BSD FMLA CKD-EPI: 9 ML/MIN/1.73 M 2
GLUCOSE SERPL-MCNC: 92 MG/DL (ref 65–99)
HCT VFR BLD AUTO: 28.9 % (ref 42–52)
HGB BLD-MCNC: 8.9 G/DL (ref 14–18)
LIPASE SERPL-CCNC: 43 U/L (ref 11–82)
MAGNESIUM SERPL-MCNC: 1.9 MG/DL (ref 1.5–2.5)
MCH RBC QN AUTO: 31.1 PG (ref 27–33)
MCHC RBC AUTO-ENTMCNC: 30.8 G/DL (ref 33.7–35.3)
MCV RBC AUTO: 101 FL (ref 81.4–97.8)
PHOSPHATE SERPL-MCNC: 3.7 MG/DL (ref 2.5–4.5)
PLATELET # BLD AUTO: 187 K/UL (ref 164–446)
PMV BLD AUTO: 10.1 FL (ref 9–12.9)
POTASSIUM SERPL-SCNC: 4.7 MMOL/L (ref 3.6–5.5)
RBC # BLD AUTO: 2.86 M/UL (ref 4.7–6.1)
SODIUM SERPL-SCNC: 136 MMOL/L (ref 135–145)
WBC # BLD AUTO: 6.3 K/UL (ref 4.8–10.8)

## 2022-06-06 PROCEDURE — 93005 ELECTROCARDIOGRAM TRACING: CPT | Performed by: NURSE PRACTITIONER

## 2022-06-06 PROCEDURE — 83690 ASSAY OF LIPASE: CPT

## 2022-06-06 PROCEDURE — 71045 X-RAY EXAM CHEST 1 VIEW: CPT

## 2022-06-06 PROCEDURE — 93010 ELECTROCARDIOGRAM REPORT: CPT | Performed by: INTERNAL MEDICINE

## 2022-06-06 PROCEDURE — 700102 HCHG RX REV CODE 250 W/ 637 OVERRIDE(OP): Performed by: INTERNAL MEDICINE

## 2022-06-06 PROCEDURE — 83735 ASSAY OF MAGNESIUM: CPT

## 2022-06-06 PROCEDURE — 80048 BASIC METABOLIC PNL TOTAL CA: CPT

## 2022-06-06 PROCEDURE — 85027 COMPLETE CBC AUTOMATED: CPT

## 2022-06-06 PROCEDURE — 700111 HCHG RX REV CODE 636 W/ 250 OVERRIDE (IP): Performed by: INTERNAL MEDICINE

## 2022-06-06 PROCEDURE — A9270 NON-COVERED ITEM OR SERVICE: HCPCS | Performed by: INTERNAL MEDICINE

## 2022-06-06 PROCEDURE — 84100 ASSAY OF PHOSPHORUS: CPT

## 2022-06-06 PROCEDURE — 700105 HCHG RX REV CODE 258: Performed by: INTERNAL MEDICINE

## 2022-06-06 PROCEDURE — 99239 HOSP IP/OBS DSCHRG MGMT >30: CPT | Performed by: INTERNAL MEDICINE

## 2022-06-06 RX ORDER — OXYCODONE HYDROCHLORIDE AND ACETAMINOPHEN 5; 325 MG/1; MG/1
1 TABLET ORAL EVERY 6 HOURS PRN
Qty: 20 TABLET | Refills: 0 | Status: SHIPPED | OUTPATIENT
Start: 2022-06-06 | End: 2022-06-06 | Stop reason: SDUPTHER

## 2022-06-06 RX ORDER — OXYCODONE HYDROCHLORIDE AND ACETAMINOPHEN 5; 325 MG/1; MG/1
1 TABLET ORAL EVERY 6 HOURS PRN
Qty: 20 TABLET | Refills: 0 | Status: SHIPPED | OUTPATIENT
Start: 2022-06-06 | End: 2022-06-11

## 2022-06-06 RX ADMIN — PIPERACILLIN AND TAZOBACTAM 3.38 G: 3; .375 INJECTION, POWDER, LYOPHILIZED, FOR SOLUTION INTRAVENOUS; PARENTERAL at 02:58

## 2022-06-06 RX ADMIN — OXYCODONE HYDROCHLORIDE 10 MG: 10 TABLET ORAL at 07:55

## 2022-06-06 RX ADMIN — OMEPRAZOLE 20 MG: 20 CAPSULE, DELAYED RELEASE ORAL at 04:54

## 2022-06-06 RX ADMIN — OXYCODONE HYDROCHLORIDE 10 MG: 10 TABLET ORAL at 15:06

## 2022-06-06 RX ADMIN — PIPERACILLIN AND TAZOBACTAM 3.38 G: 3; .375 INJECTION, POWDER, LYOPHILIZED, FOR SOLUTION INTRAVENOUS; PARENTERAL at 13:53

## 2022-06-06 RX ADMIN — OXYCODONE HYDROCHLORIDE 10 MG: 10 TABLET ORAL at 04:55

## 2022-06-06 RX ADMIN — OXYCODONE HYDROCHLORIDE 10 MG: 10 TABLET ORAL at 11:57

## 2022-06-06 RX ADMIN — HYDROMORPHONE HYDROCHLORIDE 0.5 MG: 1 INJECTION, SOLUTION INTRAMUSCULAR; INTRAVENOUS; SUBCUTANEOUS at 13:51

## 2022-06-06 RX ADMIN — SEVELAMER CARBONATE 800 MG: 800 TABLET, FILM COATED ORAL at 13:54

## 2022-06-06 RX ADMIN — SENNOSIDES AND DOCUSATE SODIUM 2 TABLET: 50; 8.6 TABLET ORAL at 04:54

## 2022-06-06 RX ADMIN — CARVEDILOL 6.25 MG: 6.25 TABLET, FILM COATED ORAL at 11:23

## 2022-06-06 RX ADMIN — PANCRELIPASE 3000 UNITS: 15000; 3000; 9500 CAPSULE, DELAYED RELEASE ORAL at 13:53

## 2022-06-06 RX ADMIN — FOLIC ACID 1 MG: 1 TABLET ORAL at 04:54

## 2022-06-06 ASSESSMENT — COGNITIVE AND FUNCTIONAL STATUS - GENERAL
SUGGESTED CMS G CODE MODIFIER DAILY ACTIVITY: CH
DAILY ACTIVITIY SCORE: 24
SUGGESTED CMS G CODE MODIFIER MOBILITY: CH
MOBILITY SCORE: 24

## 2022-06-06 ASSESSMENT — ENCOUNTER SYMPTOMS
COUGH: 0
PALPITATIONS: 0
DIARRHEA: 0
FALLS: 0
NERVOUS/ANXIOUS: 0
SPUTUM PRODUCTION: 0
GASTROINTESTINAL NEGATIVE: 1
SHORTNESS OF BREATH: 0
FOCAL WEAKNESS: 0
RESPIRATORY NEGATIVE: 1
CHILLS: 0
MUSCULOSKELETAL NEGATIVE: 1
WEAKNESS: 0
VOMITING: 0
FLANK PAIN: 0
ABDOMINAL PAIN: 1
NAUSEA: 0
EYES NEGATIVE: 1
SENSORY CHANGE: 0
HEADACHES: 0
MYALGIAS: 0
INSOMNIA: 0
CONSTIPATION: 0
NEUROLOGICAL NEGATIVE: 1
PSYCHIATRIC NEGATIVE: 1
CARDIOVASCULAR NEGATIVE: 1
FEVER: 0

## 2022-06-06 ASSESSMENT — PAIN DESCRIPTION - PAIN TYPE
TYPE: ACUTE PAIN

## 2022-06-06 ASSESSMENT — LIFESTYLE VARIABLES: SUBSTANCE_ABUSE: 0

## 2022-06-06 NOTE — PROGRESS NOTES
Hospital Medicine Daily Progress Note    Date of Service  6/6/2022    Chief Complaint  Junior Proctor is a 71 y.o. male admitted 6/4/2022 with LUQ abdominal pain    Hospital Course  Junior Proctor is a 71 y.o. male with past medical history of chronic pancreatitis, CAD, rheumatoid arthritis, end-stage renal disease on hemodialysis Tuesday Thursday Saturday, gout, hypertension, dyslipidemia, migratory polyarthritis, homelessness, CHF, who presented 6/4/2022 as a transfer from [facility] with biliary pancreatitis, for GI consult and ERCP  Patient spent around 1 week at [facility], where he initially presented with orthostatic dizziness, abdominal pain in the left upper quadrant, dull, constant, up to 8 out of 10, without nausea without alleviating aggravating factors.  CT of the abdomen and pelvis with contrast showed CBD dilation at 11 mm, multiple gallstones.unable to obtain MRCP due to claustrophobia.  Blood work showed elevated lipase.  Patient has been receiving hemodialysis through tunneled catheter on the left chest as he is AV fistula is not working. He was transferred to [facility] for further treatment and GI consultation.      Interval Problem Update  6/5/2022: [facility] not [facility] yesterday. K 5.3 Nephrology consulted. Patient clear from nephrology to undergo ERCP with GI today and will be dialyzed afterwards then TTS. Pancreatic stone removed and stent placed by pancreatic duct per GI- patient to return for stent removal in 3 days per GI. Will discuss with general surgery regarding gallbladder wall thickening.     6/6/2022:  s/p ERCP, pancreatic sphincterotomy, stone removal to biliary/pancreatic ducts, and stent placement to pancreatic duct by Dr. Daly 6/5/2022. Patient tachycardic, hypertensive, and on 2LPMC this morning. EKG NSR rate 86bmp, CXR with mild pulmonary congestion and small left pleural effusion. Patient reports feeling well, was able to wean back to room air.  Vitals stable. States pain to LUQ/epigastric area relatively unchanged from yesterday. Denies RUQ pain. Discussed plan for repeat ERCP per GI tentatively 6/8.     I have personally seen and examined the patient at bedside. I discussed the plan of care with patient and bedside RN.    Consultants/Specialty  general surgery, GI and nephrology    Code Status  Full Code    Disposition  Patient is not medically cleared for discharge.   Anticipate discharge to To be determined.  I have placed the appropriate orders for post-discharge needs.    Review of Systems  Review of Systems   Constitutional: Negative for chills and fever.   Respiratory: Negative for cough, sputum production and shortness of breath.    Cardiovascular: Negative for chest pain, palpitations and leg swelling.   Gastrointestinal: Positive for abdominal pain. Negative for constipation, diarrhea, nausea and vomiting.   Genitourinary: Negative for dysuria, flank pain and hematuria.        Minimal urination at baseline per patient   Musculoskeletal: Negative for falls and myalgias.   Skin: Negative for itching and rash.   Neurological: Negative for sensory change, focal weakness, weakness and headaches.   Psychiatric/Behavioral: Negative for substance abuse. The patient is not nervous/anxious and does not have insomnia.    All other systems reviewed and are negative.       Physical Exam  Temp:  [36 °C (96.8 °F)-36.6 °C (97.8 °F)] 36.2 °C (97.1 °F)  Pulse:  [] 106  Resp:  [15-33] 17  BP: (136-179)/() 147/98  SpO2:  [90 %-100 %] 92 %    Physical Exam  Vitals and nursing note reviewed.   Constitutional:       General: He is not in acute distress.     Appearance: Normal appearance. He is overweight. He is not ill-appearing or toxic-appearing.   HENT:      Head: Normocephalic.      Nose: Nose normal.      Mouth/Throat:      Mouth: Mucous membranes are moist.      Pharynx: Oropharynx is clear.   Eyes:      General: No scleral icterus.     Extraocular  Movements: Extraocular movements intact.      Conjunctiva/sclera: Conjunctivae normal.   Cardiovascular:      Rate and Rhythm: Normal rate and regular rhythm.      Pulses: Normal pulses.      Heart sounds: Normal heart sounds. No murmur heard.    No gallop.   Pulmonary:      Effort: Pulmonary effort is normal. No respiratory distress.      Breath sounds: Normal breath sounds. No wheezing.   Chest:      Chest wall: No tenderness.   Abdominal:      General: Abdomen is flat. Bowel sounds are normal. There is no distension.      Palpations: Abdomen is soft. There is no mass.      Tenderness: There is abdominal tenderness ( ).      Comments: Abdomen soft, normal bowel sounds. LUQ and epigastriuc pain with palpation.   No pain with palpation to RUQ.    Musculoskeletal:         General: No tenderness.      Right lower leg: No edema.      Left lower leg: No edema.      Comments: Tunneled catheter to left chest. Dressing CDI   Skin:     General: Skin is warm and dry.      Capillary Refill: Capillary refill takes less than 2 seconds.      Coloration: Skin is not jaundiced.   Neurological:      General: No focal deficit present.      Mental Status: He is alert and oriented to person, place, and time. Mental status is at baseline.   Psychiatric:         Mood and Affect: Mood normal.         Behavior: Behavior normal.         Thought Content: Thought content normal.         Judgment: Judgment normal.         Fluids    Intake/Output Summary (Last 24 hours) at 6/6/2022 0723  Last data filed at 6/5/2022 1930  Gross per 24 hour   Intake 500 ml   Output 3000 ml   Net -2500 ml       Laboratory  Recent Labs     06/05/22  0606   WBC 8.8   RBC 2.99*   HEMOGLOBIN 9.4*   HEMATOCRIT 29.6*   MCV 99.0*   MCH 31.4   MCHC 31.8*   RDW 56.3*   PLATELETCT 180   MPV 10.4     Recent Labs     06/05/22  0606 06/05/22  1637   SODIUM 135  --    POTASSIUM 5.3  --    CHLORIDE 100  --    CO2 24  --    GLUCOSE 80  --    BUN 20  --    CREATININE 8.87*  --     CALCIUM 8.5 8.3*     Recent Labs     06/05/22  0606   INR 1.14*               Imaging  DX-PORTABLE FLUORO > 1 HOUR   Final Result      Fluoroscopy provided for ERCP.      US-RUQ   Final Result      1.  Nonshadowing echogenic focus in the gallbladder is grossly similar to the prior exam and may represent a small adherent calculus. Gallbladder wall thickening is again noted. Acute cholecystitis could have a similar appearance in the appropriate    clinical setting      2.  Common bile duct dilatation, not noted on the previous exam. No choledocholithiasis identified sonographically. There is concern for biliary obstruction, consider MRCP.      3.  Right renal atrophy with an echogenic cortex, consistent with medical renal disease.           Assessment/Plan  * Pancreatitis due to common bile duct stone- (present on admission)  Assessment & Plan  CT of the abdomen at outside facility showed CBD dilation 11 mm and multiple CBD stones  Right upper quadrant ultrasound: Cholelithiasis and CBD dilation without definite stone  Patient unable to tolerate MRCP despite IV [facility]xi[facility].   s/p ERCP 6/5. Stone removed from pancreatic duct and stent placed.   No evidence of choledocholithiasis, non-dilated per GI findings.   GI plans to repeat ERCP tentatively 6/8/2022 days to remove pancreas stent and stone, due to concern about patient being lost to follow-up.  Continue Creon and prilosec  Clear liquid Diet as tolerated per GI  Continue antiacid  Antiemetics and pain management orders in place.     Thickening of wall of gallbladder  Assessment & Plan  Discussed with General surgery regarding any follow up/care needed for gallbladder wall thickening seen on US- if concerning for   cholecystitis, recommended HIDA scan.   No RUQ pain with palpation, ALT/AST and bilirubin normal, and no choledocholithiasis or dilation on ERCP.   Continue to monitor during admission especially given upcoming repeat ERCP.   Otherwise, follow up  general surgery  OP.     Dilation of biliary tract  Assessment & Plan  Started on IV Zosyn per GI recommendation  See plan for Pancreatitis     ESRD on dialysis (HCC)- (present on admission)  Assessment & Plan  Receives HD Tuesday Thursday Saturday.    Nephrology involved and following.   Patient to received HD today post-procedurally then TTS.  Avoid nephrotoxic medicatioons  Adjust dose of medications renally    HLD (hyperlipidemia)- (present on admission)  Assessment & Plan  Continue home atorvastatin    Insomnia- (present on admission)  Assessment & Plan  Continue home trazodone    Gout- (present on admission)  Assessment & Plan  Continue home allopurinol    Anemia- (present on admission)  Assessment & Plan  Likely of chronic disease due to ESRD.   Stable.   Continue to monitor.   Transfuse for hgb <7       VTE prophylaxis: heparin ppx    I have performed a physical exam and reviewed and updated ROS and Plan today (6/6/2022). In review of yesterday's note (6/5/2022), there are no changes except as documented above.

## 2022-06-06 NOTE — DISCHARGE PLANNING
Case Management Discharge Planning    Admission Date: 6/4/2022  GMLOS: 4.6  ALOS: 2    6-Clicks ADL Score: 24  6-Clicks Mobility Score: 24      Anticipated Discharge Dispo: Discharge Disposition: Discharged to home/self care (01)    DME Needed: No    Action(s) Taken: OTHER    Escalations Completed: None    Medically Clear: Yes    Next Steps: RADHA Watkins notified MD SHIRA cleared this patient for discharge home. LSW met with patient and issued the 2 IMM. Per patient's request, LSW provided 2 cab vouchers, one to the [facility] where patient will  his meds and the other to his home address.    Barriers to Discharge: None    Is the patient up for discharge tomorrow:         2

## 2022-06-06 NOTE — PROGRESS NOTES
Gastroenterology Progress Note:    Wilmar PLATT/Caitie Lopez MD  Date & Time note created:    6/6/2022   2:22 PM     Referring MD:  Bebo PLATT    Patient ID:  Name:             Junior Proctor     YOB: 1950  Age:                 71 y.o.  male   MRN:               [id number]                                                             Reason for Consult:      Abdominal pain  CBD dilatation    History of Present Illness:    This a 72 yo male PMH chronic pancreatitis, CAD, RA, ESRD on HD (tues-thurs-Sat), gout, HTN, hyperlipidemia, CHF, who presented 6/4/22 as a transfer from [facility] with biliary pancreatitis. CT scan abdomen/pelvis showed CBD dilation at 11 mm, multiple gallstones. Unable to do MRCP due to claustrophobia. He has been complaining of abdominal pain (RUQ/LUQ) for the past month. No vomiting, changes in bowel habits, rectal bleeding.      HIDA scan with CCK 5/5/22: Fatty meal. Ejection fraction 61%    Interval History  6/6/2022: Patient is stable, and pain has improved. He is tolerating his diet.   Review of Systems:      Review of Systems   Constitutional: Positive for malaise/fatigue.   HENT: Negative.    Eyes: Negative.    Respiratory: Negative.    Cardiovascular: Negative.    Gastrointestinal: Negative.    Genitourinary: Negative.    Musculoskeletal: Negative.    Skin: Negative.    Neurological: Negative.    Psychiatric/Behavioral: Negative.              Physical Exam:  Vitals/ General Appearance:   Weight/BMI: Body mass index is 27.19 kg/m².    Vitals:    06/05/22 1933 06/05/22 2050 06/06/22 0800 06/06/22 1123   BP: (!) 148/92 (!) 147/98 115/63 130/79   Pulse: 87 (!) 106 86 (!) 103   Resp: 17 17 17 17   Temp: 36 °C (96.8 °F) 36.2 °C (97.1 °F) 36.7 °C (98 °F) 36.5 °C (97.7 °F)   TempSrc: Temporal Temporal Temporal Temporal   SpO2: 100% 92% 93% 90%   Weight:       Height:         Oxygen Therapy:  Pulse Oximetry: 90 %, O2 (LPM): 0, O2 Delivery Device: None - Room  Air    Physical Exam  Constitutional:       Appearance: Normal appearance.   HENT:      Mouth/Throat:      Mouth: Mucous membranes are moist.   Eyes:      Extraocular Movements: Extraocular movements intact.      Pupils: Pupils are equal, round, and reactive to light.   Cardiovascular:      Rate and Rhythm: Normal rate and regular rhythm.      Pulses: Normal pulses.      Heart sounds: Normal heart sounds.   Pulmonary:      Effort: Pulmonary effort is normal.      Breath sounds: Normal breath sounds.   Abdominal:      General: Bowel sounds are normal.      Palpations: Abdomen is soft.      Tenderness: There is abdominal tenderness (LUQ).   Musculoskeletal:         General: Normal range of motion.   Skin:     General: Skin is warm and dry.   Neurological:      General: No focal deficit present.      Mental Status: He is alert and oriented to person, place, and time.         Past Medical History:   Past Medical History:   Diagnosis Date    Gout     Hemodialysis patient (HCC)     Hypertension     Kidney disease     MI (myocardial infarction) (HCC)        Past Surgical History:  Past Surgical History:   Procedure Laterality Date    MD ERCP,DIAGNOSTIC N/A 6/5/2022    Procedure: ERCP (ENDOSCOPIC RETROGRADE CHOLANGIOPANCREATOGRAPHY);  Surgeon: Ibrahima Daly M.D.;  Location: SURGERY [facility];  Service: Gastroenterology    MD UPPER GI ENDOSCOPY,DIAGNOSIS N/A 12/10/2021    Procedure: GASTROSCOPY;  Surgeon: Paddy Hooks M.D.;  Location: SURGERY SAME DAY [facility];  Service: Gastroenterology    MD COLONOSCOPY,DIAGNOSTIC N/A 12/10/2021    Procedure: COLONOSCOPY;  Surgeon: Paddy Hooks M.D.;  Location: SURGERY SAME DAY [facility];  Service: Gastroenterology    MD UPPER GI ENDOSCOPY,BIOPSY N/A 12/10/2021    Procedure: GASTROSCOPY, WITH BIOPSY;  Surgeon: Paddy Hooks M.D.;  Location: SURGERY SAME DAY [facility];  Service: Gastroenterology    MD COLONOSCOPY,BIOPSY N/A 12/10/2021    Procedure: COLONOSCOPY, WITH BIOPSY;   Surgeon: Paddy Hooks M.D.;  Location: SURGERY SAME DAY [facility];  Service: Gastroenterology       Hospital Medications:    Current Facility-Administered Medications:     [START ON 6/7/2022] epoetin (Retacrit) injection (Dialysis Use Only) 4,000 Units, 4,000 Units, Intravenous, TUE+THU+SAT, Harrison Melgar M.D.    heparin injection 3,900 Units, 3,900 Units, Intracatheter, ACUTE DIALYSIS PRN, Harrison Melgar M.D., 3,900 Units at 06/05/22 1930    senna-docusate (PERICOLACE or SENOKOT S) 8.6-50 MG per tablet 2 Tablet, 2 Tablet, Oral, BID, 2 Tablet at 06/06/22 0454 **AND** polyethylene glycol/lytes (MIRALAX) PACKET 1 Packet, 1 Packet, Oral, QDAY PRN **AND** magnesium hydroxide (MILK OF MAGNESIA) suspension 30 mL, 30 mL, Oral, QDAY PRN **AND** bisacodyl (DULCOLAX) suppository 10 mg, 10 mg, Rectal, QDAY PRN, Genaro Glover M.D.    heparin injection 5,000 Units, 5,000 Units, Subcutaneous, Q8HRS, Genaro Glover M.D.    acetaminophen (Tylenol) tablet 650 mg, 650 mg, Oral, Q6HRS PRN, Genaro Glover M.D.    hydrALAZINE (APRESOLINE) injection 10 mg, 10 mg, Intravenous, Q4HRS PRN, Genaro Glover M.D.    ondansetron (ZOFRAN) syringe/vial injection 4 mg, 4 mg, Intravenous, Q4HRS PRN, Genaro Glover M.D.    ondansetron (ZOFRAN ODT) dispertab 4 mg, 4 mg, Oral, Q4HRS PRN, Genaro Glover M.D.    Notify provider if pain remains uncontrolled, , , CONTINUOUS **AND** Use the Numeric Rating Scale (NRS), Cruz-Baker Faces (WBF), or FLACC on regular floors and Critical-Care Pain Observation Tool (CPOT) on ICUs/Trauma to assess pain, , , CONTINUOUS **AND** Pulse Ox, , , CONTINUOUS **AND** Pharmacy Consult Request ...Pain Management Review 1 Each, 1 Each, Other, PHARMACY TO DOSE **AND** If patient difficult to arouse and/or has respiratory depression (respiratory rate of 10 or less), stop any opiates that are currently infusing and call a Rapid Response., , , CONTINUOUS, Genaro Glover M.D.    allopurinol (ZYLOPRIM) tablet  100 mg, 100 mg, Oral, 3x/week, Genaro Glover M.D., 100 mg at 06/04/22 2228    atorvastatin (LIPITOR) tablet 20 mg, 20 mg, Oral, Nightly, Genaro Glover M.D., 20 mg at 06/05/22 2038    carvedilol (COREG) tablet 6.25 mg, 6.25 mg, Oral, BID WITH MEALS, Genaro Glover M.D., 6.25 mg at 06/06/22 1123    folic acid (FOLVITE) tablet 1 mg, 1 mg, Oral, DAILY, Genaro Glover M.D., 1 mg at 06/06/22 0454    gabapentin (NEURONTIN) capsule 300 mg, 300 mg, Oral, QHS, Genaro Glover M.D., 300 mg at 06/05/22 2039    pancrelipase (Lip-Prot-Amyl) (CREON 3000) 1077-7944 units capsule 3,000 Units, 1 Capsule, Oral, TID WITH MEALS, Genaro Glover M.D., 3,000 Units at 06/06/22 1353    omeprazole (PRILOSEC) capsule 20 mg, 20 mg, Oral, BID, Genaro Glover M.D., 20 mg at 06/06/22 0454    sevelamer carbonate (RENVELA) tablet 800 mg, 800 mg, Oral, TID WITH MEALS, Genaro Glover M.D., 800 mg at 06/06/22 1354    traZODone (DESYREL) tablet 50 mg, 50 mg, Oral, Nightly, Genaro Glover M.D., 50 mg at 06/05/22 2343    vitamin D2 (Ergocalciferol) (Drisdol) capsule 50,000 Units, 50,000 Units, Oral, Q7 DAYS, Genaro Glover M.D., 50,000 Units at 06/05/22 0530    oxyCODONE immediate-release (ROXICODONE) tablet 5 mg, 5 mg, Oral, Q3HRS PRN **OR** oxyCODONE immediate release (ROXICODONE) tablet 10 mg, 10 mg, Oral, Q3HRS PRN, 10 mg at 06/06/22 1157 **OR** HYDROmorphone (Dilaudid) injection 0.5 mg, 0.5 mg, Intravenous, Q3HRS PRN, Genaro Glover M.D., 0.5 mg at 06/06/22 1351    LORazepam (ATIVAN) injection 1 mg, 1 mg, Intravenous, Once PRN, Genaro Glover M.D.    [COMPLETED] piperacillin-tazobactam (ZOSYN) 3.375 g in  mL IVPB, 3.375 g, Intravenous, Once, Stopped at 06/04/22 2253 **AND** piperacillin-tazobactam (ZOSYN) 3.375 g in  mL IVPB, 3.375 g, Intravenous, Q12HRS, Genaro Glover M.D., Last Rate: 25 mL/hr at 06/06/22 1353, 3.375 g at 06/06/22 1353    Current Outpatient Medications:  Current  Facility-Administered Medications   Medication Dose Route Frequency Provider Last Rate Last Admin    [START ON 6/7/2022] epoetin (Retacrit) injection (Dialysis Use Only) 4,000 Units  4,000 Units Intravenous TUE+THU+SAT Harrison Melgar M.D.        heparin injection 3,900 Units  3,900 Units Intracatheter ACUTE DIALYSIS PRN Harrison Melgar M.D.   3,900 Units at 06/05/22 1930    senna-docusate (PERICOLACE or SENOKOT S) 8.6-50 MG per tablet 2 Tablet  2 Tablet Oral BID Geanro Glover M.D.   2 Tablet at 06/06/22 0454    And    polyethylene glycol/lytes (MIRALAX) PACKET 1 Packet  1 Packet Oral QDAY PRN Genaro Glover M.D.        And    magnesium hydroxide (MILK OF MAGNESIA) suspension 30 mL  30 mL Oral QDAY PRN Genaro Glover M.D.        And    bisacodyl (DULCOLAX) suppository 10 mg  10 mg Rectal QDAY PRN Genaro Glover M.D.        heparin injection 5,000 Units  5,000 Units Subcutaneous Q8HRS Genaro Glover M.D.        acetaminophen (Tylenol) tablet 650 mg  650 mg Oral Q6HRS PRN Genaro Glover M.D.        hydrALAZINE (APRESOLINE) injection 10 mg  10 mg Intravenous Q4HRS PRN Genaro Glover M.D.        ondansetron (ZOFRAN) syringe/vial injection 4 mg  4 mg Intravenous Q4HRS PRN Genaro Glover M.D.        ondansetron (ZOFRAN ODT) dispertab 4 mg  4 mg Oral Q4HRS PRN Genaro Glover M.D.        Pharmacy Consult Request ...Pain Management Review 1 Each  1 Each Other PHARMACY TO DOSE Genaro Glover M.D.        allopurinol (ZYLOPRIM) tablet 100 mg  100 mg Oral 3x/week Genaro Glover M.D.   100 mg at 06/04/22 5538    atorvastatin (LIPITOR) tablet 20 mg  20 mg Oral Nightly Genaro Glover M.D.   20 mg at 06/05/22 2038    carvedilol (COREG) tablet 6.25 mg  6.25 mg Oral BID WITH MEALS Genaro Glover M.D.   6.25 mg at 06/06/22 1123    folic acid (FOLVITE) tablet 1 mg  1 mg Oral DAILY Genaro Glover M.D.   1 mg at 06/06/22 0454    gabapentin (NEURONTIN) capsule 300 mg  300 mg Oral QHS Genaro Glover  M.D.   300 mg at 06/05/22 2039    pancrelipase (Lip-Prot-Amyl) (CREON 3000) 4688-5741 units capsule 3,000 Units  1 Capsule Oral TID WITH MEALS Genaro Glover M.D.   3,000 Units at 06/06/22 1353    omeprazole (PRILOSEC) capsule 20 mg  20 mg Oral BID Genaro Glover M.D.   20 mg at 06/06/22 0454    sevelamer carbonate (RENVELA) tablet 800 mg  800 mg Oral TID WITH MEALS Genaro Glover M.D.   800 mg at 06/06/22 1354    traZODone (DESYREL) tablet 50 mg  50 mg Oral Nightly Genaro Glover M.D.   50 mg at 06/05/22 2343    vitamin D2 (Ergocalciferol) (Drisdol) capsule 50,000 Units  50,000 Units Oral Q7 DAYS Genaro Glover M.D.   50,000 Units at 06/05/22 0530    HYDROmorphone (Dilaudid) injection 0.5 mg  0.5 mg Intravenous Q3HRS PRN Genaro Glover M.D.   0.5 mg at 06/06/22 1351    Or    oxyCODONE immediate-release (ROXICODONE) tablet 5 mg  5 mg Oral Q3HRS PRN Genaro Glover M.D.        Or    oxyCODONE immediate release (ROXICODONE) tablet 10 mg  10 mg Oral Q3HRS PRN Genaro Glover M.D.   10 mg at 06/06/22 1157    LORazepam (ATIVAN) injection 1 mg  1 mg Intravenous Once PRN Genaro Glover M.D.        piperacillin-tazobactam (ZOSYN) 3.375 g in  mL IVPB  3.375 g Intravenous Q12HRS Genaro Glover M.D. 25 mL/hr at 06/06/22 1353 3.375 g at 06/06/22 1353       Medication Allergy:  Allergies   Allergen Reactions    Motrin [Ibuprofen]      hhx of stomach ulcers       Family History:  Family History   Problem Relation Age of Onset    Diabetes Mother        Social History:  Social History     Socioeconomic History    Marital status: Single     Spouse name: Not on file    Number of children: Not on file    Years of education: Not on file    Highest education level: Not on file   Occupational History    Not on file   Tobacco Use    Smoking status: Former Smoker    Smokeless tobacco: Never Used   Vaping Use    Vaping Use: Never used   Substance and Sexual Activity    Alcohol use: Yes     Comment: occ     Drug use: Never    Sexual activity: Not on file   Other Topics Concern    Not on file   Social History Narrative    Not on file     Social Determinants of Health     Financial Resource Strain: Not on file   Food Insecurity: Not on file   Transportation Needs: Not on file   Physical Activity: Not on file   Stress: Not on file   Social Connections: Not on file   Intimate Partner Violence: Not on file   Housing Stability: Not on file         MDM (Data Review):     Records reviewed and summarized in current documentation    Lab Data Review:  Recent Results (from the past 24 hour(s))   PTH WITH CALCIUM    Collection Time: 06/05/22  4:37 PM   Result Value Ref Range    Pth, Intact 267.0 (H) 14.0 - 72.0 pg/mL    Calcium 8.3 (L) 8.5 - 10.5 mg/dL   VITAMIN D,25 HYDROXY (DEFICIENCY)    Collection Time: 06/05/22  4:37 PM   Result Value Ref Range    25-Hydroxy   Vitamin D 25 59 30 - 100 ng/mL   IRON/TOTAL IRON BIND    Collection Time: 06/05/22  4:37 PM   Result Value Ref Range    Iron 26 (L) 50 - 180 ug/dL    Total Iron Binding 137 (L) 250 - 450 ug/dL    Unsat Iron Binding 111 110 - 370 ug/dL    % Saturation 19 15 - 55 %   FERRITIN    Collection Time: 06/05/22  4:37 PM   Result Value Ref Range    Ferritin 1687.0 (H) 22.0 - 322.0 ng/mL   PHOSPHORUS    Collection Time: 06/06/22  7:40 AM   Result Value Ref Range    Phosphorus 3.7 2.5 - 4.5 mg/dL   CBC WITHOUT DIFFERENTIAL    Collection Time: 06/06/22  7:40 AM   Result Value Ref Range    WBC 6.3 4.8 - 10.8 K/uL    RBC 2.86 (L) 4.70 - 6.10 M/uL    Hemoglobin 8.9 (L) 14.0 - 18.0 g/dL    Hematocrit 28.9 (L) 42.0 - 52.0 %    .0 (H) 81.4 - 97.8 fL    MCH 31.1 27.0 - 33.0 pg    MCHC 30.8 (L) 33.7 - 35.3 g/dL    RDW 57.5 (H) 35.9 - 50.0 fL    Platelet Count 187 164 - 446 K/uL    MPV 10.1 9.0 - 12.9 fL   Basic Metabolic Panel    Collection Time: 06/06/22  7:40 AM   Result Value Ref Range    Sodium 136 135 - 145 mmol/L    Potassium 4.7 3.6 - 5.5 mmol/L    Chloride 100 96 - 112 mmol/L     Co2 25 20 - 33 mmol/L    Glucose 92 65 - 99 mg/dL    Bun 10 8 - 22 mg/dL    Creatinine 6.48 (HH) 0.50 - 1.40 mg/dL    Calcium 8.5 8.5 - 10.5 mg/dL    Anion Gap 11.0 7.0 - 16.0   MAGNESIUM    Collection Time: 22  7:40 AM   Result Value Ref Range    Magnesium 1.9 1.5 - 2.5 mg/dL   ESTIMATED GFR    Collection Time: 22  7:40 AM   Result Value Ref Range    GFR (CKD-EPI) 9 (A) >60 mL/min/1.73 m 2   LIPASE    Collection Time: 22  7:40 AM   Result Value Ref Range    Lipase 43 11 - 82 U/L   EKG    Collection Time: 22  7:44 AM   Result Value Ref Range    Report       [facility]    Test Date:  2022  Pt Name:    RENE STEINBERG          Department: 141  MRN:        [id number]                      Room:       T425  Gender:     Male                         Technician: [facility]  :        1950                   Requested By:ORQUIDEA REID  Order #:    [id number]                    Reading MD:    Measurements  Intervals                                Axis  Rate:       86                           P:          45  AR:         159                          QRS:        25  QRSD:       95                           T:          36  QT:         432  QTc:        517    Interpretive Statements  SINUS RHYTHM  BORDERLINE LOW VOLTAGE, EXTREMITY LEADS  ABNORMAL T, CONSIDER ISCHEMIA, ANTERIOR LEADS  PROLONGED QT INTERVAL  Compared to ECG 2022 13:02:06  T-wave abnormality now present  Possible ischemia now present  Prolonged QT interval now present         Imaging/Procedures Review:      DX-CHEST-PORTABLE (1 VIEW)   Final Result         1.  Increase in perihilar and lower lobe poorly defined opacifications could be due to pulmonary edema or inflammation.      2.  Slight blunting of left costophrenic angle could indicate small left pleural effusion.      3.  No pneumothorax identified.      DX-PORTABLE FLUORO > 1 HOUR   Final Result      Fluoroscopy provided for ERCP.      US-RUQ   Final Result       1.  Nonshadowing echogenic focus in the gallbladder is grossly similar to the prior exam and may represent a small adherent calculus. Gallbladder wall thickening is again noted. Acute cholecystitis could have a similar appearance in the appropriate    clinical setting      2.  Common bile duct dilatation, not noted on the previous exam. No choledocholithiasis identified sonographically. There is concern for biliary obstruction, consider MRCP.      3.  Right renal atrophy with an echogenic cortex, consistent with medical renal disease.           MDM (Assessment and Plan):     Patient Active Problem List    Diagnosis Date Noted    Thickening of wall of gallbladder 06/06/2022    Dilation of biliary tract 06/05/2022    Pancreatitis due to common bile duct stone 06/04/2022    Arthritis 05/05/2022    Chest pain 05/03/2022    Alcohol dependence (HCC) 05/03/2022    Hepatitis 05/03/2022    Acute pancreatitis 05/03/2022    AP (abdominal pain) 05/03/2022    MI (myocardial infarction) (HCC) 12/10/2021    GIB (gastrointestinal bleeding) 12/06/2021    Acute blood loss anemia 12/06/2021    Pulmonary embolism (HCC) 11/26/2021    Hepatitis C antibody positive in blood 11/24/2021    Acute respiratory failure with hypoxia (HCC) 11/24/2021    Hypotension 11/23/2021    Dehydration 11/23/2021    ESRD on dialysis (HCC) 11/23/2021    Thrombocytopenia (HCC) 12/05/2020    Transaminitis 12/01/2020    chest pain 03/18/2020    Stage 4 chronic kidney disease (HCC) 03/18/2020    Hypokalemia 03/18/2020    Anemia 03/18/2020    Gout 03/18/2020    BPH (benign prostatic hyperplasia) 03/18/2020    Insomnia 03/18/2020    HLD (hyperlipidemia) 03/18/2020    Ulcer, stomach peptic 03/18/2020    Back pain 03/18/2020     This is a 72 yo male with a history of chronic pancreatitis who was transferred from the [facility] 6/4/22 with a one month history of abdominal pain. Abdominal US revealed CBD dilatation. NO choledocholithiasis. Unable to do MRCP due to  claustrophobia. Findings of biliary pancreatitis. Labs: TB: 0.4. ALP: 150.Lipase: 420. Potassium: 5.3     ASSESSMENT:  1. Abdominal pain  2. Chronic pancreatitis  3. Possible cholecystitis  4. Elevated alkaline phosphatase  5. Hyperkalemia  6. Pancreatic duct stone x 2 s/p ERCP with removal of one stone, sphincterotomy, and PD stent placed.     PLAN:  -Repeat ERCP not available due lack of scope time availability in OR. We will have to defer to outpatient next week. Schedulers from my office will contact patient to get the ERCP scheduled at this facility outpatient.     GI will sign off.    Thank your for the opportunity to assist in the care of your patient.  Please call for any questions or concerns.    Wilmar Gallardo A.P.R.N.    ===    I have seen and examined the patient today.  I have reviewed the medical record, laboratory data, imaging, and all relevant studies.  I have discussed the assessment and plan of care with Wilmar PLATT and agree with their note and plan of care as documented.   Caitie Lopez M.D.

## 2022-06-06 NOTE — PROGRESS NOTES
Bedside report received.  Assessment complete.  A&O x 4. Patient calls appropriately.  Patient up with no assist.   Patient has 7/10 pain. Medicated.  Denies N&V. NPO.  Blister to left wrist.  permcath to left chest  AV fistula to RUE.  Anuric. +BM.  Patient denies SOB.  SCD's on.  Review plan with of care with patient. Call light and personal belongings with in reach. Hourly rounding in place. All needs met at this time.

## 2022-06-06 NOTE — PROGRESS NOTES
[facility] Progress Note     Hemodialysis treatment ordered today per Dr. Melgar x 3 hours. S/p surgery; denies pain; pt awake and alert; on o2 at 2lpm saturating well at 100%.   Treatment initiated at 1630 ended at 1930.      Patient tolerated treatment; see electronic flow sheet for details.      Net UF 2500 mL.      Post tx, CVC flushed with saline then locked with heparin 1000 units/mL per designated amount in each wing then clamped and capped. Aspirate heparin prior to next CVC use.     Report given to Primary RADHA Fragoso.

## 2022-06-06 NOTE — DISCHARGE PLANNING
Case Management Discharge Planning    Admission Date: 6/4/2022  GMLOS: 4.6  ALOS: 2    6-Clicks ADL Score: 24  6-Clicks Mobility Score: 24      Anticipated Discharge Dispo: Discharge Disposition: Discharged to home/self care (01)    DME Needed: No    Action(s) Taken: Spoke with patient who stated that he lives alone in a second story apartment. His family lives in [place]. He has recently been given a first floor unit which he is excited about. He however, needs to be able to sign the lease, but the complex is holding the unit for him as they know that he is in the hospital. He receives dialysis 3 times a week T-Th-Sat. He takes uber or access to his appointments. He also has a person who helps him out when needed. He also stated that he needed his medicare number, which was given to him.      Update - Spoke with [facility] the dialysis , patient is good to go for his dialysis tomorrow, but the patient has to schedule his own ride to dialysis.    Escalations Completed: None    Medically Clear: No    Next Steps: Continue to follow for emerging discharge needs    Barriers to Discharge: None    Is the patient up for discharge tomorrow: No

## 2022-06-06 NOTE — DISCHARGE INSTRUCTIONS
Discharge Instructions per GIULIA Drummond-BC    1.  Review your discharge Medication Reconciliation form. You may be provided with new prescriptions or changes to previously prescribed medications.  Be sure to take all of your prescribed medications exactly as directed.  Further questions can be directed to your local pharmacist or primary care provider (PCP).    2. Follow-up with your PCP/SPECIALIST.  An appointment may have already been scheduled for you.  Please review your discharge paperwork and locate this information.  Please be sure to call your PCP to cancel if you are unable to make this appointment or require schedule changes.  It is critical to to follow-up with your PCP and/or SPECIALIST to review hospital records and ensure any further diagnostic work-up, prescription refills, or referrals.    **You have been evaluated by gastroenterology and received a pancreatic stent during your hospitalization.  You need to call gastroenterology to schedule a time to have your stent removed.**     3. ACTIVITIES: After discharge from the hospital, you may resume routine activities including walking and going u/down stairs. However, no strenuous activity. For further clarification, call your PCP     4. DRIVING: You may drive whenever you are off pain medications and are able to perform the activities needed to drive, i.e. turning, bending, twisting, etc.     5. BOWEL FUNCTION: A few patients, after hospitalizations, will develop bowel irregularity. You could also experience constipation due to pain medication and decreased activity level. If you feel this is occurring, please take an over-the-counter laxative (Milk of Magnesia, Ex-Lax, Senokot, etc.) until the problem has resolved.     6. PAIN MEDICATION: You may be given a prescription for pain medication at discharge. Please take these as directed. It is important to remember not to take medications on an empty stomach as this may cause nausea/vomiting.  You can  transition from the prescription-strength pain medication to over-the-counter medications as your pain improves, such as tylenol if you are medically cleared to do so. DO NOT TAKE MORE THAN 4,000 mg OF ACETAMINOPHEN IN A 24-HOUR PERIOD. KEEP A PAIN JOURNAL; RECORD YOUR PAIN SCORE, PAIN MEDICATIONS ADMINISTERED, AND TIME. YOU SHOULD BE TAKING PAIN MEDICINE (OTC OR PRESCRIBED) EVERY 4-6 HOURS UNTIL YOU NO LONGER NEED MEDICINE FOR PAIN.    7. LABS/IMAGING: You may be asked to complete labs or imaging prior to your next provider appointment. If you use [facility], a paper order is not required. If you use another lab or imaging center, be sure you have your order requisition form at discharge. Contact scheduling or use [facility] to arrange a lab appointment.    8. BLOOD PRESSURE: Measure your blood pressure and pulse every day and write it down on a piece of paper or record it in a smart phone mell.  Bring this to your next PCP appointment.  If you feel dizzy, take your blood pressure and pulse and call your PCP.    9. RETURN TO THE ER: Return to the ER if you develop recurrent chest pain, shortness of breath, bloody sputum, fever of 101.5 or greater, intractable nausea or vomiting, blood in the vomit or urine, intractable pain, new onset inability to ambulate, focal motor weakness, acute vision disturbance, acute speech disturbance, intractable abdominal pain, diffuse watery diarrhea or any other worrisome symptoms.          Discharge Instructions    Discharged to home by taxi with self. Discharged via walking, hospital escort: Yes.  Special equipment needed: Not Applicable    Be sure to schedule a follow-up appointment with your primary care doctor or any specialists as instructed.     Discharge Plan:   Diet Plan: Discussed  Activity Level: Discussed  Confirmed Follow up Appointment: Appointment Scheduled  Confirmed Symptoms Management: Discussed  Medication Reconciliation Updated: Yes    I understand that a diet low in  cholesterol, fat, and sodium is recommended for good health. Unless I have been given specific instructions below for another diet, I accept this instruction as my diet prescription.   Other diet: regular     Special Instructions: None    Is patient discharged on Warfarin / Coumadin?   No     Depression / Suicide Risk    As you are discharged from this [facility] facility, it is important to learn how to keep safe from harming yourself.    Recognize the warning signs:  Abrupt changes in personality, positive or negative- including increase in energy   Giving away possessions  Change in eating patterns- significant weight changes-  positive or negative  Change in sleeping patterns- unable to sleep or sleeping all the time   Unwillingness or inability to communicate  Depression  Unusual sadness, discouragement and loneliness  Talk of wanting to die  Neglect of personal appearance   Rebelliousness- reckless behavior  Withdrawal from people/activities they love  Confusion- inability to concentrate     If you or a loved one observes any of these behaviors or has concerns about self-harm, here's what you can do:  Talk about it- your feelings and reasons for harming yourself  Remove any means that you might use to hurt yourself (examples: pills, rope, extension cords, firearm)  Get professional help from the community (Mental Health, Substance Abuse, psychological counseling)  Do not be alone:Call your Safe Contact- someone whom you trust who will be there for you.  Call your local CRISIS HOTLINE 877-9063 or 806-577-7145  Call your local Children's Mobile Crisis Response Team [place] (931) 391-3033 or www.RoyaltyShare  Call the toll free National Suicide Prevention Hotlines   National Suicide Prevention Lifeline 593-335-NJQH (7701)  National Hope Line Network 800-SUICIDE (261-7648)

## 2022-06-06 NOTE — PROGRESS NOTES
"[facility] -  PROGRESS NOTE               Author: Harrison Melgar M.D. Date & Time: 6/6/2022  11:28 AM     HPI:  Patient is a 71 year old male with a PMHx of chronic pancreatitis, CAD, RA, ESRD on HD TTS, gout, HTN, CHF [facility] for biliary pancreatitis for ERCP with GI.  [facility] with [facility] at 11mm with multiple stones, couldn't obtain MRCP due to claustrophobia.  Continues to have abdominal pain today.  [facility]. Nephrology consulted for maintenance HD    DAILY NEPHROLOGY SUMMARY:  6/6: HD done yesterday.  No pain or SOB this morning.  GI following s/p ERCP    REVIEW OF SYSTEMS:    10 point ROS reviewed and is as per HPI/daily summary or otherwise negative    PMH/PSH/SH/FH: Reviewed and unchanged since admission note  CURRENT MEDICATIONS: Reviewed from admission to present day    VS:  /79   Pulse (!) 103   Temp 36.7 °C (98 °F) (Temporal)   Resp 17   Ht 1.727 m (5' 8\")   Wt 81.1 kg (178 lb 12.7 oz)   SpO2 93%   BMI 27.19 kg/m²   Physical Exam  HENT:      Head: Normocephalic.      Right Ear: External ear normal.      Left Ear: External ear normal.      Nose: Nose normal.      Mouth/Throat:      Mouth: Mucous membranes are moist.   Eyes:      General:         Right eye: No discharge.         Left eye: No discharge.   Cardiovascular:      Rate and Rhythm: Normal rate.      Pulses: Normal pulses.   Pulmonary:      Effort: Pulmonary effort is normal.   Abdominal:      General: Abdomen is flat.   Musculoskeletal:         General: Swelling present.      Cervical back: Normal range of motion.   Skin:     General: Skin is warm.   Neurological:      General: No focal deficit present.      Mental Status: He is alert.   Psychiatric:         Mood and Affect: Mood normal.       Left chest TDC in place  Fluids:  In: 500 [Dialysis:500]  Out: 3000     LABS:  Recent Labs     06/05/22  0606 06/05/22  1637 06/06/22  0740 "   SODIUM 135  --  136   POTASSIUM 5.3  --  4.7   CHLORIDE 100  --  100   CO2 24  --  25   GLUCOSE 80  --  92   BUN 20  --  10   CREATININE 8.87*  --  6.48*   CALCIUM 8.5 8.3* 8.5       IMPRESSION:  # ESRD  - Has left chest TDC  - Dialysis schedule qTTS  # Pancreatitis due to stone  - Per GI recs  - S/p ERCP  # HTN  - Goal BP < 140/90  - Not at goal possibly some component of volume  # Anemia of CKD  - Goal Hgb 10-11  - Elevated ferritin 1687  # CKD-MBD  -   - Vit D 59  - Ca 8.5  - PO4 3.7     PLAN:  - cont qTTS iHD during stay  - UF as tolerated  - Epo with HD  - No dietary protein restrictions  - Planned for repeat ERC in 3 days for pancreas stent and stone  - Dose all meds per ESRD     Thank you

## 2022-06-06 NOTE — DISCHARGE SUMMARY
Discharge Summary    CHIEF COMPLAINT ON ADMISSION  LUQ and epigastric pain    Reason for Admission  Acute interstitial pancreatitis     Admission Date  6/4/2022    CODE STATUS  Full Code    HPI & HOSPITAL COURSE    Junior Proctor is a 71 y.o. male with past medical history of chronic pancreatitis, CAD, rheumatoid arthritis, end-stage renal disease on hemodialysis Tuesday Thursday Saturday, gout, hypertension, dyslipidemia, migratory polyarthritis, homelessness, CHF, who presented 6/4/2022 as a transfer from [facility] with biliary pancreatitis, for GI consult and ERCP  Patient spent around 1 week at [facility], where he initially presented with orthostatic dizziness, abdominal pain in the left upper quadrant, dull, constant, up to 8 out of 10, without nausea without alleviating aggravating factors.  CT of the abdomen and pelvis with contrast showed CBD dilation at 11 mm, multiple gallstones. He was unable to obtain MRCP due to claustrophobia with intravenous anxiolytic.  Blood work showed elevated lipase, moreso than Patient has been receiving hemodialysis through tunneled catheter on the left chest as he is AV fistula is not working. He was transferred to [facility] for further treatment and GI consultation.  Nephrology was consulted, was cleared to undergo ERCP with hemodialysis to follow afterwards, then resumed on his normal schedule.   He underwent ERCP, pancreatic sphincterotomy, stone removal to biliary/pancreatic ducts, and stent placement to pancreatic duct by Dr. Daly 6/5/2022. Two pancreatic stones were seen but only one was able to be removed as the other was deep into the body. A stent was placed in the  pancreatic duct and patient was initially to return for stent removal in 3 days per GI. However, repeat ERCP not available due lack of scope time availability in OR. GI deferred to outpatient next week. GI schedulers office will contact patient to get the ERCP scheduled at this  facility outpatient. On day of discharge, patient awake, alert, oriented x4. In the morning, he was mildly tachycardic, increased oxygen needs to 2LPM NC, and mildly hypertensive. EKG was done showed normal sinus rhythm at a rate of 86, chest xray with mild pulmonary congestion and minimal left pleural effusion. No leukocytosis. Lipase returned to normal at 42. No electrolyte abnormalities, and patient's renal functions at baseline. Patient denied chest discomfort, dyspnea, lower extremity edema and was able to wean off nasal canula. His vital signs were stable thereafter. Plan for discharge with follow up with GI this upcoming week for stent removal was discussed with patient and he his agreeable.   Prior to considering prescriptions for controlled medications, the prescription monitoring program database was searched. Narcotic score 461, Overdose score 300. Patient has received narcotics while admitted and shown ability to tolerate opioids. [facility].           Therefore, he is discharged in good and stable condition to home with close outpatient follow-up.    The patient met 2-midnight criteria for an inpatient stay at the time of discharge.    Discharge Date  6/6/2022    FOLLOW UP ITEMS POST DISCHARGE  Follow up with GI for stent removal.     DISCHARGE DIAGNOSES  Principal Problem:    Pancreatitis due to common bile duct stone POA: Yes  Active Problems:    Anemia POA: Yes    Gout POA: Yes    Insomnia POA: Yes    HLD (hyperlipidemia) POA: Yes    ESRD on dialysis (HCC) (Chronic) POA: Yes    Dilation of biliary tract POA: Unknown    Thickening of wall of gallbladder POA: Unknown  Resolved Problems:    * No resolved hospital problems. *      FOLLOW UP    Ibrahima Daly M.D.  [address] [place] [id number]  [phone]    Schedule an appointment as soon as possible for a visit  FOR STENT REMOVAL      MEDICATIONS ON  DISCHARGE     Medication List      START taking these medications      Instructions   oxyCODONE-acetaminophen 5-325 MG Tabs  Commonly known as: PERCOCET   Take 1 Tablet by mouth every 6 hours as needed for Moderate Pain or Severe Pain for up to 5 days.  Dose: 1 Tablet        CONTINUE taking these medications      Instructions   allopurinol 100 MG Tabs  Commonly known as: ZYLOPRIM   Take 100 mg by mouth 3 times a week. Taken post hemodialysis THREE TIMES A WEEK  Dose: 100 mg     atorvastatin 20 MG Tabs  Commonly known as: LIPITOR   Take 20 mg by mouth every evening.  Dose: 20 mg     Buprenorphine 10 MCG/HR Ptwk   Place 10 mcg on the skin every 7 days.  Dose: 10 mcg     carvedilol 6.25 MG Tabs  Commonly known as: COREG   Take 6.25 mg by mouth 2 times a day with meals.  Dose: 6.25 mg     Creon 4149-5581 units Cpep  Generic drug: pancrelipase (Lip-Prot-Amyl)   Take 1 Capsule by mouth 3 times a day  (every 8 hrs) with meals for 30 days.  Dose: 1 Capsule     diclofenac sodium 1 % Gel  Commonly known as: Voltaren   Apply 4 g topically 4 times a day as needed (Osetoarthritis pain).  Dose: 4 g     Etanercept 50 MG/ML Sosy   Inject 50 mg under the skin every 7 days.  Dose: 50 mg     folic acid 1 MG Tabs  Commonly known as: FOLVITE   Take 1 mg by mouth every day.  Dose: 1 mg     gabapentin 300 MG Caps  Commonly known as: NEURONTIN   Take 300 mg by mouth at bedtime.  Dose: 300 mg     lidocaine 5 % Ptch  Commonly known as: LIDODERM   Apply 1 Patch topically every day. Remove after 12 hours  Dose: 1 Patch     pantoprazole 40 MG Tbec  Commonly known as: PROTONIX   Take 40 mg by mouth 2 times a day.  Dose: 40 mg     sevelamer 800 MG Tabs  Commonly known as: RENAGEL   Take 800 mg by mouth 3 times a day with meals.  Dose: 800 mg     traZODone 50 MG Tabs  Commonly known as: DESYREL   Take 50 mg by mouth every evening. Indications: Trouble Sleeping  Dose: 50 mg     vitamin D2 (Ergocalciferol) 1.25 MG (73269 UT) Caps capsule  Commonly  known as: Drisdol   Take 50,000 Units by mouth every 7 days.  Dose: 50,000 Units        STOP taking these medications    oxyCODONE immediate-release 5 MG Tabs  Commonly known as: ROXICODONE     predniSONE 5 MG Tabs  Commonly known as: DELTASONE            Allergies  Allergies   Allergen Reactions   • Motrin [Ibuprofen]      hhx of stomach ulcers       DIET  Orders Placed This Encounter   Procedures   • Diet Order Diet: Renal     Standing Status:   Standing     Number of Occurrences:   1     Order Specific Question:   Diet:     Answer:   Renal [8]       ACTIVITY  As tolerated.  Weight bearing as tolerated    CONSULTATIONS  GI- Dr. Daly  Nephrology- Dr. Melgar    PROCEDURES  Dr. Daly 6/5/2022  AL ERCP,SPHINCTEROTOMY [33701 (CPT®)]   AL ERCP,W/REMOVAL STONE,NATASHA/PANCR DUCTS [78465 (CPT®)]   AL ERCP DUCT STENT PLACEMENT [97518 (CPT®)]         LABORATORY  Lab Results   Component Value Date    SODIUM 136 06/06/2022    POTASSIUM 4.7 06/06/2022    CHLORIDE 100 06/06/2022    CO2 25 06/06/2022    GLUCOSE 92 06/06/2022    BUN 10 06/06/2022    CREATININE 6.48 (HH) 06/06/2022        Lab Results   Component Value Date    WBC 6.3 06/06/2022    HEMOGLOBIN 8.9 (L) 06/06/2022    HEMATOCRIT 28.9 (L) 06/06/2022    PLATELETCT 187 06/06/2022        Total time of the discharge process exceeds 38 minutes.

## 2022-06-06 NOTE — CARE PLAN
The patient is Stable - Low risk of patient condition declining or worsening    Shift Goals  Clinical Goals: pain managemnt, safety  Patient Goals: pain management    Progress made toward(s) clinical / shift goals:  Pt rested through the night, pain meds given as needed.    Patient is not progressing towards the following goals:N/A      Problem: Knowledge Deficit - Standard  Goal: Patient and family/care givers will demonstrate understanding of plan of care, disease process/condition, diagnostic tests and medications  Outcome: Progressing     Problem: Pain - Standard  Goal: Alleviation of pain or a reduction in pain to the patient’s comfort goal  Outcome: Progressing

## 2022-06-06 NOTE — DISCHARGE PLANNING
Outpatient Dialysis Note    Confirmed patient is active at:    [facility]    [address]  [place] [address]    Schedule: Tuesday, Thursday, Saturday   Time: 10:45am     Patient is seen by Dr. Knott in HD clinic.    Spoke with Susan at facility who confirmed.    Forwarded records for review.    Chio Hickman- Senior Dialysis Coordinator # [phone]  [facility] [employer]

## 2022-06-06 NOTE — PROGRESS NOTES
D/c instructions given, educated on worsening s/s. Pt understands and questions answered. [facility], [facility]cher provided for pt

## 2022-06-06 NOTE — ASSESSMENT & PLAN NOTE
Discussed with General surgery regarding any follow up/care needed for gallbladder wall thickening seen on US- if concerning for   cholecystitis, recommended HIDA scan.   No RUQ pain with palpation, ALT/AST and bilirubin normal, and no choledocholithiasis or dilation on ERCP.   Continue to monitor during admission especially given upcoming repeat ERCP.   Otherwise, follow up general surgery  OP.

## 2022-06-07 NOTE — DISCHARGE PLANNING
[facility]. Pt brought in a prescription for percocet and they need ICD code and MO number for Yesica aJin.    [facility] reviewed and all was provided.

## 2022-06-08 NOTE — PROGRESS NOTES
Pt to discharge to discharge mel. Pt handed paper script to [facility] at [facility]. Pt arranged for HD to restart 6/7 and pt arranged transport for HD.

## 2022-06-15 NOTE — DOCUMENTATION QUERY
[facility]                                                                       Query Response Note      PATIENT:               RENE STEINBERG  ACCT #:                  [id number]  MRN:                     [id number]  :                      1950  ADMIT DATE:       2022 6:17 PM  DISCH DATE:        2022 4:31 PM  RESPONDING  PROVIDER #:        [id number]           QUERY TEXT:    ETOH induced Pancreatitis is stated within the Gastroenterology Procedure Note and Chronic Pancreatitis is within the progress notes without mention of ETOH induced pancreatitis. Please specify which type of Pancreatitis is specific to this encounter:     NOTE: If an appropriate response is note listed below, please respond with a new note.    [facility], located within 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

## 2022-09-13 ENCOUNTER — APPOINTMENT (OUTPATIENT)
Dept: RADIOLOGY | Facility: MEDICAL CENTER | Age: 72
DRG: 314 | End: 2022-09-13
Attending: EMERGENCY MEDICINE
Payer: COMMERCIAL

## 2022-09-13 ENCOUNTER — HOSPITAL ENCOUNTER (INPATIENT)
Facility: MEDICAL CENTER | Age: 72
LOS: 9 days | DRG: 314 | End: 2022-09-22
Attending: EMERGENCY MEDICINE | Admitting: STUDENT IN AN ORGANIZED HEALTH CARE EDUCATION/TRAINING PROGRAM
Payer: COMMERCIAL

## 2022-09-13 DIAGNOSIS — A41.9 SEPSIS, DUE TO UNSPECIFIED ORGANISM, UNSPECIFIED WHETHER ACUTE ORGAN DYSFUNCTION PRESENT (HCC): ICD-10-CM

## 2022-09-13 DIAGNOSIS — Z99.2 ESRD ON HEMODIALYSIS (HCC): ICD-10-CM

## 2022-09-13 DIAGNOSIS — M54.9 BACK PAIN, UNSPECIFIED BACK LOCATION, UNSPECIFIED BACK PAIN LATERALITY, UNSPECIFIED CHRONICITY: ICD-10-CM

## 2022-09-13 DIAGNOSIS — N18.6 ESRD ON HEMODIALYSIS (HCC): ICD-10-CM

## 2022-09-13 PROBLEM — E16.2 HYPOGLYCEMIA: Status: ACTIVE | Noted: 2022-09-13

## 2022-09-13 PROBLEM — R41.82 ALTERED MENTAL STATUS: Status: ACTIVE | Noted: 2022-09-13

## 2022-09-13 LAB
ALBUMIN SERPL BCP-MCNC: 3.7 G/DL (ref 3.2–4.9)
ALBUMIN/GLOB SERPL: 1 G/DL
ALP SERPL-CCNC: 96 U/L (ref 30–99)
ALT SERPL-CCNC: 39 U/L (ref 2–50)
ANION GAP SERPL CALC-SCNC: 21 MMOL/L (ref 7–16)
ANISOCYTOSIS BLD QL SMEAR: ABNORMAL
AST SERPL-CCNC: 87 U/L (ref 12–45)
BASOPHILS # BLD AUTO: 0 % (ref 0–1.8)
BASOPHILS # BLD: 0 K/UL (ref 0–0.12)
BILIRUB SERPL-MCNC: 0.7 MG/DL (ref 0.1–1.5)
BUN SERPL-MCNC: 23 MG/DL (ref 8–22)
CALCIUM SERPL-MCNC: 8.9 MG/DL (ref 8.5–10.5)
CHLORIDE SERPL-SCNC: 93 MMOL/L (ref 96–112)
CO2 SERPL-SCNC: 22 MMOL/L (ref 20–33)
CREAT SERPL-MCNC: 7.46 MG/DL (ref 0.5–1.4)
EKG IMPRESSION: NORMAL
EOSINOPHIL # BLD AUTO: 0.04 K/UL (ref 0–0.51)
EOSINOPHIL NFR BLD: 0.9 % (ref 0–6.9)
ERYTHROCYTE [DISTWIDTH] IN BLOOD BY AUTOMATED COUNT: 68.5 FL (ref 35.9–50)
FLUAV RNA SPEC QL NAA+PROBE: NEGATIVE
FLUBV RNA SPEC QL NAA+PROBE: NEGATIVE
GFR SERPLBLD CREATININE-BSD FMLA CKD-EPI: 7 ML/MIN/1.73 M 2
GLOBULIN SER CALC-MCNC: 3.7 G/DL (ref 1.9–3.5)
GLUCOSE BLD STRIP.AUTO-MCNC: 102 MG/DL (ref 65–99)
GLUCOSE BLD STRIP.AUTO-MCNC: 51 MG/DL (ref 65–99)
GLUCOSE SERPL-MCNC: 65 MG/DL (ref 65–99)
HCT VFR BLD AUTO: 26.8 % (ref 42–52)
HGB BLD-MCNC: 8.5 G/DL (ref 14–18)
LACTATE SERPL-SCNC: 1.4 MMOL/L (ref 0.5–2)
LACTATE SERPL-SCNC: 1.6 MMOL/L (ref 0.5–2)
LACTATE SERPL-SCNC: 2.2 MMOL/L (ref 0.5–2)
LYMPHOCYTES # BLD AUTO: 0.64 K/UL (ref 1–4.8)
LYMPHOCYTES NFR BLD: 14.9 % (ref 22–41)
MACROCYTES BLD QL SMEAR: ABNORMAL
MANUAL DIFF BLD: NORMAL
MCH RBC QN AUTO: 30.1 PG (ref 27–33)
MCHC RBC AUTO-ENTMCNC: 31.7 G/DL (ref 33.7–35.3)
MCV RBC AUTO: 95 FL (ref 81.4–97.8)
MONOCYTES # BLD AUTO: 0.04 K/UL (ref 0–0.85)
MONOCYTES NFR BLD AUTO: 0.9 % (ref 0–13.4)
MORPHOLOGY BLD-IMP: NORMAL
MYELOCYTES NFR BLD MANUAL: 0.9 %
NEUTROPHILS # BLD AUTO: 3.54 K/UL (ref 1.82–7.42)
NEUTROPHILS NFR BLD: 78.9 % (ref 44–72)
NEUTS BAND NFR BLD MANUAL: 3.5 % (ref 0–10)
NRBC # BLD AUTO: 0.02 K/UL
NRBC BLD-RTO: 0.5 /100 WBC
PLATELET # BLD AUTO: 78 K/UL (ref 164–446)
PLATELET BLD QL SMEAR: NORMAL
PMV BLD AUTO: 10.4 FL (ref 9–12.9)
POIKILOCYTOSIS BLD QL SMEAR: NORMAL
POLYCHROMASIA BLD QL SMEAR: NORMAL
POTASSIUM SERPL-SCNC: 4 MMOL/L (ref 3.6–5.5)
PROT SERPL-MCNC: 7.4 G/DL (ref 6–8.2)
RBC # BLD AUTO: 2.82 M/UL (ref 4.7–6.1)
RBC BLD AUTO: PRESENT
RSV RNA SPEC QL NAA+PROBE: NEGATIVE
SARS-COV-2 RNA RESP QL NAA+PROBE: NOTDETECTED
SODIUM SERPL-SCNC: 136 MMOL/L (ref 135–145)
SPECIMEN SOURCE: NORMAL
TARGETS BLD QL SMEAR: NORMAL
WBC # BLD AUTO: 4.3 K/UL (ref 4.8–10.8)

## 2022-09-13 PROCEDURE — 87040 BLOOD CULTURE FOR BACTERIA: CPT | Mod: 91

## 2022-09-13 PROCEDURE — 700105 HCHG RX REV CODE 258: Performed by: EMERGENCY MEDICINE

## 2022-09-13 PROCEDURE — 0241U HCHG SARS-COV-2 COVID-19 NFCT DS RESP RNA 4 TRGT MIC: CPT

## 2022-09-13 PROCEDURE — 83605 ASSAY OF LACTIC ACID: CPT

## 2022-09-13 PROCEDURE — 700105 HCHG RX REV CODE 258: Performed by: STUDENT IN AN ORGANIZED HEALTH CARE EDUCATION/TRAINING PROGRAM

## 2022-09-13 PROCEDURE — 85007 BL SMEAR W/DIFF WBC COUNT: CPT

## 2022-09-13 PROCEDURE — 99223 1ST HOSP IP/OBS HIGH 75: CPT | Performed by: STUDENT IN AN ORGANIZED HEALTH CARE EDUCATION/TRAINING PROGRAM

## 2022-09-13 PROCEDURE — 71045 X-RAY EXAM CHEST 1 VIEW: CPT

## 2022-09-13 PROCEDURE — 93005 ELECTROCARDIOGRAM TRACING: CPT | Performed by: STUDENT IN AN ORGANIZED HEALTH CARE EDUCATION/TRAINING PROGRAM

## 2022-09-13 PROCEDURE — 700111 HCHG RX REV CODE 636 W/ 250 OVERRIDE (IP): Performed by: STUDENT IN AN ORGANIZED HEALTH CARE EDUCATION/TRAINING PROGRAM

## 2022-09-13 PROCEDURE — 96365 THER/PROPH/DIAG IV INF INIT: CPT

## 2022-09-13 PROCEDURE — 36415 COLL VENOUS BLD VENIPUNCTURE: CPT

## 2022-09-13 PROCEDURE — C9803 HOPD COVID-19 SPEC COLLECT: HCPCS | Performed by: EMERGENCY MEDICINE

## 2022-09-13 PROCEDURE — 96375 TX/PRO/DX INJ NEW DRUG ADDON: CPT

## 2022-09-13 PROCEDURE — 96366 THER/PROPH/DIAG IV INF ADDON: CPT

## 2022-09-13 PROCEDURE — 700111 HCHG RX REV CODE 636 W/ 250 OVERRIDE (IP): Performed by: EMERGENCY MEDICINE

## 2022-09-13 PROCEDURE — 82962 GLUCOSE BLOOD TEST: CPT | Mod: 91

## 2022-09-13 PROCEDURE — 87186 SC STD MICRODIL/AGAR DIL: CPT

## 2022-09-13 PROCEDURE — 85025 COMPLETE CBC W/AUTO DIFF WBC: CPT

## 2022-09-13 PROCEDURE — 94760 N-INVAS EAR/PLS OXIMETRY 1: CPT

## 2022-09-13 PROCEDURE — 80053 COMPREHEN METABOLIC PANEL: CPT

## 2022-09-13 PROCEDURE — 87641 MR-STAPH DNA AMP PROBE: CPT

## 2022-09-13 PROCEDURE — 99285 EMERGENCY DEPT VISIT HI MDM: CPT

## 2022-09-13 PROCEDURE — 770000 HCHG ROOM/CARE - INTERMEDIATE ICU *

## 2022-09-13 PROCEDURE — 87077 CULTURE AEROBIC IDENTIFY: CPT

## 2022-09-13 PROCEDURE — 70450 CT HEAD/BRAIN W/O DYE: CPT

## 2022-09-13 RX ORDER — DEXTROSE AND SODIUM CHLORIDE 10; .45 G/100ML; G/100ML
INJECTION, SOLUTION INTRAVENOUS CONTINUOUS
Status: DISCONTINUED | OUTPATIENT
Start: 2022-09-13 | End: 2022-09-14

## 2022-09-13 RX ORDER — HEPARIN SODIUM 5000 [USP'U]/ML
5000 INJECTION, SOLUTION INTRAVENOUS; SUBCUTANEOUS EVERY 8 HOURS
Status: DISCONTINUED | OUTPATIENT
Start: 2022-09-13 | End: 2022-09-22 | Stop reason: HOSPADM

## 2022-09-13 RX ORDER — BISACODYL 10 MG
10 SUPPOSITORY, RECTAL RECTAL
Status: DISCONTINUED | OUTPATIENT
Start: 2022-09-13 | End: 2022-09-22 | Stop reason: HOSPADM

## 2022-09-13 RX ORDER — CAPSAICIN 0.025 %
1 CREAM (GRAM) TOPICAL 4 TIMES DAILY PRN
COMMUNITY
End: 2023-08-08

## 2022-09-13 RX ORDER — SODIUM CHLORIDE 9 MG/ML
1000 INJECTION, SOLUTION INTRAVENOUS ONCE
Status: COMPLETED | OUTPATIENT
Start: 2022-09-13 | End: 2022-09-14

## 2022-09-13 RX ORDER — BUPRENORPHINE 15 UG/H
1 PATCH TRANSDERMAL
COMMUNITY
End: 2023-08-08

## 2022-09-13 RX ORDER — SODIUM CHLORIDE 9 MG/ML
250 INJECTION, SOLUTION INTRAVENOUS ONCE
Status: COMPLETED | OUTPATIENT
Start: 2022-09-13 | End: 2022-09-13

## 2022-09-13 RX ORDER — ACETAMINOPHEN 500 MG
1000 TABLET ORAL EVERY 8 HOURS PRN
COMMUNITY
End: 2023-08-08

## 2022-09-13 RX ORDER — POLYETHYLENE GLYCOL 3350 17 G/17G
1 POWDER, FOR SOLUTION ORAL
Status: DISCONTINUED | OUTPATIENT
Start: 2022-09-13 | End: 2022-09-22 | Stop reason: HOSPADM

## 2022-09-13 RX ORDER — AMOXICILLIN 250 MG
2 CAPSULE ORAL 2 TIMES DAILY
Status: DISCONTINUED | OUTPATIENT
Start: 2022-09-13 | End: 2022-09-22 | Stop reason: HOSPADM

## 2022-09-13 RX ORDER — ACETAMINOPHEN 325 MG/1
650 TABLET ORAL EVERY 6 HOURS PRN
Status: DISCONTINUED | OUTPATIENT
Start: 2022-09-13 | End: 2022-09-22 | Stop reason: HOSPADM

## 2022-09-13 RX ORDER — LANOLIN ALCOHOL/MO/W.PET/CERES
6 CREAM (GRAM) TOPICAL
COMMUNITY
End: 2023-08-08

## 2022-09-13 RX ADMIN — DEXTROSE MONOHYDRATE 25 G: 100 INJECTION, SOLUTION INTRAVENOUS at 17:31

## 2022-09-13 RX ADMIN — VANCOMYCIN HYDROCHLORIDE 2000 MG: 500 INJECTION, POWDER, LYOPHILIZED, FOR SOLUTION INTRAVENOUS at 18:05

## 2022-09-13 RX ADMIN — CEFTRIAXONE SODIUM 2 G: 10 INJECTION, POWDER, FOR SOLUTION INTRAVENOUS at 21:30

## 2022-09-13 RX ADMIN — DEXTROSE AND SODIUM CHLORIDE: 10; .45 INJECTION, SOLUTION INTRAVENOUS at 18:39

## 2022-09-13 RX ADMIN — HEPARIN SODIUM 5000 UNITS: 5000 INJECTION, SOLUTION INTRAVENOUS; SUBCUTANEOUS at 22:08

## 2022-09-13 RX ADMIN — SODIUM CHLORIDE 1000 ML: 9 INJECTION, SOLUTION INTRAVENOUS at 21:29

## 2022-09-13 RX ADMIN — SODIUM CHLORIDE 250 ML: 9 INJECTION, SOLUTION INTRAVENOUS at 16:25

## 2022-09-13 ASSESSMENT — LIFESTYLE VARIABLES
HAVE YOU EVER FELT YOU SHOULD CUT DOWN ON YOUR DRINKING: NO
EVER HAD A DRINK FIRST THING IN THE MORNING TO STEADY YOUR NERVES TO GET RID OF A HANGOVER: NO
HAVE PEOPLE ANNOYED YOU BY CRITICIZING YOUR DRINKING: NO
HOW MANY TIMES IN THE PAST YEAR HAVE YOU HAD 5 OR MORE DRINKS IN A DAY: 0
AVERAGE NUMBER OF DAYS PER WEEK YOU HAVE A DRINK CONTAINING ALCOHOL: 0
DOES PATIENT WANT TO STOP DRINKING: NO
ALCOHOL_USE: NO
ON A TYPICAL DAY WHEN YOU DRINK ALCOHOL HOW MANY DRINKS DO YOU HAVE: 0
TOTAL SCORE: 0
EVER FELT BAD OR GUILTY ABOUT YOUR DRINKING: NO
TOTAL SCORE: 0
CONSUMPTION TOTAL: NEGATIVE
TOTAL SCORE: 0

## 2022-09-13 ASSESSMENT — PATIENT HEALTH QUESTIONNAIRE - PHQ9
2. FEELING DOWN, DEPRESSED, IRRITABLE, OR HOPELESS: NOT AT ALL
SUM OF ALL RESPONSES TO PHQ9 QUESTIONS 1 AND 2: 0
1. LITTLE INTEREST OR PLEASURE IN DOING THINGS: NOT AT ALL

## 2022-09-13 ASSESSMENT — PAIN DESCRIPTION - PAIN TYPE: TYPE: ACUTE PAIN

## 2022-09-13 ASSESSMENT — PAIN SCALES - WONG BAKER: WONGBAKER_NUMERICALRESPONSE: DOESN'T HURT AT ALL

## 2022-09-13 ASSESSMENT — FIBROSIS 4 INDEX: FIB4 SCORE: 3.18

## 2022-09-13 NOTE — ED TRIAGE NOTES
Chief Complaint   Patient presents with    ALOC     Pt BIB EMS from [facility] dialysis [place]. Per report pt 9L of fluid off and 7L returned to pt when he became altered. Pt found by EMS to be hypotensive/hypoglycemic. BP 80/40, BG 42. Pt given 200mL LR/Dextrose PTA. Pt now with /88, BG 95 PTA.

## 2022-09-13 NOTE — ED NOTES
Med rec updated and complete. Allergies reviewed. Pt is not able to partipcate in an interview.  Placed call to listed home pharmacy to verify current medications.  No antibiotics noted on file > 3 months. Unable to clarify with pt last doses taken.        [facility]  [phone]

## 2022-09-13 NOTE — ED PROVIDER NOTES
ED Provider Note    Scribed for Trav Cobb M.D. by Mary Lira. 9/13/2022  4:00 PM    Primary care provider: Pcp Pt States None  Means of arrival: EMS  History obtained from: patient  History limited by: ALOC    CHIEF COMPLAINT  Chief Complaint   Patient presents with    ALOC     Pt BIB EMS from [facility]. Per report pt 9L of fluid off and 7L returned to pt when he became altered. Pt found by EMS to be hypotensive/hypoglycemic. BP 80/40, BG 42. Pt given 200mL LR/Dextrose PTA. Pt now with /88, BG 95 PTA.        HPI  Junior Proctor is a 72 y.o. male who presents to the Emergency Department via EMS to bedside from the [facility] dialysis [place] after becoming altered and hypotensive following treatment. The patient is on dialysis every T/Thur/Saturday secondary to end stage kidney disease. The patient had 9L blood filtered and 7L placed back into the body. He then spiked a fever of 101.4 °F following treatment, and became increasingly altered. Per nursing note, the patient was hypotensive (80/40) and hypoglycemic (bg 42) he was given glucose.. He is able to shake his head to deny any associated symptoms of shortness of breath, chest pain, diarrhea, abdominal pain, sores, or rashes.    HPI limited secondary to patient's ALOC.    REVIEW OF SYSTEMS  Pertinent positives include: fever, hypotension, hypoglycemia, and altered levels of consciousness.  Pertinent negatives include: shortness of breath, chest pain, diarrhea, abdominal pain, sores, or rashes..  ROS limited secondary to patient's ALOC    PAST MEDICAL HISTORY  Past Medical History:   Diagnosis Date    Gout     Hemodialysis patient (HCC)     Hypertension     Kidney disease     MI (myocardial infarction) (HCC)        FAMILY HISTORY  Family History   Problem Relation Age of Onset    Diabetes Mother        SOCIAL HISTORY  Social History     Tobacco Use    Smoking status: Former    Smokeless tobacco: Never   Vaping Use    Vaping Use: Never used    Substance Use Topics    Alcohol use: Yes     Comment: occ    Drug use: Never     Social History     Substance and Sexual Activity   Drug Use Never       SURGICAL HISTORY  Past Surgical History:   Procedure Laterality Date    OK ERCP,DIAGNOSTIC N/A 6/5/2022    Procedure: ERCP (ENDOSCOPIC RETROGRADE CHOLANGIOPANCREATOGRAPHY);  Surgeon: Ibrahima Daly M.D.;  Location: SURGERY [facility];  Service: Gastroenterology    OK UPPER GI ENDOSCOPY,DIAGNOSIS N/A 12/10/2021    Procedure: GASTROSCOPY;  Surgeon: Paddy Hooks M.D.;  Location: SURGERY SAME DAY [facility];  Service: Gastroenterology    OK COLONOSCOPY,DIAGNOSTIC N/A 12/10/2021    Procedure: COLONOSCOPY;  Surgeon: Paddy Hooks M.D.;  Location: SURGERY SAME DAY [facility];  Service: Gastroenterology    OK UPPER GI ENDOSCOPY,BIOPSY N/A 12/10/2021    Procedure: GASTROSCOPY, WITH BIOPSY;  Surgeon: Paddy Hooks M.D.;  Location: SURGERY SAME DAY [facility];  Service: Gastroenterology    OK COLONOSCOPY,BIOPSY N/A 12/10/2021    Procedure: COLONOSCOPY, WITH BIOPSY;  Surgeon: Paddy Hooks M.D.;  Location: SURGERY SAME DAY [facility];  Service: Gastroenterology       CURRENT MEDICATIONS  Current Outpatient Medications   Medication Instructions    acetaminophen (TYLENOL) 1,000 mg, Oral, EVERY 8 HOURS PRN    allopurinol (ZYLOPRIM) 100 mg, Oral, THREE TIMES PER WEEK, Taken post hemodialysis THREE TIMES A WEEK    atorvastatin (LIPITOR) 20 mg, Oral, NIGHTLY    Buprenorphine 15 MCG/HR PATCH WEEKLY 1 Patch, Transdermal, EVERY 7 DAYS    capsaicin (ZOSTRIX) 0.025 % cream 1 Application, Topical, 4 TIMES DAILY PRN, Apply to effected area    carvedilol (COREG) 6.25 mg, Oral, 2 TIMES DAILY WITH MEALS    diclofenac sodium (VOLTAREN) 4 g, Topical, 4 TIMES DAILY PRN    Etanercept 50 mg, Subcutaneous, EVERY 7 DAYS    folic acid (FOLVITE) 1 mg, Oral, DAILY    gabapentin (NEURONTIN) 300 mg, Oral, EVERY BEDTIME    lidocaine (LIDODERM) 5 % Patch 3 Patches, Apply externally, DAILY, To  "affected area    melatonin 6 mg, Oral, EVERY BEDTIME    pantoprazole (PROTONIX) 40 mg, Oral, 2 TIMES DAILY    sevelamer (RENAGEL) 800 mg, Oral, 3 TIMES DAILY WITH MEALS    traZODone (DESYREL) 50 mg, Oral, NIGHTLY    vitamin D2 (Ergocalciferol) (DRISDOL) 50,000 Units, Oral, EVERY 7 DAYS     ALLERGIES  Allergies   Allergen Reactions    Motrin [Ibuprofen]      hhx of stomach ulcers       PHYSICAL EXAM  VITAL SIGNS: /88   Pulse (!) 105   Temp (!) 38.6 °C (101.4 °F) (Temporal)   Resp 18   Ht 1.727 m (5' 8\")   Wt 77.1 kg (170 lb)   SpO2 94%   BMI 25.85 kg/m²   Reviewed and tachycardic, normotensive  Constitutional: Well developed, Well nourished,poor historian. Somnolent, able to answer yes/no questions  HENT: Normocephalic, atraumatic, bilateral external ears normal, wearing a mask.   Eyes: PERRLA, conjunctiva pink, no scleral icterus.   Cardiovascular: Port placed in the left chest. 1+ pitting edema to the lower extremities. Tachycardic rate and regular rhythm. No murmurs, rubs or gallops.  No calf tenderness  Respiratory: Lungs clear to auscultation bilaterally. No wheezes, rales, or rhonchi.  Abdominal:  No flank tenderness. Abdomen soft, non-tender, non distended. No rebound, or guarding.    Skin: No erythema, no rash.   Genitourinary: No costovertebral angle tenderness.   Musculoskeletal: Moving all extremities. no deformities.   Neurologic: Alert & oriented x 3, cranial nerves 2-12 intact by passive exam.  No focal deficit noted.  Psychiatric: Affect normal, Judgment normal, Mood normal.     DIFFERENTIAL DIAGNOSIS:  Line sepsis  UTI  PNA  Influenza  Covid.    RADIOLOGY/PROCEDURES  DX-CHEST-PORTABLE (1 VIEW)   Final Result      Stable enlargement of the cardiomediastinal silhouette without acute cardiopulmonary abnormality.      CT-HEAD W/O    (Results Pending)     Radiologist interpretation have been reviewed by me.     LABORATORY:  Results for orders placed or performed during the hospital encounter " of 09/13/22   LACTIC ACID   Result Value Ref Range    Lactic Acid 2.2 (H) 0.5 - 2.0 mmol/L   CBC WITH DIFFERENTIAL   Result Value Ref Range    WBC 4.3 (L) 4.8 - 10.8 K/uL    RBC 2.82 (L) 4.70 - 6.10 M/uL    Hemoglobin 8.5 (L) 14.0 - 18.0 g/dL    Hematocrit 26.8 (L) 42.0 - 52.0 %    MCV 95.0 81.4 - 97.8 fL    MCH 30.1 27.0 - 33.0 pg    MCHC 31.7 (L) 33.7 - 35.3 g/dL    RDW 68.5 (H) 35.9 - 50.0 fL    Platelet Count 78 (L) 164 - 446 K/uL    MPV 10.4 9.0 - 12.9 fL    Neutrophils-Polys 78.90 (H) 44.00 - 72.00 %    Lymphocytes 14.90 (L) 22.00 - 41.00 %    Monocytes 0.90 0.00 - 13.40 %    Eosinophils 0.90 0.00 - 6.90 %    Basophils 0.00 0.00 - 1.80 %    Nucleated RBC 0.50 /100 WBC    Neutrophils (Absolute) 3.54 1.82 - 7.42 K/uL    Lymphs (Absolute) 0.64 (L) 1.00 - 4.80 K/uL    Monos (Absolute) 0.04 0.00 - 0.85 K/uL    Eos (Absolute) 0.04 0.00 - 0.51 K/uL    Baso (Absolute) 0.00 0.00 - 0.12 K/uL    NRBC (Absolute) 0.02 K/uL    Anisocytosis 1+     Macrocytosis 1+    COMP METABOLIC PANEL   Result Value Ref Range    Sodium 136 135 - 145 mmol/L    Potassium 4.0 3.6 - 5.5 mmol/L    Chloride 93 (L) 96 - 112 mmol/L    Co2 22 20 - 33 mmol/L    Anion Gap 21.0 (H) 7.0 - 16.0    Glucose 65 65 - 99 mg/dL    Bun 23 (H) 8 - 22 mg/dL    Creatinine 7.46 (HH) 0.50 - 1.40 mg/dL    Calcium 8.9 8.5 - 10.5 mg/dL    AST(SGOT) 87 (H) 12 - 45 U/L    ALT(SGPT) 39 2 - 50 U/L    Alkaline Phosphatase 96 30 - 99 U/L    Total Bilirubin 0.7 0.1 - 1.5 mg/dL    Albumin 3.7 3.2 - 4.9 g/dL    Total Protein 7.4 6.0 - 8.2 g/dL    Globulin 3.7 (H) 1.9 - 3.5 g/dL    A-G Ratio 1.0 g/dL   CoV-2, FLU A/B, and RSV by PCR (2-4 Hours CEPHEID) : Collect NP swab in VTM    Specimen: Respirate   Result Value Ref Range    Influenza virus A RNA Negative Negative    Influenza virus B, PCR Negative Negative    RSV, PCR Negative Negative    SARS-CoV-2 by PCR NotDetected     SARS-CoV-2 Source NP Swab    Lab results reviewed by me.     INTERVENTIONS:Indications IV Fluid:  medication administration  Medications   vancomycin (VANCOCIN) 2,000 mg in  mL IVPB (has no administration in time range)   D10%-0.45% NaCl infusion (has no administration in time range)   NS infusion 250 mL (250 mL Intravenous New Bag 9/13/22 1625)   dextrose 10 % BOLUS 25 g (25 g Intravenous Given 9/13/22 1731)     Response:mildly improved.    ED COURSE:  Nursing notes, VS, PMSFHx reviewed in chart. Patient was last admitted in June for abdominal pain secondary to pancreatitis flare. No recent CT of head or abdomen has been done. Not had cultures in the last year. He has a history of pancreatitis, CAD, RA, end stage renal disease, gout and heart failure.      4:00 PM - Patient seen and examined at bedside. Patient will be treated with Vancomycin 2000 mg in  mL and hydrated with 250 mL NS for his symptoms. Ordered DX-chest, Lactic acid, Blood culture, UA, CMP, CBC with differential, and CoV-2, FLU A/B, and RSV by PCR (2-4 Hours CEPHEID) : Collect NP swab in VTM  to evaluate.     5:35 PM- Paged ICU.     5:45 PM I discussed the patient's case and the above findings with Dr. Jimenez (ICU) who believes patient is a candidate for step down.    5:46 PM Paged hospitalist.     5:54 PM I discussed the patient's case and the above findings with Dr. Goldberg (hospitalist) who agrees to hospitalize the patient and take over the plan of care moving forward.     Patient remained somnolent and point-of-care glucose was checked and was 50.  He was given 25 g of D50 and then started on a D10 drip.  He was more alert after the D50 he still remains somnolent.  Head CT still pending given somnolence and thrombocytopenia to exclude subdural hematoma.    CRITICAL CARE  The very real possibilty of a deterioration of this patient's condition required the highest level of my preparedness for sudden, emergent intervention.  I provided critical care services, which included medication orders, frequent reevaluations of the  patient's condition and response to treatment, ordering and reviewing test results, and discussing the case with various consultants.  The critical care time associated with the care of the patient was 32 minutes. Review chart for interventions. This time is exclusive of any other billable procedures.     MEDICAL DECISION MAKING:  This patient presents with confusion and fever hypotension and hypoglycemia at dialysis.  He likely has line sepsis.  Subdural hematoma be excluded.  He has known A. fib and does have moderately rapid A. fib.  The dose of diltiazem was ordered by Dr. Esteban ICU feels that 101 20 is acceptable and the diltiazem was held.    PLAN:  DISPOSITION:  Patient will be hospitalized by Dr. Goldberg in critical condition for IV antibiotics, IV glucose, head CT, pressors if needed, dialysis if needed.    CONDITION: critical.     FINAL IMPRESSION  1. Sepsis, due to unspecified organism, unspecified whether acute organ dysfunction present (HCC)    2. ESRD on hemodialysis (HCC)    3.    Critical care     Mary MOORE (Manish), am scribing for, and in the presence of, Trav Cobb M.D..    Electronically signed by: Mary Lira (Alissaibjessie), 9/13/2022    Trav MOORE M.D. personally performed the services described in this documentation, as scribed by Mary Lira in my presence, and it is both accurate and complete.    The note accurately reflects work and decisions made by me.  Trav Cobb M.D.  9/13/2022  7:03 PM

## 2022-09-13 NOTE — ED NOTES
Pt now more alert and oriented. States that he has not been taking care of himself and or eating much.

## 2022-09-14 ENCOUNTER — APPOINTMENT (OUTPATIENT)
Dept: RADIOLOGY | Facility: MEDICAL CENTER | Age: 72
DRG: 314 | End: 2022-09-14
Attending: HOSPITALIST
Payer: COMMERCIAL

## 2022-09-14 PROBLEM — R78.81 GRAM-NEGATIVE BACTEREMIA: Status: ACTIVE | Noted: 2022-09-14

## 2022-09-14 LAB
ALBUMIN SERPL BCP-MCNC: 2.9 G/DL (ref 3.2–4.9)
ALBUMIN/GLOB SERPL: 0.9 G/DL
ALP SERPL-CCNC: 86 U/L (ref 30–99)
ALT SERPL-CCNC: 31 U/L (ref 2–50)
ANION GAP SERPL CALC-SCNC: 14 MMOL/L (ref 7–16)
AST SERPL-CCNC: 64 U/L (ref 12–45)
BILIRUB SERPL-MCNC: 0.4 MG/DL (ref 0.1–1.5)
BUN SERPL-MCNC: 25 MG/DL (ref 8–22)
CALCIUM SERPL-MCNC: 8.3 MG/DL (ref 8.5–10.5)
CHLORIDE SERPL-SCNC: 98 MMOL/L (ref 96–112)
CO2 SERPL-SCNC: 24 MMOL/L (ref 20–33)
CREAT SERPL-MCNC: 7.94 MG/DL (ref 0.5–1.4)
ERYTHROCYTE [DISTWIDTH] IN BLOOD BY AUTOMATED COUNT: 68.2 FL (ref 35.9–50)
GFR SERPLBLD CREATININE-BSD FMLA CKD-EPI: 7 ML/MIN/1.73 M 2
GLOBULIN SER CALC-MCNC: 3.1 G/DL (ref 1.9–3.5)
GLUCOSE BLD STRIP.AUTO-MCNC: 136 MG/DL (ref 65–99)
GLUCOSE BLD STRIP.AUTO-MCNC: 144 MG/DL (ref 65–99)
GLUCOSE BLD STRIP.AUTO-MCNC: 155 MG/DL (ref 65–99)
GLUCOSE SERPL-MCNC: 148 MG/DL (ref 65–99)
HCT VFR BLD AUTO: 23 % (ref 42–52)
HGB BLD-MCNC: 7.4 G/DL (ref 14–18)
MCH RBC QN AUTO: 30.5 PG (ref 27–33)
MCHC RBC AUTO-ENTMCNC: 32.2 G/DL (ref 33.7–35.3)
MCV RBC AUTO: 94.7 FL (ref 81.4–97.8)
PLATELET # BLD AUTO: 68 K/UL (ref 164–446)
PMV BLD AUTO: 9.9 FL (ref 9–12.9)
POTASSIUM SERPL-SCNC: 3.7 MMOL/L (ref 3.6–5.5)
PROT SERPL-MCNC: 6 G/DL (ref 6–8.2)
RBC # BLD AUTO: 2.43 M/UL (ref 4.7–6.1)
SCCMEC + MECA PNL NOSE NAA+PROBE: POSITIVE
SODIUM SERPL-SCNC: 136 MMOL/L (ref 135–145)
WBC # BLD AUTO: 7.8 K/UL (ref 4.8–10.8)

## 2022-09-14 PROCEDURE — 700102 HCHG RX REV CODE 250 W/ 637 OVERRIDE(OP): Performed by: STUDENT IN AN ORGANIZED HEALTH CARE EDUCATION/TRAINING PROGRAM

## 2022-09-14 PROCEDURE — 99223 1ST HOSP IP/OBS HIGH 75: CPT | Performed by: INTERNAL MEDICINE

## 2022-09-14 PROCEDURE — 85027 COMPLETE CBC AUTOMATED: CPT

## 2022-09-14 PROCEDURE — 700111 HCHG RX REV CODE 636 W/ 250 OVERRIDE (IP): Performed by: HOSPITALIST

## 2022-09-14 PROCEDURE — 770000 HCHG ROOM/CARE - INTERMEDIATE ICU *

## 2022-09-14 PROCEDURE — 99233 SBSQ HOSP IP/OBS HIGH 50: CPT | Performed by: HOSPITALIST

## 2022-09-14 PROCEDURE — 93971 EXTREMITY STUDY: CPT | Mod: LT

## 2022-09-14 PROCEDURE — A9270 NON-COVERED ITEM OR SERVICE: HCPCS | Performed by: STUDENT IN AN ORGANIZED HEALTH CARE EDUCATION/TRAINING PROGRAM

## 2022-09-14 PROCEDURE — A9270 NON-COVERED ITEM OR SERVICE: HCPCS | Performed by: HOSPITALIST

## 2022-09-14 PROCEDURE — 80053 COMPREHEN METABOLIC PANEL: CPT

## 2022-09-14 PROCEDURE — 700102 HCHG RX REV CODE 250 W/ 637 OVERRIDE(OP): Performed by: HOSPITALIST

## 2022-09-14 PROCEDURE — 700105 HCHG RX REV CODE 258: Performed by: HOSPITALIST

## 2022-09-14 PROCEDURE — 93971 EXTREMITY STUDY: CPT | Mod: 26,LT | Performed by: INTERNAL MEDICINE

## 2022-09-14 PROCEDURE — 82962 GLUCOSE BLOOD TEST: CPT

## 2022-09-14 RX ORDER — SODIUM CHLORIDE 9 MG/ML
1000 INJECTION, SOLUTION INTRAVENOUS ONCE
Status: COMPLETED | OUTPATIENT
Start: 2022-09-14 | End: 2022-09-15

## 2022-09-14 RX ORDER — ATORVASTATIN CALCIUM 20 MG/1
20 TABLET, FILM COATED ORAL NIGHTLY
Status: DISCONTINUED | OUTPATIENT
Start: 2022-09-14 | End: 2022-09-22 | Stop reason: HOSPADM

## 2022-09-14 RX ORDER — ALLOPURINOL 100 MG/1
100 TABLET ORAL
Status: DISCONTINUED | OUTPATIENT
Start: 2022-09-14 | End: 2022-09-22 | Stop reason: HOSPADM

## 2022-09-14 RX ORDER — GABAPENTIN 300 MG/1
300 CAPSULE ORAL
Status: DISCONTINUED | OUTPATIENT
Start: 2022-09-14 | End: 2022-09-22 | Stop reason: HOSPADM

## 2022-09-14 RX ORDER — SODIUM CHLORIDE 9 MG/ML
INJECTION, SOLUTION INTRAVENOUS CONTINUOUS
Status: ACTIVE | OUTPATIENT
Start: 2022-09-14 | End: 2022-09-14

## 2022-09-14 RX ORDER — SEVELAMER CARBONATE 800 MG/1
800 TABLET, FILM COATED ORAL
Status: DISCONTINUED | OUTPATIENT
Start: 2022-09-14 | End: 2022-09-22 | Stop reason: HOSPADM

## 2022-09-14 RX ORDER — SODIUM CHLORIDE 9 MG/ML
500 INJECTION, SOLUTION INTRAVENOUS ONCE
Status: COMPLETED | OUTPATIENT
Start: 2022-09-14 | End: 2022-09-14

## 2022-09-14 RX ORDER — OMEPRAZOLE 20 MG/1
20 CAPSULE, DELAYED RELEASE ORAL DAILY
Status: DISCONTINUED | OUTPATIENT
Start: 2022-09-14 | End: 2022-09-22 | Stop reason: HOSPADM

## 2022-09-14 RX ORDER — OXYCODONE HYDROCHLORIDE 5 MG/1
5 TABLET ORAL EVERY 4 HOURS PRN
Status: DISCONTINUED | OUTPATIENT
Start: 2022-09-14 | End: 2022-09-18

## 2022-09-14 RX ORDER — SODIUM CHLORIDE 9 MG/ML
1000 INJECTION, SOLUTION INTRAVENOUS CONTINUOUS
Status: DISCONTINUED | OUTPATIENT
Start: 2022-09-14 | End: 2022-09-14

## 2022-09-14 RX ORDER — MIDODRINE HYDROCHLORIDE 5 MG/1
10 TABLET ORAL
Status: DISCONTINUED | OUTPATIENT
Start: 2022-09-14 | End: 2022-09-15

## 2022-09-14 RX ADMIN — CEFEPIME 1 G: 1 INJECTION, POWDER, FOR SOLUTION INTRAMUSCULAR; INTRAVENOUS at 21:51

## 2022-09-14 RX ADMIN — OXYCODONE 5 MG: 5 TABLET ORAL at 17:37

## 2022-09-14 RX ADMIN — MIDODRINE HYDROCHLORIDE 10 MG: 5 TABLET ORAL at 17:29

## 2022-09-14 RX ADMIN — OXYCODONE 5 MG: 5 TABLET ORAL at 13:23

## 2022-09-14 RX ADMIN — SODIUM CHLORIDE 500 ML: 9 INJECTION, SOLUTION INTRAVENOUS at 09:05

## 2022-09-14 RX ADMIN — ALLOPURINOL 100 MG: 100 TABLET ORAL at 16:18

## 2022-09-14 RX ADMIN — SEVELAMER CARBONATE 800 MG: 800 TABLET, FILM COATED ORAL at 17:30

## 2022-09-14 RX ADMIN — GABAPENTIN 300 MG: 300 CAPSULE ORAL at 21:44

## 2022-09-14 RX ADMIN — MIDODRINE HYDROCHLORIDE 10 MG: 5 TABLET ORAL at 06:21

## 2022-09-14 RX ADMIN — SODIUM CHLORIDE 500 ML: 9 INJECTION, SOLUTION INTRAVENOUS at 10:30

## 2022-09-14 RX ADMIN — SODIUM CHLORIDE 1000 ML: 9 INJECTION, SOLUTION INTRAVENOUS at 11:56

## 2022-09-14 RX ADMIN — MIDODRINE HYDROCHLORIDE 10 MG: 5 TABLET ORAL at 10:37

## 2022-09-14 RX ADMIN — ATORVASTATIN CALCIUM 20 MG: 20 TABLET, FILM COATED ORAL at 21:44

## 2022-09-14 RX ADMIN — OMEPRAZOLE 20 MG: 20 CAPSULE, DELAYED RELEASE ORAL at 16:18

## 2022-09-14 RX ADMIN — CEFEPIME 1 G: 1 INJECTION, POWDER, FOR SOLUTION INTRAMUSCULAR; INTRAVENOUS at 11:21

## 2022-09-14 RX ADMIN — OXYCODONE 5 MG: 5 TABLET ORAL at 21:44

## 2022-09-14 ASSESSMENT — PAIN DESCRIPTION - PAIN TYPE
TYPE: CHRONIC PAIN

## 2022-09-14 ASSESSMENT — ENCOUNTER SYMPTOMS
NEUROLOGICAL NEGATIVE: 1
EYES NEGATIVE: 1
RESPIRATORY NEGATIVE: 1
CARDIOVASCULAR NEGATIVE: 1
GASTROINTESTINAL NEGATIVE: 1
NERVOUS/ANXIOUS: 1
MYALGIAS: 1
BACK PAIN: 1

## 2022-09-14 ASSESSMENT — FIBROSIS 4 INDEX: FIB4 SCORE: 11.83

## 2022-09-14 NOTE — ASSESSMENT & PLAN NOTE
This is Sepsis Present on admission  SIRS criteria identified on my evaluation include: Fever, with temperature greater than 101 deg F  Source is unknown, possible dialysis catheter infection  Sepsis protocol initiated  Fluid resuscitation ordered per protocol  Crystalloid Fluid Administration: Patient has advanced or end-stage chronic renal disease (with documentation of stage IV or GFR 15-29 mL/min, or stage V or GFR < 15 mL/min or ESRD), for this/these reason(s) it is unsafe for this patient to receive 30 mL/kg of fluid  Partial Fluid Dose Given: patient to be reassessed shortly after completion of partial fluid bolus to ensure adequacy of resuscitation  IV antibiotics as appropriate for source of sepsis  Reassessment: I have reassessed the patient's hemodynamic status  9/18/2022:  -Resolved

## 2022-09-14 NOTE — ASSESSMENT & PLAN NOTE
Unclear etiology, likely sepsis related  Not a diabetic patient  Improved now  9/18/2022-continue present medical management

## 2022-09-14 NOTE — ED NOTES
The pt is alert and oriented on room air. The pt is being transported to [facility] by RN x2 and on monitor. Vitals stable. IV infusing. Report given to Dorothy GARCIA

## 2022-09-14 NOTE — DISCHARGE PLANNING
Outpatient Dialysis Note     Confirmed patient is active at:     [facility]   [address]  [place] [address]       Schedule: Tuesday, Thursday, Saturday  Time: 10:45 AM     Patient is seen by Dr. Knott in HD clinic.     Spoke with Kelli at facility who confirmed.     Forwarded records for review.     Xitlaly Reyes   Dialysis Coordinator / [facility] [employer]  Ph#: [phone]

## 2022-09-14 NOTE — PROGRESS NOTES
Hospital Medicine Daily Progress Note    Date of Service  9/14/2022    Chief Complaint  Junior Proctor is a 72 y.o. male admitted 9/13/2022 with hypotension and altered level of consciousness after dialysis    Hospital Course  Is a 72-year-old male with a history of end-stage renal disease, presents with altered mental status, hypoglycemia and hypotension.  Reportedly the patient just had hemodialysis via a left upper chest tunneled dialysis catheter, he was found altered, hypotensive with a blood pressure of 80s over 40s and hypoglycemic with a blood glucose level of 50.  The patient emergency room was found with elevated temperature of 101.4, tachycardia, white cell count of 4.3, hemoglobin 8.5, platelet count 78.  The patient was started on broad-spectrum antibiotics with concern for possible sepsis in form of ceftriaxone and vancomycin.    Interval Problem Update  Patient seen and examined today.  Data, Medication data reviewed.  Case discussed with nursing as available.  Plan of Care reviewed with patient and notified of changes.   9/14 the patient appears somewhat improved, he required additional fluid boluses to maintain his blood pressure, he denies fevers or chills, no abdominal pain, he has very little urination, he complains of generalized joint pain in his hands, shoulders, ankles, this appears to be chronic, the patient is chronically using narcotics for pain control, he denies difficulty with infection of his fistula or dialysis catheter.  He denies nausea vomiting, no abdominal pain.  Nephrology has been consulted  Infectious disease has been consulted after patient had positive blood cultures reported x2 gram-negative    I have discussed this patient's plan of care and discharge plan at IDT rounds today with Case Management, Nursing, Nursing leadership, and other members of the IDT team.    Consultants/Specialty  infectious disease and nephrology    Code Status  Full Code    Disposition  Patient is  not medically cleared for discharge.   Anticipate discharge to to home with close outpatient follow-up.  I have placed the appropriate orders for post-discharge needs.    Review of Systems  Review of Systems   Constitutional:  Positive for malaise/fatigue.   HENT: Negative.     Eyes: Negative.    Respiratory: Negative.     Cardiovascular: Negative.    Gastrointestinal: Negative.    Genitourinary: Negative.    Musculoskeletal:  Positive for back pain, joint pain and myalgias.   Skin: Negative.    Neurological: Negative.    Endo/Heme/Allergies: Negative.    Psychiatric/Behavioral:  The patient is nervous/anxious.    All other systems reviewed and are negative.     Physical Exam  Temp:  [38.6 °C (101.4 °F)] 38.6 °C (101.4 °F)  Pulse:  [] 91  Resp:  [13-37] 18  BP: ()/(37-88) 84/37  SpO2:  [80 %-100 %] 97 %    Physical Exam  Vitals and nursing note reviewed.   Constitutional:       Appearance: He is well-developed. He is ill-appearing.      Comments: Pt seen and examined.   HENT:      Head: Normocephalic and atraumatic.   Eyes:      Pupils: Pupils are equal, round, and reactive to light.   Cardiovascular:      Rate and Rhythm: Normal rate and regular rhythm.      Heart sounds: Normal heart sounds.   Pulmonary:      Effort: Pulmonary effort is normal.      Breath sounds: Normal breath sounds.   Abdominal:      General: Bowel sounds are normal.      Palpations: Abdomen is soft.   Musculoskeletal:         General: Normal range of motion.      Cervical back: Normal range of motion and neck supple.   Skin:     General: Skin is warm and dry.      Coloration: Skin is pale.   Neurological:      General: No focal deficit present.      Mental Status: He is alert and oriented to person, place, and time.   Psychiatric:         Behavior: Behavior normal.       Fluids    Intake/Output Summary (Last 24 hours) at 9/14/2022 0773  Last data filed at 9/14/2022 0400  Gross per 24 hour   Intake 1990.1 ml   Output --   Net 1990.1  ml       Laboratory  Recent Labs     09/13/22  1620 09/14/22  0406   WBC 4.3* 7.8   RBC 2.82* 2.43*   HEMOGLOBIN 8.5* 7.4*   HEMATOCRIT 26.8* 23.0*   MCV 95.0 94.7   MCH 30.1 30.5   MCHC 31.7* 32.2*   RDW 68.5* 68.2*   PLATELETCT 78* 68*   MPV 10.4 9.9     Recent Labs     09/13/22  1620 09/14/22  0406   SODIUM 136 136   POTASSIUM 4.0 3.7   CHLORIDE 93* 98   CO2 22 24   GLUCOSE 65 148*   BUN 23* 25*   CREATININE 7.46* 7.94*   CALCIUM 8.9 8.3*                   Imaging  US-EXTREMITY VENOUS UPPER UNILAT LEFT   Final Result      CT-HEAD W/O   Final Result         1. No acute intracranial abnormality. No evidence of acute intracranial hemorrhage or mass lesion.                     DX-CHEST-PORTABLE (1 VIEW)   Final Result      Stable enlargement of the cardiomediastinal silhouette without acute cardiopulmonary abnormality.      EC-ECHOCARDIOGRAM COMPLETE W/O CONT    (Results Pending)        Assessment/Plan  * Sepsis (HCC)- (present on admission)  Assessment & Plan  This is Sepsis Present on admission  SIRS criteria identified on my evaluation include: Fever, with temperature greater than 101 deg F  Source is unknown, possible dialysis catheter infection  Sepsis protocol initiated  Fluid resuscitation ordered per protocol  Crystalloid Fluid Administration: Patient has advanced or end-stage chronic renal disease (with documentation of stage IV or GFR 15-29 mL/min, or stage V or GFR < 15 mL/min or ESRD), for this/these reason(s) it is unsafe for this patient to receive 30 mL/kg of fluid  Partial Fluid Dose Given: patient to be reassessed shortly after completion of partial fluid bolus to ensure adequacy of resuscitation  IV antibiotics as appropriate for source of sepsis  Reassessment: I have reassessed the patient's hemodynamic status      Gram-negative bacteremia  Assessment & Plan  Unclear source, certainly possibility of line infection given patient's hemodialysis catheter  Get echocardiogram  ID consult  Follow  cultures  Will likely need line removal and replacement    Hypoglycemia  Assessment & Plan  Unclear etiology  Not a diabetic patient  Related to sepsis and underlying infection?  S/p IV fluids with dextrose in ER, recheck blood sugar, monitor accuchecks q4h    Altered mental status  Assessment & Plan  Likely due to hypoglycemia in setting of suspected sepsis  Sepsis of unclear etiology, possible line infection given dialysis catheter present  Mental status has since normalized  CXR stable      Arthritis- (present on admission)  Assessment & Plan  Pain management      ESRD on dialysis (HCC)- (present on admission)  Assessment & Plan  Presents from dialysis after dialysis session completed  Found to be hypotensive, hypoglycemic, febrile  No urgent need for hemodialysis, nephrology consulted  Treat possible infection with IV antibiotics, blood cultures obtained  May need likely dialysis catheter replacement in 48 hours    Anemia- (present on admission)  Assessment & Plan  Likely anemia of chronic disease, renal disease     Plan  Continue with empiric antibiotics, follow cultures closely  Nephrology evaluation  Surgical disease evaluation  Pain control  Will likely needs hemodialysis catheter removed  Follow labs closely  See orders  Wean midodrine as possible  Medically complex high risk patient    VTE prophylaxis: heparin ppx, the patient has been refusing heparin injections    I have performed a physical exam and reviewed and updated ROS and Plan today (9/14/2022). In review of yesterday's note (9/13/2022), there are no changes except as documented above.        Please note that this dictation was created using voice recognition software. I have made every reasonable attempt to correct obvious errors, but I expect that there are errors of grammar and possibly context that I did not discover before finalizing the note.

## 2022-09-14 NOTE — ASSESSMENT & PLAN NOTE
Related to infection and possible volume loss  Several boluses of IV fluids have been given, currently with good results, monitor closely  9/18/2022:  -Resolved

## 2022-09-14 NOTE — ASSESSMENT & PLAN NOTE
Unclear source, certainly possibility of line infection given patient's hemodialysis catheter  Get echocardiogram, MRCP abdomen given the patient's history of pancreatitis with stent placement which might be retained  ID consult  Follow cultures  9/18/2022:  Continue with Ancef infectious disease recommendations appreciated  9/19/2022:  Patient will continue on antimicrobials until 9/21/2022 during dialysis 2g-2g-3g on M-W-F

## 2022-09-14 NOTE — PROGRESS NOTES
Pt refusing heparin SQ. Pt educated by RN risk for blood clots from having an infx/sepsis, and other risk factors. Pt also educated recent hx of PE and MI in 2021. PT verbalized understanding and still wished to refuse. MD made aware. SCDs placed on patient.

## 2022-09-14 NOTE — PROGRESS NOTES
Pulmonary and Critical Care Medicine Progress Note    I had the pleasure of evaluating this gentleman in the ED for admission disposition.  He presents with sepsis, ESRD, hypoglycemia.  He may be safely admitted to the IMCU.  Please don't hesitate to contact [facility] if this gentleman develops any deterioration.    Butch Jimenez MD  Pulmonary and Critical Care Medicine

## 2022-09-14 NOTE — CARE PLAN
The patient is Watcher - Medium risk of patient condition declining or worsening         Progress made toward(s) clinical / shift goals:    Problem: Hemodynamics  Goal: Patient's hemodynamics, fluid balance and neurologic status will be stable or improve  9/14/2022 0459 by Andrew G Reyes, R.N.  Outcome: Progressing  9/13/2022 2311 by Andrew G Reyes, R.N.  Outcome: Progressing     Problem: Respiratory  Goal: Patient will achieve/maintain optimum respiratory ventilation and gas exchange  Outcome: Progressing     Problem: Pain - Standard  Goal: Alleviation of pain or a reduction in pain to the patient’s comfort goal  9/14/2022 0459 by Andrew G Reyes, R.N.  Outcome: Progressing  9/13/2022 2311 by Andrew G Reyes, R.N.  Outcome: Progressing       Patient is not progressing towards the following goals:

## 2022-09-14 NOTE — CONSULTS
[facility] INFECTIOUS DISEASES INPATIENT CONSULT NOTE     Date of Service: 9/14/2022    Consult Requested By: Marco Antonio Desai M.D.    Reason for Consultation: Gram-negative bacteremia    Chief Complaint: Altered mental status    History of Present Illness:     Junior Proctor is a 72 y.o. male admitted 9/13/2022.  Patient with ESRD on HD via tunneled dialysis catheter, hypertension, had dialysis but was noted to be altered following his treatment and with hypotension to 80s over 40s, hypoglycemic with a blood glucose of 50.  He was febrile to 101.4 in the ER tachycardic to 105.  Patient is admitted to the IMCU.  2 out of 2 blood cultures are positive for GNR.  Left upper extremity ultrasound with no DVT.      Patient states he cannot remember the days before he was sick on dialysis day but he was most likely feeling well prior to that.  He does make some amount of urine but has had no issues with dysuria or change in frequency.  No abdominal pain, no diarrhea, no shortness of breath or cough.    Review of Systems:  All other systems reviewed and are negative expect as noted in HPI    Past Medical History:   Diagnosis Date    Gout     Hemodialysis patient (HCC)     Hypertension     Kidney disease     MI (myocardial infarction) (HCC)        Past Surgical History:   Procedure Laterality Date    OH ERCP,DIAGNOSTIC N/A 6/5/2022    Procedure: ERCP (ENDOSCOPIC RETROGRADE CHOLANGIOPANCREATOGRAPHY);  Surgeon: Ibrahima Daly M.D.;  Location: SURGERY [facility];  Service: Gastroenterology    OH UPPER GI ENDOSCOPY,DIAGNOSIS N/A 12/10/2021    Procedure: GASTROSCOPY;  Surgeon: Paddy Hooks M.D.;  Location: SURGERY SAME DAY [facility];  Service: Gastroenterology    OH COLONOSCOPY,DIAGNOSTIC N/A 12/10/2021    Procedure: COLONOSCOPY;  Surgeon: Paddy Hooks M.D.;  Location: SURGERY SAME DAY [facility];  Service: Gastroenterology    OH UPPER GI ENDOSCOPY,BIOPSY N/A 12/10/2021    Procedure: GASTROSCOPY, WITH BIOPSY;  Surgeon: Paddy  EDGAR Hooks M.D.;  Location: SURGERY SAME DAY [facility];  Service: Gastroenterology    [place] COLONOSCOPY,BIOPSY N/A 12/10/2021    Procedure: COLONOSCOPY, WITH BIOPSY;  Surgeon: Paddy Hooks M.D.;  Location: SURGERY SAME DAY [facility];  Service: Gastroenterology       Family History   Problem Relation Age of Onset    Diabetes Mother        Social History     Socioeconomic History    Marital status: Single     Spouse name: Not on file    Number of children: Not on file    Years of education: Not on file    Highest education level: Not on file   Occupational History    Not on file   Tobacco Use    Smoking status: Former    Smokeless tobacco: Never   Vaping Use    Vaping Use: Never used   Substance and Sexual Activity    Alcohol use: Yes     Comment: occ    Drug use: Never    Sexual activity: Not on file   Other Topics Concern    Not on file   Social History Narrative    Not on file     Social Determinants of Health     Financial Resource Strain: Not on file   Food Insecurity: Not on file   Transportation Needs: Not on file   Physical Activity: Not on file   Stress: Not on file   Social Connections: Not on file   Intimate Partner Violence: Not on file   Housing Stability: Not on file       Allergies   Allergen Reactions    Motrin [Ibuprofen]      hhx of stomach ulcers       Medications:    Current Facility-Administered Medications:     midodrine (PROAMATINE) tablet 10 mg, 10 mg, Oral, TID WITH MEALS, Osito Wiseman M.D., 10 mg at 09/14/22 1037    oxyCODONE immediate-release (ROXICODONE) tablet 5 mg, 5 mg, Oral, Q4HRS PRN, Marco Antonio Desai M.D.    NS infusion, , Intravenous, Continuous, Marco Antonio Desai M.D., Held at 09/14/22 0915    cefepime (Maxipime) 1 g in  mL IVPB, 1 g, Intravenous, QHS, Marco Antonio Desai M.D.    senna-docusate (PERICOLACE or SENOKOT S) 8.6-50 MG per tablet 2 Tablet, 2 Tablet, Oral, BID **AND** polyethylene glycol/lytes (MIRALAX) PACKET 1 Packet, 1 Packet, Oral, QDAY PRN **AND**  "magnesium hydroxide (MILK OF MAGNESIA) suspension 30 mL, 30 mL, Oral, QDAY PRN **AND** bisacodyl (DULCOLAX) suppository 10 mg, 10 mg, Rectal, QDAY PRN, Afshin Goldberg M.D.    heparin injection 5,000 Units, 5,000 Units, Subcutaneous, Q8HRS, Afshin Goldberg M.D., 5,000 Units at 22 2208    acetaminophen (Tylenol) tablet 650 mg, 650 mg, Oral, Q6HRS PRN, Afshin Goldberg M.D.    MD Alert...Vancomycin per Pharmacy, , Other, PHARMACY TO DOSE, Afshin Goldberg M.D.    Physical Exam:   Vital Signs: BP (!) 82/49   Pulse 87   Temp 36.9 °C (98.4 °F) (Temporal)   Resp (!) 35   Ht 1.727 m (5' 8\")   Wt 71 kg (156 lb 8.4 oz)   SpO2 93%   BMI 23.80 kg/m²   Temp  Av.7 °C (99.9 °F)  Min: 36.9 °C (98.4 °F)  Max: 38.6 °C (101.4 °F)  Vital signs reviewed    Constitutional: Patient is oriented to person, place, and time. Appears well-developed and well-nourished. No distress  Head: Atraumatic, normocephalic  Eyes: Conjunctivae normal, EOM intact. Pupils are equal, round, and reactive to light.   Mouth/Throat: Lips without lesions, good dentition, oropharynx is clear and moist.  Neck: Neck supple. No masses/lymphadenopathy  Cardiovascular: Normal rate, regular rhythm, normal S1S2 and intact distal pulses. No murmur, gallop, or friction rub. No pedal edema.  Pulmonary/Chest: No respiratory distress. Unlabored respiratory effort, lungs clear to auscultation. No wheezes or rales.   Abdominal: Soft, non tender. BS + x 4. No masses or hepatosplenomegaly.   Musculoskeletal: No joint tenderness, swelling, erythema, or restriction of motion noted.  Neurological: Alert and oriented to person, place, and time. No gross cranial nerve deficit. No focal neural deficit noted  Skin: Skin is warm and dry. No rashes or embolic phenomena noted on exposed skin  Psychiatric: Normal mood and affect. Behavior is normal.     LABS:  Recent Labs     22  1620 22  0406   WBC 4.3* 7.8      Recent Labs     22  0406 "   HEMOGLOBIN 8.5* 7.4*   HEMATOCRIT 26.8* 23.0*   MCV 95.0 94.7   MCH 30.1 30.5   MACROCYTOSIS 1+  --    ANISOCYTOSIS 1+  --    PLATELETCT 78* 68*       Recent Labs     09/13/22  1620 09/14/22  0406   SODIUM 136 136   POTASSIUM 4.0 3.7   CHLORIDE 93* 98   CO2 22 24   CREATININE 7.46* 7.94*        Recent Labs     09/13/22  1620 09/14/22  0406   ALBUMIN 3.7 2.9*        MICRO:  Blood Culture Hold   Date Value Ref Range Status   11/23/2021 Collected  Final        Latest pertinent labs were reviewed    IMAGING STUDIES:  As above    Hospital Course/Assessment:   Junior Proctor is a 72 y.o. male patient with ESRD on HD, admitted with acute onset of sepsis with GNR bacteremia.  No obvious source identified, suspect tunneled dialysis catheter infection.    Pertinent Diagnoses:  Sepsis  Gram-negative bacteremia  Tunneled dialysis catheter in place  ESRD on HD  Acute encephalopathy, improved    Plan:   -Mental status is improved.  Continue IV cefepime 1 g every 24 hours renally dose for now.  Discontinue vancomycin  -Follow identification and sensitivities of the GNR.  Recommend removal of the tunneled dialysis catheter after next dialysis session followed by line holiday  -Repeat blood cultures x2 in a.m.  -Recommend chest x-ray    Plan was discussed with the primary team, Dr. Giselle Pena M.D.    Please note that this dictation was created using voice recognition software. I have worked with technical experts from [facility] to optimize the interface.  I have made every reasonable attempt to correct obvious errors, but there may be errors of grammar and possibly content that I did not discover before finalizing the note.

## 2022-09-14 NOTE — PROGRESS NOTES
4 Eyes Skin Assessment Completed by RADHA Vitale and RADHA De La Cruz.    Head WDL  Ears WDL  Nose WDL  Mouth WDL  Neck WDL  Breast/Chest WDL  Shoulder Blades WDL  Spine WDL  (R) Arm/Elbow/Hand WDL  (L) Arm/Elbow/Hand WDL  Abdomen WDL  Groin WDL  Scrotum/Coccyx/Buttocks Redness and Blanching  (R) Leg Redness, Scab, and Abrasion  (L) Leg Redness, Rash, Scab, and Abrasion  (R) Heel/Foot/Toe Boggy  (L) Heel/Foot/Toe Boggy          Devices In Places ECG, Tele Box, Blood Pressure Cuff, Pulse Ox, and Nasal Cannula      Interventions In Place Gray Ear Foams and Q2 Turns    Possible Skin Injury No    Pictures Uploaded Into Epic Yes  Wound Consult Placed Yes  RN Wound Prevention Protocol Ordered No

## 2022-09-14 NOTE — ASSESSMENT & PLAN NOTE
Likely due to hypoglycemia in setting of suspected sepsis  Sepsis of unclear etiology, possible line infection given dialysis catheter present  Mental status has since normalized  CXR stable  -Resolved

## 2022-09-14 NOTE — CARE PLAN
The patient is Watcher - Medium risk of patient condition declining or worsening    Shift Goals  Clinical Goals: hemodynamically stable    Progress made toward(s) clinical / shift goals:  BP improved after 2L NS, SBP 100s, MAPs >65, pt has c/o hip/feet pain from arthritis, prn oxycodone given, pt is AxOx4, drowsy, will continue to monitor       Problem: Knowledge Deficit - Standard  Goal: Patient and family/care givers will demonstrate understanding of plan of care, disease process/condition, diagnostic tests and medications  Outcome: Progressing     Problem: Hemodynamics  Goal: Patient's hemodynamics, fluid balance and neurologic status will be stable or improve  Outcome: Progressing     Problem: Fluid Volume  Goal: Fluid volume balance will be maintained  Outcome: Progressing     Problem: Urinary - Renal Perfusion  Goal: Ability to achieve and maintain adequate renal perfusion and functioning will improve  Outcome: Progressing     Problem: Physical Regulation  Goal: Diagnostic test results will improve  Outcome: Progressing  Goal: Signs and symptoms of infection will decrease  Outcome: Progressing     Problem: Pain - Standard  Goal: Alleviation of pain or a reduction in pain to the patient’s comfort goal  Outcome: Progressing     Problem: Skin Integrity  Goal: Skin integrity is maintained or improved  Outcome: Progressing

## 2022-09-14 NOTE — CONSULTS
[facility] -  CONSULTATION NOTE               Author: Tristen Cortez M.D. Date & Time: 9/14/2022  1:45 PM       REASON FOR CONSULTATION:   Inpatient hemodialysis management.    CHIEF COMPLAINT:   I don't remember what happened    HISTORY OF PRESENT ILLNESS:    Junior Proctor is a 72 y.o. male admitted 9/13/2022.  Patient with ESRD on HD via tunneled dialysis catheter, hypertension, had dialysis but was noted to be altered following his treatment and with hypotension to 80s over 40s, hypoglycemic with a blood glucose of 50.  He was febrile to 101.4 in the ER tachycardic to 105.  Patient is admitted to the IMCU.  2 out of 2 blood cultures are positive for GNR.  Nephrology was asked to consult for ESRD management.    No F/C/N/V/CP/SOB.  No melena, hematochezia, hematemesis.  No HA, visual changes, or abdominal pain.    REVIEW OF SYSTEMS:    10 point ROS was performed and is as per HPI or otherwise negative.    PAST MEDICAL HISTORY:   Past Medical History:   Diagnosis Date    Gout     Hemodialysis patient (HCC)     Hypertension     Kidney disease     MI (myocardial infarction) (HCC)        PAST SURGICAL HISTORY:   Past Surgical History:   Procedure Laterality Date    NV ERCP,DIAGNOSTIC N/A 6/5/2022    Procedure: ERCP (ENDOSCOPIC RETROGRADE CHOLANGIOPANCREATOGRAPHY);  Surgeon: Ibrahima Daly M.D.;  Location: SURGERY [facility];  Service: Gastroenterology    NV UPPER GI ENDOSCOPY,DIAGNOSIS N/A 12/10/2021    Procedure: GASTROSCOPY;  Surgeon: Paddy Hooks M.D.;  Location: SURGERY SAME DAY [facility];  Service: Gastroenterology    NV COLONOSCOPY,DIAGNOSTIC N/A 12/10/2021    Procedure: COLONOSCOPY;  Surgeon: Paddy Hooks M.D.;  Location: SURGERY SAME DAY [facility];  Service: Gastroenterology    NV UPPER GI ENDOSCOPY,BIOPSY N/A 12/10/2021    Procedure: GASTROSCOPY, WITH BIOPSY;  Surgeon: Paddy Hooks M.D.;  Location: SURGERY SAME DAY [facility];  Service: Gastroenterology    NV  "COLONOSCOPY,BIOPSY N/A 12/10/2021    Procedure: COLONOSCOPY, WITH BIOPSY;  Surgeon: Paddy Hooks M.D.;  Location: SURGERY SAME DAY [facility];  Service: Gastroenterology       FAMILY HISTORY:   Family History   Problem Relation Age of Onset    Diabetes Mother        SOCIAL HISTORY:   Social History     Tobacco Use   Smoking Status Former   Smokeless Tobacco Never     Social History     Substance and Sexual Activity   Alcohol Use Yes    Comment: occ     Social History     Substance and Sexual Activity   Drug Use Never       HOME MEDICATIONS:   Reviewed and documented in chart.    LABORATORY STUDIES:   Recent Labs     09/13/22  1620 09/14/22  0406   SODIUM 136 136   POTASSIUM 4.0 3.7   CHLORIDE 93* 98   CO2 22 24   GLUCOSE 65 148*   BUN 23* 25*   CREATININE 7.46* 7.94*   CALCIUM 8.9 8.3*       ALLERGIES:  Motrin [ibuprofen]    VS:  /59   Pulse 84   Temp 36.9 °C (98.4 °F) (Temporal)   Resp (!) 22   Ht 1.727 m (5' 8\")   Wt 71 kg (156 lb 8.4 oz)   SpO2 98%   BMI 23.80 kg/m²     Physical Exam  Vitals and nursing note reviewed.   Constitutional:       Appearance: He is normal weight.   HENT:      Head: Normocephalic and atraumatic.      Nose: Nose normal.      Mouth/Throat:      Mouth: Mucous membranes are moist.      Pharynx: Oropharynx is clear.   Eyes:      Extraocular Movements: Extraocular movements intact.      Conjunctiva/sclera: Conjunctivae normal.      Pupils: Pupils are equal, round, and reactive to light.   Cardiovascular:      Rate and Rhythm: Normal rate and regular rhythm.      Pulses: Normal pulses.      Heart sounds: Normal heart sounds.   Pulmonary:      Effort: Pulmonary effort is normal.      Breath sounds: Normal breath sounds.   Abdominal:      General: Abdomen is flat. Bowel sounds are normal.   Musculoskeletal:         General: Normal range of motion.      Cervical back: Normal range of motion and neck supple.   Skin:     General: Skin is warm.      Capillary Refill: Capillary refill " takes less than 2 seconds.   Neurological:      General: No focal deficit present.      Mental Status: He is alert and oriented to person, place, and time. Mental status is at baseline.   Psychiatric:         Mood and Affect: Mood normal.         Behavior: Behavior normal.         Thought Content: Thought content normal.         Judgment: Judgment normal.        FLUID BALANCE:  In: 1990.1 [I.V.:1250; Other:240]  Out: -     IMAGING:  All imaging reviewed from admission to present day    IMPRESSION:  # ESRD  # HTN  - Goal BP < 140/90  # Anemia of CKD  - Goal Hgb 10-11  # CKD-MBD/Renal Osteodystrophy  # Sepsis  # Gram negative bactermia  # Encephaolpathy    PLAN:  - No need for HD today  - TTS iHD during stay  - UF as tolerated  - No dietary protein restrictions  - Dose all meds per ESRD    Thank you for the consultation!

## 2022-09-14 NOTE — CARE PLAN
The patient is Watcher - Medium risk of patient condition declining or worsening         Progress made toward(s) clinical / shift goals:    Problem: Hemodynamics  Goal: Patient's hemodynamics, fluid balance and neurologic status will be stable or improve  Outcome: Progressing     Problem: Pain - Standard  Goal: Alleviation of pain or a reduction in pain to the patient’s comfort goal  Outcome: Progressing       Patient is not progressing towards the following goals:

## 2022-09-14 NOTE — PROGRESS NOTES
Pt is hypotensive with blood pressure in the high 80's.  Pt also states he normally uses pain medication patches at home. MD Wiseman was notified.

## 2022-09-14 NOTE — ASSESSMENT & PLAN NOTE
9/19/2022:  Patient status post replacement of new tunneled dialysis catheter.  Continue with regular dialysis schedule    9/18/2022:  Patient has previously undergone line holiday discussed with infectious disease today appreciate the recommendations patient is suitable for replacement of tunneled dialysis catheter.  Discussed with interventional radiology in this regard order has been placed for consultation with interventional radiology for placement of tunneled dialysis catheter possibly on 9/19/2022.  Patient n.p.o. to midnight

## 2022-09-14 NOTE — PROGRESS NOTES
"Pharmacy Vancomycin Kinetics Note for 9/13/2022     72 y.o. male on Vancomycin day # 1       Vancomycin Indication (Two level/Trough based Dosing): Sepsis (goal trough 15-20)    Provider specified end date: 09/20/22    Active Antibiotics (From admission, onward)      Ordered     Ordering Provider       Tue Sep 13, 2022  6:22 PM    09/13/22 1822  MD Alert...Vancomycin per Pharmacy  PHARMACY TO DOSE        Question:  Indication(s) for vancomycin?  Answer:  Unknown source of infection    Afshin Goldberg M.D.       Tue Sep 13, 2022  6:22 PM    09/13/22 1822  cefTRIAXone (Rocephin) syringe 2 g  (Unknown Source (Simple Sepsis))  EVERY 24 HOURS         Afshin Goldberg M.D.       Tue Sep 13, 2022  4:11 PM    09/13/22 1611  vancomycin (VANCOCIN) 2,000 mg in  mL IVPB  (vancomycin (VANCOCIN) IV (LD + Maintenance))  ONCE         Trav Cobb M.D.            Dosing Weight: 77.1 kg (169 lb 15.6 oz)      Admission History: Admitted on 9/13/2022 for Sepsis (HCC) [A41.9]  Pertinent history: Patient with ESRD on HD presented to ED from dialysis center after becoming altered. Patient had a fever upon arrival. Unknown source of infection at this time, although thought to be related his dialysis access.    Allergies:     Motrin [ibuprofen]     Pertinent cultures to date:     Results       Procedure Component Value Units Date/Time    BLOOD CULTURE [[id number]] Collected: 09/13/22 1711    Order Status: Sent Specimen: Blood from Peripheral Updated: 09/13/22 1845    Narrative:      Per Hospital Policy: Only change Specimen Src: to \"Line\" if  specified by physician order.    CoV-2, FLU A/B, and RSV by PCR (2-4 Hours CEPHEID) : Collect NP swab in VTM [[id number]] Collected: 09/13/22 1620    Order Status: Completed Specimen: Respirate Updated: 09/13/22 1730     Influenza virus A RNA Negative     Influenza virus B, PCR Negative     RSV, PCR Negative     SARS-CoV-2 by PCR NotDetected     Comment: PATIENTS: Important information regarding your " "results and instructions can  be found at [email or web address]   \"After your  Covid-19 Test\"    [facility] providers: PLEASE REFER TO DE-ESCALATION AND RETESTING PROTOCOL  on [email or web address]    **The [employer] GeneXpert Xpress SARS-CoV-2 RT-PCR Test has been made  available for use under the Emergency Use Authorization (EUA) only.          SARS-CoV-2 Source NP Swab    BLOOD CULTURE [[id number]] Collected: 22 1655    Order Status: Sent Specimen: Blood from Peripheral Updated: 22 1710    Narrative:      Per Hospital Policy: Only change Specimen Src: to \"Line\" if  specified by physician order.    URINALYSIS [[id number]]     Order Status: Sent Specimen: Urine     URINE CULTURE(NEW) [[id number]]     Order Status: Sent Specimen: Urine             Labs:     Estimated Creatinine Clearance: 8.9 mL/min (A) (by C-G formula based on SCr of 7.46 mg/dL (HH)).  Recent Labs     22  1620   WBC 4.3*   NEUTSPOLYS 78.90*   BANDSSTABS 3.50     Recent Labs     22  1620   BUN 23*   CREATININE 7.46*   ALBUMIN 3.7       Intake/Output Summary (Last 24 hours) at 2022 204  Last data filed at 2022 1725  Gross per 24 hour   Intake 250 ml   Output --   Net 250 ml      BP (!) 95/51   Pulse (!) 101   Temp (!) 38.6 °C (101.4 °F) (Temporal)   Resp (!) 23   Ht 1.727 m (5' 8\")   Wt 77.1 kg (170 lb)   SpO2 91%  Temp (24hrs), Av.6 °C (101.4 °F), Min:38.6 °C (101.4 °F), Max:38.6 °C (101.4 °F)      List concerns for Vancomycin clearance:     ESRD;BUN/Scr ratio greater than 20:1;Age    Pharmacokinetics:     A/P:     -  Vancomycin dose: IV Vancomycin 2000 mg load in ED @ 1805    -  Next vancomycin level(s): Will schedule a random level for 9/15 @ 0300 (~ 33 hours after load)      -  Comments: Patient is ESRD on HD, expected to clear a small amount of vancomycin. Will continue to pulse dose patient. MRSA nares ordered, please follow cultures for de-escalation. Continue to follow levels for " dosing adjustments.     Angelita Wills, PharmD

## 2022-09-15 ENCOUNTER — APPOINTMENT (OUTPATIENT)
Dept: RADIOLOGY | Facility: MEDICAL CENTER | Age: 72
DRG: 314 | End: 2022-09-15
Attending: HOSPITALIST
Payer: COMMERCIAL

## 2022-09-15 LAB
ALBUMIN SERPL BCP-MCNC: 2.2 G/DL (ref 3.2–4.9)
ALBUMIN/GLOB SERPL: 0.7 G/DL
ALP SERPL-CCNC: 71 U/L (ref 30–99)
ALT SERPL-CCNC: 24 U/L (ref 2–50)
ANION GAP SERPL CALC-SCNC: 13 MMOL/L (ref 7–16)
AST SERPL-CCNC: 47 U/L (ref 12–45)
BILIRUB SERPL-MCNC: 0.3 MG/DL (ref 0.1–1.5)
BUN SERPL-MCNC: 33 MG/DL (ref 8–22)
CALCIUM SERPL-MCNC: 8.3 MG/DL (ref 8.5–10.5)
CHLORIDE SERPL-SCNC: 103 MMOL/L (ref 96–112)
CO2 SERPL-SCNC: 21 MMOL/L (ref 20–33)
CREAT SERPL-MCNC: 8.95 MG/DL (ref 0.5–1.4)
ERYTHROCYTE [DISTWIDTH] IN BLOOD BY AUTOMATED COUNT: 70.9 FL (ref 35.9–50)
GFR SERPLBLD CREATININE-BSD FMLA CKD-EPI: 6 ML/MIN/1.73 M 2
GLOBULIN SER CALC-MCNC: 3.3 G/DL (ref 1.9–3.5)
GLUCOSE SERPL-MCNC: 107 MG/DL (ref 65–99)
HCT VFR BLD AUTO: 27.2 % (ref 42–52)
HGB BLD-MCNC: 8.6 G/DL (ref 14–18)
MAGNESIUM SERPL-MCNC: 1.4 MG/DL (ref 1.5–2.5)
MCH RBC QN AUTO: 30.1 PG (ref 27–33)
MCHC RBC AUTO-ENTMCNC: 31.6 G/DL (ref 33.7–35.3)
MCV RBC AUTO: 95.1 FL (ref 81.4–97.8)
PHOSPHATE SERPL-MCNC: 3.1 MG/DL (ref 2.5–4.5)
PLATELET # BLD AUTO: 80 K/UL (ref 164–446)
PMV BLD AUTO: 11.9 FL (ref 9–12.9)
POTASSIUM SERPL-SCNC: 3.7 MMOL/L (ref 3.6–5.5)
PROT SERPL-MCNC: 5.5 G/DL (ref 6–8.2)
RBC # BLD AUTO: 2.86 M/UL (ref 4.7–6.1)
SODIUM SERPL-SCNC: 137 MMOL/L (ref 135–145)
WBC # BLD AUTO: 8.4 K/UL (ref 4.8–10.8)

## 2022-09-15 PROCEDURE — 700102 HCHG RX REV CODE 250 W/ 637 OVERRIDE(OP): Performed by: HOSPITALIST

## 2022-09-15 PROCEDURE — 87071 CULTURE AEROBIC QUANT OTHER: CPT

## 2022-09-15 PROCEDURE — 80053 COMPREHEN METABOLIC PANEL: CPT

## 2022-09-15 PROCEDURE — 700102 HCHG RX REV CODE 250 W/ 637 OVERRIDE(OP): Performed by: STUDENT IN AN ORGANIZED HEALTH CARE EDUCATION/TRAINING PROGRAM

## 2022-09-15 PROCEDURE — 87040 BLOOD CULTURE FOR BACTERIA: CPT | Mod: 91

## 2022-09-15 PROCEDURE — 700111 HCHG RX REV CODE 636 W/ 250 OVERRIDE (IP)

## 2022-09-15 PROCEDURE — 36589 REMOVAL TUNNELED CV CATH: CPT

## 2022-09-15 PROCEDURE — 36415 COLL VENOUS BLD VENIPUNCTURE: CPT

## 2022-09-15 PROCEDURE — 90935 HEMODIALYSIS ONE EVALUATION: CPT

## 2022-09-15 PROCEDURE — 5A1D70Z PERFORMANCE OF URINARY FILTRATION, INTERMITTENT, LESS THAN 6 HOURS PER DAY: ICD-10-PCS | Performed by: INTERNAL MEDICINE

## 2022-09-15 PROCEDURE — 83735 ASSAY OF MAGNESIUM: CPT

## 2022-09-15 PROCEDURE — 700111 HCHG RX REV CODE 636 W/ 250 OVERRIDE (IP): Performed by: RADIOLOGY

## 2022-09-15 PROCEDURE — 99232 SBSQ HOSP IP/OBS MODERATE 35: CPT | Performed by: INTERNAL MEDICINE

## 2022-09-15 PROCEDURE — A9270 NON-COVERED ITEM OR SERVICE: HCPCS | Performed by: STUDENT IN AN ORGANIZED HEALTH CARE EDUCATION/TRAINING PROGRAM

## 2022-09-15 PROCEDURE — 700111 HCHG RX REV CODE 636 W/ 250 OVERRIDE (IP): Performed by: HOSPITALIST

## 2022-09-15 PROCEDURE — 0JPT0XZ REMOVAL OF TUNNELED VASCULAR ACCESS DEVICE FROM TRUNK SUBCUTANEOUS TISSUE AND FASCIA, OPEN APPROACH: ICD-10-PCS | Performed by: RADIOLOGY

## 2022-09-15 PROCEDURE — 02PYX3Z REMOVAL OF INFUSION DEVICE FROM GREAT VESSEL, EXTERNAL APPROACH: ICD-10-PCS | Performed by: RADIOLOGY

## 2022-09-15 PROCEDURE — 0JH60XZ INSERTION OF TUNNELED VASCULAR ACCESS DEVICE INTO CHEST SUBCUTANEOUS TISSUE AND FASCIA, OPEN APPROACH: ICD-10-PCS | Performed by: RADIOLOGY

## 2022-09-15 PROCEDURE — 770001 HCHG ROOM/CARE - MED/SURG/GYN PRIV*

## 2022-09-15 PROCEDURE — 99233 SBSQ HOSP IP/OBS HIGH 50: CPT | Performed by: HOSPITALIST

## 2022-09-15 PROCEDURE — 700105 HCHG RX REV CODE 258: Performed by: HOSPITALIST

## 2022-09-15 PROCEDURE — 85027 COMPLETE CBC AUTOMATED: CPT

## 2022-09-15 PROCEDURE — A9270 NON-COVERED ITEM OR SERVICE: HCPCS | Performed by: HOSPITALIST

## 2022-09-15 PROCEDURE — 02HV33Z INSERTION OF INFUSION DEVICE INTO SUPERIOR VENA CAVA, PERCUTANEOUS APPROACH: ICD-10-PCS | Performed by: RADIOLOGY

## 2022-09-15 PROCEDURE — 84100 ASSAY OF PHOSPHORUS: CPT

## 2022-09-15 RX ORDER — HEPARIN SODIUM (PORCINE) LOCK FLUSH IV SOLN 100 UNIT/ML 100 UNIT/ML
SOLUTION INTRAVENOUS
Status: COMPLETED
Start: 2022-09-15 | End: 2022-09-15

## 2022-09-15 RX ORDER — ONDANSETRON 2 MG/ML
4 INJECTION INTRAMUSCULAR; INTRAVENOUS PRN
Status: ACTIVE | OUTPATIENT
Start: 2022-09-15 | End: 2022-09-15

## 2022-09-15 RX ORDER — SODIUM CHLORIDE 9 MG/ML
500 INJECTION, SOLUTION INTRAVENOUS
Status: ACTIVE | OUTPATIENT
Start: 2022-09-15 | End: 2022-09-15

## 2022-09-15 RX ORDER — HEPARIN SODIUM 1000 [USP'U]/ML
3900 INJECTION, SOLUTION INTRAVENOUS; SUBCUTANEOUS
Status: DISCONTINUED | OUTPATIENT
Start: 2022-09-15 | End: 2022-09-16

## 2022-09-15 RX ORDER — HEPARIN SODIUM 1000 [USP'U]/ML
INJECTION, SOLUTION INTRAVENOUS; SUBCUTANEOUS
Status: COMPLETED
Start: 2022-09-15 | End: 2022-09-15

## 2022-09-15 RX ORDER — MIDAZOLAM HYDROCHLORIDE 1 MG/ML
.5-2 INJECTION INTRAMUSCULAR; INTRAVENOUS PRN
Status: ACTIVE | OUTPATIENT
Start: 2022-09-15 | End: 2022-09-15

## 2022-09-15 RX ORDER — MIDODRINE HYDROCHLORIDE 5 MG/1
5 TABLET ORAL
Status: DISCONTINUED | OUTPATIENT
Start: 2022-09-15 | End: 2022-09-17

## 2022-09-15 RX ADMIN — MIDODRINE HYDROCHLORIDE 5 MG: 5 TABLET ORAL at 18:05

## 2022-09-15 RX ADMIN — OXYCODONE 5 MG: 5 TABLET ORAL at 04:34

## 2022-09-15 RX ADMIN — OXYCODONE 5 MG: 5 TABLET ORAL at 21:54

## 2022-09-15 RX ADMIN — GABAPENTIN 300 MG: 300 CAPSULE ORAL at 21:06

## 2022-09-15 RX ADMIN — ATORVASTATIN CALCIUM 20 MG: 20 TABLET, FILM COATED ORAL at 21:06

## 2022-09-15 RX ADMIN — MIDODRINE HYDROCHLORIDE 10 MG: 5 TABLET ORAL at 08:03

## 2022-09-15 RX ADMIN — SEVELAMER CARBONATE 800 MG: 800 TABLET, FILM COATED ORAL at 09:11

## 2022-09-15 RX ADMIN — OXYCODONE 5 MG: 5 TABLET ORAL at 18:05

## 2022-09-15 RX ADMIN — HEPARIN SODIUM 3900 UNITS: 1000 INJECTION, SOLUTION INTRAVENOUS; SUBCUTANEOUS at 09:45

## 2022-09-15 RX ADMIN — MIDODRINE HYDROCHLORIDE 10 MG: 5 TABLET ORAL at 11:49

## 2022-09-15 RX ADMIN — FENTANYL CITRATE 12.5 MCG: 50 INJECTION, SOLUTION INTRAMUSCULAR; INTRAVENOUS at 17:09

## 2022-09-15 RX ADMIN — FENTANYL CITRATE 12.5 MCG: 50 INJECTION, SOLUTION INTRAMUSCULAR; INTRAVENOUS at 17:25

## 2022-09-15 RX ADMIN — OXYCODONE 5 MG: 5 TABLET ORAL at 09:11

## 2022-09-15 RX ADMIN — HEPARIN 100 UNITS: 100 SYRINGE at 17:30

## 2022-09-15 RX ADMIN — CEFEPIME 1 G: 1 INJECTION, POWDER, FOR SOLUTION INTRAMUSCULAR; INTRAVENOUS at 21:06

## 2022-09-15 RX ADMIN — SEVELAMER CARBONATE 800 MG: 800 TABLET, FILM COATED ORAL at 18:05

## 2022-09-15 RX ADMIN — OMEPRAZOLE 20 MG: 20 CAPSULE, DELAYED RELEASE ORAL at 04:34

## 2022-09-15 ASSESSMENT — COGNITIVE AND FUNCTIONAL STATUS - GENERAL
MOBILITY SCORE: 24
DAILY ACTIVITIY SCORE: 24
SUGGESTED CMS G CODE MODIFIER DAILY ACTIVITY: CH
SUGGESTED CMS G CODE MODIFIER MOBILITY: CH

## 2022-09-15 ASSESSMENT — FIBROSIS 4 INDEX
FIB4 SCORE: 11.83
FIB4 SCORE: 8.39

## 2022-09-15 ASSESSMENT — ENCOUNTER SYMPTOMS
NEUROLOGICAL NEGATIVE: 1
NAUSEA: 0
VOMITING: 0
FOCAL WEAKNESS: 0
WEAKNESS: 1
NERVOUS/ANXIOUS: 1
EYES NEGATIVE: 1
CHILLS: 0
ABDOMINAL PAIN: 0
RESPIRATORY NEGATIVE: 1
DIARRHEA: 0
BACK PAIN: 1
CARDIOVASCULAR NEGATIVE: 1
MYALGIAS: 1
FEVER: 0
GASTROINTESTINAL NEGATIVE: 1

## 2022-09-15 ASSESSMENT — PAIN DESCRIPTION - PAIN TYPE
TYPE: CHRONIC PAIN
TYPE: SURGICAL PAIN

## 2022-09-15 ASSESSMENT — PATIENT HEALTH QUESTIONNAIRE - PHQ9
2. FEELING DOWN, DEPRESSED, IRRITABLE, OR HOPELESS: NOT AT ALL
1. LITTLE INTEREST OR PLEASURE IN DOING THINGS: NOT AT ALL
SUM OF ALL RESPONSES TO PHQ9 QUESTIONS 1 AND 2: 0

## 2022-09-15 NOTE — PROGRESS NOTES
Infectious Disease Progress Note    Author: Carroll Pena M.D. Date & Time of service: 9/15/2022  8:34 AM    Chief Complaint:  Follow-up for GNR bacteremia    Interval History:  9/15 fever curve has improved, no CBC this morning, tolerating cefepime.  Cultures as below    Labs Reviewed and Medications Reviewed.    Review of Systems:  Review of Systems   Constitutional:  Negative for chills and fever.   Gastrointestinal:  Negative for abdominal pain, diarrhea, nausea and vomiting.   Skin:  Negative for itching and rash.   Neurological:  Positive for weakness. Negative for focal weakness.   All other systems reviewed and are negative.    Hemodynamics:  Temp (24hrs), Av.6 °C (97.8 °F), Min:36.4 °C (97.6 °F), Max:36.7 °C (98 °F)  Temperature: 36.4 °C (97.6 °F)  Pulse  Av.5  Min: 65  Max: 109   Blood Pressure : 120/60       Physical Exam:  Physical Exam  Vitals and nursing note reviewed.   Constitutional:       General: He is not in acute distress.     Appearance: He is ill-appearing. He is not toxic-appearing.   HENT:      Mouth/Throat:      Pharynx: No oropharyngeal exudate.   Eyes:      General: No scleral icterus.        Right eye: No discharge.         Left eye: No discharge.      Conjunctiva/sclera: Conjunctivae normal.   Cardiovascular:      Rate and Rhythm: Normal rate.      Heart sounds: No murmur heard.  Pulmonary:      Effort: Pulmonary effort is normal. No respiratory distress.      Breath sounds: No stridor.      Comments: Left upper chest wall tunneled dialysis catheter in place with no gross external evidence of infection  Abdominal:      General: There is no distension.      Tenderness: There is no abdominal tenderness.   Musculoskeletal:         General: No swelling or tenderness.   Skin:     Findings: No erythema or rash.   Neurological:      General: No focal deficit present.      Mental Status: He is alert and oriented to person, place, and time.      Comments: Generalized weakness    Psychiatric:         Mood and Affect: Mood normal.         Behavior: Behavior normal.       Meds:    Current Facility-Administered Medications:     heparin    midodrine    oxyCODONE immediate-release    cefepime    allopurinol    atorvastatin    gabapentin    sevelamer carbonate    omeprazole    senna-docusate **AND** polyethylene glycol/lytes **AND** magnesium hydroxide **AND** bisacodyl    heparin    acetaminophen    Labs:  Recent Labs     09/13/22  1620 09/14/22  0406   WBC 4.3* 7.8   RBC 2.82* 2.43*   HEMOGLOBIN 8.5* 7.4*   HEMATOCRIT 26.8* 23.0*   MCV 95.0 94.7   MCH 30.1 30.5   RDW 68.5* 68.2*   PLATELETCT 78* 68*   MPV 10.4 9.9   NEUTSPOLYS 78.90*  --    LYMPHOCYTES 14.90*  --    MONOCYTES 0.90  --    EOSINOPHILS 0.90  --    BASOPHILS 0.00  --    RBCMORPHOLO Present  --      Recent Labs     09/13/22  1620 09/14/22  0406   SODIUM 136 136   POTASSIUM 4.0 3.7   CHLORIDE 93* 98   CO2 22 24   GLUCOSE 65 148*   BUN 23* 25*     Recent Labs     09/13/22  1620 09/14/22  0406   ALBUMIN 3.7 2.9*   TBILIRUBIN 0.7 0.4   ALKPHOSPHAT 96 86   TOTPROTEIN 7.4 6.0   ALTSGPT 39 31   ASTSGOT 87* 64*   CREATININE 7.46* 7.94*       Imaging:  CT-HEAD W/O    Result Date: 9/13/2022 9/13/2022 7:56 PM HISTORY/REASON FOR EXAM:  Mental status change, unknown cause TECHNIQUE/EXAM DESCRIPTION AND NUMBER OF VIEWS: CT of the head without contrast. The study was performed on a helical multidetector CT scanner. Contiguous 2.5 mm axial sections were obtained from the skull base through the vertex. Up to date radiation dose reduction adjustments have been utilized to meet ALARA standards for radiation dose reduction. COMPARISON:  11/23/2021 FINDINGS: There is no evidence of acute intracranial hemorrhage, mass, mass-effect or shift of midline structures. Gray-white matter differentiation is grossly preserved. Ventricle size and brain parenchymal volume are appropriate for this patient's stated age. The basal cisterns are patent. There are no  abnormal extra-axial fluid collections. No depressed or widely  calvarial fracture is seen. The visualized paranasal sinuses and temporal bone structures are aerated. ___________________________________     1. No acute intracranial abnormality. No evidence of acute intracranial hemorrhage or mass lesion.     DX-CHEST-PORTABLE (1 VIEW)    Result Date: 2022 4:02 PM HISTORY/REASON FOR EXAM:  Sepsis; sepsis. TECHNIQUE/EXAM DESCRIPTION AND NUMBER OF VIEWS: Single portable view of the chest. COMPARISON: 2022 FINDINGS: There is stable enlargement of the cardiomediastinal silhouette. A left-sided tunneled hemodialysis catheter is unchanged. The lungs are clear. There is no evidence of large pleural effusion or pneumothorax. Imaged osseous structures are unremarkable.     Stable enlargement of the cardiomediastinal silhouette without acute cardiopulmonary abnormality.    US-EXTREMITY VENOUS UPPER UNILAT LEFT    Result Date: 2022   Upper Extremity  Venous Duplex Report  Vascular Laboratory  CONCLUSIONS  No prior exam is available for comparison.  Left upper extremity.  No deep venous thrombosis.  RENE STEINBERG  Exam Date:     2022 10:18  Room #:     Inpatient  Priority:     Routine  Ht (in):     68      Wt (lb):     156  Ordering Physician:        CHRISTOPHER PANIAGUA  Referring Physician:       [id number] Bowers  Sonographer:               Clemencia Angela [facility], T  Study Type:                Complete Unilateral  Technical Quality:         Adequate  Age:    72    Gender:     M  MRN:    [id number]  :    1950      BSA:    1.84  Indications:     Swelling of Limb  CPT Codes:       05053  ICD Codes:       729.81  History:         Right Upper AVF, hypotension.  Limitations:  PROCEDURES:  Left upper extremity venous duplex imaging.  The following venous structures were evaluated: internal jugular,  subclavian, axillary, brachial, cephalic, and basilic  "veins.  Serial compression, color, and spectral Doppler flow evaluations were  performed.  FINDINGS:  Left upper extremity.  All veins demonstrate complete color filling and compressibility with  normal venous flow dynamics including spontaneous flow and respiratory  phasicity.  No deep venous thrombosis.  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  Khalif Hutchinson MD  (Electronically Signed)  Final Date:      14 September 2022                   10:59      Micro:  Results       Procedure Component Value Units Date/Time    BLOOD CULTURE [[id number]] Collected: 09/15/22 0816    Order Status: Sent Specimen: Blood from Peripheral Updated: 09/15/22 0823    Narrative:      Per Hospital Policy: Only change Specimen Src: to \"Line\" if  specified by physician order.    BLOOD CULTURE [[id number]] Collected: 09/15/22 0816    Order Status: Sent Specimen: Blood from Peripheral Updated: 09/15/22 0823    Narrative:      Per Hospital Policy: Only change Specimen Src: to \"Line\" if  specified by physician order.    MRSA By PCR (Amp) [[id number]] Collected: 09/13/22 2125    Order Status: Completed Specimen: Blood from Nares Updated: 09/14/22 1645     MRSA by PCR POSITIVE    BLOOD CULTURE [[id number]]  (Abnormal) Collected: 09/13/22 1711    Order Status: Completed Specimen: Blood from Peripheral Updated: 09/14/22 0901     Significant Indicator POS     Source BLD     Site PERIPHERAL     Culture Result Growth detected by Bactec instrument. 09/14/2022  08:59  Gram Stain: Gram negative rods.      Narrative:      CALL  Shea  [facility] tel. [phone],  CALLED  [facility] tel. [phone] 09/14/2022, 09:01, RB PERF. RESULTS CALLED TO:  [id number] RN  Per Hospital Policy: Only change Specimen Src: to \"Line\" if  specified by physician order.  Left Hand    BLOOD CULTURE [[id number]]  (Abnormal) Collected: 09/13/22 1655    Order Status: Completed Specimen: Blood from Peripheral Updated: " "09/14/22 0427     Significant Indicator POS     Source BLD     Site PERIPHERAL     Culture Result Growth detected by Bactec instrument. 09/14/2022  04:24  Gram Stain: Gram negative rods.      Narrative:      CALL  Shea  [facility] tel. [phone],  CALLED  [facility] tel. [phone] 09/14/2022, 04:27, RB PERF. RESULTS CALLED TO:  RN [id number]  Per Hospital Policy: Only change Specimen Src: to \"Line\" if  specified by physician order.  Right AC    CoV-2, FLU A/B, and RSV by PCR (2-4 Hours CEPHEID) : Collect NP swab in VTM [[id number]] Collected: 09/13/22 1620    Order Status: Completed Specimen: Respirate Updated: 09/13/22 1730     Influenza virus A RNA Negative     Influenza virus B, PCR Negative     RSV, PCR Negative     SARS-CoV-2 by PCR NotDetected     Comment: PATIENTS: Important information regarding your results and instructions can  be found at [email or web address]   \"After your  Covid-19 Test\"    [facility] providers: PLEASE REFER TO DE-ESCALATION AND RETESTING PROTOCOL  on [email or web address]    **The [employer] GeneXpert Xpress SARS-CoV-2 RT-PCR Test has been made  available for use under the Emergency Use Authorization (EUA) only.          SARS-CoV-2 Source NP Swab    URINALYSIS [[id number]]     Order Status: Sent Specimen: Urine     URINE CULTURE(NEW) [[id number]]     Order Status: Canceled Specimen: Urine             Assessment:  Junior Proctor is a 72 y.o. male patient with ESRD on HD, admitted with acute onset of sepsis with GNR bacteremia.  No obvious source identified, suspect tunneled dialysis catheter infection.     Pertinent Diagnoses:  Gram-negative bacteremia  Tunneled dialysis catheter in place  ESRD on HD  Acute encephalopathy, improved    Plan:  -Mental status has improved.  Continue IV cefepime 1 g every 24 hours renally dose for now   -Follow identification and sensitivities of the GNR.  Of note, Dr. Desai noted history of pancreatic stent placement in the past.  Agree with CT scan to " look for abscesses and potential source of GNR bacteremia.  If no obvious source noted, then would recommend removal of the tunneled dialysis catheter  after next dialysis session followed by line holiday  -Repeat blood cultures x2 in a.m. - pending  -Recommend chest x-ray     Plan was discussed with the primary team, Dr. Desai    Disposition: Unable to determine at this time  Need for PICC line: Unable to determine at this time

## 2022-09-15 NOTE — CARE PLAN
The patient is Watcher - Medium risk of patient condition declining or worsening    Shift Goals  Clinical Goals: hemodynamically stable    Progress made toward(s) clinical / shift goals:  pt plan to go to IR today to exchange HD cath, MRI and echo on hold until tomorrow, HR 50s-60s sinus rhythm, c/o arthritis pain      Problem: Knowledge Deficit - Standard  Goal: Patient and family/care givers will demonstrate understanding of plan of care, disease process/condition, diagnostic tests and medications  Outcome: Progressing     Problem: Hemodynamics  Goal: Patient's hemodynamics, fluid balance and neurologic status will be stable or improve  Outcome: Progressing     Problem: Fluid Volume  Goal: Fluid volume balance will be maintained  Outcome: Progressing     Problem: Urinary - Renal Perfusion  Goal: Ability to achieve and maintain adequate renal perfusion and functioning will improve  Outcome: Progressing     Problem: Pain - Standard  Goal: Alleviation of pain or a reduction in pain to the patient’s comfort goal  Outcome: Progressing     Problem: Skin Integrity  Goal: Skin integrity is maintained or improved  Outcome: Progressing

## 2022-09-15 NOTE — DISCHARGE PLANNING
Case Management Discharge Planning    Admission Date: 9/13/2022  GMLOS: 4.8  ALOS: 2    6-Clicks ADL Score:    6-Clicks Mobility Score:        Anticipated Discharge Dispo: Discharge Disposition: D/T to home under HHA care in anticipation of covered skilled care (06)  Discharge Address: [facility] [address], [address],  [place] [address]  Discharge Contact Phone Number: [phone]    DME Needed: No    Action(s) Taken: DC Assessment Complete (See below) and OTHER    Escalations Completed: None    Medically Clear: No    Next Steps: Speaker talked with the patient who resides [address] floor apartment.  His emergency contact is listed as his daughter Pratibha, however, there is no dial tone on her cell [phone] and unable to reach her.  He has HD T, Thurs, Sat at [facility] on [address]  He states he does not have any DME.The patient states his PCP is Chris.  He uses uber, taxi and an access van for transportation.  [facility]    T/C to his  Alee [phone], for [facility].  She has provied him with a SPC, a but transfer bench, FWW, B/P cuff and compression stockings.  Tuesday and Thursday he has a [facility] ride to HD and VTC ride home.  Saturday he has VTC to HD.  Friday the [facility] takes [facility].  Pending PT/OT evaluation prior to discharge.  Transfer orders for Medical floor. If he has a skilled need at discharge, recommend possible aide if he is willing to accept assistance.  Alee indicates that he has refused aide in the past.    Barriers to Discharge: Limited social support.    Is the patient up for discharge tomorrow: No

## 2022-09-15 NOTE — PROGRESS NOTES
[facility] Medicine Daily Progress Note    Date of Service  9/15/2022    Chief Complaint  Junior Proctor is a 72 y.o. male admitted 9/13/2022 with hypotension and altered level of consciousness after dialysis    Hospital Course  Is a 72-year-old male with a history of end-stage renal disease, presents with altered mental status, hypoglycemia and hypotension.  Reportedly the patient just had hemodialysis via a left upper chest tunneled dialysis catheter, he was found altered, hypotensive with a blood pressure of 80s over 40s and hypoglycemic with a blood glucose level of 50.  The patient emergency room was found with elevated temperature of 101.4, tachycardia, white cell count of 4.3, hemoglobin 8.5, platelet count 78.  The patient was started on broad-spectrum antibiotics with concern for possible sepsis in form of ceftriaxone and vancomycin.    Interval Problem Update  Patient seen and examined today.  Data, Medication data reviewed.  Case discussed with nursing as available.  Plan of Care reviewed with patient and notified of changes.   9/14 the patient appears somewhat improved, he required additional fluid boluses to maintain his blood pressure, he denies fevers or chills, no abdominal pain, he has very little urination, he complains of generalized joint pain in his hands, shoulders, ankles, this appears to be chronic, the patient is chronically using narcotics for pain control, he denies difficulty with infection of his fistula or dialysis catheter.  He denies nausea vomiting, no abdominal pain.  Nephrology has been consulted  Infectious disease has been consulted after patient had positive blood cultures reported x2 gram-negative  9/15 the patient is currently afebrile, heart rate in the 60s, unlabored respirations, on room air, normotensive, blood pressure has improved, the patient was attempted to receive dialysis through his left upper chest dialysis catheter but was unable to perform secondary to flow issues,  discussed with nephrology, ordered IR exchange of catheter, in further review the patient had a pancreatic stent placed in June that was supposed to be revised, discussed with gastroenterology, get an MRCP to see if the catheter has passed naturally or will possibly need to be removed by ERCP, updated infectious disease.    I have discussed this patient's plan of care and discharge plan at IDT rounds today with Case Management, Nursing, Nursing leadership, and other members of the IDT team.    Consultants/Specialty  infectious disease and nephrology  [facility]  Code Status  Full Code    Disposition  Patient is not medically cleared for discharge.   Anticipate discharge to to home with close outpatient follow-up.  I have placed the appropriate orders for post-discharge needs.    Review of Systems  Review of Systems   Constitutional:  Positive for malaise/fatigue.   HENT: Negative.     Eyes: Negative.    Respiratory: Negative.     Cardiovascular: Negative.    Gastrointestinal: Negative.    Genitourinary: Negative.    Musculoskeletal:  Positive for back pain, joint pain and myalgias.   Skin: Negative.    Neurological: Negative.    Endo/Heme/Allergies: Negative.    Psychiatric/Behavioral:  The patient is nervous/anxious.    All other systems reviewed and are negative.     Physical Exam  Temp:  [36.7 °C (98 °F)-36.9 °C (98.4 °F)] 36.7 °C (98 °F)  Pulse:  [65-91] 69  Resp:  [8-35] 9  BP: ()/(26-70) 109/61  SpO2:  [93 %-100 %] 99 %    Physical Exam  Vitals and nursing note reviewed.   Constitutional:       Appearance: He is well-developed.      Comments: Pt seen and examined.   HENT:      Head: Normocephalic and atraumatic.   Eyes:      Pupils: Pupils are equal, round, and reactive to light.   Cardiovascular:      Rate and Rhythm: Normal rate and regular rhythm.      Heart sounds: Normal heart sounds.   Pulmonary:      Effort: Pulmonary effort is normal.      Breath sounds: Normal breath sounds.   Abdominal:       General: Bowel sounds are normal.      Palpations: Abdomen is soft.   Musculoskeletal:         General: Normal range of motion.      Cervical back: Normal range of motion and neck supple.   Skin:     General: Skin is warm and dry.      Coloration: Skin is pale.   Neurological:      General: No focal deficit present.      Mental Status: He is alert and oriented to person, place, and time.   Psychiatric:         Behavior: Behavior normal.       Fluids    Intake/Output Summary (Last 24 hours) at 9/15/2022 0725  Last data filed at 9/15/2022 0600  Gross per 24 hour   Intake 1147.5 ml   Output 0 ml   Net 1147.5 ml         Laboratory  Recent Labs     09/13/22  1620 09/14/22  0406   WBC 4.3* 7.8   RBC 2.82* 2.43*   HEMOGLOBIN 8.5* 7.4*   HEMATOCRIT 26.8* 23.0*   MCV 95.0 94.7   MCH 30.1 30.5   MCHC 31.7* 32.2*   RDW 68.5* 68.2*   PLATELETCT 78* 68*   MPV 10.4 9.9       Recent Labs     09/13/22  1620 09/14/22  0406   SODIUM 136 136   POTASSIUM 4.0 3.7   CHLORIDE 93* 98   CO2 22 24   GLUCOSE 65 148*   BUN 23* 25*   CREATININE 7.46* 7.94*   CALCIUM 8.9 8.3*                     Imaging  US-EXTREMITY VENOUS UPPER UNILAT LEFT   Final Result      CT-HEAD W/O   Final Result         1. No acute intracranial abnormality. No evidence of acute intracranial hemorrhage or mass lesion.                     DX-CHEST-PORTABLE (1 VIEW)   Final Result      Stable enlargement of the cardiomediastinal silhouette without acute cardiopulmonary abnormality.      EC-ECHOCARDIOGRAM COMPLETE W/O CONT    (Results Pending)          Assessment/Plan  * Sepsis (HCC)- (present on admission)  Assessment & Plan  This is Sepsis Present on admission  SIRS criteria identified on my evaluation include: Fever, with temperature greater than 101 deg F  Source is unknown, possible dialysis catheter infection  Sepsis protocol initiated  Fluid resuscitation ordered per protocol  Crystalloid Fluid Administration: Patient has advanced or end-stage chronic renal disease  (with documentation of stage IV or GFR 15-29 mL/min, or stage V or GFR < 15 mL/min or ESRD), for this/these reason(s) it is unsafe for this patient to receive 30 mL/kg of fluid  Partial Fluid Dose Given: patient to be reassessed shortly after completion of partial fluid bolus to ensure adequacy of resuscitation  IV antibiotics as appropriate for source of sepsis  Reassessment: I have reassessed the patient's hemodynamic status      Gram-negative bacteremia  Assessment & Plan  Unclear source, certainly possibility of line infection given patient's hemodialysis catheter  Get echocardiogram, MRCP abdomen given the patient's history of pancreatitis with stent placement which might be retained  ID consult  Follow cultures      Hypoglycemia  Assessment & Plan  Unclear etiology  Not a diabetic patient  Related to sepsis and underlying infection?  S/p IV fluids with dextrose in ER, recheck blood sugar, monitor accuchecks q4h    Altered mental status  Assessment & Plan  Likely due to hypoglycemia in setting of suspected sepsis  Sepsis of unclear etiology, possible line infection given dialysis catheter present  Mental status has since normalized  CXR stable      Arthritis- (present on admission)  Assessment & Plan  Pain management      ESRD on dialysis (HCC)- (present on admission)  Assessment & Plan  Presents from dialysis after dialysis session completed  Found to be hypotensive, hypoglycemic, febrile  No urgent need for hemodialysis, nephrology consulted  Treat infection with IV antibiotics, blood cultures obtained, follow-up cultures ordered  Arranging catheter exchange    Hypotension- (present on admission)  Assessment & Plan  Related to infection and possible volume loss  Several boluses of IV fluids have been given, currently with good results, monitor closely    Anemia- (present on admission)  Assessment & Plan  Likely anemia of chronic disease, renal disease     Plan  Exchange dialysis catheter urgently  Continue  with empiric antibiotics, follow cultures closely  Nephrology evaluation appreciated  Get MRCP to evaluate the possibility of retained pancreatic stent, possible source of infection  Pain control  Follow labs closely  See orders  Medically complex high risk patient  Patient stabilized for transfer to medical unit and ongoing aggressive medical care as outlined  Case discussed with multiple consultants today and extent of 35 minutes    VTE prophylaxis: heparin ppx, the patient has been refusing heparin injections    I have performed a physical exam and reviewed and updated ROS and Plan today (9/15/2022). In review of yesterday's note (9/14/2022), there are no changes except as documented above.        Please note that this dictation was created using voice recognition software. I have made every reasonable attempt to correct obvious errors, but I expect that there are errors of grammar and possibly context that I did not discover before finalizing the note.

## 2022-09-15 NOTE — PROGRESS NOTES
[facility] Services Progress Note    Hemodialysis treatment ordered today per Dr. Cortez x 3 hours.   Treatment initiated at 0837.     Blood cultures drawn pre-treatment from L Chest CVC per order. Poor blood flows from both ports despite reversing lines and repositioning. Tolerating ~200 mL/min with frequent pressure alarms.     Notified Dr. Cortez with orders to stop HD. Patient to have CVC removed and temp line placed.      Net  mL.     Post tx, CVC flushed with saline then locked with heparin 1000 units/mL per designated amount in each wing then clamped and capped. Aspirate heparin prior to next CVC use.    Report given to Primary RN.

## 2022-09-15 NOTE — CARE PLAN
The patient is Watcher - Medium risk of patient condition declining or worsening    Shift Goals  Clinical Goals: hemodynamically stable    Progress made toward(s) clinical / shift goals:  .      Patient is not progressing towards the following goals:

## 2022-09-15 NOTE — PROGRESS NOTES
"[facility] -  PROGRESS NOTE               Author: Tristen Cortez M.D. Date & Time: 9/15/2022  12:03 PM     HPI:  Junior Proctor is a 72 y.o. male admitted 9/13/2022.  Patient with ESRD on HD via tunneled dialysis catheter, hypertension, had dialysis but was noted to be altered following his treatment and with hypotension to 80s over 40s, hypoglycemic with a blood glucose of 50.  He was febrile to 101.4 in the ER tachycardic to 105.  Patient is admitted to the [facility].  2 out of 2 blood cultures are positive for GNR.  Nephrology was asked to consult for ESRD management.     DAILY NEPHROLOGY SUMMARY:  9/15 - Blood cultures with gram negative rods. Difficult dialysis given poor catheter flow. ID suspects line sepsis and wants catheter holiday.    REVIEW OF SYSTEMS:    10 point ROS reviewed and is as per HPI/daily summary or otherwise negative    PMH/PSH/SH/FH:   Reviewed and unchanged since admission note    CURRENT MEDICATIONS:   Reviewed from admission to present day    VS:  /49   Pulse 64   Temp 36.4 °C (97.6 °F) (Temporal)   Resp 15   Ht 1.727 m (5' 8\")   Wt 68.4 kg (150 lb 12.7 oz)   SpO2 100%   BMI 22.93 kg/m²     Physical Exam  Vitals and nursing note reviewed.   Constitutional:       Appearance: He is normal weight.   HENT:      Head: Normocephalic and atraumatic.      Nose: Nose normal.      Mouth/Throat:      Mouth: Mucous membranes are moist.      Pharynx: Oropharynx is clear.   Eyes:      Extraocular Movements: Extraocular movements intact.      Conjunctiva/sclera: Conjunctivae normal.      Pupils: Pupils are equal, round, and reactive to light.   Cardiovascular:      Rate and Rhythm: Normal rate and regular rhythm.      Pulses: Normal pulses.      Heart sounds: Normal heart sounds.   Pulmonary:      Effort: Pulmonary effort is normal.      Breath sounds: Normal breath sounds.   Abdominal:      General: Abdomen is flat. Bowel sounds are normal.   Musculoskeletal:         " General: Normal range of motion.      Cervical back: Normal range of motion and neck supple.   Skin:     General: Skin is warm.      Capillary Refill: Capillary refill takes less than 2 seconds.   Neurological:      General: No focal deficit present.      Mental Status: He is alert and oriented to person, place, and time. Mental status is at baseline.   Psychiatric:         Mood and Affect: Mood normal.         Behavior: Behavior normal.         Thought Content: Thought content normal.         Judgment: Judgment normal.     Fluids:  In: 1147.5 [P.O.:480; I.V.:667.5]  Out: 0     LABS:  Recent Labs     09/13/22  1620 09/14/22  0406 09/15/22  0816   SODIUM 136 136 137   POTASSIUM 4.0 3.7 3.7   CHLORIDE 93* 98 103   CO2 22 24 21   GLUCOSE 65 148* 107*   BUN 23* 25* 33*   CREATININE 7.46* 7.94* 8.95*   CALCIUM 8.9 8.3* 8.3*       IMAGING:   All imaging reviewed from admission to present day    IMPRESSION:  # ESRD - catheter not functional for adequate dialysis  # HTN  - Goal BP < 140/90  # Anemia of CKD  - Goal Hgb 10-11  # CKD-MBD/Renal Osteodystrophy  # Sepsis  # Gram negative bactermia/Line infection  # Encephaolpathy     PLAN:  - HD today. Will need to dc current TDC and place temp dialysis catheter for dialysis.  - TTS iHD during stay  - UF as tolerated  - No dietary protein restrictions  - Dose all meds per ESRD  - Abx per primary service/ID.

## 2022-09-15 NOTE — OR SURGEON
Immediate Post- Operative Note        Findings: Line infection.      Procedure(s): R IJ permcath removal, R IJ tempcath placement.       Estimated Blood Loss: Less than 5 ml        Complications: None            9/15/2022     1731 PM     Trav Lozano M.D.

## 2022-09-16 ENCOUNTER — APPOINTMENT (OUTPATIENT)
Dept: RADIOLOGY | Facility: MEDICAL CENTER | Age: 72
DRG: 314 | End: 2022-09-16
Attending: STUDENT IN AN ORGANIZED HEALTH CARE EDUCATION/TRAINING PROGRAM
Payer: COMMERCIAL

## 2022-09-16 ENCOUNTER — APPOINTMENT (OUTPATIENT)
Dept: CARDIOLOGY | Facility: MEDICAL CENTER | Age: 72
DRG: 314 | End: 2022-09-16
Attending: HOSPITALIST
Payer: COMMERCIAL

## 2022-09-16 LAB
ANION GAP SERPL CALC-SCNC: 11 MMOL/L (ref 7–16)
BACTERIA BLD CULT: ABNORMAL
BUN SERPL-MCNC: 32 MG/DL (ref 8–22)
CALCIUM SERPL-MCNC: 8.1 MG/DL (ref 8.5–10.5)
CHLORIDE SERPL-SCNC: 103 MMOL/L (ref 96–112)
CO2 SERPL-SCNC: 22 MMOL/L (ref 20–33)
CREAT SERPL-MCNC: 8.43 MG/DL (ref 0.5–1.4)
ERYTHROCYTE [DISTWIDTH] IN BLOOD BY AUTOMATED COUNT: 69.1 FL (ref 35.9–50)
GFR SERPLBLD CREATININE-BSD FMLA CKD-EPI: 6 ML/MIN/1.73 M 2
GLUCOSE SERPL-MCNC: 94 MG/DL (ref 65–99)
HCT VFR BLD AUTO: 25.2 % (ref 42–52)
HGB BLD-MCNC: 8.1 G/DL (ref 14–18)
LV EJECT FRACT  99904: 65
MAGNESIUM SERPL-MCNC: 1.3 MG/DL (ref 1.5–2.5)
MCH RBC QN AUTO: 30.1 PG (ref 27–33)
MCHC RBC AUTO-ENTMCNC: 32.1 G/DL (ref 33.7–35.3)
MCV RBC AUTO: 93.7 FL (ref 81.4–97.8)
PHOSPHATE SERPL-MCNC: 2.6 MG/DL (ref 2.5–4.5)
PLATELET # BLD AUTO: 100 K/UL (ref 164–446)
PMV BLD AUTO: 12.5 FL (ref 9–12.9)
POTASSIUM SERPL-SCNC: 3.7 MMOL/L (ref 3.6–5.5)
RBC # BLD AUTO: 2.69 M/UL (ref 4.7–6.1)
SIGNIFICANT IND 70042: ABNORMAL
SIGNIFICANT IND 70042: ABNORMAL
SITE SITE: ABNORMAL
SITE SITE: ABNORMAL
SODIUM SERPL-SCNC: 136 MMOL/L (ref 135–145)
SOURCE SOURCE: ABNORMAL
SOURCE SOURCE: ABNORMAL
WBC # BLD AUTO: 8.2 K/UL (ref 4.8–10.8)

## 2022-09-16 PROCEDURE — 84100 ASSAY OF PHOSPHORUS: CPT

## 2022-09-16 PROCEDURE — 93306 TTE W/DOPPLER COMPLETE: CPT

## 2022-09-16 PROCEDURE — A9270 NON-COVERED ITEM OR SERVICE: HCPCS | Performed by: HOSPITALIST

## 2022-09-16 PROCEDURE — 36415 COLL VENOUS BLD VENIPUNCTURE: CPT

## 2022-09-16 PROCEDURE — 99233 SBSQ HOSP IP/OBS HIGH 50: CPT | Performed by: STUDENT IN AN ORGANIZED HEALTH CARE EDUCATION/TRAINING PROGRAM

## 2022-09-16 PROCEDURE — 93306 TTE W/DOPPLER COMPLETE: CPT | Mod: 26 | Performed by: INTERNAL MEDICINE

## 2022-09-16 PROCEDURE — 83735 ASSAY OF MAGNESIUM: CPT

## 2022-09-16 PROCEDURE — 85027 COMPLETE CBC AUTOMATED: CPT

## 2022-09-16 PROCEDURE — 700102 HCHG RX REV CODE 250 W/ 637 OVERRIDE(OP)

## 2022-09-16 PROCEDURE — 700102 HCHG RX REV CODE 250 W/ 637 OVERRIDE(OP): Performed by: HOSPITALIST

## 2022-09-16 PROCEDURE — 80048 BASIC METABOLIC PNL TOTAL CA: CPT

## 2022-09-16 PROCEDURE — 90935 HEMODIALYSIS ONE EVALUATION: CPT

## 2022-09-16 PROCEDURE — 700111 HCHG RX REV CODE 636 W/ 250 OVERRIDE (IP): Performed by: INTERNAL MEDICINE

## 2022-09-16 PROCEDURE — 700111 HCHG RX REV CODE 636 W/ 250 OVERRIDE (IP): Performed by: STUDENT IN AN ORGANIZED HEALTH CARE EDUCATION/TRAINING PROGRAM

## 2022-09-16 PROCEDURE — 770001 HCHG ROOM/CARE - MED/SURG/GYN PRIV*

## 2022-09-16 PROCEDURE — 99232 SBSQ HOSP IP/OBS MODERATE 35: CPT | Performed by: INTERNAL MEDICINE

## 2022-09-16 PROCEDURE — A9270 NON-COVERED ITEM OR SERVICE: HCPCS

## 2022-09-16 RX ORDER — HEPARIN SODIUM 1000 [USP'U]/ML
1400 INJECTION, SOLUTION INTRAVENOUS; SUBCUTANEOUS
Status: DISCONTINUED | OUTPATIENT
Start: 2022-09-16 | End: 2022-09-16

## 2022-09-16 RX ORDER — HEPARIN SODIUM 1000 [USP'U]/ML
2800 INJECTION, SOLUTION INTRAVENOUS; SUBCUTANEOUS
Status: DISCONTINUED | OUTPATIENT
Start: 2022-09-16 | End: 2022-09-21

## 2022-09-16 RX ORDER — LORAZEPAM 0.5 MG/1
0.5 TABLET ORAL
Status: COMPLETED | OUTPATIENT
Start: 2022-09-16 | End: 2022-09-16

## 2022-09-16 RX ORDER — CEFAZOLIN SODIUM 1 G/50ML
1 INJECTION, SOLUTION INTRAVENOUS EVERY 24 HOURS
Status: DISCONTINUED | OUTPATIENT
Start: 2022-09-16 | End: 2022-09-22 | Stop reason: HOSPADM

## 2022-09-16 RX ADMIN — SEVELAMER CARBONATE 800 MG: 800 TABLET, FILM COATED ORAL at 12:55

## 2022-09-16 RX ADMIN — MIDODRINE HYDROCHLORIDE 5 MG: 5 TABLET ORAL at 07:53

## 2022-09-16 RX ADMIN — OXYCODONE 5 MG: 5 TABLET ORAL at 21:51

## 2022-09-16 RX ADMIN — ATORVASTATIN CALCIUM 20 MG: 20 TABLET, FILM COATED ORAL at 20:35

## 2022-09-16 RX ADMIN — ALLOPURINOL 100 MG: 100 TABLET ORAL at 20:35

## 2022-09-16 RX ADMIN — HEPARIN SODIUM 2800 UNITS: 1000 INJECTION, SOLUTION INTRAVENOUS; SUBCUTANEOUS at 13:05

## 2022-09-16 RX ADMIN — MIDODRINE HYDROCHLORIDE 5 MG: 5 TABLET ORAL at 12:55

## 2022-09-16 RX ADMIN — OMEPRAZOLE 20 MG: 20 CAPSULE, DELAYED RELEASE ORAL at 04:51

## 2022-09-16 RX ADMIN — OXYCODONE 5 MG: 5 TABLET ORAL at 17:59

## 2022-09-16 RX ADMIN — CEFAZOLIN SODIUM 1 G: 1 INJECTION, SOLUTION INTRAVENOUS at 12:55

## 2022-09-16 RX ADMIN — LORAZEPAM 0.5 MG: 0.5 TABLET ORAL at 23:26

## 2022-09-16 RX ADMIN — OXYCODONE 5 MG: 5 TABLET ORAL at 08:24

## 2022-09-16 RX ADMIN — SEVELAMER CARBONATE 800 MG: 800 TABLET, FILM COATED ORAL at 17:59

## 2022-09-16 RX ADMIN — OXYCODONE 5 MG: 5 TABLET ORAL at 12:52

## 2022-09-16 RX ADMIN — MIDODRINE HYDROCHLORIDE 5 MG: 5 TABLET ORAL at 17:58

## 2022-09-16 RX ADMIN — GABAPENTIN 300 MG: 300 CAPSULE ORAL at 20:35

## 2022-09-16 RX ADMIN — OXYCODONE 5 MG: 5 TABLET ORAL at 03:08

## 2022-09-16 RX ADMIN — SEVELAMER CARBONATE 800 MG: 800 TABLET, FILM COATED ORAL at 07:53

## 2022-09-16 ASSESSMENT — ENCOUNTER SYMPTOMS
CARDIOVASCULAR NEGATIVE: 1
CHILLS: 0
EYES NEGATIVE: 1
WEAKNESS: 1
MYALGIAS: 1
NEUROLOGICAL NEGATIVE: 1
NAUSEA: 0
RESPIRATORY NEGATIVE: 1
ABDOMINAL PAIN: 0
DIARRHEA: 0
FEVER: 0
VOMITING: 0
NERVOUS/ANXIOUS: 1
FOCAL WEAKNESS: 0
GASTROINTESTINAL NEGATIVE: 1
BACK PAIN: 1

## 2022-09-16 ASSESSMENT — FIBROSIS 4 INDEX: FIB4 SCORE: 6.72

## 2022-09-16 ASSESSMENT — PAIN DESCRIPTION - PAIN TYPE: TYPE: ACUTE PAIN

## 2022-09-16 NOTE — PROGRESS NOTES
[facility] Medicine Daily Progress Note    Date of Service  9/16/2022    Chief Complaint  Junior Proctor is a 72 y.o. male admitted 9/13/2022 with hypotension and altered level of consciousness after dialysis    Hospital Course  Is a 72-year-old male with a history of end-stage renal disease, presents with altered mental status, hypoglycemia and hypotension.  Reportedly the patient just had hemodialysis via a left upper chest tunneled dialysis catheter, he was found altered, hypotensive with a blood pressure of 80s over 40s and hypoglycemic with a blood glucose level of 50.  The patient emergency room was found with elevated temperature of 101.4, tachycardia, white cell count of 4.3, hemoglobin 8.5, platelet count 78.  The patient was started on broad-spectrum antibiotics with concern for possible sepsis in form of ceftriaxone and vancomycin.    Interval Problem Update  Patient seen and examined today.  Data, Medication data reviewed.  Case discussed with nursing as available.  Plan of Care reviewed with patient and notified of changes.   9/14 the patient appears somewhat improved, he required additional fluid boluses to maintain his blood pressure, he denies fevers or chills, no abdominal pain, he has very little urination, he complains of generalized joint pain in his hands, shoulders, ankles, this appears to be chronic, the patient is chronically using narcotics for pain control, he denies difficulty with infection of his fistula or dialysis catheter.  He denies nausea vomiting, no abdominal pain.  Nephrology has been consulted  Infectious disease has been consulted after patient had positive blood cultures reported x2 gram-negative  9/15 the patient is currently afebrile, heart rate in the 60s, unlabored respirations, on room air, normotensive, blood pressure has improved, the patient was attempted to receive dialysis through his left upper chest dialysis catheter but was unable to perform secondary to flow issues,  discussed with nephrology, ordered IR exchange of catheter, in further review the patient had a pancreatic stent placed in June that was supposed to be revised, discussed with gastroenterology, get an MRCP to see if the catheter has passed naturally or will possibly need to be removed by ERCP, updated infectious disease.  9/16/2022:  Infectious disease recommendations appreciated patient no acute events overnight nephrology recommendations appreciated.  Patient had temporary R IJ dialysis line placed yesterday by interventional radiology.  Patient continue dialysis as scheduled.  We will consider replacement of permacatheter per ID recommendations.  Patient has at least several more days hospitalization prior to entertaining idea of discharge.  Otherwise continue present medical management.  I have prioritized MRCP.  Continue cefepime 1 g every 24 hours renally dosed.  I have discussed this patient's plan of care and discharge plan at IDT rounds today with Case Management, Nursing, Nursing leadership, and other members of the IDT team.    Consultants/Specialty  infectious disease and nephrology    Code Status  Full Code    Disposition  Patient is not medically cleared for discharge.   Anticipate discharge to to home with close outpatient follow-up.  I have placed the appropriate orders for post-discharge needs.    Review of Systems  Review of Systems   Constitutional:  Positive for malaise/fatigue.   HENT: Negative.     Eyes: Negative.    Respiratory: Negative.     Cardiovascular: Negative.    Gastrointestinal: Negative.    Genitourinary: Negative.    Musculoskeletal:  Positive for back pain, joint pain and myalgias.   Skin: Negative.    Neurological: Negative.    Endo/Heme/Allergies: Negative.    Psychiatric/Behavioral:  The patient is nervous/anxious.    All other systems reviewed and are negative.     Physical Exam  Temp:  [36.1 °C (97 °F)-36.8 °C (98.3 °F)] 36.7 °C (98.1 °F)  Pulse:  [55-78] 72  Resp:  [8-38]  16  BP: ()/() 123/61  SpO2:  [91 %-100 %] 91 %    Physical Exam  Vitals and nursing note reviewed.   Constitutional:       Appearance: He is well-developed.      Comments: Pt seen and examined.   HENT:      Head: Normocephalic and atraumatic.   Eyes:      Pupils: Pupils are equal, round, and reactive to light.   Cardiovascular:      Rate and Rhythm: Normal rate and regular rhythm.      Heart sounds: Normal heart sounds.   Pulmonary:      Effort: Pulmonary effort is normal.      Breath sounds: Normal breath sounds.   Abdominal:      General: Bowel sounds are normal.      Palpations: Abdomen is soft.   Musculoskeletal:         General: Normal range of motion.      Cervical back: Normal range of motion and neck supple.   Skin:     General: Skin is warm and dry.      Coloration: Skin is pale.   Neurological:      General: No focal deficit present.      Mental Status: He is alert and oriented to person, place, and time.   Psychiatric:         Behavior: Behavior normal.       Fluids    Intake/Output Summary (Last 24 hours) at 9/16/2022 1643  Last data filed at 9/16/2022 1606  Gross per 24 hour   Intake 1800 ml   Output 2465 ml   Net -665 ml         Laboratory  Recent Labs     09/14/22  0406 09/15/22  0816 09/16/22  0318   WBC 7.8 8.4 8.2   RBC 2.43* 2.86* 2.69*   HEMOGLOBIN 7.4* 8.6* 8.1*   HEMATOCRIT 23.0* 27.2* 25.2*   MCV 94.7 95.1 93.7   MCH 30.5 30.1 30.1   MCHC 32.2* 31.6* 32.1*   RDW 68.2* 70.9* 69.1*   PLATELETCT 68* 80* 100*   MPV 9.9 11.9 12.5       Recent Labs     09/14/22  0406 09/15/22  0816 09/16/22  0318   SODIUM 136 137 136   POTASSIUM 3.7 3.7 3.7   CHLORIDE 98 103 103   CO2 24 21 22   GLUCOSE 148* 107* 94   BUN 25* 33* 32*   CREATININE 7.94* 8.95* 8.43*   CALCIUM 8.3* 8.3* 8.1*                     Imaging  US-EXTREMITY VENOUS UPPER UNILAT LEFT   Final Result      CT-HEAD W/O   Final Result         1. No acute intracranial abnormality. No evidence of acute intracranial hemorrhage or mass  lesion.                     DX-CHEST-PORTABLE (1 VIEW)   Final Result      Stable enlargement of the cardiomediastinal silhouette without acute cardiopulmonary abnormality.      EC-ECHOCARDIOGRAM COMPLETE W/O CONT    (Results Pending)   LH-CEEVSRY-A/O    (Results Pending)   IR-CVC TUNNEL W/O PORT REMOVAL    (Results Pending)          Assessment/Plan  * Sepsis (HCC)- (present on admission)  Assessment & Plan  This is Sepsis Present on admission  SIRS criteria identified on my evaluation include: Fever, with temperature greater than 101 deg F  Source is unknown, possible dialysis catheter infection  Sepsis protocol initiated  Fluid resuscitation ordered per protocol  Crystalloid Fluid Administration: Patient has advanced or end-stage chronic renal disease (with documentation of stage IV or GFR 15-29 mL/min, or stage V or GFR < 15 mL/min or ESRD), for this/these reason(s) it is unsafe for this patient to receive 30 mL/kg of fluid  Partial Fluid Dose Given: patient to be reassessed shortly after completion of partial fluid bolus to ensure adequacy of resuscitation  IV antibiotics as appropriate for source of sepsis  Reassessment: I have reassessed the patient's hemodynamic status      Gram-negative bacteremia  Assessment & Plan  Unclear source, certainly possibility of line infection given patient's hemodialysis catheter  Get echocardiogram, MRCP abdomen given the patient's history of pancreatitis with stent placement which might be retained  ID consult  Follow cultures      Hypoglycemia  Assessment & Plan  Unclear etiology, likely sepsis related  Not a diabetic patient  Improved now    Altered mental status  Assessment & Plan  Likely due to hypoglycemia in setting of suspected sepsis  Sepsis of unclear etiology, possible line infection given dialysis catheter present  Mental status has since normalized  CXR stable      Arthritis- (present on admission)  Assessment & Plan  Pain management      ESRD on dialysis (HCC)-  (present on admission)  Assessment & Plan  Presents from dialysis after dialysis session completed  Found to be hypotensive, hypoglycemic, febrile  No urgent need for hemodialysis, nephrology consulted  Treat infection with IV antibiotics, blood cultures obtained, follow-up cultures ordered  Arranging catheter exchange    Hypotension- (present on admission)  Assessment & Plan  Related to infection and possible volume loss  Several boluses of IV fluids have been given, currently with good results, monitor closely    Anemia- (present on admission)  Assessment & Plan  Likely anemia of chronic disease, renal disease           VTE prophylaxis: heparin ppx, the patient has been refusing heparin injections    I have performed a physical exam and reviewed and updated ROS and Plan today (9/16/2022). In review of yesterday's note (9/15/2022), there are no changes except as documented above.        Please note that this dictation was created using voice recognition software. I have made every reasonable attempt to correct obvious errors, but I expect that there are errors of grammar and possibly context that I did not discover before finalizing the note.

## 2022-09-16 NOTE — PROGRESS NOTES
IR Nursing Note      Tunneled Dialysis catheter removal and placement of temporary HD cathter by MD Lozano assisted by RT Zapata, current Left chest access site used.  Catheter tip, collected and sent to lab for culture.    Access site sealed with 2 stitches and covered with biopatch and Tegaderm. Catheter Heparin locked    Report given to RADHA Garner.  Patient transported to [facility] via IR RN monitored then transferred care to report RN.    Aden SOTO-Trialysis Slim cath 12F/20cm  REF: [id number]  LOT: [id number]  EXP: 05/31/2024

## 2022-09-16 NOTE — CARE PLAN
The patient is Stable - Low risk of patient condition declining or worsening    Shift Goals  Clinical Goals: hemodynamically stable    Progress made toward(s) clinical / shift goals:    Problem: Respiratory  Goal: Patient will achieve/maintain optimum respiratory ventilation and gas exchange  Outcome: Progressing. Maintaining oxygen saturation on room air.     Problem: Pain - Standard  Goal: Alleviation of pain or a reduction in pain to the patient’s comfort goal  Outcome: Progressing. Reports generalized pain consistently. States it's not relieved with prn oxycodone but has been able to fall asleep and appears to be resting comfortably overnight.     Problem: Skin Integrity  Goal: Skin integrity is maintained or improved  Outcome: Progressing. Generally dry skin. Mostly intact other than abrasions to knees.       Patient is not progressing towards the following goals:

## 2022-09-16 NOTE — PROGRESS NOTES
Infectious Disease Progress Note    Author: Carroll Pena M.D. Date & Time of service: 2022  10:50 AM    Chief Complaint:  Follow-up for GNR bacteremia    Interval History:  9/15 fever curve has improved, no CBC this morning, tolerating cefepime.  Cultures as below   no further fevers, white count 8.2, repeat cultures as below.  Resting comfortably this morning    Labs Reviewed and Medications Reviewed.    Review of Systems:  Review of Systems   Constitutional:  Negative for chills and fever.   Gastrointestinal:  Negative for abdominal pain, diarrhea, nausea and vomiting.   Skin:  Negative for itching and rash.   Neurological:  Positive for weakness. Negative for focal weakness.   All other systems reviewed and are negative.    Hemodynamics:  Temp (24hrs), Av.6 °C (97.8 °F), Min:36.2 °C (97.2 °F), Max:36.8 °C (98.3 °F)  Temperature: 36.2 °C (97.2 °F)  Pulse  Av.5  Min: 53  Max: 109   Blood Pressure : 116/63 (pre-dialysis)       Physical Exam:  Physical Exam  Vitals and nursing note reviewed.   Constitutional:       General: He is not in acute distress.     Appearance: He is ill-appearing. He is not toxic-appearing.   HENT:      Mouth/Throat:      Pharynx: No oropharyngeal exudate.   Eyes:      General: No scleral icterus.        Right eye: No discharge.         Left eye: No discharge.      Conjunctiva/sclera: Conjunctivae normal.   Cardiovascular:      Rate and Rhythm: Normal rate.      Heart sounds: No murmur heard.  Pulmonary:      Effort: Pulmonary effort is normal. No respiratory distress.      Breath sounds: No stridor.      Comments: Left upper chest wall tunneled dialysis catheter removed and temporary placed, currently undergoing dialysis  Abdominal:      General: There is no distension.      Tenderness: There is no abdominal tenderness.   Musculoskeletal:         General: No swelling or tenderness.   Skin:     Findings: No erythema or rash.   Neurological:      General: No focal deficit  present.      Mental Status: He is alert and oriented to person, place, and time.      Comments: Generalized weakness   Psychiatric:         Mood and Affect: Mood normal.         Behavior: Behavior normal.       Meds:    Current Facility-Administered Medications:     heparin    midodrine    oxyCODONE immediate-release    cefepime    allopurinol    atorvastatin    gabapentin    sevelamer carbonate    omeprazole    senna-docusate **AND** polyethylene glycol/lytes **AND** magnesium hydroxide **AND** bisacodyl    heparin    acetaminophen    Labs:  Recent Labs     09/13/22  1620 09/14/22  0406 09/15/22  0816 09/16/22  0318   WBC 4.3* 7.8 8.4 8.2   RBC 2.82* 2.43* 2.86* 2.69*   HEMOGLOBIN 8.5* 7.4* 8.6* 8.1*   HEMATOCRIT 26.8* 23.0* 27.2* 25.2*   MCV 95.0 94.7 95.1 93.7   MCH 30.1 30.5 30.1 30.1   RDW 68.5* 68.2* 70.9* 69.1*   PLATELETCT 78* 68* 80* 100*   MPV 10.4 9.9 11.9 12.5   NEUTSPOLYS 78.90*  --   --   --    LYMPHOCYTES 14.90*  --   --   --    MONOCYTES 0.90  --   --   --    EOSINOPHILS 0.90  --   --   --    BASOPHILS 0.00  --   --   --    RBCMORPHOLO Present  --   --   --        Recent Labs     09/14/22  0406 09/15/22  0816 09/16/22  0318   SODIUM 136 137 136   POTASSIUM 3.7 3.7 3.7   CHLORIDE 98 103 103   CO2 24 21 22   GLUCOSE 148* 107* 94   BUN 25* 33* 32*       Recent Labs     09/13/22  1620 09/14/22  0406 09/15/22  0816 09/16/22  0318   ALBUMIN 3.7 2.9* 2.2*  --    TBILIRUBIN 0.7 0.4 0.3  --    ALKPHOSPHAT 96 86 71  --    TOTPROTEIN 7.4 6.0 5.5*  --    ALTSGPT 39 31 24  --    ASTSGOT 87* 64* 47*  --    CREATININE 7.46* 7.94* 8.95* 8.43*         Imaging:  CT-HEAD W/O    Result Date: 9/13/2022 9/13/2022 7:56 PM HISTORY/REASON FOR EXAM:  Mental status change, unknown cause TECHNIQUE/EXAM DESCRIPTION AND NUMBER OF VIEWS: CT of the head without contrast. The study was performed on a helical multidetector CT scanner. Contiguous 2.5 mm axial sections were obtained from the skull base through the vertex. Up to date  radiation dose reduction adjustments have been utilized to meet ALARA standards for radiation dose reduction. COMPARISON:  11/23/2021 FINDINGS: There is no evidence of acute intracranial hemorrhage, mass, mass-effect or shift of midline structures. Gray-white matter differentiation is grossly preserved. Ventricle size and brain parenchymal volume are appropriate for this patient's stated age. The basal cisterns are patent. There are no abnormal extra-axial fluid collections. No depressed or widely  calvarial fracture is seen. The visualized paranasal sinuses and temporal bone structures are aerated. ___________________________________     1. No acute intracranial abnormality. No evidence of acute intracranial hemorrhage or mass lesion.     DX-CHEST-PORTABLE (1 VIEW)    Result Date: 9/13/2022 9/13/2022 4:02 PM HISTORY/REASON FOR EXAM:  Sepsis; sepsis. TECHNIQUE/EXAM DESCRIPTION AND NUMBER OF VIEWS: Single portable view of the chest. COMPARISON: 6/6/2022 FINDINGS: There is stable enlargement of the cardiomediastinal silhouette. A left-sided tunneled hemodialysis catheter is unchanged. The lungs are clear. There is no evidence of large pleural effusion or pneumothorax. Imaged osseous structures are unremarkable.     Stable enlargement of the cardiomediastinal silhouette without acute cardiopulmonary abnormality.    US-EXTREMITY VENOUS UPPER UNILAT LEFT    Result Date: 9/14/2022   Upper Extremity  Venous Duplex Report  Vascular Laboratory  CONCLUSIONS  No prior exam is available for comparison.  Left upper extremity.  No deep venous thrombosis.  RENE STEINBERG  Exam Date:     09/14/2022 10:18  Room #:     Inpatient  Priority:     Routine  Ht (in):     68      Wt (lb):     156  Ordering Physician:        CHRISTOPHER PANIAGUA  Referring Physician:       [id number]  Sonographer:               Clemencia Angela RDCS, RVT  Study Type:                Complete Unilateral  Technical  "Quality:         Adequate  Age:    72    Gender:     M  MRN:    [id number]  :    1950      BSA:    1.84  Indications:     Swelling of Limb  CPT Codes:       29048  ICD Codes:       729.81  History:         Right Upper AVF, hypotension.  Limitations:  PROCEDURES:  Left upper extremity venous duplex imaging.  The following venous structures were evaluated: internal jugular,  subclavian, axillary, brachial, cephalic, and basilic veins.  Serial compression, color, and spectral Doppler flow evaluations were  performed.  FINDINGS:  Left upper extremity.  All veins demonstrate complete color filling and compressibility with  normal venous flow dynamics including spontaneous flow and respiratory  phasicity.  No deep venous thrombosis.  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  Khalif Hutchinson MD  (Electronically Signed)  Final Date:      2022                   10:59      Micro:  Results       Procedure Component Value Units Date/Time    BLOOD CULTURE [[id number]]  (Abnormal) Collected: 22 1711    Order Status: Completed Specimen: Blood from Peripheral Updated: 22 0758     Significant Indicator POS     Source BLD     Site PERIPHERAL     Culture Result Growth detected by Bactec instrument. 2022  08:59      Escherichia coli  See previous culture for sensitivity report.      Narrative:      CALL  Shea  [facility] tel. [phone],  CALLED  [facility] tel. [phone] 2022, 09:01, RB PERF. RESULTS CALLED TO:  [phone] RN  Per Hospital Policy: Only change Specimen Src: to \"Line\" if  specified by physician order.  Left Hand    BLOOD CULTURE [[id number]]  (Abnormal)  (Susceptibility) Collected: 22 8275    Order Status: Completed Specimen: Blood from Peripheral Updated: 22 0755     Significant Indicator POS     Source BLD     Site PERIPHERAL     Culture Result Growth detected by Bactec instrument. 2022  " "04:24      Escherichia coli    Narrative:      CALL  Shea  [facility] tel. [phone],  CALLED  [facility] tel. [phone] 09/14/2022, 04:27, RB PERF. RESULTS CALLED TO:  RN [phone]  Per Hospital Policy: Only change Specimen Src: to \"Line\" if  specified by physician order.  Right AC    Susceptibility       Escherichia coli (1)       Antibiotic Interpretation Microscan   Method Status    Ampicillin Sensitive <=8 mcg/mL GEORGI Final    Ceftriaxone Sensitive <=1 mcg/mL GEORGI Final    Cefazolin Sensitive <=2 mcg/mL GEORGI Final    Ciprofloxacin Sensitive <=0.25 mcg/mL GEORGI Final    Cefepime Sensitive <=2 mcg/mL GEORGI Final    Cefuroxime Sensitive <=4 mcg/mL GEORGI Final    Ampicillin/sulbactam Sensitive <=4/2 mcg/mL GEORGI Final    Ertapenem Sensitive <=0.5 mcg/mL GEORGI Final    Tobramycin Sensitive 4 mcg/mL GEORGI Final    Gentamicin Sensitive 4 mcg/mL GEORGI Final    Minocycline Sensitive <=4 mcg/mL GEORGI Final    Moxifloxacin Sensitive <=2 mcg/mL GEOGRI Final    Pip/Tazobactam Sensitive <=8 mcg/mL GEORGI Final    Trimeth/Sulfa Sensitive <=0.5/9.5 mcg/mL GEORGI Final    Tigecycline Sensitive <=2 mcg/mL GEORGI Final                       Blood Culture [[id number]] Collected: 09/15/22 0835    Order Status: Completed Specimen: Blood from Line Updated: 09/16/22 0711     Significant Indicator NEG     Source BLD     Site LINE     Culture Result No Growth  Note: Blood cultures are incubated for 5 days and  are monitored continuously.Positive blood cultures  are called to the RN and reported as soon as  they are identified.      Narrative:      Collected By: [id number] DEEPAK GATES  Per Hospital Policy: Only change Specimen Src: to \"Line\" if  specified by physician order.  Blood culture from Dialysis CVC arterial port  Dialysis CVC arterial port    BLOOD CULTURE [[id number]] Collected: 09/15/22 0816    Order Status: Completed Specimen: Blood from Peripheral Updated: 09/16/22 0711     Significant Indicator NEG     Source BLD     Site PERIPHERAL     Culture Result No Growth  Note: " "Blood cultures are incubated for 5 days and  are monitored continuously.Positive blood cultures  are called to the RN and reported as soon as  they are identified.      Narrative:      Per Hospital Policy: Only change Specimen Src: to \"Line\" if  specified by physician order.  Left Forearm/Arm    BLOOD CULTURE [[id number]] Collected: 09/15/22 0816    Order Status: Completed Specimen: Blood from Peripheral Updated: 09/16/22 0711     Significant Indicator NEG     Source BLD     Site PERIPHERAL     Culture Result No Growth  Note: Blood cultures are incubated for 5 days and  are monitored continuously.Positive blood cultures  are called to the RN and reported as soon as  they are identified.      Narrative:      Per Hospital Policy: Only change Specimen Src: to \"Line\" if  specified by physician order.  Left Hand    CATH TIP CULTURE [[id number]] Collected: 09/15/22 1710    Order Status: No result Specimen: Cath Tip Updated: 09/15/22 1804     Significant Indicator NEG     Source CTIP     Site Central Line     Culture Result -    CATH TIP CULTURE [[id number]]     Order Status: No result Specimen: Cath Tip from Central Line     MRSA By PCR (Amp) [[id number]] Collected: 09/13/22 2125    Order Status: Completed Specimen: Blood from Nares Updated: 09/14/22 1645     MRSA by PCR POSITIVE    CoV-2, FLU A/B, and RSV by PCR (2-4 Hours CEPTipstarID) : Collect NP swab in VTM [[id number]] Collected: 09/13/22 1620    Order Status: Completed Specimen: Respirate Updated: 09/13/22 1730     Influenza virus A RNA Negative     Influenza virus B, PCR Negative     RSV, PCR Negative     SARS-CoV-2 by PCR NotDetected     Comment: PATIENTS: Important information regarding your results and instructions can  be found at [email or web address]   \"After your  Covid-19 Test\"    [facility] providers: PLEASE REFER TO DE-ESCALATION AND RETESTING PROTOCOL  on [email or web address]    **The [employer] GeneXpert Xpress SARS-CoV-2 RT-PCR Test has been " made  available for use under the Emergency Use Authorization (EUA) only.          SARS-CoV-2 Source NP Swab    URINALYSIS [[id number]]     Order Status: Sent Specimen: Urine     URINE CULTURE(NEW) [[id number]]     Order Status: Canceled Specimen: Urine             Assessment:  Junior Proctor is a 72 y.o. male patient with ESRD on HD, admitted with acute onset of sepsis with GNR bacteremia.  No obvious source identified, suspect tunneled dialysis catheter infection.     Pertinent Diagnoses:  E. coli bacteremia  Tunneled dialysis catheter in place  ESRD on HD  Acute encephalopathy, improved    Plan:  -Mental status has improved.  Organism identified as pansensitive E. coli.  Discontinue cefepime, start renally dosed IV Ancef 1 g every 24 hours   -Follow identification and sensitivities of the GNR.  Of note, Dr. Desai noted history of pancreatic stent placement in the past.  Agree with MRI to look for abscesses and potential source of GNR bacteremia.    -Tunneled dialysis catheter removed by IR on 9/15, temporary catheter placed  -Follow repeat blood cultures x2 from 9/15, pending     Plan was discussed with the primary team, Dr. Valdez    Disposition: Anticipate no ID specific disposition needs  Need for PICC line: No, will be able to use IV antibiotics with dialysis

## 2022-09-16 NOTE — PROGRESS NOTES
[facility] Services Progress Note    Hemodialysis treatment x 3 hours completed as ordered per Dr. Cortez.  Treatment performed at bedside started at 0923 and ended at 1301.   Treatment paused at 1053 due to signs of dialyzer clotting, all blood returned.  Treatment resumed at 1130 with new dialyzer and tubing set up, Dr. Cortez notified.     Net UF Removed: 1265 mL    Patient tolerated treatment well. UF goal adjusted as BP tolerates, soft BPs at intervals, asymptomatic.  Left IJ non tunneled CVC access with good patency and flow x 2, blood lines reversed for optimal BFR due to positional arterial port with pressure fluctuations.  Patient stable during and post treatment, no complaints.  See Acute HD flow sheets on clinical data notes under media for details.      Post tx access: Left IJ non tunneled  triple lumen HD catheter flushed with saline then locked with Heparin 1000 units/ml per designated amount in each lumen (see MAR) then clamped, capped aseptically and labeled properly. CVC dressings clean, dry and intact. No s/s of infection to HD catheter site. Heparin lock to be aspirated prior to next dialysis/CVC use by dialysis RN only. Please do not flush or draw from ports.    Please notify Nephrologist/Dialysis for follow-up.     Report given to primary care nurse JULIÁN Phipps RN.

## 2022-09-16 NOTE — CARE PLAN
The patient is Stable - Low risk of patient condition declining or worsening    Shift Goals  Clinical Goals: hemodynamically stable    Progress made toward(s) clinical / shift goals:    Problem: Knowledge Deficit - Standard  Goal: Patient and family/care givers will demonstrate understanding of plan of care, disease process/condition, diagnostic tests and medications  Outcome: Progressing     Problem: Pain - Standard  Goal: Alleviation of pain or a reduction in pain to the patient’s comfort goal  Outcome: Progressing     Problem: Skin Integrity  Goal: Skin integrity is maintained or improved  Outcome: Progressing       Patient is not progressing towards the following goals:

## 2022-09-16 NOTE — PROGRESS NOTES
4 Eyes Skin Assessment Completed by RADHA Segura and RADHA Sanabria.    Head WDL  Ears WDL  Nose WDL  Mouth WDL  Neck WDL  Breast/Chest WDL Dressing to old HD line site. New Temp HD line to left side.  Shoulder Blades WDL  Spine WDL  (R) Arm/Elbow/Hand WDL  (L) Arm/Elbow/Hand WDL  Abdomen WDL  Groin WDL  Scrotum/Coccyx/Buttocks WDL  (R) Leg WDL dry skin  (L) Leg WDL dry skin  (R) Heel/Foot/Toe Discoloration dry skin  (L) Heel/Foot/Toe Discoloration dry skin          Devices In Places Tele Box, Blood Pressure Cuff, Pulse Ox, and SCD's      Interventions In Place N/A    Possible Skin Injury No    Pictures Uploaded Into Epic N/A  Wound Consult Placed N/A  RN Wound Prevention Protocol Ordered No

## 2022-09-16 NOTE — PROGRESS NOTES
"[facility] -  PROGRESS NOTE               Author: Tristen Cortez M.D. Date & Time: 9/16/2022  10:09 AM     HPI:  Junior Proctor is a 72 y.o. male admitted 9/13/2022.  Patient with ESRD on HD via tunneled dialysis catheter, hypertension, had dialysis but was noted to be altered following his treatment and with hypotension to 80s over 40s, hypoglycemic with a blood glucose of 50.  He was febrile to 101.4 in the ER tachycardic to 105.  Patient is admitted to the [facility].  2 out of 2 blood cultures are positive for GNR.  Nephrology was asked to consult for ESRD management.     DAILY NEPHROLOGY SUMMARY:  9/15 - Blood cultures with gram negative rods. Difficult dialysis given poor catheter flow. ID suspects line sepsis and wants catheter holiday.  9/16 - Transferred out of the CCU. No new overnight renal events. Tunneled Dialysis catheter removal and placement of temporary HD cathter.    REVIEW OF SYSTEMS:    10 point ROS reviewed and is as per HPI/daily summary or otherwise negative    PMH/PSH/SH/FH:   Reviewed and unchanged since admission note    CURRENT MEDICATIONS:   Reviewed from admission to present day    VS:  /63 Comment: pre-dialysis  Pulse 63   Temp 36.2 °C (97.2 °F) (Oral)   Resp 16   Ht 1.727 m (5' 8\")   Wt 74.6 kg (164 lb 7.4 oz)   SpO2 95%   BMI 25.01 kg/m²     Physical Exam  Vitals and nursing note reviewed.   Constitutional:       Appearance: He is normal weight.   HENT:      Head: Normocephalic and atraumatic.      Nose: Nose normal.      Mouth/Throat:      Mouth: Mucous membranes are moist.      Pharynx: Oropharynx is clear.   Eyes:      Extraocular Movements: Extraocular movements intact.      Conjunctiva/sclera: Conjunctivae normal.      Pupils: Pupils are equal, round, and reactive to light.   Cardiovascular:      Rate and Rhythm: Normal rate and regular rhythm.      Pulses: Normal pulses.      Heart sounds: Normal heart sounds.   Pulmonary:      Effort: Pulmonary " effort is normal.      Breath sounds: Normal breath sounds.   Abdominal:      General: Abdomen is flat. Bowel sounds are normal.   Musculoskeletal:         General: Normal range of motion.      Cervical back: Normal range of motion and neck supple.   Skin:     General: Skin is warm.      Capillary Refill: Capillary refill takes less than 2 seconds.   Neurological:      General: No focal deficit present.      Mental Status: He is alert and oriented to person, place, and time. Mental status is at baseline.   Psychiatric:         Mood and Affect: Mood normal.         Behavior: Behavior normal.         Thought Content: Thought content normal.         Judgment: Judgment normal.     Fluids:  In: 980 [P.O.:480; Dialysis:500]  Out: 673     LABS:  Recent Labs     09/14/22  0406 09/15/22  0816 09/16/22  0318   SODIUM 136 137 136   POTASSIUM 3.7 3.7 3.7   CHLORIDE 98 103 103   CO2 24 21 22   GLUCOSE 148* 107* 94   BUN 25* 33* 32*   CREATININE 7.94* 8.95* 8.43*   CALCIUM 8.3* 8.3* 8.1*         IMAGING:   All imaging reviewed from admission to present day    IMPRESSION:  # ESRD   # HTN  - Goal BP < 140/90  # Anemia of CKD  - Goal Hgb 10-11  # CKD-MBD/Renal Osteodystrophy  # Sepsis  # Gram negative bactermia/Line infection  # Encephaolpathy     PLAN:  - HD today.   - Will switch him to MWF during stay.  - TDC once future blood cultures come back normal.  - UF as tolerated  - No dietary protein restrictions  - Dose all meds per ESRD  - Abx per primary service/ID.

## 2022-09-17 ENCOUNTER — APPOINTMENT (OUTPATIENT)
Dept: RADIOLOGY | Facility: MEDICAL CENTER | Age: 72
DRG: 314 | End: 2022-09-17
Attending: STUDENT IN AN ORGANIZED HEALTH CARE EDUCATION/TRAINING PROGRAM
Payer: COMMERCIAL

## 2022-09-17 LAB
ALBUMIN SERPL BCP-MCNC: 2.6 G/DL (ref 3.2–4.9)
ALBUMIN/GLOB SERPL: 0.8 G/DL
ALP SERPL-CCNC: 71 U/L (ref 30–99)
ALT SERPL-CCNC: 15 U/L (ref 2–50)
ANION GAP SERPL CALC-SCNC: 11 MMOL/L (ref 7–16)
AST SERPL-CCNC: 24 U/L (ref 12–45)
BASOPHILS # BLD AUTO: 0.6 % (ref 0–1.8)
BASOPHILS # BLD: 0.04 K/UL (ref 0–0.12)
BILIRUB SERPL-MCNC: 0.4 MG/DL (ref 0.1–1.5)
BUN SERPL-MCNC: 17 MG/DL (ref 8–22)
CALCIUM SERPL-MCNC: 8.1 MG/DL (ref 8.5–10.5)
CC # CATH TIP CULT: NORMAL /ML
CHLORIDE SERPL-SCNC: 101 MMOL/L (ref 96–112)
CO2 SERPL-SCNC: 25 MMOL/L (ref 20–33)
CREAT SERPL-MCNC: 5.94 MG/DL (ref 0.5–1.4)
EOSINOPHIL # BLD AUTO: 0.13 K/UL (ref 0–0.51)
EOSINOPHIL NFR BLD: 2.1 % (ref 0–6.9)
ERYTHROCYTE [DISTWIDTH] IN BLOOD BY AUTOMATED COUNT: 71.9 FL (ref 35.9–50)
GFR SERPLBLD CREATININE-BSD FMLA CKD-EPI: 10 ML/MIN/1.73 M 2
GLOBULIN SER CALC-MCNC: 3.2 G/DL (ref 1.9–3.5)
GLUCOSE SERPL-MCNC: 90 MG/DL (ref 65–99)
HCT VFR BLD AUTO: 24.9 % (ref 42–52)
HGB BLD-MCNC: 7.8 G/DL (ref 14–18)
IMM GRANULOCYTES # BLD AUTO: 0.04 K/UL (ref 0–0.11)
IMM GRANULOCYTES NFR BLD AUTO: 0.6 % (ref 0–0.9)
LYMPHOCYTES # BLD AUTO: 2.22 K/UL (ref 1–4.8)
LYMPHOCYTES NFR BLD: 35.2 % (ref 22–41)
MCH RBC QN AUTO: 29.7 PG (ref 27–33)
MCHC RBC AUTO-ENTMCNC: 31.3 G/DL (ref 33.7–35.3)
MCV RBC AUTO: 94.7 FL (ref 81.4–97.8)
MONOCYTES # BLD AUTO: 1 K/UL (ref 0–0.85)
MONOCYTES NFR BLD AUTO: 15.8 % (ref 0–13.4)
NEUTROPHILS # BLD AUTO: 2.88 K/UL (ref 1.82–7.42)
NEUTROPHILS NFR BLD: 45.7 % (ref 44–72)
NRBC # BLD AUTO: 0 K/UL
NRBC BLD-RTO: 0 /100 WBC
PLATELET # BLD AUTO: 108 K/UL (ref 164–446)
PMV BLD AUTO: 11 FL (ref 9–12.9)
POTASSIUM SERPL-SCNC: 3.8 MMOL/L (ref 3.6–5.5)
PROT SERPL-MCNC: 5.8 G/DL (ref 6–8.2)
RBC # BLD AUTO: 2.63 M/UL (ref 4.7–6.1)
SIGNIFICANT IND 70042: NORMAL
SITE SITE: NORMAL
SODIUM SERPL-SCNC: 137 MMOL/L (ref 135–145)
SOURCE SOURCE: NORMAL
WBC # BLD AUTO: 6.3 K/UL (ref 4.8–10.8)

## 2022-09-17 PROCEDURE — 99232 SBSQ HOSP IP/OBS MODERATE 35: CPT | Performed by: INTERNAL MEDICINE

## 2022-09-17 PROCEDURE — 85025 COMPLETE CBC W/AUTO DIFF WBC: CPT

## 2022-09-17 PROCEDURE — A9270 NON-COVERED ITEM OR SERVICE: HCPCS | Performed by: HOSPITALIST

## 2022-09-17 PROCEDURE — 700111 HCHG RX REV CODE 636 W/ 250 OVERRIDE (IP): Performed by: NURSE PRACTITIONER

## 2022-09-17 PROCEDURE — 770020 HCHG ROOM/CARE - TELE (206)

## 2022-09-17 PROCEDURE — 99233 SBSQ HOSP IP/OBS HIGH 50: CPT | Performed by: STUDENT IN AN ORGANIZED HEALTH CARE EDUCATION/TRAINING PROGRAM

## 2022-09-17 PROCEDURE — 700111 HCHG RX REV CODE 636 W/ 250 OVERRIDE (IP): Performed by: INTERNAL MEDICINE

## 2022-09-17 PROCEDURE — 700102 HCHG RX REV CODE 250 W/ 637 OVERRIDE(OP): Performed by: HOSPITALIST

## 2022-09-17 PROCEDURE — 700111 HCHG RX REV CODE 636 W/ 250 OVERRIDE (IP): Performed by: STUDENT IN AN ORGANIZED HEALTH CARE EDUCATION/TRAINING PROGRAM

## 2022-09-17 PROCEDURE — 36415 COLL VENOUS BLD VENIPUNCTURE: CPT

## 2022-09-17 PROCEDURE — 74181 MRI ABDOMEN W/O CONTRAST: CPT

## 2022-09-17 PROCEDURE — 80053 COMPREHEN METABOLIC PANEL: CPT

## 2022-09-17 RX ORDER — DIAZEPAM 5 MG/ML
5 INJECTION, SOLUTION INTRAMUSCULAR; INTRAVENOUS ONCE
Status: COMPLETED | OUTPATIENT
Start: 2022-09-17 | End: 2022-09-17

## 2022-09-17 RX ORDER — HYDROMORPHONE HYDROCHLORIDE 1 MG/ML
0.5 INJECTION, SOLUTION INTRAMUSCULAR; INTRAVENOUS; SUBCUTANEOUS ONCE
Status: COMPLETED | OUTPATIENT
Start: 2022-09-17 | End: 2022-09-17

## 2022-09-17 RX ADMIN — OXYCODONE 5 MG: 5 TABLET ORAL at 13:19

## 2022-09-17 RX ADMIN — ATORVASTATIN CALCIUM 20 MG: 20 TABLET, FILM COATED ORAL at 21:38

## 2022-09-17 RX ADMIN — HYDROMORPHONE HYDROCHLORIDE 0.5 MG: 1 INJECTION, SOLUTION INTRAMUSCULAR; INTRAVENOUS; SUBCUTANEOUS at 23:34

## 2022-09-17 RX ADMIN — SEVELAMER CARBONATE 800 MG: 800 TABLET, FILM COATED ORAL at 16:41

## 2022-09-17 RX ADMIN — OXYCODONE 5 MG: 5 TABLET ORAL at 16:41

## 2022-09-17 RX ADMIN — SEVELAMER CARBONATE 800 MG: 800 TABLET, FILM COATED ORAL at 13:17

## 2022-09-17 RX ADMIN — CEFAZOLIN SODIUM 1 G: 1 INJECTION, SOLUTION INTRAVENOUS at 16:41

## 2022-09-17 RX ADMIN — OXYCODONE 5 MG: 5 TABLET ORAL at 03:02

## 2022-09-17 RX ADMIN — MIDODRINE HYDROCHLORIDE 5 MG: 5 TABLET ORAL at 08:36

## 2022-09-17 RX ADMIN — SEVELAMER CARBONATE 800 MG: 800 TABLET, FILM COATED ORAL at 08:36

## 2022-09-17 RX ADMIN — OXYCODONE 5 MG: 5 TABLET ORAL at 08:36

## 2022-09-17 RX ADMIN — GABAPENTIN 300 MG: 300 CAPSULE ORAL at 21:38

## 2022-09-17 RX ADMIN — DIAZEPAM 5 MG: 5 INJECTION, SOLUTION INTRAMUSCULAR; INTRAVENOUS at 15:00

## 2022-09-17 RX ADMIN — OMEPRAZOLE 20 MG: 20 CAPSULE, DELAYED RELEASE ORAL at 05:50

## 2022-09-17 ASSESSMENT — ENCOUNTER SYMPTOMS
MYALGIAS: 1
CHILLS: 0
EYES NEGATIVE: 1
WEAKNESS: 1
FOCAL WEAKNESS: 0
ABDOMINAL PAIN: 0
NEUROLOGICAL NEGATIVE: 1
BACK PAIN: 1
CARDIOVASCULAR NEGATIVE: 1
NAUSEA: 0
RESPIRATORY NEGATIVE: 1
VOMITING: 0
FEVER: 0
DIARRHEA: 0
NERVOUS/ANXIOUS: 1
GASTROINTESTINAL NEGATIVE: 1

## 2022-09-17 ASSESSMENT — PAIN DESCRIPTION - PAIN TYPE
TYPE: ACUTE PAIN
TYPE: ACUTE PAIN

## 2022-09-17 ASSESSMENT — FIBROSIS 4 INDEX
FIB4 SCORE: 4.02
FIB4 SCORE: 4.02

## 2022-09-17 NOTE — PROGRESS NOTES
Bedside report received and patient care assumed. Pt is resting in bed, Ano signs and symptoms of pain, and is on RA. Tele box on. All fall precautions are in place, belongings at bedside table.  Pt call light within reach.

## 2022-09-17 NOTE — PROGRESS NOTES
"[facility] -  PROGRESS NOTE               Author: Tristen Cortez M.D. Date & Time: 9/17/2022  11:12 AM     HPI:  Junior Proctor is a 72 y.o. male admitted 9/13/2022.  Patient with ESRD on HD via tunneled dialysis catheter, hypertension, had dialysis but was noted to be altered following his treatment and with hypotension to 80s over 40s, hypoglycemic with a blood glucose of 50.  He was febrile to 101.4 in the ER tachycardic to 105.  Patient is admitted to the [facility].  2 out of 2 blood cultures are positive for GNR.  Nephrology was asked to consult for ESRD management.     DAILY NEPHROLOGY SUMMARY:  9/15 - Blood cultures with gram negative rods. Difficult dialysis given poor catheter flow. ID suspects line sepsis and wants catheter holiday.  9/16 - Transferred out of the CCU. No new overnight renal events. Tunneled Dialysis catheter removal and placement of temporary HD cathter.  9/17 - No new overnight renal events. S/p HD yesterday with net UF of 1.2 L and tolerated well. On Cefepime q24 hrs    REVIEW OF SYSTEMS:    10 point ROS reviewed and is as per HPI/daily summary or otherwise negative    PMH/PSH/SH/FH:   Reviewed and unchanged since admission note    CURRENT MEDICATIONS:   Reviewed from admission to present day    VS:  /73   Pulse 83   Temp 36.4 °C (97.5 °F) (Temporal)   Resp 17   Ht 1.727 m (5' 8\")   Wt 73.2 kg (161 lb 6 oz)   SpO2 95%   BMI 24.54 kg/m²     Physical Exam  Vitals and nursing note reviewed.   Constitutional:       Appearance: He is normal weight.   HENT:      Head: Normocephalic and atraumatic.      Nose: Nose normal.      Mouth/Throat:      Mouth: Mucous membranes are moist.      Pharynx: Oropharynx is clear.   Eyes:      Extraocular Movements: Extraocular movements intact.      Conjunctiva/sclera: Conjunctivae normal.      Pupils: Pupils are equal, round, and reactive to light.   Cardiovascular:      Rate and Rhythm: Normal rate and regular rhythm.      " Pulses: Normal pulses.      Heart sounds: Normal heart sounds.   Pulmonary:      Effort: Pulmonary effort is normal.      Breath sounds: Normal breath sounds.   Abdominal:      General: Abdomen is flat. Bowel sounds are normal.   Musculoskeletal:         General: Normal range of motion.      Cervical back: Normal range of motion and neck supple.   Skin:     General: Skin is warm.      Capillary Refill: Capillary refill takes less than 2 seconds.   Neurological:      General: No focal deficit present.      Mental Status: He is alert and oriented to person, place, and time. Mental status is at baseline.   Psychiatric:         Mood and Affect: Mood normal.         Behavior: Behavior normal.         Thought Content: Thought content normal.         Judgment: Judgment normal.     Fluids:  In: 1560 [P.O.:360; Dialysis:1200]  Out: 2465     LABS:  Recent Labs     09/15/22  0816 09/16/22  0318 09/17/22  0248   SODIUM 137 136 137   POTASSIUM 3.7 3.7 3.8   CHLORIDE 103 103 101   CO2 21 22 25   GLUCOSE 107* 94 90   BUN 33* 32* 17   CREATININE 8.95* 8.43* 5.94*   CALCIUM 8.3* 8.1* 8.1*         IMAGING:   All imaging reviewed from admission to present day    IMPRESSION:  # ESRD   # HTN  - Goal BP < 140/90  # Anemia of CKD  - Goal Hgb 10-11  # CKD-MBD/Renal Osteodystrophy  # Sepsis  # Gram negative bactermia/Line infection  # Encephaolpathy     PLAN:  - No plans for HD today.   - MWF and prn dialysis during stay.  - TDC once future blood cultures come back negative  - UF as tolerated  - No dietary protein restrictions  - Dose all meds per ESRD  - Abx per primary service/ID.

## 2022-09-17 NOTE — CARE PLAN
The patient is Stable - Low risk of patient condition declining or worsening    Shift Goals  Clinical Goals: MRI, mobilize, IV ABX, pain control    Progress made toward(s) clinical / shift goals:    Problem: Knowledge Deficit - Standard  Goal: Patient and family/care givers will demonstrate understanding of plan of care, disease process/condition, diagnostic tests and medications  Outcome: Progressing     Problem: Respiratory  Goal: Patient will achieve/maintain optimum respiratory ventilation and gas exchange  Outcome: Progressing     Problem: Skin Integrity  Goal: Skin integrity is maintained or improved  Outcome: Progressing       Patient is not progressing towards the following goals:

## 2022-09-17 NOTE — CARE PLAN
The patient is Stable - Low risk of patient condition declining or worsening    Shift Goals  Clinical Goals: hemodynamically stable    Progress made toward(s) clinical / shift goals:    Problem: Hemodynamics  Goal: Patient's hemodynamics, fluid balance and neurologic status will be stable or improve  Outcome: Progressing     Problem: Respiratory  Goal: Patient will achieve/maintain optimum respiratory ventilation and gas exchange  Outcome: Progressing. On room air.     Problem: Pain - Standard  Goal: Alleviation of pain or a reduction in pain to the patient’s comfort goal  Outcome: Progressing. Regularly requesting prn oxycodone q4h.       Patient is not progressing towards the following goals:

## 2022-09-17 NOTE — PROGRESS NOTES
Infectious Disease Progress Note    Author: Carroll Pena M.D. Date & Time of service: 2022  12:04 PM    Chief Complaint:  Follow-up for GNR bacteremia    Interval History:  9/15 fever curve has improved, no CBC this morning, tolerating cefepime.  Cultures as below   no further fevers, white count 8.2, repeat cultures as below.  Resting comfortably this morning   T-max 99.1, white count down to 6.3.  Tolerating Ancef.  Culture results as below    Labs Reviewed and Medications Reviewed.    Review of Systems:  Review of Systems   Constitutional:  Negative for chills and fever.   Gastrointestinal:  Negative for abdominal pain, diarrhea, nausea and vomiting.   Skin:  Negative for itching and rash.   Neurological:  Positive for weakness. Negative for focal weakness.   All other systems reviewed and are negative.    Hemodynamics:  Temp (24hrs), Av.6 °C (97.8 °F), Min:36.1 °C (97 °F), Max:37.3 °C (99.1 °F)  Temperature: 36.4 °C (97.6 °F)  Pulse  Av.3  Min: 53  Max: 109   Blood Pressure : 117/62       Physical Exam:  Physical Exam  Vitals and nursing note reviewed.   Constitutional:       General: He is not in acute distress.     Appearance: He is ill-appearing. He is not toxic-appearing.   HENT:      Mouth/Throat:      Pharynx: No oropharyngeal exudate.   Eyes:      General: No scleral icterus.        Right eye: No discharge.         Left eye: No discharge.      Conjunctiva/sclera: Conjunctivae normal.   Cardiovascular:      Rate and Rhythm: Normal rate.      Heart sounds: No murmur heard.  Pulmonary:      Effort: Pulmonary effort is normal. No respiratory distress.      Breath sounds: No stridor.      Comments: Left upper chest wall tunneled dialysis catheter removed and temporary placed, currently undergoing dialysis  Abdominal:      General: There is no distension.      Tenderness: There is no abdominal tenderness.   Musculoskeletal:         General: No swelling or tenderness.   Skin:      Findings: No erythema or rash.   Neurological:      General: No focal deficit present.      Mental Status: He is alert and oriented to person, place, and time.      Comments: Generalized weakness   Psychiatric:         Mood and Affect: Mood normal.         Behavior: Behavior normal.       Meds:    Current Facility-Administered Medications:     diazePAM    ceFAZolin    heparin    midodrine    oxyCODONE immediate-release    allopurinol    atorvastatin    gabapentin    sevelamer carbonate    omeprazole    senna-docusate **AND** polyethylene glycol/lytes **AND** magnesium hydroxide **AND** bisacodyl    heparin    acetaminophen    Labs:  Recent Labs     09/15/22  0816 09/16/22  0318 09/17/22  0248   WBC 8.4 8.2 6.3   RBC 2.86* 2.69* 2.63*   HEMOGLOBIN 8.6* 8.1* 7.8*   HEMATOCRIT 27.2* 25.2* 24.9*   MCV 95.1 93.7 94.7   MCH 30.1 30.1 29.7   RDW 70.9* 69.1* 71.9*   PLATELETCT 80* 100* 108*   MPV 11.9 12.5 11.0   NEUTSPOLYS  --   --  45.70   LYMPHOCYTES  --   --  35.20   MONOCYTES  --   --  15.80*   EOSINOPHILS  --   --  2.10   BASOPHILS  --   --  0.60       Recent Labs     09/15/22  0816 09/16/22  0318 09/17/22  0248   SODIUM 137 136 137   POTASSIUM 3.7 3.7 3.8   CHLORIDE 103 103 101   CO2 21 22 25   GLUCOSE 107* 94 90   BUN 33* 32* 17       Recent Labs     09/15/22  0816 09/16/22  0318 09/17/22  0248   ALBUMIN 2.2*  --  2.6*   TBILIRUBIN 0.3  --  0.4   ALKPHOSPHAT 71  --  71   TOTPROTEIN 5.5*  --  5.8*   ALTSGPT 24  --  15   ASTSGOT 47*  --  24   CREATININE 8.95* 8.43* 5.94*         Imaging:  CT-HEAD W/O    Result Date: 9/13/2022 9/13/2022 7:56 PM HISTORY/REASON FOR EXAM:  Mental status change, unknown cause TECHNIQUE/EXAM DESCRIPTION AND NUMBER OF VIEWS: CT of the head without contrast. The study was performed on a helical multidetector CT scanner. Contiguous 2.5 mm axial sections were obtained from the skull base through the vertex. Up to date radiation dose reduction adjustments have been utilized to meet ALARA  standards for radiation dose reduction. COMPARISON:  11/23/2021 FINDINGS: There is no evidence of acute intracranial hemorrhage, mass, mass-effect or shift of midline structures. Gray-white matter differentiation is grossly preserved. Ventricle size and brain parenchymal volume are appropriate for this patient's stated age. The basal cisterns are patent. There are no abnormal extra-axial fluid collections. No depressed or widely  calvarial fracture is seen. The visualized paranasal sinuses and temporal bone structures are aerated. ___________________________________     1. No acute intracranial abnormality. No evidence of acute intracranial hemorrhage or mass lesion.     DX-CHEST-PORTABLE (1 VIEW)    Result Date: 9/13/2022 9/13/2022 4:02 PM HISTORY/REASON FOR EXAM:  Sepsis; sepsis. TECHNIQUE/EXAM DESCRIPTION AND NUMBER OF VIEWS: Single portable view of the chest. COMPARISON: 6/6/2022 FINDINGS: There is stable enlargement of the cardiomediastinal silhouette. A left-sided tunneled hemodialysis catheter is unchanged. The lungs are clear. There is no evidence of large pleural effusion or pneumothorax. Imaged osseous structures are unremarkable.     Stable enlargement of the cardiomediastinal silhouette without acute cardiopulmonary abnormality.    US-EXTREMITY VENOUS UPPER UNILAT LEFT    Result Date: 9/14/2022   Upper Extremity  Venous Duplex Report  Vascular Laboratory  CONCLUSIONS  No prior exam is available for comparison.  Left upper extremity.  No deep venous thrombosis.  RENE STEINBERG MARYAN  Exam Date:     09/14/2022 10:18  Room #:     Inpatient  Priority:     Routine  Ht (in):     68      Wt (lb):     156  Ordering Physician:        CHRISTOPHER PANIAGUA  Referring Physician:       [id number] Bowers  Sonographer:               Clemencia Angela RDCS, RVT  Study Type:                Complete Unilateral  Technical Quality:         Adequate  Age:    72    Gender:     M  MRN:     "[id number]  :    1950      BSA:    1.84  Indications:     Swelling of Limb  CPT Codes:       49620  ICD Codes:       729.81  History:         Right Upper AVF, hypotension.  Limitations:  PROCEDURES:  Left upper extremity venous duplex imaging.  The following venous structures were evaluated: internal jugular,  subclavian, axillary, brachial, cephalic, and basilic veins.  Serial compression, color, and spectral Doppler flow evaluations were  performed.  FINDINGS:  Left upper extremity.  All veins demonstrate complete color filling and compressibility with  normal venous flow dynamics including spontaneous flow and respiratory  phasicity.  No deep venous thrombosis.  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  Khalif Hutchinson MD  (Electronically Signed)  Final Date:      2022                   10:59      Micro:  Results       Procedure Component Value Units Date/Time    CATH TIP CULTURE [[id number]] Collected: 09/15/22 1710    Order Status: Completed Specimen: Cath Tip Updated: 22 1134     Significant Indicator NEG     Source CTIP     Site Central Line     Culture Result No growth at 24 hours.    BLOOD CULTURE [[id number]]  (Abnormal) Collected: 22    Order Status: Completed Specimen: Blood from Peripheral Updated: 22 0758     Significant Indicator POS     Source BLD     Site PERIPHERAL     Culture Result Growth detected by Bactec instrument. 2022  08:59      Escherichia coli  See previous culture for sensitivity report.      Narrative:      CALL  Shea  [facility] tel. [phone],  CALLED  [facility] tel. [phone] 2022, 09:01, RB PERF. RESULTS CALLED TO:  [phone] RN  Per Hospital Policy: Only change Specimen Src: to \"Line\" if  specified by physician order.  Left Hand    BLOOD CULTURE [[id number]]  (Abnormal)  (Susceptibility) Collected: 22 1655    Order Status: Completed Specimen: Blood from " "Peripheral Updated: 09/16/22 0755     Significant Indicator POS     Source BLD     Site PERIPHERAL     Culture Result Growth detected by Bactec instrument. 09/14/2022  04:24      Escherichia coli    Narrative:      CALL  Shea  [facility] tel. [phone],  CALLED  [facility] tel. [phone] 09/14/2022, 04:27, RB PERF. RESULTS CALLED TO:  RN [phone]  Per Hospital Policy: Only change Specimen Src: to \"Line\" if  specified by physician order.  Right AC    Susceptibility       Escherichia coli (1)       Antibiotic Interpretation Microscan   Method Status    Ampicillin Sensitive <=8 mcg/mL GEORGI Final    Ceftriaxone Sensitive <=1 mcg/mL GEORGI Final    Cefazolin Sensitive <=2 mcg/mL GEORGI Final    Ciprofloxacin Sensitive <=0.25 mcg/mL GEORGI Final    Cefepime Sensitive <=2 mcg/mL GEORGI Final    Cefuroxime Sensitive <=4 mcg/mL GEORGI Final    Ampicillin/sulbactam Sensitive <=4/2 mcg/mL GEORGI Final    Ertapenem Sensitive <=0.5 mcg/mL GEORGI Final    Tobramycin Sensitive 4 mcg/mL GEORGI Final    Gentamicin Sensitive 4 mcg/mL GEORGI Final    Minocycline Sensitive <=4 mcg/mL GEORGI Final    Moxifloxacin Sensitive <=2 mcg/mL GEORGI Final    Pip/Tazobactam Sensitive <=8 mcg/mL GEORGI Final    Trimeth/Sulfa Sensitive <=0.5/9.5 mcg/mL GEORGI Final    Tigecycline Sensitive <=2 mcg/mL GEORGI Final                       Blood Culture [[id number]] Collected: 09/15/22 0835    Order Status: Completed Specimen: Blood from Line Updated: 09/16/22 0711     Significant Indicator NEG     Source BLD     Site LINE     Culture Result No Growth  Note: Blood cultures are incubated for 5 days and  are monitored continuously.Positive blood cultures  are called to the RN and reported as soon as  they are identified.      Narrative:      Collected By: [id number] DEEPAK GATES  Per Hospital Policy: Only change Specimen Src: to \"Line\" if  specified by physician order.  Blood culture from Dialysis CVC arterial port  Dialysis CVC arterial port    BLOOD CULTURE [[id number]] Collected: 09/15/22 0816    Order " "Status: Completed Specimen: Blood from Peripheral Updated: 09/16/22 0711     Significant Indicator NEG     Source BLD     Site PERIPHERAL     Culture Result No Growth  Note: Blood cultures are incubated for 5 days and  are monitored continuously.Positive blood cultures  are called to the RN and reported as soon as  they are identified.      Narrative:      Per Hospital Policy: Only change Specimen Src: to \"Line\" if  specified by physician order.  Left Forearm/Arm    BLOOD CULTURE [[id number]] Collected: 09/15/22 0816    Order Status: Completed Specimen: Blood from Peripheral Updated: 09/16/22 0711     Significant Indicator NEG     Source BLD     Site PERIPHERAL     Culture Result No Growth  Note: Blood cultures are incubated for 5 days and  are monitored continuously.Positive blood cultures  are called to the RN and reported as soon as  they are identified.      Narrative:      Per Hospital Policy: Only change Specimen Src: to \"Line\" if  specified by physician order.  Left Hand    CATH TIP CULTURE [[id number]]     Order Status: No result Specimen: Cath Tip from Central Line     MRSA By PCR (Amp) [[id number]] Collected: 09/13/22 2125    Order Status: Completed Specimen: Blood from Nares Updated: 09/14/22 1645     MRSA by PCR POSITIVE    CoV-2, FLU A/B, and RSV by PCR (2-4 Hours CEPD[employer] Map ProductsID) : Collect NP swab in VTM [[id number]] Collected: 09/13/22 1620    Order Status: Completed Specimen: Respirate Updated: 09/13/22 1730     Influenza virus A RNA Negative     Influenza virus B, PCR Negative     RSV, PCR Negative     SARS-CoV-2 by PCR NotDetected     Comment: PATIENTS: Important information regarding your results and instructions can  be found at [email or web address]   \"After your  Covid-19 Test\"    [facility] providers: PLEASE REFER TO DE-ESCALATION AND RETESTING PROTOCOL  on [email or web address]    **The [facility] GeneXpert Xpress SARS-CoV-2 RT-PCR Test has been made  available for use under the Emergency " Use Authorization (EUA) only.          SARS-CoV-2 Source NP Swab    URINE CULTURE(NEW) [[id number]]     Order Status: Canceled Specimen: Urine     URINALYSIS [[id number]] Collected: 09/13/22 0000    Order Status: Canceled Specimen: Urine             Assessment:  Junior Proctor is a 72 y.o. male patient with ESRD on HD, admitted with acute onset of sepsis with GNR bacteremia.  No obvious source identified, suspect tunneled dialysis catheter infection.     Pertinent Diagnoses:  E. coli bacteremia  Tunneled dialysis catheter in place  ESRD on HD  Acute encephalopathy, improved    Plan:  -Mental status has improved.  Organism identified as pansensitive E. coli.  Continue renally dosed IV Ancef 1 g every 24 hours  -Of note, Dr. Desai noted history of pancreatic stent placement in the past.  Agree with MRI to look for abscesses and potential source of GNR bacteremia.    -Tunneled dialysis catheter removed by IR on 9/15, temporary catheter placed  -Follow repeat blood cultures x2 from 9/15, no growth till date     Plan was discussed with the primary team, Dr. Valdez    Disposition: Anticipate no ID specific disposition needs  Need for PICC line: No, will be able to use IV antibiotics with dialysis

## 2022-09-18 ENCOUNTER — ANESTHESIA EVENT (OUTPATIENT)
Dept: SURGERY | Facility: MEDICAL CENTER | Age: 72
DRG: 314 | End: 2022-09-18
Payer: COMMERCIAL

## 2022-09-18 PROBLEM — Z96.89 PRESENCE OF PANCREATIC DUCT STENT: Status: ACTIVE | Noted: 2022-09-18

## 2022-09-18 LAB
ALBUMIN SERPL BCP-MCNC: 2.5 G/DL (ref 3.2–4.9)
ALBUMIN/GLOB SERPL: 0.8 G/DL
ALP SERPL-CCNC: 79 U/L (ref 30–99)
ALT SERPL-CCNC: 7 U/L (ref 2–50)
ANION GAP SERPL CALC-SCNC: 13 MMOL/L (ref 7–16)
AST SERPL-CCNC: 18 U/L (ref 12–45)
BILIRUB SERPL-MCNC: 0.3 MG/DL (ref 0.1–1.5)
BUN SERPL-MCNC: 25 MG/DL (ref 8–22)
CALCIUM SERPL-MCNC: 8.4 MG/DL (ref 8.5–10.5)
CHLORIDE SERPL-SCNC: 99 MMOL/L (ref 96–112)
CO2 SERPL-SCNC: 23 MMOL/L (ref 20–33)
CREAT SERPL-MCNC: 8.16 MG/DL (ref 0.5–1.4)
ERYTHROCYTE [DISTWIDTH] IN BLOOD BY AUTOMATED COUNT: 72.5 FL (ref 35.9–50)
GFR SERPLBLD CREATININE-BSD FMLA CKD-EPI: 7 ML/MIN/1.73 M 2
GLOBULIN SER CALC-MCNC: 3.2 G/DL (ref 1.9–3.5)
GLUCOSE BLD STRIP.AUTO-MCNC: 122 MG/DL (ref 65–99)
GLUCOSE SERPL-MCNC: 107 MG/DL (ref 65–99)
HCT VFR BLD AUTO: 25.1 % (ref 42–52)
HGB BLD-MCNC: 7.9 G/DL (ref 14–18)
MCH RBC QN AUTO: 30.4 PG (ref 27–33)
MCHC RBC AUTO-ENTMCNC: 31.5 G/DL (ref 33.7–35.3)
MCV RBC AUTO: 96.5 FL (ref 81.4–97.8)
PLATELET # BLD AUTO: 139 K/UL (ref 164–446)
PMV BLD AUTO: 10.6 FL (ref 9–12.9)
POTASSIUM SERPL-SCNC: 3.8 MMOL/L (ref 3.6–5.5)
PROT SERPL-MCNC: 5.7 G/DL (ref 6–8.2)
RBC # BLD AUTO: 2.6 M/UL (ref 4.7–6.1)
SODIUM SERPL-SCNC: 135 MMOL/L (ref 135–145)
WBC # BLD AUTO: 8.9 K/UL (ref 4.8–10.8)

## 2022-09-18 PROCEDURE — A9270 NON-COVERED ITEM OR SERVICE: HCPCS | Performed by: STUDENT IN AN ORGANIZED HEALTH CARE EDUCATION/TRAINING PROGRAM

## 2022-09-18 PROCEDURE — 700102 HCHG RX REV CODE 250 W/ 637 OVERRIDE(OP): Performed by: HOSPITALIST

## 2022-09-18 PROCEDURE — A9270 NON-COVERED ITEM OR SERVICE: HCPCS | Performed by: HOSPITALIST

## 2022-09-18 PROCEDURE — 85027 COMPLETE CBC AUTOMATED: CPT

## 2022-09-18 PROCEDURE — 770020 HCHG ROOM/CARE - TELE (206)

## 2022-09-18 PROCEDURE — 700111 HCHG RX REV CODE 636 W/ 250 OVERRIDE (IP): Performed by: STUDENT IN AN ORGANIZED HEALTH CARE EDUCATION/TRAINING PROGRAM

## 2022-09-18 PROCEDURE — 700111 HCHG RX REV CODE 636 W/ 250 OVERRIDE (IP): Performed by: INTERNAL MEDICINE

## 2022-09-18 PROCEDURE — 82962 GLUCOSE BLOOD TEST: CPT

## 2022-09-18 PROCEDURE — 99233 SBSQ HOSP IP/OBS HIGH 50: CPT | Performed by: STUDENT IN AN ORGANIZED HEALTH CARE EDUCATION/TRAINING PROGRAM

## 2022-09-18 PROCEDURE — 99233 SBSQ HOSP IP/OBS HIGH 50: CPT | Performed by: INTERNAL MEDICINE

## 2022-09-18 PROCEDURE — 36415 COLL VENOUS BLD VENIPUNCTURE: CPT

## 2022-09-18 PROCEDURE — 80053 COMPREHEN METABOLIC PANEL: CPT

## 2022-09-18 PROCEDURE — 700102 HCHG RX REV CODE 250 W/ 637 OVERRIDE(OP): Performed by: STUDENT IN AN ORGANIZED HEALTH CARE EDUCATION/TRAINING PROGRAM

## 2022-09-18 RX ORDER — OXYCODONE HYDROCHLORIDE 10 MG/1
10 TABLET ORAL
Status: DISCONTINUED | OUTPATIENT
Start: 2022-09-18 | End: 2022-09-22 | Stop reason: HOSPADM

## 2022-09-18 RX ORDER — OXYCODONE HYDROCHLORIDE 5 MG/1
5 TABLET ORAL
Status: DISCONTINUED | OUTPATIENT
Start: 2022-09-18 | End: 2022-09-22 | Stop reason: HOSPADM

## 2022-09-18 RX ORDER — HYDROMORPHONE HYDROCHLORIDE 1 MG/ML
0.5 INJECTION, SOLUTION INTRAMUSCULAR; INTRAVENOUS; SUBCUTANEOUS
Status: DISCONTINUED | OUTPATIENT
Start: 2022-09-18 | End: 2022-09-21

## 2022-09-18 RX ADMIN — GABAPENTIN 300 MG: 300 CAPSULE ORAL at 20:41

## 2022-09-18 RX ADMIN — OXYCODONE HYDROCHLORIDE 10 MG: 10 TABLET ORAL at 16:56

## 2022-09-18 RX ADMIN — OXYCODONE 5 MG: 5 TABLET ORAL at 03:09

## 2022-09-18 RX ADMIN — OXYCODONE 5 MG: 5 TABLET ORAL at 08:18

## 2022-09-18 RX ADMIN — HYDROMORPHONE HYDROCHLORIDE 0.5 MG: 1 INJECTION, SOLUTION INTRAMUSCULAR; INTRAVENOUS; SUBCUTANEOUS at 13:36

## 2022-09-18 RX ADMIN — ATORVASTATIN CALCIUM 20 MG: 20 TABLET, FILM COATED ORAL at 20:41

## 2022-09-18 RX ADMIN — SEVELAMER CARBONATE 800 MG: 800 TABLET, FILM COATED ORAL at 08:18

## 2022-09-18 RX ADMIN — SEVELAMER CARBONATE 800 MG: 800 TABLET, FILM COATED ORAL at 17:23

## 2022-09-18 RX ADMIN — CEFAZOLIN SODIUM 1 G: 1 INJECTION, SOLUTION INTRAVENOUS at 16:56

## 2022-09-18 RX ADMIN — HYDROMORPHONE HYDROCHLORIDE 0.5 MG: 1 INJECTION, SOLUTION INTRAMUSCULAR; INTRAVENOUS; SUBCUTANEOUS at 23:24

## 2022-09-18 RX ADMIN — SEVELAMER CARBONATE 800 MG: 800 TABLET, FILM COATED ORAL at 12:24

## 2022-09-18 RX ADMIN — HYDROMORPHONE HYDROCHLORIDE 0.5 MG: 1 INJECTION, SOLUTION INTRAMUSCULAR; INTRAVENOUS; SUBCUTANEOUS at 18:04

## 2022-09-18 RX ADMIN — OXYCODONE 5 MG: 5 TABLET ORAL at 12:25

## 2022-09-18 RX ADMIN — OMEPRAZOLE 20 MG: 20 CAPSULE, DELAYED RELEASE ORAL at 05:51

## 2022-09-18 RX ADMIN — OXYCODONE HYDROCHLORIDE 10 MG: 10 TABLET ORAL at 22:08

## 2022-09-18 ASSESSMENT — ENCOUNTER SYMPTOMS
ABDOMINAL PAIN: 0
GASTROINTESTINAL NEGATIVE: 1
NEUROLOGICAL NEGATIVE: 1
MEMORY LOSS: 1
NERVOUS/ANXIOUS: 1
FOCAL WEAKNESS: 0
PALPITATIONS: 0
HEADACHES: 0
FEVER: 0
MYALGIAS: 1
EYES NEGATIVE: 1
DIAPHORESIS: 0
ABDOMINAL PAIN: 1
DIZZINESS: 0
HEMOPTYSIS: 0
VOMITING: 0
FALLS: 0
NAUSEA: 0
COUGH: 0
DIARRHEA: 0
RESPIRATORY NEGATIVE: 1
BACK PAIN: 1
CHILLS: 0
WEAKNESS: 1
DEPRESSION: 0
CARDIOVASCULAR NEGATIVE: 1

## 2022-09-18 ASSESSMENT — PAIN DESCRIPTION - PAIN TYPE
TYPE: ACUTE PAIN
TYPE: CHRONIC PAIN

## 2022-09-18 ASSESSMENT — FIBROSIS 4 INDEX: FIB4 SCORE: 3.43

## 2022-09-18 NOTE — PROGRESS NOTES
[facility] Medicine Daily Progress Note    Date of Service  9/17/2022    Chief Complaint  Junior Proctor is a 72 y.o. male admitted 9/13/2022 with hypotension and altered level of consciousness after dialysis    Hospital Course  Is a 72-year-old male with a history of end-stage renal disease, presents with altered mental status, hypoglycemia and hypotension.  Reportedly the patient just had hemodialysis via a left upper chest tunneled dialysis catheter, he was found altered, hypotensive with a blood pressure of 80s over 40s and hypoglycemic with a blood glucose level of 50.  The patient emergency room was found with elevated temperature of 101.4, tachycardia, white cell count of 4.3, hemoglobin 8.5, platelet count 78.  The patient was started on broad-spectrum antibiotics with concern for possible sepsis in form of ceftriaxone and vancomycin.    Interval Problem Update  Patient seen and examined today.  Data, Medication data reviewed.  Case discussed with nursing as available.  Plan of Care reviewed with patient and notified of changes.   9/14 the patient appears somewhat improved, he required additional fluid boluses to maintain his blood pressure, he denies fevers or chills, no abdominal pain, he has very little urination, he complains of generalized joint pain in his hands, shoulders, ankles, this appears to be chronic, the patient is chronically using narcotics for pain control, he denies difficulty with infection of his fistula or dialysis catheter.  He denies nausea vomiting, no abdominal pain.  Nephrology has been consulted  Infectious disease has been consulted after patient had positive blood cultures reported x2 gram-negative  9/15 the patient is currently afebrile, heart rate in the 60s, unlabored respirations, on room air, normotensive, blood pressure has improved, the patient was attempted to receive dialysis through his left upper chest dialysis catheter but was unable to perform secondary to flow issues,  discussed with nephrology, ordered IR exchange of catheter, in further review the patient had a pancreatic stent placed in June that was supposed to be revised, discussed with gastroenterology, get an MRCP to see if the catheter has passed naturally or will possibly need to be removed by ERCP, updated infectious disease.  9/16/2022:  Infectious disease recommendations appreciated patient no acute events overnight nephrology recommendations appreciated.  Patient had temporary R IJ dialysis line placed yesterday by interventional radiology.  Patient continue dialysis as scheduled.  We will consider replacement of permacatheter per ID recommendations.  Patient has at least several more days hospitalization prior to entertaining idea of discharge.  Otherwise continue present medical management.  I have prioritized MRCP.  Continue cefepime 1 g every 24 hours renally dosed.  9/17/2022:  Patient had MRCP performed today which showed retained pancreatic duct stent patient previously established with GI consultants had stent placed in June 2022.  We will place consultation morning at 9/18/2022 with likely procedure to follow.  Patient will benefit from retrieval of stent as previously discussed.  Otherwise continue with present antimicrobials.  I have discussed this patient's plan of care and discharge plan at IDT rounds today with Case Management, Nursing, Nursing leadership, and other members of the IDT team.    Consultants/Specialty  infectious disease and nephrology    Code Status  Full Code    Disposition  Patient is not medically cleared for discharge.   Anticipate discharge to to home with close outpatient follow-up.  I have placed the appropriate orders for post-discharge needs.    Review of Systems  Review of Systems   Constitutional:  Positive for malaise/fatigue.   HENT: Negative.     Eyes: Negative.    Respiratory: Negative.     Cardiovascular: Negative.    Gastrointestinal: Negative.    Genitourinary: Negative.     Musculoskeletal:  Positive for back pain, joint pain and myalgias.   Skin: Negative.    Neurological: Negative.    Endo/Heme/Allergies: Negative.    Psychiatric/Behavioral:  The patient is nervous/anxious.    All other systems reviewed and are negative.     Physical Exam  Temp:  [36.3 °C (97.3 °F)-37.3 °C (99.1 °F)] 36.3 °C (97.3 °F)  Pulse:  [70-86] 81  Resp:  [16-20] 16  BP: (117-140)/(56-76) 140/70  SpO2:  [90 %-95 %] 90 %    Physical Exam  Vitals and nursing note reviewed.   Constitutional:       Appearance: He is well-developed.      Comments: Pt seen and examined.   HENT:      Head: Normocephalic and atraumatic.   Eyes:      Pupils: Pupils are equal, round, and reactive to light.   Cardiovascular:      Rate and Rhythm: Normal rate and regular rhythm.      Heart sounds: Normal heart sounds.   Pulmonary:      Effort: Pulmonary effort is normal.      Breath sounds: Normal breath sounds.   Abdominal:      General: Bowel sounds are normal.      Palpations: Abdomen is soft.   Musculoskeletal:         General: Normal range of motion.      Cervical back: Normal range of motion and neck supple.   Skin:     General: Skin is warm and dry.      Coloration: Skin is pale.   Neurological:      General: No focal deficit present.      Mental Status: He is alert and oriented to person, place, and time.   Psychiatric:         Behavior: Behavior normal.       Fluids    Intake/Output Summary (Last 24 hours) at 9/17/2022 1733  Last data filed at 9/17/2022 1300  Gross per 24 hour   Intake 720 ml   Output --   Net 720 ml         Laboratory  Recent Labs     09/15/22  0816 09/16/22 0318 09/17/22  0248   WBC 8.4 8.2 6.3   RBC 2.86* 2.69* 2.63*   HEMOGLOBIN 8.6* 8.1* 7.8*   HEMATOCRIT 27.2* 25.2* 24.9*   MCV 95.1 93.7 94.7   MCH 30.1 30.1 29.7   MCHC 31.6* 32.1* 31.3*   RDW 70.9* 69.1* 71.9*   PLATELETCT 80* 100* 108*   MPV 11.9 12.5 11.0       Recent Labs     09/15/22  0816 09/16/22 0318 09/17/22  0248   SODIUM 137 136 137    POTASSIUM 3.7 3.7 3.8   CHLORIDE 103 103 101   CO2 21 22 25   GLUCOSE 107* 94 90   BUN 33* 32* 17   CREATININE 8.95* 8.43* 5.94*   CALCIUM 8.3* 8.1* 8.1*                     Imaging  [id number]   Final Result      1.  Pancreatic duct stent appears to be present   2.  Changes in the pancreas possibly related to history of pancreatitis.   3.  Cholelithiasis   4.  Gallbladder wall thickening which is nonspecific. While cholecystitis is not excluded this can also be seen with hepatocellular and cardiac disease.   5.  RIGHT hepatic mass, again incompletely characterized but most likely a hemangioma   6.  Trace BILATERAL pleural effusions      EC-ECHOCARDIOGRAM COMPLETE W/O CONT   Final Result      US-EXTREMITY VENOUS UPPER UNILAT LEFT   Final Result      CT-HEAD W/O   Final Result         1. No acute intracranial abnormality. No evidence of acute intracranial hemorrhage or mass lesion.                     DX-CHEST-PORTABLE (1 VIEW)   Final Result      Stable enlargement of the cardiomediastinal silhouette without acute cardiopulmonary abnormality.      IR-CVC TUNNEL W/O PORT REMOVAL    (Results Pending)          Assessment/Plan  * Sepsis (HCC)- (present on admission)  Assessment & Plan  This is Sepsis Present on admission  SIRS criteria identified on my evaluation include: Fever, with temperature greater than 101 deg F  Source is unknown, possible dialysis catheter infection  Sepsis protocol initiated  Fluid resuscitation ordered per protocol  Crystalloid Fluid Administration: Patient has advanced or end-stage chronic renal disease (with documentation of stage IV or GFR 15-29 mL/min, or stage V or GFR < 15 mL/min or ESRD), for this/these reason(s) it is unsafe for this patient to receive 30 mL/kg of fluid  Partial Fluid Dose Given: patient to be reassessed shortly after completion of partial fluid bolus to ensure adequacy of resuscitation  IV antibiotics as appropriate for source of sepsis  Reassessment: I have  reassessed the patient's hemodynamic status      Gram-negative bacteremia  Assessment & Plan  Unclear source, certainly possibility of line infection given patient's hemodialysis catheter  Get echocardiogram, MRCP abdomen given the patient's history of pancreatitis with stent placement which might be retained  ID consult  Follow cultures      Hypoglycemia  Assessment & Plan  Unclear etiology, likely sepsis related  Not a diabetic patient  Improved now    Altered mental status  Assessment & Plan  Likely due to hypoglycemia in setting of suspected sepsis  Sepsis of unclear etiology, possible line infection given dialysis catheter present  Mental status has since normalized  CXR stable      Arthritis- (present on admission)  Assessment & Plan  Pain management      ESRD on dialysis (HCC)- (present on admission)  Assessment & Plan  Presents from dialysis after dialysis session completed  Found to be hypotensive, hypoglycemic, febrile  No urgent need for hemodialysis, nephrology consulted  Treat infection with IV antibiotics, blood cultures obtained, follow-up cultures ordered  Arranging catheter exchange    Hypotension- (present on admission)  Assessment & Plan  Related to infection and possible volume loss  Several boluses of IV fluids have been given, currently with good results, monitor closely    Anemia- (present on admission)  Assessment & Plan  Likely anemia of chronic disease, renal disease           VTE prophylaxis: heparin ppx, the patient has been refusing heparin injections    I have performed a physical exam and reviewed and updated ROS and Plan today (9/17/2022). In review of yesterday's note (9/16/2022), there are no changes except as documented above.        Please note that this dictation was created using voice recognition software. I have made every reasonable attempt to correct obvious errors, but I expect that there are errors of grammar and possibly context that I did not discover before finalizing  the note.

## 2022-09-18 NOTE — PROGRESS NOTES
MONITOR SUMMARY:     Rhythm: SR  Rate: 81 - 98  Ectopy: (r) PVC, (r) Trigm  Measurements: .16/.08/.40

## 2022-09-18 NOTE — PROGRESS NOTES
Infectious Disease Progress Note    Author: Carroll Pena M.D. Date & Time of service: 2022  10:28 AM    Chief Complaint:  Follow-up for GNR bacteremia    Interval History:  9/15 fever curve has improved, no CBC this morning, tolerating cefepime.  Cultures as below   no further fevers, white count 8.2, repeat cultures as below.  Resting comfortably this morning   T-max 99.1, white count down to 6.3.  Tolerating Ancef.  Culture results as below   patient remains afebrile, no CBC this morning, tolerating Ancef.  MRI results as below.  ERCP is planned    Labs Reviewed and Medications Reviewed.    Review of Systems:  Review of Systems   Constitutional:  Negative for chills and fever.   Gastrointestinal:  Negative for abdominal pain, diarrhea, nausea and vomiting.   Skin:  Negative for itching and rash.   Neurological:  Positive for weakness. Negative for focal weakness.   All other systems reviewed and are negative.    Hemodynamics:  Temp (24hrs), Av.4 °C (97.6 °F), Min:36.2 °C (97.2 °F), Max:36.9 °C (98.5 °F)  Temperature: 36.3 °C (97.3 °F)  Pulse  Av.6  Min: 53  Max: 109   Blood Pressure : 108/67       Physical Exam:  Physical Exam  Vitals and nursing note reviewed.   Constitutional:       General: He is not in acute distress.     Appearance: He is ill-appearing. He is not toxic-appearing.   HENT:      Mouth/Throat:      Pharynx: No oropharyngeal exudate.   Eyes:      General: No scleral icterus.        Right eye: No discharge.         Left eye: No discharge.      Conjunctiva/sclera: Conjunctivae normal.   Neck:      Comments: Temporary dialysis catheter  Cardiovascular:      Rate and Rhythm: Normal rate.      Heart sounds: No murmur heard.  Pulmonary:      Effort: Pulmonary effort is normal. No respiratory distress.      Breath sounds: No stridor.      Comments: Left upper chest wall tunneled dialysis catheter removed and temporary placed, currently undergoing dialysis  Abdominal:       General: There is no distension.      Tenderness: There is no abdominal tenderness.   Musculoskeletal:         General: No swelling or tenderness.   Skin:     Findings: No erythema or rash.   Neurological:      General: No focal deficit present.      Mental Status: He is alert and oriented to person, place, and time.      Comments: Generalized weakness   Psychiatric:         Mood and Affect: Mood normal.         Behavior: Behavior normal.       Meds:    Current Facility-Administered Medications:     ceFAZolin    heparin    oxyCODONE immediate-release    allopurinol    atorvastatin    gabapentin    sevelamer carbonate    omeprazole    senna-docusate **AND** polyethylene glycol/lytes **AND** magnesium hydroxide **AND** bisacodyl    heparin    acetaminophen    Labs:  Recent Labs     09/16/22 0318 09/17/22  0248   WBC 8.2 6.3   RBC 2.69* 2.63*   HEMOGLOBIN 8.1* 7.8*   HEMATOCRIT 25.2* 24.9*   MCV 93.7 94.7   MCH 30.1 29.7   RDW 69.1* 71.9*   PLATELETCT 100* 108*   MPV 12.5 11.0   NEUTSPOLYS  --  45.70   LYMPHOCYTES  --  35.20   MONOCYTES  --  15.80*   EOSINOPHILS  --  2.10   BASOPHILS  --  0.60       Recent Labs     09/16/22 0318 09/17/22  0248   SODIUM 136 137   POTASSIUM 3.7 3.8   CHLORIDE 103 101   CO2 22 25   GLUCOSE 94 90   BUN 32* 17       Recent Labs     09/16/22 0318 09/17/22  0248   ALBUMIN  --  2.6*   TBILIRUBIN  --  0.4   ALKPHOSPHAT  --  71   TOTPROTEIN  --  5.8*   ALTSGPT  --  15   ASTSGOT  --  24   CREATININE 8.43* 5.94*         Imaging:  CT-HEAD W/O    Result Date: 9/13/2022 9/13/2022 7:56 PM HISTORY/REASON FOR EXAM:  Mental status change, unknown cause TECHNIQUE/EXAM DESCRIPTION AND NUMBER OF VIEWS: CT of the head without contrast. The study was performed on a helical multidetector CT scanner. Contiguous 2.5 mm axial sections were obtained from the skull base through the vertex. Up to date radiation dose reduction adjustments have been utilized to meet ALARA standards for radiation dose reduction.  COMPARISON:  2021 FINDINGS: There is no evidence of acute intracranial hemorrhage, mass, mass-effect or shift of midline structures. Gray-white matter differentiation is grossly preserved. Ventricle size and brain parenchymal volume are appropriate for this patient's stated age. The basal cisterns are patent. There are no abnormal extra-axial fluid collections. No depressed or widely  calvarial fracture is seen. The visualized paranasal sinuses and temporal bone structures are aerated. ___________________________________     1. No acute intracranial abnormality. No evidence of acute intracranial hemorrhage or mass lesion.     DX-CHEST-PORTABLE (1 VIEW)    Result Date: 2022 4:02 PM HISTORY/REASON FOR EXAM:  Sepsis; sepsis. TECHNIQUE/EXAM DESCRIPTION AND NUMBER OF VIEWS: Single portable view of the chest. COMPARISON: 2022 FINDINGS: There is stable enlargement of the cardiomediastinal silhouette. A left-sided tunneled hemodialysis catheter is unchanged. The lungs are clear. There is no evidence of large pleural effusion or pneumothorax. Imaged osseous structures are unremarkable.     Stable enlargement of the cardiomediastinal silhouette without acute cardiopulmonary abnormality.    US-EXTREMITY VENOUS UPPER UNILAT LEFT    Result Date: 2022   Upper Extremity  Venous Duplex Report  Vascular Laboratory  CONCLUSIONS  No prior exam is available for comparison.  Left upper extremity.  No deep venous thrombosis.  RENE STEINBERG  Exam Date:     2022 10:18  Room #:     Inpatient  Priority:     Routine  Ht (in):     68      Wt (lb):     156  Ordering Physician:        CHRISTOPHER PANIAGUA  Referring Physician:       [id number] Bowers  Sonographer:               Clemencia Angela [facility] [[email or web address]], RVT  Study Type:                Complete Unilateral  Technical Quality:         Adequate  Age:    72    Gender:     M  MRN:    [id number]  :    1950      BSA:    1.84  " Indications:     Swelling of Limb  CPT Codes:       25086  ICD Codes:       729.81  History:         Right Upper AVF, hypotension.  Limitations:  PROCEDURES:  Left upper extremity venous duplex imaging.  The following venous structures were evaluated: internal jugular,  subclavian, axillary, brachial, cephalic, and basilic veins.  Serial compression, color, and spectral Doppler flow evaluations were  performed.  FINDINGS:  Left upper extremity.  All veins demonstrate complete color filling and compressibility with  normal venous flow dynamics including spontaneous flow and respiratory  phasicity.  No deep venous thrombosis.  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  Khalif Hutchinson MD  (Electronically Signed)  Final Date:      14 September 2022                   10:59      Micro:  Results       Procedure Component Value Units Date/Time    CATH TIP CULTURE [[id number]] Collected: 09/15/22 1710    Order Status: Completed Specimen: Cath Tip Updated: 09/17/22 1407     Significant Indicator NEG     Source CTIP     Site Central Line     Culture Result <15 CFU's mixed skin myah.    BLOOD CULTURE [[id number]]  (Abnormal) Collected: 09/13/22 1711    Order Status: Completed Specimen: Blood from Peripheral Updated: 09/16/22 0758     Significant Indicator POS     Source BLD     Site PERIPHERAL     Culture Result Growth detected by Bactec instrument. 09/14/2022  08:59      Escherichia coli  See previous culture for sensitivity report.      Narrative:      CALL  Shea  [facility] tel. [phone],  CALLED  [facility] tel. [phone] 09/14/2022, 09:01, RB PERF. RESULTS CALLED TO:  [id number] RN  Per Hospital Policy: Only change Specimen Src: to \"Line\" if  specified by physician order.  Left Hand    BLOOD CULTURE [[id number]]  (Abnormal)  (Susceptibility) Collected: 09/13/22 1655    Order Status: Completed Specimen: Blood from Peripheral Updated: 09/16/22 0755     " "Significant Indicator POS     Source BLD     Site PERIPHERAL     Culture Result Growth detected by Bactec instrument. 09/14/2022  04:24      Escherichia coli    Narrative:      CALL  Shea  [facility] tel. [phone],  CALLED  [facility] tel. [phone] 09/14/2022, 04:27, RB PERF. RESULTS CALLED TO:  RN [id number]  Per Hospital Policy: Only change Specimen Src: to \"Line\" if  specified by physician order.  Right AC    Susceptibility       Escherichia coli (1)       Antibiotic Interpretation Microscan   Method Status    Ampicillin Sensitive <=8 mcg/mL GEORGI Final    Ceftriaxone Sensitive <=1 mcg/mL GEORGI Final    Cefazolin Sensitive <=2 mcg/mL GEORGI Final    Ciprofloxacin Sensitive <=0.25 mcg/mL GEORGI Final    Cefepime Sensitive <=2 mcg/mL GEORGI Final    Cefuroxime Sensitive <=4 mcg/mL GEORGI Final    Ampicillin/sulbactam Sensitive <=4/2 mcg/mL GEORGI Final    Ertapenem Sensitive <=0.5 mcg/mL GEORGI Final    Tobramycin Sensitive 4 mcg/mL GEORGI Final    Gentamicin Sensitive 4 mcg/mL GEORGI Final    Minocycline Sensitive <=4 mcg/mL GEORGI Final    Moxifloxacin Sensitive <=2 mcg/mL GEORGI Final    Pip/Tazobactam Sensitive <=8 mcg/mL GEORGI Final    Trimeth/Sulfa Sensitive <=0.5/9.5 mcg/mL GEORGI Final    Tigecycline Sensitive <=2 mcg/mL GEORGI Final                       Blood Culture [[id number]] Collected: 09/15/22 0835    Order Status: Completed Specimen: Blood from Line Updated: 09/16/22 0711     Significant Indicator NEG     Source BLD     Site LINE     Culture Result No Growth  Note: Blood cultures are incubated for 5 days and  are monitored continuously.Positive blood cultures  are called to the RN and reported as soon as  they are identified.      Narrative:      Collected By: [id number] DEEPAK GATES  Per Hospital Policy: Only change Specimen Src: to \"Line\" if  specified by physician order.  Blood culture from Dialysis CVC arterial port  Dialysis CVC arterial port    BLOOD CULTURE [[id number]] Collected: 09/15/22 0816    Order Status: Completed Specimen: Blood from " "Peripheral Updated: 09/16/22 0711     Significant Indicator NEG     Source BLD     Site PERIPHERAL     Culture Result No Growth  Note: Blood cultures are incubated for 5 days and  are monitored continuously.Positive blood cultures  are called to the RN and reported as soon as  they are identified.      Narrative:      Per Hospital Policy: Only change Specimen Src: to \"Line\" if  specified by physician order.  Left Forearm/Arm    BLOOD CULTURE [[id number]] Collected: 09/15/22 0816    Order Status: Completed Specimen: Blood from Peripheral Updated: 09/16/22 0711     Significant Indicator NEG     Source BLD     Site PERIPHERAL     Culture Result No Growth  Note: Blood cultures are incubated for 5 days and  are monitored continuously.Positive blood cultures  are called to the RN and reported as soon as  they are identified.      Narrative:      Per Hospital Policy: Only change Specimen Src: to \"Line\" if  specified by physician order.  Left Hand    CATH TIP CULTURE [[id number]]     Order Status: No result Specimen: Cath Tip from Central Line     MRSA By PCR (Amp) [[id number]] Collected: 09/13/22 2125    Order Status: Completed Specimen: Blood from Nares Updated: 09/14/22 1645     MRSA by PCR POSITIVE    CoV-2, FLU A/B, and RSV by PCR (2-4 Hours CEPHEID) : Collect NP swab in VTM [[id number]] Collected: 09/13/22 1620    Order Status: Completed Specimen: Respirate Updated: 09/13/22 1730     Influenza virus A RNA Negative     Influenza virus B, PCR Negative     RSV, PCR Negative     SARS-CoV-2 by PCR NotDetected     Comment: PATIENTS: Important information regarding your results and instructions can  be found at [email or web address]   \"After your  Covid-19 Test\"    [facility] providers: PLEASE REFER TO DE-ESCALATION AND RETESTING PROTOCOL  on [email or web address]    **The [employer] GeneXpert Xpress SARS-CoV-2 RT-PCR Test has been made  available for use under the Emergency Use Authorization (EUA) only.          " SARS-CoV-2 Source NP Swab    URINE CULTURE(NEW) [[id number]]     Order Status: Canceled Specimen: Urine     URINALYSIS [[id number]] Collected: 09/13/22 0000    Order Status: Canceled Specimen: Urine             Assessment:  Junior Proctor is a 72 y.o. male patient with ESRD on HD, admitted with acute onset of sepsis with GNR bacteremia.  No obvious source identified, suspect tunneled dialysis catheter infection.     Pertinent Diagnoses:  E. coli bacteremia  Tunneled dialysis catheter in place  ESRD on HD  Acute encephalopathy, improved  History of pancreatic duct stone status post pancreatic stent placement    Plan:  -Mental status has improved.  Organism identified as pansensitive E. coli.  Continue renally dosed IV Ancef 1 g every 24 hours  -MRCP with indwelling pancreatic duct stent, no abscesses.  Plan is for ERCP  -Tunneled dialysis catheter removed by IR on 9/15, temporary catheter placed.  Okay to place new permanent dialysis catheter if repeat blood cultures remain negative  -Follow repeat blood cultures x2 from 9/15, no growth till date.  Please follow till finalized     Plan was discussed with the primary team, Dr. Valdez    Disposition: Anticipate no ID specific disposition needs.  If cultures remain negative, recommend continuing renally dosed IV Ancef 1 g every 24 hours through 9/21/2022.  If discharged, can dose the Ancef with dialysis 2g-2g-3g on M-W-F  Need for PICC line: No, will be able to use IV antibiotics with dialysis    ID will sign off.  Please call with questions.

## 2022-09-18 NOTE — CONSULTS
Gastroenterology Initial Consult Note               Author:  DARIN Rashid Date & Time Created: 9/18/2022 9:58 AM       Patient ID:  Name:             Junior Proctor    YOB: 1950  Age:                 72 y.o.  male  MRN:               [id number]      Referring Provider:  Kevin Valdez MD      Presenting Chief Complaint:  Gram negative bacteremia, Hx of ERCP with stent placement      History of Present Illness:    This is a very pleasant 72 y.o. male seen for repeat evaluation and management of in situ pancreatic duct stent in a patient with gram-negative bacteremia.  The patient originally presented to the hospital on 9/13/2022 with altered mental status.  He was found to have hypoglycemia and hypotension.  He has a history of end-stage renal disease on dialysis, hypertension, chronic pancreatitis, CHF, gout, hyperlipidemia.  In evaluation he was found to have gram-negative bacteremia.  He had his tunneled dialysis catheter change this admission as a possible source of the infection.      The patient had previously been [facility] to the [facility].  He underwent ERCP on 6/5/2022 by Dr. Ibrahima Daly with findings of normal common bile duct without stone, however pancreatic duct did have 2 stones.  One of the stones, further up in the body, was not able to be removed.  A pancreatic duct stent was placed.  Plan was for outpatient repeat ERCP and stent removal, however upon being contacted the patient refused to schedule the procedure.  Upon admission here during this stay, Dr. Desai ordered MRCP for evaluation of the previously known in situ pancreatic duct stent.  MRCP shows continued pancreatic duct stent without definitive evidence of pancreatic duct stones.  He is noted to have cholelithiasis, gallbladder wall thickening, right hepatic mass likely hemangioma, and trace bilateral pleural effusions.      Review of  Systems:  Review of Systems   Constitutional:  Negative for diaphoresis and fever.   Respiratory:  Negative for cough and hemoptysis.    Cardiovascular:  Negative for chest pain and palpitations.   Gastrointestinal:  Positive for abdominal pain. Negative for nausea and vomiting.   Musculoskeletal:  Positive for back pain and joint pain. Negative for falls.   Skin:  Negative for itching and rash.   Neurological:  Positive for weakness. Negative for dizziness and headaches.   Psychiatric/Behavioral:  Positive for memory loss. Negative for depression.            Past Medical History:  Past Medical History:   Diagnosis Date    Gout     Hemodialysis patient (Formerly [facility])     Hypertension     Kidney disease     MI (myocardial infarction) (Formerly [facility])      Active Hospital Problems    Diagnosis     Gram-negative bacteremia [R78.81]     Altered mental status [R41.82]     Hypoglycemia [E16.2]     Arthritis [M19.90]     ESRD on dialysis (Formerly [facility]) [N18.6, Z99.2]     Hypotension [I95.9]     Sepsis ([facility]ly [facility]) [A41.9]     Anemia [D64.9]          Past Surgical History:  Past Surgical History:   Procedure Laterality Date    SC ERCP,DIAGNOSTIC N/A 6/5/2022    Procedure: ERCP (ENDOSCOPIC RETROGRADE CHOLANGIOPANCREATOGRAPHY);  Surgeon: Ibrahima Daly M.D.;  Location: [facility];  Service: Gastroenterology    SC UPPER GI ENDOSCOPY,DIAGNOSIS N/A 12/10/2021    Procedure: GASTROSCOPY;  Surgeon: Paddy Hooks M.D.;  Location: SURGERY SAME DAY [facility];  Service: Gastroenterology    SC COLONOSCOPY,DIAGNOSTIC N/A 12/10/2021    Procedure: COLONOSCOPY;  Surgeon: Paddy Hooks M.D.;  Location: SURGERY SAME DAY [facility];  Service: Gastroenterology    SC UPPER GI ENDOSCOPY,BIOPSY N/A 12/10/2021    Procedure: GASTROSCOPY, WITH BIOPSY;  Surgeon: Paddy Hooks M.D.;  Location: SURGERY SAME DAY [facility];  Service: Gastroenterology    SC COLONOSCOPY,BIOPSY N/A 12/10/2021    Procedure: COLONOSCOPY, WITH BIOPSY;  Surgeon: Paddy Hooks M.D.;  Location:  SURGERY SAME DAY [facility];  Service: Gastroenterology           Hospital Medications:  Current Facility-Administered Medications   Medication Dose Frequency Provider Last Rate Last Admin    ceFAZolin in dextrose (ANCEF) IVPB premix 1 g  1 g Q24HRS Carroll Pena M.D.   Stopped at 09/17/22 1711    heparin injection 2,800 Units  2,800 Units ACUTE DIALYSIS PRN Tristen Cortez M.D.   2,800 Units at 09/16/22 1305    oxyCODONE immediate-release (ROXICODONE) tablet 5 mg  5 mg Q4HRS PRN Marco Antonio Desai M.D.   5 mg at 09/18/22 0818    allopurinol (ZYLOPRIM) tablet 100 mg  100 mg 3x/week Marco Antonio Desai M.D.   100 mg at 09/16/22 2035    atorvastatin (LIPITOR) tablet 20 mg  20 mg Nightly Marco Antonio Dseai M.D.   20 mg at 09/17/22 2138    gabapentin (NEURONTIN) capsule 300 mg  300 mg QHS Marco Antonio Desai M.D.   300 mg at 09/17/22 2138    sevelamer carbonate (RENVELA) tablet 800 mg  800 mg TID WITH MEALS Marco Antonio Desai M.D.   800 mg at 09/18/22 0818    omeprazole (PRILOSEC) capsule 20 mg  20 mg DAILY Marco Antonio Desai M.D.   20 mg at 09/18/22 0551    senna-docusate (PERICOLACE or SENOKOT S) 8.6-50 MG per tablet 2 Tablet  2 Tablet BID Afshin Goldberg M.D.        And    polyethylene glycol/lytes (MIRALAX) PACKET 1 Packet  1 Packet QDAY PRN Afshin Goldberg M.D.        And    magnesium hydroxide (MILK OF MAGNESIA) suspension 30 mL  30 mL QDAY PRN Afshin Goldberg M.D.        And    bisacodyl (DULCOLAX) suppository 10 mg  10 mg QDAY PRN Afshin Goldberg M.D.        heparin injection 5,000 Units  5,000 Units Q8HRS Afshin Goldberg M.D.   5,000 Units at 09/13/22 2208    acetaminophen (Tylenol) tablet 650 mg  650 mg Q6HRS PRN Afshin Goldberg M.D.       Last reviewed on 9/13/2022  4:34 PM by Rishabh Vincent       Current Outpatient Medications:  Medications Prior to Admission   Medication Sig Dispense Refill Last Dose    Buprenorphine 15 MCG/HR PATCH WEEKLY Place 1 Patch on the skin every 7 days.   unknown at unknown     capsaicin (ZOSTRIX) 0.025 % cream Apply 1 Application topically 4 times a day as needed. Apply to effected area   unknown at unknown    acetaminophen (TYLENOL) 500 MG Tab Take 1,000 mg by mouth every 8 hours as needed. Indications: Pain   unknown at unknown    melatonin 3 MG Tab Take 6 mg by mouth at bedtime.   unknown at unknown    Etanercept 50 MG/ML Solution Prefilled Syringe Inject 50 mg under the skin every 7 days. 4.2 mL 1 unknown at unknown    gabapentin (NEURONTIN) 300 MG Cap Take 300 mg by mouth at bedtime.   unknown at unknown    folic acid (FOLVITE) 1 MG Tab Take 1 mg by mouth every day.   unknown at unknown    pantoprazole (PROTONIX) 40 MG Tablet Delayed Response Take 40 mg by mouth 2 times a day.   unknown at unknown    lidocaine (LIDODERM) 5 % Patch Apply 3 Patches topically every day. To affected area   unknown at unknown    sevelamer (RENAGEL) 800 MG Tab Take 800 mg by mouth 3 times a day with meals.   unknown at unknown    diclofenac sodium (VOLTAREN) 1 % Gel Apply 4 g topically 4 times a day as needed (Osetoarthritis pain).   unknown at unknown    vitamin D2, Ergocalciferol, (DRISDOL) 1.25 MG (03446 UT) Cap capsule Take 50,000 Units by mouth every 7 days.   unknown at unknown    atorvastatin (LIPITOR) 20 MG Tab Take 20 mg by mouth every evening.   unknown at unknown    carvedilol (COREG) 6.25 MG Tab Take 6.25 mg by mouth 2 times a day with meals.   unknown at unknown    traZODone (DESYREL) 50 MG Tab Take 50 mg by mouth every evening. Indications: Trouble Sleeping   unknown at unknown    allopurinol (ZYLOPRIM) 100 MG Tab Take 100 mg by mouth 3 times a week. Taken post hemodialysis THREE TIMES A WEEK   unknown at unknown         Medication Allergies:  Allergies   Allergen Reactions    Motrin [Ibuprofen]      hhx of stomach ulcers         Family Medical History:  Family History   Problem Relation Age of Onset    Diabetes Mother          Social History:  Social History     Socioeconomic History     "Marital status: Single     Spouse name: Not on file    Number of children: Not on file    Years of education: Not on file    Highest education level: Not on file   Occupational History    Not on file   Tobacco Use    Smoking status: Former    Smokeless tobacco: Never   Vaping Use    Vaping Use: Never used   Substance and Sexual Activity    Alcohol use: Yes     Comment: occ    Drug use: Never    Sexual activity: Not on file   Other Topics Concern    Not on file   Social History Narrative    Not on file     Social Determinants of Health     Financial Resource Strain: Not on file   Food Insecurity: Not on file   Transportation Needs: Not on file   Physical Activity: Not on file   Stress: Not on file   Social Connections: Not on file   Intimate Partner Violence: Not on file   Housing Stability: Not on file         Vital signs:  Weight/BMI: Body mass index is 24.14 kg/m².  /67   Pulse 91   Temp 36.3 °C (97.3 °F) (Temporal)   Resp 16   Ht 1.727 m (5' 8\")   Wt 72 kg (158 lb 11.7 oz)   SpO2 93%   Vitals:    09/17/22 1951 09/17/22 2359 09/18/22 0357 09/18/22 0709   BP:  (!) 141/83 (!) 98/60 108/67   Pulse:  88 72 91   Resp:  18 18 16   Temp:  36.9 °C (98.5 °F) 36.5 °C (97.7 °F) 36.3 °C (97.3 °F)   TempSrc:  Temporal Temporal Temporal   SpO2:  92% 97% 93%   Weight: 72 kg (158 lb 11.7 oz)      Height:         Oxygen Therapy:  Pulse Oximetry: 93 %, O2 (LPM): 0, O2 Delivery Device: None - Room Air    Intake/Output Summary (Last 24 hours) at 9/18/2022 0958  Last data filed at 9/17/2022 1300  Gross per 24 hour   Intake 250 ml   Output --   Net 250 ml         Physical Exam:  Physical Exam  Vitals and nursing note reviewed.   Constitutional:       Appearance: He is ill-appearing.   HENT:      Head: Normocephalic and atraumatic.      Right Ear: External ear normal.      Left Ear: External ear normal.      Nose: Nose normal. No congestion.      Mouth/Throat:      Mouth: Mucous membranes are dry.      Pharynx: Oropharynx is " clear.   Eyes:      General: No scleral icterus.     Extraocular Movements: Extraocular movements intact.      Pupils: Pupils are equal, round, and reactive to light.   Cardiovascular:      Rate and Rhythm: Normal rate and regular rhythm.      Pulses: Normal pulses.      Heart sounds: Normal heart sounds. No murmur heard.  Pulmonary:      Effort: Pulmonary effort is normal. No respiratory distress.      Breath sounds: Normal breath sounds.   Abdominal:      General: Abdomen is flat. Bowel sounds are normal.      Palpations: Abdomen is soft.      Tenderness: There is no abdominal tenderness.   Musculoskeletal:      Cervical back: Neck supple.      Right lower leg: No edema.      Left lower leg: No edema.   Lymphadenopathy:      Cervical: No cervical adenopathy.   Skin:     General: Skin is warm and dry.   Neurological:      General: No focal deficit present.      Mental Status: He is alert and oriented to person, place, and time.   Psychiatric:         Mood and Affect: Mood normal.         Speech: Speech normal.         Behavior: Behavior is cooperative.         Cognition and Memory: Cognition normal. Cognition is not impaired. He exhibits impaired recent memory.         Labs:  Recent Labs     09/16/22 0318 09/17/22 0248   SODIUM 136 137   POTASSIUM 3.7 3.8   CHLORIDE 103 101   CO2 22 25   BUN 32* 17   CREATININE 8.43* 5.94*   MAGNESIUM 1.3*  --    PHOSPHORUS 2.6  --    CALCIUM 8.1* 8.1*     Recent Labs     09/16/22 0318 09/17/22 0248   ALTSGPT  --  15   ASTSGOT  --  24   ALKPHOSPHAT  --  71   TBILIRUBIN  --  0.4   GLUCOSE 94 90     Recent Labs     09/16/22 0318 09/17/22 0248   WBC 8.2 6.3   NEUTSPOLYS  --  45.70   LYMPHOCYTES  --  35.20   MONOCYTES  --  15.80*   EOSINOPHILS  --  2.10   BASOPHILS  --  0.60   ASTSGOT  --  24   ALTSGPT  --  15   ALKPHOSPHAT  --  71   TBILIRUBIN  --  0.4     Recent Labs     09/16/22 0318 09/17/22 0248   RBC 2.69* 2.63*   HEMOGLOBIN 8.1* 7.8*   HEMATOCRIT 25.2* 24.9*   PLATELETCT  100* 108*     Recent Results (from the past 24 hour(s))   POCT glucose device results    Collection Time: 09/18/22  8:17 AM   Result Value Ref Range    POC Glucose, Blood 122 (H) 65 - 99 mg/dL         Radiology Review:  [id number]   Final Result      1.  Pancreatic duct stent appears to be present   2.  Changes in the pancreas possibly related to history of pancreatitis.   3.  Cholelithiasis   4.  Gallbladder wall thickening which is nonspecific. While cholecystitis is not excluded this can also be seen with hepatocellular and cardiac disease.   5.  RIGHT hepatic mass, again incompletely characterized but most likely a hemangioma   6.  Trace BILATERAL pleural effusions      EC-ECHOCARDIOGRAM COMPLETE W/O CONT   Final Result      US-EXTREMITY VENOUS UPPER UNILAT LEFT   Final Result      CT-HEAD W/O   Final Result         1. No acute intracranial abnormality. No evidence of acute intracranial hemorrhage or mass lesion.                     DX-CHEST-PORTABLE (1 VIEW)   Final Result      Stable enlargement of the cardiomediastinal silhouette without acute cardiopulmonary abnormality.      IR-CVC TUNNEL W/O PORT REMOVAL    (Results Pending)         MDM (Data Review):   -Records reviewed and summarized in current documentation  -I personally reviewed and interpreted the laboratory results  -I personally reviewed the radiology images        Medical Decision Making, by Problem:  Active Hospital Problems    Diagnosis     Gram-negative bacteremia [R78.81]     Altered mental status [R41.82]     Hypoglycemia [E16.2]     Arthritis [M19.90]     ESRD on dialysis (HCC) [N18.6, Z99.2]     Hypotension [I95.9]     Sepsis (HCC) [A41.9]     Anemia [D64.9]            Assessment/Recommendations:  Assessment:  -Altered mental status-likely secondary to infection  -Gram-negative bacteremia  -Hypotension-resolved  -History of pancreatic duct stones and stent placement with stent still within the duct  -End-stage renal disease on  dialysis  -CAD  -Hyperlipidemia  -Hypertension  -Chronic pancreatitis    Plan:  -N.p.o. after midnight  -Hold anticoagulants  -ERCP with possible sphincterotomy, biliary stent removal or exchange, stone or sludge removal, or other intervention with Dr. Mikie Lugo at 4 PM tomorrow.    Risks, benefits, and alternatives of aforementioned procedures were discussed with patient.  Consenting patient was given opportunities to ask questions and discuss other options.  Risks including but not limited to perforation, infection, bleeding, missed lesion(s), possible need for surgery(ies) and/or interventional radiology, possible need for repeat procedure(s) and/or additional testing, hospitalization possibly prolonged, cardiac and/or pulmonary event, aspiration, hypoxia, stroke, medication and/or anesthesia reaction, indefinite diagnosis, discomfort, unsuccessful and/or incomplete procedure, ineffective therapy and/or persistent symptoms, damage to adjacent organs and/or vascular structures, and other adverse events possibly life-threatening.  Interactive discussion was undertaken with Layman's terms. I answered questions in full and to satisfaction.  Consenting person(s) stated understanding and acceptance of these risks, and wished to proceed.  Informed consent was given in clear state of mind.    -The patient is aware that if he changes his mind and refuses to perform the procedure he is at risk for infection, sepsis, perforation, disability, pancreatitis, prolonged hospital stay, need for surgical intervention, or death.  -Antibiotics per DELICIA Duncan.      Thank you for inviting me to participate in the care of this patient. Please do not hesitate to call GI consultants with additional questions/concerns or changes in the patient's clinical status at [phone].      Core Quality Measures   Reviewed items:  Labs, Medications and Radiology reports reviewed

## 2022-09-18 NOTE — CARE PLAN
Problem: Knowledge Deficit - Standard  Goal: Patient and family/care givers will demonstrate understanding of plan of care, disease process/condition, diagnostic tests and medications  Outcome: Progressing     Problem: Hemodynamics  Goal: Patient's hemodynamics, fluid balance and neurologic status will be stable or improve  Outcome: Progressing     Problem: Pain - Standard  Goal: Alleviation of pain or a reduction in pain to the patient’s comfort goal  Outcome: Progressing   The patient is Watcher - Medium risk of patient condition declining or worsening    Shift Goals  Clinical Goals: pain control; npo-ercp  Patient Goals: pain control  Family Goals: jace    Progress made toward(s) clinical / shift goals:       Patient is not progressing towards the following goals:

## 2022-09-18 NOTE — CARE PLAN
The patient is Stable - Low risk of patient condition declining or worsening    Shift Goals  Clinical Goals: mobilze, IV ABX, Pain management  Patient Goals: Pain relief  Family Goals: jace    Progress made toward(s) clinical / shift goals:    Problem: Knowledge Deficit - Standard  Goal: Patient and family/care givers will demonstrate understanding of plan of care, disease process/condition, diagnostic tests and medications  Outcome: Progressing     Problem: Hemodynamics  Goal: Patient's hemodynamics, fluid balance and neurologic status will be stable or improve  Outcome: Progressing     Problem: Fluid Volume  Goal: Fluid volume balance will be maintained  Outcome: Progressing     Problem: Urinary - Renal Perfusion  Goal: Ability to achieve and maintain adequate renal perfusion and functioning will improve  Outcome: Progressing     Problem: Pain - Standard  Goal: Alleviation of pain or a reduction in pain to the patient’s comfort goal  Outcome: Progressing       Patient is not progressing towards the following goals:

## 2022-09-18 NOTE — PROGRESS NOTES
"[facility] -  PROGRESS NOTE               Author: Tristen Cortez M.D. Date & Time: 9/18/2022  10:54 AM     HPI:  Junior Proctor is a 72 y.o. male admitted 9/13/2022.  Patient with ESRD on HD via tunneled dialysis catheter, hypertension, had dialysis but was noted to be altered following his treatment and with hypotension to 80s over 40s, hypoglycemic with a blood glucose of 50.  He was febrile to 101.4 in the ER tachycardic to 105.  Patient is admitted to the [facility].  2 out of 2 blood cultures are positive for GNR.  Nephrology was asked to consult for ESRD management.     DAILY NEPHROLOGY SUMMARY:  9/15 - Blood cultures with gram negative rods. Difficult dialysis given poor catheter flow. ID suspects line sepsis and wants catheter holiday.  9/16 - Transferred out of the CCU. No new overnight renal events. Tunneled Dialysis catheter removal and placement of temporary HD cathter.  9/17 - No new overnight renal events. S/p HD yesterday with net UF of 1.2 L and tolerated well. On Cefepime q24 hrs.  9/18 - no new overnight renal events. Patient had MRCP performed yesterday which showed retained pancreatic duct stent.     REVIEW OF SYSTEMS:    10 point ROS reviewed and is as per HPI/daily summary or otherwise negative    PMH/PSH/SH/FH:   Reviewed and unchanged since admission note    CURRENT MEDICATIONS:   Reviewed from admission to present day    VS:  /67   Pulse 91   Temp 36.3 °C (97.3 °F) (Temporal)   Resp 16   Ht 1.727 m (5' 8\")   Wt 72 kg (158 lb 11.7 oz)   SpO2 93%   BMI 24.14 kg/m²     Physical Exam  Vitals and nursing note reviewed.   Constitutional:       Appearance: He is normal weight.   HENT:      Head: Normocephalic and atraumatic.      Nose: Nose normal.      Mouth/Throat:      Mouth: Mucous membranes are moist.      Pharynx: Oropharynx is clear.   Eyes:      Extraocular Movements: Extraocular movements intact.      Conjunctiva/sclera: Conjunctivae normal.      Pupils: " Pupils are equal, round, and reactive to light.   Cardiovascular:      Rate and Rhythm: Normal rate and regular rhythm.      Pulses: Normal pulses.      Heart sounds: Normal heart sounds.   Pulmonary:      Effort: Pulmonary effort is normal.      Breath sounds: Normal breath sounds.   Abdominal:      General: Abdomen is flat. Bowel sounds are normal.   Musculoskeletal:         General: Normal range of motion.      Cervical back: Normal range of motion and neck supple.   Skin:     General: Skin is warm.      Capillary Refill: Capillary refill takes less than 2 seconds.   Neurological:      General: No focal deficit present.      Mental Status: He is alert and oriented to person, place, and time. Mental status is at baseline.   Psychiatric:         Mood and Affect: Mood normal.         Behavior: Behavior normal.         Thought Content: Thought content normal.         Judgment: Judgment normal.     Fluids:  In: 720 [P.O.:720]  Out: -     LABS:  Recent Labs     09/16/22  0318 09/17/22  0248   SODIUM 136 137   POTASSIUM 3.7 3.8   CHLORIDE 103 101   CO2 22 25   GLUCOSE 94 90   BUN 32* 17   CREATININE 8.43* 5.94*   CALCIUM 8.1* 8.1*         IMAGING:   All imaging reviewed from admission to present day    IMPRESSION:  # ESRD   # HTN  - Goal BP < 140/90  # Anemia of CKD  - Goal Hgb 10-11  # CKD-MBD/Renal Osteodystrophy  # Sepsis  # Gram negative bactermia/Line infection  # Encephaolpathy     PLAN:  - No plans for HD today.   - MWF and prn dialysis during stay.  - retrieval of pancreatic duct stent procedure possibly this week.  - TDC once future blood cultures come back negative  - UF as tolerated  - No dietary protein restrictions  - Dose all meds per ESRD  - Abx per primary service/ID.

## 2022-09-19 ENCOUNTER — APPOINTMENT (OUTPATIENT)
Dept: RADIOLOGY | Facility: MEDICAL CENTER | Age: 72
DRG: 314 | End: 2022-09-19
Attending: STUDENT IN AN ORGANIZED HEALTH CARE EDUCATION/TRAINING PROGRAM
Payer: COMMERCIAL

## 2022-09-19 ENCOUNTER — APPOINTMENT (OUTPATIENT)
Dept: RADIOLOGY | Facility: MEDICAL CENTER | Age: 72
DRG: 314 | End: 2022-09-19
Attending: INTERNAL MEDICINE
Payer: COMMERCIAL

## 2022-09-19 ENCOUNTER — ANESTHESIA (OUTPATIENT)
Dept: SURGERY | Facility: MEDICAL CENTER | Age: 72
DRG: 314 | End: 2022-09-19
Payer: COMMERCIAL

## 2022-09-19 PROBLEM — I10 HTN (HYPERTENSION): Status: ACTIVE | Noted: 2022-09-19

## 2022-09-19 LAB
ALBUMIN SERPL BCP-MCNC: 2.6 G/DL (ref 3.2–4.9)
ALBUMIN/GLOB SERPL: 0.8 G/DL
ALP SERPL-CCNC: 79 U/L (ref 30–99)
ALT SERPL-CCNC: <5 U/L (ref 2–50)
ANION GAP SERPL CALC-SCNC: 11 MMOL/L (ref 7–16)
AST SERPL-CCNC: 13 U/L (ref 12–45)
BILIRUB SERPL-MCNC: 0.3 MG/DL (ref 0.1–1.5)
BUN SERPL-MCNC: 29 MG/DL (ref 8–22)
CALCIUM SERPL-MCNC: 8.3 MG/DL (ref 8.5–10.5)
CHLORIDE SERPL-SCNC: 101 MMOL/L (ref 96–112)
CO2 SERPL-SCNC: 24 MMOL/L (ref 20–33)
CREAT SERPL-MCNC: 9.99 MG/DL (ref 0.5–1.4)
GFR SERPLBLD CREATININE-BSD FMLA CKD-EPI: 5 ML/MIN/1.73 M 2
GLOBULIN SER CALC-MCNC: 3.1 G/DL (ref 1.9–3.5)
GLUCOSE SERPL-MCNC: 99 MG/DL (ref 65–99)
POTASSIUM SERPL-SCNC: 4.1 MMOL/L (ref 3.6–5.5)
PROT SERPL-MCNC: 5.7 G/DL (ref 6–8.2)
SODIUM SERPL-SCNC: 136 MMOL/L (ref 135–145)

## 2022-09-19 PROCEDURE — 05HY33Z INSERTION OF INFUSION DEVICE INTO UPPER VEIN, PERCUTANEOUS APPROACH: ICD-10-PCS | Performed by: RADIOLOGY

## 2022-09-19 PROCEDURE — 700111 HCHG RX REV CODE 636 W/ 250 OVERRIDE (IP): Performed by: RADIOLOGY

## 2022-09-19 PROCEDURE — 700111 HCHG RX REV CODE 636 W/ 250 OVERRIDE (IP)

## 2022-09-19 PROCEDURE — 700101 HCHG RX REV CODE 250: Performed by: ANESTHESIOLOGY

## 2022-09-19 PROCEDURE — 700117 HCHG RX CONTRAST REV CODE 255: Performed by: INTERNAL MEDICINE

## 2022-09-19 PROCEDURE — 74328 X-RAY BILE DUCT ENDOSCOPY: CPT

## 2022-09-19 PROCEDURE — 160203 HCHG ENDO MINUTES - 1ST 30 MINS LEVEL 4: Performed by: INTERNAL MEDICINE

## 2022-09-19 PROCEDURE — C2617 STENT, NON-COR, TEM W/O DEL: HCPCS | Performed by: INTERNAL MEDICINE

## 2022-09-19 PROCEDURE — A9270 NON-COVERED ITEM OR SERVICE: HCPCS | Performed by: STUDENT IN AN ORGANIZED HEALTH CARE EDUCATION/TRAINING PROGRAM

## 2022-09-19 PROCEDURE — 02PY33Z REMOVAL OF INFUSION DEVICE FROM GREAT VESSEL, PERCUTANEOUS APPROACH: ICD-10-PCS | Performed by: RADIOLOGY

## 2022-09-19 PROCEDURE — 99100 ANES PT EXTEME AGE<1 YR&>70: CPT | Performed by: ANESTHESIOLOGY

## 2022-09-19 PROCEDURE — 160002 HCHG RECOVERY MINUTES (STAT): Performed by: INTERNAL MEDICINE

## 2022-09-19 PROCEDURE — 80053 COMPREHEN METABOLIC PANEL: CPT

## 2022-09-19 PROCEDURE — 700105 HCHG RX REV CODE 258: Performed by: ANESTHESIOLOGY

## 2022-09-19 PROCEDURE — 0JPT0XZ REMOVAL OF TUNNELED VASCULAR ACCESS DEVICE FROM TRUNK SUBCUTANEOUS TISSUE AND FASCIA, OPEN APPROACH: ICD-10-PCS | Performed by: RADIOLOGY

## 2022-09-19 PROCEDURE — 700111 HCHG RX REV CODE 636 W/ 250 OVERRIDE (IP): Performed by: STUDENT IN AN ORGANIZED HEALTH CARE EDUCATION/TRAINING PROGRAM

## 2022-09-19 PROCEDURE — 0F7D8DZ DILATION OF PANCREATIC DUCT WITH INTRALUMINAL DEVICE, VIA NATURAL OR ARTIFICIAL OPENING ENDOSCOPIC: ICD-10-PCS | Performed by: INTERNAL MEDICINE

## 2022-09-19 PROCEDURE — 0FPD8DZ REMOVAL OF INTRALUMINAL DEVICE FROM PANCREATIC DUCT, VIA NATURAL OR ARTIFICIAL OPENING ENDOSCOPIC: ICD-10-PCS | Performed by: INTERNAL MEDICINE

## 2022-09-19 PROCEDURE — 700105 HCHG RX REV CODE 258: Performed by: RADIOLOGY

## 2022-09-19 PROCEDURE — C1889 IMPLANT/INSERT DEVICE, NOC: HCPCS | Performed by: INTERNAL MEDICINE

## 2022-09-19 PROCEDURE — C1769 GUIDE WIRE: HCPCS | Performed by: INTERNAL MEDICINE

## 2022-09-19 PROCEDURE — 90935 HEMODIALYSIS ONE EVALUATION: CPT

## 2022-09-19 PROCEDURE — 160009 HCHG ANES TIME/MIN: Performed by: INTERNAL MEDICINE

## 2022-09-19 PROCEDURE — 36415 COLL VENOUS BLD VENIPUNCTURE: CPT

## 2022-09-19 PROCEDURE — 0FCD8ZZ EXTIRPATION OF MATTER FROM PANCREATIC DUCT, VIA NATURAL OR ARTIFICIAL OPENING ENDOSCOPIC: ICD-10-PCS | Performed by: INTERNAL MEDICINE

## 2022-09-19 PROCEDURE — 700101 HCHG RX REV CODE 250

## 2022-09-19 PROCEDURE — 700102 HCHG RX REV CODE 250 W/ 637 OVERRIDE(OP): Performed by: STUDENT IN AN ORGANIZED HEALTH CARE EDUCATION/TRAINING PROGRAM

## 2022-09-19 PROCEDURE — 160035 HCHG PACU - 1ST 60 MINS PHASE I: Performed by: INTERNAL MEDICINE

## 2022-09-19 PROCEDURE — A9270 NON-COVERED ITEM OR SERVICE: HCPCS | Performed by: HOSPITALIST

## 2022-09-19 PROCEDURE — 160048 HCHG OR STATISTICAL LEVEL 1-5: Performed by: INTERNAL MEDICINE

## 2022-09-19 PROCEDURE — 700111 HCHG RX REV CODE 636 W/ 250 OVERRIDE (IP): Performed by: ANESTHESIOLOGY

## 2022-09-19 PROCEDURE — 36558 INSERT TUNNELED CV CATH: CPT

## 2022-09-19 PROCEDURE — 700102 HCHG RX REV CODE 250 W/ 637 OVERRIDE(OP): Performed by: HOSPITALIST

## 2022-09-19 PROCEDURE — 700111 HCHG RX REV CODE 636 W/ 250 OVERRIDE (IP): Performed by: INTERNAL MEDICINE

## 2022-09-19 PROCEDURE — 770020 HCHG ROOM/CARE - TELE (206)

## 2022-09-19 PROCEDURE — 00732 ANES UPR GI NDSC PX ERCP: CPT | Performed by: ANESTHESIOLOGY

## 2022-09-19 PROCEDURE — 160208 HCHG ENDO MINUTES - EA ADDL 1 MIN LEVEL 4: Performed by: INTERNAL MEDICINE

## 2022-09-19 PROCEDURE — 99233 SBSQ HOSP IP/OBS HIGH 50: CPT | Performed by: STUDENT IN AN ORGANIZED HEALTH CARE EDUCATION/TRAINING PROGRAM

## 2022-09-19 PROCEDURE — 0JH60XZ INSERTION OF TUNNELED VASCULAR ACCESS DEVICE INTO CHEST SUBCUTANEOUS TISSUE AND FASCIA, OPEN APPROACH: ICD-10-PCS | Performed by: RADIOLOGY

## 2022-09-19 DEVICE — STENT PANCREATIC SINGLE PIGTAIL 4FR X 11CM (1EA): Type: IMPLANTABLE DEVICE | Site: ESOPHAGUS | Status: FUNCTIONAL

## 2022-09-19 RX ORDER — ROCURONIUM BROMIDE 10 MG/ML
INJECTION, SOLUTION INTRAVENOUS PRN
Status: DISCONTINUED | OUTPATIENT
Start: 2022-09-19 | End: 2022-09-19 | Stop reason: SURG

## 2022-09-19 RX ORDER — HEPARIN SODIUM 1000 [USP'U]/ML
INJECTION, SOLUTION INTRAVENOUS; SUBCUTANEOUS
Status: COMPLETED
Start: 2022-09-19 | End: 2022-09-19

## 2022-09-19 RX ORDER — LIDOCAINE HYDROCHLORIDE AND EPINEPHRINE BITARTRATE 20; .01 MG/ML; MG/ML
INJECTION, SOLUTION SUBCUTANEOUS
Status: COMPLETED
Start: 2022-09-19 | End: 2022-09-19

## 2022-09-19 RX ORDER — SODIUM CHLORIDE, SODIUM LACTATE, POTASSIUM CHLORIDE, CALCIUM CHLORIDE 600; 310; 30; 20 MG/100ML; MG/100ML; MG/100ML; MG/100ML
INJECTION, SOLUTION INTRAVENOUS CONTINUOUS
Status: ACTIVE | OUTPATIENT
Start: 2022-09-19 | End: 2022-09-19

## 2022-09-19 RX ORDER — MIDAZOLAM HYDROCHLORIDE 1 MG/ML
.5-2 INJECTION INTRAMUSCULAR; INTRAVENOUS PRN
Status: ACTIVE | OUTPATIENT
Start: 2022-09-19 | End: 2022-09-19

## 2022-09-19 RX ORDER — SODIUM CHLORIDE, SODIUM LACTATE, POTASSIUM CHLORIDE, CALCIUM CHLORIDE 600; 310; 30; 20 MG/100ML; MG/100ML; MG/100ML; MG/100ML
INJECTION, SOLUTION INTRAVENOUS CONTINUOUS
Status: DISCONTINUED | OUTPATIENT
Start: 2022-09-19 | End: 2022-09-19 | Stop reason: HOSPADM

## 2022-09-19 RX ORDER — LIDOCAINE HYDROCHLORIDE 20 MG/ML
INJECTION, SOLUTION EPIDURAL; INFILTRATION; INTRACAUDAL; PERINEURAL PRN
Status: DISCONTINUED | OUTPATIENT
Start: 2022-09-19 | End: 2022-09-19 | Stop reason: SURG

## 2022-09-19 RX ORDER — SODIUM CHLORIDE 9 MG/ML
INJECTION, SOLUTION INTRAVENOUS
Status: DISCONTINUED | OUTPATIENT
Start: 2022-09-19 | End: 2022-09-19 | Stop reason: SURG

## 2022-09-19 RX ORDER — HEPARIN SODIUM (PORCINE) LOCK FLUSH IV SOLN 100 UNIT/ML 100 UNIT/ML
SOLUTION INTRAVENOUS
Status: COMPLETED
Start: 2022-09-19 | End: 2022-09-19

## 2022-09-19 RX ORDER — PHENYLEPHRINE HCL IN 0.9% NACL 0.5 MG/5ML
SYRINGE (ML) INTRAVENOUS PRN
Status: DISCONTINUED | OUTPATIENT
Start: 2022-09-19 | End: 2022-09-19 | Stop reason: SURG

## 2022-09-19 RX ORDER — ONDANSETRON 2 MG/ML
4 INJECTION INTRAMUSCULAR; INTRAVENOUS PRN
Status: ACTIVE | OUTPATIENT
Start: 2022-09-19 | End: 2022-09-19

## 2022-09-19 RX ORDER — SODIUM CHLORIDE 9 MG/ML
500 INJECTION, SOLUTION INTRAVENOUS
Status: DISPENSED | OUTPATIENT
Start: 2022-09-19 | End: 2022-09-19

## 2022-09-19 RX ORDER — CEFAZOLIN SODIUM 1 G/3ML
INJECTION, POWDER, FOR SOLUTION INTRAMUSCULAR; INTRAVENOUS PRN
Status: DISCONTINUED | OUTPATIENT
Start: 2022-09-19 | End: 2022-09-19 | Stop reason: SURG

## 2022-09-19 RX ORDER — ONDANSETRON 2 MG/ML
4 INJECTION INTRAMUSCULAR; INTRAVENOUS
Status: DISCONTINUED | OUTPATIENT
Start: 2022-09-19 | End: 2022-09-19 | Stop reason: HOSPADM

## 2022-09-19 RX ORDER — LIDOCAINE HYDROCHLORIDE 40 MG/ML
SOLUTION TOPICAL PRN
Status: DISCONTINUED | OUTPATIENT
Start: 2022-09-19 | End: 2022-09-19 | Stop reason: SURG

## 2022-09-19 RX ORDER — MIDAZOLAM HYDROCHLORIDE 1 MG/ML
INJECTION INTRAMUSCULAR; INTRAVENOUS
Status: COMPLETED
Start: 2022-09-19 | End: 2022-09-19

## 2022-09-19 RX ADMIN — Medication 100 MCG: at 16:50

## 2022-09-19 RX ADMIN — MIDAZOLAM HYDROCHLORIDE 1 MG: 1 INJECTION, SOLUTION INTRAMUSCULAR; INTRAVENOUS at 13:11

## 2022-09-19 RX ADMIN — HEPARIN 100 UNITS: 100 SYRINGE at 13:20

## 2022-09-19 RX ADMIN — OMEPRAZOLE 20 MG: 20 CAPSULE, DELAYED RELEASE ORAL at 05:51

## 2022-09-19 RX ADMIN — CEFAZOLIN SODIUM 1 G: 1 INJECTION, SOLUTION INTRAVENOUS at 20:39

## 2022-09-19 RX ADMIN — MIDAZOLAM HYDROCHLORIDE 1 MG: 1 INJECTION, SOLUTION INTRAMUSCULAR; INTRAVENOUS at 13:04

## 2022-09-19 RX ADMIN — HEPARIN SODIUM 2800 UNITS: 1000 INJECTION, SOLUTION INTRAVENOUS; SUBCUTANEOUS at 12:05

## 2022-09-19 RX ADMIN — ATORVASTATIN CALCIUM 20 MG: 20 TABLET, FILM COATED ORAL at 20:25

## 2022-09-19 RX ADMIN — PROPOFOL 90 MG: 10 INJECTION, EMULSION INTRAVENOUS at 16:29

## 2022-09-19 RX ADMIN — ALFENTANIL HYDROCHLORIDE 1000 MCG: 500 INJECTION INTRAVENOUS at 16:29

## 2022-09-19 RX ADMIN — Medication 100 MCG: at 16:59

## 2022-09-19 RX ADMIN — Medication 200 MCG: at 16:35

## 2022-09-19 RX ADMIN — ROCURONIUM BROMIDE 20 MG: 10 INJECTION, SOLUTION INTRAVENOUS at 16:29

## 2022-09-19 RX ADMIN — Medication 100 MCG: at 16:48

## 2022-09-19 RX ADMIN — OXYCODONE HYDROCHLORIDE 10 MG: 10 TABLET ORAL at 14:38

## 2022-09-19 RX ADMIN — Medication 100 MCG: at 16:42

## 2022-09-19 RX ADMIN — HYDROMORPHONE HYDROCHLORIDE 0.5 MG: 1 INJECTION, SOLUTION INTRAMUSCULAR; INTRAVENOUS; SUBCUTANEOUS at 15:49

## 2022-09-19 RX ADMIN — SEVELAMER CARBONATE 800 MG: 800 TABLET, FILM COATED ORAL at 07:45

## 2022-09-19 RX ADMIN — OXYCODONE HYDROCHLORIDE 10 MG: 10 TABLET ORAL at 05:51

## 2022-09-19 RX ADMIN — CEFAZOLIN 1 G: 330 INJECTION, POWDER, FOR SOLUTION INTRAMUSCULAR; INTRAVENOUS at 16:38

## 2022-09-19 RX ADMIN — OXYCODONE HYDROCHLORIDE 10 MG: 10 TABLET ORAL at 20:25

## 2022-09-19 RX ADMIN — ALLOPURINOL 100 MG: 100 TABLET ORAL at 07:45

## 2022-09-19 RX ADMIN — LIDOCAINE HYDROCHLORIDE 70 MG: 20 INJECTION, SOLUTION EPIDURAL; INFILTRATION; INTRACAUDAL at 16:29

## 2022-09-19 RX ADMIN — HYDROMORPHONE HYDROCHLORIDE 0.5 MG: 1 INJECTION, SOLUTION INTRAMUSCULAR; INTRAVENOUS; SUBCUTANEOUS at 19:39

## 2022-09-19 RX ADMIN — LIDOCAINE HYDROCHLORIDE 4 ML: 40 SOLUTION TOPICAL at 16:32

## 2022-09-19 RX ADMIN — LIDOCAINE HYDROCHLORIDE AND EPINEPHRINE: 20; 10 INJECTION, SOLUTION INFILTRATION; PERINEURAL at 13:09

## 2022-09-19 RX ADMIN — HYDROMORPHONE HYDROCHLORIDE 0.5 MG: 1 INJECTION, SOLUTION INTRAMUSCULAR; INTRAVENOUS; SUBCUTANEOUS at 07:39

## 2022-09-19 RX ADMIN — GABAPENTIN 300 MG: 300 CAPSULE ORAL at 20:25

## 2022-09-19 RX ADMIN — SODIUM CHLORIDE: 9 INJECTION, SOLUTION INTRAVENOUS at 16:23

## 2022-09-19 RX ADMIN — FENTANYL CITRATE 50 MCG: 50 INJECTION, SOLUTION INTRAMUSCULAR; INTRAVENOUS at 13:05

## 2022-09-19 ASSESSMENT — PAIN DESCRIPTION - PAIN TYPE
TYPE: CHRONIC PAIN
TYPE: ACUTE PAIN;SURGICAL PAIN
TYPE: CHRONIC PAIN
TYPE: SURGICAL PAIN

## 2022-09-19 ASSESSMENT — ENCOUNTER SYMPTOMS
BLOOD IN STOOL: 0
BACK PAIN: 0
DIARRHEA: 0
HEARTBURN: 0
MYALGIAS: 0
EYES NEGATIVE: 1
NERVOUS/ANXIOUS: 1
VOMITING: 0
CARDIOVASCULAR NEGATIVE: 1
GASTROINTESTINAL NEGATIVE: 1
FEVER: 0
CHILLS: 0
NEUROLOGICAL NEGATIVE: 1
RESPIRATORY NEGATIVE: 1
CONSTIPATION: 0
ABDOMINAL PAIN: 0
NAUSEA: 0

## 2022-09-19 ASSESSMENT — FIBROSIS 4 INDEX
FIB4 SCORE: 3.09
FIB4 SCORE: 3.43

## 2022-09-19 ASSESSMENT — PAIN SCALES - GENERAL: PAIN_LEVEL: 0

## 2022-09-19 NOTE — OR SURGEON
Immediate Post- Operative Note        Findings: CRF.      Procedure(s): Permacath placement, Tempcath removal.       Estimated Blood Loss: Less than 5 ml        Complications: None            9/19/2022     1329 PM     Trav Lozano M.D.

## 2022-09-19 NOTE — CARE PLAN
The patient is Stable - Low risk of patient condition declining or worsening    Shift Goals  Clinical Goals: Pain control, diet instruction at midnight  Patient Goals: Pain relief  Family Goals: jace    Progress made toward(s) clinical / shift goals:    Problem: Knowledge Deficit - Standard  Goal: Patient and family/care givers will demonstrate understanding of plan of care, disease process/condition, diagnostic tests and medications  Outcome: Progressing     Problem: Hemodynamics  Goal: Patient's hemodynamics, fluid balance and neurologic status will be stable or improve  Outcome: Progressing     Problem: Pain - Standard  Goal: Alleviation of pain or a reduction in pain to the patient’s comfort goal  Outcome: Progressing     Problem: Skin Integrity  Goal: Skin integrity is maintained or improved  Outcome: Progressing       Patient is not progressing towards the following goals: NA

## 2022-09-19 NOTE — PROGRESS NOTES
MONITOR SUMMARY:     Rhythm: SR, ST  Rate: 81 - 105  Ectopy: (r) PVC, (r) coup  Measurements: .16/.09/.39

## 2022-09-19 NOTE — PROGRESS NOTES
[facility] Medicine Daily Progress Note    Date of Service  9/18/2022    Chief Complaint  Junior Proctor is a 72 y.o. male admitted 9/13/2022 with hypotension and altered level of consciousness after dialysis    Hospital Course  Is a 72-year-old male with a history of end-stage renal disease, presents with altered mental status, hypoglycemia and hypotension.  Reportedly the patient just had hemodialysis via a left upper chest tunneled dialysis catheter, he was found altered, hypotensive with a blood pressure of 80s over 40s and hypoglycemic with a blood glucose level of 50.  The patient emergency room was found with elevated temperature of 101.4, tachycardia, white cell count of 4.3, hemoglobin 8.5, platelet count 78.  The patient was started on broad-spectrum antibiotics with concern for possible sepsis in form of ceftriaxone and vancomycin.    Interval Problem Update  Patient seen and examined today.  Data, Medication data reviewed.  Case discussed with nursing as available.  Plan of Care reviewed with patient and notified of changes.   9/14 the patient appears somewhat improved, he required additional fluid boluses to maintain his blood pressure, he denies fevers or chills, no abdominal pain, he has very little urination, he complains of generalized joint pain in his hands, shoulders, ankles, this appears to be chronic, the patient is chronically using narcotics for pain control, he denies difficulty with infection of his fistula or dialysis catheter.  He denies nausea vomiting, no abdominal pain.  Nephrology has been consulted  Infectious disease has been consulted after patient had positive blood cultures reported x2 gram-negative  9/15 the patient is currently afebrile, heart rate in the 60s, unlabored respirations, on room air, normotensive, blood pressure has improved, the patient was attempted to receive dialysis through his left upper chest dialysis catheter but was unable to perform secondary to flow issues,  discussed with nephrology, ordered IR exchange of catheter, in further review the patient had a pancreatic stent placed in June that was supposed to be revised, discussed with gastroenterology, get an MRCP to see if the catheter has passed naturally or will possibly need to be removed by ERCP, updated infectious disease.  9/16/2022:  Infectious disease recommendations appreciated patient no acute events overnight nephrology recommendations appreciated.  Patient had temporary R IJ dialysis line placed yesterday by interventional radiology.  Patient continue dialysis as scheduled.  We will consider replacement of permacatheter per ID recommendations.  Patient has at least several more days hospitalization prior to entertaining idea of discharge.  Otherwise continue present medical management.  I have prioritized MRCP.  Continue cefepime 1 g every 24 hours renally dosed.  9/17/2022:  Patient had MRCP performed today which showed retained pancreatic duct stent patient previously established with GI consultants had stent placed in June 2022.  We will place consultation morning at 9/18/2022 with likely procedure to follow.  Patient will benefit from retrieval of stent as previously discussed.  Otherwise continue with present antimicrobials.  9/18/2022:  Discussed plan with patient in detail infectious diseases cleared patient for replacement of tunneled dialysis catheter for dialysis needs.  Patient has presently temporary dialysis catheter in the L IJ.  Furthermore discussed line placement with nephrology they are in agreement.  Lastly, patient has retained biliary stent in the pancreatic duct gastroenterology is planning for possible stent retrieval on 9/19/2022.  I have also placed consultation with interventional radiology with regards to placement of tunneled dialysis catheter line.  Patient be n.p.o. after midnight schedule procedure for ERCP with possible stent procedure versus EGD and possible stent procedure  scheduled for 1600 on 09/19/2022.  Interventional radiology may opt to place tunneled dialysis catheter and patient prior or after this.  I have discussed this patient's plan of care and discharge plan at IDT rounds today with Case Management, Nursing, Nursing leadership, and other members of the IDT team.    Consultants/Specialty  infectious disease and nephrology    Code Status  Full Code    Disposition  Patient is not medically cleared for discharge.   Anticipate discharge to to home with close outpatient follow-up.  I have placed the appropriate orders for post-discharge needs.    Review of Systems  Review of Systems   Constitutional:  Positive for malaise/fatigue.   HENT: Negative.     Eyes: Negative.    Respiratory: Negative.     Cardiovascular: Negative.    Gastrointestinal: Negative.    Genitourinary: Negative.    Musculoskeletal:  Positive for back pain, joint pain and myalgias.   Skin: Negative.    Neurological: Negative.    Endo/Heme/Allergies: Negative.    Psychiatric/Behavioral:  The patient is nervous/anxious.    All other systems reviewed and are negative.     Physical Exam  Temp:  [36.2 °C (97.2 °F)-37.1 °C (98.7 °F)] 37.1 °C (98.7 °F)  Pulse:  [72-93] 86  Resp:  [16-18] 18  BP: ()/(60-83) 106/67  SpO2:  [92 %-97 %] 94 %    Physical Exam  Vitals and nursing note reviewed.   Constitutional:       Appearance: He is well-developed.      Comments: Pt seen and examined.   HENT:      Head: Normocephalic and atraumatic.   Eyes:      Pupils: Pupils are equal, round, and reactive to light.   Cardiovascular:      Rate and Rhythm: Normal rate and regular rhythm.      Heart sounds: Normal heart sounds.   Pulmonary:      Effort: Pulmonary effort is normal.      Breath sounds: Normal breath sounds.   Abdominal:      General: Bowel sounds are normal.      Palpations: Abdomen is soft.   Musculoskeletal:         General: Normal range of motion.      Cervical back: Normal range of motion and neck supple.    Skin:     General: Skin is warm and dry.      Coloration: Skin is pale.   Neurological:      General: No focal deficit present.      Mental Status: He is alert and oriented to person, place, and time.   Psychiatric:         Behavior: Behavior normal.       Fluids    Intake/Output Summary (Last 24 hours) at 9/18/2022 1726  Last data filed at 9/18/2022 1611  Gross per 24 hour   Intake 360 ml   Output --   Net 360 ml       Laboratory  Recent Labs     09/16/22 0318 09/17/22 0248 09/18/22  1150   WBC 8.2 6.3 8.9   RBC 2.69* 2.63* 2.60*   HEMOGLOBIN 8.1* 7.8* 7.9*   HEMATOCRIT 25.2* 24.9* 25.1*   MCV 93.7 94.7 96.5   MCH 30.1 29.7 30.4   MCHC 32.1* 31.3* 31.5*   RDW 69.1* 71.9* 72.5*   PLATELETCT 100* 108* 139*   MPV 12.5 11.0 10.6     Recent Labs     09/16/22 0318 09/17/22  0248 09/18/22  1150   SODIUM 136 137 135   POTASSIUM 3.7 3.8 3.8   CHLORIDE 103 101 99   CO2 22 25 23   GLUCOSE 94 90 107*   BUN 32* 17 25*   CREATININE 8.43* 5.94* 8.16*   CALCIUM 8.1* 8.1* 8.4*                   Imaging  XF-MATOXYT-I/O   Final Result      1.  Pancreatic duct stent appears to be present   2.  Changes in the pancreas possibly related to history of pancreatitis.   3.  Cholelithiasis   4.  Gallbladder wall thickening which is nonspecific. While cholecystitis is not excluded this can also be seen with hepatocellular and cardiac disease.   5.  RIGHT hepatic mass, again incompletely characterized but most likely a hemangioma   6.  Trace BILATERAL pleural effusions      EC-ECHOCARDIOGRAM COMPLETE W/O CONT   Final Result      US-EXTREMITY VENOUS UPPER UNILAT LEFT   Final Result      CT-HEAD W/O   Final Result         1. No acute intracranial abnormality. No evidence of acute intracranial hemorrhage or mass lesion.                     DX-CHEST-PORTABLE (1 VIEW)   Final Result      Stable enlargement of the cardiomediastinal silhouette without acute cardiopulmonary abnormality.      IR-CVC TUNNEL W/O PORT REMOVAL    (Results Pending)    IR-CONSULT AND TREAT    (Results Pending)          Assessment/Plan  * Sepsis (HCC)- (present on admission)  Assessment & Plan  This is Sepsis Present on admission  SIRS criteria identified on my evaluation include: Fever, with temperature greater than 101 deg F  Source is unknown, possible dialysis catheter infection  Sepsis protocol initiated  Fluid resuscitation ordered per protocol  Crystalloid Fluid Administration: Patient has advanced or end-stage chronic renal disease (with documentation of stage IV or GFR 15-29 mL/min, or stage V or GFR < 15 mL/min or ESRD), for this/these reason(s) it is unsafe for this patient to receive 30 mL/kg of fluid  Partial Fluid Dose Given: patient to be reassessed shortly after completion of partial fluid bolus to ensure adequacy of resuscitation  IV antibiotics as appropriate for source of sepsis  Reassessment: I have reassessed the patient's hemodynamic status  9/18/2022:  -Resolved      Presence of pancreatic duct stent  Assessment & Plan  Patient had pancreatic duct stent placed back in June 2022 for which she was supposed to have removed however for unclear reasons he was lost to follow-up.  Patient had a repeat MRCP performed with has results/findings suggestive of retained pancreatic duct stent.  Discussed with gastroenterology in detail.  Gastroenterology intends to take patient on 9/19/2022 for retrieval of pancreatic duct stent.  Patient be n.p.o. at midnight anticoagulation has been held.    Gram-negative bacteremia  Assessment & Plan  Unclear source, certainly possibility of line infection given patient's hemodialysis catheter  Get echocardiogram, MRCP abdomen given the patient's history of pancreatitis with stent placement which might be retained  ID consult  Follow cultures  9/18/2022:  Continue with Ancef infectious disease recommendations appreciated    Hypoglycemia  Assessment & Plan  Unclear etiology, likely sepsis related  Not a diabetic patient  Improved  now  9/18/2022-continue present medical management    Altered mental status  Assessment & Plan  Likely due to hypoglycemia in setting of suspected sepsis  Sepsis of unclear etiology, possible line infection given dialysis catheter present  Mental status has since normalized  CXR stable      Arthritis- (present on admission)  Assessment & Plan  Pain management      ESRD on dialysis (HCC)- (present on admission)  Assessment & Plan  Presents from dialysis after dialysis session completed  Found to be hypotensive, hypoglycemic, febrile  No urgent need for hemodialysis, nephrology consulted  Treat infection with IV antibiotics, blood cultures obtained, follow-up cultures ordered  Arranging catheter exchange  9/18/2022:  Patient has previously undergone line holiday discussed with infectious disease today appreciate the recommendations patient is suitable for replacement of tunneled dialysis catheter.  Discussed with interventional radiology in this regard order has been placed for consultation with interventional radiology for placement of tunneled dialysis catheter possibly on 9/19/2022.  Patient n.p.o. to midnight    Hypotension- (present on admission)  Assessment & Plan  Related to infection and possible volume loss  Several boluses of IV fluids have been given, currently with good results, monitor closely  9/18/2022:  -Resolved    Anemia- (present on admission)  Assessment & Plan  Likely anemia of chronic disease, renal disease         VTE prophylaxis: SCDs/TEDs, the patient has been refusing heparin injections    I have performed a physical exam and reviewed and updated ROS and Plan today (9/18/2022). In review of yesterday's note (9/17/2022), there are no changes except as documented above.        Please note that this dictation was created using voice recognition software. I have made every reasonable attempt to correct obvious errors, but I expect that there are errors of grammar and possibly context that I did  not discover before finalizing the note.

## 2022-09-19 NOTE — PROGRESS NOTES
Report received from day shift RN, assumed care of pt. Pt A&Ox4. Plan of care discussed with pt, labs and chart reviewed. All needs met at this time. Tele box on. On RA. Call light within reach, bed locked and in lowest position. All fall precautions and hourly rounding in place.

## 2022-09-19 NOTE — PROGRESS NOTES
Indication:Gram negative faith, chronic pancreatitis; pancreatic stent.   Risks, benefits, and alternatives were discussed with with consenting person(s). Consenting person(s) were given an opportunity to ask questions and discuss other options. Risks including but not limited to failed or incomplete ERCP, stent migration, ineffective therapy, radiation exposure, pancreatitis (with potential future complications), contrast reaction, perforation, infection, bleeding, missed lesion(s), cardiac and/or pulmonary event, aspiration, stroke, possible need for surgery, hospitalization possibly prolonged, discomfort, unsuccessful and/or incomplete procedure, possible need for repeat procedures and/or additional testings, damage to adjacent organs and/or vascular structures, medication reaction, disability, death, and other adverse events possibly life-threatening. Discussion was undertaken with Layman's terms. Consenting persons stated understanding and acceptance of these risks, and wished to proceed. Consent was given in clear state of mind.

## 2022-09-19 NOTE — DISCHARGE PLANNING
Case Management Discharge Planning    Admission Date: 9/13/2022  GMLOS: 4.8  ALOS: 6    6-Clicks ADL Score: 24  6-Clicks Mobility Score: 24      Anticipated Discharge Dispo: Discharge Disposition: Discharged to home/self care (01)  Discharge Address: [facility] [address], [address],  [place] [address]  Discharge Contact Phone Number: [phone]    DME Needed: No    Action(s) Taken: OTHER    Escalations Completed: None    Medically Clear: No    Next Steps: f/u with pt and medical team to discuss dc needs and barriers.    Barriers to Discharge: Medical clearance, Dialysis, and No Social Support     Alee [phone], [facility].

## 2022-09-19 NOTE — PROGRESS NOTES
Report received from Alta GARCIA, patient arrived in room AxOx4, RIJ site clean dry and intact. Patient c/o generalized pain in all joints, patient received with out IV access. Charge nurse Amparo made aware and attempting IV ultrasound.

## 2022-09-19 NOTE — PROGRESS NOTES
[facility] Medicine Daily Progress Note    Date of Service  9/19/2022    Chief Complaint  Junior Proctor is a 72 y.o. male admitted 9/13/2022 with hypotension and altered level of consciousness after dialysis    Hospital Course  Is a 72-year-old male with a history of end-stage renal disease, presents with altered mental status, hypoglycemia and hypotension.  Reportedly the patient just had hemodialysis via a left upper chest tunneled dialysis catheter, he was found altered, hypotensive with a blood pressure of 80s over 40s and hypoglycemic with a blood glucose level of 50.  The patient emergency room was found with elevated temperature of 101.4, tachycardia, white cell count of 4.3, hemoglobin 8.5, platelet count 78.  The patient was started on broad-spectrum antibiotics with concern for possible sepsis in form of ceftriaxone and vancomycin.    Interval Problem Update  Patient seen and examined today.  Data, Medication data reviewed.  Case discussed with nursing as available.  Plan of Care reviewed with patient and notified of changes.   9/14 the patient appears somewhat improved, he required additional fluid boluses to maintain his blood pressure, he denies fevers or chills, no abdominal pain, he has very little urination, he complains of generalized joint pain in his hands, shoulders, ankles, this appears to be chronic, the patient is chronically using narcotics for pain control, he denies difficulty with infection of his fistula or dialysis catheter.  He denies nausea vomiting, no abdominal pain.  Nephrology has been consulted  Infectious disease has been consulted after patient had positive blood cultures reported x2 gram-negative  9/15 the patient is currently afebrile, heart rate in the 60s, unlabored respirations, on room air, normotensive, blood pressure has improved, the patient was attempted to receive dialysis through his left upper chest dialysis catheter but was unable to perform secondary to flow issues,  discussed with nephrology, ordered IR exchange of catheter, in further review the patient had a pancreatic stent placed in June that was supposed to be revised, discussed with gastroenterology, get an MRCP to see if the catheter has passed naturally or will possibly need to be removed by ERCP, updated infectious disease.  9/16/2022:  Infectious disease recommendations appreciated patient no acute events overnight nephrology recommendations appreciated.  Patient had temporary R IJ dialysis line placed yesterday by interventional radiology.  Patient continue dialysis as scheduled.  We will consider replacement of permacatheter per ID recommendations.  Patient has at least several more days hospitalization prior to entertaining idea of discharge.  Otherwise continue present medical management.  I have prioritized MRCP.  Continue cefepime 1 g every 24 hours renally dosed.  9/17/2022:  Patient had MRCP performed today which showed retained pancreatic duct stent patient previously established with GI consultants had stent placed in June 2022.  We will place consultation morning at 9/18/2022 with likely procedure to follow.  Patient will benefit from retrieval of stent as previously discussed.  Otherwise continue with present antimicrobials.  9/18/2022:  Discussed plan with patient in detail infectious diseases cleared patient for replacement of tunneled dialysis catheter for dialysis needs.  Patient has presently temporary dialysis catheter in the L IJ.  Furthermore discussed line placement with nephrology they are in agreement.  Lastly, patient has retained biliary stent in the pancreatic duct gastroenterology is planning for possible stent retrieval on 9/19/2022.  I have also placed consultation with interventional radiology with regards to placement of tunneled dialysis catheter line.  Patient be n.p.o. after midnight schedule procedure for ERCP with possible stent procedure versus EGD and possible stent procedure  scheduled for 1600 on 09/19/2022.  Interventional radiology may opt to place tunneled dialysis catheter and patient prior or after this.  9/19/2022:  Patient had tunneled dialysis catheter placed this morning tolerated procedure well without issue dialysis catheter in L IJ was subsequently removed.  Patient is presently pending EGD/ERCP for removal of pancreatic duct stent that is been retained since June.  There is question as to whether this may be causing occult infection.  Patient had cultures that grew out methicillin sensitive staph aureus and is being treated with Ancef.  Patient will continue to be treated with Ancef  During dialysis on dialysis 2g-2g-3g on M-W-F through 9/21/2022.    I have discussed this patient's plan of care and discharge plan at IDT rounds today with Case Management, Nursing, Nursing leadership, and other members of the IDT team.    Consultants/Specialty  infectious disease and nephrology    Code Status  Full Code    Disposition  Patient is not medically cleared for discharge.   Anticipate discharge to to home with close outpatient follow-up.  I have placed the appropriate orders for post-discharge needs.    Review of Systems  Review of Systems   Constitutional:  Positive for malaise/fatigue.   HENT: Negative.     Eyes: Negative.    Respiratory: Negative.     Cardiovascular: Negative.    Gastrointestinal: Negative.    Genitourinary: Negative.    Musculoskeletal:  Negative for back pain, joint pain and myalgias.   Skin: Negative.    Neurological: Negative.    Endo/Heme/Allergies: Negative.    Psychiatric/Behavioral:  The patient is nervous/anxious.    All other systems reviewed and are negative.     Physical Exam  Temp:  [35.9 °C (96.7 °F)-37.1 °C (98.7 °F)] 36.2 °C (97.2 °F)  Pulse:  [] 98  Resp:  [12-19] 16  BP: (106-172)/(61-82) 121/79  SpO2:  [90 %-100 %] 95 %    Physical Exam  Vitals and nursing note reviewed.   Constitutional:       Appearance: He is well-developed.       Comments: Pt seen and examined.   HENT:      Head: Normocephalic and atraumatic.   Eyes:      Pupils: Pupils are equal, round, and reactive to light.   Cardiovascular:      Rate and Rhythm: Normal rate and regular rhythm.      Heart sounds: Normal heart sounds.   Pulmonary:      Effort: Pulmonary effort is normal.      Breath sounds: Normal breath sounds.   Abdominal:      General: Bowel sounds are normal.      Palpations: Abdomen is soft.   Musculoskeletal:         General: Normal range of motion.      Cervical back: Normal range of motion and neck supple.   Skin:     General: Skin is warm and dry.      Coloration: Skin is pale.   Neurological:      General: No focal deficit present.      Mental Status: He is alert and oriented to person, place, and time.   Psychiatric:         Behavior: Behavior normal.       Fluids    Intake/Output Summary (Last 24 hours) at 9/19/2022 1535  Last data filed at 9/19/2022 1200  Gross per 24 hour   Intake 1180 ml   Output 2000 ml   Net -820 ml       Laboratory  Recent Labs     09/17/22  0248 09/18/22  1150   WBC 6.3 8.9   RBC 2.63* 2.60*   HEMOGLOBIN 7.8* 7.9*   HEMATOCRIT 24.9* 25.1*   MCV 94.7 96.5   MCH 29.7 30.4   MCHC 31.3* 31.5*   RDW 71.9* 72.5*   PLATELETCT 108* 139*   MPV 11.0 10.6     Recent Labs     09/17/22  0248 09/18/22  1150 09/19/22  0155   SODIUM 137 135 136   POTASSIUM 3.8 3.8 4.1   CHLORIDE 101 99 101   CO2 25 23 24   GLUCOSE 90 107* 99   BUN 17 25* 29*   CREATININE 5.94* 8.16* 9.99*   CALCIUM 8.1* 8.4* 8.3*                   Imaging  IR-GUANAKITO THAPA PLACEMENT >5   Final Result      1.  Removal of the left IJ tunneled hemodialysis catheter.   2.  Placement of left IJ temporary hemodialysis catheter.      [id number]   Final Result      1.  Pancreatic duct stent appears to be present   2.  Changes in the pancreas possibly related to history of pancreatitis.   3.  Cholelithiasis   4.  Gallbladder wall thickening which is nonspecific. While cholecystitis is not  excluded this can also be seen with hepatocellular and cardiac disease.   5.  RIGHT hepatic mass, again incompletely characterized but most likely a hemangioma   6.  Trace BILATERAL pleural effusions      EC-ECHOCARDIOGRAM COMPLETE W/O CONT   Final Result      IR-CVC TUNNEL W/O PORT REMOVAL   Final Result      1.  Removal of the left IJ tunneled hemodialysis catheter.   2.  Placement of left IJ temporary hemodialysis catheter.      US-EXTREMITY VENOUS UPPER UNILAT LEFT   Final Result      CT-HEAD W/O   Final Result         1. No acute intracranial abnormality. No evidence of acute intracranial hemorrhage or mass lesion.                     DX-CHEST-PORTABLE (1 VIEW)   Final Result      Stable enlargement of the cardiomediastinal silhouette without acute cardiopulmonary abnormality.      BQ-NIAU-OWAHNYE DUCTS    (Results Pending)   IR-US GUIDED PIV    (Results Pending)          Assessment/Plan  * Sepsis (HCC)- (present on admission)  Assessment & Plan  This is Sepsis Present on admission  SIRS criteria identified on my evaluation include: Fever, with temperature greater than 101 deg F  Source is unknown, possible dialysis catheter infection  Sepsis protocol initiated  Fluid resuscitation ordered per protocol  Crystalloid Fluid Administration: Patient has advanced or end-stage chronic renal disease (with documentation of stage IV or GFR 15-29 mL/min, or stage V or GFR < 15 mL/min or ESRD), for this/these reason(s) it is unsafe for this patient to receive 30 mL/kg of fluid  Partial Fluid Dose Given: patient to be reassessed shortly after completion of partial fluid bolus to ensure adequacy of resuscitation  IV antibiotics as appropriate for source of sepsis  Reassessment: I have reassessed the patient's hemodynamic status  9/18/2022:  -Resolved      Presence of pancreatic duct stent  Assessment & Plan  Patient had pancreatic duct stent placed back in June 2022 for which she was supposed to have removed however for  unclear reasons he was lost to follow-up.  Patient had a repeat MRCP performed with has results/findings suggestive of retained pancreatic duct stent.  Discussed with gastroenterology in detail.  Gastroenterology intends to take patient on 9/19/2022 for retrieval of pancreatic duct stent.  Patient be n.p.o. at midnight anticoagulation has been held.  9/19/[id number]-cmspty-sc with GI recommendations    Gram-negative bacteremia  Assessment & Plan  Unclear source, certainly possibility of line infection given patient's hemodialysis catheter  Get echocardiogram, MRCP abdomen given the patient's history of pancreatitis with stent placement which might be retained  ID consult  Follow cultures  9/18/2022:  Continue with Ancef infectious disease recommendations appreciated  9/19/2022:  Patient will continue on antimicrobials until 9/21/2022 during dialysis 2g-2g-3g on M-W-F    Hypoglycemia  Assessment & Plan  Unclear etiology, likely sepsis related  Not a diabetic patient  Improved now  9/18/2022-continue present medical management    Altered mental status  Assessment & Plan  Likely due to hypoglycemia in setting of suspected sepsis  Sepsis of unclear etiology, possible line infection given dialysis catheter present  Mental status has since normalized  CXR stable  -Resolved      Arthritis- (present on admission)  Assessment & Plan  Pain management      ESRD on dialysis (HCC)- (present on admission)  Assessment & Plan  9/19/2022:  Patient status post replacement of new tunneled dialysis catheter.  Continue with regular dialysis schedule    9/18/2022:  Patient has previously undergone line holiday discussed with infectious disease today appreciate the recommendations patient is suitable for replacement of tunneled dialysis catheter.  Discussed with interventional radiology in this regard order has been placed for consultation with interventional radiology for placement of tunneled dialysis catheter possibly on 9/19/2022.  Patient  n.p.o. to midnight    Hypotension- (present on admission)  Assessment & Plan  Related to infection and possible volume loss  Several boluses of IV fluids have been given, currently with good results, monitor closely  9/18/2022:  -Resolved    Anemia- (present on admission)  Assessment & Plan  Likely anemia of chronic disease, renal disease         VTE prophylaxis: SCDs/TEDs, the patient has been refusing heparin injections    I have performed a physical exam and reviewed and updated ROS and Plan today (9/19/2022). In review of yesterday's note (9/18/2022), there are no changes except as documented above.        Please note that this dictation was created using voice recognition software. I have made every reasonable attempt to correct obvious errors, but I expect that there are errors of grammar and possibly context that I did not discover before finalizing the note.

## 2022-09-19 NOTE — ANESTHESIA PROCEDURE NOTES
Airway    Date/Time: 9/19/2022 4:32 PM  Performed by: Malaika Mcguire M.D.  Authorized by: Malaika Mcguire M.D.     Location:  OR  Urgency:  Elective  Indications for Airway Management:  Anesthesia      Spontaneous Ventilation: absent    Sedation Level:  Deep  Preoxygenated: Yes    Patient Position:  Sniffing  Mask Difficulty Assessment:  1 - vent by mask  Final Airway Type:  Endotracheal airway  Final Endotracheal Airway:  ETT  Cuffed: Yes    Technique Used for Successful ETT Placement:  Direct laryngoscopy  Devices/Methods Used in Placement:  Intubating stylet    Insertion Site:  Oral  Blade Type:  Alannah  Laryngoscope Blade/Videolaryngoscope Blade Size:  3  ETT Size (mm):  7.0  Measured from:  Teeth  ETT to Teeth (cm):  21  Placement Verified by: capnometry and palpation of cuff    Cormack-Lehane Classification:  Grade I - full view of glottis  Number of Attempts at Approach:  1

## 2022-09-19 NOTE — OR NURSING
1714 - Pt to PACU 1 from OR.  Bedside report from anesthesiologist and RN.  Attached to monitoring, VSS, breathing is calm and unlabored. Titrated to room air from 8L mask.  Pt denies pain or nausea.     1733 - Report to hailee Zapata RN.  Pt tolerating jello and apple juice. MD Lugo was bedside to update patient.      1800 - taken on hospital bed back to room with RN escort and on cardiac monitoring throughout.  Pt on room air, VSS, denies pain or nausea.  Bedside RN present when patient arrived, attached to tele box before leaving unit.

## 2022-09-19 NOTE — ANESTHESIA PREPROCEDURE EVALUATION
Case: [id number] Date/Time: 09/19/22 1530    Procedure: ERCP (ENDOSCOPIC RETROGRADE CHOLANGIOPANCREATOGRAPHY) (Esophagus)    Anesthesia type: General    Pre-op diagnosis: History of pancreatic duct stones and stent placement     Location: [facility] ROOM 26 / [facility]    Surgeons: Mikie Lugo M.D.        71 yo w/indwelling pancreatic stent placed 6/2022    Relevant Problems   ANESTHESIA (within normal limits)      CARDIAC   (positive) HTN (hypertension)   (positive) MI (myocardial infarction) (HCC)   (positive) Pulmonary embolism (HCC)      GI   (positive) Ulcer, stomach peptic         (positive) ESRD on dialysis (HCC)   (positive) Hepatitis   (positive) Stage 4 chronic kidney disease (HCC)      Other   (positive) Anemia   (positive) Arthritis   (positive) Presence of pancreatic duct stent     Chronic narcotics    Physical Exam    Airway   Mallampati: II  TM distance: >3 FB  Neck ROM: full       Cardiovascular   Rhythm: regular  Rate: normal  (-) murmur     Dental   (+) upper dentures           Pulmonary   Breath sounds clear to auscultation     Abdominal    Neurological - normal exam                 Anesthesia Plan    ASA 3   ASA physical status 3 criteria: ESRD undergoing regularly scheduled dialysis    Plan - general       Airway plan will be ETT          Induction: intravenous      Pertinent diagnostic labs and testing reviewed    Informed Consent:    Anesthetic plan and risks discussed with patient.

## 2022-09-19 NOTE — PROGRESS NOTES
[facility] Services Progress Note     Hemodialysis treatment ordered today per Dr. Knott x 3 hours.   Treatment initiated at 0904 ended at 1204.      Patient tolerated treatment; see e flow sheet for details.      Net UF 1500 mL.      Post tx, CVC flushed with saline then locked with heparin 1000 units/mL per designated amount in each wing then clamped and capped. Aspirate heparin prior to next CVC use.     Report given to Primary RN Elin Webb    Patient going to IR for HD catheter placement;  Pt remains on NPO

## 2022-09-19 NOTE — ASSESSMENT & PLAN NOTE
Patient had pancreatic duct stent placed back in June 2022 for which she was supposed to have removed however for unclear reasons he was lost to follow-up.  Patient had a repeat MRCP performed with has results/findings suggestive of retained pancreatic duct stent.  Discussed with gastroenterology in detail.  Gastroenterology intends to take patient on 9/19/2022 for retrieval of pancreatic duct stent.  Patient be n.p.o. at midnight anticoagulation has been held.  9/19/20223796-ynhhab-vd with GI recommendations

## 2022-09-19 NOTE — PROGRESS NOTES
Gastroenterology Progress Note     Author: Ayden Alvarado M.D.   Date & Time Created: 9/19/2022 11:23 AM    Chief Complaint:  Pancreas duct stent    History of Present Illness:    This is a very pleasant 72 y.o. male seen for repeat evaluation and management of in situ pancreatic duct stent in a patient with gram-negative bacteremia.  The patient originally presented to the hospital on 9/13/2022 with altered mental status.  He was found to have hypoglycemia and hypotension.  He has a history of end-stage renal disease on dialysis, hypertension, chronic pancreatitis, CHF, gout, hyperlipidemia.  In evaluation he was found to have gram-negative bacteremia.  He had his tunneled dialysis catheter change this admission as a possible source of the infection.       The patient had previously been admitted to the hospital in June [facility].  He underwent ERCP on 6/5/2022 by Dr. Ibrahima Daly with findings of normal common bile duct without stone, however pancreatic duct did have 2 stones.  One of the stones, further up in the body, was not able to be removed.  A pancreatic duct stent was placed.  Plan was for outpatient repeat ERCP and stent removal, however upon being contacted the patient refused to schedule the procedure.  Upon admission here during this stay, Dr. Desai ordered MRCP for evaluation of the previously known in situ pancreatic duct stent.  MRCP shows continued pancreatic duct stent without definitive evidence of pancreatic duct stones.  He is noted to have cholelithiasis, gallbladder wall thickening, right hepatic mass likely hemangioma, and trace bilateral pleural effusions.    Interval History:  9/19/2022: Denies pain.  On dialysis currently.    Review of Systems:  Review of Systems   Constitutional:  Negative for chills and fever.   Gastrointestinal:  Negative for abdominal pain, blood in stool, constipation, diarrhea, heartburn, melena,  nausea and vomiting.     Physical Exam:  Physical Exam  Vitals and nursing note reviewed.   Constitutional:       Appearance: Normal appearance.   Cardiovascular:      Rate and Rhythm: Normal rate.   Pulmonary:      Effort: Pulmonary effort is normal.      Breath sounds: Normal breath sounds.   Abdominal:      General: Abdomen is flat. Bowel sounds are normal. There is no distension.      Palpations: Abdomen is soft. There is no mass.      Tenderness: There is no abdominal tenderness. There is no guarding or rebound.      Hernia: No hernia is present.   Neurological:      Mental Status: He is alert.       Labs:          Recent Labs     22  1150 22  0155   SODIUM 137 135 136   POTASSIUM 3.8 3.8 4.1   CHLORIDE 101 99 101   CO2 25 23 24   BUN 17 25* 29*   CREATININE 5.94* 8.16* 9.99*   CALCIUM 8.1* 8.4* 8.3*     Recent Labs     22  1150 22  0155   ALTSGPT 15 7 <5   ASTSGOT 24 18 13   ALKPHOSPHAT 71 79 79   TBILIRUBIN 0.4 0.3 0.3   GLUCOSE 90 107* 99     Recent Labs     22  1150   RBC 2.63* 2.60*   HEMOGLOBIN 7.8* 7.9*   HEMATOCRIT 24.9* 25.1*   PLATELETCT 108* 139*     Recent Labs     22  1150 22  0155   WBC 6.3 8.9  --    NEUTSPOLYS 45.70  --   --    LYMPHOCYTES 35.20  --   --    MONOCYTES 15.80*  --   --    EOSINOPHILS 2.10  --   --    BASOPHILS 0.60  --   --    ASTSGOT 24 18 13   ALTSGPT 15 7 <5   ALKPHOSPHAT 71 79 79   TBILIRUBIN 0.4 0.3 0.3     Hemodynamics:  Temp (24hrs), Av.6 °C (97.9 °F), Min:35.9 °C (96.7 °F), Max:37.1 °C (98.7 °F)  Temperature: 36.3 °C (97.4 °F)  Pulse  Av.5  Min: 53  Max: 109   Blood Pressure : 136/69     Respiratory:    Respiration: 17, Pulse Oximetry: 97 %        RUL Breath Sounds: Clear, RML Breath Sounds: Diminished, RLL Breath Sounds: Diminished, HANNAH Breath Sounds: Clear, LLL Breath Sounds: Diminished  Fluids:    Intake/Output Summary (Last 24 hours) at 2022 1123  Last  data filed at 9/19/2022 0552  Gross per 24 hour   Intake 680 ml   Output 0 ml   Net 680 ml     Weight: 73.7 kg (162 lb 7.7 oz)  GI/Nutrition:  Orders Placed This Encounter   Procedures    Diet NPO Restrict to: Sips with Medications     Standing Status:   Standing     Number of Occurrences:   1     Order Specific Question:   Diet NPO Restrict to:     Answer:   Sips with Medications [3]     Medical Decision Making, by Problem:  Active Hospital Problems    Diagnosis     *Sepsis (HCC) [A41.9]     Presence of pancreatic duct stent [Z96.89]     Gram-negative bacteremia [R78.81]     Altered mental status [R41.82]     Hypoglycemia [E16.2]     Arthritis [M19.90]     ESRD on dialysis (HCC) [N18.6, Z99.2]     Hypotension [I95.9]     Anemia [D64.9]      Assessment/Recommendations:  Assessment:  -Altered mental status-likely secondary to infection  -Gram-negative bacteremia  -Hypotension-resolved  -History of pancreatic duct stones and stent placement with stent still within the duct  -End-stage renal disease on dialysis  -CAD  -Hyperlipidemia  -Hypertension  -Chronic pancreatitis    Plan:  OK for ERCP with attempted PD stent removal with Dr. Lugo today.  Risks and benefits of procedure discussed again with patient and answered all questions.    Quality-Core Measures

## 2022-09-19 NOTE — CARE PLAN
The patient is Watcher - Medium risk of patient condition declining or worsening    Shift Goals  Clinical Goals: pain control, ERCP, HD  Patient Goals: pain releif  Family Goals: n/a    Progress made toward(s) clinical / shift goals:    Problem: Knowledge Deficit - Standard  Goal: Patient and family/care givers will demonstrate understanding of plan of care, disease process/condition, diagnostic tests and medications  Outcome: Progressing     Problem: Hemodynamics  Goal: Patient's hemodynamics, fluid balance and neurologic status will be stable or improve  Outcome: Progressing     Problem: Fluid Volume  Goal: Fluid volume balance will be maintained  Outcome: Progressing     Problem: Urinary - Renal Perfusion  Goal: Ability to achieve and maintain adequate renal perfusion and functioning will improve  Outcome: Progressing     Problem: Respiratory  Goal: Patient will achieve/maintain optimum respiratory ventilation and gas exchange  Outcome: Progressing       Patient is not progressing towards the following goals:

## 2022-09-19 NOTE — PROGRESS NOTES
IR Nursing Note    Right HD tunneled catheter placement by MD Lozano assisted by RT Zapata,right IJ access site.    Access site sealed with sutures and covered with Biopatch and Tegaderm. Left IJ catheter removed, pressure, gauze and tegaderm applied to area    Report given to RADHA Worthington.  [facility] RN monitored then transferred care to report RN.    Allyn, Long-Term Hemodialysis catheter 14.5F, 23cm  REF: [id number]  LOT: [id number]  EXP: 02/29/2024

## 2022-09-19 NOTE — PROGRESS NOTES
IR note  Pt arrived to unit without IV access, attempted to get access 3X, 2 of them with IV guidance without success. Dr. Lozano notify of no access, besides HD triple lumen catheter that pt is schedule to remove while in IR. Per MD okay to use current triple lumen lumen line, then it can be pulled after tunneled line has been placed.

## 2022-09-20 LAB
ANION GAP SERPL CALC-SCNC: 10 MMOL/L (ref 7–16)
BACTERIA BLD CULT: NORMAL
BUN SERPL-MCNC: 17 MG/DL (ref 8–22)
CALCIUM SERPL-MCNC: 8.4 MG/DL (ref 8.5–10.5)
CHLORIDE SERPL-SCNC: 100 MMOL/L (ref 96–112)
CO2 SERPL-SCNC: 26 MMOL/L (ref 20–33)
CREAT SERPL-MCNC: 6.17 MG/DL (ref 0.5–1.4)
ERYTHROCYTE [DISTWIDTH] IN BLOOD BY AUTOMATED COUNT: 74.8 FL (ref 35.9–50)
GFR SERPLBLD CREATININE-BSD FMLA CKD-EPI: 9 ML/MIN/1.73 M 2
GLUCOSE SERPL-MCNC: 107 MG/DL (ref 65–99)
HCT VFR BLD AUTO: 22.8 % (ref 42–52)
HGB BLD-MCNC: 7.1 G/DL (ref 14–18)
MCH RBC QN AUTO: 30.2 PG (ref 27–33)
MCHC RBC AUTO-ENTMCNC: 31.1 G/DL (ref 33.7–35.3)
MCV RBC AUTO: 97 FL (ref 81.4–97.8)
PLATELET # BLD AUTO: 130 K/UL (ref 164–446)
PMV BLD AUTO: 11.2 FL (ref 9–12.9)
POTASSIUM SERPL-SCNC: 4.7 MMOL/L (ref 3.6–5.5)
RBC # BLD AUTO: 2.35 M/UL (ref 4.7–6.1)
SIGNIFICANT IND 70042: NORMAL
SITE SITE: NORMAL
SODIUM SERPL-SCNC: 136 MMOL/L (ref 135–145)
SOURCE SOURCE: NORMAL
WBC # BLD AUTO: 7.2 K/UL (ref 4.8–10.8)

## 2022-09-20 PROCEDURE — A9270 NON-COVERED ITEM OR SERVICE: HCPCS | Performed by: STUDENT IN AN ORGANIZED HEALTH CARE EDUCATION/TRAINING PROGRAM

## 2022-09-20 PROCEDURE — 36415 COLL VENOUS BLD VENIPUNCTURE: CPT

## 2022-09-20 PROCEDURE — 85027 COMPLETE CBC AUTOMATED: CPT

## 2022-09-20 PROCEDURE — 80048 BASIC METABOLIC PNL TOTAL CA: CPT

## 2022-09-20 PROCEDURE — 99232 SBSQ HOSP IP/OBS MODERATE 35: CPT | Performed by: INTERNAL MEDICINE

## 2022-09-20 PROCEDURE — 700111 HCHG RX REV CODE 636 W/ 250 OVERRIDE (IP): Performed by: STUDENT IN AN ORGANIZED HEALTH CARE EDUCATION/TRAINING PROGRAM

## 2022-09-20 PROCEDURE — 700102 HCHG RX REV CODE 250 W/ 637 OVERRIDE(OP): Performed by: HOSPITALIST

## 2022-09-20 PROCEDURE — 700101 HCHG RX REV CODE 250: Performed by: INTERNAL MEDICINE

## 2022-09-20 PROCEDURE — A9270 NON-COVERED ITEM OR SERVICE: HCPCS | Performed by: HOSPITALIST

## 2022-09-20 PROCEDURE — 770020 HCHG ROOM/CARE - TELE (206)

## 2022-09-20 PROCEDURE — 700111 HCHG RX REV CODE 636 W/ 250 OVERRIDE (IP): Performed by: INTERNAL MEDICINE

## 2022-09-20 PROCEDURE — 700102 HCHG RX REV CODE 250 W/ 637 OVERRIDE(OP): Performed by: STUDENT IN AN ORGANIZED HEALTH CARE EDUCATION/TRAINING PROGRAM

## 2022-09-20 RX ORDER — LIDOCAINE 50 MG/G
1 PATCH TOPICAL EVERY 24 HOURS
Status: DISCONTINUED | OUTPATIENT
Start: 2022-09-20 | End: 2022-09-22 | Stop reason: HOSPADM

## 2022-09-20 RX ADMIN — GABAPENTIN 300 MG: 300 CAPSULE ORAL at 20:54

## 2022-09-20 RX ADMIN — LIDOCAINE 1 PATCH: 50 PATCH TOPICAL at 11:02

## 2022-09-20 RX ADMIN — OXYCODONE HYDROCHLORIDE 10 MG: 10 TABLET ORAL at 16:55

## 2022-09-20 RX ADMIN — OXYCODONE HYDROCHLORIDE 10 MG: 10 TABLET ORAL at 20:53

## 2022-09-20 RX ADMIN — OXYCODONE HYDROCHLORIDE 10 MG: 10 TABLET ORAL at 09:14

## 2022-09-20 RX ADMIN — HYDROMORPHONE HYDROCHLORIDE 0.5 MG: 1 INJECTION, SOLUTION INTRAMUSCULAR; INTRAVENOUS; SUBCUTANEOUS at 03:41

## 2022-09-20 RX ADMIN — OXYCODONE HYDROCHLORIDE 10 MG: 10 TABLET ORAL at 02:32

## 2022-09-20 RX ADMIN — OMEPRAZOLE 20 MG: 20 CAPSULE, DELAYED RELEASE ORAL at 05:32

## 2022-09-20 RX ADMIN — SEVELAMER CARBONATE 800 MG: 800 TABLET, FILM COATED ORAL at 18:26

## 2022-09-20 RX ADMIN — ACETAMINOPHEN 650 MG: 325 TABLET, FILM COATED ORAL at 20:54

## 2022-09-20 RX ADMIN — HYDROMORPHONE HYDROCHLORIDE 0.5 MG: 1 INJECTION, SOLUTION INTRAMUSCULAR; INTRAVENOUS; SUBCUTANEOUS at 11:59

## 2022-09-20 RX ADMIN — ATORVASTATIN CALCIUM 20 MG: 20 TABLET, FILM COATED ORAL at 20:54

## 2022-09-20 RX ADMIN — CEFAZOLIN SODIUM 1 G: 1 INJECTION, SOLUTION INTRAVENOUS at 20:58

## 2022-09-20 ASSESSMENT — ENCOUNTER SYMPTOMS
BACK PAIN: 0
NEUROLOGICAL NEGATIVE: 1
NERVOUS/ANXIOUS: 1
EYES NEGATIVE: 1
GASTROINTESTINAL NEGATIVE: 1
MYALGIAS: 0
CARDIOVASCULAR NEGATIVE: 1
RESPIRATORY NEGATIVE: 1

## 2022-09-20 ASSESSMENT — PAIN DESCRIPTION - PAIN TYPE
TYPE: ACUTE PAIN;SURGICAL PAIN
TYPE: ACUTE PAIN

## 2022-09-20 NOTE — PROGRESS NOTES
Report received from [facility] shift RN, assumed patient care. Patient is calmly resting in bed, no signs of distress, even and unlabored breathing noted. Pt on room air. Tele box on and in place. Plan of care discussed with patient, repeat back understanding obtained. Patient has call light within reach, fall precautions in place. Will continue to monitor.

## 2022-09-20 NOTE — ANESTHESIA TIME REPORT
Anesthesia Start and Stop Event Times     Date Time Event    9/19/2022 1619 Ready for Procedure     1623 Anesthesia Start     1724 Anesthesia Stop        Responsible Staff  09/19/22    Name Role Begin End    Malaika Mcguire M.D. Anesth 1623 1724        Overtime Reason:  no overtime (within assigned shift)    Comments:

## 2022-09-20 NOTE — PROGRESS NOTES
[facility] Medicine Daily Progress Note    Date of Service  9/20/2022    Chief Complaint  Junior Proctor is a 72 y.o. male admitted 9/13/2022 with hypotension and altered level of consciousness after dialysis    Hospital Course  Is a 72-year-old male with a history of end-stage renal disease, presents with altered mental status, hypoglycemia and hypotension.  Reportedly the patient just had hemodialysis via a left upper chest tunneled dialysis catheter, he was found altered, hypotensive with a blood pressure of 80s over 40s and hypoglycemic with a blood glucose level of 50.  The patient emergency room was found with elevated temperature of 101.4, tachycardia, white cell count of 4.3, hemoglobin 8.5, platelet count 78.  The patient was started on broad-spectrum antibiotics with concern for possible sepsis in form of ceftriaxone and vancomycin.    Interval Problem Update  Patient seen and examined today.  Data, Medication data reviewed.  Case discussed with nursing as available.  Plan of Care reviewed with patient and notified of changes.   9/14 the patient appears somewhat improved, he required additional fluid boluses to maintain his blood pressure, he denies fevers or chills, no abdominal pain, he has very little urination, he complains of generalized joint pain in his hands, shoulders, ankles, this appears to be chronic, the patient is chronically using narcotics for pain control, he denies difficulty with infection of his fistula or dialysis catheter.  He denies nausea vomiting, no abdominal pain.  Nephrology has been consulted  Infectious disease has been consulted after patient had positive blood cultures reported x2 gram-negative  9/15 the patient is currently afebrile, heart rate in the 60s, unlabored respirations, on room air, normotensive, blood pressure has improved, the patient was attempted to receive dialysis through his left upper chest dialysis catheter but was unable to perform secondary to flow issues,  discussed with nephrology, ordered IR exchange of catheter, in further review the patient had a pancreatic stent placed in June that was supposed to be revised, discussed with gastroenterology, get an MRCP to see if the catheter has passed naturally or will possibly need to be removed by ERCP, updated infectious disease.  9/16/2022:  Infectious disease recommendations appreciated patient no acute events overnight nephrology recommendations appreciated.  Patient had temporary R IJ dialysis line placed yesterday by interventional radiology.  Patient continue dialysis as scheduled.  We will consider replacement of permacatheter per ID recommendations.  Patient has at least several more days hospitalization prior to entertaining idea of discharge.  Otherwise continue present medical management.  I have prioritized MRCP.  Continue cefepime 1 g every 24 hours renally dosed.  9/17/2022:  Patient had MRCP performed today which showed retained pancreatic duct stent patient previously established with GI consultants had stent placed in June 2022.  We will place consultation morning at 9/18/2022 with likely procedure to follow.  Patient will benefit from retrieval of stent as previously discussed.  Otherwise continue with present antimicrobials.  9/18/2022:  Discussed plan with patient in detail infectious diseases cleared patient for replacement of tunneled dialysis catheter for dialysis needs.  Patient has presently temporary dialysis catheter in the L IJ.  Furthermore discussed line placement with nephrology they are in agreement.  Lastly, patient has retained biliary stent in the pancreatic duct gastroenterology is planning for possible stent retrieval on 9/19/2022.  I have also placed consultation with interventional radiology with regards to placement of tunneled dialysis catheter line.  Patient be n.p.o. after midnight schedule procedure for ERCP with possible stent procedure versus EGD and possible stent procedure  scheduled for 1600 on 09/19/2022.  Interventional radiology may opt to place tunneled dialysis catheter and patient prior or after this.  9/19/2022:  Patient had tunneled dialysis catheter placed this morning tolerated procedure well without issue dialysis catheter in L IJ was subsequently removed.  Patient is presently pending EGD/ERCP for removal of pancreatic duct stent that is been retained since June.  There is question as to whether this may be causing occult infection.  Patient had cultures that grew out methicillin sensitive staph aureus and is being treated with Ancef.  Patient will continue to be treated with Ancef  During dialysis on dialysis 2g-2g-3g on M-W-F through 9/21/2022.  9/20: Patient seen and examined no acute events overnight afebrile. Cont with ancef tomorrow is the last day of abx   Regarding his ESRD on dialysis nephrology following appreciate rec.   I have discussed this patient's plan of care and discharge plan at IDT rounds today with Case Management, Nursing, Nursing leadership, and other members of the IDT team.    Consultants/Specialty  infectious disease and nephrology    Code Status  Full Code    Disposition  Patient is not medically cleared for discharge.   Anticipate discharge to to home with close outpatient follow-up.  I have placed the appropriate orders for post-discharge needs.    Review of Systems  Review of Systems   Constitutional:  Positive for malaise/fatigue.   HENT: Negative.     Eyes: Negative.    Respiratory: Negative.     Cardiovascular: Negative.    Gastrointestinal: Negative.    Genitourinary: Negative.    Musculoskeletal:  Negative for back pain, joint pain and myalgias.   Skin: Negative.    Neurological: Negative.    Endo/Heme/Allergies: Negative.    Psychiatric/Behavioral:  The patient is nervous/anxious.    All other systems reviewed and are negative.     Physical Exam  Temp:  [36.3 °C (97.4 °F)-37.9 °C (100.3 °F)] 37.6 °C (99.7 °F)  Pulse:  [] 107  Resp:   [13-16] 16  BP: ()/(51-87) 111/60  SpO2:  [91 %-100 %] 93 %    Physical Exam  Vitals and nursing note reviewed.   Constitutional:       Appearance: He is well-developed.      Comments: Pt seen and examined.   HENT:      Head: Normocephalic and atraumatic.   Eyes:      Pupils: Pupils are equal, round, and reactive to light.   Cardiovascular:      Rate and Rhythm: Normal rate and regular rhythm.      Heart sounds: Normal heart sounds.   Pulmonary:      Effort: Pulmonary effort is normal.      Breath sounds: Normal breath sounds.   Abdominal:      General: Bowel sounds are normal.      Palpations: Abdomen is soft.   Musculoskeletal:         General: Normal range of motion.      Cervical back: Normal range of motion and neck supple.   Skin:     General: Skin is warm and dry.      Coloration: Skin is pale.   Neurological:      General: No focal deficit present.      Mental Status: He is alert and oriented to person, place, and time.   Psychiatric:         Behavior: Behavior normal.       Fluids    Intake/Output Summary (Last 24 hours) at 9/20/2022 1545  Last data filed at 9/19/2022 1724  Gross per 24 hour   Intake 300 ml   Output 1 ml   Net 299 ml       Laboratory  Recent Labs     09/18/22  1150 09/20/22  0557   WBC 8.9 7.2   RBC 2.60* 2.35*   HEMOGLOBIN 7.9* 7.1*   HEMATOCRIT 25.1* 22.8*   MCV 96.5 97.0   MCH 30.4 30.2   MCHC 31.5* 31.1*   RDW 72.5* 74.8*   PLATELETCT 139* 130*   MPV 10.6 11.2     Recent Labs     09/18/22  1150 09/19/22  0155 09/20/22  0557   SODIUM 135 136 136   POTASSIUM 3.8 4.1 4.7   CHLORIDE 99 101 100   CO2 23 24 26   GLUCOSE 107* 99 107*   BUN 25* 29* 17   CREATININE 8.16* 9.99* 6.17*   CALCIUM 8.4* 8.3* 8.4*                   Imaging  [id number] DUCTS   Final Result      Portable fluoroscopic images were obtained for localization purposes during ERCP procedure.      IR-US GUIDED PIV   Final Result    Ultrasound-guided PERIPHERAL IV INSERTION performed by    qualified nursing staff as  above.      IR-THAPA,GROSHONG PLACEMENT >5   Final Result      1.  Removal of the left IJ tunneled hemodialysis catheter.   2.  Placement of left IJ temporary hemodialysis catheter.      LE-AWBZQST-X/O   Final Result      1.  Pancreatic duct stent appears to be present   2.  Changes in the pancreas possibly related to history of pancreatitis.   3.  Cholelithiasis   4.  Gallbladder wall thickening which is nonspecific. While cholecystitis is not excluded this can also be seen with hepatocellular and cardiac disease.   5.  RIGHT hepatic mass, again incompletely characterized but most likely a hemangioma   6.  Trace BILATERAL pleural effusions      EC-ECHOCARDIOGRAM COMPLETE W/O CONT   Final Result      IR-CVC TUNNEL W/O PORT REMOVAL   Final Result      1.  Removal of the left IJ tunneled hemodialysis catheter.   2.  Placement of left IJ temporary hemodialysis catheter.      US-EXTREMITY VENOUS UPPER UNILAT LEFT   Final Result      CT-HEAD W/O   Final Result         1. No acute intracranial abnormality. No evidence of acute intracranial hemorrhage or mass lesion.                     DX-CHEST-PORTABLE (1 VIEW)   Final Result      Stable enlargement of the cardiomediastinal silhouette without acute cardiopulmonary abnormality.             Assessment/Plan  * Sepsis (HCC)- (present on admission)  Assessment & Plan  This is Sepsis Present on admission  SIRS criteria identified on my evaluation include: Fever, with temperature greater than 101 deg F  Source is unknown, possible dialysis catheter infection  Sepsis protocol initiated  Fluid resuscitation ordered per protocol  Crystalloid Fluid Administration: Patient has advanced or end-stage chronic renal disease (with documentation of stage IV or GFR 15-29 mL/min, or stage V or GFR < 15 mL/min or ESRD), for this/these reason(s) it is unsafe for this patient to receive 30 mL/kg of fluid  Partial Fluid Dose Given: patient to be reassessed shortly after completion of partial  fluid bolus to ensure adequacy of resuscitation  IV antibiotics as appropriate for source of sepsis  Reassessment: I have reassessed the patient's hemodynamic status  9/18/2022:  -Resolved      Presence of pancreatic duct stent  Assessment & Plan  Patient had pancreatic duct stent placed back in June 2022 for which she was supposed to have removed however for unclear reasons he was lost to follow-up.  Patient had a repeat MRCP performed with has results/findings suggestive of retained pancreatic duct stent.  Discussed with gastroenterology in detail.  Gastroenterology intends to take patient on 9/19/2022 for retrieval of pancreatic duct stent.  Patient be n.p.o. at midnight anticoagulation has been held.  9/19/[id number] with GI recommendations    Gram-negative bacteremia  Assessment & Plan  Unclear source, certainly possibility of line infection given patient's hemodialysis catheter  Get echocardiogram, MRCP abdomen given the patient's history of pancreatitis with stent placement which might be retained  ID consult  Follow cultures  9/18/2022:  Continue with Ancef infectious disease recommendations appreciated  9/19/2022:  Patient will continue on antimicrobials until 9/21/2022 during dialysis 2g-2g-3g on M-W-F    Hypoglycemia  Assessment & Plan  Unclear etiology, likely sepsis related  Not a diabetic patient  Improved now  9/18/2022-continue present medical management    Altered mental status  Assessment & Plan  Likely due to hypoglycemia in setting of suspected sepsis  Sepsis of unclear etiology, possible line infection given dialysis catheter present  Mental status has since normalized  CXR stable  -Resolved      Arthritis- (present on admission)  Assessment & Plan  Pain management      ESRD on dialysis (HCC)- (present on admission)  Assessment & Plan  9/19/2022:  Patient status post replacement of new tunneled dialysis catheter.  Continue with regular dialysis schedule    9/18/2022:  Patient has previously  undergone line holiday discussed with infectious disease today appreciate the recommendations patient is suitable for replacement of tunneled dialysis catheter.  Discussed with interventional radiology in this regard order has been placed for consultation with interventional radiology for placement of tunneled dialysis catheter possibly on 9/19/2022.  Patient n.p.o. to midnight    Hypotension- (present on admission)  Assessment & Plan  Related to infection and possible volume loss  Several boluses of IV fluids have been given, currently with good results, monitor closely  9/18/2022:  -Resolved    Anemia- (present on admission)  Assessment & Plan  Likely anemia of chronic disease, renal disease         VTE prophylaxis: SCDs/TEDs, the patient has been refusing heparin injections    I have performed a physical exam and reviewed and updated ROS and Plan today (9/20/2022). In review of yesterday's note (9/19/2022), there are no changes except as documented above.        Please note that this dictation was created using voice recognition software. I have made every reasonable attempt to correct obvious errors, but I expect that there are errors of grammar and possibly context that I did not discover before finalizing the note.

## 2022-09-20 NOTE — PROGRESS NOTES
"[facility] -  PROGRESS NOTE               Author: FOREST Cunningham Date & Time: 9/20/2022  12:22 PM     HPI:  Junior Proctor is a 72 y.o. male admitted 9/13/2022.  Patient with ESRD on HD via tunneled dialysis catheter, hypertension, had dialysis but was noted to be altered following his treatment and with hypotension to 80s over 40s, hypoglycemic with a blood glucose of 50.  He was febrile to 101.4 in the ER tachycardic to 105.  Patient is admitted to the [facility].  2 out of 2 blood cultures are positive for GNR.  Nephrology was asked to consult for ESRD management.     DAILY NEPHROLOGY SUMMARY:  9/15 - Blood cultures with gram negative rods. Difficult dialysis given poor catheter flow. ID suspects line sepsis and wants catheter holiday.  9/16 - Transferred out of the CCU. No new overnight renal events. Tunneled Dialysis catheter removal and placement of temporary HD cathter.  9/17 - No new overnight renal events. S/p HD yesterday with net UF of 1.2 L and tolerated well. On Cefepime q24 hrs.  9/18 - no new overnight renal events. Patient had MRCP performed yesterday which showed retained pancreatic duct stent.   9/19 - Seen on HD this am and tolerating.  NPO for ERCP at 1600 today and placement of TDC.   9/20 - HD yest, UF 1.5L. Had ERCP yest, self ejecting stent placed. TDC placed 9/19. Some weakness and pain where new dialysis catheter was placed. Overall feeling much better.    REVIEW OF SYSTEMS:    10 point ROS reviewed and is as per HPI/daily summary or otherwise negative    PMH/PSH/SH/FH:   Reviewed and unchanged since admission note    CURRENT MEDICATIONS:   Reviewed from admission to present day    VS:  /60   Pulse (!) 108   Temp 37.9 °C (100.3 °F) (Temporal)   Resp 16   Ht 1.727 m (5' 8\")   Wt 80.5 kg (177 lb 7.5 oz)   SpO2 92%   BMI 26.98 kg/m²     Physical Exam  Vitals and nursing note reviewed.   Constitutional:       Appearance: He is normal weight.   HENT: "      Head: Normocephalic and atraumatic.      Nose: Nose normal.      Mouth/Throat:      Mouth: Mucous membranes are moist.      Pharynx: Oropharynx is clear.   Eyes:      Extraocular Movements: Extraocular movements intact.      Conjunctiva/sclera: Conjunctivae normal.      Pupils: Pupils are equal, round, and reactive to light.   Cardiovascular:      Rate and Rhythm: Normal rate and regular rhythm.      Pulses: Normal pulses.      Heart sounds: Normal heart sounds.   Pulmonary:      Effort: Pulmonary effort is normal.      Breath sounds: Normal breath sounds.   Abdominal:      General: Abdomen is flat. Bowel sounds are normal.   Musculoskeletal:         General: Normal range of motion.      Cervical back: Normal range of motion and neck supple.   Skin:     General: Skin is warm.      Capillary Refill: Capillary refill takes less than 2 seconds.   Neurological:      General: No focal deficit present.      Mental Status: He is alert and oriented to person, place, and time. Mental status is at baseline.   Psychiatric:         Mood and Affect: Mood normal.         Behavior: Behavior normal.         Thought Content: Thought content normal.         Judgment: Judgment normal.     Fluids:  In: 800 [I.V.:300; Dialysis:500]  Out: 2001     LABS:  Recent Labs     09/18/22  1150 09/19/22  0155 09/20/22  0557   SODIUM 135 136 136   POTASSIUM 3.8 4.1 4.7   CHLORIDE 99 101 100   CO2 23 24 26   GLUCOSE 107* 99 107*   BUN 25* 29* 17   CREATININE 8.16* 9.99* 6.17*   CALCIUM 8.4* 8.3* 8.4*         IMAGING:   All imaging reviewed from admission to present day    IMPRESSION:  # ESRD   - R chest TDC 9/19/22  # HTN  - Goal BP < 140/90  # Anemia of CKD  - Goal Hgb 10-11  # CKD-MBD/Renal Osteodystrophy  # Sepsis  # Gram negative bactermia/Line infection  # Encephaolpathy     PLAN:  - No additional HD today (TUES)  - UF with HD as tolerated  - Continue iHD qMWF and prn during stay.  - UF as tolerated  - No dietary protein restrictions  -  Dose all meds per ESRD  - Abx per primary service/ID.

## 2022-09-20 NOTE — PROCEDURES
Endoscopic Retrograde Cholangiopancreatography    Date of Procedure:  9/19/2022  Attending Physician:  Mikie Lugo MD  Indications: Gram negative faith bacteremia; chronic pancreatitis      Instrument: Olympus Flexible Sideviewing Endoscope  Sedation:     Anesthesiologist/Type of Anesthesia:  Anesthesiologist: Malaika Mcguire M.D./General    Surgical Staff:  Endoscopy Technician: Romeo Braga; Michael Howell; Saman Metcalf  Radiology Technologist: Rosendo Alfred  Endoscopy Nurse: Roseann Matos R.N.; Rich Aparicio RMICAELA    Pre-Anesthesia Assessment:  Prior to the procedure, a History and Physical was performed, and patient medications and allergies were reviewed. The patient’s tolerance of previous anesthesia was also reviewed. The risks and benefits of the procedure and the sedation options and risks were discussed with the patient including but not limited to infection, bleeding, aspiration, perforation, adverse medication reaction, missed diagnosis, missed lesions, and pancreatitis. The patient verbalized understanding. All questions were answered, and informed consent was obtained    After I obtained informed consent from the patient, the patient was placed in the prone/swimmer position. Appropriate time-out protocol was followed: the correct patient, the correct procedure, and the correct equipment in the room were confirmed. Throughout the procedure, the patient’s blood pressure, pulse, and oxygen saturations were monitored continuously. The Olymus flexible sideviewing duodenoscope was inserted through the oropharynx, esophagus intubated, then advanced to the gastrointestinal tract to the major papilla. The duct(s) were cannulated and contrast was injected I personally interpreted the ductal images.  Findings and interventions were performed and documented below. Air was then withdrawn and the duodenoscope was removed. The patient tolerated the procedure well. There were no immediate postoperative  complications    Findings:     film demonstrated in situ pancreatic duct stent.  Scope was inserted to the second portion duodenum without difficulty.  Stent appears visibly occluded.  There was fibrous debris.  Utilizing a snare the stent was removed through the scope.  It was removed intact.  Cannulation with a 0.025 wire sphincterotome was pure into the pancreatic duct only.  Common bile duct was not cannulated or injected.  There was drainage of material noted.  A test cholangiogram demonstrated a filling defect in the mid pancreatic duct consistent with known pancreatic duct stone.  Utilizing a 9-12 mm extractor balloon at the lowest setting extraction was performed with multiple wound sweeps with removal of the stone material.  Under fill pancreatogram without occlusion demonstrated no additional filling defect and that the duct was swept out clean.  Placement of a Holt 4 Indonesian by 11 cm single pigtail self ejecting prophylactic stent was performed due to concern for patient lost to follow-up as well as the need for repeat procedure.  Bilateral films under diaphragm was negative for free air    Impressions:   Chronic pancreatitis changes based on in situ pancreatic duct stone removed by ERCP and balloon sweep  Placement of new Holt 4Fr x 11cm single pigtail self ejecting prophylatic pancreatic duct stent  Removal of occluded in-situ pancreatic duct stent    Recommendations:   Monitor for postprocedure complication including perforation bleeding pancreatitis  Due to patient's end-stage renal disease history of chronic pancreatitis we will hold off on indomethacin  Pain management as necessary  Continue antibiotics  KUB in 2 weeks to ensure passage of pancreatic duct stent; this stent is designed to self eject.      NOTE: Radiologic interpretation of dynamic and static fluoroscopic imaging by myself.  At no time was/were a Radiologist present.     This note was generated using voice recognition software  which has a small chance of producing errors of grammar and possibly content. I have made every reasonable attempt to find and correct any obvious errors, but expect that some may not be found prior to finalization of this note

## 2022-09-20 NOTE — CARE PLAN
The patient is Stable - Low risk of patient condition declining or worsening    Shift Goals  Clinical Goals: Pain control, rest, monitor VS, hemdynamic stability  Patient Goals: Pain reflief, rest  Family Goals: No family present    Problem: Knowledge Deficit - Standard  Goal: Patient and family/care givers will demonstrate understanding of plan of care, disease process/condition, diagnostic tests and medications  Outcome: Progressing  Note: Patient educated on plan and goals of care and disease process. Education provided on medications, procedures, and equipment. Will continue to re-inforce education when required. All questions and concerns answered at this time.      Problem: Hemodynamics  Goal: Patient's hemodynamics, fluid balance and neurologic status will be stable or improve  Outcome: Progressing  Note: Cardiac monitoring, vital signs, daily weights, monitor i/o, manage IV fluids and infusions      Problem: Pain - Standard  Goal: Alleviation of pain or a reduction in pain to the patient’s comfort goal  Outcome: Progressing  Note: Educated patient on the use of 1-10 pain scale and use of pain descriptors. Administered pain medication when needed per MAR. Non-pharmacological methods for pain control in place such as rest, repositioning, and enforcing a calm and conductive environment.

## 2022-09-20 NOTE — ANESTHESIA POSTPROCEDURE EVALUATION
Patient: Junior Proctor    Procedure Summary     Date: 09/19/22 Room / Location: [facility] ROOM 26 / SURGERY SAME DAY [facility]    Anesthesia Start: 1623 Anesthesia Stop: 1724    Procedure: ERCP (ENDOSCOPIC RETROGRADE CHOLANGIOPANCREATOGRAPHY) (Esophagus) Diagnosis: (pancreatic stone, s/p stent )    Surgeons: Mikie Lugo M.D. Responsible Provider: Malaika Mcguire M.D.    Anesthesia Type: general ASA Status: 3          Final Anesthesia Type: general  Last vitals  BP   Blood Pressure : (!) 140/67    Temp   36.3 °C (97.4 °F)    Pulse   99   Resp   16    SpO2   100 %      Anesthesia Post Evaluation    Patient location during evaluation: PACU  Patient participation: complete - patient participated  Level of consciousness: sleepy but conscious  Pain score: 0    Airway patency: patent  Anesthetic complications: no  Cardiovascular status: adequate  Respiratory status: acceptable  Hydration status: acceptable    PONV: none          No notable events documented.     Nurse Pain Score: 0 (NPRS)

## 2022-09-20 NOTE — PROGRESS NOTES
Gastroenterology Progress notes               Author:  DARIN Tobar Date & Time Created: 9/20/2022 9:58 AM       Patient ID:  Name:             Junior Proctor    YOB: 1950  Age:                 72 y.o.  male  MRN:               [id number]      Referring Provider:  Kevin Valdez MD      Presenting Chief Complaint:  Gram negative bacteremia, Hx of ERCP with stent placement      History of Present Illness:    This is a very pleasant 72 y.o. male seen for repeat evaluation and management of in situ pancreatic duct stent in a patient with gram-negative bacteremia.  The patient originally presented to the hospital on 9/13/2022 with altered mental status.  He was found to have hypoglycemia and hypotension.  He has a history of end-stage renal disease on dialysis, hypertension, chronic pancreatitis, CHF, gout, hyperlipidemia.  In evaluation he was found to have gram-negative bacteremia.  He had his tunneled dialysis catheter change this admission as a possible source of the infection.      The patient had previously been [facility] to the [facility].  He underwent ERCP on 6/5/2022 by Dr. Ibrahima Daly with findings of normal common bile duct without stone, however pancreatic duct did have 2 stones.  One of the stones, further up in the body, was not able to be removed.  A pancreatic duct stent was placed.  Plan was for outpatient repeat ERCP and stent removal, however upon being contacted the patient refused to schedule the procedure.  Upon admission here during this stay, Dr. Desai ordered MRCP for evaluation of the previously known in situ pancreatic duct stent.  MRCP shows continued pancreatic duct stent without definitive evidence of pancreatic duct stones.  He is noted to have cholelithiasis, gallbladder wall thickening, right hepatic mass likely hemangioma, and trace bilateral pleural effusions.    INTERVAL  HISTORY:    9/20/22: Stable. No acute overnight events. Complaints of joint pain but denies abdominal pain, N/V.      ERCP 9/19/22: Impressions:   Chronic pancreatitis changes based on in situ pancreatic duct stone removed by ERCP and balloon sweep  Placement of new Holt 4Fr x 11cm single pigtail self ejecting prophylatic pancreatic duct stent  Removal of occluded in-situ pancreatic duct stent    Review of Systems:  Review of Systems   Constitutional:  Negative for diaphoresis and fever.   Respiratory:  Negative for cough and hemoptysis.    Cardiovascular:  Negative for chest pain and palpitations.   Gastrointestinal:  Positive for abdominal pain. Negative for nausea and vomiting.   Musculoskeletal:  Positive for back pain and joint pain. Negative for falls.   Skin:  Negative for itching and rash.   Neurological:  Positive for weakness. Negative for dizziness and headaches.   Psychiatric/Behavioral:  Positive for memory loss. Negative for depression.            Past Medical History:  Past Medical History:   Diagnosis Date    Gout     Hemodialysis patient ([facility])     Hypertension     Kidney disease     MI (myocardial infarction) ([facility])      Active Hospital Problems    Diagnosis     Gram-negative bacteremia [R78.81]     Altered mental status [R41.82]     Hypoglycemia [E16.2]     Arthritis [M19.90]     ESRD on dialysis ([facility]) [N18.6, Z99.2]     Hypotension [I95.9]     Sepsis ([facility]) [A41.9]     Anemia [D64.9]          Past Surgical History:  Past Surgical History:   Procedure Laterality Date    TN ERCP,DIAGNOSTIC N/A 6/5/2022    Procedure: ERCP (ENDOSCOPIC RETROGRADE CHOLANGIOPANCREATOGRAPHY);  Surgeon: Ibrahima Daly M.D.;  Location: SURGERY [facility];  Service: Gastroenterology    TN UPPER GI ENDOSCOPY,DIAGNOSIS N/A 12/10/2021    Procedure: GASTROSCOPY;  Surgeon: Paddy Hooks M.D.;  Location: SURGERY SAME DAY [facility];  Service: Gastroenterology    TN COLONOSCOPY,DIAGNOSTIC N/A 12/10/2021    Procedure: COLONOSCOPY;   Surgeon: Paddy Hooks M.D.;  Location: SURGERY SAME DAY [facility];  Service: Gastroenterology    MA UPPER GI ENDOSCOPY,BIOPSY N/A 12/10/2021    Procedure: GASTROSCOPY, WITH BIOPSY;  Surgeon: Paddy Hooks M.D.;  Location: SURGERY SAME DAY [facility];  Service: Gastroenterology    MA COLONOSCOPY,BIOPSY N/A 12/10/2021    Procedure: COLONOSCOPY, WITH BIOPSY;  Surgeon: Paddy Hooks M.D.;  Location: SURGERY SAME DAY [facility];  Service: Gastroenterology           Hospital Medications:  Current Facility-Administered Medications   Medication Dose Frequency Provider Last Rate Last Admin    ceFAZolin in dextrose (ANCEF) IVPB premix 1 g  1 g Q24HRS Carroll Pena M.D.   Stopped at 09/17/22 1711    heparin injection 2,800 Units  2,800 Units ACUTE DIALYSIS PRN Tristen Cortez M.D.   2,800 Units at 09/16/22 1305    oxyCODONE immediate-release (ROXICODONE) tablet 5 mg  5 mg Q4HRS PRN Marco Antonio Desai M.D.   5 mg at 09/18/22 0818    allopurinol (ZYLOPRIM) tablet 100 mg  100 mg 3x/week Marco Antonio Desai M.D.   100 mg at 09/16/22 2035    atorvastatin (LIPITOR) tablet 20 mg  20 mg Nightly Marco Antonio Desai M.D.   20 mg at 09/17/22 2138    gabapentin (NEURONTIN) capsule 300 mg  300 mg QHS Marco Antonio Desai M.D.   300 mg at 09/17/22 2138    sevelamer carbonate (RENVELA) tablet 800 mg  800 mg TID WITH MEALS Marco Antonio Desai M.D.   800 mg at 09/18/22 0818    omeprazole (PRILOSEC) capsule 20 mg  20 mg DAILY Marco Antonio Desai M.D.   20 mg at 09/18/22 0551    senna-docusate (PERICOLACE or SENOKOT S) 8.6-50 MG per tablet 2 Tablet  2 Tablet BID Afshin Goldberg M.D.        And    polyethylene glycol/lytes (MIRALAX) PACKET 1 Packet  1 Packet QDAY PRN Afshin Goldberg M.D.        And    magnesium hydroxide (MILK OF MAGNESIA) suspension 30 mL  30 mL QDAY PRN Afshin Goldberg M.D.        And    bisacodyl (DULCOLAX) suppository 10 mg  10 mg QDAY PRN Afshin Goldberg M.D.        heparin injection 5,000 Units  5,000 Units Q8HRS Afshin  GRADY Goldberg M.D.   5,000 Units at 09/13/22 2208    acetaminophen (Tylenol) tablet 650 mg  650 mg Q6HRS PRN Afshin Goldberg M.D.       Last reviewed on 9/13/2022  4:34 PM by Rishabh Vincent       Current Outpatient Medications:  Medications Prior to Admission   Medication Sig Dispense Refill Last Dose    Buprenorphine 15 MCG/HR PATCH WEEKLY Place 1 Patch on the skin every 7 days.   unknown at unknown    capsaicin (ZOSTRIX) 0.025 % cream Apply 1 Application topically 4 times a day as needed. Apply to effected area   unknown at unknown    acetaminophen (TYLENOL) 500 MG Tab Take 1,000 mg by mouth every 8 hours as needed. Indications: Pain   unknown at unknown    melatonin 3 MG Tab Take 6 mg by mouth at bedtime.   unknown at unknown    Etanercept 50 MG/ML Solution Prefilled Syringe Inject 50 mg under the skin every 7 days. 4.2 mL 1 unknown at unknown    gabapentin (NEURONTIN) 300 MG Cap Take 300 mg by mouth at bedtime.   unknown at unknown    folic acid (FOLVITE) 1 MG Tab Take 1 mg by mouth every day.   unknown at unknown    pantoprazole (PROTONIX) 40 MG Tablet Delayed Response Take 40 mg by mouth 2 times a day.   unknown at unknown    lidocaine (LIDODERM) 5 % Patch Apply 3 Patches topically every day. To affected area   unknown at unknown    sevelamer (RENAGEL) 800 MG Tab Take 800 mg by mouth 3 times a day with meals.   unknown at unknown    diclofenac sodium (VOLTAREN) 1 % Gel Apply 4 g topically 4 times a day as needed (Osetoarthritis pain).   unknown at unknown    vitamin D2, Ergocalciferol, (DRISDOL) 1.25 MG (70034 UT) Cap capsule Take 50,000 Units by mouth every 7 days.   unknown at unknown    atorvastatin (LIPITOR) 20 MG Tab Take 20 mg by mouth every evening.   unknown at unknown    carvedilol (COREG) 6.25 MG Tab Take 6.25 mg by mouth 2 times a day with meals.   unknown at unknown    traZODone (DESYREL) 50 MG Tab Take 50 mg by mouth every evening. Indications: Trouble Sleeping   unknown at unknown    allopurinol  "(ZYLOPRIM) 100 MG Tab Take 100 mg by mouth 3 times a week. Taken post hemodialysis THREE TIMES A WEEK   unknown at unknown         Medication Allergies:  Allergies   Allergen Reactions    Motrin [Ibuprofen]      hhx of stomach ulcers         Family Medical History:  Family History   Problem Relation Age of Onset    Diabetes Mother          Social History:  Social History     Socioeconomic History    Marital status: Single     Spouse name: Not on file    Number of children: Not on file    Years of education: Not on file    Highest education level: Not on file   Occupational History    Not on file   Tobacco Use    Smoking status: Former    Smokeless tobacco: Never   Vaping Use    Vaping Use: Never used   Substance and Sexual Activity    Alcohol use: Yes     Comment: occ    Drug use: Never    Sexual activity: Not on file   Other Topics Concern    Not on file   Social History Narrative    Not on file     Social Determinants of Health     Financial Resource Strain: Not on file   Food Insecurity: Not on file   Transportation Needs: Not on file   Physical Activity: Not on file   Stress: Not on file   Social Connections: Not on file   Intimate Partner Violence: Not on file   Housing Stability: Not on file         Vital signs:  Weight/BMI: Body mass index is 24.14 kg/m².  /67   Pulse 91   Temp 36.3 °C (97.3 °F) (Temporal)   Resp 16   Ht 1.727 m (5' 8\")   Wt 72 kg (158 lb 11.7 oz)   SpO2 93%   Vitals:    09/17/22 1951 09/17/22 2359 09/18/22 0357 09/18/22 0709   BP:  (!) 141/83 (!) 98/60 108/67   Pulse:  88 72 91   Resp:  18 18 16   Temp:  36.9 °C (98.5 °F) 36.5 °C (97.7 °F) 36.3 °C (97.3 °F)   TempSrc:  Temporal Temporal Temporal   SpO2:  92% 97% 93%   Weight: 72 kg (158 lb 11.7 oz)      Height:         Oxygen Therapy:  Pulse Oximetry: 93 %, O2 (LPM): 0, O2 Delivery Device: None - Room Air    Intake/Output Summary (Last 24 hours) at 9/18/2022 0958  Last data filed at 9/17/2022 1300  Gross per 24 hour   Intake 250 " ml   Output --   Net 250 ml         Physical Exam:  Physical Exam  Vitals and nursing note reviewed.   Constitutional:       Appearance: He is ill-appearing.   HENT:      Head: Normocephalic and atraumatic.      Right Ear: External ear normal.      Left Ear: External ear normal.      Nose: Nose normal. No congestion.      Mouth/Throat:      Mouth: Mucous membranes are dry.      Pharynx: Oropharynx is clear.   Eyes:      General: No scleral icterus.     Extraocular Movements: Extraocular movements intact.      Pupils: Pupils are equal, round, and reactive to light.   Cardiovascular:      Rate and Rhythm: Normal rate and regular rhythm.      Pulses: Normal pulses.      Heart sounds: Normal heart sounds. No murmur heard.  Pulmonary:      Effort: Pulmonary effort is normal. No respiratory distress.      Breath sounds: Normal breath sounds.   Abdominal:      General: Abdomen is flat. Bowel sounds are normal.      Palpations: Abdomen is soft.      Tenderness: There is no abdominal tenderness.   Musculoskeletal:      Cervical back: Neck supple.      Right lower leg: No edema.      Left lower leg: No edema.   Lymphadenopathy:      Cervical: No cervical adenopathy.   Skin:     General: Skin is warm and dry.   Neurological:      General: No focal deficit present.      Mental Status: He is alert and oriented to person, place, and time.   Psychiatric:         Mood and Affect: Mood normal.         Speech: Speech normal.         Behavior: Behavior is cooperative.         Cognition and Memory: Cognition normal. Cognition is not impaired. He exhibits impaired recent memory.         Labs:  Recent Labs     09/16/22 0318 09/17/22  0248   SODIUM 136 137   POTASSIUM 3.7 3.8   CHLORIDE 103 101   CO2 22 25   BUN 32* 17   CREATININE 8.43* 5.94*   MAGNESIUM 1.3*  --    PHOSPHORUS 2.6  --    CALCIUM 8.1* 8.1*     Recent Labs     09/16/22 0318 09/17/22  0248   ALTSGPT  --  15   ASTSGOT  --  24   ALKPHOSPHAT  --  71   TBILIRUBIN  --  0.4    GLUCOSE 94 90     Recent Labs     09/16/22  0318 09/17/22  0248   WBC 8.2 6.3   NEUTSPOLYS  --  45.70   LYMPHOCYTES  --  35.20   MONOCYTES  --  15.80*   EOSINOPHILS  --  2.10   BASOPHILS  --  0.60   ASTSGOT  --  24   ALTSGPT  --  15   ALKPHOSPHAT  --  71   TBILIRUBIN  --  0.4     Recent Labs     09/16/22  0318 09/17/22  0248   RBC 2.69* 2.63*   HEMOGLOBIN 8.1* 7.8*   HEMATOCRIT 25.2* 24.9*   PLATELETCT 100* 108*     Recent Results (from the past 24 hour(s))   POCT glucose device results    Collection Time: 09/18/22  8:17 AM   Result Value Ref Range    POC Glucose, Blood 122 (H) 65 - 99 mg/dL         Radiology Review:  GO-AZDUVSE-T/O   Final Result      1.  Pancreatic duct stent appears to be present   2.  Changes in the pancreas possibly related to history of pancreatitis.   3.  Cholelithiasis   4.  Gallbladder wall thickening which is nonspecific. While cholecystitis is not excluded this can also be seen with hepatocellular and cardiac disease.   5.  RIGHT hepatic mass, again incompletely characterized but most likely a hemangioma   6.  Trace BILATERAL pleural effusions      EC-ECHOCARDIOGRAM COMPLETE W/O CONT   Final Result      US-EXTREMITY VENOUS UPPER UNILAT LEFT   Final Result      CT-HEAD W/O   Final Result         1. No acute intracranial abnormality. No evidence of acute intracranial hemorrhage or mass lesion.                     DX-CHEST-PORTABLE (1 VIEW)   Final Result      Stable enlargement of the cardiomediastinal silhouette without acute cardiopulmonary abnormality.      IR-CVC TUNNEL W/O PORT REMOVAL    (Results Pending)         MDM (Data Review):   -Records reviewed and summarized in current documentation  -I personally reviewed and interpreted the laboratory results  -I personally reviewed the radiology images        Medical Decision Making, by Problem:  Active Hospital Problems    Diagnosis     Gram-negative bacteremia [R78.81]     Altered mental status [R41.82]     Hypoglycemia [E16.2]      Arthritis [M19.90]     ESRD on dialysis ([facility]) [N18.6, Z99.2]     Hypotension [I95.9]     Sepsis ([facility]) [A41.9]     Anemia [D64.9]            Assessment/Recommendations:  Assessment:  -Altered mental status-likely secondary to infection  -Gram-negative bacteremia  -Hypotension-resolved  -History of pancreatic duct stones and stent placement with stent still within the duct  -End-stage renal disease on dialysis  -CAD  -Hyperlipidemia  -Hypertension  -Chronic pancreatitis    Plan:  -ADAT  --Antibiotics per ID  --KUB xray in 2 weeks to ensure passage of pancreatic duct stent, this stent is designed to self eject    DARIN Tobar      Thank you for inviting me to participate in the care of this patient. Please do not hesitate to call GI consultants with additional questions/concerns or changes in the patient's clinical status at [phone].    GI WILL SIGN OFF. PLEASE CALL IF ANY QUESTIONS OR CONCERNS.      Core Quality Measures   Reviewed items:  Labs, Medications and Radiology reports reviewed

## 2022-09-21 LAB
ANION GAP SERPL CALC-SCNC: 11 MMOL/L (ref 7–16)
BUN SERPL-MCNC: 25 MG/DL (ref 8–22)
CALCIUM SERPL-MCNC: 8.5 MG/DL (ref 8.5–10.5)
CHLORIDE SERPL-SCNC: 100 MMOL/L (ref 96–112)
CO2 SERPL-SCNC: 25 MMOL/L (ref 20–33)
CREAT SERPL-MCNC: 8.4 MG/DL (ref 0.5–1.4)
ERYTHROCYTE [DISTWIDTH] IN BLOOD BY AUTOMATED COUNT: 71.5 FL (ref 35.9–50)
ERYTHROCYTE [DISTWIDTH] IN BLOOD BY AUTOMATED COUNT: 74.2 FL (ref 35.9–50)
GFR SERPLBLD CREATININE-BSD FMLA CKD-EPI: 6 ML/MIN/1.73 M 2
GLUCOSE SERPL-MCNC: 103 MG/DL (ref 65–99)
HCT VFR BLD AUTO: 21.8 % (ref 42–52)
HCT VFR BLD AUTO: 24.9 % (ref 42–52)
HGB BLD-MCNC: 7 G/DL (ref 14–18)
HGB BLD-MCNC: 7.7 G/DL (ref 14–18)
MCH RBC QN AUTO: 30.3 PG (ref 27–33)
MCH RBC QN AUTO: 30.4 PG (ref 27–33)
MCHC RBC AUTO-ENTMCNC: 30.9 G/DL (ref 33.7–35.3)
MCHC RBC AUTO-ENTMCNC: 32.1 G/DL (ref 33.7–35.3)
MCV RBC AUTO: 94.8 FL (ref 81.4–97.8)
MCV RBC AUTO: 98 FL (ref 81.4–97.8)
PLATELET # BLD AUTO: 115 K/UL (ref 164–446)
PLATELET # BLD AUTO: 126 K/UL (ref 164–446)
PMV BLD AUTO: 11.5 FL (ref 9–12.9)
PMV BLD AUTO: 9.9 FL (ref 9–12.9)
POTASSIUM SERPL-SCNC: 4.7 MMOL/L (ref 3.6–5.5)
RBC # BLD AUTO: 2.3 M/UL (ref 4.7–6.1)
RBC # BLD AUTO: 2.54 M/UL (ref 4.7–6.1)
SODIUM SERPL-SCNC: 136 MMOL/L (ref 135–145)
WBC # BLD AUTO: 6.1 K/UL (ref 4.8–10.8)
WBC # BLD AUTO: 6.6 K/UL (ref 4.8–10.8)

## 2022-09-21 PROCEDURE — A9270 NON-COVERED ITEM OR SERVICE: HCPCS | Performed by: HOSPITALIST

## 2022-09-21 PROCEDURE — 700111 HCHG RX REV CODE 636 W/ 250 OVERRIDE (IP): Performed by: INTERNAL MEDICINE

## 2022-09-21 PROCEDURE — 700102 HCHG RX REV CODE 250 W/ 637 OVERRIDE(OP): Performed by: STUDENT IN AN ORGANIZED HEALTH CARE EDUCATION/TRAINING PROGRAM

## 2022-09-21 PROCEDURE — 90935 HEMODIALYSIS ONE EVALUATION: CPT

## 2022-09-21 PROCEDURE — 700111 HCHG RX REV CODE 636 W/ 250 OVERRIDE (IP)

## 2022-09-21 PROCEDURE — 85027 COMPLETE CBC AUTOMATED: CPT | Mod: 91

## 2022-09-21 PROCEDURE — 80048 BASIC METABOLIC PNL TOTAL CA: CPT

## 2022-09-21 PROCEDURE — A9270 NON-COVERED ITEM OR SERVICE: HCPCS | Performed by: STUDENT IN AN ORGANIZED HEALTH CARE EDUCATION/TRAINING PROGRAM

## 2022-09-21 PROCEDURE — 99233 SBSQ HOSP IP/OBS HIGH 50: CPT | Performed by: INTERNAL MEDICINE

## 2022-09-21 PROCEDURE — 770020 HCHG ROOM/CARE - TELE (206)

## 2022-09-21 PROCEDURE — 700102 HCHG RX REV CODE 250 W/ 637 OVERRIDE(OP): Performed by: HOSPITALIST

## 2022-09-21 RX ORDER — OXYCODONE HYDROCHLORIDE 5 MG/1
5 TABLET ORAL EVERY 6 HOURS PRN
Qty: 12 TABLET | Refills: 0 | Status: SHIPPED | OUTPATIENT
Start: 2022-09-21 | End: 2022-09-24

## 2022-09-21 RX ORDER — OXYCODONE HYDROCHLORIDE 5 MG/1
5 TABLET ORAL EVERY 6 HOURS PRN
Qty: 12 TABLET | Refills: 0 | Status: SHIPPED | OUTPATIENT
Start: 2022-09-21 | End: 2022-09-21 | Stop reason: SDUPTHER

## 2022-09-21 RX ORDER — HEPARIN SODIUM 1000 [USP'U]/ML
3700 INJECTION, SOLUTION INTRAVENOUS; SUBCUTANEOUS
Status: DISCONTINUED | OUTPATIENT
Start: 2022-09-21 | End: 2022-09-22 | Stop reason: HOSPADM

## 2022-09-21 RX ORDER — HYDROMORPHONE HYDROCHLORIDE 1 MG/ML
0.5 INJECTION, SOLUTION INTRAMUSCULAR; INTRAVENOUS; SUBCUTANEOUS
Status: DISCONTINUED | OUTPATIENT
Start: 2022-09-21 | End: 2022-09-21

## 2022-09-21 RX ORDER — HYDROMORPHONE HYDROCHLORIDE 1 MG/ML
0.5 INJECTION, SOLUTION INTRAMUSCULAR; INTRAVENOUS; SUBCUTANEOUS
Status: COMPLETED | OUTPATIENT
Start: 2022-09-21 | End: 2022-09-21

## 2022-09-21 RX ORDER — HEPARIN SODIUM 1000 [USP'U]/ML
INJECTION, SOLUTION INTRAVENOUS; SUBCUTANEOUS
Status: COMPLETED
Start: 2022-09-21 | End: 2022-09-21

## 2022-09-21 RX ADMIN — OXYCODONE HYDROCHLORIDE 10 MG: 10 TABLET ORAL at 00:43

## 2022-09-21 RX ADMIN — HEPARIN SODIUM 3700 UNITS: 1000 INJECTION, SOLUTION INTRAVENOUS; SUBCUTANEOUS at 11:30

## 2022-09-21 RX ADMIN — OXYCODONE HYDROCHLORIDE 10 MG: 10 TABLET ORAL at 17:44

## 2022-09-21 RX ADMIN — ALLOPURINOL 100 MG: 100 TABLET ORAL at 15:02

## 2022-09-21 RX ADMIN — GABAPENTIN 300 MG: 300 CAPSULE ORAL at 21:29

## 2022-09-21 RX ADMIN — OXYCODONE HYDROCHLORIDE 10 MG: 10 TABLET ORAL at 05:55

## 2022-09-21 RX ADMIN — SEVELAMER CARBONATE 800 MG: 800 TABLET, FILM COATED ORAL at 17:44

## 2022-09-21 RX ADMIN — OXYCODONE HYDROCHLORIDE 10 MG: 10 TABLET ORAL at 12:09

## 2022-09-21 RX ADMIN — CEFAZOLIN SODIUM 1 G: 1 INJECTION, SOLUTION INTRAVENOUS at 12:56

## 2022-09-21 RX ADMIN — HYDROMORPHONE HYDROCHLORIDE 0.5 MG: 1 INJECTION, SOLUTION INTRAMUSCULAR; INTRAVENOUS; SUBCUTANEOUS at 22:50

## 2022-09-21 RX ADMIN — OXYCODONE HYDROCHLORIDE 10 MG: 10 TABLET ORAL at 21:29

## 2022-09-21 RX ADMIN — OMEPRAZOLE 20 MG: 20 CAPSULE, DELAYED RELEASE ORAL at 05:55

## 2022-09-21 RX ADMIN — ACETAMINOPHEN 650 MG: 325 TABLET, FILM COATED ORAL at 05:55

## 2022-09-21 RX ADMIN — ATORVASTATIN CALCIUM 20 MG: 20 TABLET, FILM COATED ORAL at 21:29

## 2022-09-21 ASSESSMENT — ENCOUNTER SYMPTOMS
EYES NEGATIVE: 1
MYALGIAS: 0
NERVOUS/ANXIOUS: 1
RESPIRATORY NEGATIVE: 1
GASTROINTESTINAL NEGATIVE: 1
NEUROLOGICAL NEGATIVE: 1
BACK PAIN: 0
CARDIOVASCULAR NEGATIVE: 1

## 2022-09-21 ASSESSMENT — PAIN DESCRIPTION - PAIN TYPE
TYPE: ACUTE PAIN

## 2022-09-21 ASSESSMENT — FIBROSIS 4 INDEX: FIB4 SCORE: 3.4

## 2022-09-21 NOTE — PROGRESS NOTES
[facility] Progress Note    Hemodialysis treatment ordered today per Dr. Knott x 3 hours. Treatment initiated at 0828 and ended at 1128.  Pt A/Ox3, c/o tenderness on R chest tunneled HD ports, tolerable, VSS.    UF adjusted due to BP changes, SBP trended down from 140's-90's, Pt also c/o cramping sensation on R hand, improved post tx, VSS post tx; see e-flow sheets for details.    Net UF 1,455 mL    Post tx, CVC flushed with saline then locked with heparin 1000 units/mL per designated amount in each wing then clamped and capped. Aspirate heparin prior to next CVC use.    Report given to Primary RN.

## 2022-09-21 NOTE — DISCHARGE SUMMARY
Discharge Summary    CHIEF COMPLAINT ON ADMISSION  Chief Complaint   Patient presents with    ALOC     Pt BIB EMS from [facility]. Per report pt 9L of fluid off and 7L returned to pt when he became altered. Pt found by EMS to be hypotensive/hypoglycemic. BP 80/40, BG 42. Pt given 200mL LR/Dextrose PTA. Pt now with /88, BG 95 PTA.        Reason for Admission  ems     Admission Date  9/13/2022    CODE STATUS  Full Code    HPI & HOSPITAL COURSE  This is a 72 y.o. male with a history of end-stage renal disease who presented to ER on 9/13/22 with alter mental status , hypoglycemia, hypotension.  Patient was given IV dextrose blood cultures obtained, and patient started on vancomycin for sepsis. Patient admitted for further treatment   His blood culture grew gram negative rods( E.Coli) Id was also consulted and recommended IV Ancef through 9/21. Patient has finished his abx course.  During this stay GI was also consulted as patient had underwent ERCP on 6/5/2022 by Dr. Ibrahima Daly with findings of normal common bile duct without stone, however pancreatic duct did have 2 stones.  One of the stones, further up in the body, was not able to be removed.  A pancreatic duct stent was placed.  Plan was for outpatient repeat ERCP and stent removal but patient did not follow up as outpatient so GI was consulted and patient had another ERCP which showed that stent was occluded so patient had a stent exchange done by GI   this time a self ejecting  prophylatic pancreatic duct stent was placed.  Patient will need to follow up with GI as outpatient and will need a KUB as outpatient in 2 weeks to  ensure passage of PD stent   Regardign his ESRD nephrology was consulted and he was continued on dialysis   Initially his perm cath was removed and temp cath was placed in on 9/15 and once his bcx have become negative he was switched back to permanent dialysis cath.   His hemoglobin was low due to anemia of chronic disease  but has now improved after transfusion   Will discharge patient home today since he is done with his IV abx, and his cultures have remained negative and he can continue his dialysis as outpatient     The patient met 2-midnight criteria for an inpatient stay at the time of discharge.    Discharge Date  9/22/22    FOLLOW UP ITEMS POST DISCHARGE  Nephrology     DISCHARGE DIAGNOSES  Principal Problem:    Sepsis (HCC) POA: Yes  Active Problems:    Anemia POA: Yes    Hypotension POA: Yes    ESRD on dialysis (HCC) (Chronic) POA: Yes    Arthritis POA: Yes    Altered mental status POA: Unknown    Hypoglycemia POA: Unknown    Gram-negative bacteremia POA: Unknown    Presence of pancreatic duct stent POA: Unknown    HTN (hypertension) POA: Unknown  Resolved Problems:    * No resolved hospital problems. *      FOLLOW UP  No future appointments.  [facility]  [address] [facility] [address] [phone]  Follow up  Post hospitalization follow up. Have Abdominal x-ray ordered and follow up on stent removal.    Carroll Pena M.D.  [address] [place] [id number]  [facility] [id number] [phone]    Schedule an appointment as soon as possible for a visit  post hospital follow up    [facility]  [address] [facility] [address] [phone]  Schedule an appointment as soon as possible for a visit  With PCP and follow up on post hospitalization care    MEDICATIONS ON DISCHARGE     Medication List        START taking these medications        Instructions   oxyCODONE immediate-release 5 MG Tabs  Commonly known as: ROXICODONE   Take 1 Tablet by mouth every 6 hours as needed for Severe Pain for up to 3 days.  Dose: 5 mg            CONTINUE taking these medications        Instructions   acetaminophen 500 MG Tabs  Commonly known as: TYLENOL   Take 1,000 mg by mouth every 8 hours as needed. Indications: Pain  Dose: 1,000 mg     allopurinol 100 MG  Tabs  Commonly known as: ZYLOPRIM   Take 100 mg by mouth 3 times a week. Taken post hemodialysis THREE TIMES A WEEK  Dose: 100 mg     atorvastatin 20 MG Tabs  Commonly known as: LIPITOR   Take 20 mg by mouth every evening.  Dose: 20 mg     Buprenorphine 15 MCG/HR Ptwk   Place 1 Patch on the skin every 7 days.  Dose: 1 Patch     capsaicin 0.025 % cream  Commonly known as: Zostrix   Apply 1 Application topically 4 times a day as needed. Apply to effected area  Dose: 1 Application     carvedilol 6.25 MG Tabs  Commonly known as: COREG   Take 6.25 mg by mouth 2 times a day with meals.  Dose: 6.25 mg     diclofenac sodium 1 % Gel  Commonly known as: Voltaren   Apply 4 g topically 4 times a day as needed (Osetoarthritis pain).  Dose: 4 g     Etanercept 50 MG/ML Sosy   Inject 50 mg under the skin every 7 days.  Dose: 50 mg     folic acid 1 MG Tabs  Commonly known as: FOLVITE   Take 1 mg by mouth every day.  Dose: 1 mg     gabapentin 300 MG Caps  Commonly known as: NEURONTIN   Take 300 mg by mouth at bedtime.  Dose: 300 mg     lidocaine 5 % Ptch  Commonly known as: LIDODERM   Apply 3 Patches topically every day. To affected area  Dose: 3 Patch     melatonin 3 MG Tabs   Take 6 mg by mouth at bedtime.  Dose: 6 mg     pantoprazole 40 MG Tbec  Commonly known as: PROTONIX   Take 40 mg by mouth 2 times a day.  Dose: 40 mg     sevelamer 800 MG Tabs  Commonly known as: RENAGEL   Take 800 mg by mouth 3 times a day with meals.  Dose: 800 mg     traZODone 50 MG Tabs  Commonly known as: DESYREL   Take 50 mg by mouth every evening. Indications: Trouble Sleeping  Dose: 50 mg     vitamin D2 (Ergocalciferol) 1.25 MG (10891 UT) Caps capsule  Commonly known as: Drisdol   Take 50,000 Units by mouth every 7 days.  Dose: 50,000 Units              Allergies  Allergies   Allergen Reactions    Motrin [Ibuprofen]      hhx of stomach ulcers       DIET  Orders Placed This Encounter   Procedures    Diet Order Diet: Renal     Standing Status:   Standing      Number of Occurrences:   1     Order Specific Question:   Diet:     Answer:   Renal [8]       ACTIVITY  As tolerated.  Weight bearing as tolerated    CONSULTATIONS  Nephrology  Infection disease     PROCEDURES  None     LABORATORY  Lab Results   Component Value Date    SODIUM 138 09/22/2022    POTASSIUM 4.6 09/22/2022    CHLORIDE 102 09/22/2022    CO2 27 09/22/2022    GLUCOSE 107 (H) 09/22/2022    BUN 13 09/22/2022    CREATININE 5.37 (HH) 09/22/2022        Lab Results   Component Value Date    WBC 6.2 09/22/2022    HEMOGLOBIN 8.1 (L) 09/22/2022    HEMATOCRIT 21.1 (L) 09/22/2022    PLATELETCT 127 (L) 09/22/2022        Total time of the discharge process exceeds 41 minutes.

## 2022-09-21 NOTE — CARE PLAN
The patient is Stable - Low risk of patient condition declining or worsening    Shift Goals  Clinical Goals: pain control,dialysis  Patient Goals: rest, pain control  Family Goals: jace    Progress made toward(s) clinical / shift goals:    Problem: Knowledge Deficit - Standard  Goal: Patient and family/care givers will demonstrate understanding of plan of care, disease process/condition, diagnostic tests and medications  Outcome: Progressing     Problem: Hemodynamics  Goal: Patient's hemodynamics, fluid balance and neurologic status will be stable or improve  Outcome: Progressing       Patient is not progressing towards the following goals:

## 2022-09-21 NOTE — PROGRESS NOTES
"[facility] -  PROGRESS NOTE               Author: Missy Reyna-DARIN Rios Date & Time: 9/21/2022  11:41 AM     HPI:  Junior Proctor is a 72 y.o. male admitted 9/13/2022.  Patient with ESRD on HD via tunneled dialysis catheter, hypertension, had dialysis but was noted to be altered following his treatment and with hypotension to 80s over 40s, hypoglycemic with a blood glucose of 50.  He was febrile to 101.4 in the ER tachycardic to 105.  Patient is admitted to the [facility].  2 out of 2 blood cultures are positive for GNR.  Nephrology was asked to consult for ESRD management.     DAILY NEPHROLOGY SUMMARY:  9/15 - Blood cultures with gram negative rods. Difficult dialysis given poor catheter flow. ID suspects line sepsis and wants catheter holiday.  9/16 - Transferred out of the CCU. No new overnight renal events. Tunneled Dialysis catheter removal and placement of temporary HD cathter.  9/17 - No new overnight renal events. S/p HD yesterday with net UF of 1.2 L and tolerated well. On Cefepime q24 hrs.  9/18 - no new overnight renal events. Patient had MRCP performed yesterday which showed retained pancreatic duct stent.   9/19 - Seen on HD this am and tolerating.  NPO for ERCP at 1600 today and placement of TDC.   9/20 - HD yest, UF 1.5L. Had ERCP yest, self ejecting stent placed. TDC placed 9/19. Some weakness and pain where new dialysis catheter was placed. Overall feeling much better.  9/21- VSS RA no complaints. Just completed dialysis.     REVIEW OF SYSTEMS:    10 point ROS reviewed and is as per HPI/daily summary or otherwise negative    PMH/PSH/SH/FH:   Reviewed and unchanged since admission note    CURRENT MEDICATIONS:   Reviewed from admission to present day    VS:  /65   Pulse 93   Temp 37.3 °C (99.1 °F) (Temporal)   Resp 16   Ht 1.727 m (5' 8\")   Wt 80.5 kg (177 lb 7.5 oz)   SpO2 95%   BMI 26.98 kg/m²     Physical Exam  Vitals and nursing note reviewed. "   Constitutional:       Appearance: He is normal weight.   HENT:      Head: Normocephalic and atraumatic.      Nose: Nose normal.      Mouth/Throat:      Mouth: Mucous membranes are moist.      Pharynx: Oropharynx is clear.   Eyes:      Extraocular Movements: Extraocular movements intact.      Conjunctiva/sclera: Conjunctivae normal.      Pupils: Pupils are equal, round, and reactive to light.   Cardiovascular:      Rate and Rhythm: Normal rate and regular rhythm.      Pulses: Normal pulses.      Heart sounds: Normal heart sounds.   Pulmonary:      Effort: Pulmonary effort is normal.      Breath sounds: Normal breath sounds.   Abdominal:      General: Abdomen is flat. Bowel sounds are normal.   Musculoskeletal:         General: Normal range of motion.      Cervical back: Normal range of motion and neck supple.   Skin:     General: Skin is warm.      Capillary Refill: Capillary refill takes less than 2 seconds.   Neurological:      General: No focal deficit present.      Mental Status: He is alert and oriented to person, place, and time. Mental status is at baseline.   Psychiatric:         Mood and Affect: Mood normal.         Behavior: Behavior normal.         Thought Content: Thought content normal.         Judgment: Judgment normal.     Fluids:  In: 50   Out: -     LABS:  Recent Labs     09/19/22  0155 09/20/22  0557 09/21/22  0634   SODIUM 136 136 136   POTASSIUM 4.1 4.7 4.7   CHLORIDE 101 100 100   CO2 24 26 25   GLUCOSE 99 107* 103*   BUN 29* 17 25*   CREATININE 9.99* 6.17* 8.40*   CALCIUM 8.3* 8.4* 8.5         IMAGING:   All imaging reviewed from admission to present day    IMPRESSION:  # ESRD   - R chest TDC 9/19/22  # HTN  - Goal BP < 140/90  # Anemia of CKD  - Goal Hgb 10-11  # CKD-MBD/Renal Osteodystrophy  # Sepsis  # Gram negative bactermia/Line infection  # Encephaolpathy     PLAN:  - iHD done today  - UF with HD as tolerated  - Continue iHD qMWF and prn during stay.  - UF as tolerated  - No dietary  protein restrictions  - Dose all meds per ESRD  - Abx per primary service/ID.

## 2022-09-21 NOTE — PROGRESS NOTES
[facility] Medicine Daily Progress Note    Date of Service  9/21/2022    Chief Complaint  Junior Proctor is a 72 y.o. male admitted 9/13/2022 with hypotension and altered level of consciousness after dialysis    Hospital Course  Is a 72-year-old male with a history of end-stage renal disease, presents with altered mental status, hypoglycemia and hypotension.  Reportedly the patient just had hemodialysis via a left upper chest tunneled dialysis catheter, he was found altered, hypotensive with a blood pressure of 80s over 40s and hypoglycemic with a blood glucose level of 50.  The patient emergency room was found with elevated temperature of 101.4, tachycardia, white cell count of 4.3, hemoglobin 8.5, platelet count 78.  The patient was started on broad-spectrum antibiotics with concern for possible sepsis in form of ceftriaxone and vancomycin.    Interval Problem Update  Patient seen and examined today.  Data, Medication data reviewed.  Case discussed with nursing as available.  Plan of Care reviewed with patient and notified of changes.   9/14 the patient appears somewhat improved, he required additional fluid boluses to maintain his blood pressure, he denies fevers or chills, no abdominal pain, he has very little urination, he complains of generalized joint pain in his hands, shoulders, ankles, this appears to be chronic, the patient is chronically using narcotics for pain control, he denies difficulty with infection of his fistula or dialysis catheter.  He denies nausea vomiting, no abdominal pain.  Nephrology has been consulted  Infectious disease has been consulted after patient had positive blood cultures reported x2 gram-negative  9/15 the patient is currently afebrile, heart rate in the 60s, unlabored respirations, on room air, normotensive, blood pressure has improved, the patient was attempted to receive dialysis through his left upper chest dialysis catheter but was unable to perform secondary to flow issues,  discussed with nephrology, ordered IR exchange of catheter, in further review the patient had a pancreatic stent placed in June that was supposed to be revised, discussed with gastroenterology, get an MRCP to see if the catheter has passed naturally or will possibly need to be removed by ERCP, updated infectious disease.  9/16/2022:  Infectious disease recommendations appreciated patient no acute events overnight nephrology recommendations appreciated.  Patient had temporary R IJ dialysis line placed yesterday by interventional radiology.  Patient continue dialysis as scheduled.  We will consider replacement of permacatheter per ID recommendations.  Patient has at least several more days hospitalization prior to entertaining idea of discharge.  Otherwise continue present medical management.  I have prioritized MRCP.  Continue cefepime 1 g every 24 hours renally dosed.  9/17/2022:  Patient had MRCP performed today which showed retained pancreatic duct stent patient previously established with GI consultants had stent placed in June 2022.  We will place consultation morning at 9/18/2022 with likely procedure to follow.  Patient will benefit from retrieval of stent as previously discussed.  Otherwise continue with present antimicrobials.  9/18/2022:  Discussed plan with patient in detail infectious diseases cleared patient for replacement of tunneled dialysis catheter for dialysis needs.  Patient has presently temporary dialysis catheter in the L IJ.  Furthermore discussed line placement with nephrology they are in agreement.  Lastly, patient has retained biliary stent in the pancreatic duct gastroenterology is planning for possible stent retrieval on 9/19/2022.  I have also placed consultation with interventional radiology with regards to placement of tunneled dialysis catheter line.  Patient be n.p.o. after midnight schedule procedure for ERCP with possible stent procedure versus EGD and possible stent procedure  scheduled for 1600 on 09/19/2022.  Interventional radiology may opt to place tunneled dialysis catheter and patient prior or after this.  9/19/2022:  Patient had tunneled dialysis catheter placed this morning tolerated procedure well without issue dialysis catheter in L IJ was subsequently removed.  Patient is presently pending EGD/ERCP for removal of pancreatic duct stent that is been retained since June.  There is question as to whether this may be causing occult infection.  Patient had cultures that grew out methicillin sensitive staph aureus and is being treated with Ancef.  Patient will continue to be treated with Ancef  During dialysis on dialysis 2g-2g-3g on M-W-F through 9/21/2022.  9/20: Patient seen and examined no acute events overnight afebrile. Cont with ancef tomorrow is the last day of abx   Regarding his ESRD on dialysis nephrology following appreciate rec.   9/21: Patient seen and examine dhad dialysis today . Also today is last day of his abx course.  Since now his blood culture have remained negative will switch his temp dialysis to a permanent dialysis cath. Order has been placed for IR to change   Appreciate rec.   I have discussed this patient's plan of care and discharge plan at IDT rounds today with Case Management, Nursing, Nursing leadership, and other members of the IDT team.    Consultants/Specialty  infectious disease and nephrology    Code Status  Full Code    Disposition  Patient is not medically cleared for discharge.   Anticipate discharge to to home with close outpatient follow-up.  I have placed the appropriate orders for post-discharge needs.    Review of Systems  Review of Systems   Constitutional:  Positive for malaise/fatigue.   HENT: Negative.     Eyes: Negative.    Respiratory: Negative.     Cardiovascular: Negative.    Gastrointestinal: Negative.    Genitourinary: Negative.    Musculoskeletal:  Negative for back pain, joint pain and myalgias.   Skin: Negative.     Neurological: Negative.    Endo/Heme/Allergies: Negative.    Psychiatric/Behavioral:  The patient is nervous/anxious.    All other systems reviewed and are negative.     Physical Exam  Temp:  [36.6 °C (97.8 °F)-37.6 °C (99.7 °F)] 37.1 °C (98.8 °F)  Pulse:  [] 96  Resp:  [14-18] 14  BP: (114-140)/(60-82) 121/65  SpO2:  [90 %-95 %] 90 %    Physical Exam  Vitals and nursing note reviewed.   Constitutional:       Appearance: He is well-developed.      Comments: Pt seen and examined.   HENT:      Head: Normocephalic and atraumatic.   Eyes:      Pupils: Pupils are equal, round, and reactive to light.   Cardiovascular:      Rate and Rhythm: Normal rate and regular rhythm.      Heart sounds: Normal heart sounds.   Pulmonary:      Effort: Pulmonary effort is normal.      Breath sounds: Normal breath sounds.   Abdominal:      General: Bowel sounds are normal.      Palpations: Abdomen is soft.   Musculoskeletal:         General: Normal range of motion.      Cervical back: Normal range of motion and neck supple.   Skin:     General: Skin is warm and dry.      Coloration: Skin is pale.   Neurological:      General: No focal deficit present.      Mental Status: He is alert and oriented to person, place, and time.   Psychiatric:         Behavior: Behavior normal.       Fluids    Intake/Output Summary (Last 24 hours) at 9/21/2022 1526  Last data filed at 9/21/2022 1207  Gross per 24 hour   Intake 550 ml   Output 1955 ml   Net -1405 ml       Laboratory  Recent Labs     09/20/22  0557 09/21/22  0633 09/21/22  1341   WBC 7.2 6.1 6.6   RBC 2.35* 2.30* 2.54*   HEMOGLOBIN 7.1* 7.0* 7.7*   HEMATOCRIT 22.8* 21.8* 24.9*   MCV 97.0 94.8 98.0*   MCH 30.2 30.4 30.3   MCHC 31.1* 32.1* 30.9*   RDW 74.8* 71.5* 74.2*   PLATELETCT 130* 115* 126*   MPV 11.2 11.5 9.9     Recent Labs     09/19/22  0155 09/20/22  0557 09/21/22  0634   SODIUM 136 136 136   POTASSIUM 4.1 4.7 4.7   CHLORIDE 101 100 100   CO2 24 26 25   GLUCOSE 99 107* 103*   BUN  29* 17 25*   CREATININE 9.99* 6.17* 8.40*   CALCIUM 8.3* 8.4* 8.5                   Imaging  [id number]   Final Result      Portable fluoroscopic images were obtained for localization purposes during ERCP procedure.      IR-US GUIDED PIV   Final Result    Ultrasound-guided PERIPHERAL IV INSERTION performed by    qualified nursing staff as above.      IR-THAPA,GROSHONG PLACEMENT >5   Final Result      1.  Removal of the left IJ tunneled hemodialysis catheter.   2.  Placement of left IJ temporary hemodialysis catheter.      TT-OAFODNP-W/O   Final Result      1.  Pancreatic duct stent appears to be present   2.  Changes in the pancreas possibly related to history of pancreatitis.   3.  Cholelithiasis   4.  Gallbladder wall thickening which is nonspecific. While cholecystitis is not excluded this can also be seen with hepatocellular and cardiac disease.   5.  RIGHT hepatic mass, again incompletely characterized but most likely a hemangioma   6.  Trace BILATERAL pleural effusions      EC-ECHOCARDIOGRAM COMPLETE W/O CONT   Final Result      IR-CVC TUNNEL W/O PORT REMOVAL   Final Result      1.  Removal of the left IJ tunneled hemodialysis catheter.   2.  Placement of left IJ temporary hemodialysis catheter.      US-EXTREMITY VENOUS UPPER UNILAT LEFT   Final Result      CT-HEAD W/O   Final Result         1. No acute intracranial abnormality. No evidence of acute intracranial hemorrhage or mass lesion.                     DX-CHEST-PORTABLE (1 VIEW)   Final Result      Stable enlargement of the cardiomediastinal silhouette without acute cardiopulmonary abnormality.      IR-CVC WITH TUNNEL W/O PORT EXCHANGE    (Results Pending)   IR-THAPA,GROSHONG PLACEMENT >5    (Results Pending)          Assessment/Plan  * Sepsis (HCC)- (present on admission)  Assessment & Plan  This is Sepsis Present on admission  SIRS criteria identified on my evaluation include: Fever, with temperature greater than 101 deg F  Source is  unknown, possible dialysis catheter infection  Sepsis protocol initiated  Fluid resuscitation ordered per protocol  Crystalloid Fluid Administration: Patient has advanced or end-stage chronic renal disease (with documentation of stage IV or GFR 15-29 mL/min, or stage V or GFR < 15 mL/min or ESRD), for this/these reason(s) it is unsafe for this patient to receive 30 mL/kg of fluid  Partial Fluid Dose Given: patient to be reassessed shortly after completion of partial fluid bolus to ensure adequacy of resuscitation  IV antibiotics as appropriate for source of sepsis  Reassessment: I have reassessed the patient's hemodynamic status  9/18/2022:  -Resolved      Presence of pancreatic duct stent  Assessment & Plan  Patient had pancreatic duct stent placed back in June 2022 for which she was supposed to have removed however for unclear reasons he was lost to follow-up.  Patient had a repeat MRCP performed with has results/findings suggestive of retained pancreatic duct stent.  Discussed with gastroenterology in detail.  Gastroenterology intends to take patient on 9/19/2022 for retrieval of pancreatic duct stent.  Patient be n.p.o. at midnight anticoagulation has been held.  9/[id number] with GI recommendations    Gram-negative bacteremia  Assessment & Plan  Unclear source, certainly possibility of line infection given patient's hemodialysis catheter  Get echocardiogram, MRCP abdomen given the patient's history of pancreatitis with stent placement which might be retained  ID consult  Follow cultures  9/18/2022:  Continue with Ancef infectious disease recommendations appreciated  9/19/2022:  Patient will continue on antimicrobials until 9/21/2022 during dialysis 2g-2g-3g on M-W-F    Hypoglycemia  Assessment & Plan  Unclear etiology, likely sepsis related  Not a diabetic patient  Improved now  9/18/2022-continue present medical management    Altered mental status  Assessment & Plan  Likely due to hypoglycemia in  setting of suspected sepsis  Sepsis of unclear etiology, possible line infection given dialysis catheter present  Mental status has since normalized  CXR stable  -Resolved      Arthritis- (present on admission)  Assessment & Plan  Pain management      ESRD on dialysis (HCC)- (present on admission)  Assessment & Plan  9/19/2022:  Patient status post replacement of new tunneled dialysis catheter.  Continue with regular dialysis schedule    9/18/2022:  Patient has previously undergone line holiday discussed with infectious disease today appreciate the recommendations patient is suitable for replacement of tunneled dialysis catheter.  Discussed with interventional radiology in this regard order has been placed for consultation with interventional radiology for placement of tunneled dialysis catheter possibly on 9/19/2022.  Patient n.p.o. to midnight    Hypotension- (present on admission)  Assessment & Plan  Related to infection and possible volume loss  Several boluses of IV fluids have been given, currently with good results, monitor closely  9/18/2022:  -Resolved    Anemia- (present on admission)  Assessment & Plan  Likely anemia of chronic disease, renal disease         VTE prophylaxis: SCDs/TEDs, the patient has been refusing heparin injections    I have performed a physical exam and reviewed and updated ROS and Plan today (9/21/2022). In review of yesterday's note (9/20/2022), there are no changes except as documented above.        Please note that this dictation was created using voice recognition software. I have made every reasonable attempt to correct obvious errors, but I expect that there are errors of grammar and possibly context that I did not discover before finalizing the note.

## 2022-09-21 NOTE — DISCHARGE PLANNING
RNCM spoke to pt bedside who stated intent to appeal his discharge: RN provided copy of IMM and directed pt to call the [facility] number on the first page of the IMM. MD and bedside RN notified pt intent to appeal discharge.

## 2022-09-21 NOTE — CARE PLAN
The patient is Stable - Low risk of patient condition declining or worsening    Shift Goals  Clinical Goals: Pain control, rest, monitor VS, hemdynamic stability  Patient Goals: Pain reflief, rest  Family Goals: No family present    Progress made toward(s) clinical / shift goals:    Problem: Fluid Volume  Goal: Fluid volume balance will be maintained  Outcome: Progressing     Problem: Respiratory  Goal: Patient will achieve/maintain optimum respiratory ventilation and gas exchange  Outcome: Progressing     Problem: Skin Integrity  Goal: Skin integrity is maintained or improved  Outcome: Progressing     Problem: Fall Risk  Goal: Patient will remain free from falls  Outcome: Progressing       Patient is not progressing towards the following goals:      Problem: Pain - Standard  Goal: Alleviation of pain or a reduction in pain to the patient’s comfort goal  Outcome: Not Progressing

## 2022-09-21 NOTE — DOCUMENTATION QUERY
[facility] hospital                                                                       Query Response Note      PATIENT:               RENE STEINBERG  ACCT #:                  [id number]  MRN:                     [id number]  :                      1950  ADMIT DATE:       2022 3:23 PM  DISCH DATE:          RESPONDING  PROVIDER #:        [id number]           QUERY TEXT:    Encephalopathy is documented in the ID Notes. Please specify type.]]    The patient's Clinical Indicators include:  Per ID Notes: acute encephalopathy, improved  Hospitalist Notes: Altered mental status likely due to hypoglycemia in setting of suspected sepsis   Risk Factors: sepsis, bacteremia, hypoglycemia  Treatment: Cefepime, Vanco, IVF, dextrose, accuchecks q 4 h    Thank you,  Nelda Bloom RN, BSN  Clinical   Connect via [employer]  Options provided:   -- Metabolic encephalopathy   -- Septic encephalopathy   -- Other type of encephalopathy, -please specify   -- Unable to determine      Query created by: Nelda Bloom on 9/15/2022 9:01 AM    RESPONSE TEXT:    Septic encephalopathy          Electronically signed by:  HANNA LEAL MD 2022 2:30 PM

## 2022-09-22 VITALS
HEIGHT: 68 IN | DIASTOLIC BLOOD PRESSURE: 8 MMHG | SYSTOLIC BLOOD PRESSURE: 146 MMHG | TEMPERATURE: 98 F | OXYGEN SATURATION: 91 % | HEART RATE: 96 BPM | RESPIRATION RATE: 16 BRPM | WEIGHT: 169.09 LBS | BODY MASS INDEX: 25.63 KG/M2

## 2022-09-22 LAB
ABO GROUP BLD: NORMAL
ANION GAP SERPL CALC-SCNC: 9 MMOL/L (ref 7–16)
BARCODED ABORH UBTYP: 600
BARCODED PRD CODE UBPRD: NORMAL
BARCODED UNIT NUM UBUNT: NORMAL
BLD GP AB SCN SERPL QL: NORMAL
BUN SERPL-MCNC: 13 MG/DL (ref 8–22)
CALCIUM SERPL-MCNC: 8.4 MG/DL (ref 8.5–10.5)
CHLORIDE SERPL-SCNC: 102 MMOL/L (ref 96–112)
CO2 SERPL-SCNC: 27 MMOL/L (ref 20–33)
COMPONENT R 8504R: NORMAL
CREAT SERPL-MCNC: 5.37 MG/DL (ref 0.5–1.4)
ERYTHROCYTE [DISTWIDTH] IN BLOOD BY AUTOMATED COUNT: 73 FL (ref 35.9–50)
GFR SERPLBLD CREATININE-BSD FMLA CKD-EPI: 11 ML/MIN/1.73 M 2
GLUCOSE SERPL-MCNC: 107 MG/DL (ref 65–99)
HCT VFR BLD AUTO: 21.1 % (ref 42–52)
HGB BLD-MCNC: 6.7 G/DL (ref 14–18)
HGB BLD-MCNC: 8.1 G/DL (ref 14–18)
MCH RBC QN AUTO: 30.6 PG (ref 27–33)
MCHC RBC AUTO-ENTMCNC: 31.8 G/DL (ref 33.7–35.3)
MCV RBC AUTO: 96.3 FL (ref 81.4–97.8)
PLATELET # BLD AUTO: 127 K/UL (ref 164–446)
PMV BLD AUTO: 10.8 FL (ref 9–12.9)
POTASSIUM SERPL-SCNC: 4.6 MMOL/L (ref 3.6–5.5)
PRODUCT TYPE UPROD: NORMAL
RBC # BLD AUTO: 2.19 M/UL (ref 4.7–6.1)
RH BLD: NORMAL
SODIUM SERPL-SCNC: 138 MMOL/L (ref 135–145)
UNIT STATUS USTAT: NORMAL
WBC # BLD AUTO: 6.2 K/UL (ref 4.8–10.8)

## 2022-09-22 PROCEDURE — 86901 BLOOD TYPING SEROLOGIC RH(D): CPT

## 2022-09-22 PROCEDURE — A9270 NON-COVERED ITEM OR SERVICE: HCPCS | Performed by: HOSPITALIST

## 2022-09-22 PROCEDURE — A9270 NON-COVERED ITEM OR SERVICE: HCPCS | Performed by: STUDENT IN AN ORGANIZED HEALTH CARE EDUCATION/TRAINING PROGRAM

## 2022-09-22 PROCEDURE — 80048 BASIC METABOLIC PNL TOTAL CA: CPT

## 2022-09-22 PROCEDURE — 700111 HCHG RX REV CODE 636 W/ 250 OVERRIDE (IP)

## 2022-09-22 PROCEDURE — 85018 HEMOGLOBIN: CPT

## 2022-09-22 PROCEDURE — 86923 COMPATIBILITY TEST ELECTRIC: CPT

## 2022-09-22 PROCEDURE — 700102 HCHG RX REV CODE 250 W/ 637 OVERRIDE(OP): Performed by: HOSPITALIST

## 2022-09-22 PROCEDURE — 700102 HCHG RX REV CODE 250 W/ 637 OVERRIDE(OP): Performed by: STUDENT IN AN ORGANIZED HEALTH CARE EDUCATION/TRAINING PROGRAM

## 2022-09-22 PROCEDURE — 99239 HOSP IP/OBS DSCHRG MGMT >30: CPT | Performed by: INTERNAL MEDICINE

## 2022-09-22 PROCEDURE — 36430 TRANSFUSION BLD/BLD COMPNT: CPT

## 2022-09-22 PROCEDURE — 86850 RBC ANTIBODY SCREEN: CPT

## 2022-09-22 PROCEDURE — 700111 HCHG RX REV CODE 636 W/ 250 OVERRIDE (IP): Performed by: INTERNAL MEDICINE

## 2022-09-22 PROCEDURE — 86900 BLOOD TYPING SEROLOGIC ABO: CPT

## 2022-09-22 PROCEDURE — P9016 RBC LEUKOCYTES REDUCED: HCPCS

## 2022-09-22 PROCEDURE — 85027 COMPLETE CBC AUTOMATED: CPT

## 2022-09-22 PROCEDURE — 30233N1 TRANSFUSION OF NONAUTOLOGOUS RED BLOOD CELLS INTO PERIPHERAL VEIN, PERCUTANEOUS APPROACH: ICD-10-PCS | Performed by: INTERNAL MEDICINE

## 2022-09-22 PROCEDURE — 700105 HCHG RX REV CODE 258

## 2022-09-22 RX ORDER — HYDROMORPHONE HYDROCHLORIDE 1 MG/ML
0.5 INJECTION, SOLUTION INTRAMUSCULAR; INTRAVENOUS; SUBCUTANEOUS ONCE
Status: COMPLETED | OUTPATIENT
Start: 2022-09-22 | End: 2022-09-22

## 2022-09-22 RX ORDER — SODIUM CHLORIDE 9 MG/ML
INJECTION, SOLUTION INTRAVENOUS CONTINUOUS
Status: ACTIVE | OUTPATIENT
Start: 2022-09-22 | End: 2022-09-22

## 2022-09-22 RX ORDER — OXYCODONE HYDROCHLORIDE 5 MG/1
5 TABLET ORAL EVERY 6 HOURS PRN
Qty: 12 TABLET | Refills: 0 | Status: SHIPPED | OUTPATIENT
Start: 2022-09-22 | End: 2022-09-25

## 2022-09-22 RX ADMIN — SODIUM CHLORIDE: 9 INJECTION, SOLUTION INTRAVENOUS at 05:15

## 2022-09-22 RX ADMIN — OXYCODONE HYDROCHLORIDE 10 MG: 10 TABLET ORAL at 04:27

## 2022-09-22 RX ADMIN — OMEPRAZOLE 20 MG: 20 CAPSULE, DELAYED RELEASE ORAL at 08:45

## 2022-09-22 RX ADMIN — OXYCODONE HYDROCHLORIDE 10 MG: 10 TABLET ORAL at 08:45

## 2022-09-22 RX ADMIN — HYDROMORPHONE HYDROCHLORIDE 0.5 MG: 1 INJECTION, SOLUTION INTRAMUSCULAR; INTRAVENOUS; SUBCUTANEOUS at 06:14

## 2022-09-22 RX ADMIN — SEVELAMER CARBONATE 800 MG: 800 TABLET, FILM COATED ORAL at 11:30

## 2022-09-22 RX ADMIN — CEFAZOLIN SODIUM 1 G: 1 INJECTION, SOLUTION INTRAVENOUS at 13:59

## 2022-09-22 RX ADMIN — SEVELAMER CARBONATE 800 MG: 800 TABLET, FILM COATED ORAL at 08:45

## 2022-09-22 ASSESSMENT — PAIN DESCRIPTION - PAIN TYPE
TYPE: CHRONIC PAIN
TYPE: ACUTE PAIN
TYPE: ACUTE PAIN

## 2022-09-22 ASSESSMENT — ENCOUNTER SYMPTOMS
CARDIOVASCULAR NEGATIVE: 1
MYALGIAS: 0
GASTROINTESTINAL NEGATIVE: 1
RESPIRATORY NEGATIVE: 1
NERVOUS/ANXIOUS: 1
NEUROLOGICAL NEGATIVE: 1
BACK PAIN: 0
EYES NEGATIVE: 1

## 2022-09-22 NOTE — PROGRESS NOTES
Monitor Summary:    Rhythm: SR   HR: 92-99  Ectopy: R PVC, 6 beats VT      Measurements: .14/.08/.36

## 2022-09-22 NOTE — CARE PLAN
Problem: Fluid Volume  Goal: Fluid volume balance will be maintained  Outcome: Progressing     Problem: Pain - Standard  Goal: Alleviation of pain or a reduction in pain to the patient’s comfort goal  Outcome: Progressing   The patient is Stable - Low risk of patient condition declining or worsening    Shift Goals  Clinical Goals: pain control, VS stable  Patient Goals: rest, pain control  Family Goals: jace    Progress made toward(s) clinical / shift goals:  fluid volume monitored following hemodialysis, patient assessed for edema or peripheral vascular changes suggestive of fluid-volume imbalance; patient encouraged to report pain as necessary, medicated per MAR.     Patient is not progressing towards the following goals:

## 2022-09-22 NOTE — PROGRESS NOTES
Arrive to dc lounge via wheelchair. A/O, dc instructions reviewed w/ pt, verbalized understanding. Pt to dc home by self via taxi w/ voucher.

## 2022-09-22 NOTE — CARE PLAN
The patient is Stable - Low risk of patient condition declining or worsening    Shift Goals  Clinical Goals: pain, blood  Patient Goals: rest, pain control  Family Goals: jace    Progress made toward(s) clinical / shift goals:    Problem: Knowledge Deficit - Standard  Goal: Patient and family/care givers will demonstrate understanding of plan of care, disease process/condition, diagnostic tests and medications  Outcome: Progressing     Problem: Hemodynamics  Goal: Patient's hemodynamics, fluid balance and neurologic status will be stable or improve  Outcome: Progressing       Patient is not progressing towards the following goals:

## 2022-09-22 NOTE — PROGRESS NOTES
"[facility] -  PROGRESS NOTE               Author: DARIN Blanco Date & Time: 9/22/2022  11:23 AM     HPI:  Junior Proctor is a 72 y.o. male admitted 9/13/2022.  Patient with ESRD on HD via tunneled dialysis catheter, hypertension, had dialysis but was noted to be altered following his treatment and with hypotension to 80s over 40s, hypoglycemic with a blood glucose of 50.  He was febrile to 101.4 in the ER tachycardic to 105.  Patient is admitted to the [facility].  2 out of 2 blood cultures are positive for GNR.  Nephrology was asked to consult for ESRD management.     DAILY NEPHROLOGY SUMMARY:  9/15 - Blood cultures with gram negative rods. Difficult dialysis given poor catheter flow. ID suspects line sepsis and wants catheter holiday.  9/16 - Transferred out of the CCU. No new overnight renal events. Tunneled Dialysis catheter removal and placement of temporary HD cathter.  9/17 - No new overnight renal events. S/p HD yesterday with net UF of 1.2 L and tolerated well. On Cefepime q24 hrs.  9/18 - no new overnight renal events. Patient had MRCP performed yesterday which showed retained pancreatic duct stent.   9/19 - Seen on HD this am and tolerating.  NPO for ERCP at 1600 today and placement of TDC.   9/20 - HD yest, UF 1.5L. Had ERCP yest, self ejecting stent placed. TDC placed 9/19. Some weakness and pain where new dialysis catheter was placed. Overall feeling much better.  9/21- VSS RA no complaints. Just completed dialysis.   9/22- VSS RA, resting comfortably without complaints.  Verbalized that he will discharge tomorrow.     REVIEW OF SYSTEMS:    10 point ROS reviewed and is as per HPI/daily summary or otherwise negative    PMH/PSH/SH/FH:   Reviewed and unchanged since admission note    CURRENT MEDICATIONS:   Reviewed from admission to present day    VS:  BP (!) 146/8   Pulse 96   Temp 36.7 °C (98 °F) (Temporal)   Resp 16   Ht 1.727 m (5' 8\")   Wt 76.7 kg (169 " lb 1.5 oz)   SpO2 91%   BMI 25.71 kg/m²     Physical Exam  Vitals and nursing note reviewed.   Constitutional:       Appearance: He is normal weight.   HENT:      Head: Normocephalic and atraumatic.      Nose: Nose normal.      Mouth/Throat:      Mouth: Mucous membranes are moist.      Pharynx: Oropharynx is clear.   Eyes:      Conjunctiva/sclera: Conjunctivae normal.      Pupils: Pupils are equal, round, and reactive to light.   Cardiovascular:      Rate and Rhythm: Normal rate and regular rhythm.      Pulses: Normal pulses.      Heart sounds: Normal heart sounds.   Pulmonary:      Effort: Pulmonary effort is normal.      Breath sounds: Normal breath sounds.   Abdominal:      General: Abdomen is flat. Bowel sounds are normal.   Musculoskeletal:         General: Normal range of motion.      Cervical back: Normal range of motion and neck supple.   Skin:     General: Skin is warm.      Capillary Refill: Capillary refill takes less than 2 seconds.   Neurological:      General: No focal deficit present.      Mental Status: He is alert and oriented to person, place, and time. Mental status is at baseline.   Psychiatric:         Mood and Affect: Mood normal.         Behavior: Behavior normal.         Thought Content: Thought content normal.         Judgment: Judgment normal.     Fluids:  In: 620 [P.O.:120; Dialysis:500]  Out: 1955     LABS:  Recent Labs     09/20/22  0557 09/21/22  0634 09/22/22  0227   SODIUM 136 136 138   POTASSIUM 4.7 4.7 4.6   CHLORIDE 100 100 102   CO2 26 25 27   GLUCOSE 107* 103* 107*   BUN 17 25* 13   CREATININE 6.17* 8.40* 5.37*   CALCIUM 8.4* 8.5 8.4*         IMAGING:   All imaging reviewed from admission to present day    IMPRESSION:  # ESRD   - R chest TDC 9/19/22  # HTN  - Goal BP < 140/90  # Anemia of CKD  - Goal Hgb 10-11  # CKD-MBD/Renal Osteodystrophy  # Sepsis  # Gram negative bactermia/Line infection  # Encephaolpathy     PLAN:  - iHD tomorrow FRI  - UF with HD as tolerated  - Continue  iHD qMWF and prn during stay.  - UF as tolerated  - No dietary protein restrictions  - Dose all meds per ESRD  - Abx per primary service/ID.  - OK to discharge from a nephrology standpoint once medically cleared and resume outpatient dialysis.

## 2022-09-22 NOTE — PROGRESS NOTES
[facility] Medicine Daily Progress Note    Date of Service  9/22/2022    Chief Complaint  Junior Proctor is a 72 y.o. male admitted 9/13/2022 with hypotension and altered level of consciousness after dialysis    Hospital Course  Is a 72-year-old male with a history of end-stage renal disease, presents with altered mental status, hypoglycemia and hypotension.  Reportedly the patient just had hemodialysis via a left upper chest tunneled dialysis catheter, he was found altered, hypotensive with a blood pressure of 80s over 40s and hypoglycemic with a blood glucose level of 50.  The patient emergency room was found with elevated temperature of 101.4, tachycardia, white cell count of 4.3, hemoglobin 8.5, platelet count 78.  The patient was started on broad-spectrum antibiotics with concern for possible sepsis in form of ceftriaxone and vancomycin.    Interval Problem Update  Patient seen and examined today.  Data, Medication data reviewed.  Case discussed with nursing as available.  Plan of Care reviewed with patient and notified of changes.   9/14 the patient appears somewhat improved, he required additional fluid boluses to maintain his blood pressure, he denies fevers or chills, no abdominal pain, he has very little urination, he complains of generalized joint pain in his hands, shoulders, ankles, this appears to be chronic, the patient is chronically using narcotics for pain control, he denies difficulty with infection of his fistula or dialysis catheter.  He denies nausea vomiting, no abdominal pain.  Nephrology has been consulted  Infectious disease has been consulted after patient had positive blood cultures reported x2 gram-negative  9/15 the patient is currently afebrile, heart rate in the 60s, unlabored respirations, on room air, normotensive, blood pressure has improved, the patient was attempted to receive dialysis through his left upper chest dialysis catheter but was unable to perform secondary to flow issues,  discussed with nephrology, ordered IR exchange of catheter, in further review the patient had a pancreatic stent placed in June that was supposed to be revised, discussed with gastroenterology, get an MRCP to see if the catheter has passed naturally or will possibly need to be removed by ERCP, updated infectious disease.  9/16/2022:  Infectious disease recommendations appreciated patient no acute events overnight nephrology recommendations appreciated.  Patient had temporary R IJ dialysis line placed yesterday by interventional radiology.  Patient continue dialysis as scheduled.  We will consider replacement of permacatheter per ID recommendations.  Patient has at least several more days hospitalization prior to entertaining idea of discharge.  Otherwise continue present medical management.  I have prioritized MRCP.  Continue cefepime 1 g every 24 hours renally dosed.  9/17/2022:  Patient had MRCP performed today which showed retained pancreatic duct stent patient previously established with GI consultants had stent placed in June 2022.  We will place consultation morning at 9/18/2022 with likely procedure to follow.  Patient will benefit from retrieval of stent as previously discussed.  Otherwise continue with present antimicrobials.  9/18/2022:  Discussed plan with patient in detail infectious diseases cleared patient for replacement of tunneled dialysis catheter for dialysis needs.  Patient has presently temporary dialysis catheter in the L IJ.  Furthermore discussed line placement with nephrology they are in agreement.  Lastly, patient has retained biliary stent in the pancreatic duct gastroenterology is planning for possible stent retrieval on 9/19/2022.  I have also placed consultation with interventional radiology with regards to placement of tunneled dialysis catheter line.  Patient be n.p.o. after midnight schedule procedure for ERCP with possible stent procedure versus EGD and possible stent procedure  scheduled for 1600 on 09/19/2022.  Interventional radiology may opt to place tunneled dialysis catheter and patient prior or after this.  9/19/2022:  Patient had tunneled dialysis catheter placed this morning tolerated procedure well without issue dialysis catheter in L IJ was subsequently removed.  Patient is presently pending EGD/ERCP for removal of pancreatic duct stent that is been retained since June.  There is question as to whether this may be causing occult infection.  Patient had cultures that grew out methicillin sensitive staph aureus and is being treated with Ancef.  Patient will continue to be treated with Ancef  During dialysis on dialysis 2g-2g-3g on M-W-F through 9/21/2022.  9/20: Patient seen and examined no acute events overnight afebrile. Cont with ancef tomorrow is the last day of abx   Regarding his ESRD on dialysis nephrology following appreciate rec.   9/21: Patient seen and examined, afebrile, no overnight evens, feeling today is last day of IV abx,   Monitor H&H  Regarding his ESRD on dialysis nephrology following appreciate rec,   I have discussed this patient's plan of care and discharge plan at IDT rounds today with Case Management, Nursing, Nursing leadership, and other members of the IDT team.    Consultants/Specialty  infectious disease and nephrology    Code Status  Full Code    Disposition  Patient is not medically cleared for discharge.   Anticipate discharge to to home with close outpatient follow-up.  I have placed the appropriate orders for post-discharge needs.    Review of Systems  Review of Systems   Constitutional:  Positive for malaise/fatigue.   HENT: Negative.     Eyes: Negative.    Respiratory: Negative.     Cardiovascular: Negative.    Gastrointestinal: Negative.    Genitourinary: Negative.    Musculoskeletal:  Negative for back pain, joint pain and myalgias.   Skin: Negative.    Neurological: Negative.    Endo/Heme/Allergies: Negative.    Psychiatric/Behavioral:  The  patient is nervous/anxious.    All other systems reviewed and are negative.     Physical Exam  Temp:  [36.4 °C (97.6 °F)-37.8 °C (100.1 °F)] 36.7 °C (98 °F)  Pulse:  [] 96  Resp:  [14-16] 16  BP: (131-146)/(8-77) 146/8  SpO2:  [90 %-96 %] 91 %    Physical Exam  Vitals and nursing note reviewed.   Constitutional:       Appearance: He is well-developed.      Comments: Pt seen and examined.   HENT:      Head: Normocephalic and atraumatic.   Eyes:      Pupils: Pupils are equal, round, and reactive to light.   Cardiovascular:      Rate and Rhythm: Normal rate and regular rhythm.      Heart sounds: Normal heart sounds.   Pulmonary:      Effort: Pulmonary effort is normal.      Breath sounds: Normal breath sounds.   Abdominal:      General: Bowel sounds are normal.      Palpations: Abdomen is soft.   Musculoskeletal:         General: Normal range of motion.      Cervical back: Normal range of motion and neck supple.   Skin:     General: Skin is warm and dry.      Coloration: Skin is pale.   Neurological:      General: No focal deficit present.      Mental Status: He is alert and oriented to person, place, and time.   Psychiatric:         Behavior: Behavior normal.       Fluids    Intake/Output Summary (Last 24 hours) at 9/22/2022 1358  Last data filed at 9/22/2022 0916  Gross per 24 hour   Intake 698 ml   Output 0 ml   Net 698 ml       Laboratory  Recent Labs     09/21/22  0633 09/21/22  1341 09/22/22  0227 09/22/22  1035   WBC 6.1 6.6 6.2  --    RBC 2.30* 2.54* 2.19*  --    HEMOGLOBIN 7.0* 7.7* 6.7* 8.1*   HEMATOCRIT 21.8* 24.9* 21.1*  --    MCV 94.8 98.0* 96.3  --    MCH 30.4 30.3 30.6  --    MCHC 32.1* 30.9* 31.8*  --    RDW 71.5* 74.2* 73.0*  --    PLATELETCT 115* 126* 127*  --    MPV 11.5 9.9 10.8  --      Recent Labs     09/20/22  0557 09/21/22  0634 09/22/22  0227   SODIUM 136 136 138   POTASSIUM 4.7 4.7 4.6   CHLORIDE 100 100 102   CO2 26 25 27   GLUCOSE 107* 103* 107*   BUN 17 25* 13   CREATININE 6.17* 8.40*  5.37*   CALCIUM 8.4* 8.5 8.4*                   Imaging  [id number] [id number]   Final Result      Portable fluoroscopic images were obtained for localization purposes during ERCP procedure.      IR-US GUIDED PIV   Final Result    Ultrasound-guided PERIPHERAL IV INSERTION performed by    qualified nursing staff as above.      IR-GUANAKITO THAPA PLACEMENT >5   Final Result      1.  Removal of the left IJ tunneled hemodialysis catheter.   2.  Placement of left IJ temporary hemodialysis catheter.      OI-TUZULOE-W/O   Final Result      1.  Pancreatic duct stent appears to be present   2.  Changes in the pancreas possibly related to history of pancreatitis.   3.  Cholelithiasis   4.  Gallbladder wall thickening which is nonspecific. While cholecystitis is not excluded this can also be seen with hepatocellular and cardiac disease.   5.  RIGHT hepatic mass, again incompletely characterized but most likely a hemangioma   6.  Trace BILATERAL pleural effusions      EC-ECHOCARDIOGRAM COMPLETE W/O CONT   Final Result      IR-CVC TUNNEL W/O PORT REMOVAL   Final Result      1.  Removal of the left IJ tunneled hemodialysis catheter.   2.  Placement of left IJ temporary hemodialysis catheter.      US-EXTREMITY VENOUS UPPER UNILAT LEFT   Final Result      CT-HEAD W/O   Final Result         1. No acute intracranial abnormality. No evidence of acute intracranial hemorrhage or mass lesion.                     DX-CHEST-PORTABLE (1 VIEW)   Final Result      Stable enlargement of the cardiomediastinal silhouette without acute cardiopulmonary abnormality.             Assessment/Plan  * Sepsis (HCC)- (present on admission)  Assessment & Plan  This is Sepsis Present on admission  SIRS criteria identified on my evaluation include: Fever, with temperature greater than 101 deg F  Source is unknown, possible dialysis catheter infection  Sepsis protocol initiated  Fluid resuscitation ordered per protocol  Crystalloid Fluid Administration:  Patient has advanced or end-stage chronic renal disease (with documentation of stage IV or GFR 15-29 mL/min, or stage V or GFR < 15 mL/min or ESRD), for this/these reason(s) it is unsafe for this patient to receive 30 mL/kg of fluid  Partial Fluid Dose Given: patient to be reassessed shortly after completion of partial fluid bolus to ensure adequacy of resuscitation  IV antibiotics as appropriate for source of sepsis  Reassessment: I have reassessed the patient's hemodynamic status  9/18/2022:  -Resolved      Presence of pancreatic duct stent  Assessment & Plan  Patient had pancreatic duct stent placed back in June 2022 for which she was supposed to have removed however for unclear reasons he was lost to follow-up.  Patient had a repeat MRCP performed with has results/findings suggestive of retained pancreatic duct stent.  Discussed with gastroenterology in detail.  Gastroenterology intends to take patient on 9/19/2022 for retrieval of pancreatic duct stent.  Patient be n.p.o. at midnight anticoagulation has been held.  9/19/[id number] with GI recommendations    Gram-negative bacteremia  Assessment & Plan  Unclear source, certainly possibility of line infection given patient's hemodialysis catheter  Get echocardiogram, MRCP abdomen given the patient's history of pancreatitis with stent placement which might be retained  ID consult  Follow cultures  9/18/2022:  Continue with Ancef infectious disease recommendations appreciated  9/19/2022:  Patient will continue on antimicrobials until 9/21/2022 during dialysis 2g-2g-3g on M-W-F    Hypoglycemia  Assessment & Plan  Unclear etiology, likely sepsis related  Not a diabetic patient  Improved now  9/18/2022-continue present medical management    Altered mental status  Assessment & Plan  Likely due to hypoglycemia in setting of suspected sepsis  Sepsis of unclear etiology, possible line infection given dialysis catheter present  Mental status has since normalized  CXR  stable  -Resolved      Arthritis- (present on admission)  Assessment & Plan  Pain management      ESRD on dialysis (HCC)- (present on admission)  Assessment & Plan  9/19/2022:  Patient status post replacement of new tunneled dialysis catheter.  Continue with regular dialysis schedule    9/18/2022:  Patient has previously undergone line holiday discussed with infectious disease today appreciate the recommendations patient is suitable for replacement of tunneled dialysis catheter.  Discussed with interventional radiology in this regard order has been placed for consultation with interventional radiology for placement of tunneled dialysis catheter possibly on 9/19/2022.  Patient n.p.o. to midnight    Hypotension- (present on admission)  Assessment & Plan  Related to infection and possible volume loss  Several boluses of IV fluids have been given, currently with good results, monitor closely  9/18/2022:  -Resolved    Anemia- (present on admission)  Assessment & Plan  Likely anemia of chronic disease, renal disease         VTE prophylaxis: SCDs/TEDs, the patient has been refusing heparin injections    I have performed a physical exam and reviewed and updated ROS and Plan today (9/22/2022). In review of yesterday's note (9/21/2022), there are no changes except as documented above.        Please note that this dictation was created using voice recognition software. I have made every reasonable attempt to correct obvious errors, but I expect that there are errors of grammar and possibly context that I did not discover before finalizing the note.

## 2022-09-23 NOTE — PROGRESS NOTES
"Pt transferred to d/c [facility] [place] this PM. Pt was discharged and then called unit and notified this RN he did not have prescription for medication with discharge although provided. Pt unable to fill pain medication prescription since he did not have physical copy of prescription. GIULIA Carlos paged r[facility] not [facility] and cannot fill. Pt \"either needs to come back to [facility] and  prescription to have filled in AM or pt to follow up with PCP for medication\". Attempted to call pt with updated information, pt did not answer phone and mailbox full. Will notify NOC Charge RN to attempt and call pt. Physical prescription provided to NOC Charge RN.   "

## 2022-09-27 ENCOUNTER — APPOINTMENT (OUTPATIENT)
Dept: RADIOLOGY | Facility: MEDICAL CENTER | Age: 72
DRG: 438 | End: 2022-09-27
Attending: EMERGENCY MEDICINE
Payer: COMMERCIAL

## 2022-09-27 ENCOUNTER — APPOINTMENT (OUTPATIENT)
Dept: RADIOLOGY | Facility: MEDICAL CENTER | Age: 72
DRG: 438 | End: 2022-09-27
Attending: INTERNAL MEDICINE
Payer: COMMERCIAL

## 2022-09-27 ENCOUNTER — HOSPITAL ENCOUNTER (INPATIENT)
Facility: MEDICAL CENTER | Age: 72
LOS: 14 days | DRG: 438 | End: 2022-10-12
Attending: EMERGENCY MEDICINE | Admitting: INTERNAL MEDICINE
Payer: COMMERCIAL

## 2022-09-27 DIAGNOSIS — K86.1 ACUTE ON CHRONIC PANCREATITIS (HCC): ICD-10-CM

## 2022-09-27 DIAGNOSIS — K85.90 ACUTE ON CHRONIC PANCREATITIS (HCC): ICD-10-CM

## 2022-09-27 DIAGNOSIS — K85.90 ACUTE PANCREATITIS, UNSPECIFIED COMPLICATION STATUS, UNSPECIFIED PANCREATITIS TYPE: ICD-10-CM

## 2022-09-27 LAB
ALBUMIN SERPL BCP-MCNC: 3.3 G/DL (ref 3.2–4.9)
ALBUMIN/GLOB SERPL: 0.9 G/DL
ALP SERPL-CCNC: 137 U/L (ref 30–99)
ALT SERPL-CCNC: <5 U/L (ref 2–50)
ANION GAP SERPL CALC-SCNC: 15 MMOL/L (ref 7–16)
APPEARANCE UR: CLEAR
AST SERPL-CCNC: 13 U/L (ref 12–45)
BASOPHILS # BLD AUTO: 0.8 % (ref 0–1.8)
BASOPHILS # BLD: 0.07 K/UL (ref 0–0.12)
BILIRUB SERPL-MCNC: 0.3 MG/DL (ref 0.1–1.5)
BILIRUB UR QL STRIP.AUTO: ABNORMAL
BUN SERPL-MCNC: 25 MG/DL (ref 8–22)
CALCIUM SERPL-MCNC: 8.5 MG/DL (ref 8.5–10.5)
CHLORIDE SERPL-SCNC: 98 MMOL/L (ref 96–112)
CO2 SERPL-SCNC: 23 MMOL/L (ref 20–33)
COLOR UR: YELLOW
CREAT SERPL-MCNC: 8.82 MG/DL (ref 0.5–1.4)
EKG IMPRESSION: NORMAL
EOSINOPHIL # BLD AUTO: 0.17 K/UL (ref 0–0.51)
EOSINOPHIL NFR BLD: 2 % (ref 0–6.9)
ERYTHROCYTE [DISTWIDTH] IN BLOOD BY AUTOMATED COUNT: 62.9 FL (ref 35.9–50)
GFR SERPLBLD CREATININE-BSD FMLA CKD-EPI: 6 ML/MIN/1.73 M 2
GLOBULIN SER CALC-MCNC: 3.5 G/DL (ref 1.9–3.5)
GLUCOSE BLD STRIP.AUTO-MCNC: 63 MG/DL (ref 65–99)
GLUCOSE BLD STRIP.AUTO-MCNC: 81 MG/DL (ref 65–99)
GLUCOSE SERPL-MCNC: 74 MG/DL (ref 65–99)
GLUCOSE UR STRIP.AUTO-MCNC: NEGATIVE MG/DL
HCT VFR BLD AUTO: 25.4 % (ref 42–52)
HGB BLD-MCNC: 8.2 G/DL (ref 14–18)
IMM GRANULOCYTES # BLD AUTO: 0.05 K/UL (ref 0–0.11)
IMM GRANULOCYTES NFR BLD AUTO: 0.6 % (ref 0–0.9)
INR PPP: 1.23 (ref 0.87–1.13)
KETONES UR STRIP.AUTO-MCNC: 15 MG/DL
LACTATE SERPL-SCNC: 1.1 MMOL/L (ref 0.5–2)
LACTATE SERPL-SCNC: 1.6 MMOL/L (ref 0.5–2)
LACTATE SERPL-SCNC: 1.6 MMOL/L (ref 0.5–2)
LEUKOCYTE ESTERASE UR QL STRIP.AUTO: NEGATIVE
LIPASE SERPL-CCNC: 157 U/L (ref 11–82)
LYMPHOCYTES # BLD AUTO: 0.93 K/UL (ref 1–4.8)
LYMPHOCYTES NFR BLD: 10.9 % (ref 22–41)
MCH RBC QN AUTO: 30.1 PG (ref 27–33)
MCHC RBC AUTO-ENTMCNC: 32.3 G/DL (ref 33.7–35.3)
MCV RBC AUTO: 93.4 FL (ref 81.4–97.8)
MICRO URNS: ABNORMAL
MONOCYTES # BLD AUTO: 0.39 K/UL (ref 0–0.85)
MONOCYTES NFR BLD AUTO: 4.6 % (ref 0–13.4)
NEUTROPHILS # BLD AUTO: 6.9 K/UL (ref 1.82–7.42)
NEUTROPHILS NFR BLD: 81.1 % (ref 44–72)
NITRITE UR QL STRIP.AUTO: NEGATIVE
NRBC # BLD AUTO: 0 K/UL
NRBC BLD-RTO: 0 /100 WBC
PH UR STRIP.AUTO: 5.5 [PH] (ref 5–8)
PLATELET # BLD AUTO: 162 K/UL (ref 164–446)
PMV BLD AUTO: 11 FL (ref 9–12.9)
POTASSIUM SERPL-SCNC: 5.1 MMOL/L (ref 3.6–5.5)
PROT SERPL-MCNC: 6.8 G/DL (ref 6–8.2)
PROT UR QL STRIP: NEGATIVE MG/DL
PROTHROMBIN TIME: 15.3 SEC (ref 12–14.6)
RBC # BLD AUTO: 2.72 M/UL (ref 4.7–6.1)
RBC UR QL AUTO: NEGATIVE
SODIUM SERPL-SCNC: 136 MMOL/L (ref 135–145)
SP GR UR STRIP.AUTO: >=1.03
UROBILINOGEN UR STRIP.AUTO-MCNC: 0.2 MG/DL
WBC # BLD AUTO: 8.5 K/UL (ref 4.8–10.8)

## 2022-09-27 PROCEDURE — 700102 HCHG RX REV CODE 250 W/ 637 OVERRIDE(OP): Performed by: EMERGENCY MEDICINE

## 2022-09-27 PROCEDURE — 85025 COMPLETE CBC W/AUTO DIFF WBC: CPT

## 2022-09-27 PROCEDURE — 83605 ASSAY OF LACTIC ACID: CPT | Mod: 91

## 2022-09-27 PROCEDURE — 700105 HCHG RX REV CODE 258: Performed by: INTERNAL MEDICINE

## 2022-09-27 PROCEDURE — 700101 HCHG RX REV CODE 250: Performed by: INTERNAL MEDICINE

## 2022-09-27 PROCEDURE — 81003 URINALYSIS AUTO W/O SCOPE: CPT

## 2022-09-27 PROCEDURE — 76705 ECHO EXAM OF ABDOMEN: CPT

## 2022-09-27 PROCEDURE — 87040 BLOOD CULTURE FOR BACTERIA: CPT | Mod: 91

## 2022-09-27 PROCEDURE — 82962 GLUCOSE BLOOD TEST: CPT | Mod: 91

## 2022-09-27 PROCEDURE — 96375 TX/PRO/DX INJ NEW DRUG ADDON: CPT

## 2022-09-27 PROCEDURE — 99220 PR INITIAL OBSERVATION CARE,LEVL III: CPT | Performed by: INTERNAL MEDICINE

## 2022-09-27 PROCEDURE — A9270 NON-COVERED ITEM OR SERVICE: HCPCS | Performed by: INTERNAL MEDICINE

## 2022-09-27 PROCEDURE — 96376 TX/PRO/DX INJ SAME DRUG ADON: CPT

## 2022-09-27 PROCEDURE — 700111 HCHG RX REV CODE 636 W/ 250 OVERRIDE (IP): Performed by: EMERGENCY MEDICINE

## 2022-09-27 PROCEDURE — 36415 COLL VENOUS BLD VENIPUNCTURE: CPT

## 2022-09-27 PROCEDURE — 85610 PROTHROMBIN TIME: CPT

## 2022-09-27 PROCEDURE — A9270 NON-COVERED ITEM OR SERVICE: HCPCS | Performed by: EMERGENCY MEDICINE

## 2022-09-27 PROCEDURE — 700102 HCHG RX REV CODE 250 W/ 637 OVERRIDE(OP): Performed by: INTERNAL MEDICINE

## 2022-09-27 PROCEDURE — 74176 CT ABD & PELVIS W/O CONTRAST: CPT

## 2022-09-27 PROCEDURE — G0378 HOSPITAL OBSERVATION PER HR: HCPCS

## 2022-09-27 PROCEDURE — 93005 ELECTROCARDIOGRAM TRACING: CPT | Performed by: EMERGENCY MEDICINE

## 2022-09-27 PROCEDURE — 96374 THER/PROPH/DIAG INJ IV PUSH: CPT

## 2022-09-27 PROCEDURE — 96372 THER/PROPH/DIAG INJ SC/IM: CPT

## 2022-09-27 PROCEDURE — 80053 COMPREHEN METABOLIC PANEL: CPT

## 2022-09-27 PROCEDURE — 99222 1ST HOSP IP/OBS MODERATE 55: CPT | Performed by: SURGERY

## 2022-09-27 PROCEDURE — 83690 ASSAY OF LIPASE: CPT

## 2022-09-27 PROCEDURE — 99285 EMERGENCY DEPT VISIT HI MDM: CPT

## 2022-09-27 PROCEDURE — 700111 HCHG RX REV CODE 636 W/ 250 OVERRIDE (IP): Performed by: INTERNAL MEDICINE

## 2022-09-27 RX ORDER — CHOLECALCIFEROL (VITAMIN D3) 125 MCG
5 CAPSULE ORAL
Status: DISCONTINUED | OUTPATIENT
Start: 2022-09-27 | End: 2022-10-12 | Stop reason: HOSPADM

## 2022-09-27 RX ORDER — ERGOCALCIFEROL 1.25 MG/1
50000 CAPSULE ORAL
Status: DISCONTINUED | OUTPATIENT
Start: 2022-10-02 | End: 2022-10-12 | Stop reason: HOSPADM

## 2022-09-27 RX ORDER — ALLOPURINOL 100 MG/1
100 TABLET ORAL
Status: DISCONTINUED | OUTPATIENT
Start: 2022-09-29 | End: 2022-10-12 | Stop reason: HOSPADM

## 2022-09-27 RX ORDER — ONDANSETRON 2 MG/ML
4 INJECTION INTRAMUSCULAR; INTRAVENOUS ONCE
Status: COMPLETED | OUTPATIENT
Start: 2022-09-27 | End: 2022-09-27

## 2022-09-27 RX ORDER — MORPHINE SULFATE 4 MG/ML
4 INJECTION INTRAVENOUS
Status: DISPENSED | OUTPATIENT
Start: 2022-09-27 | End: 2022-09-27

## 2022-09-27 RX ORDER — SEVELAMER CARBONATE 800 MG/1
800 TABLET, FILM COATED ORAL
Status: DISCONTINUED | OUTPATIENT
Start: 2022-09-27 | End: 2022-10-12 | Stop reason: HOSPADM

## 2022-09-27 RX ORDER — ACETAMINOPHEN 325 MG/1
650 TABLET ORAL EVERY 6 HOURS PRN
Status: DISCONTINUED | OUTPATIENT
Start: 2022-09-27 | End: 2022-10-04

## 2022-09-27 RX ORDER — ONDANSETRON 4 MG/1
4 TABLET, ORALLY DISINTEGRATING ORAL EVERY 4 HOURS PRN
Status: DISCONTINUED | OUTPATIENT
Start: 2022-09-27 | End: 2022-10-12 | Stop reason: HOSPADM

## 2022-09-27 RX ORDER — LIDOCAINE 50 MG/G
3 PATCH TOPICAL DAILY
Status: DISCONTINUED | OUTPATIENT
Start: 2022-09-27 | End: 2022-10-12 | Stop reason: HOSPADM

## 2022-09-27 RX ORDER — GABAPENTIN 300 MG/1
300 CAPSULE ORAL
Status: DISCONTINUED | OUTPATIENT
Start: 2022-09-27 | End: 2022-10-06

## 2022-09-27 RX ORDER — BISACODYL 10 MG
10 SUPPOSITORY, RECTAL RECTAL
Status: DISCONTINUED | OUTPATIENT
Start: 2022-09-27 | End: 2022-10-03

## 2022-09-27 RX ORDER — GAUZE BANDAGE 2" X 2"
100 BANDAGE TOPICAL 2 TIMES DAILY
Status: DISCONTINUED | OUTPATIENT
Start: 2022-09-27 | End: 2022-10-12 | Stop reason: HOSPADM

## 2022-09-27 RX ORDER — METRONIDAZOLE 500 MG/1
500 TABLET ORAL EVERY 8 HOURS
Status: COMPLETED | OUTPATIENT
Start: 2022-09-27 | End: 2022-10-02

## 2022-09-27 RX ORDER — POLYETHYLENE GLYCOL 3350 17 G/17G
1 POWDER, FOR SOLUTION ORAL
Status: DISCONTINUED | OUTPATIENT
Start: 2022-09-27 | End: 2022-10-03

## 2022-09-27 RX ORDER — TRAZODONE HYDROCHLORIDE 50 MG/1
50 TABLET ORAL NIGHTLY
Status: DISCONTINUED | OUTPATIENT
Start: 2022-09-27 | End: 2022-10-12 | Stop reason: HOSPADM

## 2022-09-27 RX ORDER — SODIUM CHLORIDE 9 MG/ML
INJECTION, SOLUTION INTRAVENOUS CONTINUOUS
Status: DISCONTINUED | OUTPATIENT
Start: 2022-09-27 | End: 2022-09-29

## 2022-09-27 RX ORDER — CARVEDILOL 6.25 MG/1
6.25 TABLET ORAL 2 TIMES DAILY WITH MEALS
Status: DISCONTINUED | OUTPATIENT
Start: 2022-09-27 | End: 2022-10-12 | Stop reason: HOSPADM

## 2022-09-27 RX ORDER — ONDANSETRON 2 MG/ML
4 INJECTION INTRAMUSCULAR; INTRAVENOUS EVERY 4 HOURS PRN
Status: DISCONTINUED | OUTPATIENT
Start: 2022-09-27 | End: 2022-10-12 | Stop reason: HOSPADM

## 2022-09-27 RX ORDER — OXYCODONE HYDROCHLORIDE 5 MG/1
5 TABLET ORAL EVERY 4 HOURS PRN
Status: DISCONTINUED | OUTPATIENT
Start: 2022-09-27 | End: 2022-09-29

## 2022-09-27 RX ORDER — HEPARIN SODIUM 5000 [USP'U]/ML
5000 INJECTION, SOLUTION INTRAVENOUS; SUBCUTANEOUS EVERY 8 HOURS
Status: DISCONTINUED | OUTPATIENT
Start: 2022-09-27 | End: 2022-10-12 | Stop reason: HOSPADM

## 2022-09-27 RX ORDER — ATORVASTATIN CALCIUM 20 MG/1
20 TABLET, FILM COATED ORAL NIGHTLY
Status: DISCONTINUED | OUTPATIENT
Start: 2022-09-27 | End: 2022-10-12 | Stop reason: HOSPADM

## 2022-09-27 RX ORDER — AMOXICILLIN 250 MG
2 CAPSULE ORAL 2 TIMES DAILY
Status: DISCONTINUED | OUTPATIENT
Start: 2022-09-27 | End: 2022-10-03

## 2022-09-27 RX ORDER — OMEPRAZOLE 20 MG/1
20 CAPSULE, DELAYED RELEASE ORAL 2 TIMES DAILY
Status: DISCONTINUED | OUTPATIENT
Start: 2022-09-27 | End: 2022-10-12 | Stop reason: HOSPADM

## 2022-09-27 RX ADMIN — CEFTRIAXONE SODIUM 1 G: 10 INJECTION, POWDER, FOR SOLUTION INTRAVENOUS at 16:58

## 2022-09-27 RX ADMIN — SODIUM CHLORIDE: 9 INJECTION, SOLUTION INTRAVENOUS at 12:41

## 2022-09-27 RX ADMIN — OMEPRAZOLE 20 MG: 20 CAPSULE, DELAYED RELEASE ORAL at 16:59

## 2022-09-27 RX ADMIN — Medication 5 MG: at 23:28

## 2022-09-27 RX ADMIN — MORPHINE SULFATE 4 MG: 4 INJECTION, SOLUTION INTRAMUSCULAR; INTRAVENOUS at 09:01

## 2022-09-27 RX ADMIN — HEPARIN SODIUM 5000 UNITS: 5000 INJECTION, SOLUTION INTRAVENOUS; SUBCUTANEOUS at 23:29

## 2022-09-27 RX ADMIN — ONDANSETRON 4 MG: 2 INJECTION INTRAMUSCULAR; INTRAVENOUS at 05:40

## 2022-09-27 RX ADMIN — CARVEDILOL 6.25 MG: 6.25 TABLET, FILM COATED ORAL at 12:09

## 2022-09-27 RX ADMIN — LIDOCAINE HYDROCHLORIDE 30 ML: 20 SOLUTION OROPHARYNGEAL at 07:15

## 2022-09-27 RX ADMIN — METRONIDAZOLE 500 MG: 500 TABLET ORAL at 23:29

## 2022-09-27 RX ADMIN — TRAZODONE HYDROCHLORIDE 50 MG: 50 TABLET ORAL at 23:29

## 2022-09-27 RX ADMIN — METRONIDAZOLE 500 MG: 500 TABLET ORAL at 16:59

## 2022-09-27 RX ADMIN — OXYCODONE 5 MG: 5 TABLET ORAL at 20:56

## 2022-09-27 RX ADMIN — GABAPENTIN 300 MG: 300 CAPSULE ORAL at 23:29

## 2022-09-27 RX ADMIN — ATORVASTATIN CALCIUM 20 MG: 20 TABLET, FILM COATED ORAL at 23:29

## 2022-09-27 RX ADMIN — MORPHINE SULFATE 4 MG: 4 INJECTION, SOLUTION INTRAMUSCULAR; INTRAVENOUS at 05:40

## 2022-09-27 RX ADMIN — Medication 100 MG: at 17:07

## 2022-09-27 RX ADMIN — CARVEDILOL 6.25 MG: 6.25 TABLET, FILM COATED ORAL at 17:00

## 2022-09-27 RX ADMIN — OXYCODONE 5 MG: 5 TABLET ORAL at 12:09

## 2022-09-27 RX ADMIN — OXYCODONE 5 MG: 5 TABLET ORAL at 16:58

## 2022-09-27 ASSESSMENT — ENCOUNTER SYMPTOMS
EYE PAIN: 0
MEMORY LOSS: 0
NECK PAIN: 0
FOCAL WEAKNESS: 0
ORTHOPNEA: 0
VOMITING: 0
DIARRHEA: 0
PALPITATIONS: 0
DOUBLE VISION: 0
BLURRED VISION: 0
EYE DISCHARGE: 0
COUGH: 0
BACK PAIN: 1
HEMOPTYSIS: 0
SPEECH CHANGE: 0
WEAKNESS: 0
WEIGHT LOSS: 0
NERVOUS/ANXIOUS: 0
MYALGIAS: 0
ABDOMINAL PAIN: 1
CHILLS: 0
CONSTIPATION: 0
PHOTOPHOBIA: 0
FEVER: 0
POLYDIPSIA: 0
TREMORS: 0
CLAUDICATION: 0
NAUSEA: 1
DIZZINESS: 0

## 2022-09-27 ASSESSMENT — LIFESTYLE VARIABLES
AVERAGE NUMBER OF DAYS PER WEEK YOU HAVE A DRINK CONTAINING ALCOHOL: 0
ON A TYPICAL DAY WHEN YOU DRINK ALCOHOL HOW MANY DRINKS DO YOU HAVE: 0
HOW MANY TIMES IN THE PAST YEAR HAVE YOU HAD 5 OR MORE DRINKS IN A DAY: 0
ALCOHOL_USE: NO
TOTAL SCORE: 0
TOTAL SCORE: 0
CONSUMPTION TOTAL: NEGATIVE
EVER HAD A DRINK FIRST THING IN THE MORNING TO STEADY YOUR NERVES TO GET RID OF A HANGOVER: NO
TOTAL SCORE: 0
HAVE YOU EVER FELT YOU SHOULD CUT DOWN ON YOUR DRINKING: NO
EVER FELT BAD OR GUILTY ABOUT YOUR DRINKING: NO
HAVE PEOPLE ANNOYED YOU BY CRITICIZING YOUR DRINKING: NO
DOES PATIENT WANT TO STOP DRINKING: NO

## 2022-09-27 ASSESSMENT — FIBROSIS 4 INDEX: FIB4 SCORE: 3.38

## 2022-09-27 ASSESSMENT — PAIN DESCRIPTION - PAIN TYPE
TYPE: ACUTE PAIN
TYPE: ACUTE PAIN

## 2022-09-27 NOTE — ED PROVIDER NOTES
ED Provider Note    CHIEF COMPLAINT  Chief Complaint   Patient presents with    Flank Pain     L flank pain, worsened 40 mins ago. 8/10 pain. Ptrecently had stent removed from pancreas 2 days ago.  Pt denies n/v. Dialysis pt.        HPI  Junior Proctor is a 72 y.o. male who presents with abdomen and flank pain.  Patient has a history of pancreatic obstruction and associated pancreatitis.  Had ERCP with stent placement.  Stent was recently removed and then replaced with a self-expanding stent.  States he was doing okay, but 2 days ago started having pain in his upper abdomen.  It worsened over the last few hours.  Radiates around to the left flank and back.  He has not had fever.  He feels nauseous.  No vomiting.  No headache, confusion      REVIEW OF SYSTEMS  As per HPI, otherwise a 10 point review of systems is negative    PAST MEDICAL HISTORY  Past Medical History:   Diagnosis Date    Gout     Hemodialysis patient (HCC)     Hypertension     Kidney disease     MI (myocardial infarction) (HCC)        SOCIAL HISTORY  Social History     Tobacco Use    Smoking status: Former    Smokeless tobacco: Never   Vaping Use    Vaping Use: Never used   Substance Use Topics    Alcohol use: Yes     Comment: occ    Drug use: Never       SURGICAL HISTORY  Past Surgical History:   Procedure Laterality Date    TX ERCP,DIAGNOSTIC N/A 9/19/2022    Procedure: ERCP (ENDOSCOPIC RETROGRADE CHOLANGIOPANCREATOGRAPHY);  Surgeon: Mikie Lugo M.D.;  Location: SURGERY SAME DAY [facility];  Service: Gastroenterology    TX ERCP,DIAGNOSTIC N/A 6/5/2022    Procedure: ERCP (ENDOSCOPIC RETROGRADE CHOLANGIOPANCREATOGRAPHY);  Surgeon: Ibrahima Daly M.D.;  Location: SURGERY [facility];  Service: Gastroenterology    TX UPPER GI ENDOSCOPY,DIAGNOSIS N/A 12/10/2021    Procedure: GASTROSCOPY;  Surgeon: Paddy Hooks M.D.;  Location: SURGERY SAME DAY [facility];  Service: Gastroenterology    TX COLONOSCOPY,DIAGNOSTIC N/A 12/10/2021    Procedure: COLONOSCOPY;   "Surgeon: Paddy Hooks M.D.;  Location: SURGERY SAME DAY [facility];  Service: Gastroenterology    IA UPPER GI ENDOSCOPY,BIOPSY N/A 12/10/2021    Procedure: GASTROSCOPY, WITH BIOPSY;  Surgeon: Paddy Hooks M.D.;  Location: SURGERY SAME DAY [facility];  Service: Gastroenterology    IA COLONOSCOPY,BIOPSY N/A 12/10/2021    Procedure: COLONOSCOPY, WITH BIOPSY;  Surgeon: Paddy Hooks M.D.;  Location: SURGERY SAME DAY [facility];  Service: Gastroenterology       CURRENT MEDICATIONS  Home Medications       Reviewed by Bushra Martin R.N. (Registered Nurse) on 09/27/22 at 0515  Med List Status: Partial     Medication Last Dose Status   acetaminophen (TYLENOL) 500 MG Tab  Active   allopurinol (ZYLOPRIM) 100 MG Tab  Active   atorvastatin (LIPITOR) 20 MG Tab  Active   Buprenorphine 15 MCG/HR PATCH WEEKLY  Active   capsaicin (ZOSTRIX) 0.025 % cream  Active   carvedilol (COREG) 6.25 MG Tab  Active   diclofenac sodium (VOLTAREN) 1 % Gel  Active   Etanercept 50 MG/ML Solution Prefilled Syringe  Active   folic acid (FOLVITE) 1 MG Tab  Active   gabapentin (NEURONTIN) 300 MG Cap  Active   lidocaine (LIDODERM) 5 % Patch  Active   melatonin 3 MG Tab  Active   pantoprazole (PROTONIX) 40 MG Tablet Delayed Response  Active   sevelamer (RENAGEL) 800 MG Tab  Active   traZODone (DESYREL) 50 MG Tab  Active   vitamin D2, Ergocalciferol, (DRISDOL) 1.25 MG (98411 UT) Cap capsule  Active                    ALLERGIES  Allergies   Allergen Reactions    Motrin [Ibuprofen]      hhx of stomach ulcers       PHYSICAL EXAM  VITAL SIGNS: BP (!) 182/95   Pulse 98   Temp 36.6 °C (97.8 °F) (Temporal)   Resp 18   Ht 1.727 m (5' 8\")   Wt 76.7 kg (169 lb)   SpO2 97%   BMI 25.70 kg/m²    Constitutional: Awake and alert.  Chronically ill-appearing  HENT: Normal inspection  Eyes: Normal inspection  Neck: Grossly normal range of motion.  Cardiovascular: Normal heart rate, Normal rhythm.  Symmetric peripheral pulses.   Thorax & Lungs: No respiratory " distress, No wheezing, No rales, No rhonchi, No chest tenderness.  Permacath right upper chest  Abdomen: Bowel sounds normal, soft, non-distended, tender to palpation across upper abdomen.  No rebound or peritonitis  Skin: No obvious rash.  Back: No tenderness, No CVA tenderness.   Extremities: Symmetric pedal edema  Neurologic: Grossly normal   Psychiatric: Normal for situation    RADIOLOGY/PROCEDURES  CT-ABDOMEN-PELVIS W/O   Final Result   Addendum (preliminary) 1 of 1   Subacute right L2 and L3 transverse process fractures.      Final      1.  Acute on chronic pancreatitis. Inflammatory changes track along the left paracolic gutter.   2.  Likely reactive inflammation of the colon at the splenic flexure.   3.  Mildly dilated gallbladder with likely cholelithiasis. If there is concern for acute cholecystitis, ultrasound can be performed.   4.  Features of medical renal disease.   5.  Sigmoid diverticulosis.   6.  Atherosclerotic changes.   7.  Right hepatic lobe lesion, previously characterized as hemangioma on MRI           Imaging is interpreted by radiologist    Labs:  Results for orders placed or performed during the hospital encounter of 09/27/22   CBC WITH DIFFERENTIAL   Result Value Ref Range    WBC 8.5 4.8 - 10.8 K/uL    RBC 2.72 (L) 4.70 - 6.10 M/uL    Hemoglobin 8.2 (L) 14.0 - 18.0 g/dL    Hematocrit 25.4 (L) 42.0 - 52.0 %    MCV 93.4 81.4 - 97.8 fL    MCH 30.1 27.0 - 33.0 pg    MCHC 32.3 (L) 33.7 - 35.3 g/dL    RDW 62.9 (H) 35.9 - 50.0 fL    Platelet Count 162 (L) 164 - 446 K/uL    MPV 11.0 9.0 - 12.9 fL    Neutrophils-Polys 81.10 (H) 44.00 - 72.00 %    Lymphocytes 10.90 (L) 22.00 - 41.00 %    Monocytes 4.60 0.00 - 13.40 %    Eosinophils 2.00 0.00 - 6.90 %    Basophils 0.80 0.00 - 1.80 %    Immature Granulocytes 0.60 0.00 - 0.90 %    Nucleated RBC 0.00 /100 WBC    Neutrophils (Absolute) 6.90 1.82 - 7.42 K/uL    Lymphs (Absolute) 0.93 (L) 1.00 - 4.80 K/uL    Monos (Absolute) 0.39 0.00 - 0.85 K/uL    Eos  (Absolute) 0.17 0.00 - 0.51 K/uL    Baso (Absolute) 0.07 0.00 - 0.12 K/uL    Immature Granulocytes (abs) 0.05 0.00 - 0.11 K/uL    NRBC (Absolute) 0.00 K/uL   COMP METABOLIC PANEL   Result Value Ref Range    Sodium 136 135 - 145 mmol/L    Potassium 5.1 3.6 - 5.5 mmol/L    Chloride 98 96 - 112 mmol/L    Co2 23 20 - 33 mmol/L    Anion Gap 15.0 7.0 - 16.0    Glucose 74 65 - 99 mg/dL    Bun 25 (H) 8 - 22 mg/dL    Creatinine 8.82 (HH) 0.50 - 1.40 mg/dL    Calcium 8.5 8.5 - 10.5 mg/dL    AST(SGOT) 13 12 - 45 U/L    ALT(SGPT) <5 2 - 50 U/L    Alkaline Phosphatase 137 (H) 30 - 99 U/L    Total Bilirubin 0.3 0.1 - 1.5 mg/dL    Albumin 3.3 3.2 - 4.9 g/dL    Total Protein 6.8 6.0 - 8.2 g/dL    Globulin 3.5 1.9 - 3.5 g/dL    A-G Ratio 0.9 g/dL   LIPASE   Result Value Ref Range    Lipase 157 (H) 11 - 82 U/L   URINALYSIS    Specimen: Urine   Result Value Ref Range    Color Yellow     Character Clear     Specific Gravity >=1.030 <1.035    Ph 5.5 5.0 - 8.0    Glucose Negative Negative mg/dL    Ketones 15 (A) Negative mg/dL    Protein Negative Negative mg/dL    Bilirubin Small (A) Negative    Urobilinogen, Urine 0.2 Negative    Nitrite Negative Negative    Leukocyte Esterase Negative Negative    Occult Blood Negative Negative    Micro Urine Req see below    ESTIMATED GFR   Result Value Ref Range    GFR (CKD-EPI) 6 (A) >60 mL/min/1.73 m 2   EKG   Result Value Ref Range    Report       [facility] Emergency Dept.    Test Date:  2022  Pt Name:    RENE STEINBERG          Department: ER  MRN:        [id number]                      Room:       [facility]  Gender:     Male                         Technician: [id number]  :        1950                   Requested By:YAZAN ASCENCIO  Order #:    [id number]                    Reading MD:    Measurements  Intervals                                Axis  Rate:       108                          P:          67  AZ:         152                          QRS:        61  QRSD:        90                           T:          56  QT:         363  QTc:        487    Interpretive Statements  Sinus tachycardia  Borderline prolonged QT interval  Compared to ECG 09/13/2022 19:58:08  Sinus rhythm no longer present         Medications   morphine 4 MG/ML injection 4 mg (4 mg Intravenous Given 9/27/22 0540)   ondansetron (ZOFRAN) syringe/vial injection 4 mg (4 mg Intravenous Given 9/27/22 0540)         COURSE & MEDICAL DECISION MAKING  Patient presents with abdominal pain.  Has past history as described above.  Work-up is initiated.  Treat symptoms with morphine and Zofran.    Laboratory data began to return.  He has a mildly elevated lipase.  He has chronic end-stage renal disease.  No leukocytosis or left shift.    CT scan of the abdomen and pelvis shows acute on chronic pancreatitis.  Inflammation adjacent to the colon.  Suspect all symptoms related to his pancreatitis.  He will need to be admitted to the hospital for further management.  Hold IV fluids for now given end-stage renal disease.  I will discuss with gastroenterologist and hospitalist.    Consulted Dr. Veliz.  He will admit the patient.    Discussed case with Dr. Villalobos.  He will evaluate patient    FINAL IMPRESSION  1.  Pancreatitis      This dictation was created using voice recognition software. The accuracy of the dictation is limited to the abilities of the software.  The nursing notes were reviewed and certain aspects of this information were incorporated into this note.      Electronically signed by: Mu Eng M.D., 9/27/2022 5:57 AM

## 2022-09-27 NOTE — H&P
Hospital Medicine History & Physical Note    Date of Service  9/27/2022    Primary Care Physician  Pcp Pt States None    Consultants  GI, Dr Daly General surgeon Dr. Heath and nephrology    Code Status  Full Code    Chief Complaint  Chief Complaint   Patient presents with    Flank Pain     L flank pain, worsened 40 mins ago. 8/10 pain. Ptrecently had stent removed from pancreas 2 days ago.  Pt denies n/v. Dialysis pt.        History of Presenting Illness    72-year-old male with history of end-stage kidney disease on dialysis gout, alcoholism, hypertension and coronary artery disease who presented 9/27 with abdominal pain.  Recently patient was discharged on 9/22 for bacteremia sepsis and pancreatitis.  Patient had acute pancreatitis due to stones, needed stent and later stent was blocked and was exchanged by Dr.Victor Daly, at that time patient was discharged to follow-up with GI clinic, patient came on 9/27 with severe abdominal pain, nausea, no vomiting, no fever or chills and no other symptoms.  Labs showed elevated lipase and CT scan confirmed acute on chronic pancreatitis.  Patient did not have elevated liver enzymes, GI was consulted by ED physician n.p.o. and IV fluid were started,.    Patient lives by himself, patient has been on dialysis for 3 months and he is getting dialysis through port on the right side.    I discussed the plan of care with patient and bedside RN.    Review of Systems  Review of Systems   Constitutional:  Negative for chills, fever and weight loss.   HENT:  Negative for ear pain, hearing loss and tinnitus.    Eyes:  Negative for blurred vision, double vision and photophobia.   Respiratory:  Negative for cough and hemoptysis.    Cardiovascular:  Negative for chest pain, palpitations, orthopnea and claudication.   Gastrointestinal:  Positive for abdominal pain and nausea. Negative for constipation, diarrhea and vomiting.   Genitourinary:  Negative for dysuria, frequency and urgency.    Musculoskeletal:  Negative for myalgias and neck pain.   Skin:  Negative for rash.   Neurological:  Negative for dizziness, speech change and weakness.     Past Medical History   has a past medical history of Gout, Hemodialysis patient (HCC), Hypertension, Kidney disease, and MI (myocardial infarction) ([facility]).    Surgical History   has a past surgical history that includes pr upper gi endoscopy,diagnosis (N/A, 12/10/2021); pr colonoscopy,diagnostic (N/A, 12/10/2021); pr upper gi endoscopy,biopsy (N/A, 12/10/2021); pr colonoscopy,biopsy (N/A, 12/10/2021); pr ercp,diagnostic (N/A, 6/5/2022); and pr ercp,diagnostic (N/A, 9/19/2022).     Family History  family history includes Diabetes in his mother.   Family history reviewed with patient. There is no family history that is pertinent to the chief complaint.     Social History   reports that he has quit smoking. He has never used smokeless tobacco. He reports current alcohol use. He reports that he does not use drugs.    Allergies  Allergies   Allergen Reactions    Motrin [Ibuprofen] Unspecified     hhx of stomach ulcers       Medications  Prior to Admission Medications   Prescriptions Last Dose Informant Patient Reported? Taking?   Buprenorphine 15 MCG/HR PATCH WEEKLY  Patient's Home Pharmacy Yes No   Sig: Place 1 Patch on the skin every 7 days.   Etanercept 50 MG/ML Solution Prefilled Syringe  Patient's Home Pharmacy No No   Sig: Inject 50 mg under the skin every 7 days.   acetaminophen (TYLENOL) 500 MG Tab  Patient's Home Pharmacy Yes No   Sig: Take 1,000 mg by mouth every 8 hours as needed. Indications: Pain   allopurinol (ZYLOPRIM) 100 MG Tab  Patient's Home Pharmacy Yes No   Sig: Take 100 mg by mouth 3 times a week. Taken post hemodialysis THREE TIMES A WEEK   atorvastatin (LIPITOR) 20 MG Tab  Patient's Home Pharmacy Yes No   Sig: Take 20 mg by mouth every evening.   capsaicin (ZOSTRIX) 0.025 % cream  Patient's Home Pharmacy Yes No   Sig: Apply 1 Application  topically 4 times a day as needed. Apply to effected area   carvedilol (COREG) 6.25 MG Tab  Patient's Home Pharmacy Yes No   Sig: Take 6.25 mg by mouth 2 times a day with meals.   diclofenac sodium (VOLTAREN) 1 % Gel  Patient's Home Pharmacy Yes No   Sig: Apply 4 g topically 4 times a day as needed (Osetoarthritis pain).   folic acid (FOLVITE) 1 MG Tab  Patient's Home Pharmacy Yes No   Sig: Take 1 mg by mouth every day.   gabapentin (NEURONTIN) 300 MG Cap  Patient's Home Pharmacy Yes No   Sig: Take 300 mg by mouth at bedtime.   lidocaine (LIDODERM) 5 % Patch  Patient's Home Pharmacy Yes No   Sig: Apply 3 Patches topically every day. To affected area   melatonin 3 MG Tab  Patient's Home Pharmacy Yes No   Sig: Take 6 mg by mouth at bedtime.   pantoprazole (PROTONIX) 40 MG Tablet Delayed Response  Patient's Home Pharmacy Yes No   Sig: Take 40 mg by mouth 2 times a day.   sevelamer (RENAGEL) 800 MG Tab  Patient's Home Pharmacy Yes No   Sig: Take 800 mg by mouth 3 times a day with meals.   traZODone (DESYREL) 50 MG Tab  Patient's Home Pharmacy Yes No   Sig: Take 50 mg by mouth every evening. Indications: Trouble Sleeping   vitamin D2, Ergocalciferol, (DRISDOL) 1.25 MG (30088 UT) Cap capsule  Patient's Home Pharmacy Yes No   Sig: Take 50,000 Units by mouth every 7 days.      Facility-Administered Medications: None       Physical Exam  Temp:  [36.6 °C (97.8 °F)-38.4 °C (101.1 °F)] 38.4 °C (101.1 °F)  Pulse:  [] 107  Resp:  [2-19] 19  BP: (108-182)/(65-95) 108/79  SpO2:  [93 %-97 %] 97 %  Blood Pressure : (!) 152/69   Temperature: 36.6 °C (97.8 °F)   Pulse: (!) 108   Respiration: 18   Pulse Oximetry: 94 %       Physical Exam  Constitutional:       Appearance: He is ill-appearing.      Comments: Patient is in pain   Eyes:      General: No scleral icterus.  Cardiovascular:      Rate and Rhythm: Normal rate.      Heart sounds: No murmur heard.  Pulmonary:      Effort: No respiratory distress.      Breath sounds: No  wheezing.   Abdominal:      General: There is no distension.      Tenderness: There is abdominal tenderness. There is no right CVA tenderness, left CVA tenderness or guarding.      Comments: Tenderness on the right upper quadrant   Musculoskeletal:      Right lower leg: No edema.      Left lower leg: No edema.   Lymphadenopathy:      Cervical: No cervical adenopathy.   Skin:     General: Skin is dry.      Coloration: Skin is not jaundiced.      Findings: No bruising, lesion or rash.   Neurological:      General: No focal deficit present.      Mental Status: He is alert. Mental status is at baseline. He is disoriented.      Cranial Nerves: No cranial nerve deficit.      Motor: No weakness.      Gait: Gait normal.       Laboratory:  Recent Labs     09/27/22  0534   WBC 8.5   RBC 2.72*   HEMOGLOBIN 8.2*   HEMATOCRIT 25.4*   MCV 93.4   MCH 30.1   MCHC 32.3*   RDW 62.9*   PLATELETCT 162*   MPV 11.0     Recent Labs     09/27/22  0534   SODIUM 136   POTASSIUM 5.1   CHLORIDE 98   CO2 23   GLUCOSE 74   BUN 25*   CREATININE 8.82*   CALCIUM 8.5     Recent Labs     09/27/22  0534   ALTSGPT <5   ASTSGOT 13   ALKPHOSPHAT 137*   TBILIRUBIN 0.3   LIPASE 157*   GLUCOSE 74         No results for input(s): NTPROBNP in the last 72 hours.      No results for input(s): TROPONINT in the last 72 hours.    Imaging:  CT-ABDOMEN-PELVIS W/O   Final Result   Addendum (preliminary) 1 of 1   Subacute right L2 and L3 transverse process fractures.      Final      1.  Acute on chronic pancreatitis. Inflammatory changes track along the left paracolic gutter.   2.  Likely reactive inflammation of the colon at the splenic flexure.   3.  Mildly dilated gallbladder with likely cholelithiasis. If there is concern for acute cholecystitis, ultrasound can be performed.   4.  Features of medical renal disease.   5.  Sigmoid diverticulosis.   6.  Atherosclerotic changes.   7.  Right hepatic lobe lesion, previously characterized as hemangioma on MRI      US-RUQ     (Results Pending)       X-Ray:  I have personally reviewed the images and compared with prior images.  EKG:  I have personally reviewed the images and compared with prior images.    Assessment/Plan:  Justification for Admission Status  I anticipate this patient is appropriate for observation status at this time because acute pancreatitis    Patient will need a Med/Surg bed on MEDICAL service .  The need is secondary to acute pancreatitis.    * Acute on chronic pancreatitis (HCC)- (present on admission)  Assessment & Plan  Recurrent acute pancreatitis   history of alcoholism, CT scan showed stones  Patient underwent stent placement and pancreatic duct and then exchanged  Possible cholecystitis, ultrasound was ordered  GI was consulted for possible stent exchange  General surgeon was consulted for possible cholecystectomy  IV fluid, n.p.o. and antibiotics ceftriaxone and Flagyl  Close monitoring with labs daily      HTN (hypertension)- (present on admission)  Assessment & Plan  Continue carvedilol as outpatient medication    Alcohol dependence (HCC)- (present on admission)  Assessment & Plan  Close monitoring and consider start with CIWA if needed    ESRD on dialysis (HCC)- (present on admission)  Assessment & Plan  Started on dialysis for 3 months  Nephrology was consulted  Patient receiving dialysis and from port on his right side   monitor his electrolytes        VTE prophylaxis: SCDs/TEDs and enoxaparin ppx

## 2022-09-27 NOTE — ED NOTES
Med Rec completed per patient and home pharmacy ([facility])  Allergies reviewed  No ORAL antibiotics in last 30 days    Patient states that it has been a few days since he has taken his medications

## 2022-09-27 NOTE — CONSULTS
Gastroenterology Initial Consult Note               Author:  DARIN Rashid Date & Time Created: 9/27/2022 2:33 PM       Patient ID:  Name:             Junior Proctor    YOB: 1950  Age:                 72 y.o.  male  MRN:               [id number]      Referring Provider:  Oswaldo Kwan MD      Presenting Chief Complaint:  Abdominal pain, Pancreatitis      History of Present Illness:    He is a 72-year-old male patient seen in consultation for abdominal pain, pancreatitis, possible need for repeat ERCP and stent removal/replacement.  He has a past medical history that includes chronic pancreatitis, coronary artery disease, end-stage renal disease on dialysis, gout, hypertension, hyperlipidemia, CHF.  He  presented to the hospital with acute onset epigastric abdominal pain that started 2 to 3 days ago and has become increasingly worse.  He reports the pain became significantly worse after he drank a glass of wine at home.  The patient has been seen by our group in the past with episodes of pancreatitis, initially first evaluated in June of this year for possible biliary pancreatitis.  ERCP was performed at that time without evidence of CBD stone, however patient did have 2 pancreatic duct stones.  1 stone was removed and placement of biliary stent by Dr. Ibrahima Daly at that time.  Following the procedure, patient refused to schedule an ERCP and repeat stone/stent removal.  He then was hospitalized earlier this month with abdominal pain and gram-negative bacteremia where an MRCP was performed demonstrating continued in situ pancreatic duct stent.  ERCP was performed at that time on 9/19/2022 by Dr. Mikie Lugo with pancreatic duct stone removal, removal of occluded pancreatic stent, and placement of new self ejecting pancreatic stent placed.  After the procedure, the patient did not have abdominal pain and patient states that he did not have pain, nausea, or vomiting up until about 2 days  ago.    Upon initial evaluation today, he is noted to have elevated lipase 157, alkaline phosphatase 137.  Liver enzymes and bilirubin are normal.  Creatinine elevate 8.82.  CBC with normal WBCs, hemoglobin 8.2, hematocrit 25.4, platelets 162.  Right upper quadrant ultrasound demonstrates cholelithiasis with positive Evans sign, hepatic steatosis, hepatomegaly.  CT abdomen pelvis without contrast demonstrates acute on chronic pancreatitis with inflammatory changes tracking along the left paracolic gutter, likely reactive inflammation of the colon of the splenic flexure, mildly dilated gallbladder, and chronic finding.  I personally discussed the CT scan with Dr. Sandeep Gallardo this afternoon, and there is no evidence of pancreatic duct stent in situ on current CT especially compared to previous imaging where the stent was noted.      Review of Systems:  Review of Systems   Constitutional:  Negative for chills and fever.   HENT:  Negative for hearing loss and tinnitus.    Eyes:  Negative for pain and discharge.   Respiratory:  Negative for cough and hemoptysis.    Cardiovascular:  Negative for chest pain and palpitations.   Gastrointestinal:  Positive for abdominal pain. Negative for vomiting.   Musculoskeletal:  Positive for back pain. Negative for myalgias.   Neurological:  Negative for tremors and focal weakness.   Endo/Heme/Allergies:  Negative for environmental allergies and polydipsia.   Psychiatric/Behavioral:  Negative for memory loss. The patient is not nervous/anxious.            Past Medical History:  Past Medical History:   Diagnosis Date    Gout     Hemodialysis patient (HCC)     Hypertension     Kidney disease     MI (myocardial infarction) (HCC)      Active Hospital Problems    Diagnosis     Acute on chronic pancreatitis (HCC) [K85.90, K86.1]     HTN (hypertension) [I10]     Alcohol dependence (HCC) [F10.20]     ESRD on dialysis (HCC) [N18.6, Z99.2]          Past Surgical History:  Past Surgical  History:   Procedure Laterality Date    CT ERCP,DIAGNOSTIC N/A 9/19/2022    Procedure: ERCP (ENDOSCOPIC RETROGRADE CHOLANGIOPANCREATOGRAPHY);  Surgeon: Mikie Lugo M.D.;  Location: SURGERY SAME DAY [facility];  Service: Gastroenterology    CT ERCP,DIAGNOSTIC N/A 6/5/2022    Procedure: ERCP (ENDOSCOPIC RETROGRADE CHOLANGIOPANCREATOGRAPHY);  Surgeon: Ibrahima Daly M.D.;  Location: SURGERY [facility];  Service: Gastroenterology    CT UPPER GI ENDOSCOPY,DIAGNOSIS N/A 12/10/2021    Procedure: GASTROSCOPY;  Surgeon: Paddy Hooks M.D.;  Location: SURGERY SAME DAY [facility];  Service: Gastroenterology    CT COLONOSCOPY,DIAGNOSTIC N/A 12/10/2021    Procedure: COLONOSCOPY;  Surgeon: Paddy Hooks M.D.;  Location: SURGERY SAME DAY [facility];  Service: Gastroenterology    CT UPPER GI ENDOSCOPY,BIOPSY N/A 12/10/2021    Procedure: GASTROSCOPY, WITH BIOPSY;  Surgeon: Paddy Hooks M.D.;  Location: SURGERY SAME DAY [facility];  Service: Gastroenterology    CT COLONOSCOPY,BIOPSY N/A 12/10/2021    Procedure: COLONOSCOPY, WITH BIOPSY;  Surgeon: Paddy Hooks M.D.;  Location: SURGERY SAME DAY [facility];  Service: Gastroenterology           Hospital Medications:  Current Facility-Administered Medications   Medication Dose Frequency Provider Last Rate Last Admin    allopurinol (ZYLOPRIM) tablet 100 mg  100 mg 3x/week Oswaldo Kwan M.D.        atorvastatin (LIPITOR) tablet 20 mg  20 mg Nightly Oswaldo Kwan M.D.        carvedilol (COREG) tablet 6.25 mg  6.25 mg BID WITH MEALS Oswaldo Kwan M.D.   6.25 mg at 09/27/22 1209    gabapentin (NEURONTIN) capsule 300 mg  300 mg QHS Oswaldo Kwan M.D.        lidocaine (LIDODERM) 5 % 3 Patch  3 Patch DAILY Oswaldo Kwan M.D.        melatonin tablet 5 mg  5 mg QHS Oswaldo Kwan M.D.        omeprazole (PRILOSEC) capsule 20 mg  20 mg BID Oswaldo Kwan M.D.        sevelamer carbonate (RENVELA) tablet 800 mg  800 mg TID WITH MEALS Oswaldo Kwan M.D.         traZODone (DESYREL) tablet 50 mg  50 mg Nightly Oswaldo Kwan M.D.        [START ON 10/2/2022] vitamin D2 (Ergocalciferol) (Drisdol) capsule 50,000 Units  50,000 Units Q7 DAYS Oswaldo Kwan M.D.        senna-docusate (PERICOLACE or SENOKOT S) 8.6-50 MG per tablet 2 Tablet  2 Tablet BID Oswaldo Kwan M.D.        And    polyethylene glycol/lytes (MIRALAX) PACKET 1 Packet  1 Packet QDAY PRN Oswaldo Kwan M.D.        And    magnesium hydroxide (MILK OF MAGNESIA) suspension 30 mL  30 mL QDAY PRN Oswaldo Kwan M.D.        And    bisacodyl (DULCOLAX) suppository 10 mg  10 mg QDAY PRN Oswaldo Kwan M.D.        NS infusion   Continuous Oswaldo Kwan M.D. 125 mL/hr at 09/27/22 1241 New Bag at 09/27/22 1241    acetaminophen (Tylenol) tablet 650 mg  650 mg Q6HRS PRN Oswaldo Kwan M.D.        ondansetron (ZOFRAN) syringe/vial injection 4 mg  4 mg Q4HRS PRN Oswaldo Kwan M.D.        ondansetron (ZOFRAN ODT) dispertab 4 mg  4 mg Q4HRS PRN Oswaldo Kwan M.D.        oxyCODONE immediate-release (ROXICODONE) tablet 5 mg  5 mg Q4HRS PRN Oswaldo Kwan M.D.   5 mg at 09/27/22 1209    heparin injection 5,000 Units  5,000 Units Q8HRS Oswaldo Kwan M.D.        cefTRIAXone (Rocephin) syringe 1 g  1 g Q24HRS Oswaldo Kwan M.D.        metroNIDAZOLE (FLAGYL) tablet 500 mg  500 mg Q8HRS Oswaldo Kwan M.D.       Last reviewed on 9/27/2022  9:27 AM by Rishabh Cagle       Current Outpatient Medications:  Medications Prior to Admission   Medication Sig Dispense Refill Last Dose    Buprenorphine 15 MCG/HR PATCH WEEKLY Place 1 Patch on the skin every 7 days.   9/21/2022 at PATCH ON    capsaicin (ZOSTRIX) 0.025 % cream Apply 1 Application topically 4 times a day as needed. Apply to effected area   UNK at UNK    acetaminophen (TYLENOL) 500 MG Tab Take 1,000 mg by mouth every 8 hours as needed. Indications: Pain   UNK at UNK    melatonin 3 MG Tab Take 6 mg by mouth at bedtime.    UNK at K    Etanercept 50 MG/ML Solution Prefilled Syringe Inject 50 mg under the skin every 7 days. 4.2 mL 1 9/21/2022 at UNK    gabapentin (NEURONTIN) 300 MG Cap Take 300 mg by mouth at bedtime.   UNK at UNK    folic acid (FOLVITE) 1 MG Tab Take 1 mg by mouth every day.   UNK at K    pantoprazole (PROTONIX) 40 MG Tablet Delayed Response Take 40 mg by mouth 2 times a day.   UNK at K    lidocaine (LIDODERM) 5 % Patch Apply 3 Patches topically every day. To affected area   UNK at K    sevelamer (RENAGEL) 800 MG Tab Take 800 mg by mouth 3 times a day with meals.   UNK at K    diclofenac sodium (VOLTAREN) 1 % Gel Apply 4 g topically 4 times a day as needed (Osetoarthritis pain).   UNK at K    vitamin D2, Ergocalciferol, (DRISDOL) 1.25 MG (22690 UT) Cap capsule Take 50,000 Units by mouth every 7 days.   UNK at K    atorvastatin (LIPITOR) 20 MG Tab Take 20 mg by mouth every evening.   UNK at UNK    carvedilol (COREG) 6.25 MG Tab Take 6.25 mg by mouth 2 times a day with meals.   UNK at UNK    traZODone (DESYREL) 50 MG Tab Take 50 mg by mouth every evening. Indications: Trouble Sleeping   UNK at UNK    allopurinol (ZYLOPRIM) 100 MG Tab Take 100 mg by mouth 3 times a week. Taken post hemodialysis THREE TIMES A WEEK   UNK at UNK         Medication Allergies:  Allergies   Allergen Reactions    Motrin [Ibuprofen] Unspecified     hhx of stomach ulcers         Family Medical History:  Family History   Problem Relation Age of Onset    Diabetes Mother          Social History:  Social History     Socioeconomic History    Marital status: Single     Spouse name: Not on file    Number of children: Not on file    Years of education: Not on file    Highest education level: Not on file   Occupational History    Not on file   Tobacco Use    Smoking status: Former    Smokeless tobacco: Never   Vaping Use    Vaping Use: Never used   Substance and Sexual Activity    Alcohol use: Yes     Comment: occ    Drug use: Never     "Sexual activity: Not on file   Other Topics Concern    Not on file   Social History Narrative    Not on file     Social Determinants of Health     Financial Resource Strain: Not on file   Food Insecurity: Not on file   Transportation Needs: Not on file   Physical Activity: Not on file   Stress: Not on file   Social Connections: Not on file   Intimate Partner Violence: Not on file   Housing Stability: Not on file         Vital signs:  Weight/BMI: Body mass index is 25.7 kg/m².  /79   Pulse (!) 107   Temp (!) 38.4 °C (101.1 °F) (Oral)   Resp 19   Ht 1.727 m (5' 8\")   Wt 76.7 kg (169 lb)   SpO2 97%   Vitals:    09/27/22 0930 09/27/22 0934 09/27/22 1000 09/27/22 1137   BP: 138/70  138/65 108/79   Pulse: (!) 112 (!) 111 (!) 110 (!) 107   Resp:  14  19   Temp:    (!) 38.4 °C (101.1 °F)   TempSrc:    Oral   SpO2: 94% 94% 93% 97%   Weight:       Height:         Oxygen Therapy:  Pulse Oximetry: 97 %, O2 (LPM): 0, O2 Delivery Device: None - Room Air    Intake/Output Summary (Last 24 hours) at 9/27/2022 1433  Last data filed at 9/27/2022 1400  Gross per 24 hour   Intake 120 ml   Output --   Net 120 ml         Physical Exam:  Physical Exam  Vitals and nursing note reviewed.   Constitutional:       Appearance: Normal appearance.   HENT:      Right Ear: External ear normal.      Left Ear: External ear normal.      Nose: Nose normal. No congestion.      Mouth/Throat:      Mouth: Mucous membranes are moist.      Pharynx: Oropharynx is clear.   Eyes:      General: No scleral icterus.     Extraocular Movements: Extraocular movements intact.      Pupils: Pupils are equal, round, and reactive to light.   Cardiovascular:      Rate and Rhythm: Regular rhythm. Tachycardia present.      Pulses: Normal pulses.      Heart sounds: Normal heart sounds. No murmur heard.  Pulmonary:      Effort: Pulmonary effort is normal. No respiratory distress.      Breath sounds: Normal breath sounds.   Abdominal:      General: Abdomen is flat. " Bowel sounds are normal. There is distension.      Palpations: Abdomen is soft.      Tenderness: There is abdominal tenderness (Epigastric, LUQ).   Musculoskeletal:      Cervical back: Neck supple.      Right lower leg: No edema.      Left lower leg: No edema.   Lymphadenopathy:      Cervical: No cervical adenopathy.   Skin:     General: Skin is warm and dry.      Capillary Refill: Capillary refill takes less than 2 seconds.   Neurological:      General: No focal deficit present.      Mental Status: He is alert and oriented to person, place, and time.   Psychiatric:         Thought Content: Thought content normal.         Judgment: Judgment normal.         Labs:  Recent Labs     09/27/22  0534   SODIUM 136   POTASSIUM 5.1   CHLORIDE 98   CO2 23   BUN 25*   CREATININE 8.82*   CALCIUM 8.5     Recent Labs     09/27/22  0534   ALTSGPT <5   ASTSGOT 13   ALKPHOSPHAT 137*   TBILIRUBIN 0.3   LIPASE 157*   GLUCOSE 74     Recent Labs     09/27/22  0534   WBC 8.5   NEUTSPOLYS 81.10*   LYMPHOCYTES 10.90*   MONOCYTES 4.60   EOSINOPHILS 2.00   BASOPHILS 0.80   ASTSGOT 13   ALTSGPT <5   ALKPHOSPHAT 137*   TBILIRUBIN 0.3     Recent Labs     09/27/22  0534 09/27/22  1314   RBC 2.72*  --    HEMOGLOBIN 8.2*  --    HEMATOCRIT 25.4*  --    PLATELETCT 162*  --    PROTHROMBTM  --  15.3*   INR  --  1.23*     Recent Results (from the past 24 hour(s))   CBC WITH DIFFERENTIAL    Collection Time: 09/27/22  5:34 AM   Result Value Ref Range    WBC 8.5 4.8 - 10.8 K/uL    RBC 2.72 (L) 4.70 - 6.10 M/uL    Hemoglobin 8.2 (L) 14.0 - 18.0 g/dL    Hematocrit 25.4 (L) 42.0 - 52.0 %    MCV 93.4 81.4 - 97.8 fL    MCH 30.1 27.0 - 33.0 pg    MCHC 32.3 (L) 33.7 - 35.3 g/dL    RDW 62.9 (H) 35.9 - 50.0 fL    Platelet Count 162 (L) 164 - 446 K/uL    MPV 11.0 9.0 - 12.9 fL    Neutrophils-Polys 81.10 (H) 44.00 - 72.00 %    Lymphocytes 10.90 (L) 22.00 - 41.00 %    Monocytes 4.60 0.00 - 13.40 %    Eosinophils 2.00 0.00 - 6.90 %    Basophils 0.80 0.00 - 1.80 %     Immature Granulocytes 0.60 0.00 - 0.90 %    Nucleated RBC 0.00 /100 WBC    Neutrophils (Absolute) 6.90 1.82 - 7.42 K/uL    Lymphs (Absolute) 0.93 (L) 1.00 - 4.80 K/uL    Monos (Absolute) 0.39 0.00 - 0.85 K/uL    Eos (Absolute) 0.17 0.00 - 0.51 K/uL    Baso (Absolute) 0.07 0.00 - 0.12 K/uL    Immature Granulocytes (abs) 0.05 0.00 - 0.11 K/uL    NRBC (Absolute) 0.00 K/uL   COMP METABOLIC PANEL    Collection Time: 22  5:34 AM   Result Value Ref Range    Sodium 136 135 - 145 mmol/L    Potassium 5.1 3.6 - 5.5 mmol/L    Chloride 98 96 - 112 mmol/L    Co2 23 20 - 33 mmol/L    Anion Gap 15.0 7.0 - 16.0    Glucose 74 65 - 99 mg/dL    Bun 25 (H) 8 - 22 mg/dL    Creatinine 8.82 (HH) 0.50 - 1.40 mg/dL    Calcium 8.5 8.5 - 10.5 mg/dL    AST(SGOT) 13 12 - 45 U/L    ALT(SGPT) <5 2 - 50 U/L    Alkaline Phosphatase 137 (H) 30 - 99 U/L    Total Bilirubin 0.3 0.1 - 1.5 mg/dL    Albumin 3.3 3.2 - 4.9 g/dL    Total Protein 6.8 6.0 - 8.2 g/dL    Globulin 3.5 1.9 - 3.5 g/dL    A-G Ratio 0.9 g/dL   LIPASE    Collection Time: 22  5:34 AM   Result Value Ref Range    Lipase 157 (H) 11 - 82 U/L   ESTIMATED GFR    Collection Time: 22  5:34 AM   Result Value Ref Range    GFR (CKD-EPI) 6 (A) >60 mL/min/1.73 m 2   EKG    Collection Time: 22  7:19 AM   Result Value Ref Range    Report       [facility] Emergency Dept.    Test Date:  2022  Pt Name:    RENE STEINBERG          Department: ER  MRN:        [id number]                      Room:       [facility]  Gender:     Male                         Technician: [id number]  :        1950                   Requested By:YAZAN ASCENCIO  Order #:    [id number]                    Reading MD:    Measurements  Intervals                                Axis  Rate:       108                          P:          67  CT:         152                          QRS:        61  QRSD:       90                           T:          56  QT:         363  QTc:         487    Interpretive Statements  Sinus tachycardia  Borderline prolonged QT interval  Compared to ECG 09/13/2022 19:58:08  Sinus rhythm no longer present     URINALYSIS    Collection Time: 09/27/22  7:37 AM    Specimen: Urine   Result Value Ref Range    Color Yellow     Character Clear     Specific Gravity >=1.030 <1.035    Ph 5.5 5.0 - 8.0    Glucose Negative Negative mg/dL    Ketones 15 (A) Negative mg/dL    Protein Negative Negative mg/dL    Bilirubin Small (A) Negative    Urobilinogen, Urine 0.2 Negative    Nitrite Negative Negative    Leukocyte Esterase Negative Negative    Occult Blood Negative Negative    Micro Urine Req see below    POCT glucose device results    Collection Time: 09/27/22 12:15 PM   Result Value Ref Range    POC Glucose, Blood 63 (L) 65 - 99 mg/dL   Lactic Acid -STAT Once    Collection Time: 09/27/22  1:14 PM   Result Value Ref Range    Lactic Acid 1.6 0.5 - 2.0 mmol/L   Prothrombin time (INR)    Collection Time: 09/27/22  1:14 PM   Result Value Ref Range    PT 15.3 (H) 12.0 - 14.6 sec    INR 1.23 (H) 0.87 - 1.13         Radiology Review:  US-RUQ   Final Result   Addendum (preliminary) 1 of 1   No pancreatic duct stent is seen.      Dr. Gallardo discussed these findings with Wilmar Gallardo.      Final      1.  Cholelithiasis without discrete sonographic evidence of acute cholecystitis. Patient did demonstrate a Evans's sign throughout the course of the exam. There is strong clinical suspicion for acute cholecystitis, a HIDA scan should be obtained for    further evaluation.   2.  Hepatic steatosis.   3.  Hepatomegaly.   4.  The pancreas is obscured by overlying bowel gas.      CT-ABDOMEN-PELVIS W/O   Final Result   Addendum (preliminary) 1 of 1   Subacute right L2 and L3 transverse process fractures.      Final      1.  Acute on chronic pancreatitis. Inflammatory changes track along the left paracolic gutter.   2.  Likely reactive inflammation of the colon at the splenic flexure.   3.  Mildly  dilated gallbladder with likely cholelithiasis. If there is concern for acute cholecystitis, ultrasound can be performed.   4.  Features of medical renal disease.   5.  Sigmoid diverticulosis.   6.  Atherosclerotic changes.   7.  Right hepatic lobe lesion, previously characterized as hemangioma on MRI            MDM (Data Review):   -Records reviewed and summarized in current documentation  -I personally reviewed and interpreted the laboratory results  -I personally reviewed the radiology images        Medical Decision Making, by Problem:  Active Hospital Problems    Diagnosis     Acute on chronic pancreatitis (HCC) [K85.90, K86.1]     HTN (hypertension) [I10]     Alcohol dependence (HCC) [F10.20]     ESRD on dialysis (HCC) [N18.6, Z99.2]            Assessment/Recommendations:  72-year-old male with findings of acute pancreatitis on imaging and elevated lipase along with a 2 to 3-day history of epigastric pain.  The patient did recently have an ERCP with stent exchange, however the PD stent that was placed on September 19 by Dr. Mikie Lugo was a self injecting stent.  Imaging from today with CT without contrast per Dr. Miller review does not demonstrate continued presence of a pancreatic duct stent.  Given that the patient did not have pain following his ERCP on the 19th, unlikely that this acute pancreatitis is related to the ERCP.  The patient states that the pain began and got worse around the time that he drank a glass of wine, therefore I feel that this is more likely alcohol induced.  However, the patient does have gallstones and there is some concern for acute calculus cholecystitis.  This is another potential cause of his acute pancreatitis and without evidence of biliary ductal dilation or elevated liver enzymes/bilirubin, do not suspect choledocholithiasis.     Assessment:  -Acute on chronic pancreatitis-likely secondary to alcohol use however additional differential to include gallstone related  -Epigastric  pain secondary to above  -End-stage renal disease on dialysis  -Hypertension  -Hyperlipidemia    Plan:  -Conservative management of pancreatitis with IV hydration, careful advancement of diet, daily labs  -Agree with surgical consultation and following patient for possible cholecystectomy  -Alcohol cessation  -No plan for ERCP at this time given that current CT does not demonstrate stent is in place at this time and appears to have self ejected which was expected.    GI will sign off.  Recommend that he can follow-up with GI consultants outpatient as needed, however can also follow-up with his [facility] system GI group.      DELICIA Rashid.      Thank you for inviting me to participate in the care of this patient. Please do not hesitate to call GI consultants with additional questions/concerns or changes in the patient's clinical status at [phone].      Core Quality Measures   Reviewed items:  Labs, Medications and Radiology reports reviewed

## 2022-09-27 NOTE — PROGRESS NOTES
4 Eyes Skin Assessment Completed by Chiqui, RN and Javier RN.    Head WDL  Ears WDL  Nose WDL  Mouth WDL  Neck WDL  Breast/Chest WDL  Shoulder Blades WDL  Spine WDL  (R) Arm/Elbow/Hand WDL  (L) Arm/Elbow/Hand WDL  Abdomen WDL  Groin WDL  Scrotum/Coccyx/Buttocks WDL  (R) Leg WDL  (L) Leg WDL  (R) Heel/Foot/Toe Edema  (L) Heel/Foot/Toe Edema          Devices In Places Pulse Ox      Interventions In Place Pillows    Possible Skin Injury No    Pictures Uploaded Into Epic N/A  Wound Consult Placed N/A  RN Wound Prevention Protocol Ordered No

## 2022-09-27 NOTE — CONSULTS
"[facility] -  CONSULTATION NOTE               Author: Harrison Melgar M.D. Date & Time: 9/27/2022  11:58 AM       REASON FOR CONSULTATION:   Inpatient hemodialysis management.    CHIEF COMPLAINT:   \"Abdominal pain\"    HISTORY OF PRESENT ILLNESS:    Patient is a 72 year old male with a PMHx of ESRD on HD qTTS, HTN, ETOH, CAD, presented with abdominal pain.  There is concern for pancreatitis and plan for possible intervention.  He states pain started about 3 days ago and worsening.  Hasn't been eating or drinking well. Elevated lipase and CT wit acute on chronic pancreatitis.  Hasn't had dialysis today but refusing dialysis today.    REVIEW OF SYSTEMS:    10 point ROS was performed and is as per HPI or otherwise negative.    PAST MEDICAL HISTORY:   Past Medical History:   Diagnosis Date    Gout     Hemodialysis patient (HCC)     Hypertension     Kidney disease     MI (myocardial infarction) (HCC)        PAST SURGICAL HISTORY:   Past Surgical History:   Procedure Laterality Date    DE ERCP,DIAGNOSTIC N/A 9/19/2022    Procedure: ERCP (ENDOSCOPIC RETROGRADE CHOLANGIOPANCREATOGRAPHY);  Surgeon: Mikie Lugo M.D.;  Location: SURGERY SAME DAY [facility];  Service: Gastroenterology    DE ERCP,DIAGNOSTIC N/A 6/5/2022    Procedure: ERCP (ENDOSCOPIC RETROGRADE CHOLANGIOPANCREATOGRAPHY);  Surgeon: Ibrahima aDly M.D.;  Location: [facility];  Service: Gastroenterology    DE UPPER GI ENDOSCOPY,DIAGNOSIS N/A 12/10/2021    Procedure: GASTROSCOPY;  Surgeon: Paddy Hooks M.D.;  Location: SURGERY SAME DAY [facility];  Service: Gastroenterology    DE COLONOSCOPY,DIAGNOSTIC N/A 12/10/2021    Procedure: COLONOSCOPY;  Surgeon: Paddy Hooks M.D.;  Location: SURGERY SAME DAY [facility];  Service: Gastroenterology    DE UPPER GI ENDOSCOPY,BIOPSY N/A 12/10/2021    Procedure: GASTROSCOPY, WITH BIOPSY;  Surgeon: Paddy Hooks M.D.;  Location: SURGERY SAME DAY [facility];  Service: Gastroenterology    DE " "COLONOSCOPY,BIOPSY N/A 12/10/2021    Procedure: COLONOSCOPY, WITH BIOPSY;  Surgeon: Paddy Hooks M.D.;  Location: SURGERY SAME DAY [facility];  Service: Gastroenterology       FAMILY HISTORY:   Family History   Problem Relation Age of Onset    Diabetes Mother        SOCIAL HISTORY:   Social History     Tobacco Use   Smoking Status Former   Smokeless Tobacco Never     Social History     Substance and Sexual Activity   Alcohol Use Yes    Comment: occ     Social History     Substance and Sexual Activity   Drug Use Never       HOME MEDICATIONS:   Reviewed and documented in chart.    LABORATORY STUDIES:   Recent Labs     09/27/22  0534   SODIUM 136   POTASSIUM 5.1   CHLORIDE 98   CO2 23   GLUCOSE 74   BUN 25*   CREATININE 8.82*   CALCIUM 8.5       ALLERGIES:  Motrin [ibuprofen]    VS:  /79   Pulse (!) 107   Temp (!) 38.4 °C (101.1 °F) (Oral)   Resp 19   Ht 1.727 m (5' 8\")   Wt 76.7 kg (169 lb)   SpO2 97%   BMI 25.70 kg/m²     Physical Exam  Constitutional:       Appearance: He is ill-appearing.   HENT:      Head: Normocephalic.      Right Ear: External ear normal.      Left Ear: External ear normal.      Mouth/Throat:      Mouth: Mucous membranes are dry.   Cardiovascular:      Rate and Rhythm: Normal rate.   Pulmonary:      Effort: Pulmonary effort is normal.   Abdominal:      Tenderness: There is abdominal tenderness.   Musculoskeletal:         General: Normal range of motion.      Cervical back: Normal range of motion.   Skin:     General: Skin is warm.   Neurological:      Mental Status: He is oriented to person, place, and time.   Psychiatric:         Mood and Affect: Mood normal.      Right chest TDC    FLUID BALANCE:  No intake/output data recorded.    IMAGING:  All imaging reviewed from admission to present day    IMPRESSION:  # ESRD   - R chest TDC   # Abdominal pain/pancreatitis   - Management per GI and primary team  # HTN  - Goal BP < 140/90  # Anemia of CKD  - Goal Hgb 10-11  # CKD-MBD/Renal " Osteodystrophy  # Sepsis  # Encephaolpathy     PLAN:  - Patient refusing dialysis today  - Plan for HD tomorrow  - Continue iHD qTTS thereafter  - UF as tolerated  - No dietary protein restrictions  - Dose all meds per ESRD  - Rest of management per primary team and GI    Thank you for the consultation!

## 2022-09-27 NOTE — ASSESSMENT & PLAN NOTE
Alcohol related causing acute episode?  per Gastroenterology - conservative management of pancreatitis with IV hydration, careful advancement of diet, daily labs, agree with surgical consultation and following patient for possible cholecystectomy, Alcohol cessation, No plan for ERCP at this time given that current CT does not demonstrate stent is in place at this time and appears to have self ejected which was expected.  Alcohol cessation

## 2022-09-27 NOTE — ED NOTES
"During medication administration pt started feeling pain to PIV, this RN flushed and asked if pain continued. Pt stated \"who asked if I was in pain\" pt then becoming agitated and irritable towards this RN. Pt educated on ER process for PIV and went for US. USG IV explained to pt, pt verbalized understanding. During IV insertion pt moving around and stating \"If you can't get this in a few minutes I want another nurse.\" pt educated on USG IV insertion, given reassurance, PIV established at time. Pt became agitated again, this RN flushed PIV and asked if pt felt pain, pt stated \"I feel like I want you out of this room.\" pt educated on securing IV access and given reassurance, pt refused and continued to yell at this RN. PIV secured, pt bedrails up, call light within reach.   "

## 2022-09-27 NOTE — CONSULTS
DATE OF CONSULTATION:  9/27/2022     REFERRING PHYSICIAN:   Oswaldo Kwan M.D.     CONSULTING PHYSICIAN:  Maynor Heath M.D.     REASON FOR CONSULTATION:  I have been asked by  to see the patient in surgical consultation for evaluation of suspected gallstone pancreatitis.    HISTORY OF PRESENT ILLNESS: The patient is a 72 year-old elderly man with a history of end-stage renal disease and multiple medical comorbidities who presents to the Emergency Department a recent history of moderate to severe midepigastric abdominal pain.  He has a history of acute pancreatitis presumed secondary to choledocholithiasis.  He has undergone endoscopy with common bile duct stenting and subsequent stent exchange.  Prior imaging is consistent with cholelithiasis and acute on chronic pancreatitis. The patient denies any history of previous abdominal surgery.    PAST MEDICAL HISTORY:  has a past medical history of Gout, Hemodialysis patient (HCC), Hypertension, Kidney disease, and MI (myocardial infarction) (HCC).    PAST SURGICAL HISTORY:  has a past surgical history that includes pr upper gi endoscopy,diagnosis (N/A, 12/10/2021); pr colonoscopy,diagnostic (N/A, 12/10/2021); pr upper gi endoscopy,biopsy (N/A, 12/10/2021); pr colonoscopy,biopsy (N/A, 12/10/2021); pr ercp,diagnostic (N/A, 6/5/2022); and pr ercp,diagnostic (N/A, 9/19/2022).    ALLERGIES:   Allergies   Allergen Reactions    Motrin [Ibuprofen] Unspecified     hhx of stomach ulcers     CURRENT MEDICATIONS:    Home Medications       Reviewed by Rishabh Cagle (Pharmacy Tech) on 09/27/22 at 0984  Med List Status: Complete     Medication Last Dose Status   acetaminophen (TYLENOL) 500 MG Tab UNK Active   allopurinol (ZYLOPRIM) 100 MG Tab UNK Active   atorvastatin (LIPITOR) 20 MG Tab UNK Active   Buprenorphine 15 MCG/HR PATCH WEEKLY 9/21/2022 Active   capsaicin (ZOSTRIX) 0.025 % cream UNK Active   carvedilol (COREG) 6.25 MG Tab UNK Active   diclofenac  "sodium (VOLTAREN) 1 % Gel UNK Active   Etanercept 50 MG/ML Solution Prefilled Syringe 9/21/2022 Active   folic acid (FOLVITE) 1 MG Tab UNK Active   gabapentin (NEURONTIN) 300 MG Cap UNK Active   lidocaine (LIDODERM) 5 % Patch UNK Active   melatonin 3 MG Tab UNK Active   pantoprazole (PROTONIX) 40 MG Tablet Delayed Response UNK Active   sevelamer (RENAGEL) 800 MG Tab UNK Active   traZODone (DESYREL) 50 MG Tab UNK Active   vitamin D2, Ergocalciferol, (DRISDOL) 1.25 MG (72223 UT) Cap capsule UNK Active                FAMILY HISTORY: family history includes Diabetes in his mother.    SOCIAL HISTORY:  reports that he has quit smoking. He has never used smokeless tobacco. He reports current alcohol use. He reports that he does not use drugs.    REVIEW OF SYSTEMS: Comprehensive review of systems is negative with the exception of the aforementioned HPI, PMH, and PSH bullets in accordance with CMS guidelines.    PHYSICAL EXAMINATION:      Constitutional:     Vital Signs: /79   Pulse (!) 107   Temp (!) 38.4 °C (101.1 °F) (Oral)   Resp 19   Ht 1.727 m (5' 8\")   Wt 76.7 kg (169 lb)   SpO2 97%    General Appearance: appears stated age.  HEENT: The pupils are equal, round, and reactive to light bilaterally. The extraocular muscles are intact bilaterally.. The sclera are non icteric. Nares and oropharynx are clear.   Neck: Supple. No adenopathy.  Respiratory:   Inspection: Unlabored respirations, no intercostal retractions, paradoxical motion, or accessory muscle use.   Auscultation: clear to auscultation.  Cardiovascular:   Inspection: The skin is warm, dry, and well purfused.  Auscultation: Sinus tachycardia.   Peripheral Pulses: Normal.   Abdomen:  Inspection: Abdominal inspection reveals  no abdominal distension.   Palpation: Palpation is remarkable for moderate tenderness in the epigastric and right subcostal region. No abdominal wall hernias.  Extremities:   Examination of the upper and lower extremities " demonstrates no cyanosis edema or clubbing.  Neurologic:   Alert & oriented to person, time and place. Normal motor function. Normal sensory function. No focal deficits noted.    LABORATORY VALUES:   Recent Labs     09/27/22  0534   WBC 8.5   RBC 2.72*   HEMOGLOBIN 8.2*   HEMATOCRIT 25.4*   MCV 93.4   MCH 30.1   MCHC 32.3*   RDW 62.9*   PLATELETCT 162*   MPV 11.0     Recent Labs     09/27/22  0534   SODIUM 136   POTASSIUM 5.1   CHLORIDE 98   CO2 23   GLUCOSE 74   BUN 25*   CREATININE 8.82*   CALCIUM 8.5     Recent Labs     09/27/22  0534   ASTSGOT 13   ALTSGPT <5   TBILIRUBIN 0.3   ALKPHOSPHAT 137*   GLOBULIN 3.5      IMAGING:   CT-ABDOMEN-PELVIS W/O   Final Result   Addendum (preliminary) 1 of 1   Subacute right L2 and L3 transverse process fractures.      Final      1.  Acute on chronic pancreatitis. Inflammatory changes track along the left paracolic gutter.   2.  Likely reactive inflammation of the colon at the splenic flexure.   3.  Mildly dilated gallbladder with likely cholelithiasis. If there is concern for acute cholecystitis, ultrasound can be performed.   4.  Features of medical renal disease.   5.  Sigmoid diverticulosis.   6.  Atherosclerotic changes.   7.  Right hepatic lobe lesion, previously characterized as hemangioma on MRI      US-RUQ    (Results Pending)     ASSESSMENT AND PLAN:     Cholelithiasis.  No evidence for acute biliary obstruction.  Acute on chronic pancreatitis with current mild exacerbation.    Recommend stabilization of multiple medical comorbidities and a short course of observation to determine the trajectory of his pancreatitis.  Consideration for cholecystectomy on this hospitalization predicated on overall clinical response to therapies.     ____________________________________     Maynor Heath M.D.    DD: 9/27/2022  12:35 PM

## 2022-09-27 NOTE — ED TRIAGE NOTES
"Chief Complaint   Patient presents with    Flank Pain     L flank pain, worsened 40 mins ago. 8/10 pain. Ptrecently had stent removed from pancreas 2 days ago.  Pt denies n/v. Dialysis pt.        BIB [facility] to [facility], pt on monitor and in gown, labs drawn and sent. Pt consists of: above complaint, pt A&Ox4, on room air, pt receives dialysis  Tuesday, Thursday, and saturday. Fistula to RUE. States he is compliant w/ dialysis. Hx of HTN. Pt states he was not given prescription for pain meds and has not taken pain meds for about 2-3 days.    Medications given en route:50 mcg fentanyl INH, 4 mg zofran ODT    BP (!) 182/95   Pulse 98   Temp 36.6 °C (97.8 °F) (Temporal)   Resp 18   Ht 1.727 m (5' 8\")   Wt 76.7 kg (169 lb)   SpO2 97%   BMI 25.70 kg/m²    "

## 2022-09-28 PROBLEM — K85.10 PANCREATITIS, GALLSTONE: Status: ACTIVE | Noted: 2022-09-28

## 2022-09-28 LAB
ALBUMIN SERPL BCP-MCNC: 2.4 G/DL (ref 3.2–4.9)
ALBUMIN/GLOB SERPL: 0.8 G/DL
ALP SERPL-CCNC: 112 U/L (ref 30–99)
ALT SERPL-CCNC: <5 U/L (ref 2–50)
ANION GAP SERPL CALC-SCNC: 15 MMOL/L (ref 7–16)
ANISOCYTOSIS BLD QL SMEAR: ABNORMAL
AST SERPL-CCNC: 12 U/L (ref 12–45)
BASOPHILS # BLD AUTO: 0 % (ref 0–1.8)
BASOPHILS # BLD: 0 K/UL (ref 0–0.12)
BILIRUB SERPL-MCNC: 0.3 MG/DL (ref 0.1–1.5)
BUN SERPL-MCNC: 29 MG/DL (ref 8–22)
CALCIUM SERPL-MCNC: 7.9 MG/DL (ref 8.5–10.5)
CHLORIDE SERPL-SCNC: 100 MMOL/L (ref 96–112)
CHOLEST SERPL-MCNC: 73 MG/DL (ref 100–199)
CO2 SERPL-SCNC: 22 MMOL/L (ref 20–33)
CREAT SERPL-MCNC: 10.26 MG/DL (ref 0.5–1.4)
CRP SERPL HS-MCNC: 18.48 MG/DL (ref 0–0.75)
EOSINOPHIL # BLD AUTO: 0.28 K/UL (ref 0–0.51)
EOSINOPHIL NFR BLD: 3.5 % (ref 0–6.9)
ERYTHROCYTE [DISTWIDTH] IN BLOOD BY AUTOMATED COUNT: 63.7 FL (ref 35.9–50)
FERRITIN SERPL-MCNC: 954 NG/ML (ref 22–322)
GFR SERPLBLD CREATININE-BSD FMLA CKD-EPI: 5 ML/MIN/1.73 M 2
GLOBULIN SER CALC-MCNC: 3 G/DL (ref 1.9–3.5)
GLUCOSE SERPL-MCNC: 107 MG/DL (ref 65–99)
HCT VFR BLD AUTO: 22.9 % (ref 42–52)
HDLC SERPL-MCNC: 42 MG/DL
HGB BLD-MCNC: 7.3 G/DL (ref 14–18)
IRON SATN MFR SERPL: 13 % (ref 15–55)
IRON SERPL-MCNC: 14 UG/DL (ref 50–180)
LACTATE SERPL-SCNC: 1 MMOL/L (ref 0.5–2)
LDLC SERPL CALC-MCNC: 20 MG/DL
LIPASE SERPL-CCNC: 68 U/L (ref 11–82)
LYMPHOCYTES # BLD AUTO: 0.99 K/UL (ref 1–4.8)
LYMPHOCYTES NFR BLD: 12.2 % (ref 22–41)
MACROCYTES BLD QL SMEAR: ABNORMAL
MAGNESIUM SERPL-MCNC: 1.4 MG/DL (ref 1.5–2.5)
MANUAL DIFF BLD: NORMAL
MCH RBC QN AUTO: 29.9 PG (ref 27–33)
MCHC RBC AUTO-ENTMCNC: 31.9 G/DL (ref 33.7–35.3)
MCV RBC AUTO: 93.9 FL (ref 81.4–97.8)
MONOCYTES # BLD AUTO: 0.14 K/UL (ref 0–0.85)
MONOCYTES NFR BLD AUTO: 1.7 % (ref 0–13.4)
MORPHOLOGY BLD-IMP: NORMAL
NEUTROPHILS # BLD AUTO: 6.69 K/UL (ref 1.82–7.42)
NEUTROPHILS NFR BLD: 82.6 % (ref 44–72)
NRBC # BLD AUTO: 0 K/UL
NRBC BLD-RTO: 0 /100 WBC
PLATELET # BLD AUTO: 130 K/UL (ref 164–446)
PLATELET BLD QL SMEAR: NORMAL
PMV BLD AUTO: 10.6 FL (ref 9–12.9)
POTASSIUM SERPL-SCNC: 5.2 MMOL/L (ref 3.6–5.5)
PROT SERPL-MCNC: 5.4 G/DL (ref 6–8.2)
RBC # BLD AUTO: 2.44 M/UL (ref 4.7–6.1)
RBC BLD AUTO: PRESENT
SODIUM SERPL-SCNC: 137 MMOL/L (ref 135–145)
TIBC SERPL-MCNC: 109 UG/DL (ref 250–450)
TRIGL SERPL-MCNC: 57 MG/DL (ref 0–149)
UIBC SERPL-MCNC: 95 UG/DL (ref 110–370)
WBC # BLD AUTO: 8.1 K/UL (ref 4.8–10.8)

## 2022-09-28 PROCEDURE — 700102 HCHG RX REV CODE 250 W/ 637 OVERRIDE(OP): Performed by: INTERNAL MEDICINE

## 2022-09-28 PROCEDURE — 83540 ASSAY OF IRON: CPT

## 2022-09-28 PROCEDURE — 90935 HEMODIALYSIS ONE EVALUATION: CPT

## 2022-09-28 PROCEDURE — 700111 HCHG RX REV CODE 636 W/ 250 OVERRIDE (IP): Performed by: INTERNAL MEDICINE

## 2022-09-28 PROCEDURE — 36415 COLL VENOUS BLD VENIPUNCTURE: CPT

## 2022-09-28 PROCEDURE — 96376 TX/PRO/DX INJ SAME DRUG ADON: CPT

## 2022-09-28 PROCEDURE — 85025 COMPLETE CBC W/AUTO DIFF WBC: CPT

## 2022-09-28 PROCEDURE — 770001 HCHG ROOM/CARE - MED/SURG/GYN PRIV*

## 2022-09-28 PROCEDURE — 700111 HCHG RX REV CODE 636 W/ 250 OVERRIDE (IP)

## 2022-09-28 PROCEDURE — 83690 ASSAY OF LIPASE: CPT

## 2022-09-28 PROCEDURE — 700105 HCHG RX REV CODE 258: Performed by: INTERNAL MEDICINE

## 2022-09-28 PROCEDURE — 83735 ASSAY OF MAGNESIUM: CPT

## 2022-09-28 PROCEDURE — 80053 COMPREHEN METABOLIC PANEL: CPT

## 2022-09-28 PROCEDURE — 99233 SBSQ HOSP IP/OBS HIGH 50: CPT | Performed by: NURSE PRACTITIONER

## 2022-09-28 PROCEDURE — 700101 HCHG RX REV CODE 250: Performed by: INTERNAL MEDICINE

## 2022-09-28 PROCEDURE — 83550 IRON BINDING TEST: CPT

## 2022-09-28 PROCEDURE — 86140 C-REACTIVE PROTEIN: CPT

## 2022-09-28 PROCEDURE — 80061 LIPID PANEL: CPT

## 2022-09-28 PROCEDURE — 83605 ASSAY OF LACTIC ACID: CPT

## 2022-09-28 PROCEDURE — 82728 ASSAY OF FERRITIN: CPT

## 2022-09-28 PROCEDURE — 85007 BL SMEAR W/DIFF WBC COUNT: CPT

## 2022-09-28 PROCEDURE — 700111 HCHG RX REV CODE 636 W/ 250 OVERRIDE (IP): Performed by: NURSE PRACTITIONER

## 2022-09-28 PROCEDURE — A9270 NON-COVERED ITEM OR SERVICE: HCPCS | Performed by: INTERNAL MEDICINE

## 2022-09-28 PROCEDURE — 5A1D70Z PERFORMANCE OF URINARY FILTRATION, INTERMITTENT, LESS THAN 6 HOURS PER DAY: ICD-10-PCS | Performed by: STUDENT IN AN ORGANIZED HEALTH CARE EDUCATION/TRAINING PROGRAM

## 2022-09-28 PROCEDURE — 96372 THER/PROPH/DIAG INJ SC/IM: CPT

## 2022-09-28 RX ORDER — HYDROMORPHONE HYDROCHLORIDE 1 MG/ML
0.5 INJECTION, SOLUTION INTRAMUSCULAR; INTRAVENOUS; SUBCUTANEOUS EVERY 4 HOURS PRN
Status: DISCONTINUED | OUTPATIENT
Start: 2022-09-28 | End: 2022-09-29

## 2022-09-28 RX ORDER — HEPARIN SODIUM 1000 [USP'U]/ML
3700 INJECTION, SOLUTION INTRAVENOUS; SUBCUTANEOUS PRN
Status: DISCONTINUED | OUTPATIENT
Start: 2022-09-28 | End: 2022-10-12 | Stop reason: HOSPADM

## 2022-09-28 RX ORDER — HEPARIN SODIUM 1000 [USP'U]/ML
INJECTION, SOLUTION INTRAVENOUS; SUBCUTANEOUS
Status: COMPLETED
Start: 2022-09-28 | End: 2022-09-28

## 2022-09-28 RX ORDER — HYDROMORPHONE HYDROCHLORIDE 2 MG/ML
INJECTION, SOLUTION INTRAMUSCULAR; INTRAVENOUS; SUBCUTANEOUS ONCE
Status: CANCELLED | OUTPATIENT
Start: 2022-09-28 | End: 2022-09-28

## 2022-09-28 RX ADMIN — METRONIDAZOLE 500 MG: 500 TABLET ORAL at 21:24

## 2022-09-28 RX ADMIN — CARVEDILOL 6.25 MG: 6.25 TABLET, FILM COATED ORAL at 17:18

## 2022-09-28 RX ADMIN — Medication 100 MG: at 17:18

## 2022-09-28 RX ADMIN — HEPARIN SODIUM: 1000 INJECTION, SOLUTION INTRAVENOUS; SUBCUTANEOUS at 14:56

## 2022-09-28 RX ADMIN — CEFTRIAXONE SODIUM 1 G: 10 INJECTION, POWDER, FOR SOLUTION INTRAVENOUS at 05:54

## 2022-09-28 RX ADMIN — HYDROMORPHONE HYDROCHLORIDE 0.5 MG: 1 INJECTION, SOLUTION INTRAMUSCULAR; INTRAVENOUS; SUBCUTANEOUS at 21:20

## 2022-09-28 RX ADMIN — OMEPRAZOLE 20 MG: 20 CAPSULE, DELAYED RELEASE ORAL at 17:18

## 2022-09-28 RX ADMIN — HYDROMORPHONE HYDROCHLORIDE 0.5 MG: 1 INJECTION, SOLUTION INTRAMUSCULAR; INTRAVENOUS; SUBCUTANEOUS at 17:20

## 2022-09-28 RX ADMIN — METRONIDAZOLE 500 MG: 500 TABLET ORAL at 16:08

## 2022-09-28 RX ADMIN — OXYCODONE 5 MG: 5 TABLET ORAL at 07:22

## 2022-09-28 RX ADMIN — OXYCODONE 5 MG: 5 TABLET ORAL at 19:57

## 2022-09-28 RX ADMIN — DOCUSATE SODIUM 50 MG AND SENNOSIDES 8.6 MG 2 TABLET: 8.6; 5 TABLET, FILM COATED ORAL at 17:18

## 2022-09-28 RX ADMIN — SODIUM CHLORIDE: 9 INJECTION, SOLUTION INTRAVENOUS at 17:28

## 2022-09-28 RX ADMIN — HEPARIN SODIUM 5000 UNITS: 5000 INJECTION, SOLUTION INTRAVENOUS; SUBCUTANEOUS at 05:54

## 2022-09-28 RX ADMIN — OXYCODONE 5 MG: 5 TABLET ORAL at 23:43

## 2022-09-28 RX ADMIN — OMEPRAZOLE 20 MG: 20 CAPSULE, DELAYED RELEASE ORAL at 05:54

## 2022-09-28 RX ADMIN — METRONIDAZOLE 500 MG: 500 TABLET ORAL at 05:54

## 2022-09-28 RX ADMIN — Medication 100 MG: at 05:54

## 2022-09-28 RX ADMIN — GABAPENTIN 300 MG: 300 CAPSULE ORAL at 21:21

## 2022-09-28 RX ADMIN — SODIUM CHLORIDE: 9 INJECTION, SOLUTION INTRAVENOUS at 05:48

## 2022-09-28 RX ADMIN — OXYCODONE 5 MG: 5 TABLET ORAL at 11:21

## 2022-09-28 RX ADMIN — ATORVASTATIN CALCIUM 20 MG: 20 TABLET, FILM COATED ORAL at 21:21

## 2022-09-28 RX ADMIN — CARVEDILOL 6.25 MG: 6.25 TABLET, FILM COATED ORAL at 09:09

## 2022-09-28 RX ADMIN — OXYCODONE 5 MG: 5 TABLET ORAL at 00:53

## 2022-09-28 RX ADMIN — LIDOCAINE 3 PATCH: 50 PATCH TOPICAL at 05:55

## 2022-09-28 ASSESSMENT — ENCOUNTER SYMPTOMS
ABDOMINAL PAIN: 1
VOMITING: 1
MYALGIAS: 1
WEAKNESS: 1
FEVER: 0
CARDIOVASCULAR NEGATIVE: 1
NAUSEA: 1
PSYCHIATRIC NEGATIVE: 1
CHILLS: 0
EYES NEGATIVE: 1
RESPIRATORY NEGATIVE: 1
FLANK PAIN: 1

## 2022-09-28 ASSESSMENT — COGNITIVE AND FUNCTIONAL STATUS - GENERAL
DRESSING REGULAR LOWER BODY CLOTHING: A LITTLE
MOBILITY SCORE: 18
STANDING UP FROM CHAIR USING ARMS: A LITTLE
SUGGESTED CMS G CODE MODIFIER DAILY ACTIVITY: CK
EATING MEALS: A LITTLE
DRESSING REGULAR UPPER BODY CLOTHING: A LITTLE
TURNING FROM BACK TO SIDE WHILE IN FLAT BAD: A LITTLE
TOILETING: A LITTLE
MOVING FROM LYING ON BACK TO SITTING ON SIDE OF FLAT BED: A LITTLE
WALKING IN HOSPITAL ROOM: A LITTLE
CLIMB 3 TO 5 STEPS WITH RAILING: A LITTLE
MOVING TO AND FROM BED TO CHAIR: A LITTLE
DAILY ACTIVITIY SCORE: 18
PERSONAL GROOMING: A LITTLE
SUGGESTED CMS G CODE MODIFIER MOBILITY: CK
HELP NEEDED FOR BATHING: A LITTLE

## 2022-09-28 ASSESSMENT — PAIN DESCRIPTION - PAIN TYPE
TYPE: ACUTE PAIN

## 2022-09-28 NOTE — PROGRESS NOTES
"[facility] -  PROGRESS NOTE               Author: Harrison Melgar M.D. Date & Time: 9/28/2022  12:12 PM     HPI:  Patient is a 72 year old male with a PMHx of ESRD on HD qTTS, HTN, ETOH, CAD, presented with abdominal pain.  There is concern for pancreatitis and plan for possible intervention.  He states pain started about 3 days ago and worsening.  Hasn't been eating or drinking well. Elevated lipase and CT wit acute on chronic pancreatitis.  Hasn't had dialysis today but refusing dialysis today.       DAILY NEPHROLOGY SUMMARY:  9/28 - Still having severe abdominal pain.  Agreeable to HD today    REVIEW OF SYSTEMS:    10 point ROS reviewed and is as per HPI/daily summary or otherwise negative    PMH/PSH/SH/FH:   Reviewed and unchanged since admission note    CURRENT MEDICATIONS:   Reviewed from admission to present day    VS:  BP (!) 149/72   Pulse 99   Temp 36.7 °C (98.1 °F) (Temporal)   Resp 16   Ht 1.727 m (5' 8\")   Wt 76.7 kg (169 lb)   SpO2 100%   BMI 25.70 kg/m²     Physical Exam  Constitutional:       Appearance: He is ill-appearing.   HENT:      Head: Normocephalic.      Right Ear: External ear normal.      Left Ear: External ear normal.      Mouth/Throat:      Mouth: Mucous membranes are dry.   Cardiovascular:      Rate and Rhythm: Normal rate.   Pulmonary:      Effort: Pulmonary effort is normal.   Abdominal:      Tenderness: There is abdominal tenderness.   Musculoskeletal:         General: Normal range of motion.      Cervical back: Normal range of motion.   Skin:     General: Skin is warm.   Neurological:      Mental Status: He is oriented to person, place, and time.   Psychiatric:         Mood and Affect: Mood normal.      Right chest TDC  Fluids:  In: 120 [P.O.:120]  Out: -     LABS:  Recent Labs     09/27/22  0534 09/28/22  0019   SODIUM 136 137   POTASSIUM 5.1 5.2   CHLORIDE 98 100   CO2 23 22   GLUCOSE 74 107*   BUN 25* 29*   CREATININE 8.82* 10.26*   CALCIUM 8.5 7.9* "       IMAGING:   All imaging reviewed from admission to present day    IMPRESSION:  # ESRD   - R chest TDC   # Abdominal pain/pancreatitis   - Management per GI and primary team  # HTN  - Goal BP < 140/90  # Anemia of CKD  - Goal Hgb 10-11  # CKD-MBD/Renal Osteodystrophy       PLAN:  - HD today then  - Continue iHD qTTS thereafter  - minimal UF  - No dietary protein restrictions  - Dose all meds per ESRD  - Rest of management per primary team and GI

## 2022-09-28 NOTE — PROGRESS NOTES
Hospital Medicine Daily Progress Note    Date of Service  9/28/2022    Chief Complaint  Junior Proctor is a 72 y.o. male admitted 9/27/2022 with LEFT flank pain    Hospital Course  Mr. Abran Proctor is a 72-year-old male with a PMHx of end-stage kidney disease on dialysis, gout, alcoholism, hypertension and coronary artery disease who presented 9/27 with abdominal pain.      Patient recently discharged on 9/22 for bacteremia sepsis and pancreatitis.  Patient had acute pancreatitis due to stones and needed a stent, but later, the stent was blocked and was exchanged by Dr.Victor Daly. At that time patient was discharged to follow-up with GI clinic, instead, patient came to the hospital on 9/27 due to  severe abdominal pain, nausea, no vomiting, no fever or chills and no other symptoms.      In ER during this admission, labs showed elevated lipase and CT scan confirmed acute on chronic pancreatitis.  Patient did not have elevated liver enzymes, GI was consulted by ED physician n.p.o. and IV fluid were started.     Patient lives by himself, patient has been on dialysis for 3 months and he is getting dialysis through port on the RIGHT IJ.     Per gastroenterology's plan on 9/27:   -Conservative management of pancreatitis with IV hydration, careful advancement of diet, daily labs  -Agree with surgical consultation and following patient for possible cholecystectomy  -Alcohol cessation  -No plan for ERCP at this time given that current CT does not demonstrate stent is in place at this time and appears to have self ejected which was expected.     GI will sign off.  Recommend that he can follow-up with GI consultants outpatient as needed, however can also follow-up with his [facility] GI group.    Per general surgery's plan on 9/27:   Recommend stabilization of multiple medical comorbidities and a short course of observation to determine the trajectory of his pancreatitis.  Consideration for cholecystectomy on this  hospitalization predicated on overall clinical response to therapies.    Per nephrology's plan on 9/28:   - HD today then  - Continue iHD qTTS thereafter  - minimal UF  - No dietary protein restrictions  - Dose all meds per ESRD  - Rest of management per primary team and GI    Patient switched to inpatient status for anticipated 2-3 midnight stay due to anticipated surgical intervention, management of pancreatitis with IV fluids and IV pain medication.    Interval Problem Update  -Patient seen and examined.  Patient still endorses some epigastric pain that is radiating towards his left flank.  Patient currently receiving oral and IV pain management.  Patient will receive HD treatment today as managed by nephrology.  Awaiting further recommendations from general surgery along with gastroenterology.  Plan will likely to pursue surgical intervention at this time until all other comorbidities has been managed.  -Plan of care: Pain management as indicated; continue IV fluids; currently n.p.o. for possible surgical intervention today; awaiting surgical team; HD treatment today  -Disposition: Switch to inpatient status for anticipated 2-3 midnight stay for surgical intervention, and management of pancreatitis with IV fluids and IV pain medications.  -Lab work: reviewed; expected  -VSS at this time    I have discussed this patient's plan of care and discharge plan at IDT rounds today with Case Management, Nursing, Nursing leadership, and other members of the IDT team.    Consultants/Specialty  general surgery, GI, and nephrology    Code Status  Full Code    Disposition  Patient is not medically cleared for discharge.   Anticipate discharge to to home with close outpatient follow-up.  I have placed the appropriate orders for post-discharge needs.    Review of Systems  Review of Systems   Constitutional:  Positive for malaise/fatigue. Negative for chills and fever.   HENT: Negative.     Eyes: Negative.    Respiratory:  Negative.     Cardiovascular: Negative.    Gastrointestinal:  Positive for abdominal pain, nausea and vomiting.   Genitourinary:  Positive for flank pain.   Musculoskeletal:  Positive for myalgias.   Skin: Negative.    Neurological:  Positive for weakness.   Endo/Heme/Allergies: Negative.    Psychiatric/Behavioral: Negative.        Physical Exam  Temp:  [36.7 °C (98.1 °F)-37.7 °C (99.8 °F)] 36.7 °C (98.1 °F)  Pulse:  [] 99  Resp:  [16-20] 16  BP: (127-149)/(60-72) 149/72  SpO2:  [88 %-100 %] 100 %    Physical Exam  Vitals and nursing note reviewed.   HENT:      Head: Normocephalic.      Nose: Nose normal.      Mouth/Throat:      Mouth: Mucous membranes are moist.      Pharynx: Oropharynx is clear.   Eyes:      Pupils: Pupils are equal, round, and reactive to light.   Cardiovascular:      Rate and Rhythm: Normal rate and regular rhythm.      Pulses: Normal pulses.      Heart sounds: Normal heart sounds.   Pulmonary:      Effort: Pulmonary effort is normal.      Breath sounds: Normal breath sounds.   Abdominal:      General: Bowel sounds are normal.      Palpations: Abdomen is soft.   Musculoskeletal:         General: Tenderness present.      Cervical back: Normal range of motion and neck supple.   Skin:     General: Skin is dry.      Capillary Refill: Capillary refill takes 2 to 3 seconds.   Neurological:      Mental Status: He is alert. Mental status is at baseline.       Fluids    Intake/Output Summary (Last 24 hours) at 9/28/2022 1301  Last data filed at 9/27/2022 1400  Gross per 24 hour   Intake 120 ml   Output --   Net 120 ml       Laboratory  Recent Labs     09/27/22  0534 09/28/22  0019   WBC 8.5 8.1   RBC 2.72* 2.44*   HEMOGLOBIN 8.2* 7.3*   HEMATOCRIT 25.4* 22.9*   MCV 93.4 93.9   MCH 30.1 29.9   MCHC 32.3* 31.9*   RDW 62.9* 63.7*   PLATELETCT 162* 130*   MPV 11.0 10.6     Recent Labs     09/27/22  0534 09/28/22  0019   SODIUM 136 137   POTASSIUM 5.1 5.2   CHLORIDE 98 100   CO2 23 22   GLUCOSE 74 107*    BUN 25* 29*   CREATININE 8.82* 10.26*   CALCIUM 8.5 7.9*     Recent Labs     09/27/22  1314   INR 1.23*         Recent Labs     09/28/22  0019   TRIGLYCERIDE 57   HDL 42   LDL 20       Imaging  US-RUQ   Final Result   Addendum (preliminary) 1 of 1   No pancreatic duct stent is seen.      Dr. Gallardo discussed these findings with Wilmar Gallardo.      Final      1.  Cholelithiasis without discrete sonographic evidence of acute cholecystitis. Patient did demonstrate a Evans's sign throughout the course of the exam. There is strong clinical suspicion for acute cholecystitis, a HIDA scan should be obtained for    further evaluation.   2.  Hepatic steatosis.   3.  Hepatomegaly.   4.  The pancreas is obscured by overlying bowel gas.      CT-ABDOMEN-PELVIS W/O   Final Result   Addendum (preliminary) 1 of 1   Subacute right L2 and L3 transverse process fractures.      Final      1.  Acute on chronic pancreatitis. Inflammatory changes track along the left paracolic gutter.   2.  Likely reactive inflammation of the colon at the splenic flexure.   3.  Mildly dilated gallbladder with likely cholelithiasis. If there is concern for acute cholecystitis, ultrasound can be performed.   4.  Features of medical renal disease.   5.  Sigmoid diverticulosis.   6.  Atherosclerotic changes.   7.  Right hepatic lobe lesion, previously characterized as hemangioma on MRI           Assessment/Plan  * Acute on chronic pancreatitis (HCC)- (present on admission)  Assessment & Plan  -Recurrent acute pancreatitis  -history of alcoholism, CT scan showed stones  -Patient underwent stent placement and pancreatic duct and then exchanged  -Possible cholecystitis, ultrasound was ordered  -GI was consulted for possible stent exchange - no ERCP at this time; recommendations for surgery  -General surgeon was consulted for possible cholecystectomy  -IV fluid, n.p.o. and antibiotics ceftriaxone and Flagyl  -Close monitoring with labs daily      HTN  (hypertension)- (present on admission)  Assessment & Plan  -Continue carvedilol as outpatient medication    Alcohol dependence (HCC)- (present on admission)  Assessment & Plan  -Close monitoring and consider start with CIWA if needed    ESRD on dialysis (HCC)- (present on admission)  Assessment & Plan  -Started on dialysis for 3 months  -Nephrology was consulted  -Patient receiving dialysis and from port on his right side  -monitor his electrolytes    Stage 4 chronic kidney disease (HCC)- (present on admission)  Assessment & Plan  - History of ESRD and on dialysis MWF  -We will get dialysis during his admission       VTE prophylaxis: heparin ppx    I have performed a physical exam and reviewed and updated ROS and Plan today (9/28/2022). In review of yesterday's note (9/27/2022), there are no changes except as documented above.    =================================================================================================================================================================================  Please note that this dictation was created using voice recognition software. I have made every reasonable attempt to correct obvious errors, but there may be errors of grammar and possibly content that I did not discover before finalizing the note.    Electronically signed by:  GISSELLE Weathers, MSN, APRN, FNP-C  Hospitalist Services  [facility]  [phone]  [email or web address]  09/28/22                9433

## 2022-09-28 NOTE — HOSPITAL COURSE
Mr. Abran Proctor is a 72-year-old male with a PMHx of end-stage kidney disease on dialysis, gout, alcoholism, hypertension and coronary artery disease who presented 9/27 with abdominal pain.      Patient recently discharged on 9/22 for bacteremia sepsis and pancreatitis.  Patient had acute pancreatitis due to stones and needed a stent, but later, the stent was blocked and was exchanged by Dr.Victor Daly. At that time patient was discharged to follow-up with GI clinic, instead, patient came to the hospital on 9/27 due to  severe abdominal pain, nausea, no vomiting, no fever or chills and no other symptoms.      In ER during this admission, labs showed elevated lipase and CT scan confirmed acute on chronic pancreatitis.  Patient did not have elevated liver enzymes, GI was consulted by ED physician n.p.o. and IV fluid were started.     Patient lives by himself, patient has been on dialysis for 3 months and he is getting dialysis through port on the RIGHT IJ.     Per gastroenterology's plan on 9/27:   -Conservative management of pancreatitis with IV hydration, careful advancement of diet, daily labs  -Agree with surgical consultation and following patient for possible cholecystectomy  -Alcohol cessation  -No plan for ERCP at this time given that current CT does not demonstrate stent is in place at this time and appears to have self ejected which was expected.     GI will sign off.  Recommend that he can follow-up with GI consultants outpatient as needed, however can also follow-up with his [facility] GI group.    Per general surgery's plan on 9/27:   Recommend stabilization of multiple medical comorbidities and a short course of observation to determine the trajectory of his pancreatitis.  Consideration for cholecystectomy on this hospitalization predicated on overall clinical response to therapies.    Per nephrology's plan on 9/28:   - HD today then  - Continue iHD qTTS thereafter  - minimal UF  - No dietary  protein restrictions  - Dose all meds per ESRD  - Rest of management per primary team and GI    Patient switched to inpatient status for anticipated 2-3 midnight stay due to anticipated surgical intervention, management of pancreatitis with IV fluids and IV pain medication.

## 2022-09-28 NOTE — DISCHARGE PLANNING
Outpatient Dialysis Note     Confirmed patient is active at:      [facility]  [address] [facility] [address]  [place] [address]     Schedule: TuesIfeomaur, Sat  Time: 10:45 AM     Patient is seen by Dr. Knott in HD clinic.     Spoke with Kelli at facility who confirmed.     Forwarded records for review.     Xitlaly Reyes   Dialysis Coordinator / [facility]  Ph#: [phone]

## 2022-09-28 NOTE — CARE PLAN
The patient is Stable - Low risk of patient condition declining or worsening    Shift Goals  Clinical Goals: pain mgmt  Patient Goals: rest    Progress made toward(s) clinical / shift goals:    Problem: Pain - Standard  Goal: Alleviation of pain or a reduction in pain to the patient’s comfort goal  Outcome: Progressing     Problem: Knowledge Deficit - Standard  Goal: Patient and family/care givers will demonstrate understanding of plan of care, disease process/condition, diagnostic tests and medications  Outcome: Progressing     Problem: Hemodynamics  Goal: Patient's hemodynamics, fluid balance and neurologic status will be stable or improve  Outcome: Progressing     Problem: Fluid Volume  Goal: Fluid volume balance will be maintained  Outcome: Progressing     Problem: Urinary - Renal Perfusion  Goal: Ability to achieve and maintain adequate renal perfusion and functioning will improve  Outcome: Progressing     Problem: Physical Regulation  Goal: Diagnostic test results will improve  Outcome: Progressing  Goal: Signs and symptoms of infection will decrease  Outcome: Progressing     Problem: Fall Risk  Goal: Patient will remain free from falls  Outcome: Progressing       Patient is not progressing towards the following goals:

## 2022-09-28 NOTE — PROGRESS NOTES
4 Eyes Skin Assessment Completed by RADHA Craig and RADHA Styles.    Head WDL  Ears WDL  Nose WDL  Mouth WDL  Neck Incision  Breast/Chest Dialysis catheter  Shoulder Blades WDL  Spine WDL  (R) Arm/Elbow/Hand Dryness, edema   (L) Arm/Elbow/Hand Dryness, scab  Abdomen WDL  Groin WDL  Scrotum/Coccyx/Buttocks Redness and Blanching  (R) Leg abrasion on knee  (L) Leg abrasion on knee   (R) Heel/Foot/Toe Edema, dryness  (L) Heel/Foot/Toe Edema, dryness          Devices In Places Blood Pressure Cuff and Pulse Ox      Interventions In Place Pillows and Pressure Redistribution Mattress    Possible Skin Injury No    Pictures Uploaded Into Epic N/A  Wound Consult Placed N/A  RN Wound Prevention Protocol Ordered No

## 2022-09-28 NOTE — PROGRESS NOTES
Pt admitted to room T430 via transport in bed from dialysis at 1615.      Patient reports pain at 9 on a scale of 0-10. Educated patient regarding pharmacologic and non pharmacologic modalities for pain management. Oral PRN medication offered, pt refused. Oriented to room call light and smoking policy.  Reviewed plan of care (equipment, incentive spirometer, sequential compression devices, medications, activity, diet, fall precautions, skin care, and pain) with patient and family. Welcome packet given and reviewed with patient, all questions answered. Education provided on oral hygiene program.     AA&Ox4. Denies SOB. Pt on room air.    See 2 RN skin note    Pt NPO at this time. Denies N/V.  Pt anuric due to ESRD.   Pt ambulates x1 assist.    Plan of care discussed, all questions answered. Educated on the importance of calling before getting OOB and pt verbalizes understanding. Educated regarding importance of oral care. Oral care kit at bedside. Call light is within reach, treaded slipper socks on, bed in lowest/ locked position, hourly rounding in place, all needs met at this time.    Kiara Hall R.N.

## 2022-09-28 NOTE — PROGRESS NOTES
[facility] Services Progress Note     HD treatment ordered by Dr Melgar x 2 hours.  Treatment Start time: 1318          End time: 1518       Net UF 0 ml    Patient tolerated treatment well. VS stable all through out.     All blood was returned. CVC R chest locked with heparin (Red limb-1.8 ml; Blue limb-1.9 ml). CVC Dressing and end cap changed.  See flow sheet for details.     Report given to SIERRA Hall RN.

## 2022-09-28 NOTE — PROGRESS NOTES
Pt stated he would like pain medication but refused his ordered oxy when offered. Pt stated it makes his pain worse and he spoke with someone this morning abut changing it.     ANGELO Weathers notified.     Pt refusing sub Q heparin even after education.     NP notified.     Kiara Hall R.N.

## 2022-09-29 PROBLEM — K80.20 CHOLELITHIASIS: Status: ACTIVE | Noted: 2022-09-29

## 2022-09-29 LAB
ANION GAP SERPL CALC-SCNC: 14 MMOL/L (ref 7–16)
BUN SERPL-MCNC: 19 MG/DL (ref 8–22)
CALCIUM SERPL-MCNC: 8.1 MG/DL (ref 8.5–10.5)
CHLORIDE SERPL-SCNC: 99 MMOL/L (ref 96–112)
CO2 SERPL-SCNC: 24 MMOL/L (ref 20–33)
CREAT SERPL-MCNC: 6.46 MG/DL (ref 0.5–1.4)
ERYTHROCYTE [DISTWIDTH] IN BLOOD BY AUTOMATED COUNT: 64 FL (ref 35.9–50)
GFR SERPLBLD CREATININE-BSD FMLA CKD-EPI: 9 ML/MIN/1.73 M 2
GLUCOSE SERPL-MCNC: 70 MG/DL (ref 65–99)
HCT VFR BLD AUTO: 24.9 % (ref 42–52)
HGB BLD-MCNC: 7.8 G/DL (ref 14–18)
MCH RBC QN AUTO: 29.7 PG (ref 27–33)
MCHC RBC AUTO-ENTMCNC: 31.3 G/DL (ref 33.7–35.3)
MCV RBC AUTO: 94.7 FL (ref 81.4–97.8)
PLATELET # BLD AUTO: 138 K/UL (ref 164–446)
PMV BLD AUTO: 10.6 FL (ref 9–12.9)
POTASSIUM SERPL-SCNC: 4.5 MMOL/L (ref 3.6–5.5)
RBC # BLD AUTO: 2.63 M/UL (ref 4.7–6.1)
SODIUM SERPL-SCNC: 137 MMOL/L (ref 135–145)
WBC # BLD AUTO: 8.5 K/UL (ref 4.8–10.8)

## 2022-09-29 PROCEDURE — 700111 HCHG RX REV CODE 636 W/ 250 OVERRIDE (IP): Performed by: NURSE PRACTITIONER

## 2022-09-29 PROCEDURE — A9270 NON-COVERED ITEM OR SERVICE: HCPCS | Performed by: INTERNAL MEDICINE

## 2022-09-29 PROCEDURE — 700111 HCHG RX REV CODE 636 W/ 250 OVERRIDE (IP): Performed by: FAMILY MEDICINE

## 2022-09-29 PROCEDURE — 700105 HCHG RX REV CODE 258: Performed by: FAMILY MEDICINE

## 2022-09-29 PROCEDURE — 90935 HEMODIALYSIS ONE EVALUATION: CPT

## 2022-09-29 PROCEDURE — 700101 HCHG RX REV CODE 250: Performed by: INTERNAL MEDICINE

## 2022-09-29 PROCEDURE — 80048 BASIC METABOLIC PNL TOTAL CA: CPT

## 2022-09-29 PROCEDURE — 770001 HCHG ROOM/CARE - MED/SURG/GYN PRIV*

## 2022-09-29 PROCEDURE — A9270 NON-COVERED ITEM OR SERVICE: HCPCS | Performed by: FAMILY MEDICINE

## 2022-09-29 PROCEDURE — 700111 HCHG RX REV CODE 636 W/ 250 OVERRIDE (IP): Performed by: INTERNAL MEDICINE

## 2022-09-29 PROCEDURE — 700102 HCHG RX REV CODE 250 W/ 637 OVERRIDE(OP): Performed by: INTERNAL MEDICINE

## 2022-09-29 PROCEDURE — 85027 COMPLETE CBC AUTOMATED: CPT

## 2022-09-29 PROCEDURE — 99233 SBSQ HOSP IP/OBS HIGH 50: CPT | Performed by: FAMILY MEDICINE

## 2022-09-29 PROCEDURE — 99232 SBSQ HOSP IP/OBS MODERATE 35: CPT | Performed by: SURGERY

## 2022-09-29 PROCEDURE — 36415 COLL VENOUS BLD VENIPUNCTURE: CPT

## 2022-09-29 PROCEDURE — 700105 HCHG RX REV CODE 258: Performed by: INTERNAL MEDICINE

## 2022-09-29 PROCEDURE — 700102 HCHG RX REV CODE 250 W/ 637 OVERRIDE(OP): Performed by: FAMILY MEDICINE

## 2022-09-29 RX ORDER — HEPARIN SODIUM 1000 [USP'U]/ML
INJECTION, SOLUTION INTRAVENOUS; SUBCUTANEOUS
Status: COMPLETED
Start: 2022-09-29 | End: 2022-09-29

## 2022-09-29 RX ORDER — HYDROMORPHONE HYDROCHLORIDE 1 MG/ML
1 INJECTION, SOLUTION INTRAMUSCULAR; INTRAVENOUS; SUBCUTANEOUS
Status: DISCONTINUED | OUTPATIENT
Start: 2022-09-29 | End: 2022-10-04

## 2022-09-29 RX ORDER — DEXTROSE AND SODIUM CHLORIDE 5; .9 G/100ML; G/100ML
INJECTION, SOLUTION INTRAVENOUS CONTINUOUS
Status: DISCONTINUED | OUTPATIENT
Start: 2022-09-29 | End: 2022-09-29

## 2022-09-29 RX ORDER — OXYCODONE HYDROCHLORIDE 5 MG/1
5-10 TABLET ORAL
Status: DISCONTINUED | OUTPATIENT
Start: 2022-09-29 | End: 2022-10-01

## 2022-09-29 RX ADMIN — METRONIDAZOLE 500 MG: 500 TABLET ORAL at 22:44

## 2022-09-29 RX ADMIN — HYDROMORPHONE HYDROCHLORIDE 0.5 MG: 1 INJECTION, SOLUTION INTRAMUSCULAR; INTRAVENOUS; SUBCUTANEOUS at 10:54

## 2022-09-29 RX ADMIN — OMEPRAZOLE 20 MG: 20 CAPSULE, DELAYED RELEASE ORAL at 04:55

## 2022-09-29 RX ADMIN — Medication 5 MG: at 22:43

## 2022-09-29 RX ADMIN — METRONIDAZOLE 500 MG: 500 TABLET ORAL at 04:56

## 2022-09-29 RX ADMIN — LIDOCAINE 3 PATCH: 50 PATCH TOPICAL at 04:48

## 2022-09-29 RX ADMIN — ATORVASTATIN CALCIUM 20 MG: 20 TABLET, FILM COATED ORAL at 20:08

## 2022-09-29 RX ADMIN — OMEPRAZOLE 20 MG: 20 CAPSULE, DELAYED RELEASE ORAL at 17:16

## 2022-09-29 RX ADMIN — GABAPENTIN 300 MG: 300 CAPSULE ORAL at 20:07

## 2022-09-29 RX ADMIN — METRONIDAZOLE 500 MG: 500 TABLET ORAL at 15:07

## 2022-09-29 RX ADMIN — HYDROMORPHONE HYDROCHLORIDE 0.5 MG: 1 INJECTION, SOLUTION INTRAMUSCULAR; INTRAVENOUS; SUBCUTANEOUS at 01:22

## 2022-09-29 RX ADMIN — CEFTRIAXONE SODIUM 1 G: 10 INJECTION, POWDER, FOR SOLUTION INTRAVENOUS at 06:24

## 2022-09-29 RX ADMIN — OXYCODONE 10 MG: 5 TABLET ORAL at 20:06

## 2022-09-29 RX ADMIN — HYDROMORPHONE HYDROCHLORIDE 1 MG: 1 INJECTION, SOLUTION INTRAMUSCULAR; INTRAVENOUS; SUBCUTANEOUS at 22:54

## 2022-09-29 RX ADMIN — DEXTROSE AND SODIUM CHLORIDE: 5; 900 INJECTION, SOLUTION INTRAVENOUS at 12:26

## 2022-09-29 RX ADMIN — ONDANSETRON 4 MG: 2 INJECTION INTRAMUSCULAR; INTRAVENOUS at 20:19

## 2022-09-29 RX ADMIN — Medication 100 MG: at 17:16

## 2022-09-29 RX ADMIN — CARVEDILOL 6.25 MG: 6.25 TABLET, FILM COATED ORAL at 17:16

## 2022-09-29 RX ADMIN — Medication 100 MG: at 04:55

## 2022-09-29 RX ADMIN — OXYCODONE 5 MG: 5 TABLET ORAL at 04:56

## 2022-09-29 RX ADMIN — ALLOPURINOL 100 MG: 100 TABLET ORAL at 17:16

## 2022-09-29 RX ADMIN — HYDROMORPHONE HYDROCHLORIDE 0.5 MG: 1 INJECTION, SOLUTION INTRAMUSCULAR; INTRAVENOUS; SUBCUTANEOUS at 06:21

## 2022-09-29 RX ADMIN — HEPARIN SODIUM 3700 UNITS: 1000 INJECTION, SOLUTION INTRAVENOUS; SUBCUTANEOUS at 10:15

## 2022-09-29 RX ADMIN — DOCUSATE SODIUM 50 MG AND SENNOSIDES 8.6 MG 2 TABLET: 8.6; 5 TABLET, FILM COATED ORAL at 17:16

## 2022-09-29 RX ADMIN — HYDROMORPHONE HYDROCHLORIDE 0.5 MG: 1 INJECTION, SOLUTION INTRAMUSCULAR; INTRAVENOUS; SUBCUTANEOUS at 15:07

## 2022-09-29 RX ADMIN — SODIUM CHLORIDE: 9 INJECTION, SOLUTION INTRAVENOUS at 04:47

## 2022-09-29 RX ADMIN — OXYCODONE 5 MG: 5 TABLET ORAL at 12:28

## 2022-09-29 RX ADMIN — HYDROMORPHONE HYDROCHLORIDE 1 MG: 1 INJECTION, SOLUTION INTRAMUSCULAR; INTRAVENOUS; SUBCUTANEOUS at 18:05

## 2022-09-29 ASSESSMENT — ENCOUNTER SYMPTOMS
BACK PAIN: 0
HEARTBURN: 0
FEVER: 0
BLURRED VISION: 0
SPEECH CHANGE: 0
PALPITATIONS: 0
HEADACHES: 0
NAUSEA: 0
COUGH: 0
ABDOMINAL PAIN: 1
NECK PAIN: 0
NERVOUS/ANXIOUS: 1
DIAPHORESIS: 0
FLANK PAIN: 0
SORE THROAT: 0
MYALGIAS: 0
WEAKNESS: 1
SHORTNESS OF BREATH: 0
FOCAL WEAKNESS: 0
WHEEZING: 0
SENSORY CHANGE: 0
DIZZINESS: 0
CHILLS: 0
DIARRHEA: 0
VOMITING: 0

## 2022-09-29 ASSESSMENT — PAIN DESCRIPTION - PAIN TYPE
TYPE: ACUTE PAIN
TYPE: ACUTE PAIN

## 2022-09-29 NOTE — PROGRESS NOTES
[facility] Services Progress Note    Hemodialysis treatment ordered today per Dr. Melgar x 3 hours.   Treatment initiated at 0712.     Patient tolerated tx; see electronic flow sheet for details.     Net UF 0 mL per order.     Post tx, CVC flushed with saline then locked with heparin 1000 units/mL per designated amount in each wing then clamped and capped. Aspirate heparin prior to next CVC use.    Report given to Primary RN.

## 2022-09-29 NOTE — PROGRESS NOTES
"[facility] -  PROGRESS NOTE               Author: Harrison Melgar M.D. Date & Time: 9/29/2022  12:52 PM     HPI:  Patient is a 72 year old male with a PMHx of ESRD on HD qTTS, HTN, ETOH, CAD, presented with abdominal pain.  There is concern for pancreatitis and plan for possible intervention.  He states pain started about 3 days ago and worsening.  Hasn't been eating or drinking well. Elevated lipase and CT wit acute on chronic pancreatitis.  Hasn't had dialysis today but refusing dialysis today.       DAILY NEPHROLOGY SUMMARY:  9/28 - Still having severe abdominal pain.  Agreeable to HD today  9/29 - On IVF.  Stable abdominal pain.  No other complaints    REVIEW OF SYSTEMS:    10 point ROS reviewed and is as per HPI/daily summary or otherwise negative    PMH/PSH/SH/FH:   Reviewed and unchanged since admission note    CURRENT MEDICATIONS:   Reviewed from admission to present day    VS:  BP (!) 152/74   Pulse 100   Temp 36.6 °C (97.8 °F) (Temporal)   Resp 17   Ht 1.727 m (5' 8\")   Wt 76.7 kg (169 lb)   SpO2 100%   BMI 25.70 kg/m²     Physical Exam  Constitutional:       Appearance: He is ill-appearing.   HENT:      Head: Normocephalic.      Right Ear: External ear normal.      Left Ear: External ear normal.      Mouth/Throat:      Mouth: Mucous membranes are dry.   Cardiovascular:      Rate and Rhythm: Normal rate.   Pulmonary:      Effort: Pulmonary effort is normal.   Abdominal:      Tenderness: There is abdominal tenderness.   Musculoskeletal:         General: Normal range of motion.      Cervical back: Normal range of motion.   Skin:     General: Skin is warm.   Neurological:      Mental Status: He is oriented to person, place, and time.   Psychiatric:         Mood and Affect: Mood normal.      Right chest TDC  Fluids:  In: 500 [Dialysis:500]  Out: 500     LABS:  Recent Labs     09/27/22  0534 09/28/22  0019 09/29/22  0136   SODIUM 136 137 137   POTASSIUM 5.1 5.2 4.5   CHLORIDE 98 " 100 99   CO2 23 22 24   GLUCOSE 74 107* 70   BUN 25* 29* 19   CREATININE 8.82* 10.26* 6.46*   CALCIUM 8.5 7.9* 8.1*         IMAGING:   All imaging reviewed from admission to present day    IMPRESSION:  # ESRD   - R chest TDC   # Abdominal pain/pancreatitis   - Management per GI and primary team  # HTN  - Goal BP < 140/90  # Anemia of CKD  - Goal Hgb 10-11  # CKD-MBD/Renal Osteodystrophy       PLAN:  - Caution with IVF in ESRD patient, avoid if possible  - Okay for bolus if hypotensive  - Continue iHD qTTS thereafter  - minimal UF  - No dietary protein restrictions  - Dose all meds per ESRD  - Rest of management per primary team and GI

## 2022-09-29 NOTE — ASSESSMENT & PLAN NOTE
Finished course of Rocephin and Flagyl   Per Surgery - Symptoms and clinical findings more consistent with acute on chronic alcoholic pancreatitis. No compelling evidence for active biliary colic or gallstone pancreatitis.  9/29/2022

## 2022-09-29 NOTE — CARE PLAN
The patient is Stable - Low risk of patient condition declining or worsening    Shift Goals  Clinical Goals: pain control  Patient Goals: pain control    Progress made toward(s) clinical / shift goals:  Pt rested through the night, pain meds given as needed. Pt still having pain with pain med regimen. Education provided on kidney function and pain meds. Pt concerned about fluid overload with IV fluids. Pt educated on signs and symptoms of fluid overload.    Problem: Pain - Standard  Goal: Alleviation of pain or a reduction in pain to the patient’s comfort goal  Outcome: Progressing     Problem: Knowledge Deficit - Standard  Goal: Patient and family/care givers will demonstrate understanding of plan of care, disease process/condition, diagnostic tests and medications  Outcome: Progressing     Problem: Hemodynamics  Goal: Patient's hemodynamics, fluid balance and neurologic status will be stable or improve  Outcome: Progressing     Problem: Fluid Volume  Goal: Fluid volume balance will be maintained  Outcome: Progressing     Problem: Urinary - Renal Perfusion  Goal: Ability to achieve and maintain adequate renal perfusion and functioning will improve  Outcome: Progressing     Problem: Respiratory  Goal: Patient will achieve/maintain optimum respiratory ventilation and gas exchange  Outcome: Progressing     Problem: Fall Risk  Goal: Patient will remain free from falls  Outcome: Progressing       Patient is not progressing towards the following goals:

## 2022-09-29 NOTE — CARE PLAN
The patient is Stable - Low risk of patient condition declining or worsening    Shift Goals  Clinical Goals: pain control, dialysis, NPO for possible OR  Patient Goals: pain control  Family Goals: family not at bedside    Progress made toward(s) clinical / shift goals: Pt had dialysis this AM. Pt on room air, turns self in bed. Pt NPO at this time.    Patient is not progressing towards the following goals: N/A

## 2022-09-29 NOTE — DOCUMENTATION QUERY
"                                                                         [facility]                                                                       Query Response Note      PATIENT:               RENE STEINBERG  ACCT #:                  [id number]  MRN:                     [id number]  :                      1950  ADMIT DATE:       2022 5:02 AM  DISCH DATE:          RESPONDING  PROVIDER #:        [id number]           QUERY TEXT:    The diagnosis of sepsis has been documented in progress note  &  .    Please provide additional clinical indicators/SIRS criteria supportive of the documented diagnosis of sepsis.     Note: If an appropriate response is not listed below, please respond with a new note.    Sepsis - real or suspected infection plus 2 or more SIRS criteria  Temp <96.8 or >101  HR >90  RR >20  WBC <4,000 or > 12,000  >10% bandemia       If you have any questions please contact:  Laina Sy RN [facility]  [email or web address]  Laina Sy [address]      The patient's Clinical Indicators include:  72 year old male admitted for pancreatitis.     Ramón \"# Sepsis # Encephaolpathy\"     Ramón \"# Sepsis # Encephaolpathy\"     Ralleca-Llaguno \"Patient recently discharged on  for bacteremia sepsis and pancreatitis\"  \"At that time patient was discharged  to follow-up with GI clinic, instead, patient came to the hospital on  due to  severe abdominal pain, nausea, no vomiting, no fever or chills and no other symptoms.  \"  \"VSS at this time\"     ED /95, P 98, T 97.8, R 18, 97%     WBC 8.5, lipase 157    Risk factors: recent admission for sepsis, pancreatitis, ESRD  Treatment: labs, CT, US, EKG, holding IVF due to ESRD, NPO, pain meds  Options provided:   -- Sepsis exists, (please document additional + SIRS criteria and clinical indicators)   -- Sepsis does not exist - amended documentation in the medical record and updated problem list   -- Treated " for sepsis during prior admission, patient is not currently septic this admission   -- Treated for sepsis and encephalopathy during prior admission, patient is not currently septic or encephalopathic this admission   -- Other explanation, please specify   -- Unable to determine      Query created by: Laina Sy on 9/29/2022 9:56 AM    RESPONSE TEXT:    Sepsis does not exist - amended documentation in the medical record and updated problem list          Electronically signed by:  MAGGIE FLORES MD 9/29/2022 11:24 AM

## 2022-09-29 NOTE — DISCHARGE PLANNING
Care Transition Team Assessment    Patient is a readmission. Patient was discharged from hospital to home on 9/22 and readmitted for abdominal pain and nausea. Patient's PCP is at the [facility]; patient cannot recall provider's name. Patient lives alone, his nearest family is his daughter who lives in the [place]. Patient reports that he does have another local contact, but will decide if he wants to share that contact information with hospital. Patient had [employer] Whe[employer] previously but stated that they started to make him sick. LSW and patient discussed whether his current pancreatitis was at play vs the meals making him sick. Patient stated he will likely call [employer] to discuss his concerns and restart services. LSW provided patient with information on free meals/food jeffries in [place]. LSW also gave patient magazines from the [employer].     Patient's HD chair time confirmed by HD Coordinator/Remedios:  [facility]  [address]  [place] [address]  Rustam Fields, Sat at 10:45 AM  Patient takes public transport to his HD appointments.        Information Source  Orientation Level: Oriented X4  Information Given By: Patient  Who is responsible for making decisions for patient? : Patient    Readmission Evaluation  Is this a readmission?: Yes - unplanned readmission  Why do you think you were readmitted?: Abdominal pain and nausea  Was an appointment arranged for you prior to discharge?: Yes, did not attend appointment  Why didn't you go to your appointment?: Other (comment) (Came back to hospital)  Did you understand your discharge instructions?: Yes    Elopement Risk  Legal Hold: No  Ambulatory or Self Mobile in Wheelchair: No-Not an Elopement Risk  Disoriented: No  History of Wandering: No  Elopement this Admit: No  Vocalizing Wanting to Leave: No    Interdisciplinary Discharge Planning  Primary Care Physician: Doctor at [facility]  Lives with - Patient's Self Care  Capacity: Alone and Able to Care For Self  Patient or legal guardian wants to designate a caregiver: No  Support Systems: Children (Nader lives in [facility])  Housing / Facility: 1 Story Apartment / Condo  Durable Medical Equipment: Not Applicable    Discharge Preparedness  What is your plan after discharge?: Uncertain - pending medical team collaboration  Prior Functional Level: Independent with Activities of Daily Living, Other (Comments) (Takes public transit to HD)    Functional Assesment  Prior Functional Level: Independent with Activities of Daily Living, Other (Comments) (Takes public transit to HD)    Finances  Financial Barriers to Discharge: No  Prescription Coverage: Yes    Vision / Hearing Impairment  Vision Impairment : No  Hearing Impairment : No              Domestic Abuse  Have you ever been the victim of abuse or violence?: No  Physical Abuse or Sexual Abuse: No  Verbal Abuse or Emotional Abuse: No  Possible Abuse/Neglect Reported to:: Not Applicable

## 2022-09-29 NOTE — PROGRESS NOTES
"  DATE: 9/29/2022    PHYSICAL EXAMINATION:  Vital Signs: BP (!) 155/76   Pulse 97   Temp 37.4 °C (99.3 °F) (Temporal)   Resp 17   Ht 1.727 m (5' 8\")   Wt 76.7 kg (169 lb)   SpO2 100%     Mild epigastric and left upper quadrant tenderness.  No right upper quadrant tenderness.      LABORATORY VALUES:   Recent Labs     09/27/22  0534 09/28/22  0019 09/29/22  0136   WBC 8.5 8.1 8.5   RBC 2.72* 2.44* 2.63*   HEMOGLOBIN 8.2* 7.3* 7.8*   HEMATOCRIT 25.4* 22.9* 24.9*   MCV 93.4 93.9 94.7   MCH 30.1 29.9 29.7   MCHC 32.3* 31.9* 31.3*   RDW 62.9* 63.7* 64.0*   PLATELETCT 162* 130* 138*   MPV 11.0 10.6 10.6     Recent Labs     09/27/22  0534 09/28/22  0019 09/29/22  0136   SODIUM 136 137 137   POTASSIUM 5.1 5.2 4.5   CHLORIDE 98 100 99   CO2 23 22 24   GLUCOSE 74 107* 70   BUN 25* 29* 19   CREATININE 8.82* 10.26* 6.46*   CALCIUM 8.5 7.9* 8.1*     Recent Labs     09/27/22  0534 09/27/22  1314 09/28/22  0019   ASTSGOT 13  --  12   ALTSGPT <5  --  <5   TBILIRUBIN 0.3  --  0.3   ALKPHOSPHAT 137*  --  112*   GLOBULIN 3.5  --  3.0   INR  --  1.23*  --        ASSESSMENT AND PLAN:     Symptoms and clinical findings more consistent with acute on chronic alcoholic pancreatitis. No compelling evidence for active biliary colic or gallstone pancreatitis.  Recommend a course of watchful waiting with consideration of cholecystectomy for clear and reproducible biliary colic.   General surgery signing off.     ____________________________________     Maynor Heath M.D.    DD: 9/29/2022  3:38 PM    "

## 2022-09-29 NOTE — PROGRESS NOTES
Hospital Medicine Daily Progress Note    Date of Service  9/29/2022    Chief Complaint  Junior Proctor is a 72 y.o. male admitted 9/27/2022 with pancreatitis    Hospital Course  Admitted with acute on chronic pancreatitis.  Patient was placed on n.p.o., pain control, and IVF hydration was started.  Patient with history of pancreatic duct stones in the past.  Patient had undergone ERCP - pancreatic duct stone removal, placement of pancreatic duct stent on 6/5/2022 and ERCP - pancreatic duct stone removed by balloon sweep, placement of new Holt 4Fr x 11cm single pigtail self ejecting prophylatic pancreatic duct stent, removal of occluded in-situ pancreatic duct stent on 9/19/2022. Gastroenterology was consulted on the case.  They recommended conservative management of pancreatitis with IV hydration, careful advancement of diet, daily labs, agree with surgical consultation and following patient for possible cholecystectomy, Alcohol cessation, No plan for ERCP at this time given that current CT does not demonstrate stent is in place at this time and appears to have self ejected which was expected.  He was also noted to have cholelithiasis, with possible cholecystitis, and started on empiric coverage with IV Rocephin and Flagyl.  Surgery was also consulted on the case.  He has known history of end-stage renal disease on hemodialysis Nephrology was consulted on the case.    Interval Problem Update  Pancreatitis - pain a little better  Cholelithiasis -discussed case with surgery  HTN - sbp 125-162    I have discussed this patient's plan of care and discharge plan at IDT rounds today with Case Management, Nursing, Nursing leadership, and other members of the IDT team.    Consultants/Specialty  general surgery, GI, and nephrology    Code Status  Full Code    Disposition  Patient is not medically cleared for discharge.   Anticipate discharge to to home with close outpatient follow-up.  I have placed the appropriate orders for  post-discharge needs.    Review of Systems  Review of Systems   Constitutional:  Negative for chills, diaphoresis, fever and malaise/fatigue.   HENT:  Negative for congestion, hearing loss and sore throat.    Eyes:  Negative for blurred vision.   Respiratory:  Negative for cough, shortness of breath and wheezing.    Cardiovascular:  Positive for leg swelling. Negative for chest pain and palpitations.   Gastrointestinal:  Positive for abdominal pain. Negative for diarrhea, heartburn, nausea and vomiting.   Genitourinary:  Negative for dysuria, flank pain and hematuria.   Musculoskeletal:  Negative for back pain, joint pain, myalgias and neck pain.   Skin:  Negative for rash.   Neurological:  Positive for weakness. Negative for dizziness, sensory change, speech change, focal weakness and headaches.   Psychiatric/Behavioral:  The patient is nervous/anxious.       Physical Exam  Temp:  [36.1 °C (97 °F)-37.2 °C (98.9 °F)] 36.6 °C (97.8 °F)  Pulse:  [] 100  Resp:  [16-18] 17  BP: (125-152)/(51-81) 152/74  SpO2:  [91 %-100 %] 100 %    Physical Exam  Vitals and nursing note reviewed.   HENT:      Head: Normocephalic and atraumatic.      Nose: No congestion.      Mouth/Throat:      Mouth: Mucous membranes are moist.   Eyes:      Extraocular Movements: Extraocular movements intact.      Conjunctiva/sclera: Conjunctivae normal.   Cardiovascular:      Rate and Rhythm: Normal rate and regular rhythm.   Pulmonary:      Effort: Pulmonary effort is normal.      Breath sounds: Normal breath sounds.   Abdominal:      General: There is no distension.      Tenderness: There is abdominal tenderness. There is no guarding or rebound.   Musculoskeletal:      Cervical back: No tenderness.      Right lower leg: Edema present.      Left lower leg: Edema present.   Skin:     General: Skin is warm and dry.   Neurological:      General: No focal deficit present.      Mental Status: He is alert and oriented to person, place, and time.       Cranial Nerves: No cranial nerve deficit.   Psychiatric:         Mood and Affect: Mood is anxious.       Fluids    Intake/Output Summary (Last 24 hours) at 9/29/2022 1331  Last data filed at 9/29/2022 1012  Gross per 24 hour   Intake 1100 ml   Output 1100 ml   Net 0 ml       Laboratory  Recent Labs     09/27/22  0534 09/28/22  0019 09/29/22  0136   WBC 8.5 8.1 8.5   RBC 2.72* 2.44* 2.63*   HEMOGLOBIN 8.2* 7.3* 7.8*   HEMATOCRIT 25.4* 22.9* 24.9*   MCV 93.4 93.9 94.7   MCH 30.1 29.9 29.7   MCHC 32.3* 31.9* 31.3*   RDW 62.9* 63.7* 64.0*   PLATELETCT 162* 130* 138*   MPV 11.0 10.6 10.6     Recent Labs     09/27/22  0534 09/28/22  0019 09/29/22  0136   SODIUM 136 137 137   POTASSIUM 5.1 5.2 4.5   CHLORIDE 98 100 99   CO2 23 22 24   GLUCOSE 74 107* 70   BUN 25* 29* 19   CREATININE 8.82* 10.26* 6.46*   CALCIUM 8.5 7.9* 8.1*     Recent Labs     09/27/22  1314   INR 1.23*         Recent Labs     09/28/22  0019   TRIGLYCERIDE 57   HDL 42   LDL 20       Imaging  US-RUQ   Final Result   Addendum (preliminary) 1 of 1   No pancreatic duct stent is seen.      Dr. Gallardo discussed these findings with Wilmar Gallardo.      Final      1.  Cholelithiasis without discrete sonographic evidence of acute cholecystitis. Patient did demonstrate a Evans's sign throughout the course of the exam. There is strong clinical suspicion for acute cholecystitis, a HIDA scan should be obtained for    further evaluation.   2.  Hepatic steatosis.   3.  Hepatomegaly.   4.  The pancreas is obscured by overlying bowel gas.      CT-ABDOMEN-PELVIS W/O   Final Result   Addendum (preliminary) 1 of 1   Subacute right L2 and L3 transverse process fractures.      Final      1.  Acute on chronic pancreatitis. Inflammatory changes track along the left paracolic gutter.   2.  Likely reactive inflammation of the colon at the splenic flexure.   3.  Mildly dilated gallbladder with likely cholelithiasis. If there is concern for acute cholecystitis, ultrasound can be  performed.   4.  Features of medical renal disease.   5.  Sigmoid diverticulosis.   6.  Atherosclerotic changes.   7.  Right hepatic lobe lesion, previously characterized as hemangioma on MRI           Assessment/Plan  * Acute on chronic pancreatitis (HCC)- (present on admission)  Assessment & Plan  NPO, IVF hydration  Pain control  ERCP - pancreatic duct stone removed by balloon sweep, placement of new Holt 4Fr x 11cm single pigtail self ejecting prophylatic pancreatic duct stent, removal of occluded in-situ pancreatic duct stent   9/19/2022  ERCP -pancreatic duct stone removal, placement of pancreatic duct stent  6/5/2022  Per gastroenterology - conservative management of pancreatitis with IV hydration, careful advancement of diet, daily labs, agree with surgical consultation and following patient for possible cholecystectomy, Alcohol cessation, No plan for ERCP at this time given that current CT does not demonstrate stent is in place at this time and appears to have self ejected which was expected.      Cholelithiasis- (present on admission)  Assessment & Plan  Possible cholecystitis?  IV Rocephin and Flagyl  Surgery following    HTN (hypertension)- (present on admission)  Assessment & Plan  Carvedilol     Alcohol dependence (HCC)- (present on admission)  Assessment & Plan  Monitor for withdrawal symptoms    ESRD on dialysis (HCC)- (present on admission)  Assessment & Plan  HD per Nephrology  Decrease IVF rate    Gout- (present on admission)  Assessment & Plan  Allopurinol    Anemia- (present on admission)  Assessment & Plan  Follow cbc       VTE prophylaxis: heparin ppx    I have performed a physical exam and reviewed and updated ROS and Plan today (9/29/2022). In review of yesterday's note (9/28/2022), there are no changes except as documented above.

## 2022-09-30 LAB
ALBUMIN SERPL BCP-MCNC: 2.3 G/DL (ref 3.2–4.9)
ALBUMIN/GLOB SERPL: 0.7 G/DL
ALP SERPL-CCNC: 104 U/L (ref 30–99)
ALT SERPL-CCNC: <5 U/L (ref 2–50)
ANION GAP SERPL CALC-SCNC: 9 MMOL/L (ref 7–16)
AST SERPL-CCNC: 9 U/L (ref 12–45)
BILIRUB SERPL-MCNC: 0.3 MG/DL (ref 0.1–1.5)
BUN SERPL-MCNC: 10 MG/DL (ref 8–22)
CALCIUM SERPL-MCNC: 7.8 MG/DL (ref 8.5–10.5)
CHLORIDE SERPL-SCNC: 102 MMOL/L (ref 96–112)
CO2 SERPL-SCNC: 27 MMOL/L (ref 20–33)
CREAT SERPL-MCNC: 4.76 MG/DL (ref 0.5–1.4)
ERYTHROCYTE [DISTWIDTH] IN BLOOD BY AUTOMATED COUNT: 62.3 FL (ref 35.9–50)
GFR SERPLBLD CREATININE-BSD FMLA CKD-EPI: 12 ML/MIN/1.73 M 2
GLOBULIN SER CALC-MCNC: 3.3 G/DL (ref 1.9–3.5)
GLUCOSE SERPL-MCNC: 94 MG/DL (ref 65–99)
HCT VFR BLD AUTO: 21.8 % (ref 42–52)
HGB BLD-MCNC: 7.1 G/DL (ref 14–18)
MCH RBC QN AUTO: 30.5 PG (ref 27–33)
MCHC RBC AUTO-ENTMCNC: 32.6 G/DL (ref 33.7–35.3)
MCV RBC AUTO: 93.6 FL (ref 81.4–97.8)
PLATELET # BLD AUTO: 126 K/UL (ref 164–446)
PMV BLD AUTO: 11 FL (ref 9–12.9)
POTASSIUM SERPL-SCNC: 3.9 MMOL/L (ref 3.6–5.5)
PROT SERPL-MCNC: 5.6 G/DL (ref 6–8.2)
RBC # BLD AUTO: 2.33 M/UL (ref 4.7–6.1)
SODIUM SERPL-SCNC: 138 MMOL/L (ref 135–145)
WBC # BLD AUTO: 6.5 K/UL (ref 4.8–10.8)

## 2022-09-30 PROCEDURE — 700102 HCHG RX REV CODE 250 W/ 637 OVERRIDE(OP): Performed by: FAMILY MEDICINE

## 2022-09-30 PROCEDURE — 80053 COMPREHEN METABOLIC PANEL: CPT

## 2022-09-30 PROCEDURE — 36415 COLL VENOUS BLD VENIPUNCTURE: CPT

## 2022-09-30 PROCEDURE — 770001 HCHG ROOM/CARE - MED/SURG/GYN PRIV*

## 2022-09-30 PROCEDURE — A9270 NON-COVERED ITEM OR SERVICE: HCPCS | Performed by: INTERNAL MEDICINE

## 2022-09-30 PROCEDURE — 700111 HCHG RX REV CODE 636 W/ 250 OVERRIDE (IP): Performed by: INTERNAL MEDICINE

## 2022-09-30 PROCEDURE — 99232 SBSQ HOSP IP/OBS MODERATE 35: CPT | Performed by: FAMILY MEDICINE

## 2022-09-30 PROCEDURE — 85027 COMPLETE CBC AUTOMATED: CPT

## 2022-09-30 PROCEDURE — 700111 HCHG RX REV CODE 636 W/ 250 OVERRIDE (IP): Performed by: FAMILY MEDICINE

## 2022-09-30 PROCEDURE — 700101 HCHG RX REV CODE 250: Performed by: INTERNAL MEDICINE

## 2022-09-30 PROCEDURE — 700102 HCHG RX REV CODE 250 W/ 637 OVERRIDE(OP): Performed by: INTERNAL MEDICINE

## 2022-09-30 PROCEDURE — A9270 NON-COVERED ITEM OR SERVICE: HCPCS | Performed by: FAMILY MEDICINE

## 2022-09-30 RX ORDER — HYDRALAZINE HYDROCHLORIDE 20 MG/ML
10 INJECTION INTRAMUSCULAR; INTRAVENOUS EVERY 6 HOURS PRN
Status: DISCONTINUED | OUTPATIENT
Start: 2022-09-30 | End: 2022-10-12 | Stop reason: HOSPADM

## 2022-09-30 RX ORDER — LABETALOL HYDROCHLORIDE 5 MG/ML
10 INJECTION, SOLUTION INTRAVENOUS EVERY 6 HOURS PRN
Status: DISCONTINUED | OUTPATIENT
Start: 2022-09-30 | End: 2022-10-12 | Stop reason: HOSPADM

## 2022-09-30 RX ADMIN — OXYCODONE 10 MG: 5 TABLET ORAL at 11:03

## 2022-09-30 RX ADMIN — OXYCODONE 10 MG: 5 TABLET ORAL at 14:30

## 2022-09-30 RX ADMIN — SEVELAMER CARBONATE 800 MG: 800 TABLET, FILM COATED ORAL at 11:03

## 2022-09-30 RX ADMIN — CARVEDILOL 6.25 MG: 6.25 TABLET, FILM COATED ORAL at 15:48

## 2022-09-30 RX ADMIN — HYDROMORPHONE HYDROCHLORIDE 1 MG: 1 INJECTION, SOLUTION INTRAMUSCULAR; INTRAVENOUS; SUBCUTANEOUS at 15:46

## 2022-09-30 RX ADMIN — LIDOCAINE 3 PATCH: 50 PATCH TOPICAL at 04:32

## 2022-09-30 RX ADMIN — DOCUSATE SODIUM 50 MG AND SENNOSIDES 8.6 MG 2 TABLET: 8.6; 5 TABLET, FILM COATED ORAL at 15:46

## 2022-09-30 RX ADMIN — OXYCODONE 10 MG: 5 TABLET ORAL at 07:57

## 2022-09-30 RX ADMIN — CARVEDILOL 6.25 MG: 6.25 TABLET, FILM COATED ORAL at 07:57

## 2022-09-30 RX ADMIN — ATORVASTATIN CALCIUM 20 MG: 20 TABLET, FILM COATED ORAL at 21:28

## 2022-09-30 RX ADMIN — OMEPRAZOLE 20 MG: 20 CAPSULE, DELAYED RELEASE ORAL at 15:47

## 2022-09-30 RX ADMIN — SEVELAMER CARBONATE 800 MG: 800 TABLET, FILM COATED ORAL at 15:48

## 2022-09-30 RX ADMIN — OXYCODONE 10 MG: 5 TABLET ORAL at 21:29

## 2022-09-30 RX ADMIN — Medication 100 MG: at 15:48

## 2022-09-30 RX ADMIN — Medication 5 MG: at 21:27

## 2022-09-30 RX ADMIN — CEFTRIAXONE SODIUM 1 G: 10 INJECTION, POWDER, FOR SOLUTION INTRAVENOUS at 06:09

## 2022-09-30 RX ADMIN — GABAPENTIN 300 MG: 300 CAPSULE ORAL at 21:28

## 2022-09-30 RX ADMIN — METRONIDAZOLE 500 MG: 500 TABLET ORAL at 14:30

## 2022-09-30 RX ADMIN — METRONIDAZOLE 500 MG: 500 TABLET ORAL at 06:08

## 2022-09-30 RX ADMIN — HYDROMORPHONE HYDROCHLORIDE 1 MG: 1 INJECTION, SOLUTION INTRAMUSCULAR; INTRAVENOUS; SUBCUTANEOUS at 09:02

## 2022-09-30 RX ADMIN — SEVELAMER CARBONATE 800 MG: 800 TABLET, FILM COATED ORAL at 07:57

## 2022-09-30 RX ADMIN — Medication 100 MG: at 04:32

## 2022-09-30 RX ADMIN — OXYCODONE 10 MG: 5 TABLET ORAL at 04:31

## 2022-09-30 RX ADMIN — OMEPRAZOLE 20 MG: 20 CAPSULE, DELAYED RELEASE ORAL at 04:31

## 2022-09-30 RX ADMIN — METRONIDAZOLE 500 MG: 500 TABLET ORAL at 21:28

## 2022-09-30 ASSESSMENT — PAIN DESCRIPTION - PAIN TYPE
TYPE: CHRONIC PAIN
TYPE: ACUTE PAIN
TYPE: ACUTE PAIN;CHRONIC PAIN
TYPE: CHRONIC PAIN
TYPE: ACUTE PAIN;CHRONIC PAIN
TYPE: ACUTE PAIN
TYPE: CHRONIC PAIN

## 2022-09-30 ASSESSMENT — ENCOUNTER SYMPTOMS
SORE THROAT: 0
CHILLS: 0
SPEECH CHANGE: 0
DIZZINESS: 0
NECK PAIN: 0
SENSORY CHANGE: 0
WHEEZING: 0
PALPITATIONS: 0
DIAPHORESIS: 0
SHORTNESS OF BREATH: 0
WEAKNESS: 1
NERVOUS/ANXIOUS: 1
VOMITING: 0
FOCAL WEAKNESS: 0
FEVER: 0
NAUSEA: 0
HEADACHES: 0
ABDOMINAL PAIN: 1
COUGH: 0
HEARTBURN: 0
MYALGIAS: 0
BLURRED VISION: 0
FLANK PAIN: 0
BACK PAIN: 0
DIARRHEA: 0

## 2022-09-30 ASSESSMENT — FIBROSIS 4 INDEX: FIB4 SCORE: 2.357022603955158415

## 2022-09-30 NOTE — PROGRESS NOTES
Bedside report received, assessment completed    A&O x  4, pt calls appropriately  Mobility: Up with SBA, FWW  Fall Risk Assessment: low, bed alarm n/a, door notifications n/a  Pain Assessment / Reassessment completed, medication provided per MAR  Diet: NPO  LDA:   IV Access: 20G LAC/ 20G RUDI, CDI/ flushed/ SL  Access: HD Cath R-chest   No BP/Sticks RUE    GI/: anuric void, + flatus, 9/27 BM  DVT Prophylaxis: heparin, SCD on while in bed   Chago Score: 19, Interventions per flow sheet  Procedures:    - Conservative mgt of pancreatitis w/ IV hydration  D/C Plan:    - Pending slow advancement of diet   - Trending daily labs   - Gen Surg/ GI/ Nephrology following    - Pt will d/c home with outpatient follow up     Reviewed plan of care with patient, bed in lowest position and locked, pt resting comfortably now, call light within reach, all needs met at this time. Interventions will be executed per plan of care

## 2022-09-30 NOTE — CARE PLAN
The patient is Stable - Low risk of patient condition declining or worsening    Shift Goals  Clinical Goals: Pain mgt/ OOB x3/ Up for meals  Patient Goals: Pain Mgt  Family Goals: family not at bedside    Progress made toward(s) clinical / shift goals:        Problem: Pain - Standard  Goal: Alleviation of pain or a reduction in pain to the patient’s comfort goal  Outcome: Progressing     Problem: Knowledge Deficit - Standard  Goal: Patient and family/care givers will demonstrate understanding of plan of care, disease process/condition, diagnostic tests and medications  Outcome: Progressing     Problem: Hemodynamics  Goal: Patient's hemodynamics, fluid balance and neurologic status will be stable or improve  Outcome: Progressing     Problem: Fluid Volume  Goal: Fluid volume balance will be maintained  Outcome: Progressing

## 2022-09-30 NOTE — PROGRESS NOTES
"[facility] -  PROGRESS NOTE               Author: Harrison Melgar M.D. Date & Time: 9/30/2022  12:23 PM     HPI:  Patient is a 72 year old male with a PMHx of ESRD on HD qTTS, HTN, ETOH, CAD, presented with abdominal pain.  There is concern for pancreatitis and plan for possible intervention.  He states pain started about 3 days ago and worsening.  Hasn't been eating or drinking well. Elevated lipase and CT wit acute on chronic pancreatitis.  Hasn't had dialysis today but refusing dialysis today.       DAILY NEPHROLOGY SUMMARY:  9/28 - Still having severe abdominal pain.  Agreeable to HD today  9/29 - On IVF.  Stable abdominal pain.  No other complaints  9/30 - Abdominal pain decreasing.  Trying to eat some food    REVIEW OF SYSTEMS:    10 point ROS reviewed and is as per HPI/daily summary or otherwise negative    PMH/PSH/SH/FH:   Reviewed and unchanged since admission note    CURRENT MEDICATIONS:   Reviewed from admission to present day    VS:  BP (!) 142/78 Comment: Rn Notifed  Pulse 92   Temp 36.9 °C (98.5 °F) (Temporal)   Resp 17   Ht 1.727 m (5' 8\")   Wt 77 kg (169 lb 12.1 oz)   SpO2 97%   BMI 25.81 kg/m²     Physical Exam  Constitutional:       Appearance: He is ill-appearing.   HENT:      Head: Normocephalic.      Right Ear: External ear normal.      Left Ear: External ear normal.      Mouth/Throat:      Mouth: Mucous membranes are dry.   Cardiovascular:      Rate and Rhythm: Normal rate.   Pulmonary:      Effort: Pulmonary effort is normal.   Abdominal:      Tenderness: There is abdominal tenderness.   Musculoskeletal:         General: Normal range of motion.      Cervical back: Normal range of motion.   Skin:     General: Skin is warm.   Neurological:      Mental Status: He is oriented to person, place, and time.   Psychiatric:         Mood and Affect: Mood normal.      Right chest TDC  Fluids:  In: 600 [Dialysis:600]  Out: 600     LABS:  Recent Labs     09/28/22  0019 " 09/29/22  0136 09/30/22  0345   SODIUM 137 137 138   POTASSIUM 5.2 4.5 3.9   CHLORIDE 100 99 102   CO2 22 24 27   GLUCOSE 107* 70 94   BUN 29* 19 10   CREATININE 10.26* 6.46* 4.76*   CALCIUM 7.9* 8.1* 7.8*         IMAGING:   All imaging reviewed from admission to present day    IMPRESSION:  # ESRD   - R chest TDC   # Abdominal pain/pancreatitis   - Management per GI and primary team  # HTN  - Goal BP < 140/90  # Anemia of CKD  - Goal Hgb 10-11  # CKD-MBD/Renal Osteodystrophy       PLAN:  - Caution with IVF in ESRD patient, avoid if possible  - Continue iHD qTTS   - minimal UF  - No dietary protein restrictions  - Dose all meds per ESRD  - Rest of management per primary team and GI

## 2022-09-30 NOTE — PROGRESS NOTES
"Report received from RN, assumed care at 1845  Pt is A0X4, and responds appropriately   Pt declines any SOB, chest pain, new onset of numbness/ tingling  Patient on 1 liter of oxygen  Pt rates pain at 9-10/10, on a scale of 1-10, pt medicated per MAR  Pt is anuric due to end stage renal disease  + bowel sounds, last BM on 9/27  Pt ambulates with a x 1 assist per report  X 2 for repositioning in bed  Patient needs encouraging to turn himself in bed  Pt is NPO, pt medicated for nausea per MAR  Patient refusing heparin. Education provided. Patient states \"I'm just not good with needles\".  Plan of care discussed, all questions answered. Explained importance of calling before getting OOB and pt verbalizes understanding. Explained importance of oral care. Call light is within reach, treaded slipper socks on, bed in lowest/ locked position, hourly rounding in place, all needs met at this time    "

## 2022-09-30 NOTE — PROGRESS NOTES
Hospital Medicine Daily Progress Note    Date of Service  9/30/2022    Chief Complaint  Junior Proctor is a 72 y.o. male admitted 9/27/2022 with pancreatitis    Hospital Course  Admitted with acute on chronic pancreatitis.  Patient was placed on n.p.o., pain control, and IVF hydration was started.  Patient with history of pancreatic duct stones in the past.  Patient had undergone ERCP - pancreatic duct stone removal, placement of pancreatic duct stent on 6/5/2022 and ERCP - pancreatic duct stone removed by balloon sweep, placement of new Holt 4Fr x 11cm single pigtail self ejecting prophylatic pancreatic duct stent, removal of occluded in-situ pancreatic duct stent on 9/19/2022. Gastroenterology was consulted on the case.  They recommended conservative management of pancreatitis with IV hydration, careful advancement of diet, daily labs, agree with surgical consultation and following patient for possible cholecystectomy, Alcohol cessation, No plan for ERCP at this time given that current CT does not demonstrate stent is in place at this time and appears to have self ejected which was expected.  He was also noted to have cholelithiasis, with possible cholecystitis, and started on empiric coverage with IV Rocephin and Flagyl.  Surgery was also consulted on the case.  He has known history of end-stage renal disease on hemodialysis Nephrology was consulted on the case. Per surgery, symptoms and clinical findings more consistent with acute on chronic alcoholic pancreatitis, no compelling evidence for active biliary colic or gallstone pancreatitis.    Interval Problem Update  Pancreatitis - pain better, wants to try clears  HTN - sbp 142-181    I have discussed this patient's plan of care and discharge plan at IDT rounds today with Case Management, Nursing, Nursing leadership, and other members of the IDT team.    Consultants/Specialty  general surgery, GI, and nephrology    Code Status  Full Code    Disposition  Patient  is not medically cleared for discharge.   Anticipate discharge to to home with close outpatient follow-up.  I have placed the appropriate orders for post-discharge needs.    Review of Systems  Review of Systems   Constitutional:  Negative for chills, diaphoresis, fever and malaise/fatigue.   HENT:  Negative for congestion, hearing loss and sore throat.    Eyes:  Negative for blurred vision.   Respiratory:  Negative for cough, shortness of breath and wheezing.    Cardiovascular:  Positive for leg swelling. Negative for chest pain and palpitations.   Gastrointestinal:  Positive for abdominal pain. Negative for diarrhea, heartburn, nausea and vomiting.   Genitourinary:  Negative for dysuria, flank pain and hematuria.   Musculoskeletal:  Negative for back pain, joint pain, myalgias and neck pain.   Skin:  Negative for rash.   Neurological:  Positive for weakness. Negative for dizziness, sensory change, speech change, focal weakness and headaches.   Psychiatric/Behavioral:  The patient is nervous/anxious.       Physical Exam  Temp:  [36.2 °C (97.2 °F)-37.4 °C (99.3 °F)] 36.9 °C (98.5 °F)  Pulse:  [] 92  Resp:  [16-18] 17  BP: (142-181)/() 142/78  SpO2:  [90 %-100 %] 97 %    Physical Exam  Vitals and nursing note reviewed.   HENT:      Head: Normocephalic and atraumatic.      Nose: No congestion.      Mouth/Throat:      Mouth: Mucous membranes are moist.   Eyes:      Extraocular Movements: Extraocular movements intact.      Conjunctiva/sclera: Conjunctivae normal.   Cardiovascular:      Rate and Rhythm: Normal rate and regular rhythm.   Pulmonary:      Effort: Pulmonary effort is normal.      Breath sounds: Normal breath sounds.   Abdominal:      General: There is no distension.      Tenderness: There is abdominal tenderness. There is no guarding or rebound.   Musculoskeletal:      Cervical back: No tenderness.      Right lower leg: Edema present.      Left lower leg: Edema present.   Skin:     General: Skin  is warm and dry.   Neurological:      General: No focal deficit present.      Mental Status: He is alert and oriented to person, place, and time.      Cranial Nerves: No cranial nerve deficit.   Psychiatric:         Mood and Affect: Mood is anxious.       Fluids    Intake/Output Summary (Last 24 hours) at 9/30/2022 1233  Last data filed at 9/29/2022 1531  Gross per 24 hour   Intake --   Output 0 ml   Net 0 ml       Laboratory  Recent Labs     09/28/22  0019 09/29/22  0136 09/30/22  0345   WBC 8.1 8.5 6.5   RBC 2.44* 2.63* 2.33*   HEMOGLOBIN 7.3* 7.8* 7.1*   HEMATOCRIT 22.9* 24.9* 21.8*   MCV 93.9 94.7 93.6   MCH 29.9 29.7 30.5   MCHC 31.9* 31.3* 32.6*   RDW 63.7* 64.0* 62.3*   PLATELETCT 130* 138* 126*   MPV 10.6 10.6 11.0     Recent Labs     09/28/22  0019 09/29/22  0136 09/30/22  0345   SODIUM 137 137 138   POTASSIUM 5.2 4.5 3.9   CHLORIDE 100 99 102   CO2 22 24 27   GLUCOSE 107* 70 94   BUN 29* 19 10   CREATININE 10.26* 6.46* 4.76*   CALCIUM 7.9* 8.1* 7.8*     Recent Labs     09/27/22  1314   INR 1.23*         Recent Labs     09/28/22  0019   TRIGLYCERIDE 57   HDL 42   LDL 20       Imaging  US-RUQ   Final Result   Addendum (preliminary) 1 of 1   No pancreatic duct stent is seen.      Dr. Gallardo discussed these findings with Wilmar Gallardo.      Final      1.  Cholelithiasis without discrete sonographic evidence of acute cholecystitis. Patient did demonstrate a Evans's sign throughout the course of the exam. There is strong clinical suspicion for acute cholecystitis, a HIDA scan should be obtained for    further evaluation.   2.  Hepatic steatosis.   3.  Hepatomegaly.   4.  The pancreas is obscured by overlying bowel gas.      CT-ABDOMEN-PELVIS W/O   Final Result   Addendum (preliminary) 1 of 1   Subacute right L2 and L3 transverse process fractures.      Final      1.  Acute on chronic pancreatitis. Inflammatory changes track along the left paracolic gutter.   2.  Likely reactive inflammation of the colon at the  splenic flexure.   3.  Mildly dilated gallbladder with likely cholelithiasis. If there is concern for acute cholecystitis, ultrasound can be performed.   4.  Features of medical renal disease.   5.  Sigmoid diverticulosis.   6.  Atherosclerotic changes.   7.  Right hepatic lobe lesion, previously characterized as hemangioma on MRI           Assessment/Plan  * Acute on chronic pancreatitis (HCC)- (present on admission)  Assessment & Plan  Pain control  ERCP - pancreatic duct stone removed by balloon sweep, placement of new Holt 4Fr x 11cm single pigtail self ejecting prophylatic pancreatic duct stent, removal of occluded in-situ pancreatic duct stent   9/19/2022  ERCP -pancreatic duct stone removal, placement of pancreatic duct stent  6/5/2022  Per gastroenterology - conservative management of pancreatitis with IV hydration, careful advancement of diet, daily labs, agree with surgical consultation and following patient for possible cholecystectomy, Alcohol cessation, No plan for ERCP at this time given that current CT does not demonstrate stent is in place at this time and appears to have self ejected which was expected.  Start clear liquids      Cholelithiasis- (present on admission)  Assessment & Plan  Possible cholecystitis?  IV Rocephin and Flagyl x 5 days    HTN (hypertension)- (present on admission)  Assessment & Plan  Carvedilol   IV labetalol and hydralazine as needed with parameters    Alcohol dependence (HCC)- (present on admission)  Assessment & Plan  Monitor for withdrawal symptoms    ESRD on dialysis (HCC)- (present on admission)  Assessment & Plan  HD per Nephrology    Gout- (present on admission)  Assessment & Plan  Allopurinol    Anemia- (present on admission)  Assessment & Plan  Follow cbc       VTE prophylaxis: heparin ppx    I have performed a physical exam and reviewed and updated ROS and Plan today (9/30/2022). In review of yesterday's note (9/29/2022), there are no changes except as documented  above.

## 2022-10-01 PROBLEM — M06.9 RHEUMATOID ARTHRITIS (HCC): Status: ACTIVE | Noted: 2022-05-05

## 2022-10-01 LAB
ALBUMIN SERPL BCP-MCNC: 2.7 G/DL (ref 3.2–4.9)
ALBUMIN/GLOB SERPL: 0.8 G/DL
ALP SERPL-CCNC: 107 U/L (ref 30–99)
ALT SERPL-CCNC: <5 U/L (ref 2–50)
ANION GAP SERPL CALC-SCNC: 8 MMOL/L (ref 7–16)
AST SERPL-CCNC: 11 U/L (ref 12–45)
BILIRUB SERPL-MCNC: 0.3 MG/DL (ref 0.1–1.5)
BUN SERPL-MCNC: 6 MG/DL (ref 8–22)
CALCIUM SERPL-MCNC: 8.1 MG/DL (ref 8.5–10.5)
CHLORIDE SERPL-SCNC: 100 MMOL/L (ref 96–112)
CO2 SERPL-SCNC: 29 MMOL/L (ref 20–33)
CREAT SERPL-MCNC: 3.11 MG/DL (ref 0.5–1.4)
ERYTHROCYTE [DISTWIDTH] IN BLOOD BY AUTOMATED COUNT: 62.4 FL (ref 35.9–50)
GFR SERPLBLD CREATININE-BSD FMLA CKD-EPI: 21 ML/MIN/1.73 M 2
GLOBULIN SER CALC-MCNC: 3.4 G/DL (ref 1.9–3.5)
GLUCOSE SERPL-MCNC: 80 MG/DL (ref 65–99)
HCT VFR BLD AUTO: 23.9 % (ref 42–52)
HGB BLD-MCNC: 7.6 G/DL (ref 14–18)
MCH RBC QN AUTO: 30.3 PG (ref 27–33)
MCHC RBC AUTO-ENTMCNC: 31.8 G/DL (ref 33.7–35.3)
MCV RBC AUTO: 95.2 FL (ref 81.4–97.8)
PLATELET # BLD AUTO: 134 K/UL (ref 164–446)
PMV BLD AUTO: 10 FL (ref 9–12.9)
POTASSIUM SERPL-SCNC: 4.2 MMOL/L (ref 3.6–5.5)
PROT SERPL-MCNC: 6.1 G/DL (ref 6–8.2)
RBC # BLD AUTO: 2.51 M/UL (ref 4.7–6.1)
SODIUM SERPL-SCNC: 137 MMOL/L (ref 135–145)
WBC # BLD AUTO: 6.9 K/UL (ref 4.8–10.8)

## 2022-10-01 PROCEDURE — 36415 COLL VENOUS BLD VENIPUNCTURE: CPT

## 2022-10-01 PROCEDURE — A9270 NON-COVERED ITEM OR SERVICE: HCPCS | Performed by: FAMILY MEDICINE

## 2022-10-01 PROCEDURE — 90935 HEMODIALYSIS ONE EVALUATION: CPT

## 2022-10-01 PROCEDURE — 700111 HCHG RX REV CODE 636 W/ 250 OVERRIDE (IP): Performed by: FAMILY MEDICINE

## 2022-10-01 PROCEDURE — 700102 HCHG RX REV CODE 250 W/ 637 OVERRIDE(OP): Performed by: FAMILY MEDICINE

## 2022-10-01 PROCEDURE — A9270 NON-COVERED ITEM OR SERVICE: HCPCS | Performed by: HOSPITALIST

## 2022-10-01 PROCEDURE — 80053 COMPREHEN METABOLIC PANEL: CPT

## 2022-10-01 PROCEDURE — 700111 HCHG RX REV CODE 636 W/ 250 OVERRIDE (IP)

## 2022-10-01 PROCEDURE — 700101 HCHG RX REV CODE 250: Performed by: INTERNAL MEDICINE

## 2022-10-01 PROCEDURE — 700102 HCHG RX REV CODE 250 W/ 637 OVERRIDE(OP): Performed by: INTERNAL MEDICINE

## 2022-10-01 PROCEDURE — 700111 HCHG RX REV CODE 636 W/ 250 OVERRIDE (IP): Performed by: INTERNAL MEDICINE

## 2022-10-01 PROCEDURE — A9270 NON-COVERED ITEM OR SERVICE: HCPCS | Performed by: INTERNAL MEDICINE

## 2022-10-01 PROCEDURE — 99232 SBSQ HOSP IP/OBS MODERATE 35: CPT | Performed by: HOSPITALIST

## 2022-10-01 PROCEDURE — 700102 HCHG RX REV CODE 250 W/ 637 OVERRIDE(OP): Performed by: HOSPITALIST

## 2022-10-01 PROCEDURE — 770001 HCHG ROOM/CARE - MED/SURG/GYN PRIV*

## 2022-10-01 PROCEDURE — 85027 COMPLETE CBC AUTOMATED: CPT

## 2022-10-01 RX ORDER — OXYCODONE HYDROCHLORIDE 5 MG/1
5 TABLET ORAL
Status: DISCONTINUED | OUTPATIENT
Start: 2022-10-01 | End: 2022-10-04

## 2022-10-01 RX ORDER — HEPARIN SODIUM 1000 [USP'U]/ML
INJECTION, SOLUTION INTRAVENOUS; SUBCUTANEOUS
Status: COMPLETED
Start: 2022-10-01 | End: 2022-10-01

## 2022-10-01 RX ORDER — OXYCODONE HCL 20 MG/1
20 TABLET, FILM COATED, EXTENDED RELEASE ORAL EVERY 12 HOURS
Status: DISCONTINUED | OUTPATIENT
Start: 2022-10-01 | End: 2022-10-02

## 2022-10-01 RX ADMIN — LIDOCAINE 3 PATCH: 50 PATCH TOPICAL at 06:22

## 2022-10-01 RX ADMIN — METRONIDAZOLE 500 MG: 500 TABLET ORAL at 22:30

## 2022-10-01 RX ADMIN — OXYCODONE 10 MG: 5 TABLET ORAL at 15:09

## 2022-10-01 RX ADMIN — HEPARIN SODIUM 3700 UNITS: 1000 INJECTION, SOLUTION INTRAVENOUS; SUBCUTANEOUS at 10:39

## 2022-10-01 RX ADMIN — SEVELAMER CARBONATE 800 MG: 800 TABLET, FILM COATED ORAL at 17:51

## 2022-10-01 RX ADMIN — HYDROMORPHONE HYDROCHLORIDE 1 MG: 1 INJECTION, SOLUTION INTRAMUSCULAR; INTRAVENOUS; SUBCUTANEOUS at 12:52

## 2022-10-01 RX ADMIN — CEFTRIAXONE SODIUM 1 G: 10 INJECTION, POWDER, FOR SOLUTION INTRAVENOUS at 06:58

## 2022-10-01 RX ADMIN — HYDROMORPHONE HYDROCHLORIDE 1 MG: 1 INJECTION, SOLUTION INTRAMUSCULAR; INTRAVENOUS; SUBCUTANEOUS at 00:40

## 2022-10-01 RX ADMIN — Medication 100 MG: at 06:18

## 2022-10-01 RX ADMIN — Medication 5 MG: at 22:29

## 2022-10-01 RX ADMIN — SEVELAMER CARBONATE 800 MG: 800 TABLET, FILM COATED ORAL at 11:40

## 2022-10-01 RX ADMIN — ALLOPURINOL 100 MG: 100 TABLET ORAL at 17:55

## 2022-10-01 RX ADMIN — ATORVASTATIN CALCIUM 20 MG: 20 TABLET, FILM COATED ORAL at 22:30

## 2022-10-01 RX ADMIN — Medication 100 MG: at 17:51

## 2022-10-01 RX ADMIN — OMEPRAZOLE 20 MG: 20 CAPSULE, DELAYED RELEASE ORAL at 17:52

## 2022-10-01 RX ADMIN — OXYCODONE 5 MG: 5 TABLET ORAL at 11:40

## 2022-10-01 RX ADMIN — METRONIDAZOLE 500 MG: 500 TABLET ORAL at 06:18

## 2022-10-01 RX ADMIN — OXYCODONE 5 MG: 5 TABLET ORAL at 22:29

## 2022-10-01 RX ADMIN — DOCUSATE SODIUM 50 MG AND SENNOSIDES 8.6 MG 2 TABLET: 8.6; 5 TABLET, FILM COATED ORAL at 06:19

## 2022-10-01 RX ADMIN — DOCUSATE SODIUM 50 MG AND SENNOSIDES 8.6 MG 2 TABLET: 8.6; 5 TABLET, FILM COATED ORAL at 17:52

## 2022-10-01 RX ADMIN — GABAPENTIN 300 MG: 300 CAPSULE ORAL at 22:29

## 2022-10-01 RX ADMIN — HYDROMORPHONE HYDROCHLORIDE 1 MG: 1 INJECTION, SOLUTION INTRAMUSCULAR; INTRAVENOUS; SUBCUTANEOUS at 06:56

## 2022-10-01 RX ADMIN — OXYCODONE 10 MG: 5 TABLET ORAL at 02:56

## 2022-10-01 RX ADMIN — METRONIDAZOLE 500 MG: 500 TABLET ORAL at 15:10

## 2022-10-01 RX ADMIN — OXYCODONE HYDROCHLORIDE 20 MG: 20 TABLET, FILM COATED, EXTENDED RELEASE ORAL at 17:52

## 2022-10-01 RX ADMIN — CARVEDILOL 6.25 MG: 6.25 TABLET, FILM COATED ORAL at 17:51

## 2022-10-01 RX ADMIN — OMEPRAZOLE 20 MG: 20 CAPSULE, DELAYED RELEASE ORAL at 06:18

## 2022-10-01 ASSESSMENT — ENCOUNTER SYMPTOMS
BACK PAIN: 0
LOSS OF CONSCIOUSNESS: 0
DOUBLE VISION: 0
ABDOMINAL PAIN: 1
DIZZINESS: 0
SHORTNESS OF BREATH: 0
HEADACHES: 0
NAUSEA: 1
COUGH: 0
BLURRED VISION: 0
SORE THROAT: 0
CHILLS: 0
PALPITATIONS: 0
DIARRHEA: 0
FEVER: 0
VOMITING: 0

## 2022-10-01 ASSESSMENT — PAIN DESCRIPTION - PAIN TYPE
TYPE: CHRONIC PAIN
TYPE: CHRONIC PAIN
TYPE: ACUTE PAIN
TYPE: ACUTE PAIN;CHRONIC PAIN
TYPE: CHRONIC PAIN
TYPE: ACUTE PAIN
TYPE: CHRONIC PAIN
TYPE: ACUTE PAIN;SURGICAL PAIN
TYPE: CHRONIC PAIN
TYPE: CHRONIC PAIN
TYPE: ACUTE PAIN
TYPE: CHRONIC PAIN

## 2022-10-01 NOTE — PROGRESS NOTES
[facility] Progress Note     Hemodialysis treatment x3 hours completed as ordered per Dr Waddell.   Treatment performed in Multi-Tx Room at 0805 and ended at 1105  Net UF Removed:  2000 mL   Patient initial UF goal of 0, but Pt wanted fluid off and consented to increasing goal.  MD aware.   Patient tolerated treatment well. UF goal reached as tolerated.  R-side IJ tunneled CVC access with good patency and flow x 2  Patient stable during and post treatment.   See Acute HD flow sheets on clinical data notes under media for details.      Post tx access: R-side IJ tunneled HD catheter flushed with saline then locked with heparin 1000 units/ml per designated amount in each lumen (see MAR) then clamped, capped aseptically and labeled properly. CVC dressing was changed today as per policy. CVC dressings clean, dry and intact. No s/s of infection to HD catheter site. Heparin lock to be aspirated prior to next dialysis/CVC use by dialysis RN only. Please do not flush or draw from ports.     Please notify Nephrologist/Dialysis for follow-up.     Report given to primary care nurse MARANDA Bell.

## 2022-10-01 NOTE — CARE PLAN
The patient is Stable - Low risk of patient condition declining or worsening    Shift Goals  Clinical Goals: Pain Mgt/ OOB x3/ Advance Diet  Patient Goals: Pain mgt  Family Goals: family not at bedside    Progress made toward(s) clinical / shift goals:    Problem: Pain - Standard  Goal: Alleviation of pain or a reduction in pain to the patient’s comfort goal  Outcome: Progressing     Problem: Knowledge Deficit - Standard  Goal: Patient and family/care givers will demonstrate understanding of plan of care, disease process/condition, diagnostic tests and medications  Outcome: Progressing     Patient aware of plan of care and verbalizes understanding. Patient medicated for pain per MAR.

## 2022-10-01 NOTE — PROGRESS NOTES
Bedside report received, assessment completed    A&O x  4, pt calls appropriately  Mobility: Up with SBA, FWW  Fall Risk Assessment: low, bed alarm n/a, door notifications n/a  Pain Assessment / Reassessment completed, medication provided per MAR  Diet: Clear liquid diet  LDA:   IV Access: 20G LAC/ 20G RUDI, CDI/ flushed/ SL  Access: HD Cath R-chest   No BP/Sticks RUE    GI/: anuric void, + flatus, 9/27 BM  DVT Prophylaxis: heparin, SCD on while in bed   Chago Score: 19, Interventions per flow sheet  Procedures:    - Conservative mgt of pancreatitis w/ IV hydration   - Dialysis T/TH/Sa  D/C Plan:    - Pending slow advancement of diet   - Trending daily labs   - Gen Surg/ GI/ Nephrology following    - Pt will d/c home with outpatient follow up     Reviewed plan of care with patient, bed in lowest position and locked, pt resting comfortably now, call light within reach, all needs met at this time. Interventions will be executed per plan of care

## 2022-10-01 NOTE — PROGRESS NOTES
Report received from RN, assumed care at 1845  Pt is A0X4, and responds appropriately   Pt declines any SOB, chest pain, new onset of numbness/ tingling  Patient on 1 liter of oxygen at this time  Pt rates pain at 9-10/10, on a scale of 1-10, pt medicated per MAR  Pt is anuric due to end stage renal disease  + bowel sounds, last BM on 9/27  Pt ambulates with a x 1 assist per report  X 2 for repositioning in bed  Patient needs encouraging to turn himself in bed  Pt is on clear liquid diet and tolerating  Patient refusing heparin. Education provided.   Plan of care discussed, all questions answered. Explained importance of calling before getting OOB and pt verbalizes understanding. Explained importance of oral care. Call light is within reach, treaded slipper socks on, bed in lowest/ locked position, hourly rounding in place, all needs met at this time

## 2022-10-01 NOTE — PROGRESS NOTES
Hospital Medicine Daily Progress Note    Date of Service  10/1/2022    Chief Complaint  Junior Proctor is a 72 y.o. male admitted 9/27/2022 with abd pain    Hospital Course  73yo PMHx ESRD on HD, gout, ETOH, RA on Enbril, HTN, CAD presenting with Abd pain.  Had recently been DC'd home after admission for acute pancreatitis, bacteremia/sepsis on 9/22.  On that admission had a biliary stent placed and later exchanged after it was blocked.  On re-presentation found to have acute on chronic pancreatitis.  Seen by Nicho who recommended non operative management    Interval Problem Update  Cont's to have some belly pain in his upper belly and LUQ particularly.  Sharp, stabbing.  About the same as yesterday.  Also has his chronic joint pain which is stable.  Appetite is a little better.  Some N but no V    I have discussed this patient's plan of care and discharge plan at IDT rounds today with Case Management, Nursing, Nursing leadership, and other members of the IDT team.    Consultants/Specialty  general surgery    Code Status  Full Code    Disposition  Patient is not medically cleared for discharge.   Anticipate discharge to to home with close outpatient follow-up.  I have placed the appropriate orders for post-discharge needs.    Review of Systems  Review of Systems   Constitutional:  Negative for chills and fever.   HENT:  Negative for nosebleeds and sore throat.    Eyes:  Negative for blurred vision and double vision.   Respiratory:  Negative for cough and shortness of breath.    Cardiovascular:  Negative for chest pain, palpitations and leg swelling.   Gastrointestinal:  Positive for abdominal pain and nausea. Negative for diarrhea and vomiting.   Genitourinary:  Negative for dysuria and urgency.   Musculoskeletal:  Positive for joint pain. Negative for back pain.   Skin:  Negative for rash.   Neurological:  Negative for dizziness, loss of consciousness and headaches.      Physical Exam  Temp:  [36.3 °C (97.3 °F)-37  °C (98.6 °F)] 36.3 °C (97.3 °F)  Pulse:  [] 86  Resp:  [16-18] 18  BP: (101-139)/(56-92) 132/78  SpO2:  [90 %-100 %] 99 %    Physical Exam  Constitutional:       General: He is not in acute distress.     Appearance: He is well-developed. He is obese. He is not diaphoretic.   HENT:      Head: Normocephalic and atraumatic.   Eyes:      General: No scleral icterus.     Conjunctiva/sclera: Conjunctivae normal.   Neck:      Vascular: No JVD.   Cardiovascular:      Rate and Rhythm: Normal rate.      Heart sounds: No murmur heard.    No gallop.   Pulmonary:      Effort: Pulmonary effort is normal. No respiratory distress.      Breath sounds: No stridor. No wheezing or rales.   Abdominal:      General: There is no distension.      Palpations: Abdomen is soft.      Tenderness: There is abdominal tenderness. There is no guarding or rebound.      Comments: TTP in epigastrium and LUQ, no G/R or peritoneal findings, soft throughout   Musculoskeletal:         General: No tenderness.      Right lower leg: No edema.      Left lower leg: No edema.   Skin:     General: Skin is warm and dry.      Capillary Refill: Capillary refill takes less than 2 seconds.      Coloration: Skin is not jaundiced.      Findings: No rash.   Neurological:      Mental Status: He is oriented to person, place, and time.   Psychiatric:         Mood and Affect: Mood normal.         Behavior: Behavior normal.         Thought Content: Thought content normal.       Fluids    Intake/Output Summary (Last 24 hours) at 10/1/2022 0814  Last data filed at 10/1/2022 0720  Gross per 24 hour   Intake 720 ml   Output 0 ml   Net 720 ml       Laboratory  Recent Labs     09/29/22 0136 09/30/22  0345   WBC 8.5 6.5   RBC 2.63* 2.33*   HEMOGLOBIN 7.8* 7.1*   HEMATOCRIT 24.9* 21.8*   MCV 94.7 93.6   MCH 29.7 30.5   MCHC 31.3* 32.6*   RDW 64.0* 62.3*   PLATELETCT 138* 126*   MPV 10.6 11.0     Recent Labs     09/29/22 0136 09/30/22  0345   SODIUM 137 138   POTASSIUM 4.5  3.9   CHLORIDE 99 102   CO2 24 27   GLUCOSE 70 94   BUN 19 10   CREATININE 6.46* 4.76*   CALCIUM 8.1* 7.8*                   Imaging  US-RUQ   Final Result   Addendum (preliminary) 1 of 1   No pancreatic duct stent is seen.      Dr. Gallardo discussed these findings with Wilmar Gallardo.      Final      1.  Cholelithiasis without discrete sonographic evidence of acute cholecystitis. Patient did demonstrate a Evans's sign throughout the course of the exam. There is strong clinical suspicion for acute cholecystitis, a HIDA scan should be obtained for    further evaluation.   2.  Hepatic steatosis.   3.  Hepatomegaly.   4.  The pancreas is obscured by overlying bowel gas.      CT-ABDOMEN-PELVIS W/O   Final Result   Addendum (preliminary) 1 of 1   Subacute right L2 and L3 transverse process fractures.      Final      1.  Acute on chronic pancreatitis. Inflammatory changes track along the left paracolic gutter.   2.  Likely reactive inflammation of the colon at the splenic flexure.   3.  Mildly dilated gallbladder with likely cholelithiasis. If there is concern for acute cholecystitis, ultrasound can be performed.   4.  Features of medical renal disease.   5.  Sigmoid diverticulosis.   6.  Atherosclerotic changes.   7.  Right hepatic lobe lesion, previously characterized as hemangioma on MRI           Assessment/Plan  * Acute on chronic pancreatitis (HCC)- (present on admission)  Assessment & Plan  Pain control  ERCP - pancreatic duct stone removed by balloon sweep, placement of new Holt 4Fr x 11cm single pigtail self ejecting prophylatic pancreatic duct stent, removal of occluded in-situ pancreatic duct stent   9/19/2022  ERCP -pancreatic duct stone removal, placement of pancreatic duct stent  6/5/2022  Per gastroenterology - conservative management of pancreatitis with IV hydration, careful advancement of diet, daily labs, agree with surgical consultation and following patient for possible cholecystectomy, Alcohol cessation,  No plan for ERCP at this time given that current CT does not demonstrate stent is in place at this time and appears to have self ejected which was expected.  Start clear liquids  Follow CMP, CBC, lipase  Prn support      Cholelithiasis- (present on admission)  Assessment & Plan  Possible cholecystitis?  IV Rocephin and Flagyl x 5 days  Per Surgery - Symptoms and clinical findings more consistent with acute on chronic alcoholic pancreatitis. No compelling evidence for active biliary colic or gallstone pancreatitis.  9/29/2022    HTN (hypertension)- (present on admission)  Assessment & Plan  Carvedilol 6.25  IV labetalol and hydralazine as needed with parameters: has not required coverage in last 48rhs    Rheumatoid arthritis (HCC)  Assessment & Plan  Maintained on Etanercept weekly    Alcohol dependence (HCC)- (present on admission)  Assessment & Plan  Monitor for withdrawal symptoms  counseled    ESRD on dialysis (HCC)- (present on admission)  Assessment & Plan  HD per Nephrology    Gout- (present on admission)  Assessment & Plan  Allopurinol    Anemia- (present on admission)  Assessment & Plan  Anemia of chronic disease (Hx RA) + CKD       VTE prophylaxis: heparin ppx    I have performed a physical exam and reviewed and updated ROS and Plan today (10/1/2022). In review of yesterday's note (9/30/2022), there are no changes except as documented above.

## 2022-10-01 NOTE — PROGRESS NOTES
"[facility] -  PROGRESS NOTE               Author: Megan Waddell M.D. Date & Time: 10/1/2022  1:20 PM     HPI:  Patient is a 72 year old male with a PMHx of ESRD on HD qTTS, HTN, ETOH, CAD, presented with abdominal pain.  There is concern for pancreatitis and plan for possible intervention.  He states pain started about 3 days ago and worsening.  Hasn't been eating or drinking well. Elevated lipase and CT wit acute on chronic pancreatitis.  Hasn't had dialysis today but refusing dialysis today.    DAILY NEPHROLOGY SUMMARY:  See note from 9/30 for prior summaries  10/01: NAEO, +abd pain stabbing and cramping, no interest in eating     REVIEW OF SYSTEMS:    10 point ROS reviewed and is as per HPI/daily summary or otherwise negative    PMH/PSH/SH/FH:   Reviewed and unchanged since admission note    CURRENT MEDICATIONS:   Reviewed from admission to present day    VS:  /51   Pulse 84   Temp 36.2 °C (97.2 °F) (Temporal)   Resp 18   Ht 1.727 m (5' 8\")   Wt 77 kg (169 lb 12.1 oz)   SpO2 100%   BMI 25.81 kg/m²     Physical Exam  Nursing note reviewed.   Constitutional:       Appearance: He is ill-appearing.   Eyes:      General: No scleral icterus.  Cardiovascular:      Comments: No edema  Pulmonary:      Effort: Pulmonary effort is normal.   Abdominal:      General: There is no distension.      Tenderness: There is abdominal tenderness.   Musculoskeletal:         General: No deformity.   Skin:     Findings: No rash.   Neurological:      Mental Status: He is alert.   Psychiatric:         Behavior: Behavior normal.     Fluids:  In: 720 [P.O.:720]  Out: -     LABS:  Recent Labs     09/29/22  0136 09/30/22  0345 10/01/22  1141   SODIUM 137 138 137   POTASSIUM 4.5 3.9 4.2   CHLORIDE 99 102 100   CO2 24 27 29   GLUCOSE 70 94 80   BUN 19 10 6*   CREATININE 6.46* 4.76* 3.11*   CALCIUM 8.1* 7.8* 8.1*       IMAGING:   All imaging reviewed from admission to present day    IMPRESSION:  # ESRD   - " R chest TDC   # Abdominal pain/pancreatitis  - Management per GI and primary team  - Caution with IVF in ESRD with pancreatitis, bolus maybe more appropriate as needed than maintenance  # HTN  - Goal BP < 140/90  # Anemia of CKD  - Goal Hgb 10-11  # CKD-MBD/Renal Osteodystrophy     PLAN:  - TTS iHD  - Minimal UF  - No dietary protein restrictions  - Dose all meds per ESRD  - Rest of management per primary team and GI

## 2022-10-01 NOTE — CARE PLAN
The patient is Stable - Low risk of patient condition declining or worsening    Shift Goals  Clinical Goals: Pain Mgt/ OOB x3/ Advance Diet  Patient Goals: Pain mgt  Family Goals: family not at bedside    Progress made toward(s) clinical / shift goals:        Problem: Pain - Standard  Goal: Alleviation of pain or a reduction in pain to the patient’s comfort goal  Outcome: Progressing     Problem: Knowledge Deficit - Standard  Goal: Patient and family/care givers will demonstrate understanding of plan of care, disease process/condition, diagnostic tests and medications  Outcome: Progressing     Problem: Hemodynamics  Goal: Patient's hemodynamics, fluid balance and neurologic status will be stable or improve  Outcome: Progressing

## 2022-10-02 LAB
ABO GROUP BLD: NORMAL
ALBUMIN SERPL BCP-MCNC: 2.3 G/DL (ref 3.2–4.9)
ALBUMIN/GLOB SERPL: 0.8 G/DL
ALP SERPL-CCNC: 91 U/L (ref 30–99)
ALT SERPL-CCNC: <5 U/L (ref 2–50)
ANION GAP SERPL CALC-SCNC: 7 MMOL/L (ref 7–16)
AST SERPL-CCNC: 8 U/L (ref 12–45)
BACTERIA BLD CULT: NORMAL
BACTERIA BLD CULT: NORMAL
BARCODED ABORH UBTYP: 600
BARCODED PRD CODE UBPRD: NORMAL
BARCODED UNIT NUM UBUNT: NORMAL
BILIRUB SERPL-MCNC: 0.3 MG/DL (ref 0.1–1.5)
BLD GP AB SCN SERPL QL: NORMAL
BUN SERPL-MCNC: 6 MG/DL (ref 8–22)
CALCIUM SERPL-MCNC: 7.9 MG/DL (ref 8.5–10.5)
CHLORIDE SERPL-SCNC: 99 MMOL/L (ref 96–112)
CO2 SERPL-SCNC: 29 MMOL/L (ref 20–33)
COMPONENT R 8504R: NORMAL
CREAT SERPL-MCNC: 4.18 MG/DL (ref 0.5–1.4)
ERYTHROCYTE [DISTWIDTH] IN BLOOD BY AUTOMATED COUNT: 62.2 FL (ref 35.9–50)
GFR SERPLBLD CREATININE-BSD FMLA CKD-EPI: 15 ML/MIN/1.73 M 2
GLOBULIN SER CALC-MCNC: 3 G/DL (ref 1.9–3.5)
GLUCOSE SERPL-MCNC: 88 MG/DL (ref 65–99)
HCT VFR BLD AUTO: 20.3 % (ref 42–52)
HGB BLD-MCNC: 6.4 G/DL (ref 14–18)
LIPASE SERPL-CCNC: 50 U/L (ref 11–82)
MCH RBC QN AUTO: 30 PG (ref 27–33)
MCHC RBC AUTO-ENTMCNC: 31.5 G/DL (ref 33.7–35.3)
MCV RBC AUTO: 95.3 FL (ref 81.4–97.8)
PLATELET # BLD AUTO: 125 K/UL (ref 164–446)
PMV BLD AUTO: 10.7 FL (ref 9–12.9)
POTASSIUM SERPL-SCNC: 4 MMOL/L (ref 3.6–5.5)
PRODUCT TYPE UPROD: NORMAL
PROT SERPL-MCNC: 5.3 G/DL (ref 6–8.2)
RBC # BLD AUTO: 2.13 M/UL (ref 4.7–6.1)
RH BLD: NORMAL
SIGNIFICANT IND 70042: NORMAL
SIGNIFICANT IND 70042: NORMAL
SITE SITE: NORMAL
SITE SITE: NORMAL
SODIUM SERPL-SCNC: 135 MMOL/L (ref 135–145)
SOURCE SOURCE: NORMAL
SOURCE SOURCE: NORMAL
UNIT STATUS USTAT: NORMAL
WBC # BLD AUTO: 6.7 K/UL (ref 4.8–10.8)

## 2022-10-02 PROCEDURE — 700102 HCHG RX REV CODE 250 W/ 637 OVERRIDE(OP): Performed by: HOSPITALIST

## 2022-10-02 PROCEDURE — 700111 HCHG RX REV CODE 636 W/ 250 OVERRIDE (IP): Performed by: INTERNAL MEDICINE

## 2022-10-02 PROCEDURE — 700102 HCHG RX REV CODE 250 W/ 637 OVERRIDE(OP): Performed by: INTERNAL MEDICINE

## 2022-10-02 PROCEDURE — 30233N1 TRANSFUSION OF NONAUTOLOGOUS RED BLOOD CELLS INTO PERIPHERAL VEIN, PERCUTANEOUS APPROACH: ICD-10-PCS | Performed by: HOSPITALIST

## 2022-10-02 PROCEDURE — P9016 RBC LEUKOCYTES REDUCED: HCPCS

## 2022-10-02 PROCEDURE — 36415 COLL VENOUS BLD VENIPUNCTURE: CPT

## 2022-10-02 PROCEDURE — 700111 HCHG RX REV CODE 636 W/ 250 OVERRIDE (IP): Performed by: FAMILY MEDICINE

## 2022-10-02 PROCEDURE — A9270 NON-COVERED ITEM OR SERVICE: HCPCS | Performed by: INTERNAL MEDICINE

## 2022-10-02 PROCEDURE — A9270 NON-COVERED ITEM OR SERVICE: HCPCS | Performed by: HOSPITALIST

## 2022-10-02 PROCEDURE — 86850 RBC ANTIBODY SCREEN: CPT

## 2022-10-02 PROCEDURE — 36430 TRANSFUSION BLD/BLD COMPNT: CPT

## 2022-10-02 PROCEDURE — 85027 COMPLETE CBC AUTOMATED: CPT

## 2022-10-02 PROCEDURE — 86923 COMPATIBILITY TEST ELECTRIC: CPT | Mod: 91

## 2022-10-02 PROCEDURE — 770001 HCHG ROOM/CARE - MED/SURG/GYN PRIV*

## 2022-10-02 PROCEDURE — 80053 COMPREHEN METABOLIC PANEL: CPT

## 2022-10-02 PROCEDURE — 99232 SBSQ HOSP IP/OBS MODERATE 35: CPT | Performed by: HOSPITALIST

## 2022-10-02 PROCEDURE — 83690 ASSAY OF LIPASE: CPT

## 2022-10-02 PROCEDURE — 86900 BLOOD TYPING SEROLOGIC ABO: CPT

## 2022-10-02 PROCEDURE — 86901 BLOOD TYPING SEROLOGIC RH(D): CPT

## 2022-10-02 RX ADMIN — GABAPENTIN 300 MG: 300 CAPSULE ORAL at 22:38

## 2022-10-02 RX ADMIN — Medication 100 MG: at 17:24

## 2022-10-02 RX ADMIN — DOCUSATE SODIUM 50 MG AND SENNOSIDES 8.6 MG 2 TABLET: 8.6; 5 TABLET, FILM COATED ORAL at 06:31

## 2022-10-02 RX ADMIN — OMEPRAZOLE 20 MG: 20 CAPSULE, DELAYED RELEASE ORAL at 06:31

## 2022-10-02 RX ADMIN — METRONIDAZOLE 500 MG: 500 TABLET ORAL at 06:30

## 2022-10-02 RX ADMIN — OXYCODONE 5 MG: 5 TABLET ORAL at 02:56

## 2022-10-02 RX ADMIN — CARVEDILOL 6.25 MG: 6.25 TABLET, FILM COATED ORAL at 08:26

## 2022-10-02 RX ADMIN — ATORVASTATIN CALCIUM 20 MG: 20 TABLET, FILM COATED ORAL at 22:38

## 2022-10-02 RX ADMIN — OXYCODONE 5 MG: 5 TABLET ORAL at 15:18

## 2022-10-02 RX ADMIN — OMEPRAZOLE 20 MG: 20 CAPSULE, DELAYED RELEASE ORAL at 17:24

## 2022-10-02 RX ADMIN — CARVEDILOL 6.25 MG: 6.25 TABLET, FILM COATED ORAL at 17:24

## 2022-10-02 RX ADMIN — OXYCODONE 5 MG: 5 TABLET ORAL at 12:09

## 2022-10-02 RX ADMIN — HYDROMORPHONE HYDROCHLORIDE 1 MG: 1 INJECTION, SOLUTION INTRAMUSCULAR; INTRAVENOUS; SUBCUTANEOUS at 10:57

## 2022-10-02 RX ADMIN — SEVELAMER CARBONATE 800 MG: 800 TABLET, FILM COATED ORAL at 07:09

## 2022-10-02 RX ADMIN — OXYCODONE 5 MG: 5 TABLET ORAL at 08:26

## 2022-10-02 RX ADMIN — OXYCODONE HYDROCHLORIDE 20 MG: 20 TABLET, FILM COATED, EXTENDED RELEASE ORAL at 06:30

## 2022-10-02 RX ADMIN — OXYCODONE HYDROCHLORIDE 30 MG: 20 TABLET, FILM COATED, EXTENDED RELEASE ORAL at 17:25

## 2022-10-02 RX ADMIN — OXYCODONE 5 MG: 5 TABLET ORAL at 22:38

## 2022-10-02 RX ADMIN — Medication 100 MG: at 06:31

## 2022-10-02 RX ADMIN — ERGOCALCIFEROL 50000 UNITS: 1.25 CAPSULE ORAL at 07:09

## 2022-10-02 RX ADMIN — SEVELAMER CARBONATE 800 MG: 800 TABLET, FILM COATED ORAL at 17:24

## 2022-10-02 RX ADMIN — SEVELAMER CARBONATE 800 MG: 800 TABLET, FILM COATED ORAL at 10:58

## 2022-10-02 RX ADMIN — Medication 5 MG: at 22:39

## 2022-10-02 ASSESSMENT — PAIN DESCRIPTION - PAIN TYPE
TYPE: CHRONIC PAIN

## 2022-10-02 ASSESSMENT — ENCOUNTER SYMPTOMS
FEVER: 0
SORE THROAT: 0
ABDOMINAL PAIN: 1
DOUBLE VISION: 0
PALPITATIONS: 0
HEADACHES: 0
VOMITING: 0
COUGH: 0
CHILLS: 0
DIZZINESS: 0
DIARRHEA: 0
LOSS OF CONSCIOUSNESS: 0
BLURRED VISION: 0
NAUSEA: 0
SHORTNESS OF BREATH: 0
BACK PAIN: 0

## 2022-10-02 NOTE — PROGRESS NOTES
Orders received to infuse blood. Consent received, protocol followed. Blood tubed to unit.    Patient IV removed, blood returned to blood bank until new IV is placed.  Will continue to monitor

## 2022-10-02 NOTE — PROGRESS NOTES
Report received from RN, assumed care at 1845  Pt is A0X4, and responds appropriately   Pt declines any SOB, chest pain, new onset of numbness/ tingling  Patient on 4 liters of oxygen at this time  Pt rates pain at 9-10/10, on a scale of 1-10, pt medicated per MAR  Education provided regarding options such as heat packs and ice  Pt is anuric due to end stage renal disease  + bowel sounds, per patient last BM on 10/1  Pt ambulates with a x 1 assist per report  X 1-2 for repositioning in bed  Patient needs encouraging to turn himself in bed  Pt is on clear liquid diet and tolerating  Patient refusing heparin. Education provided.   Plan of care discussed, all questions answered. Explained importance of calling before getting OOB and pt verbalizes understanding. Explained importance of oral care. Call light is within reach, treaded slipper socks on, bed in lowest/ locked position, hourly rounding in place, all needs met at this time

## 2022-10-02 NOTE — PROGRESS NOTES
"Patient was woken up by lab. Patient drowsy upon this RN entering the room. Patient became quickly agitated because the  wanted to draw blood. Patient asked \"Can't you just take it off the IV\"? This RN educated patient that doing that is not an option is not an option at this time.   Patient then stated he was in pain. This RN provided education of the way his pain medication orders are written and provided education that PRN is as needed, every 3 hours and it is still upon assessment for the RN to determine if that medication is appropriate to give at that time.   Patient became loud and agitated.  This RN left room and let lab finish and re-entered room. Patient alert and agitated. Pain medication given per MAR and pain scale. Patient argumentative requesting this RN page the doctor. Again read this patient his medication orders to provide understanding and clarity.     "

## 2022-10-02 NOTE — CARE PLAN
The patient is Stable - Low risk of patient condition declining or worsening    Shift Goals  Clinical Goals: Blood transfuion  Patient Goals: Pain management  Family Goals: No family present    Progress made toward(s) clinical / shift goals:  Patient medicated per MAR, provided non pharmacological interventions. Patient verbalizes understanding of POC. Hemodynamically, to be followed by ISAIAH, MD aware  Fall risk interventions in place. Care team aware of of fall risk status. Patient verbalizes understanding of calling before OOB activity. Skin integrity per protocol, patient independently turns/mobilizes.     Patient is not progressing towards the following goals:

## 2022-10-02 NOTE — PROGRESS NOTES
"[facility] -  PROGRESS NOTE               Author: Megan Waddell M.D. Date & Time: 10/2/2022  1:25 PM     HPI:  Patient is a 72 year old male with a PMHx of ESRD on HD qTTS, HTN, ETOH, CAD, presented with abdominal pain.  There is concern for pancreatitis and plan for possible intervention.  He states pain started about 3 days ago and worsening.  Hasn't been eating or drinking well. Elevated lipase and CT wit acute on chronic pancreatitis.  Hasn't had dialysis today but refusing dialysis today.    DAILY NEPHROLOGY SUMMARY:  See note from 9/30 for prior summaries  10/01: NAEO, +abd pain stabbing and cramping, no interest in eating   10/02: NAEO, feels better, wants to try to advance diet    REVIEW OF SYSTEMS:    10 point ROS reviewed and is as per HPI/daily summary or otherwise negative    PMH/PSH/SH/FH:   Reviewed and unchanged since admission note    CURRENT MEDICATIONS:   Reviewed from admission to present day    VS:  /57   Pulse 81   Temp 37.2 °C (98.9 °F) (Temporal)   Resp 16   Ht 1.727 m (5' 8\")   Wt 77 kg (169 lb 12.1 oz)   SpO2 98%   BMI 25.81 kg/m²     Physical Exam  Nursing note reviewed.   Constitutional:       Appearance: He is ill-appearing.   Eyes:      General: No scleral icterus.  Cardiovascular:      Comments: No edema  Pulmonary:      Effort: Pulmonary effort is normal.   Abdominal:      General: There is no distension.      Tenderness: There is abdominal tenderness.   Musculoskeletal:         General: No deformity.   Skin:     Findings: No rash.   Neurological:      Mental Status: He is alert.   Psychiatric:         Behavior: Behavior normal.     Fluids:  In: 500 [Dialysis:500]  Out: 2500     LABS:  Recent Labs     09/30/22  0345 10/01/22  1141 10/02/22  0252   SODIUM 138 137 135   POTASSIUM 3.9 4.2 4.0   CHLORIDE 102 100 99   CO2 27 29 29   GLUCOSE 94 80 88   BUN 10 6* 6*   CREATININE 4.76* 3.11* 4.18*   CALCIUM 7.8* 8.1* 7.9*       IMAGING:   All imaging " reviewed from admission to present day    IMPRESSION:  # ESRD   - R chest TDC   # Abdominal pain/pancreatitis  - Management per GI and primary team  - Caution with IVF in ESRD with pancreatitis, bolus maybe more appropriate as needed than maintenance  # HTN  - Goal BP < 140/90  # Anemia of CKD  - Goal Hgb 10-11  - pRBC today per primary team 10/2  # CKD-MBD/Renal Osteodystrophy     PLAN:  - TTS iHD  - Minimal UF  - No dietary protein restrictions  - Dose all meds per ESRD  - Rest of management per primary team and GI

## 2022-10-02 NOTE — DIETARY
Nutrition services: Day 4 of admit.  Junior Proctor is a 72 y.o. male with admitting DX of acute on chronic pancreatitis.     Pt currently on a clear liquid diet. Today is day 5 NPO/CLD. Pt consuming % of clear trays and tolerating per RN. Per MD note pt with some reported nausea but no emesis. Clear liquid diet is not meeting pt's estimated needs. If it is not medically feasible to advance to PO feedings within 48-72 hours, consider nutrition support to meet needs.      RD following for diet advancement and POC

## 2022-10-02 NOTE — PROGRESS NOTES
Ultrasound IV attempted by charge RADHA Melendez. No adequate vascular access, MD notified. Blood returned to blood bank.   See nursing communication for MD order on further plan of care.   Will continue to monitor

## 2022-10-02 NOTE — PROGRESS NOTES
Hospital Medicine Daily Progress Note    Date of Service  10/2/2022    Chief Complaint  Junior Proctor is a 72 y.o. male admitted 9/27/2022 with abd pain    Hospital Course  71yo PMHx ESRD on HD, gout, ETOH, RA on Enbril, HTN, CAD presenting with Abd pain.  Had recently been DC'd home after admission for acute pancreatitis, bacteremia/sepsis on 9/22.  On that admission had a biliary stent placed and later exchanged after it was blocked.  On re-presentation found to have acute on chronic pancreatitis.  Seen by Srjoe who recommended non operative management    Interval Problem Update  Belly pain is less today.  Pt states he's hungry and would like to try solid food.  No N or V    I have discussed this patient's plan of care and discharge plan at IDT rounds today with Case Management, Nursing, Nursing leadership, and other members of the IDT team.    Consultants/Specialty  general surgery    Code Status  Full Code    Disposition  Patient is not medically cleared for discharge.   Anticipate discharge to to home with close outpatient follow-up.  I have placed the appropriate orders for post-discharge needs.    Review of Systems  Review of Systems   Constitutional:  Negative for chills and fever.   HENT:  Negative for nosebleeds and sore throat.    Eyes:  Negative for blurred vision and double vision.   Respiratory:  Negative for cough and shortness of breath.    Cardiovascular:  Negative for chest pain, palpitations and leg swelling.   Gastrointestinal:  Positive for abdominal pain. Negative for diarrhea, nausea and vomiting.   Genitourinary:  Negative for dysuria and urgency.   Musculoskeletal:  Positive for joint pain. Negative for back pain.   Skin:  Negative for rash.   Neurological:  Negative for dizziness, loss of consciousness and headaches.      Physical Exam  Temp:  [36.6 °C (97.8 °F)-37.9 °C (100.3 °F)] 36.6 °C (97.8 °F)  Pulse:  [81-98] 92  Resp:  [16-18] 16  BP: ()/(41-75) 98/41  SpO2:  [92 %-98 %] 98  %    Physical Exam  Constitutional:       General: He is not in acute distress.     Appearance: He is well-developed. He is obese. He is not diaphoretic.   HENT:      Head: Normocephalic and atraumatic.   Eyes:      General: No scleral icterus.     Conjunctiva/sclera: Conjunctivae normal.   Neck:      Vascular: No JVD.   Cardiovascular:      Rate and Rhythm: Normal rate.      Heart sounds: No murmur heard.    No gallop.   Pulmonary:      Effort: Pulmonary effort is normal. No respiratory distress.      Breath sounds: No stridor. No wheezing or rales.   Abdominal:      General: There is no distension.      Palpations: Abdomen is soft.      Tenderness: There is abdominal tenderness. There is no guarding or rebound.      Comments: TTP in epigastrium and LUQ, no G/R or peritoneal findings, soft throughout   Musculoskeletal:         General: No tenderness.      Right lower leg: No edema.      Left lower leg: No edema.   Skin:     General: Skin is warm and dry.      Capillary Refill: Capillary refill takes less than 2 seconds.      Coloration: Skin is not jaundiced.      Findings: No rash.   Neurological:      General: No focal deficit present.      Mental Status: He is oriented to person, place, and time. Mental status is at baseline.   Psychiatric:         Mood and Affect: Mood normal.         Behavior: Behavior normal.         Thought Content: Thought content normal.       Fluids    Intake/Output Summary (Last 24 hours) at 10/2/2022 1419  Last data filed at 10/2/2022 1405  Gross per 24 hour   Intake 99.58 ml   Output 0 ml   Net 99.58 ml         Laboratory  Recent Labs     09/30/22  0345 10/01/22  1141 10/02/22  0252   WBC 6.5 6.9 6.7   RBC 2.33* 2.51* 2.13*   HEMOGLOBIN 7.1* 7.6* 6.4*   HEMATOCRIT 21.8* 23.9* 20.3*   MCV 93.6 95.2 95.3   MCH 30.5 30.3 30.0   MCHC 32.6* 31.8* 31.5*   RDW 62.3* 62.4* 62.2*   PLATELETCT 126* 134* 125*   MPV 11.0 10.0 10.7       Recent Labs     09/30/22  0345 10/01/22  1141 10/02/22  0252    SODIUM 138 137 135   POTASSIUM 3.9 4.2 4.0   CHLORIDE 102 100 99   CO2 27 29 29   GLUCOSE 94 80 88   BUN 10 6* 6*   CREATININE 4.76* 3.11* 4.18*   CALCIUM 7.8* 8.1* 7.9*                     Imaging  US-RUQ   Final Result   Addendum (preliminary) 1 of 1   No pancreatic duct stent is seen.      Dr. Gallardo discussed these findings with Wilmar Gallardo.      Final      1.  Cholelithiasis without discrete sonographic evidence of acute cholecystitis. Patient did demonstrate a Evans's sign throughout the course of the exam. There is strong clinical suspicion for acute cholecystitis, a HIDA scan should be obtained for    further evaluation.   2.  Hepatic steatosis.   3.  Hepatomegaly.   4.  The pancreas is obscured by overlying bowel gas.      CT-ABDOMEN-PELVIS W/O   Final Result   Addendum (preliminary) 1 of 1   Subacute right L2 and L3 transverse process fractures.      Final      1.  Acute on chronic pancreatitis. Inflammatory changes track along the left paracolic gutter.   2.  Likely reactive inflammation of the colon at the splenic flexure.   3.  Mildly dilated gallbladder with likely cholelithiasis. If there is concern for acute cholecystitis, ultrasound can be performed.   4.  Features of medical renal disease.   5.  Sigmoid diverticulosis.   6.  Atherosclerotic changes.   7.  Right hepatic lobe lesion, previously characterized as hemangioma on MRI      IR-US GUIDED PIV    (Results Pending)          Assessment/Plan  * Acute on chronic pancreatitis (HCC)- (present on admission)  Assessment & Plan  Pain control  ERCP - pancreatic duct stone removed by balloon sweep, placement of new Holt 4Fr x 11cm single pigtail self ejecting prophylatic pancreatic duct stent, removal of occluded in-situ pancreatic duct stent   9/19/2022  ERCP -pancreatic duct stone removal, placement of pancreatic duct stent  6/5/2022  TGs in double digits  Per gastroenterology - conservative management of pancreatitis with IV hydration, careful  advancement of diet, daily labs, agree with surgical consultation and following patient for possible cholecystectomy, Alcohol cessation, No plan for ERCP at this time given that current CT does not demonstrate stent is in place at this time and appears to have self ejected which was expected.  Progress diet  Follow CMP, CBC, lipase  Prn support  Holding IVFs as pt is on HD  Clinically improving with lessening pain, benign exam, decreasing lipase      Cholelithiasis- (present on admission)  Assessment & Plan  Possible cholecystitis?  IV Rocephin and Flagyl x 5 days  Per Surgery - Symptoms and clinical findings more consistent with acute on chronic alcoholic pancreatitis. No compelling evidence for active biliary colic or gallstone pancreatitis.  9/29/2022    HTN (hypertension)- (present on admission)  Assessment & Plan  Carvedilol 6.25  IV labetalol and hydralazine as needed with parameters: has not required coverage in last 48rhs    Rheumatoid arthritis (HCC)  Assessment & Plan  Maintained on Etanercept weekly    Alcohol dependence (HCC)- (present on admission)  Assessment & Plan  Monitor for withdrawal symptoms  counseled    ESRD on dialysis (HCC)- (present on admission)  Assessment & Plan  HD per Nephrology    Gout- (present on admission)  Assessment & Plan  Allopurinol    Anemia- (present on admission)  Assessment & Plan  Anemia of chronic disease (Hx RA) + CKD  Give one unit PRBCs today  On EPO per Neph       VTE prophylaxis: heparin ppx    I have performed a physical exam and reviewed and updated ROS and Plan today (10/2/2022). In review of yesterday's note (10/1/2022), there are no changes except as documented above.

## 2022-10-02 NOTE — PROGRESS NOTES
Report received at bedside from previous shift RN   Assumed care. Pt in bed. A/O x4. VSS.   Responds appropriately.   Pain 7/10  Denies SOB/denies chest pain. Provided non pharmacological interventions for comfort.  Denies N/V tolerating clear liquid diet appropriately  Adequate oxygenation noted with 1L NC O2  Patient anuric, +flatus, last BM 10/2 per report  Patient ambulates SBA, no restrictions   SCDs on  Patient refusing heparin for anticoagulation, education provided, patient continues to refuse.  Assessment complete.   Discussed POC, pt verbalizes understanding.   Explained importance of calling before getting OOB.   Call light and belongings within reach. Bed in the lowest position. Treaded socks in place. Hourly rounding in progress.   Will continue to monitor, currently no further needs.

## 2022-10-02 NOTE — PROGRESS NOTES
Patient blood transfusion re-attempted, within 5 minutes of blood administration patient complained of burning and pain in his left arm. Transfusion immediately stopped, charge RN's contacted for assistance. Charge RN's assessed patient, IV site infiltrated. Blood stopped, blood bank contacted, instructions to waste blood received.   Infiltrated IVs removed.  MD notified  No complications noted, will continue to monitor.

## 2022-10-03 ENCOUNTER — APPOINTMENT (OUTPATIENT)
Dept: RADIOLOGY | Facility: MEDICAL CENTER | Age: 72
DRG: 438 | End: 2022-10-03
Attending: HOSPITALIST
Payer: COMMERCIAL

## 2022-10-03 PROCEDURE — 700102 HCHG RX REV CODE 250 W/ 637 OVERRIDE(OP): Performed by: INTERNAL MEDICINE

## 2022-10-03 PROCEDURE — A9270 NON-COVERED ITEM OR SERVICE: HCPCS | Performed by: NURSE PRACTITIONER

## 2022-10-03 PROCEDURE — 700102 HCHG RX REV CODE 250 W/ 637 OVERRIDE(OP): Performed by: HOSPITALIST

## 2022-10-03 PROCEDURE — A9270 NON-COVERED ITEM OR SERVICE: HCPCS | Performed by: HOSPITALIST

## 2022-10-03 PROCEDURE — 99232 SBSQ HOSP IP/OBS MODERATE 35: CPT | Performed by: HOSPITALIST

## 2022-10-03 PROCEDURE — 770001 HCHG ROOM/CARE - MED/SURG/GYN PRIV*

## 2022-10-03 PROCEDURE — 700102 HCHG RX REV CODE 250 W/ 637 OVERRIDE(OP): Performed by: NURSE PRACTITIONER

## 2022-10-03 PROCEDURE — 700111 HCHG RX REV CODE 636 W/ 250 OVERRIDE (IP): Performed by: FAMILY MEDICINE

## 2022-10-03 PROCEDURE — A9270 NON-COVERED ITEM OR SERVICE: HCPCS | Performed by: INTERNAL MEDICINE

## 2022-10-03 RX ADMIN — Medication 100 MG: at 06:53

## 2022-10-03 RX ADMIN — PSYLLIUM HUSK 1 PACKET: 3.4 POWDER ORAL at 10:18

## 2022-10-03 RX ADMIN — GABAPENTIN 300 MG: 300 CAPSULE ORAL at 21:40

## 2022-10-03 RX ADMIN — OMEPRAZOLE 20 MG: 20 CAPSULE, DELAYED RELEASE ORAL at 06:53

## 2022-10-03 RX ADMIN — CARVEDILOL 6.25 MG: 6.25 TABLET, FILM COATED ORAL at 18:24

## 2022-10-03 RX ADMIN — HYDROMORPHONE HYDROCHLORIDE 1 MG: 1 INJECTION, SOLUTION INTRAMUSCULAR; INTRAVENOUS; SUBCUTANEOUS at 14:02

## 2022-10-03 RX ADMIN — OXYCODONE 5 MG: 5 TABLET ORAL at 21:41

## 2022-10-03 RX ADMIN — OMEPRAZOLE 20 MG: 20 CAPSULE, DELAYED RELEASE ORAL at 18:26

## 2022-10-03 RX ADMIN — HYDROMORPHONE HYDROCHLORIDE 1 MG: 1 INJECTION, SOLUTION INTRAMUSCULAR; INTRAVENOUS; SUBCUTANEOUS at 10:26

## 2022-10-03 RX ADMIN — SEVELAMER CARBONATE 800 MG: 800 TABLET, FILM COATED ORAL at 18:24

## 2022-10-03 RX ADMIN — Medication 5 MG: at 21:41

## 2022-10-03 RX ADMIN — CARVEDILOL 6.25 MG: 6.25 TABLET, FILM COATED ORAL at 10:18

## 2022-10-03 RX ADMIN — OXYCODONE HYDROCHLORIDE 30 MG: 20 TABLET, FILM COATED, EXTENDED RELEASE ORAL at 06:52

## 2022-10-03 RX ADMIN — OXYCODONE 5 MG: 5 TABLET ORAL at 13:01

## 2022-10-03 RX ADMIN — Medication 100 MG: at 18:26

## 2022-10-03 RX ADMIN — OXYCODONE 5 MG: 5 TABLET ORAL at 18:26

## 2022-10-03 RX ADMIN — SEVELAMER CARBONATE 800 MG: 800 TABLET, FILM COATED ORAL at 12:59

## 2022-10-03 RX ADMIN — ATORVASTATIN CALCIUM 20 MG: 20 TABLET, FILM COATED ORAL at 21:41

## 2022-10-03 RX ADMIN — OXYCODONE HYDROCHLORIDE 30 MG: 20 TABLET, FILM COATED, EXTENDED RELEASE ORAL at 18:25

## 2022-10-03 RX ADMIN — OXYCODONE 5 MG: 5 TABLET ORAL at 03:43

## 2022-10-03 ASSESSMENT — ENCOUNTER SYMPTOMS
CHILLS: 0
DIARRHEA: 0
FEVER: 0
DIZZINESS: 0
SHORTNESS OF BREATH: 0
NAUSEA: 0
PALPITATIONS: 0
LOSS OF CONSCIOUSNESS: 0
HEADACHES: 0
COUGH: 0
BACK PAIN: 0
DOUBLE VISION: 0
ABDOMINAL PAIN: 1
BLURRED VISION: 0
VOMITING: 0

## 2022-10-03 ASSESSMENT — PAIN DESCRIPTION - PAIN TYPE
TYPE: CHRONIC PAIN

## 2022-10-03 NOTE — PROGRESS NOTES
Report received from RN, assumed care at 1845  Pt is A0X4, and responds appropriately   Pt declines any SOB, chest pain, new onset of numbness/ tingling  Patient on room air  Pt rates pain at 9-10/10, on a scale of 1-10, pt medicated per MAR  Pt is anuric due to end stage renal disease  + bowel sounds, per patient last BM on 10/2, patient having episodes of bowel incontinence  Pt ambulates as a standby assist  X 1-2 for repositioning in bed   Patient needs encouraging to turn himself in bed at times  Pt is on a low fat diet  Patient refusing heparin. Education provided.   Plan of care discussed, all questions answered. Explained importance of calling before getting OOB and pt verbalizes understanding. Explained importance of oral care. Call light is within reach, treaded slipper socks on, bed in lowest/ locked position, hourly rounding in place, all needs met at this time

## 2022-10-03 NOTE — PROGRESS NOTES
Carmen WEST notified of hgb 6.4. MD stated no redraw at this time. Plan is for pt to be transfused in dialysis tomorrow morning.

## 2022-10-03 NOTE — PROGRESS NOTES
"[facility] -  PROGRESS NOTE               Author: DARIN Boss Date & Time: 10/3/2022  2:45 PM     HPI:  Patient is a 72 year old male with a PMHx of ESRD on HD qTTS, HTN, ETOH, CAD, presented with abdominal pain.  There is concern for pancreatitis and plan for possible intervention.  He states pain started about 3 days ago and worsening.  Hasn't been eating or drinking well. Elevated lipase and CT wit acute on chronic pancreatitis.  Hasn't had dialysis today but refusing dialysis today.    DAILY NEPHROLOGY SUMMARY:  See note from 9/30 for prior summaries  10/01: NAEO, +abd pain stabbing and cramping, no interest in eating   10/02: NAEO, feels better, wants to try to advance diet  10/03: Pt sitting up EOB, feels OK, reports some loose stools today, diet advanced and he was able to eat some, but still with abd discomfort, Hgb yesterday 6.4, no new labs today, pt unable to receive pRBC transfusion yesterday d/t lack of successful PIV access, VSS on RA, due for HD tomorrow    REVIEW OF SYSTEMS:    10 point ROS reviewed and is as per HPI/daily summary or otherwise negative    PMH/PSH/SH/FH:   Reviewed and unchanged since admission note    CURRENT MEDICATIONS:   Reviewed from admission to present day    VS:  BP (!) 143/82   Pulse (!) 104   Temp 36.4 °C (97.5 °F) (Temporal)   Resp 17   Ht 1.727 m (5' 8\")   Wt 77 kg (169 lb 12.1 oz)   SpO2 93%   BMI 25.81 kg/m²     Physical Exam  Nursing note reviewed.   Constitutional:       Appearance: He is ill-appearing.   Eyes:      General: No scleral icterus.  Cardiovascular:      Comments: RIJ TDC    Pulmonary:      Effort: Pulmonary effort is normal.   Abdominal:      General: There is no distension.      Tenderness: There is abdominal tenderness.   Musculoskeletal:         General: No deformity.      Right lower leg: Edema present.      Left lower leg: Edema present.   Skin:     Findings: No rash.      Comments: B/l knee " abrasions/scabbing   Neurological:      Mental Status: He is alert.   Psychiatric:         Behavior: Behavior normal.     Fluids:  In: 339.6 [P.O.:300; Blood:39.6]  Out: 0     LABS:  Recent Labs     10/01/22  1141 10/02/22  0252   SODIUM 137 135   POTASSIUM 4.2 4.0   CHLORIDE 100 99   CO2 29 29   GLUCOSE 80 88   BUN 6* 6*   CREATININE 3.11* 4.18*   CALCIUM 8.1* 7.9*       IMAGING:   All imaging reviewed from admission to present day    IMPRESSION:  # ESRD, dependent on HD  - RIJ TDC  - qTTS HD  # Abdominal pain/pancreatitis, slow improvement  - Management per GI and primary team  - Caution with IVF in ESRD with pancreatitis, bolus PRN  - Diet advanced   # HTN, labile control, some soft BPs  - Goal BP < 140/90  - On carvedilol  # Anemia of CKD, below target, unstable  - Goal Hgb 10-11  - pRBCs unable to be transfused 10/2, plan to give with HD 10/4  - Add MADAY  - % sat 13, ferritin 954  # CKD-MBD/Renal Osteodystrophy  - On sevelamer WM  - On Ergo weekly  - Phos 2.6     PLAN:  - Continue qTTS iHD, next treatment due tomorrow (TUES)  - Minimal UF  - Can transfuse pRBCs with HD  - Add MADAY to goal Hgb 10-11  - Transfuse PRN Hgb <7   - Ferritin precludes IV Fe, can consider PO Fe if pt can tolerate  - No dietary protein restrictions  - Diet advancement per GI/primary team  - Dose all meds per ESRD/iHD  - Holding parameters for carvedilol   - Labs in AM (TUES), recheck phos    Thank you,

## 2022-10-03 NOTE — PROGRESS NOTES
[facility] Medicine Daily Progress Note    Date of Service  10/3/2022    Chief Complaint  Junior Proctor is a 72 y.o. male admitted 9/27/2022 with abd pain    Hospital Course  71yo PMHx ESRD on HD, gout, ETOH, RA on Enbril, HTN, CAD presenting with Abd pain.  Had recently been DC'd home after admission for acute pancreatitis, bacteremia/sepsis on 9/22.  On that admission had a biliary stent placed and later exchanged after it was blocked.  On re-presentation found to have acute on chronic pancreatitis.  Seen by Nicho who recommended non operative management    Interval Problem Update  Belly pain is less today.    Pain is still present but less intense    I have discussed this patient's plan of care and discharge plan at IDT rounds today with Case Management, Nursing, Nursing leadership, and other members of the IDT team.    Consultants/Specialty  general surgery    Code Status  Full Code    Disposition  Patient is not medically cleared for discharge.   Anticipate discharge to to home with close outpatient follow-up.  I have placed the appropriate orders for post-discharge needs.    Review of Systems  Review of Systems   Constitutional:  Negative for chills and fever.   Eyes:  Negative for blurred vision and double vision.   Respiratory:  Negative for cough and shortness of breath.    Cardiovascular:  Negative for chest pain, palpitations and leg swelling.   Gastrointestinal:  Positive for abdominal pain. Negative for diarrhea, nausea and vomiting.   Genitourinary:  Negative for dysuria and urgency.   Musculoskeletal:  Positive for joint pain. Negative for back pain.   Neurological:  Negative for dizziness, loss of consciousness and headaches.      Physical Exam  Temp:  [36.2 °C (97.1 °F)-36.9 °C (98.4 °F)] 36.4 °C (97.5 °F)  Pulse:  [] 104  Resp:  [16-17] 17  BP: ()/(41-82) 143/82  SpO2:  [90 %-98 %] 93 %    Physical Exam  Constitutional:       General: He is not in acute distress.     Appearance: He is  well-developed. He is obese. He is not diaphoretic.   HENT:      Head: Normocephalic and atraumatic.   Eyes:      General: No scleral icterus.     Conjunctiva/sclera: Conjunctivae normal.   Neck:      Vascular: No JVD.   Cardiovascular:      Rate and Rhythm: Normal rate.      Heart sounds: No murmur heard.    No gallop.   Pulmonary:      Effort: Pulmonary effort is normal. No respiratory distress.      Breath sounds: No stridor. No wheezing or rales.   Abdominal:      General: There is no distension.      Palpations: Abdomen is soft.      Tenderness: There is no abdominal tenderness. There is no guarding or rebound.      Comments: TTP in epigastrium and LUQ, no G/R or peritoneal findings, soft throughout   Musculoskeletal:         General: No tenderness.      Right lower leg: No edema.      Left lower leg: No edema.   Skin:     General: Skin is warm and dry.      Capillary Refill: Capillary refill takes less than 2 seconds.      Coloration: Skin is not jaundiced.      Findings: No rash.   Neurological:      General: No focal deficit present.      Mental Status: He is oriented to person, place, and time. Mental status is at baseline.   Psychiatric:         Mood and Affect: Mood normal.         Behavior: Behavior normal.         Thought Content: Thought content normal.       Fluids    Intake/Output Summary (Last 24 hours) at 10/3/2022 1324  Last data filed at 10/3/2022 0801  Gross per 24 hour   Intake 219.58 ml   Output 0 ml   Net 219.58 ml         Laboratory  Recent Labs     10/01/22  1141 10/02/22  0252   WBC 6.9 6.7   RBC 2.51* 2.13*   HEMOGLOBIN 7.6* 6.4*   HEMATOCRIT 23.9* 20.3*   MCV 95.2 95.3   MCH 30.3 30.0   MCHC 31.8* 31.5*   RDW 62.4* 62.2*   PLATELETCT 134* 125*   MPV 10.0 10.7       Recent Labs     10/01/22  1141 10/02/22  0252   SODIUM 137 135   POTASSIUM 4.2 4.0   CHLORIDE 100 99   CO2 29 29   GLUCOSE 80 88   BUN 6* 6*   CREATININE 3.11* 4.18*   CALCIUM 8.1* 7.9*                     Imaging  IR-US  GUIDED PIV   Final Result    Ultrasound-guided PERIPHERAL IV INSERTION performed by    qualified nursing staff as above.      US-RUQ   Final Result   Addendum (preliminary) 1 of 1   No pancreatic duct stent is seen.      Dr. Gallardo discussed these findings with Wilmar Gallardo.      Final      1.  Cholelithiasis without discrete sonographic evidence of acute cholecystitis. Patient did demonstrate a Evans's sign throughout the course of the exam. There is strong clinical suspicion for acute cholecystitis, a HIDA scan should be obtained for    further evaluation.   2.  Hepatic steatosis.   3.  Hepatomegaly.   4.  The pancreas is obscured by overlying bowel gas.      CT-ABDOMEN-PELVIS W/O   Final Result   Addendum (preliminary) 1 of 1   Subacute right L2 and L3 transverse process fractures.      Final      1.  Acute on chronic pancreatitis. Inflammatory changes track along the left paracolic gutter.   2.  Likely reactive inflammation of the colon at the splenic flexure.   3.  Mildly dilated gallbladder with likely cholelithiasis. If there is concern for acute cholecystitis, ultrasound can be performed.   4.  Features of medical renal disease.   5.  Sigmoid diverticulosis.   6.  Atherosclerotic changes.   7.  Right hepatic lobe lesion, previously characterized as hemangioma on MRI             Assessment/Plan  * Acute on chronic pancreatitis (HCC)- (present on admission)  Assessment & Plan  Pain control  ERCP - pancreatic duct stone removed by balloon sweep, placement of new Holt 4Fr x 11cm single pigtail self ejecting prophylatic pancreatic duct stent, removal of occluded in-situ pancreatic duct stent   9/19/2022  ERCP -pancreatic duct stone removal, placement of pancreatic duct stent  6/5/2022  TGs in double digits  Per gastroenterology - conservative management of pancreatitis with IV hydration, careful advancement of diet, daily labs, agree with surgical consultation and following patient for possible cholecystectomy,  Alcohol cessation, No plan for ERCP at this time given that current CT does not demonstrate stent is in place at this time and appears to have self ejected which was expected.  Progress diet  Follow CMP, CBC, lipase  Prn support  Holding IVFs as pt is on HD  Clinically improving with lessening pain, benign exam, decreasing lipase        Cholelithiasis- (present on admission)  Assessment & Plan  Possible cholecystitis?  IV Rocephin and Flagyl x 5 days  Per Surgery - Symptoms and clinical findings more consistent with acute on chronic alcoholic pancreatitis. No compelling evidence for active biliary colic or gallstone pancreatitis.  9/29/2022    HTN (hypertension)- (present on admission)  Assessment & Plan  Carvedilol 6.25  IV labetalol and hydralazine as needed with parameters: has not required coverage in last 48rhs    Rheumatoid arthritis (HCC)  Assessment & Plan  Maintained on Etanercept weekly    Alcohol dependence (HCC)- (present on admission)  Assessment & Plan  Monitor for withdrawal symptoms  counseled    ESRD on dialysis (HCC)- (present on admission)  Assessment & Plan  HD per Nephrology    Gout- (present on admission)  Assessment & Plan  Allopurinol    Anemia- (present on admission)  Assessment & Plan  Anemia of chronic disease (Hx RA) + CKD  Give one unit PRBCs tomorrow with HD  On EPO per Neph       VTE prophylaxis: heparin ppx    I have performed a physical exam and reviewed and updated ROS and Plan today (10/3/2022). In review of yesterday's note (10/2/2022), there are no changes except as documented above.

## 2022-10-03 NOTE — PROGRESS NOTES
Assumed care of patient at 0645. Bedside report received. Assessment complete.    AA&Ox4. Denies SOB.    Reporting 9/10 pain. Medications used for pain control. Educated patient regarding pharmacologic and non pharmacologic modalities for pain management.    Skin per flow sheets.    Tolerating low- fat diet. Denies N/V.    Anuric- ESRD. Last BM 10/3/2022.    Pt ambulates x self.    Plan of care discussed, all questions answered. Educated on the importance of calling before getting OOB and pt verbalizes understanding. Educated regarding importance of oral care. Oral care kit at bedside. Call light is within reach, treaded slipper socks on, bed in lowest/ locked position, hourly rounding in place, all needs met at this time.      Kiara Hall R.N.

## 2022-10-03 NOTE — CARE PLAN
The patient is Stable - Low risk of patient condition declining or worsening    Shift Goals  Clinical Goals: pain control, monitor for signs of bleeding  Patient Goals: pain control, sleep  Family Goals: No family present    Progress made toward(s) clinical / shift goals:    Problem: Pain - Standard  Goal: Alleviation of pain or a reduction in pain to the patient’s comfort goal  Outcome: Progressing     Problem: Knowledge Deficit - Standard  Goal: Patient and family/care givers will demonstrate understanding of plan of care, disease process/condition, diagnostic tests and medications  Outcome: Progressing       Patient is able to verbalize understanding of plan of care. Patient medicated per MAR.

## 2022-10-03 NOTE — CARE PLAN
The patient is Stable - Low risk of patient condition declining or worsening    Shift Goals  Clinical Goals: pain control, monitor VS, increase mobility  Patient Goals: pain control  Family Goals: no family at bedside    Progress made toward(s) clinical / shift goals: Medications used for pain control. Pt turns self in bed. BM today.     Patient is not progressing towards the following goals: Pt continues to require dialysis.       Problem: Urinary - Renal Perfusion  Goal: Ability to achieve and maintain adequate renal perfusion and functioning will improve  Outcome: Not Progressing

## 2022-10-04 LAB
ALBUMIN SERPL BCP-MCNC: 2.3 G/DL (ref 3.2–4.9)
ALBUMIN/GLOB SERPL: 0.8 G/DL
ALP SERPL-CCNC: 92 U/L (ref 30–99)
ALT SERPL-CCNC: <5 U/L (ref 2–50)
ANION GAP SERPL CALC-SCNC: 10 MMOL/L (ref 7–16)
AST SERPL-CCNC: 8 U/L (ref 12–45)
BILIRUB SERPL-MCNC: 0.3 MG/DL (ref 0.1–1.5)
BUN SERPL-MCNC: 8 MG/DL (ref 8–22)
CALCIUM SERPL-MCNC: 7.8 MG/DL (ref 8.5–10.5)
CHLORIDE SERPL-SCNC: 99 MMOL/L (ref 96–112)
CO2 SERPL-SCNC: 27 MMOL/L (ref 20–33)
CREAT SERPL-MCNC: 7.71 MG/DL (ref 0.5–1.4)
GFR SERPLBLD CREATININE-BSD FMLA CKD-EPI: 7 ML/MIN/1.73 M 2
GLOBULIN SER CALC-MCNC: 3 G/DL (ref 1.9–3.5)
GLUCOSE SERPL-MCNC: 91 MG/DL (ref 65–99)
HCT VFR BLD AUTO: 28.7 % (ref 42–52)
HGB BLD-MCNC: 9.1 G/DL (ref 14–18)
LIPASE SERPL-CCNC: 63 U/L (ref 11–82)
PHOSPHATE SERPL-MCNC: 2.5 MG/DL (ref 2.5–4.5)
POTASSIUM SERPL-SCNC: 3.9 MMOL/L (ref 3.6–5.5)
PROT SERPL-MCNC: 5.3 G/DL (ref 6–8.2)
SODIUM SERPL-SCNC: 136 MMOL/L (ref 135–145)

## 2022-10-04 PROCEDURE — 700102 HCHG RX REV CODE 250 W/ 637 OVERRIDE(OP): Performed by: HOSPITALIST

## 2022-10-04 PROCEDURE — 80053 COMPREHEN METABOLIC PANEL: CPT

## 2022-10-04 PROCEDURE — 700102 HCHG RX REV CODE 250 W/ 637 OVERRIDE(OP): Performed by: INTERNAL MEDICINE

## 2022-10-04 PROCEDURE — 700102 HCHG RX REV CODE 250 W/ 637 OVERRIDE(OP): Performed by: NURSE PRACTITIONER

## 2022-10-04 PROCEDURE — 84100 ASSAY OF PHOSPHORUS: CPT

## 2022-10-04 PROCEDURE — A9270 NON-COVERED ITEM OR SERVICE: HCPCS | Performed by: HOSPITALIST

## 2022-10-04 PROCEDURE — 83690 ASSAY OF LIPASE: CPT

## 2022-10-04 PROCEDURE — 86923 COMPATIBILITY TEST ELECTRIC: CPT

## 2022-10-04 PROCEDURE — A9270 NON-COVERED ITEM OR SERVICE: HCPCS | Performed by: INTERNAL MEDICINE

## 2022-10-04 PROCEDURE — 36415 COLL VENOUS BLD VENIPUNCTURE: CPT

## 2022-10-04 PROCEDURE — 36430 TRANSFUSION BLD/BLD COMPNT: CPT

## 2022-10-04 PROCEDURE — 90935 HEMODIALYSIS ONE EVALUATION: CPT

## 2022-10-04 PROCEDURE — 700111 HCHG RX REV CODE 636 W/ 250 OVERRIDE (IP)

## 2022-10-04 PROCEDURE — 700111 HCHG RX REV CODE 636 W/ 250 OVERRIDE (IP): Performed by: NURSE PRACTITIONER

## 2022-10-04 PROCEDURE — 700111 HCHG RX REV CODE 636 W/ 250 OVERRIDE (IP): Performed by: INTERNAL MEDICINE

## 2022-10-04 PROCEDURE — 99232 SBSQ HOSP IP/OBS MODERATE 35: CPT | Performed by: INTERNAL MEDICINE

## 2022-10-04 PROCEDURE — 770001 HCHG ROOM/CARE - MED/SURG/GYN PRIV*

## 2022-10-04 PROCEDURE — 85018 HEMOGLOBIN: CPT

## 2022-10-04 PROCEDURE — P9016 RBC LEUKOCYTES REDUCED: HCPCS

## 2022-10-04 PROCEDURE — 85014 HEMATOCRIT: CPT

## 2022-10-04 PROCEDURE — A9270 NON-COVERED ITEM OR SERVICE: HCPCS | Performed by: NURSE PRACTITIONER

## 2022-10-04 RX ORDER — OXYCODONE HYDROCHLORIDE 5 MG/1
5 TABLET ORAL
Status: DISCONTINUED | OUTPATIENT
Start: 2022-10-04 | End: 2022-10-06

## 2022-10-04 RX ORDER — ACETAMINOPHEN 500 MG
1000 TABLET ORAL EVERY 6 HOURS PRN
Status: DISCONTINUED | OUTPATIENT
Start: 2022-10-09 | End: 2022-10-12 | Stop reason: HOSPADM

## 2022-10-04 RX ORDER — OXYCODONE HYDROCHLORIDE 10 MG/1
10 TABLET ORAL
Status: DISCONTINUED | OUTPATIENT
Start: 2022-10-04 | End: 2022-10-06

## 2022-10-04 RX ORDER — HYDROMORPHONE HYDROCHLORIDE 1 MG/ML
0.5 INJECTION, SOLUTION INTRAMUSCULAR; INTRAVENOUS; SUBCUTANEOUS
Status: DISCONTINUED | OUTPATIENT
Start: 2022-10-04 | End: 2022-10-06

## 2022-10-04 RX ORDER — ACETAMINOPHEN 500 MG
1000 TABLET ORAL EVERY 6 HOURS
Status: DISPENSED | OUTPATIENT
Start: 2022-10-04 | End: 2022-10-09

## 2022-10-04 RX ORDER — BUPRENORPHINE 15 UG/H
1 PATCH TRANSDERMAL
Status: DISCONTINUED | OUTPATIENT
Start: 2022-10-04 | End: 2022-10-04

## 2022-10-04 RX ORDER — OXYCODONE HCL 10 MG/1
10 TABLET, FILM COATED, EXTENDED RELEASE ORAL EVERY 12 HOURS
Status: DISCONTINUED | OUTPATIENT
Start: 2022-10-04 | End: 2022-10-04

## 2022-10-04 RX ORDER — CAPSAICIN 0.025 %
1 CREAM (GRAM) TOPICAL 4 TIMES DAILY PRN
Status: DISCONTINUED | OUTPATIENT
Start: 2022-10-04 | End: 2022-10-12 | Stop reason: HOSPADM

## 2022-10-04 RX ORDER — HEPARIN SODIUM 1000 [USP'U]/ML
INJECTION, SOLUTION INTRAVENOUS; SUBCUTANEOUS
Status: COMPLETED
Start: 2022-10-04 | End: 2022-10-04

## 2022-10-04 RX ADMIN — ACETAMINOPHEN 1000 MG: 500 TABLET ORAL at 16:59

## 2022-10-04 RX ADMIN — OXYCODONE HYDROCHLORIDE 30 MG: 20 TABLET, FILM COATED, EXTENDED RELEASE ORAL at 06:11

## 2022-10-04 RX ADMIN — Medication 100 MG: at 06:10

## 2022-10-04 RX ADMIN — OXYCODONE HYDROCHLORIDE 10 MG: 10 TABLET ORAL at 21:28

## 2022-10-04 RX ADMIN — HEPARIN SODIUM 3700 UNITS: 1000 INJECTION, SOLUTION INTRAVENOUS; SUBCUTANEOUS at 11:51

## 2022-10-04 RX ADMIN — OXYCODONE HYDROCHLORIDE 10 MG: 10 TABLET ORAL at 12:44

## 2022-10-04 RX ADMIN — OXYCODONE HYDROCHLORIDE 10 MG: 10 TABLET ORAL at 16:59

## 2022-10-04 RX ADMIN — OXYCODONE 5 MG: 5 TABLET ORAL at 08:17

## 2022-10-04 RX ADMIN — CARVEDILOL 6.25 MG: 6.25 TABLET, FILM COATED ORAL at 17:00

## 2022-10-04 RX ADMIN — Medication 100 MG: at 17:00

## 2022-10-04 RX ADMIN — HYDROMORPHONE HYDROCHLORIDE 0.5 MG: 1 INJECTION, SOLUTION INTRAMUSCULAR; INTRAVENOUS; SUBCUTANEOUS at 23:09

## 2022-10-04 RX ADMIN — CARVEDILOL 6.25 MG: 6.25 TABLET, FILM COATED ORAL at 08:18

## 2022-10-04 RX ADMIN — ACETAMINOPHEN 1000 MG: 500 TABLET ORAL at 12:44

## 2022-10-04 RX ADMIN — SEVELAMER CARBONATE 800 MG: 800 TABLET, FILM COATED ORAL at 08:18

## 2022-10-04 RX ADMIN — SEVELAMER CARBONATE 800 MG: 800 TABLET, FILM COATED ORAL at 17:00

## 2022-10-04 RX ADMIN — OMEPRAZOLE 20 MG: 20 CAPSULE, DELAYED RELEASE ORAL at 17:00

## 2022-10-04 RX ADMIN — SEVELAMER CARBONATE 800 MG: 800 TABLET, FILM COATED ORAL at 12:44

## 2022-10-04 RX ADMIN — OXYCODONE 5 MG: 5 TABLET ORAL at 04:53

## 2022-10-04 RX ADMIN — EPOETIN ALFA-EPBX 4000 UNITS: 4000 INJECTION, SOLUTION INTRAVENOUS; SUBCUTANEOUS at 10:05

## 2022-10-04 RX ADMIN — HYDROMORPHONE HYDROCHLORIDE 0.5 MG: 1 INJECTION, SOLUTION INTRAMUSCULAR; INTRAVENOUS; SUBCUTANEOUS at 14:19

## 2022-10-04 RX ADMIN — OMEPRAZOLE 20 MG: 20 CAPSULE, DELAYED RELEASE ORAL at 04:53

## 2022-10-04 RX ADMIN — ALLOPURINOL 100 MG: 100 TABLET ORAL at 17:00

## 2022-10-04 RX ADMIN — OXYCODONE 5 MG: 5 TABLET ORAL at 01:29

## 2022-10-04 RX ADMIN — GABAPENTIN 300 MG: 300 CAPSULE ORAL at 21:28

## 2022-10-04 RX ADMIN — ACETAMINOPHEN 1000 MG: 500 TABLET ORAL at 23:09

## 2022-10-04 RX ADMIN — ATORVASTATIN CALCIUM 20 MG: 20 TABLET, FILM COATED ORAL at 21:28

## 2022-10-04 ASSESSMENT — ENCOUNTER SYMPTOMS
DIARRHEA: 0
FALLS: 1
PALPITATIONS: 0
ABDOMINAL PAIN: 1
FEVER: 0
NAUSEA: 0
SHORTNESS OF BREATH: 0
DIZZINESS: 0
BACK PAIN: 0
HEADACHES: 0
BLURRED VISION: 0
VOMITING: 0

## 2022-10-04 ASSESSMENT — PAIN DESCRIPTION - PAIN TYPE
TYPE: CHRONIC PAIN

## 2022-10-04 NOTE — PROGRESS NOTES
Hospital Medicine Daily Progress Note    Date of Service  10/4/2022    Chief Complaint  Junior Proctor is a 72 y.o. male admitted 9/27/2022 with abd pain    Hospital Course  73yo PMHx ESRD on HD, gout, ETOH, RA on Enbril, HTN, CAD presenting with Abd pain.  Had recently been DC'd home after admission for acute pancreatitis, bacteremia/sepsis on 9/22.  On that admission had a biliary stent placed and later exchanged after it was blocked.  On re-presentation found to have acute on chronic pancreatitis.  Seen by Nicho who recommended non operative management    Interval Problem Update  Acute on chronic pancreatitis-patient tells me his pain is band like at baseline but now has pangs of sharp shooting pain. He is willing to try to eat food, but was scared yesterday and this AM. I had a long conversation with the patient about de-prescribing his oxycontin as this is not an appropriate medication. He agrees and have initiated that process. Has no N/V.    ANemia-receiving 1 U PRBC's during dialysis today.    ESRD-getting IHD today without issue.     I have discussed this patient's plan of care and discharge plan at IDT rounds today with Case Management, Nursing, Nursing leadership, and other members of the IDT team.    Consultants/Specialty  general surgery  Renal  GI    Code Status  Full Code    Disposition  Patient is not medically cleared for discharge.   Anticipate discharge to to home with close outpatient follow-up. Possibly needs Home health given 6-clicks of 18 and patient with self-endorsed progressive weakness at home.   I have placed the appropriate orders for post-discharge needs.    Review of Systems  Review of Systems   Constitutional:  Negative for fever.   Eyes:  Negative for blurred vision.   Respiratory:  Negative for shortness of breath.    Cardiovascular:  Negative for chest pain, palpitations and leg swelling.   Gastrointestinal:  Positive for abdominal pain (no clear changes). Negative for diarrhea,  nausea and vomiting.   Genitourinary:  Negative for dysuria and urgency.   Musculoskeletal:  Positive for falls (at home, apparently) and joint pain (at his baseline 2/2 RA). Negative for back pain.   Neurological:  Negative for dizziness and headaches.   All other systems reviewed and are negative.     Physical Exam  Temp:  [36.2 °C (97.1 °F)-37 °C (98.6 °F)] 36.9 °C (98.5 °F)  Pulse:  [] 88  Resp:  [15-18] 18  BP: (123-170)/(78-96) 140/89  SpO2:  [65 %-97 %] 96 %    Physical Exam  Constitutional:       General: He is not in acute distress.     Appearance: He is well-developed. He is obese. He is not diaphoretic.   HENT:      Head: Normocephalic and atraumatic.   Eyes:      General: No scleral icterus.     Conjunctiva/sclera: Conjunctivae normal.   Neck:      Vascular: No JVD.   Cardiovascular:      Rate and Rhythm: Normal rate.      Heart sounds: No murmur heard.    No gallop.      Comments: Dialysis catheter in [facility], insertion site is C/D/I  Pulmonary:      Effort: Pulmonary effort is normal. No respiratory distress.      Breath sounds: No wheezing.   Abdominal:      General: There is no distension.      Palpations: Abdomen is soft.      Tenderness: There is abdominal tenderness (moderate, epigastric area). There is no guarding or rebound.   Musculoskeletal:         General: Tenderness (diffusely) present.      Right lower leg: Edema present.      Left lower leg: Edema present.   Skin:     General: Skin is warm and dry.      Capillary Refill: Capillary refill takes less than 2 seconds.      Coloration: Skin is not jaundiced.      Findings: No rash.   Neurological:      General: No focal deficit present.      Mental Status: He is oriented to person, place, and time. Mental status is at baseline.   Psychiatric:         Mood and Affect: Mood normal.         Behavior: Behavior normal.         Thought Content: Thought content normal.       Fluids    Intake/Output Summary (Last 24 hours) at 10/4/2022 3199  Last  data filed at 10/4/2022 1148  Gross per 24 hour   Intake 1100 ml   Output 2800 ml   Net -1700 ml       Laboratory  Recent Labs     10/02/22  0252   WBC 6.7   RBC 2.13*   HEMOGLOBIN 6.4*   HEMATOCRIT 20.3*   MCV 95.3   MCH 30.0   MCHC 31.5*   RDW 62.2*   PLATELETCT 125*   MPV 10.7     Recent Labs     10/02/22  0252 10/04/22  0056   SODIUM 135 136   POTASSIUM 4.0 3.9   CHLORIDE 99 99   CO2 29 27   GLUCOSE 88 91   BUN 6* 8   CREATININE 4.18* 7.71*   CALCIUM 7.9* 7.8*                   Imaging  IR-US GUIDED PIV   Final Result    Ultrasound-guided PERIPHERAL IV INSERTION performed by    qualified nursing staff as above.      US-RUQ   Final Result   Addendum (preliminary) 1 of 1   No pancreatic duct stent is seen.      Dr. Gallardo discussed these findings with Wilmar Gallardo.      Final      1.  Cholelithiasis without discrete sonographic evidence of acute cholecystitis. Patient did demonstrate a Evans's sign throughout the course of the exam. There is strong clinical suspicion for acute cholecystitis, a HIDA scan should be obtained for    further evaluation.   2.  Hepatic steatosis.   3.  Hepatomegaly.   4.  The pancreas is obscured by overlying bowel gas.      CT-ABDOMEN-PELVIS W/O   Final Result   Addendum (preliminary) 1 of 1   Subacute right L2 and L3 transverse process fractures.      Final      1.  Acute on chronic pancreatitis. Inflammatory changes track along the left paracolic gutter.   2.  Likely reactive inflammation of the colon at the splenic flexure.   3.  Mildly dilated gallbladder with likely cholelithiasis. If there is concern for acute cholecystitis, ultrasound can be performed.   4.  Features of medical renal disease.   5.  Sigmoid diverticulosis.   6.  Atherosclerotic changes.   7.  Right hepatic lobe lesion, previously characterized as hemangioma on MRI             Assessment/Plan  * Acute on chronic pancreatitis (HCC)- (present on admission)  Assessment & Plan  Suspect related to ETOH induced, though has  had Gallstone-related pancreatitis earlier this year  Also, appears to have a chronic component as well, which will provide difficulties for pain control  Per gastroenterology - conservative management of pancreatitis with IV hydration, careful advancement of diet, daily labs, agree with surgical consultation and following patient for possible cholecystectomy, Alcohol cessation, No plan for ERCP at this time given that current CT does not demonstrate stent is in place at this time and appears to have self ejected which was expected.  Progress diet  Follow CMP, CBC, lipase  Prn support  Holding IVFs as pt is on HD  Continues to improve clinically very slowly, strongly encourage oral intake  Decrease oxycontin to 10 mg Q12 hours, not a good candidate for LA opiates  Continue oxy-IR PRN and scheduled APAP        Cholelithiasis- (present on admission)  Assessment & Plan  Possible cholecystitis?  IV Rocephin and Flagyl x 5 days. Already finished  Per Surgery - Symptoms and clinical findings more consistent with acute on chronic alcoholic pancreatitis. No compelling evidence for active biliary colic or gallstone pancreatitis.  9/29/2022  No change to management at this time  LFT's and TBILI normal  If pain continues for another 24-48 hours, consider HIDA scan and re-consulting GS    HTN (hypertension)- (present on admission)  Assessment & Plan  Carvedilol 6.25  IV labetalol and hydralazine as needed with parameters  Remains persistently high, consider adjusting above medication    Rheumatoid arthritis (HCC)- (present on admission)  Assessment & Plan  Maintained on Etanercept weekly  Progressive symptoms contributing to his chronic pain syndrome  Needs outpatient chronic pain management, will place outpatient referral  Has a high narcotic score on  review, limit opiates as much as possible    Alcohol dependence (HCC)- (present on admission)  Assessment & Plan  Outside window of withdrawal   Long discussion about  importance of complete ETOH abstinence     ESRD on dialysis (HCC)- (present on admission)  Assessment & Plan  HD per Nephrology    Gout- (present on admission)  Assessment & Plan  Allopurinol    Anemia- (present on admission)  Assessment & Plan  Anemia of chronic disease (Hx RA) + CKD  Give one unit PRBCs today with HD  On EPO per Neph  No e/o overt bleeding       VTE prophylaxis: heparin ppx    I have performed a physical exam and reviewed and updated ROS and Plan today (10/4/2022). In review of yesterday's note (10/3/2022), there are no changes except as documented above.

## 2022-10-04 NOTE — PROGRESS NOTES
Pt returned to unit from dialysis. Per Yessica dialysis RN, pt received 1 unit PRBC and had 2 liters of fluid removed.

## 2022-10-04 NOTE — PROGRESS NOTES
"[facility] -  PROGRESS NOTE               Author: Susan Tsang M.D. Date & Time: 10/4/2022  2:29 PM     HPI:  Patient is a 72 year old male with a PMHx of ESRD on HD qTTS, HTN, ETOH, CAD, presented with abdominal pain.  There is concern for pancreatitis and plan for possible intervention.  He states pain started about 3 days ago and worsening.  Hasn't been eating or drinking well. Elevated lipase and CT wit acute on chronic pancreatitis.  Hasn't had dialysis today but refusing dialysis today.    DAILY NEPHROLOGY SUMMARY:  See note from 9/30 for prior summaries  10/01: NAEO, +abd pain stabbing and cramping, no interest in eating   10/02: NAEO, feels better, wants to try to advance diet  10/03: Pt sitting up EOB, feels OK, reports some loose stools today, diet advanced and he was able to eat some, but still with abd discomfort, Hgb yesterday 6.4, no new labs today, pt unable to receive pRBC transfusion yesterday d/t lack of successful PIV access, VSS on RA, due for HD tomorrow  10/04: No events, pt seen on hemodialysis, VSS--see dialysis treatment sheet for full details, denies any CP/SOB, has LE edema, BP stable    REVIEW OF SYSTEMS:    10 point ROS reviewed and is as per HPI/daily summary or otherwise negative    PMH/PSH/SH/FH:   Reviewed and unchanged since admission note    CURRENT MEDICATIONS:   Reviewed from admission to present day    VS:  BP (!) 140/89   Pulse 88   Temp 36.9 °C (98.5 °F) (Temporal)   Resp 18   Ht 1.727 m (5' 8\")   Wt 77 kg (169 lb 12.1 oz)   SpO2 96%   BMI 25.81 kg/m²     Physical Exam  Nursing note reviewed.   Constitutional:       General: He is not in acute distress.     Appearance: He is ill-appearing.   HENT:      Head: Normocephalic and atraumatic.   Eyes:      General: No scleral icterus.  Cardiovascular:      Rate and Rhythm: Normal rate and regular rhythm.      Comments: RIJ TDC    Pulmonary:      Effort: Pulmonary effort is normal. No " respiratory distress.      Breath sounds: Normal breath sounds.   Abdominal:      General: Bowel sounds are normal. There is no distension.      Palpations: Abdomen is soft.      Tenderness: There is abdominal tenderness (LUQ).   Musculoskeletal:         General: No deformity.      Right lower leg: Edema present.      Left lower leg: Edema present.   Skin:     General: Skin is warm and dry.      Findings: No rash.      Comments: B/l knee abrasions/scabbing   Neurological:      General: No focal deficit present.      Mental Status: He is alert and oriented to person, place, and time.   Psychiatric:         Mood and Affect: Mood normal.         Behavior: Behavior normal.     Fluids:  No intake/output data recorded.    LABS:  Recent Labs     10/02/22  0252 10/04/22  0056   SODIUM 135 136   POTASSIUM 4.0 3.9   CHLORIDE 99 99   CO2 29 27   GLUCOSE 88 91   BUN 6* 8   CREATININE 4.18* 7.71*   CALCIUM 7.9* 7.8*       IMAGING:   All imaging reviewed from admission to present day    IMPRESSION:  # ESRD, dependent on HD  - RIJ TDC  - qTTS HD  # Abdominal pain/pancreatitis, slow improvement  - Management per GI and primary team  - Caution with IVF in ESRD with pancreatitis, bolus PRN  - Diet advanced   # HTN, labile control, some soft BPs  - Goal BP < 140/90  - On carvedilol  # Anemia of CKD, below target, unstable  - Goal Hgb 10-11  - pRBCs unable to be transfused 10/2, plan to give with HD 10/4  - Add MADAY  - % sat 13, ferritin 954  # CKD-MBD/Renal Osteodystrophy  - On sevelamer WM  - On Ergo weekly  - Phos 2.5     PLAN:  - HD today (TUES) and continue qTTS schedule  - UF with HD as tolerated  - Can transfuse pRBCs with HD  - MADAY with HD  - Transfuse PRN Hgb <7   - Ferritin precludes IV Fe, can consider PO Fe if pt can tolerate  - No dietary protein restrictions  - Diet advancement per GI/primary team  - Dose all meds per ESRD/iHD  - Holding parameters for carvedilol   - DC Phos binders for now

## 2022-10-04 NOTE — CARE PLAN
The patient is Stable - Low risk of patient condition declining or worsening    Shift Goals    Clinical Goals: pain control, sleep, monior vitals  Patient Goals: pain control  Family Goals: no family at bedside    Progress made toward(s) clinical / shift goals:  Problem: Pain - Standard  Goal: Alleviation of pain or a reduction in pain to the patient’s comfort goal  Outcome: Progressing     Problem: Knowledge Deficit - Standard  Goal: Patient and family/care givers will demonstrate understanding of plan of care, disease process/condition, diagnostic tests and medications  Outcome: Progressing

## 2022-10-04 NOTE — PROGRESS NOTES
3hr HD started @ 0843 and completed @ 1148,tx well tolerated,VSS,net UF = 2000ml.Transfused with 1 unit RBC without adverse reaction  noted.NEAL TDC dressing CDI,report given to Kathy Ramirez RN.

## 2022-10-04 NOTE — PROGRESS NOTES
Report received from RN, assumed care at 1845  Pt is A0X4, and responds appropriately   Pt declines any SOB, chest pain, new onset of numbness/ tingling  Patient on room air  Pt rates pain at 8/10, on a scale of 1-10, pt medicated per MAR  Pt is anuric due to end stage renal disease  + bowel sounds, per patient last BM on 10/3, patient having episodes of bowel incontinence  Pt ambulates as a standby assist  X 1-2 for repositioning in bed   Patient needs encouraging to turn himself in bed at times  Pt is on a low fat diet  Patient refusing heparin. Education provided.  Patient expresses agitation surround pain medication and feels he isn't receiving as much as he needs   Plan of care discussed, all questions answered. Explained importance of calling before getting OOB and pt verbalizes understanding. Explained importance of oral care. Call light is within reach, treaded slipper socks on, bed in lowest/ locked position, hourly rounding in place, all needs met at this time

## 2022-10-04 NOTE — CARE PLAN
The patient is Stable - Low risk of patient condition declining or worsening    Shift Goals  Clinical Goals: dialysis, pain control  Patient Goals: pain control  Family Goals: not currently present at bedside    Progress made toward(s) clinical / shift goals:  Patient tolerated hemodialysis this morning. Patient received 1 unit PRBC during dialysis that was previously scheduled. Medicated for pain per MAR parameters.    Patient is not progressing towards the following goals: Patient has not been cleared for discharge.     Problem: Pain - Standard  Goal: Alleviation of pain or a reduction in pain to the patient’s comfort goal  Outcome: Progressing     Problem: Knowledge Deficit - Standard  Goal: Patient and family/care givers will demonstrate understanding of plan of care, disease process/condition, diagnostic tests and medications  Outcome: Progressing     Problem: Fall Risk  Goal: Patient will remain free from falls  Outcome: Progressing

## 2022-10-04 NOTE — PROGRESS NOTES
Report received from Manisha RN and care of patient assumed at 0645. Assessment completed prior to patient leaving unit for dialysis. Flushed PIV per protocol. White board updated; call light within patient reach.

## 2022-10-05 LAB
ALBUMIN SERPL BCP-MCNC: 2.8 G/DL (ref 3.2–4.9)
ALBUMIN/GLOB SERPL: 0.8 G/DL
ALP SERPL-CCNC: 110 U/L (ref 30–99)
ALT SERPL-CCNC: <5 U/L (ref 2–50)
ANION GAP SERPL CALC-SCNC: 10 MMOL/L (ref 7–16)
AST SERPL-CCNC: 13 U/L (ref 12–45)
BILIRUB SERPL-MCNC: 0.5 MG/DL (ref 0.1–1.5)
BUN SERPL-MCNC: 5 MG/DL (ref 8–22)
CALCIUM SERPL-MCNC: 8.1 MG/DL (ref 8.5–10.5)
CHLORIDE SERPL-SCNC: 99 MMOL/L (ref 96–112)
CO2 SERPL-SCNC: 26 MMOL/L (ref 20–33)
CREAT SERPL-MCNC: 5.18 MG/DL (ref 0.5–1.4)
ERYTHROCYTE [DISTWIDTH] IN BLOOD BY AUTOMATED COUNT: 62.8 FL (ref 35.9–50)
GFR SERPLBLD CREATININE-BSD FMLA CKD-EPI: 11 ML/MIN/1.73 M 2
GLOBULIN SER CALC-MCNC: 3.3 G/DL (ref 1.9–3.5)
GLUCOSE SERPL-MCNC: 96 MG/DL (ref 65–99)
HCT VFR BLD AUTO: 25.9 % (ref 42–52)
HGB BLD-MCNC: 8.3 G/DL (ref 14–18)
MCH RBC QN AUTO: 29.2 PG (ref 27–33)
MCHC RBC AUTO-ENTMCNC: 32 G/DL (ref 33.7–35.3)
MCV RBC AUTO: 91.2 FL (ref 81.4–97.8)
PLATELET # BLD AUTO: 167 K/UL (ref 164–446)
PMV BLD AUTO: 10.4 FL (ref 9–12.9)
POTASSIUM SERPL-SCNC: 4.4 MMOL/L (ref 3.6–5.5)
PROT SERPL-MCNC: 6.1 G/DL (ref 6–8.2)
RBC # BLD AUTO: 2.84 M/UL (ref 4.7–6.1)
SODIUM SERPL-SCNC: 135 MMOL/L (ref 135–145)
WBC # BLD AUTO: 4.9 K/UL (ref 4.8–10.8)

## 2022-10-05 PROCEDURE — 97166 OT EVAL MOD COMPLEX 45 MIN: CPT

## 2022-10-05 PROCEDURE — A9270 NON-COVERED ITEM OR SERVICE: HCPCS | Performed by: INTERNAL MEDICINE

## 2022-10-05 PROCEDURE — 99232 SBSQ HOSP IP/OBS MODERATE 35: CPT | Performed by: INTERNAL MEDICINE

## 2022-10-05 PROCEDURE — 770001 HCHG ROOM/CARE - MED/SURG/GYN PRIV*

## 2022-10-05 PROCEDURE — 36415 COLL VENOUS BLD VENIPUNCTURE: CPT

## 2022-10-05 PROCEDURE — 700102 HCHG RX REV CODE 250 W/ 637 OVERRIDE(OP): Performed by: INTERNAL MEDICINE

## 2022-10-05 PROCEDURE — 85027 COMPLETE CBC AUTOMATED: CPT

## 2022-10-05 PROCEDURE — 700111 HCHG RX REV CODE 636 W/ 250 OVERRIDE (IP): Performed by: INTERNAL MEDICINE

## 2022-10-05 PROCEDURE — A9270 NON-COVERED ITEM OR SERVICE: HCPCS | Performed by: NURSE PRACTITIONER

## 2022-10-05 PROCEDURE — 700102 HCHG RX REV CODE 250 W/ 637 OVERRIDE(OP): Performed by: NURSE PRACTITIONER

## 2022-10-05 PROCEDURE — 80053 COMPREHEN METABOLIC PANEL: CPT

## 2022-10-05 RX ADMIN — HYDROMORPHONE HYDROCHLORIDE 0.5 MG: 1 INJECTION, SOLUTION INTRAMUSCULAR; INTRAVENOUS; SUBCUTANEOUS at 04:03

## 2022-10-05 RX ADMIN — OXYCODONE HYDROCHLORIDE 10 MG: 10 TABLET ORAL at 11:19

## 2022-10-05 RX ADMIN — OXYCODONE HYDROCHLORIDE 10 MG: 10 TABLET ORAL at 08:19

## 2022-10-05 RX ADMIN — HYDROMORPHONE HYDROCHLORIDE 0.5 MG: 1 INJECTION, SOLUTION INTRAMUSCULAR; INTRAVENOUS; SUBCUTANEOUS at 21:00

## 2022-10-05 RX ADMIN — CARVEDILOL 6.25 MG: 6.25 TABLET, FILM COATED ORAL at 17:16

## 2022-10-05 RX ADMIN — OXYCODONE HYDROCHLORIDE 10 MG: 10 TABLET ORAL at 15:23

## 2022-10-05 RX ADMIN — SEVELAMER CARBONATE 800 MG: 800 TABLET, FILM COATED ORAL at 17:15

## 2022-10-05 RX ADMIN — OXYCODONE HYDROCHLORIDE 10 MG: 10 TABLET ORAL at 23:17

## 2022-10-05 RX ADMIN — SEVELAMER CARBONATE 800 MG: 800 TABLET, FILM COATED ORAL at 11:19

## 2022-10-05 RX ADMIN — ONDANSETRON 4 MG: 2 INJECTION INTRAMUSCULAR; INTRAVENOUS at 21:00

## 2022-10-05 RX ADMIN — Medication 100 MG: at 17:16

## 2022-10-05 RX ADMIN — OMEPRAZOLE 20 MG: 20 CAPSULE, DELAYED RELEASE ORAL at 17:15

## 2022-10-05 RX ADMIN — OMEPRAZOLE 20 MG: 20 CAPSULE, DELAYED RELEASE ORAL at 05:17

## 2022-10-05 RX ADMIN — ACETAMINOPHEN 1000 MG: 500 TABLET ORAL at 05:15

## 2022-10-05 RX ADMIN — GABAPENTIN 300 MG: 300 CAPSULE ORAL at 21:01

## 2022-10-05 RX ADMIN — ACETAMINOPHEN 1000 MG: 500 TABLET ORAL at 23:16

## 2022-10-05 RX ADMIN — OXYCODONE HYDROCHLORIDE 10 MG: 10 TABLET ORAL at 18:23

## 2022-10-05 RX ADMIN — CARVEDILOL 6.25 MG: 6.25 TABLET, FILM COATED ORAL at 08:20

## 2022-10-05 RX ADMIN — OXYCODONE HYDROCHLORIDE 10 MG: 10 TABLET ORAL at 02:40

## 2022-10-05 RX ADMIN — Medication 100 MG: at 05:17

## 2022-10-05 RX ADMIN — SEVELAMER CARBONATE 800 MG: 800 TABLET, FILM COATED ORAL at 08:19

## 2022-10-05 RX ADMIN — ACETAMINOPHEN 1000 MG: 500 TABLET ORAL at 17:15

## 2022-10-05 RX ADMIN — HYDROMORPHONE HYDROCHLORIDE 0.5 MG: 1 INJECTION, SOLUTION INTRAMUSCULAR; INTRAVENOUS; SUBCUTANEOUS at 12:30

## 2022-10-05 RX ADMIN — ATORVASTATIN CALCIUM 20 MG: 20 TABLET, FILM COATED ORAL at 21:01

## 2022-10-05 ASSESSMENT — ACTIVITIES OF DAILY LIVING (ADL): TOILETING: INDEPENDENT

## 2022-10-05 ASSESSMENT — PAIN DESCRIPTION - PAIN TYPE
TYPE: CHRONIC PAIN

## 2022-10-05 ASSESSMENT — ENCOUNTER SYMPTOMS
VOMITING: 0
DIARRHEA: 0
SHORTNESS OF BREATH: 0
ROS GI COMMENTS: EATING WELL
HEADACHES: 0
BACK PAIN: 0
FEVER: 0
FALLS: 1
NAUSEA: 0
DIZZINESS: 0
ABDOMINAL PAIN: 1
PALPITATIONS: 0
BLURRED VISION: 0

## 2022-10-05 ASSESSMENT — COGNITIVE AND FUNCTIONAL STATUS - GENERAL
DRESSING REGULAR UPPER BODY CLOTHING: A LITTLE
PERSONAL GROOMING: A LITTLE
HELP NEEDED FOR BATHING: A LITTLE
SUGGESTED CMS G CODE MODIFIER DAILY ACTIVITY: CK
DRESSING REGULAR LOWER BODY CLOTHING: A LITTLE
TOILETING: A LITTLE
DAILY ACTIVITIY SCORE: 19

## 2022-10-05 NOTE — PROGRESS NOTES
Assessment completed. Patient's pain has been medicated according to MAR parameters. Vital signs have been stable. White board updated; call light within patient reach; needs met at this time.

## 2022-10-05 NOTE — CARE PLAN
The patient is Stable - Low risk of patient condition declining or worsening    Shift Goals  Clinical Goals: pain control; monitor labs  Patient Goals: pain control  Family Goals: not currently present at bedside    Progress made toward(s) clinical / shift goals:  Patient medicated per MAR. Skin integrity assessed. Safety discussed. Education provided. Coping mechanisms discussed.    Problem: Knowledge Deficit - Standard  Goal: Patient and family/care givers will demonstrate understanding of plan of care, disease process/condition, diagnostic tests and medications  Outcome: Progressing     Problem: Hemodynamics  Goal: Patient's hemodynamics, fluid balance and neurologic status will be stable or improve  Outcome: Progressing     Problem: Fall Risk  Goal: Patient will remain free from falls  Outcome: Progressing

## 2022-10-05 NOTE — CARE PLAN
The patient is Stable - Low risk of patient condition declining or worsening    Shift Goals  Clinical Goals: pain control, encourage mobility  Patient Goals: pain control  Family Goals: not currently present at bedside    Progress made toward(s) clinical / shift goals:  Pain medication remains available for patient comfort per MAR parameters. PT/OT have been ordered for patient. Patient appears to be tolerating current interventions.    Patient is not progressing towards the following goals: Patient has not been cleared to discharge.

## 2022-10-05 NOTE — PROGRESS NOTES
"[facility] -  PROGRESS NOTE               Author: DARIN Stephenson Date & Time: 10/5/2022  1:08 PM     HPI:  Patient is a 72 year old male with a PMHx of ESRD on HD qTTS, HTN, ETOH, CAD, presented with abdominal pain.  There is concern for pancreatitis and plan for possible intervention.  He states pain started about 3 days ago and worsening.  Hasn't been eating or drinking well. Elevated lipase and CT wit acute on chronic pancreatitis.  Hasn't had dialysis today but refusing dialysis today.    DAILY NEPHROLOGY SUMMARY:  See note from 9/30 for prior summaries  10/01: NAEO, +abd pain stabbing and cramping, no interest in eating   10/02: NAEO, feels better, wants to try to advance diet  10/03: Pt sitting up EOB, feels OK, reports some loose stools today, diet advanced and he was able to eat some, but still with abd discomfort, Hgb yesterday 6.4, no new labs today, pt unable to receive pRBC transfusion yesterday d/t lack of successful PIV access, VSS on RA, due for HD tomorrow  10/04: No events, pt seen on hemodialysis, VSS--see dialysis treatment sheet for full details, denies any CP/SOB, has LE edema, BP stable  10/05: sitting up in bed, continues with some abdominal pain but improving, no NV, tolerated iHD yesterday UF: 2L    REVIEW OF SYSTEMS:    10 point ROS reviewed and is as per HPI/daily summary or otherwise negative    PMH/PSH/SH/FH:   Reviewed and unchanged since admission note    CURRENT MEDICATIONS:   Reviewed from admission to present day    VS:  /75   Pulse 87   Temp 36.4 °C (97.5 °F) (Temporal)   Resp 18   Ht 1.727 m (5' 8\")   Wt 77 kg (169 lb 12.1 oz)   SpO2 95%   BMI 25.81 kg/m²     Physical Exam  Nursing note reviewed.   Constitutional:       General: He is not in acute distress.     Appearance: He is ill-appearing.   HENT:      Head: Normocephalic and atraumatic.   Eyes:      General: No scleral icterus.  Cardiovascular:      Rate and Rhythm: Normal rate " and regular rhythm.      Comments: RIJ TDC    Pulmonary:      Effort: Pulmonary effort is normal. No respiratory distress.      Breath sounds: Normal breath sounds.   Abdominal:      General: Bowel sounds are normal. There is no distension.      Palpations: Abdomen is soft.      Tenderness: There is abdominal tenderness (LUQ).   Musculoskeletal:         General: No deformity.      Right lower leg: Edema present.      Left lower leg: Edema present.   Skin:     General: Skin is warm and dry.      Findings: No rash.      Comments: B/l knee abrasions/scabbing   Neurological:      General: No focal deficit present.      Mental Status: He is alert and oriented to person, place, and time.   Psychiatric:         Mood and Affect: Mood normal.         Behavior: Behavior normal.     Fluids:  In: 1220 [P.O.:120; Blood:300; Dialysis:800]  Out: 2800     LABS:  Recent Labs     10/04/22  0056 10/05/22  0305   SODIUM 136 135   POTASSIUM 3.9 4.4   CHLORIDE 99 99   CO2 27 26   GLUCOSE 91 96   BUN 8 5*   CREATININE 7.71* 5.18*   CALCIUM 7.8* 8.1*       IMAGING:   All imaging reviewed from admission to present day    IMPRESSION:  # ESRD, dependent on HD  - RIJ TDC  - qTTS HD  # Abdominal pain/pancreatitis, slow improvement  - Management per GI and primary team  - Caution with IVF in ESRD with pancreatitis, bolus PRN  - Diet advanced   # HTN, labile control, some soft BPs  - Goal BP < 140/90  - On carvedilol  # Anemia of CKD, below target, unstable  - Goal Hgb 10-11  - pRBCs unable to be transfused 10/2, plan to give with HD 10/4  - Add MADAY  - % sat 13, ferritin 954  # CKD-MBD/Renal Osteodystrophy  - On sevelamer WM  - On Ergo weekly  - Phos 2.5     PLAN:  - HD tomorrow(Thu) and continue qTTS schedule  - UF with HD as tolerated  - Can transfuse pRBCs with HD  - MADAY with HD  - Transfuse PRN Hgb <7   - Ferritin precludes IV Fe, can consider PO Fe if pt can tolerate  - No dietary protein restrictions  - Diet advancement per GI/primary  team  - Dose all meds per ESRD/iHD  - Holding parameters for carvedilol   - DC Phos binders for now  - Okay to transition to outpt hemodialysis when medically cleared

## 2022-10-05 NOTE — PROGRESS NOTES
Received report from previous shift RN at 1900.  Assessment complete.  A&O x 4. Patient calls appropriately.  Patient ambulates with standby assist.   Patient has 9/10 pain. Pain managed with prescribed medications per MAR.  Denies N&V. Tolerating regular- low fat diet.  Skin per flowsheets. RA fistulat present. R chest double-lumen dialysis catheter, CDI.  Anuric, + flatus, + BM on 10/3.  Patient denies SOB on room air.    Patient pleasant and cooperative throughout assessment.  Reviewed plan of care with patient, pt verbalizes understanding. Call light and personal belongings with in reach. Hourly rounding in place. All needs met at this time.

## 2022-10-05 NOTE — THERAPY
"Occupational Therapy   Initial Evaluation     Patient Name: Junior Proctor  Age:  72 y.o., Sex:  male  Medical Record #: [id number]  Today's Date: 10/5/2022     Precautions  Precautions: Fall Risk    Assessment  Patient is a 72 y.o. male with a h/o ESRD on hemodialysis, HTN and alcohol dependence with recent pancreatitis and s/p placement of a stent on 9/19. He presented to the hospital again on 9/27 with abdominal pain. Pt with acute on chronic pancreatitis. Pt reports several recent hospitalizations and/or trips to the ED as well as falls at home. Pt lives alone. During OT eval pt limited by impaired balance and decreased activity tolerance impacting functional mobility, transfers and ADLs. Pt would benefit from OT services.     Plan    Recommend Occupational Therapy 3 times per week until therapy goals are met for the following treatments:  Adaptive Equipment, Self Care/Activities of Daily Living, Therapeutic Activities, and Therapeutic Exercises.    DC Equipment Recommendations: Unable to determine at this time  Discharge Recommendations: Recommend post-acute placement for additional occupational therapy services prior to discharge home     Subjective    \"I think I need a cane.\"      Objective       10/05/22 1525   Prior Living Situation   Prior Services Home-Independent   Housing / Facility 1 Story Apartment / Condo   Steps Into Home 0   Steps In Home 0   Bathroom Set up Bathtub / Shower Combination   Equipment Owned None   Lives with - Patient's Self Care Capacity Alone and Able to Care For Self   Prior Level of ADL Function   Self Feeding Independent   Grooming / Hygiene Independent   Bathing Independent   Dressing Independent   Toileting Independent   Prior Level of IADL Function   Medication Management Independent   Laundry Independent   Kitchen Mobility Independent   Finances Independent   Home Management Independent   Shopping Independent   Prior Level Of Mobility Independent Without Device in Community "   Driving / Transportation Utilizes Public Transportation  (has RTC Access)   Precautions   Precautions Fall Risk   Vitals   O2 Delivery Device None - Room Air   Pain 0 - 10 Group   Therapist Pain Assessment During Activity;Nurse Notified  (abdominal pain)   Cognition    Cognition / Consciousness WDL   Level of Consciousness Alert   Active ROM Upper Body   Active ROM Upper Body  X   Dominant Hand Right   Comments right shoulder limited to 90 degrees, otherwise WDL for both UEs   Strength Upper Body   Upper Body Strength  WDL   Upper Body Muscle Tone   Upper Body Muscle Tone  WDL   Coordination Upper Body   Coordination WDL   Balance Assessment   Sitting Balance (Static) Good   Sitting Balance (Dynamic) Good   Standing Balance (Static) Fair -   Standing Balance (Dynamic) Poor +   Weight Shift Sitting Good   Weight Shift Standing Fair   Comments walked without AD and then trialed walking with SPC   Bed Mobility    Comments seated EOB before and after session   ADL Assessment   Grooming Contact Guard Assist;Standing  (wash hands at sink)   Lower Body Dressing Standby Assist  (slippers, doesn't wear socks at home)   Toileting Contact Guard Assist  (denied need but did demo transfer and clothing mangement)   6 Clicks Daily Activity Score 19   Functional Mobility   Sit to Stand Contact Guard Assist   Bed, Chair, Wheelchair Transfer Contact Guard Assist   Toilet Transfers Contact Guard Assist   Mobility walked in room, slightly unsteady on his feet   Patient / Family Goals   Patient / Family Goal #1 to improve his balance   Short Term Goals   Short Term Goal # 1 Pt will don pants with supervision   Short Term Goal # 2 Pt will stand at the sink to groom with supervision   Short Term Goal # 3 Pt will complete toilet transfers with supervision   Education Group   Education Provided Role of Occupational Therapist   Role of Occupational Therapist Patient Response Patient;Acceptance;Explanation;Reinforcement Needed   Problem  List   Problem List Decreased Active Daily Living Skills;Decreased Homemaking Skills;Decreased Upper Extremity AROM Right;Decreased Upper Extremity PROM Right;Decreased Functional Mobility;Decreased Activity Tolerance;Impaired Postural Control / Balance

## 2022-10-05 NOTE — PROGRESS NOTES
Hospital Medicine Daily Progress Note    Date of Service  10/5/2022    Chief Complaint  Junior Proctor is a 72 y.o. male admitted 9/27/2022 with abd pain    Hospital Course  73yo PMHx ESRD on HD, gout, ETOH, RA on Enbril, HTN, CAD presenting with Abd pain.  Had recently been DC'd home after admission for acute pancreatitis, bacteremia/sepsis on 9/22.  On that admission had a biliary stent placed and later exchanged after it was blocked.  On re-presentation found to have acute on chronic pancreatitis.  Seen by Nicho who recommended non operative management    Interval Problem Update  Acute on chronic pancreatitis-pain is mostly the same, but he is recorded eating about 75% of his meals consistently. Labs are normal. Normal vital signs including no HTN.     ANemia-responded well to 1 U PRBC's during iHD on 10.4      I have discussed this patient's plan of care and discharge plan at IDT rounds today with Case Management, Nursing, Nursing leadership, and other members of the IDT team.    Consultants/Specialty  general surgery  Renal  GI    Code Status  Full Code    Disposition  Patient is not medically cleared for discharge.   Anticipate discharge to SNF  I have placed the appropriate orders for post-discharge needs.    Review of Systems  Review of Systems   Constitutional:  Negative for fever.   Eyes:  Negative for blurred vision.   Respiratory:  Negative for shortness of breath.    Cardiovascular:  Negative for chest pain, palpitations and leg swelling.   Gastrointestinal:  Positive for abdominal pain (mostly the same). Negative for diarrhea, nausea and vomiting.        Eating well   Genitourinary:  Negative for dysuria and urgency.   Musculoskeletal:  Positive for falls (at home, apparently) and joint pain (at his baseline 2/2 RA). Negative for back pain.   Neurological:  Negative for dizziness and headaches.   All other systems reviewed and are negative.     Physical Exam  Temp:  [36.4 °C (97.5 °F)-37.1 °C (98.8 °F)]  36.4 °C (97.5 °F)  Pulse:  [87-93] 87  Resp:  [18-20] 18  BP: (125-148)/(75-92) 125/75  SpO2:  [95 %-98 %] 95 %    Physical Exam  Constitutional:       General: He is not in acute distress.     Appearance: He is well-developed. He is obese. He is not diaphoretic.      Comments: Comfortable appearing in bed   HENT:      Head: Normocephalic and atraumatic.   Eyes:      General: No scleral icterus.     Conjunctiva/sclera: Conjunctivae normal.   Neck:      Vascular: No JVD.   Cardiovascular:      Rate and Rhythm: Normal rate.      Heart sounds: No murmur heard.    No gallop.      Comments: Dialysis catheter in [facility], insertion site is C/D/I  Pulmonary:      Effort: Pulmonary effort is normal. No respiratory distress.      Breath sounds: No wheezing.   Abdominal:      General: There is no distension.      Palpations: Abdomen is soft.      Tenderness: There is abdominal tenderness (mild in the epigastric area). There is no guarding or rebound.   Musculoskeletal:         General: Tenderness (diffusely) present.      Right lower leg: Edema present.      Left lower leg: Edema present.      Comments: No changes noted today   Skin:     General: Skin is warm and dry.      Capillary Refill: Capillary refill takes less than 2 seconds.      Coloration: Skin is not jaundiced.      Findings: No rash.   Neurological:      General: No focal deficit present.      Mental Status: He is oriented to person, place, and time. Mental status is at baseline.   Psychiatric:         Mood and Affect: Mood normal.         Behavior: Behavior normal.         Thought Content: Thought content normal.       Fluids    Intake/Output Summary (Last 24 hours) at 10/5/2022 1631  Last data filed at 10/5/2022 0910  Gross per 24 hour   Intake 240 ml   Output 0 ml   Net 240 ml       Laboratory  Recent Labs     10/04/22  1540 10/05/22  0305   WBC  --  4.9   RBC  --  2.84*   HEMOGLOBIN 9.1* 8.3*   HEMATOCRIT 28.7* 25.9*   MCV  --  91.2   MCH  --  29.2   MCHC  --   32.0*   RDW  --  62.8*   PLATELETCT  --  167   MPV  --  10.4     Recent Labs     10/04/22  0056 10/05/22  0305   SODIUM 136 135   POTASSIUM 3.9 4.4   CHLORIDE 99 99   CO2 27 26   GLUCOSE 91 96   BUN 8 5*   CREATININE 7.71* 5.18*   CALCIUM 7.8* 8.1*                   Imaging  IR-US GUIDED PIV   Final Result    Ultrasound-guided PERIPHERAL IV INSERTION performed by    qualified nursing staff as above.      US-RUQ   Final Result   Addendum (preliminary) 1 of 1   No pancreatic duct stent is seen.      Dr. Gallardo discussed these findings with Wilmar Gallardo.      Final      1.  Cholelithiasis without discrete sonographic evidence of acute cholecystitis. Patient did demonstrate a Evans's sign throughout the course of the exam. There is strong clinical suspicion for acute cholecystitis, a HIDA scan should be obtained for    further evaluation.   2.  Hepatic steatosis.   3.  Hepatomegaly.   4.  The pancreas is obscured by overlying bowel gas.      CT-ABDOMEN-PELVIS W/O   Final Result   Addendum (preliminary) 1 of 1   Subacute right L2 and L3 transverse process fractures.      Final      1.  Acute on chronic pancreatitis. Inflammatory changes track along the left paracolic gutter.   2.  Likely reactive inflammation of the colon at the splenic flexure.   3.  Mildly dilated gallbladder with likely cholelithiasis. If there is concern for acute cholecystitis, ultrasound can be performed.   4.  Features of medical renal disease.   5.  Sigmoid diverticulosis.   6.  Atherosclerotic changes.   7.  Right hepatic lobe lesion, previously characterized as hemangioma on MRI             Assessment/Plan  * Acute on chronic pancreatitis (HCC)- (present on admission)  Assessment & Plan  Suspect related to ETOH induced, though has had Gallstone-related pancreatitis earlier this year  Also, appears to have a chronic component as well, which will provide difficulties for pain control  Per gastroenterology - conservative management of pancreatitis  with IV hydration, careful advancement of diet, daily labs, agree with surgical consultation and following patient for possible cholecystectomy, Alcohol cessation, No plan for ERCP at this time given that current CT does not demonstrate stent is in place at this time and appears to have self ejected which was expected.  Prn support  Holding IVFs as pt is on HD  Clinically doing well with normal vital signs, normal lipase, normal LFT's, normal WBC and eating consistently over 75% of his food  SNF referral placed  Not a good candidate for LA opiates, needs outpatient chronic pain management referral on discharge  Continue oxy-IR PRN and scheduled APAP        Cholelithiasis- (present on admission)  Assessment & Plan  Possible cholecystitis?  IV Rocephin and Flagyl x 5 days. Already finished  Per Surgery - Symptoms and clinical findings more consistent with acute on chronic alcoholic pancreatitis. No compelling evidence for active biliary colic or gallstone pancreatitis.  9/29/2022  No change to management at this time  LFT's and TBILI normal  If pain continues for another 24-48 hours, consider HIDA scan and re-consulting     HTN (hypertension)- (present on admission)  Assessment & Plan  Carvedilol 6.25  IV labetalol and hydralazine as needed with parameters  Remains persistently high, consider adjusting above medication    Rheumatoid arthritis (HCC)- (present on admission)  Assessment & Plan  Maintained on Etanercept weekly  Progressive symptoms contributing to his chronic pain syndrome  Needs outpatient chronic pain management, will place outpatient referral  Has a high narcotic score on  review, limit opiates as much as possible    Alcohol dependence (HCC)- (present on admission)  Assessment & Plan  Outside window of withdrawal   Long discussion about importance of complete ETOH abstinence     ESRD on dialysis (HCC)- (present on admission)  Assessment & Plan  HD per Nephrology    Gout- (present on  admission)  Assessment & Plan  Allopurinol    Anemia- (present on admission)  Assessment & Plan  Anemia of chronic disease (Hx RA) + CKD  Give one unit PRBCs today with HD 10/4, appropriate response noted  On EPO per Neph  No e/o overt bleeding       VTE prophylaxis: heparin ppx    I have performed a physical exam and reviewed and updated ROS and Plan today (10/5/2022). In review of yesterday's note (10/4/2022), there are no changes except as documented above.

## 2022-10-06 LAB
ALBUMIN SERPL BCP-MCNC: 2.5 G/DL (ref 3.2–4.9)
ALBUMIN/GLOB SERPL: 0.8 G/DL
ALP SERPL-CCNC: 111 U/L (ref 30–99)
ALT SERPL-CCNC: <5 U/L (ref 2–50)
ANION GAP SERPL CALC-SCNC: 9 MMOL/L (ref 7–16)
AST SERPL-CCNC: 11 U/L (ref 12–45)
BILIRUB SERPL-MCNC: 0.2 MG/DL (ref 0.1–1.5)
BUN SERPL-MCNC: 6 MG/DL (ref 8–22)
CALCIUM SERPL-MCNC: 8.3 MG/DL (ref 8.5–10.5)
CHLORIDE SERPL-SCNC: 98 MMOL/L (ref 96–112)
CO2 SERPL-SCNC: 27 MMOL/L (ref 20–33)
CREAT SERPL-MCNC: 7.2 MG/DL (ref 0.5–1.4)
GFR SERPLBLD CREATININE-BSD FMLA CKD-EPI: 8 ML/MIN/1.73 M 2
GLOBULIN SER CALC-MCNC: 3.2 G/DL (ref 1.9–3.5)
GLUCOSE SERPL-MCNC: 90 MG/DL (ref 65–99)
POTASSIUM SERPL-SCNC: 4.7 MMOL/L (ref 3.6–5.5)
PROT SERPL-MCNC: 5.7 G/DL (ref 6–8.2)
SODIUM SERPL-SCNC: 134 MMOL/L (ref 135–145)

## 2022-10-06 PROCEDURE — 80053 COMPREHEN METABOLIC PANEL: CPT

## 2022-10-06 PROCEDURE — 97162 PT EVAL MOD COMPLEX 30 MIN: CPT

## 2022-10-06 PROCEDURE — 700111 HCHG RX REV CODE 636 W/ 250 OVERRIDE (IP)

## 2022-10-06 PROCEDURE — 700102 HCHG RX REV CODE 250 W/ 637 OVERRIDE(OP): Performed by: INTERNAL MEDICINE

## 2022-10-06 PROCEDURE — A9270 NON-COVERED ITEM OR SERVICE: HCPCS | Performed by: INTERNAL MEDICINE

## 2022-10-06 PROCEDURE — 90935 HEMODIALYSIS ONE EVALUATION: CPT

## 2022-10-06 PROCEDURE — A9270 NON-COVERED ITEM OR SERVICE: HCPCS | Performed by: NURSE PRACTITIONER

## 2022-10-06 PROCEDURE — 770001 HCHG ROOM/CARE - MED/SURG/GYN PRIV*

## 2022-10-06 PROCEDURE — 700111 HCHG RX REV CODE 636 W/ 250 OVERRIDE (IP): Performed by: INTERNAL MEDICINE

## 2022-10-06 PROCEDURE — 99232 SBSQ HOSP IP/OBS MODERATE 35: CPT | Performed by: INTERNAL MEDICINE

## 2022-10-06 PROCEDURE — 36415 COLL VENOUS BLD VENIPUNCTURE: CPT

## 2022-10-06 PROCEDURE — 700111 HCHG RX REV CODE 636 W/ 250 OVERRIDE (IP): Performed by: NURSE PRACTITIONER

## 2022-10-06 PROCEDURE — 97535 SELF CARE MNGMENT TRAINING: CPT

## 2022-10-06 PROCEDURE — 700102 HCHG RX REV CODE 250 W/ 637 OVERRIDE(OP): Performed by: NURSE PRACTITIONER

## 2022-10-06 PROCEDURE — 97163 PT EVAL HIGH COMPLEX 45 MIN: CPT

## 2022-10-06 RX ORDER — HEPARIN SODIUM 1000 [USP'U]/ML
INJECTION, SOLUTION INTRAVENOUS; SUBCUTANEOUS
Status: COMPLETED
Start: 2022-10-06 | End: 2022-10-06

## 2022-10-06 RX ORDER — GABAPENTIN 300 MG/1
300 CAPSULE ORAL 2 TIMES DAILY
Status: DISCONTINUED | OUTPATIENT
Start: 2022-10-06 | End: 2022-10-07

## 2022-10-06 RX ORDER — HYDROMORPHONE HYDROCHLORIDE 1 MG/ML
0.5 INJECTION, SOLUTION INTRAMUSCULAR; INTRAVENOUS; SUBCUTANEOUS EVERY 6 HOURS PRN
Status: DISCONTINUED | OUTPATIENT
Start: 2022-10-06 | End: 2022-10-07

## 2022-10-06 RX ORDER — OXYCODONE HYDROCHLORIDE 5 MG/1
5 TABLET ORAL
Status: DISCONTINUED | OUTPATIENT
Start: 2022-10-06 | End: 2022-10-07

## 2022-10-06 RX ORDER — OXYCODONE HYDROCHLORIDE 10 MG/1
10 TABLET ORAL
Status: DISCONTINUED | OUTPATIENT
Start: 2022-10-06 | End: 2022-10-07

## 2022-10-06 RX ADMIN — SEVELAMER CARBONATE 800 MG: 800 TABLET, FILM COATED ORAL at 16:21

## 2022-10-06 RX ADMIN — ACETAMINOPHEN 1000 MG: 500 TABLET ORAL at 05:32

## 2022-10-06 RX ADMIN — EPOETIN ALFA-EPBX 4000 UNITS: 4000 INJECTION, SOLUTION INTRAVENOUS; SUBCUTANEOUS at 11:30

## 2022-10-06 RX ADMIN — OXYCODONE HYDROCHLORIDE 10 MG: 10 TABLET ORAL at 20:25

## 2022-10-06 RX ADMIN — GABAPENTIN 300 MG: 300 CAPSULE ORAL at 17:21

## 2022-10-06 RX ADMIN — ACETAMINOPHEN 1000 MG: 500 TABLET ORAL at 16:21

## 2022-10-06 RX ADMIN — OXYCODONE HYDROCHLORIDE 10 MG: 10 TABLET ORAL at 07:53

## 2022-10-06 RX ADMIN — Medication 100 MG: at 16:21

## 2022-10-06 RX ADMIN — HYDROMORPHONE HYDROCHLORIDE 0.5 MG: 1 INJECTION, SOLUTION INTRAMUSCULAR; INTRAVENOUS; SUBCUTANEOUS at 16:21

## 2022-10-06 RX ADMIN — OXYCODONE HYDROCHLORIDE 10 MG: 10 TABLET ORAL at 14:42

## 2022-10-06 RX ADMIN — ALLOPURINOL 100 MG: 100 TABLET ORAL at 16:21

## 2022-10-06 RX ADMIN — HYDROMORPHONE HYDROCHLORIDE 0.5 MG: 1 INJECTION, SOLUTION INTRAMUSCULAR; INTRAVENOUS; SUBCUTANEOUS at 00:38

## 2022-10-06 RX ADMIN — CARVEDILOL 6.25 MG: 6.25 TABLET, FILM COATED ORAL at 07:53

## 2022-10-06 RX ADMIN — OMEPRAZOLE 20 MG: 20 CAPSULE, DELAYED RELEASE ORAL at 05:33

## 2022-10-06 RX ADMIN — OMEPRAZOLE 20 MG: 20 CAPSULE, DELAYED RELEASE ORAL at 16:21

## 2022-10-06 RX ADMIN — SEVELAMER CARBONATE 800 MG: 800 TABLET, FILM COATED ORAL at 07:53

## 2022-10-06 RX ADMIN — HEPARIN SODIUM 3700 UNITS: 1000 INJECTION, SOLUTION INTRAVENOUS; SUBCUTANEOUS at 12:25

## 2022-10-06 RX ADMIN — CARVEDILOL 6.25 MG: 6.25 TABLET, FILM COATED ORAL at 16:21

## 2022-10-06 RX ADMIN — OXYCODONE HYDROCHLORIDE 10 MG: 10 TABLET ORAL at 03:57

## 2022-10-06 RX ADMIN — ATORVASTATIN CALCIUM 20 MG: 20 TABLET, FILM COATED ORAL at 20:25

## 2022-10-06 RX ADMIN — Medication 100 MG: at 05:33

## 2022-10-06 ASSESSMENT — PAIN DESCRIPTION - PAIN TYPE
TYPE: ACUTE PAIN
TYPE: CHRONIC PAIN
TYPE: CHRONIC PAIN
TYPE: ACUTE PAIN
TYPE: ACUTE PAIN
TYPE: CHRONIC PAIN
TYPE: CHRONIC PAIN
TYPE: ACUTE PAIN
TYPE: ACUTE PAIN

## 2022-10-06 ASSESSMENT — ENCOUNTER SYMPTOMS
ABDOMINAL PAIN: 1
DIARRHEA: 0
NAUSEA: 0
BACK PAIN: 0
VOMITING: 0
FALLS: 1
DIZZINESS: 0
FEVER: 0
CHILLS: 0
SHORTNESS OF BREATH: 0
BLURRED VISION: 0
HEADACHES: 0

## 2022-10-06 ASSESSMENT — GAIT ASSESSMENTS
DISTANCE (FEET): 50
GAIT LEVEL OF ASSIST: MINIMAL ASSIST

## 2022-10-06 ASSESSMENT — COGNITIVE AND FUNCTIONAL STATUS - GENERAL
CLIMB 3 TO 5 STEPS WITH RAILING: A LOT
MOVING FROM LYING ON BACK TO SITTING ON SIDE OF FLAT BED: A LITTLE
SUGGESTED CMS G CODE MODIFIER MOBILITY: CK
MOBILITY SCORE: 17
MOVING TO AND FROM BED TO CHAIR: A LITTLE
STANDING UP FROM CHAIR USING ARMS: A LITTLE
WALKING IN HOSPITAL ROOM: A LITTLE
TURNING FROM BACK TO SIDE WHILE IN FLAT BAD: A LITTLE

## 2022-10-06 NOTE — PROGRESS NOTES
"[facility] -  PROGRESS NOTE               Author: Susan Tsang M.D. Date & Time: 10/6/2022  11:20 AM     HPI:  Patient is a 72 year old male with a PMHx of ESRD on HD qTTS, HTN, ETOH, CAD, presented with abdominal pain.  There is concern for pancreatitis and plan for possible intervention.  He states pain started about 3 days ago and worsening.  Hasn't been eating or drinking well. Elevated lipase and CT wit acute on chronic pancreatitis.  Hasn't had dialysis today but refusing dialysis today.    DAILY NEPHROLOGY SUMMARY:  See note from 9/30 for prior summaries  10/01: NAEO, +abd pain stabbing and cramping, no interest in eating   10/02: NAEO, feels better, wants to try to advance diet  10/03: Pt sitting up EOB, feels OK, reports some loose stools today, diet advanced and he was able to eat some, but still with abd discomfort, Hgb yesterday 6.4, no new labs today, pt unable to receive pRBC transfusion yesterday d/t lack of successful PIV access, VSS on RA, due for HD tomorrow  10/04: No events, pt seen on hemodialysis, VSS--see dialysis treatment sheet for full details, denies any CP/SOB, has LE edema, BP stable  10/05: sitting up in bed, continues with some abdominal pain but improving, no NV, tolerated iHD yesterday UF: 2L  10/06: No events, seen on dialysis this am, VSS--see dialysis treatment sheet for full details, denies any CP/SOB, still reports some mild abd pain, no n/v, tolerating PO but reports poor appetite    REVIEW OF SYSTEMS:    10 point ROS reviewed and is as per HPI/daily summary or otherwise negative    PMH/PSH/SH/FH:   Reviewed and unchanged since admission note    CURRENT MEDICATIONS:   Reviewed from admission to present day    VS:  BP (!) 142/78 Comment: prior to dialysis  Pulse 92   Temp 36.4 °C (97.5 °F) (Temporal)   Resp 18   Ht 1.727 m (5' 8\")   Wt 77 kg (169 lb 12.1 oz)   SpO2 98%   BMI 25.81 kg/m²     Physical Exam  Vitals and nursing note reviewed. "   Constitutional:       General: He is not in acute distress.  HENT:      Head: Normocephalic and atraumatic.   Eyes:      General: No scleral icterus.  Cardiovascular:      Rate and Rhythm: Normal rate and regular rhythm.      Comments: RIJ TDC    Pulmonary:      Effort: Pulmonary effort is normal. No respiratory distress.      Breath sounds: Normal breath sounds.   Abdominal:      General: Bowel sounds are normal. There is no distension.      Palpations: Abdomen is soft.      Tenderness: There is abdominal tenderness (LUQ).   Musculoskeletal:         General: No deformity.      Right lower leg: Edema present.      Left lower leg: Edema present.   Skin:     General: Skin is warm and dry.      Findings: No rash.      Comments: B/l knee abrasions/scabbing   Neurological:      General: No focal deficit present.      Mental Status: He is alert and oriented to person, place, and time.   Psychiatric:         Mood and Affect: Mood normal.         Behavior: Behavior normal.     Fluids:  In: 480 [P.O.:480]  Out: 0     LABS:  Recent Labs     10/04/22  0056 10/05/22  0305 10/06/22  0623   SODIUM 136 135 134*   POTASSIUM 3.9 4.4 4.7   CHLORIDE 99 99 98   CO2 27 26 27   GLUCOSE 91 96 90   BUN 8 5* 6*   CREATININE 7.71* 5.18* 7.20*   CALCIUM 7.8* 8.1* 8.3*       IMAGING:   All imaging reviewed from admission to present day    IMPRESSION:  # ESRD, dependent on HD  - RIJ TDC  - qTTS HD  # Abdominal pain/pancreatitis, slow improvement  - Management per GI and primary team  - Caution with IVF in ESRD with pancreatitis, bolus PRN  - Diet advanced   # HTN, labile control, some soft BPs  - Goal BP < 140/90  - On carvedilol  # Anemia of CKD, below target, unstable  - Goal Hgb 10-11  - pRBCs unable to be transfused 10/2, plan to give with HD 10/4  - Add MADAY  - % sat 13, ferritin 954  # CKD-MBD/Renal Osteodystrophy  - On sevelamer WM  - On Ergo weekly  - Phos 2.5     PLAN:  - HD today (Thu) and continue qTTS schedule  - UF with HD as  tolerated  - MADAY with HD  - Transfuse PRN Hgb <7   - Ferritin precludes IV Fe, can consider PO Fe if pt can tolerate  - No dietary protein restrictions  - Diet advancement per GI/primary team  - Dose all meds per ESRD/iHD  - Holding parameters for carvedilol   - No Phos binders for now  - Okay to transition to outpt hemodialysis when medically cleared

## 2022-10-06 NOTE — CARE PLAN
The patient is Stable - Low risk of patient condition declining or worsening    Shift Goals  Clinical Goals: pain control; comfort; rest  Patient Goals: pain control; comfort  Family Goals: not currently present at bedside    Progress made toward(s) clinical / shift goals:  Patient medicated per MAR. Safety discussed. Education provided. Non-pharmacologic comfort measures implemented.    Problem: Pain - Standard  Goal: Alleviation of pain or a reduction in pain to the patient’s comfort goal  Outcome: Progressing     Problem: Knowledge Deficit - Standard  Goal: Patient and family/care givers will demonstrate understanding of plan of care, disease process/condition, diagnostic tests and medications  Outcome: Progressing

## 2022-10-06 NOTE — PROGRESS NOTES
Hospital Medicine Daily Progress Note    Date of Service  10/6/2022    Chief Complaint  Junior Proctor is a 72 y.o. male admitted 9/27/2022 with abd pain    Hospital Course  71yo PMHx ESRD on HD, gout, ETOH, RA on Enbril, HTN, CAD presenting with Abd pain.  Had recently been DC'd home after admission for acute pancreatitis, bacteremia/sepsis on 9/22.  On that admission had a biliary stent placed and later exchanged after it was blocked.  On re-presentation found to have acute on chronic pancreatitis.  Seen by Nicho who recommended non operative management    Interval Problem Update  Acute on chronic pancreatitis-pain is contantly 10/10 but is eating food fine  Chronic pain-suspect large portion is from his RA. Needs outpatient chronic pain management.   ESRD-UF without issue today.       I have discussed this patient's plan of care and discharge plan at IDT rounds today with Case Management, Nursing, Nursing leadership, and other members of the IDT team.    Consultants/Specialty  general surgery  Renal  GI    Code Status  Full Code    Disposition  Patient is not medically cleared for discharge.   Anticipate discharge to SNF  I have placed the appropriate orders for post-discharge needs.    Review of Systems  Review of Systems   Constitutional:  Negative for chills and fever.   Eyes:  Negative for blurred vision.   Respiratory:  Negative for shortness of breath.    Cardiovascular:  Negative for chest pain and leg swelling.   Gastrointestinal:  Positive for abdominal pain (mostly the same). Negative for diarrhea, nausea and vomiting.        Eating well still   Genitourinary:  Negative for dysuria and urgency.   Musculoskeletal:  Positive for falls (at home, apparently) and joint pain (at his baseline 2/2 RA, unchanged today). Negative for back pain.   Neurological:  Negative for dizziness and headaches.   All other systems reviewed and are negative.     Physical Exam  Temp:  [36.1 °C (96.9 °F)-37.3 °C (99.1 °F)] 37.3  °C (99.1 °F)  Pulse:  [80-95] 80  Resp:  [16-18] 17  BP: (130-170)/(76-95) 159/92  SpO2:  [94 %-98 %] 94 %    Physical Exam  Constitutional:       General: He is not in acute distress.     Appearance: He is well-developed. He is obese. He is not diaphoretic.      Comments: Comfortable appearing in bed   HENT:      Head: Normocephalic and atraumatic.   Eyes:      General: No scleral icterus.     Conjunctiva/sclera: Conjunctivae normal.   Neck:      Vascular: No JVD.   Cardiovascular:      Rate and Rhythm: Normal rate.      Heart sounds: No murmur heard.    No gallop.      Comments: Dialysis catheter in [facility], insertion site is C/D/I  Pulmonary:      Effort: Pulmonary effort is normal. No respiratory distress.      Breath sounds: No wheezing.   Abdominal:      General: There is no distension.      Palpations: Abdomen is soft.      Tenderness: There is abdominal tenderness (mild in the epigastric area, unchanged). There is no guarding or rebound.   Musculoskeletal:         General: Tenderness (diffusely) present.      Right lower leg: Edema present.      Left lower leg: Edema present.      Comments: Unchanged   Skin:     General: Skin is warm and dry.      Capillary Refill: Capillary refill takes less than 2 seconds.      Coloration: Skin is not jaundiced.      Findings: No rash.   Neurological:      General: No focal deficit present.      Mental Status: He is oriented to person, place, and time. Mental status is at baseline.   Psychiatric:         Mood and Affect: Mood normal.         Behavior: Behavior normal.         Thought Content: Thought content normal.       Fluids    Intake/Output Summary (Last 24 hours) at 10/6/2022 1520  Last data filed at 10/6/2022 1200  Gross per 24 hour   Intake 800 ml   Output 2000 ml   Net -1200 ml       Laboratory  Recent Labs     10/04/22  1540 10/05/22  0305   WBC  --  4.9   RBC  --  2.84*   HEMOGLOBIN 9.1* 8.3*   HEMATOCRIT 28.7* 25.9*   MCV  --  91.2   MCH  --  29.2   MCHC  --  32.0*    RDW  --  62.8*   PLATELETCT  --  167   MPV  --  10.4     Recent Labs     10/04/22  0056 10/05/22  0305 10/06/22  0623   SODIUM 136 135 134*   POTASSIUM 3.9 4.4 4.7   CHLORIDE 99 99 98   CO2 27 26 27   GLUCOSE 91 96 90   BUN 8 5* 6*   CREATININE 7.71* 5.18* 7.20*   CALCIUM 7.8* 8.1* 8.3*                   Imaging  IR-US GUIDED PIV   Final Result    Ultrasound-guided PERIPHERAL IV INSERTION performed by    qualified nursing staff as above.      US-RUQ   Final Result   Addendum (preliminary) 1 of 1   No pancreatic duct stent is seen.      Dr. Gallardo discussed these findings with Wilmar Gallardo.      Final      1.  Cholelithiasis without discrete sonographic evidence of acute cholecystitis. Patient did demonstrate a Evans's sign throughout the course of the exam. There is strong clinical suspicion for acute cholecystitis, a HIDA scan should be obtained for    further evaluation.   2.  Hepatic steatosis.   3.  Hepatomegaly.   4.  The pancreas is obscured by overlying bowel gas.      CT-ABDOMEN-PELVIS W/O   Final Result   Addendum (preliminary) 1 of 1   Subacute right L2 and L3 transverse process fractures.      Final      1.  Acute on chronic pancreatitis. Inflammatory changes track along the left paracolic gutter.   2.  Likely reactive inflammation of the colon at the splenic flexure.   3.  Mildly dilated gallbladder with likely cholelithiasis. If there is concern for acute cholecystitis, ultrasound can be performed.   4.  Features of medical renal disease.   5.  Sigmoid diverticulosis.   6.  Atherosclerotic changes.   7.  Right hepatic lobe lesion, previously characterized as hemangioma on MRI             Assessment/Plan  * Acute on chronic pancreatitis (HCC)- (present on admission)  Assessment & Plan  Suspect related to ETOH induced, though has had Gallstone-related pancreatitis earlier this year  Also, appears to have a chronic component as well, which will provide difficulties for pain control  Per gastroenterology -  conservative management of pancreatitis with IV hydration, careful advancement of diet, daily labs, agree with surgical consultation and following patient for possible cholecystectomy, Alcohol cessation, No plan for ERCP at this time given that current CT does not demonstrate stent is in place at this time and appears to have self ejected which was expected.  Prn support  Holding IVFs as pt is on HD  Clinically doing well with normal vital signs, normal lipase, normal LFT's, normal WBC and eating consistently over 75% of his food  SNF referral placed  Not a good candidate for LA opiates, needs outpatient chronic pain management referral on discharge  Continue oxy-IR PRN and scheduled APAP  Taper IV dilaudid further today  Double gabapentin to 300 mg BID (likely no higher given ESRD)        Cholelithiasis- (present on admission)  Assessment & Plan  Possible cholecystitis?  IV Rocephin and Flagyl x 5 days. Already finished  Per Surgery - Symptoms and clinical findings more consistent with acute on chronic alcoholic pancreatitis. No compelling evidence for active biliary colic or gallstone pancreatitis.  9/29/2022  No change to management at this time  LFT's and TBILI normal  Defer HIDA scan at this time    HTN (hypertension)- (present on admission)  Assessment & Plan  Carvedilol 6.25  IV labetalol and hydralazine as needed with parameters  Remains persistently high, consider adjusting above medication    Rheumatoid arthritis (HCC)- (present on admission)  Assessment & Plan  Maintained on Etanercept weekly  Progressive symptoms contributing to his chronic pain syndrome  Needs outpatient chronic pain management, will place outpatient referral  Has a high narcotic score on  review, limit opiates as much as possible    Alcohol dependence (HCC)- (present on admission)  Assessment & Plan  Outside window of withdrawal   Long discussion about importance of complete ETOH abstinence     ESRD on dialysis (HCC)- (present on  admission)  Assessment & Plan  HD per Nephrology    Gout- (present on admission)  Assessment & Plan  Allopurinol    Anemia- (present on admission)  Assessment & Plan  Anemia of chronic disease (Hx RA) + CKD  Give one unit PRBCs today with HD 10/4, appropriate response noted  On EPO per Neph  No e/o overt bleeding       VTE prophylaxis: heparin ppx    I have performed a physical exam and reviewed and updated ROS and Plan today (10/6/2022). In review of yesterday's note (10/5/2022), there are no changes except as documented above.

## 2022-10-06 NOTE — CARE PLAN
The patient is Stable - Low risk of patient condition declining or worsening  Shift Goals  Clinical Goals: dialysis, pain control, PT/OT  Patient Goals: pain control  Family Goals: n/a    Progress made toward(s) clinical / shift goals:  Patient's pain has been medicated according to MAR parameters. Patient worked with physical therapy -- placement has now been recommended. Patient receives dialysis Tuesday/Thursday/Saturday.    Patient is not progressing towards the following goals: Discharge placement    Problem: Pain - Standard  Goal: Alleviation of pain or a reduction in pain to the patient’s comfort goal  Outcome: Progressing     Problem: Knowledge Deficit - Standard  Goal: Patient and family/care givers will demonstrate understanding of plan of care, disease process/condition, diagnostic tests and medications  Outcome: Progressing

## 2022-10-06 NOTE — THERAPY
"Physical Therapy   Initial Evaluation     Patient Name: Junior Proctor  Age:  72 y.o., Sex:  male  Medical Record #: [id number]  Today's Date: 10/6/2022     Precautions  Precautions: Fall Risk    Assessment  Patient is 72 y.o. male admitted with abdominal pain and acute on chronic pancreatitis being managed non-operatively. Pt recently d/c 9/22 from [facility] after biliary stent on 9/19. PMHx of ESRD on HD, gout, ETOH dependence, RA, HTN and CAD. Pt endorsing multiple falls at home and several recent hospitalizations/trips to ED. Pt with multiple LOB during ambulation upon this evaluation, requiring Min/ModA to regain balance. Details regarding mobilization listed below. Recommend placement as pt is at high risk for falls and re-admission. Would benefit from continued acute PT to address weakness, impaired balance, safety awareness, activity tolerance and endurance impacting pt's functional mobility.     Plan    Recommend Physical Therapy 3 times per week until therapy goals are met for the following treatments:  Bed Mobility, Equipment, Gait Training, Manual Therapy, Neuro Re-Education / Balance, Self Care/Home Evaluation, Stair Training, Therapeutic Activities, and Therapeutic Exercises    DC Equipment Recommendations: Unable to determine at this time  Discharge Recommendations: Recommend post-acute placement for additional physical therapy services prior to discharge home       Subjective    \"I really want to get my balance under control.\"      Objective     10/06/22 0844   Vitals   O2 Delivery Device None - Room Air   Pain 0 - 10 Group   Therapist Pain Assessment Post Activity Pain Same as Prior to Activity;Nurse Notified  (c/o knee and ankle pain with mobility 2/2 RA, not rated)   Prior Living Situation   Prior Services Home-Independent   Housing / Facility 1 Story Apartment / Condo   Steps Into Home 0   Steps In Home 0   Bathroom Set up Bathtub / Shower Combination   Equipment Owned None   Lives with - Patient's Self " Care Capacity Alone and Able to Care For Self   Prior Level of Functional Mobility   Bed Mobility Independent   Transfer Status Independent   Ambulation Independent   Distance Ambulation (Feet)   (community)   Assistive Devices Used None   History of Falls   History of Falls Yes   Date of Last Fall   (reports 2 weeks ago. Endorses multiple falls at home 2/2 impaired balance)   Cognition    Cognition / Consciousness X   Level of Consciousness Alert   Safety Awareness Impaired   Comments Pleasant and cooperative   Passive ROM Lower Body   Passive ROM Lower Body WDL   Active ROM Lower Body    Active ROM Lower Body  WDL   Strength Lower Body   Lower Body Strength  X   Gross Strength Generalized Weakness, Equal Bilaterally   Comments however, functional   Sensation Lower Body   Lower Extremity Sensation   WDL   Lower Body Muscle Tone   Lower Body Muscle Tone  WDL   Strength Upper Body   Upper Body Strength  WDL   Upper Body Muscle Tone   Upper Body Muscle Tone  WDL   Coordination Upper Body   Coordination WDL   Coordination Lower Body    Coordination Lower Body  WDL   Balance Assessment   Sitting Balance (Static) Good   Sitting Balance (Dynamic) Good   Standing Balance (Static) Fair -   Standing Balance (Dynamic) Poor +   Weight Shift Sitting Good   Weight Shift Standing Fair   Comments no AD, however, furniture walking with 4-5 LOB   Gait Analysis   Gait Level Of Assist Minimal Assist   Assistive Device None   Distance (Feet) 50   # of Times Distance was Traveled 1   Deviation Increased Base Of Support;Shuffled Gait;Bradykinetic;Decreased Heel Strike;Antalgic  (slow betzy, guarded posture, furniture walking, inconsistent foot placement)   Weight Bearing Status no restrictions   Comments Pt with 4-5 LOB, requiring ModA to regain. Pt endorsing SOB with minimal ambulation   Bed Mobility    Supine to Sit Modified Independent   Scooting Modified Independent   Rolling Modified Independent   Comments EOB post   Functional  Mobility   Sit to Stand Contact Guard Assist   Bed, Chair, Wheelchair Transfer Minimal Assist   Transfer Method Stand Step   Mobility no AD in hallway, multiple LOB   How much difficulty does the patient currently have...   Turning over in bed (including adjusting bedclothes, sheets and blankets)? 3   Sitting down on and standing up from a chair with arms (e.g., wheelchair, bedside commode, etc.) 3   Moving from lying on back to sitting on the side of the bed? 3   How much help from another person does the patient currently need...   Moving to and from a bed to a chair (including a wheelchair)? 3   Need to walk in a hospital room? 3   Climbing 3-5 steps with a railing? 2   6 clicks Mobility Score 17   Activity Tolerance   Sitting Edge of Bed up post   Standing 5 min   Comments limited 2/2 balance, fatigue   Edema / Skin Assessment   Edema / Skin  X   Comments multiple scrapes on B knees and elbows 2/2 recent fall   Short Term Goals    Short Term Goal # 1 Pt will perform STS transfers with FWW and SPV in 6 visits to improve functional mobility   Short Term Goal # 2 Pt will ambulate 150ft with FWW and SPV in 6 visits to access household distances   Short Term Goal # 3 Pt will negotiate 1 steps with FWW and SPV in 6 visits in order to perform curb step to access public transportation   Education Group   Education Provided Role of Physical Therapist;Gait Training   Role of Physical Therapist Patient Response Patient;Acceptance;Explanation;Verbal Demonstration   Gait Training Patient Response Patient;Acceptance;Explanation;Action Demonstration;Reinforcement Needed   Additional Comments Pt educated on self mangement and compensatory strategies with mobility, balance and safety   Problem List    Problems Pain;Impaired Bed Mobility;Impaired Transfers;Impaired Ambulation;Functional Strength Deficit;Impaired Balance;Decreased Activity Tolerance;Safety Awareness Deficits / Cognition   Anticipated Discharge Equipment and  Recommendations   DC Equipment Recommendations Unable to determine at this time   Discharge Recommendations Recommend post-acute placement for additional physical therapy services prior to discharge home   Interdisciplinary Plan of Care Collaboration   IDT Collaboration with  Nursing   Patient Position at End of Therapy Seated;Edge of Bed;Call Light within Reach;Tray Table within Reach;Phone within Reach   Collaboration Comments RN updated   Session Information   Date / Session Number  10/6- 1 (1/3, 10/12)

## 2022-10-06 NOTE — PROGRESS NOTES
[facility]     Dialysis treatment started at 0919 and completed at 1220. Nephrologist Dr. Tsang notified of treatment start.     NET UF: 1500 mL     Patient is A&Ox4, no pain and discomfort reported. VS within normal limits.Right chest tunneled CVC care done aseptically and no signs and symptoms of infection noted. CVC Lines are patent w/ good blood flow. Lab results and orders reviewed prior to treatment start.     Patient completed and tolerated dialysis treatment well w/o complications. VS stayed within normal limits. Right chest tunneled CVC care done aseptically, lines flushed, Heparin lock in each lines, then clamped and capped. Access site labelled and dated.    1235 Report given to RAZA Ramirez RN

## 2022-10-06 NOTE — PROGRESS NOTES
Received report from previous shift RN at 1900.  Assessment complete.  A&O x 4. Patient calls appropriately.  Patient ambulates with standby assist and FWW.   Patient has 10/10 pain. Pain managed with prescribed medications per MAR.  Patienbt complains of nausea, no vomiting. Tolerating regular, low fat diet.  RA fistula, sleeve in place. R chest double-lumen dialysis catheter, CDI.  Pt anuric, + flatus, + BM on 10/5.  Patient denies SOB on room air.  SCD's refused.    Patient pleasant and cooperative throughout assessment.  Reviewed plan  of care with patient, pt verbalizes understanding. Call light and personal belongings with in reach. Hourly rounding in place. All needs met at this time.

## 2022-10-07 PROCEDURE — 700102 HCHG RX REV CODE 250 W/ 637 OVERRIDE(OP): Performed by: INTERNAL MEDICINE

## 2022-10-07 PROCEDURE — 770001 HCHG ROOM/CARE - MED/SURG/GYN PRIV*

## 2022-10-07 PROCEDURE — 99232 SBSQ HOSP IP/OBS MODERATE 35: CPT | Performed by: INTERNAL MEDICINE

## 2022-10-07 PROCEDURE — 700102 HCHG RX REV CODE 250 W/ 637 OVERRIDE(OP): Performed by: NURSE PRACTITIONER

## 2022-10-07 PROCEDURE — A9270 NON-COVERED ITEM OR SERVICE: HCPCS | Performed by: INTERNAL MEDICINE

## 2022-10-07 PROCEDURE — 97535 SELF CARE MNGMENT TRAINING: CPT

## 2022-10-07 PROCEDURE — 700111 HCHG RX REV CODE 636 W/ 250 OVERRIDE (IP): Performed by: INTERNAL MEDICINE

## 2022-10-07 PROCEDURE — A9270 NON-COVERED ITEM OR SERVICE: HCPCS | Performed by: NURSE PRACTITIONER

## 2022-10-07 RX ORDER — OXYCODONE HYDROCHLORIDE 10 MG/1
10 TABLET ORAL
Status: DISCONTINUED | OUTPATIENT
Start: 2022-10-07 | End: 2022-10-12 | Stop reason: HOSPADM

## 2022-10-07 RX ORDER — GABAPENTIN 100 MG/1
100 CAPSULE ORAL 2 TIMES DAILY
Status: DISCONTINUED | OUTPATIENT
Start: 2022-10-07 | End: 2022-10-12 | Stop reason: HOSPADM

## 2022-10-07 RX ADMIN — OXYCODONE HYDROCHLORIDE 10 MG: 10 TABLET ORAL at 07:37

## 2022-10-07 RX ADMIN — CARVEDILOL 6.25 MG: 6.25 TABLET, FILM COATED ORAL at 08:41

## 2022-10-07 RX ADMIN — SEVELAMER CARBONATE 800 MG: 800 TABLET, FILM COATED ORAL at 17:45

## 2022-10-07 RX ADMIN — GABAPENTIN 100 MG: 100 CAPSULE ORAL at 17:45

## 2022-10-07 RX ADMIN — ACETAMINOPHEN 1000 MG: 500 TABLET ORAL at 11:14

## 2022-10-07 RX ADMIN — ACETAMINOPHEN 1000 MG: 500 TABLET ORAL at 05:01

## 2022-10-07 RX ADMIN — OXYCODONE HYDROCHLORIDE 10 MG: 10 TABLET ORAL at 14:19

## 2022-10-07 RX ADMIN — ATORVASTATIN CALCIUM 20 MG: 20 TABLET, FILM COATED ORAL at 20:36

## 2022-10-07 RX ADMIN — GABAPENTIN 300 MG: 300 CAPSULE ORAL at 05:01

## 2022-10-07 RX ADMIN — HYDROMORPHONE HYDROCHLORIDE 0.5 MG: 1 INJECTION, SOLUTION INTRAMUSCULAR; INTRAVENOUS; SUBCUTANEOUS at 08:41

## 2022-10-07 RX ADMIN — Medication 100 MG: at 17:45

## 2022-10-07 RX ADMIN — HYDROMORPHONE HYDROCHLORIDE 0.5 MG: 1 INJECTION, SOLUTION INTRAMUSCULAR; INTRAVENOUS; SUBCUTANEOUS at 01:39

## 2022-10-07 RX ADMIN — Medication 100 MG: at 05:01

## 2022-10-07 RX ADMIN — OXYCODONE HYDROCHLORIDE 15 MG: 10 TABLET ORAL at 17:35

## 2022-10-07 RX ADMIN — OMEPRAZOLE 20 MG: 20 CAPSULE, DELAYED RELEASE ORAL at 17:45

## 2022-10-07 RX ADMIN — ACETAMINOPHEN 1000 MG: 500 TABLET ORAL at 17:45

## 2022-10-07 RX ADMIN — ACETAMINOPHEN 1000 MG: 500 TABLET ORAL at 23:33

## 2022-10-07 RX ADMIN — OXYCODONE HYDROCHLORIDE 10 MG: 10 TABLET ORAL at 03:30

## 2022-10-07 RX ADMIN — SEVELAMER CARBONATE 800 MG: 800 TABLET, FILM COATED ORAL at 08:41

## 2022-10-07 RX ADMIN — CARVEDILOL 6.25 MG: 6.25 TABLET, FILM COATED ORAL at 17:45

## 2022-10-07 RX ADMIN — ACETAMINOPHEN 1000 MG: 500 TABLET ORAL at 00:22

## 2022-10-07 RX ADMIN — OXYCODONE HYDROCHLORIDE 15 MG: 10 TABLET ORAL at 23:34

## 2022-10-07 RX ADMIN — OXYCODONE HYDROCHLORIDE 15 MG: 10 TABLET ORAL at 20:35

## 2022-10-07 RX ADMIN — OMEPRAZOLE 20 MG: 20 CAPSULE, DELAYED RELEASE ORAL at 05:01

## 2022-10-07 RX ADMIN — OXYCODONE HYDROCHLORIDE 10 MG: 10 TABLET ORAL at 00:22

## 2022-10-07 RX ADMIN — OXYCODONE HYDROCHLORIDE 10 MG: 10 TABLET ORAL at 11:14

## 2022-10-07 RX ADMIN — SEVELAMER CARBONATE 800 MG: 800 TABLET, FILM COATED ORAL at 14:19

## 2022-10-07 ASSESSMENT — ENCOUNTER SYMPTOMS
FALLS: 1
SHORTNESS OF BREATH: 0
BACK PAIN: 0
NAUSEA: 0
ABDOMINAL PAIN: 1
VOMITING: 0
FEVER: 0
DIARRHEA: 0
HEADACHES: 0
BLURRED VISION: 0
DIZZINESS: 0

## 2022-10-07 ASSESSMENT — COGNITIVE AND FUNCTIONAL STATUS - GENERAL
SUGGESTED CMS G CODE MODIFIER DAILY ACTIVITY: CK
TOILETING: A LITTLE
HELP NEEDED FOR BATHING: A LITTLE
DRESSING REGULAR LOWER BODY CLOTHING: A LITTLE
DRESSING REGULAR UPPER BODY CLOTHING: A LITTLE
PERSONAL GROOMING: A LITTLE
DAILY ACTIVITIY SCORE: 19

## 2022-10-07 ASSESSMENT — PAIN DESCRIPTION - PAIN TYPE
TYPE: ACUTE PAIN

## 2022-10-07 NOTE — CARE PLAN
The patient is Stable - Low risk of patient condition declining or worsening    Shift Goals  Clinical Goals: pain control; rest; monitor labs  Patient Goals: pain control; nutrition; discharge planning  Family Goals: n/a    Progress made toward(s) clinical / shift goals:  Patient medicated per MAR. Skin integrity assessed. Education provided. Safety discussed. Non-pharmacologic comfort measures implemented.    Problem: Pain - Standard  Goal: Alleviation of pain or a reduction in pain to the patient’s comfort goal  Outcome: Progressing     Problem: Knowledge Deficit - Standard  Goal: Patient and family/care givers will demonstrate understanding of plan of care, disease process/condition, diagnostic tests and medications  Outcome: Progressing     Problem: Fall Risk  Goal: Patient will remain free from falls  Outcome: Progressing

## 2022-10-07 NOTE — DIETARY
Nutrition Services Brief Update:    Day 9 of admit.  Junior Proctor is a 72 y.o. male with admitting DX of Acute on chronic pancreatitis (HCC) [K85.90, K86.1]  Pancreatitis, gallstone [K85.10]    Current Diet: Regular, low fat    Problem: Nutritional:  Goal: Achieve adequate nutritional intake  Description: Patient will consume >50% of meals  Outcome: Progressing    PO intake per ADLs over past 3 days 25-50% up to % of meals. Last 4 of 6 meals documented %. Improvement shown w/ % of breakfast this a.m. + % a.m. snack.    RD continues to follow.

## 2022-10-07 NOTE — THERAPY
"Occupational Therapy  Daily Treatment     Patient Name: Junior Proctor  Age:  72 y.o., Sex:  male  Medical Record #: [id number]  Today's Date: 10/7/2022     Precautions  Precautions: Fall Risk, Toe Touch Weight Bearing Left Lower Extremity, Non Weight Bearing Left Upper Extremity    Assessment    Pt seen for OT tx. Pt participated in donning socks w/ supv. Pt reported donning pants prior to treatment. Pt requested to trial quad cane for functional mobility to look in the mirror. Pt had one LOB requiring min A for recovery and was noted to reach for furniture even w/ cane. Pt educated regarding energy conservation strategies due to pt reports of increased WOB w/ activity, and post acute placements (inpatient rehab vs skilled nursing facility). Pt demonstrated impaired balance, activity tolerance, and strength that are limiting functional performance. Pt recommended to go to post acute placement after d/c from hospital due to impaired activity tolerance and high fall risk. Pt will continue to benefit from skilled OT while admitted to acute care.     Plan    Continue current treatment plan.    DC Equipment Recommendations: Unable to determine at this time  Discharge Recommendations: Recommend post-acute placement for additional occupational therapy services prior to discharge home    Subjective    \"Can you tell me what rehab means?\"     Objective     10/07/22 1115   Non Verbal Descriptors   Non Verbal Scale  Calm   Cognition    Cognition / Consciousness X   Level of Consciousness Alert   Safety Awareness Impaired   Comments Pleasant and cooperative   Other Treatments   Other Treatments Provided Educated regarding energy conservation and post acute placements (SNF vs inpatient rehab).   Balance   Sitting Balance (Static) Fair +   Sitting Balance (Dynamic) Fair +   Standing Balance (Static) Fair -   Standing Balance (Dynamic) Poor +   Weight Shift Sitting Good   Weight Shift Standing Fair   Skilled Intervention Verbal " Cuing;Tactile Cuing;Facilitation;Compensatory Strategies   Comments w/ quad cane per pt request, one LOB requiring min A to recover, still noted to furniture walk   Bed Mobility    Supine to Sit Modified Independent   Sit to Supine   (EOB post)   Scooting Supervised   Rolling Modified Independent   Skilled Intervention Verbal Cuing;Facilitation   Comments HOB slightly raised, no use of rails   Activities of Daily Living   Grooming Modified Independent;Seated  (combing hair at beginning of session)   Lower Body Dressing Standby Assist  (don socks and slippers)   Toileting   (declined the need)   Skilled Intervention Verbal Cuing;Facilitation   Functional Mobility   Sit to Stand Contact Guard Assist   Bed, Chair, Wheelchair Transfer Minimal Assist   Transfer Method Stand Step   Mobility EOB>around room>bed   Skilled Intervention Verbal Cuing;Facilitation;Compensatory Strategies;Tactile Cuing   Comments w/ quad cane   Visual Perception   Visual Perception  Not Tested   Activity Tolerance   Sitting in Chair NT   Sitting Edge of Bed EOB pre   Standing <5 min   Comments Pt reported having increased WOB   Patient / Family Goals   Patient / Family Goal #1 to improve his balance   Goal #1 Outcome Progressing as expected   Short Term Goals   Short Term Goal # 1 Pt will don pants with supervision   Goal Outcome # 1 Progressing as expected  (Pt reported donning pants prior to session, demonstrated ability to don socks w/ SBA)   Short Term Goal # 2 Pt will stand at the sink to groom with supervision   Goal Outcome # 2 Goal not met   Short Term Goal # 3 Pt will complete toilet transfers with supervision   Goal Outcome # 3 Goal not met   Education Group   Education Provided Energy Conservation;Role of Occupational Therapist;Activities of Daily Living  (Discussed post acute placements)   Role of Occupational Therapist Patient Response Patient;Acceptance;Explanation;Verbal Demonstration   Energy Conservation Patient Response  Patient;Acceptance;Explanation;Verbal Demonstration;Handout   ADL Patient Response Patient;Acceptance;Explanation;Verbal Demonstration;Action Demonstration;Demonstration

## 2022-10-07 NOTE — PROGRESS NOTES
Patient requesting IV dilaudid for continued pain. Patient educated that IV dilaudid had been discontinued. Patient complaining about increased pain. Dr. Ignacio notified. Patient refusing use of non-pharmacologic interventions such as heat and ice at this time.

## 2022-10-07 NOTE — THERAPY
Missed Therapy     Patient Name: Junior Proctor  Age:  72 y.o., Sex:  male  Medical Record #: [id number]  Today's Date: 10/6/2022    Discussed missed therapy with RN       10/06/22 2042   Interdisciplinary Plan of Care Collaboration   Collaboration Comments OT tx attempted. Pt refusing due to fatigue and 8/10 pain. Pt reported willingness to participate in OT tomorrow. Will follow up tomorrow as appropriate and able.

## 2022-10-07 NOTE — PROGRESS NOTES
Received report from previous shift RN at 1900.  Assessment complete.  A&O x 4. Patient calls appropriately.  Patient ambulates with standby assist and FWW.   Patient has 8/10 pain. Pain managed with prescribed medications per MAR.  Denies N&V. Tolerating regular - low fat diet.  RA fistula present with bruit and thrill. R chest double-lumen dialysis catheter, CDI.  Pt anuric, + flatus, + BM on 10/6.  Patient denies SOB.  SCD's refused.    Patient pleasant and cooperative throughout assessment.  Reviewed plan of care with patient, pt verbalizes understanding. Call light and personal belongings with in reach. Hourly rounding in place. All needs met at this time.

## 2022-10-07 NOTE — PROGRESS NOTES
[facility] -  PROGRESS NOTE               Author: DARIN Stephenson Date & Time: 10/7/2022  1:02 PM     HPI:  Patient is a 72 year old male with a PMHx of ESRD on HD qTTS, HTN, ETOH, CAD, presented with abdominal pain.  There is concern for pancreatitis and plan for possible intervention.  He states pain started about 3 days ago and worsening.  Hasn't been eating or drinking well. Elevated lipase and CT wit acute on chronic pancreatitis.  Hasn't had dialysis today but refusing dialysis today.    DAILY NEPHROLOGY SUMMARY:  See note from 9/30 for prior summaries  10/01: NAEO, +abd pain stabbing and cramping, no interest in eating   10/02: NAEO, feels better, wants to try to advance diet  10/03: Pt sitting up EOB, feels OK, reports some loose stools today, diet advanced and he was able to eat some, but still with abd discomfort, Hgb yesterday 6.4, no new labs today, pt unable to receive pRBC transfusion yesterday d/t lack of successful PIV access, VSS on RA, due for HD tomorrow  10/04: No events, pt seen on hemodialysis, VSS--see dialysis treatment sheet for full details, denies any CP/SOB, has LE edema, BP stable  10/05: sitting up in bed, continues with some abdominal pain but improving, no NV, tolerated iHD yesterday UF: 2L  10/06: No events, seen on dialysis this am, VSS--see dialysis treatment sheet for full details, denies any CP/SOB, still reports some mild abd pain, no n/v, tolerating PO but reports poor appetite  10/07: sitting up on side of bed eating, getting a bit of an appetite and trying to eat, continues with sharp pains in abdomen but getting better, tolerated iHD yesterday with UF: 1.5L    REVIEW OF SYSTEMS:    10 point ROS reviewed and is as per HPI/daily summary or otherwise negative    PMH/PSH/SH/FH:   Reviewed and unchanged since admission note    CURRENT MEDICATIONS:   Reviewed from admission to present day    VS:  /72   Pulse 85   Temp 36.6 °C (97.9 °F)  "(Temporal)   Resp 18   Ht 1.727 m (5' 8\")   Wt 77 kg (169 lb 12.1 oz)   SpO2 94%   BMI 25.81 kg/m²     Physical Exam  Vitals and nursing note reviewed.   Constitutional:       General: He is not in acute distress.  HENT:      Head: Normocephalic and atraumatic.   Eyes:      General: No scleral icterus.  Cardiovascular:      Rate and Rhythm: Normal rate and regular rhythm.      Comments: [facility] TD    Pulmonary:      Effort: Pulmonary effort is normal. No respiratory distress.      Breath sounds: Normal breath sounds.   Abdominal:      General: Bowel sounds are normal. There is no distension.      Palpations: Abdomen is soft.      Tenderness: There is abdominal tenderness (LUQ).   Musculoskeletal:         General: No deformity.      Right lower leg: Edema present.      Left lower leg: Edema present.   Skin:     General: Skin is warm and dry.      Findings: No rash.      Comments: B/l knee abrasions/scabbing   Neurological:      General: No focal deficit present.      Mental Status: He is alert and oriented to person, place, and time.   Psychiatric:         Mood and Affect: Mood normal.         Behavior: Behavior normal.     Fluids:  In: 560 [P.O.:60; Dialysis:500]  Out: 2000     LABS:  Recent Labs     10/05/22  0305 10/06/22  0623   SODIUM 135 134*   POTASSIUM 4.4 4.7   CHLORIDE 99 98   CO2 26 27   GLUCOSE 96 90   BUN 5* 6*   CREATININE 5.18* 7.20*   CALCIUM 8.1* 8.3*       IMAGING:   All imaging reviewed from admission to present day    IMPRESSION:  # ESRD, dependent on HD  - [facility] TDC  - qTTS HD  # Abdominal pain/pancreatitis, slow improvement  - Management per GI and primary team  - Caution with IVF in ESRD with pancreatitis, bolus PRN  - Diet advanced   # HTN, labile control, some soft BPs  - Goal BP < 140/90  - On carvedilol  # Anemia of CKD, below target, unstable  - Goal Hgb 10-11  - pRBCs unable to be transfused 10/2, plan to give with HD 10/4  - Continue MADAY  - % sat 13, ferritin 954  # CKD-MBD/Renal " Osteodystrophy  - On sevelamer WM  - On Ergo weekly  - Phos 2.5     PLAN:  - HD tomorrow (Sat) and continue qTTS schedule  - UF with HD as tolerated  - MADAY with HD  - Transfuse PRN Hgb <7   - Ferritin precludes IV Fe, can consider PO Fe if pt can tolerate  - No dietary protein restrictions  - Diet advancement per GI/primary team  - Dose all meds per ESRD/iHD  - Holding parameters for carvedilol   - No Phos binders for now  - Adjusted gabapentin to 100mg BID TID, max dose for ESRD   - Okay to transition to outpt hemodialysis when medically cleared

## 2022-10-07 NOTE — DISCHARGE PLANNING
Case Management Discharge Planning    Admission Date: 9/27/2022  GMLOS: 4.6  ALOS: 9    6-Clicks ADL Score: 19  6-Clicks Mobility Score: 17  PT and/or OT Eval ordered: Yes  Post-acute Referrals Ordered: Yes  Post-acute Choice Obtained: Yes  Has referral(s) been sent to post-acute provider:  Yes      Anticipated Discharge Dispo: Discharge Disposition: D/T to SNF with Medicare cert in anticipation of skilled care (03)    DME Needed: No    Action(s) Taken: Choice obtained    Escalations Completed: None    Medically Clear: No    Next Steps: CM met with patient to discuss discharge planning, CM informed patient of recommendations for discharge to a SNF, provided patient with choice list for [facility], pt reports he has not preference, would like CM to send referrals, see who accepts him and then he will pick a facility from those choices     Barriers to Discharge: Pending Placement    Is the patient up for discharge tomorrow: No    Angelika VARGAS, RN CCM   Care Coordinator

## 2022-10-07 NOTE — PROGRESS NOTES
Patient has been educated extensively that his PRN Oxycodne is available every three hours as needed. Patient educated on when his next dose will be available if needed. Patient continues to call multiples times an hour, asking for pain medication, even after frequent re-education.

## 2022-10-07 NOTE — PROGRESS NOTES
Hospital Medicine Daily Progress Note    Date of Service  10/7/2022    Chief Complaint  Junior Proctor is a 72 y.o. male admitted 9/27/2022 with abd pain    Hospital Course  71yo PMHx ESRD on HD, gout, ETOH, RA on Enbril, HTN, CAD presenting with Abd pain.  Had recently been DC'd home after admission for acute pancreatitis, bacteremia/sepsis on 9/22.  On that admission had a biliary stent placed and later exchanged after it was blocked.  On re-presentation found to have acute on chronic pancreatitis.  Seen by Nicho who recommended non operative management    Interval Problem Update  Acute on chronic pancreatitis-pain is nearly constant 8//10 but eating food without issue. Has a band like pressure with sharp 1-2 seconds stabs of pain. Cannot determine the trigger.     Chronic pain-no clear interval changes noted today.      I have discussed this patient's plan of care and discharge plan at IDT rounds today with Case Management, Nursing, Nursing leadership, and other members of the IDT team.    Consultants/Specialty  general surgery  Renal  GI    Code Status  Full Code    Disposition  Patient is not medically cleared for discharge.   Anticipate discharge to SNF  I have placed the appropriate orders for post-discharge needs.    Review of Systems  Review of Systems   Constitutional:  Negative for fever.   Eyes:  Negative for blurred vision.   Respiratory:  Negative for shortness of breath.    Cardiovascular:  Negative for chest pain and leg swelling.   Gastrointestinal:  Positive for abdominal pain (mostly the same). Negative for diarrhea, nausea and vomiting.        Eating without overt issues   Genitourinary:  Negative for dysuria and urgency.   Musculoskeletal:  Positive for falls (at home, apparently) and joint pain (at his baseline 2/2 RA, unchanged today). Negative for back pain.        Unchanged again   Neurological:  Negative for dizziness and headaches.   All other systems reviewed and are negative.     Physical  Exam  Temp:  [36.1 °C (97 °F)-37.3 °C (99.1 °F)] 36.6 °C (97.9 °F)  Pulse:  [85-92] 85  Resp:  [16-18] 18  BP: (124-152)/(71-79) 124/72  SpO2:  [94 %-98 %] 94 %    Physical Exam  Constitutional:       General: He is not in acute distress.     Appearance: He is well-developed. He is obese. He is not diaphoretic.      Comments: Sitting upright at the edge of his bed, comfortable appearing   HENT:      Head: Normocephalic and atraumatic.   Eyes:      General: No scleral icterus.     Conjunctiva/sclera: Conjunctivae normal.   Neck:      Vascular: No JVD.   Cardiovascular:      Rate and Rhythm: Normal rate.      Heart sounds: No murmur heard.    No gallop.      Comments: Dialysis catheter in [facility], insertion site is C/D/I  Pulmonary:      Effort: Pulmonary effort is normal. No respiratory distress.      Breath sounds: No wheezing.   Abdominal:      General: There is no distension.      Palpations: Abdomen is soft.      Tenderness: There is abdominal tenderness (mild, band like across the upper part). There is no guarding or rebound.   Musculoskeletal:         General: Tenderness (diffusely) present.      Right lower leg: Edema present.      Left lower leg: Edema present.      Comments: Unchanged   Skin:     General: Skin is warm and dry.      Capillary Refill: Capillary refill takes less than 2 seconds.      Coloration: Skin is not jaundiced.      Findings: No rash.   Neurological:      General: No focal deficit present.      Mental Status: He is oriented to person, place, and time. Mental status is at baseline.   Psychiatric:         Mood and Affect: Mood normal.         Behavior: Behavior normal.         Thought Content: Thought content normal.       Fluids    Intake/Output Summary (Last 24 hours) at 10/7/2022 1534  Last data filed at 10/7/2022 0751  Gross per 24 hour   Intake 120 ml   Output --   Net 120 ml       Laboratory  Recent Labs     10/04/22  1540 10/05/22  0305   WBC  --  4.9   RBC  --  2.84*   HEMOGLOBIN 9.1*  8.3*   HEMATOCRIT 28.7* 25.9*   MCV  --  91.2   MCH  --  29.2   MCHC  --  32.0*   RDW  --  62.8*   PLATELETCT  --  167   MPV  --  10.4     Recent Labs     10/05/22  0305 10/06/22  0623   SODIUM 135 134*   POTASSIUM 4.4 4.7   CHLORIDE 99 98   CO2 26 27   GLUCOSE 96 90   BUN 5* 6*   CREATININE 5.18* 7.20*   CALCIUM 8.1* 8.3*                   Imaging  IR-US GUIDED PIV   Final Result    Ultrasound-guided PERIPHERAL IV INSERTION performed by    qualified nursing staff as above.      US-RUQ   Final Result   Addendum (preliminary) 1 of 1   No pancreatic duct stent is seen.      Dr. Gallardo discussed these findings with Wilmar Gallardo.      Final      1.  Cholelithiasis without discrete sonographic evidence of acute cholecystitis. Patient did demonstrate a Evans's sign throughout the course of the exam. There is strong clinical suspicion for acute cholecystitis, a HIDA scan should be obtained for    further evaluation.   2.  Hepatic steatosis.   3.  Hepatomegaly.   4.  The pancreas is obscured by overlying bowel gas.      CT-ABDOMEN-PELVIS W/O   Final Result   Addendum (preliminary) 1 of 1   Subacute right L2 and L3 transverse process fractures.      Final      1.  Acute on chronic pancreatitis. Inflammatory changes track along the left paracolic gutter.   2.  Likely reactive inflammation of the colon at the splenic flexure.   3.  Mildly dilated gallbladder with likely cholelithiasis. If there is concern for acute cholecystitis, ultrasound can be performed.   4.  Features of medical renal disease.   5.  Sigmoid diverticulosis.   6.  Atherosclerotic changes.   7.  Right hepatic lobe lesion, previously characterized as hemangioma on MRI             Assessment/Plan  * Acute on chronic pancreatitis (HCC)- (present on admission)  Assessment & Plan  Suspect related to ETOH induced, though has had Gallstone-related pancreatitis earlier this year  Also, appears to have a chronic component as well, which will provide difficulties for  pain control  Per gastroenterology - conservative management of pancreatitis with IV hydration, careful advancement of diet, daily labs, agree with surgical consultation and following patient for possible cholecystectomy, Alcohol cessation, No plan for ERCP at this time given that current CT does not demonstrate stent is in place at this time and appears to have self ejected which was expected.  Clinically doing well with normal vital signs, normal lipase, normal LFT's, normal WBC, continues to eat well  SNF referral placed, awaiting decision  Not a good candidate for [place] opiates, needs outpatient chronic pain management referral on discharge  Continue oxy-IR PRN and scheduled APAP, stop IV dilaudid  Continue gabapentin to 300 mg BID (likely no higher given ESRD)        Cholelithiasis- (present on admission)  Assessment & Plan  Possible cholecystitis?  IV Rocephin and Flagyl x 5 days. Already finished  Per Surgery - Symptoms and clinical findings more consistent with acute on chronic alcoholic pancreatitis. No compelling evidence for active biliary colic or gallstone pancreatitis.  9/29/2022  No change to management at this time  LFT's and TBILI normal  Defer HIDA scan at this time    HTN (hypertension)- (present on admission)  Assessment & Plan  Carvedilol 6.25  IV labetalol and hydralazine as needed with parameters  Remains persistently high, consider adjusting above medication    Rheumatoid arthritis (HCC)- (present on admission)  Assessment & Plan  Maintained on Etanercept weekly  Progressive symptoms contributing to his chronic pain syndrome  Needs outpatient chronic pain management, will place outpatient referral  Has a high narcotic score on  review, limit opiates as much as possible    Alcohol dependence (HCC)- (present on admission)  Assessment & Plan  Outside window of withdrawal   Long discussion about importance of complete ETOH abstinence     ESRD on dialysis (HCC)- (present on admission)  Assessment  & Plan  HD per Nephrology    Gout- (present on admission)  Assessment & Plan  Allopurinol    Anemia- (present on admission)  Assessment & Plan  Anemia of chronic disease (Hx RA) + CKD  Give one unit PRBCs today with HD 10/4, appropriate response noted  On EPO per Neph  No e/o overt bleeding       VTE prophylaxis: heparin ppx    I have performed a physical exam and reviewed and updated ROS and Plan today (10/7/2022). In review of yesterday's note (10/6/2022), there are no changes except as documented above.

## 2022-10-07 NOTE — PROGRESS NOTES
Bedside report received.  Assessment complete.  A&O x 4. Patient calls appropriately.  Patient ambulates with SB assist with FWW.    Patient has 8/10 pain. Pain managed with prescribed medications.  Denies N&V. Tolerating regular/low fat diet.  + void, + flatus, - BM.  Patient denies SOB.  SCD's off.    Review plan with of care with patient. Call light and personal belongings within reach. Hourly rounding in place. All needs met at this time.

## 2022-10-08 LAB
ALBUMIN SERPL BCP-MCNC: 2.8 G/DL (ref 3.2–4.9)
ALBUMIN/GLOB SERPL: 0.9 G/DL
ALP SERPL-CCNC: 117 U/L (ref 30–99)
ALT SERPL-CCNC: <5 U/L (ref 2–50)
ANION GAP SERPL CALC-SCNC: 10 MMOL/L (ref 7–16)
AST SERPL-CCNC: 12 U/L (ref 12–45)
BILIRUB SERPL-MCNC: 0.2 MG/DL (ref 0.1–1.5)
BUN SERPL-MCNC: 11 MG/DL (ref 8–22)
CALCIUM SERPL-MCNC: 8.1 MG/DL (ref 8.5–10.5)
CHLORIDE SERPL-SCNC: 99 MMOL/L (ref 96–112)
CO2 SERPL-SCNC: 27 MMOL/L (ref 20–33)
CREAT SERPL-MCNC: 5.75 MG/DL (ref 0.5–1.4)
GFR SERPLBLD CREATININE-BSD FMLA CKD-EPI: 10 ML/MIN/1.73 M 2
GLOBULIN SER CALC-MCNC: 3.1 G/DL (ref 1.9–3.5)
GLUCOSE SERPL-MCNC: 110 MG/DL (ref 65–99)
POTASSIUM SERPL-SCNC: 4 MMOL/L (ref 3.6–5.5)
PROT SERPL-MCNC: 5.9 G/DL (ref 6–8.2)
SODIUM SERPL-SCNC: 136 MMOL/L (ref 135–145)

## 2022-10-08 PROCEDURE — 99232 SBSQ HOSP IP/OBS MODERATE 35: CPT | Performed by: INTERNAL MEDICINE

## 2022-10-08 PROCEDURE — 700102 HCHG RX REV CODE 250 W/ 637 OVERRIDE(OP): Performed by: INTERNAL MEDICINE

## 2022-10-08 PROCEDURE — 700111 HCHG RX REV CODE 636 W/ 250 OVERRIDE (IP)

## 2022-10-08 PROCEDURE — 90935 HEMODIALYSIS ONE EVALUATION: CPT

## 2022-10-08 PROCEDURE — 700102 HCHG RX REV CODE 250 W/ 637 OVERRIDE(OP): Performed by: NURSE PRACTITIONER

## 2022-10-08 PROCEDURE — 770001 HCHG ROOM/CARE - MED/SURG/GYN PRIV*

## 2022-10-08 PROCEDURE — A9270 NON-COVERED ITEM OR SERVICE: HCPCS | Performed by: NURSE PRACTITIONER

## 2022-10-08 PROCEDURE — A9270 NON-COVERED ITEM OR SERVICE: HCPCS | Performed by: INTERNAL MEDICINE

## 2022-10-08 PROCEDURE — 80053 COMPREHEN METABOLIC PANEL: CPT

## 2022-10-08 RX ORDER — HEPARIN SODIUM 1000 [USP'U]/ML
INJECTION, SOLUTION INTRAVENOUS; SUBCUTANEOUS
Status: COMPLETED
Start: 2022-10-08 | End: 2022-10-08

## 2022-10-08 RX ADMIN — SEVELAMER CARBONATE 800 MG: 800 TABLET, FILM COATED ORAL at 08:49

## 2022-10-08 RX ADMIN — CARVEDILOL 6.25 MG: 6.25 TABLET, FILM COATED ORAL at 08:49

## 2022-10-08 RX ADMIN — Medication 100 MG: at 18:37

## 2022-10-08 RX ADMIN — HEPARIN SODIUM 3700 UNITS: 1000 INJECTION, SOLUTION INTRAVENOUS; SUBCUTANEOUS at 13:46

## 2022-10-08 RX ADMIN — EPOETIN ALFA-EPBX 4000 UNITS: 4000 INJECTION, SOLUTION INTRAVENOUS; SUBCUTANEOUS at 13:01

## 2022-10-08 RX ADMIN — OXYCODONE HYDROCHLORIDE 15 MG: 10 TABLET ORAL at 21:32

## 2022-10-08 RX ADMIN — ALLOPURINOL 100 MG: 100 TABLET ORAL at 18:37

## 2022-10-08 RX ADMIN — SEVELAMER CARBONATE 800 MG: 800 TABLET, FILM COATED ORAL at 18:37

## 2022-10-08 RX ADMIN — Medication 100 MG: at 05:30

## 2022-10-08 RX ADMIN — OXYCODONE HYDROCHLORIDE 15 MG: 10 TABLET ORAL at 08:49

## 2022-10-08 RX ADMIN — OXYCODONE HYDROCHLORIDE 15 MG: 10 TABLET ORAL at 02:36

## 2022-10-08 RX ADMIN — OXYCODONE HYDROCHLORIDE 15 MG: 10 TABLET ORAL at 05:30

## 2022-10-08 RX ADMIN — ACETAMINOPHEN 1000 MG: 500 TABLET ORAL at 18:37

## 2022-10-08 RX ADMIN — CARVEDILOL 6.25 MG: 6.25 TABLET, FILM COATED ORAL at 18:40

## 2022-10-08 RX ADMIN — ATORVASTATIN CALCIUM 20 MG: 20 TABLET, FILM COATED ORAL at 21:32

## 2022-10-08 RX ADMIN — OXYCODONE HYDROCHLORIDE 15 MG: 10 TABLET ORAL at 18:37

## 2022-10-08 RX ADMIN — OXYCODONE HYDROCHLORIDE 15 MG: 10 TABLET ORAL at 14:40

## 2022-10-08 RX ADMIN — ACETAMINOPHEN 1000 MG: 500 TABLET ORAL at 05:30

## 2022-10-08 RX ADMIN — OMEPRAZOLE 20 MG: 20 CAPSULE, DELAYED RELEASE ORAL at 05:31

## 2022-10-08 RX ADMIN — OMEPRAZOLE 20 MG: 20 CAPSULE, DELAYED RELEASE ORAL at 18:37

## 2022-10-08 RX ADMIN — GABAPENTIN 100 MG: 100 CAPSULE ORAL at 18:37

## 2022-10-08 RX ADMIN — GABAPENTIN 100 MG: 100 CAPSULE ORAL at 05:31

## 2022-10-08 ASSESSMENT — ENCOUNTER SYMPTOMS
ABDOMINAL PAIN: 1
DIARRHEA: 0
VOMITING: 0
BACK PAIN: 0
FALLS: 1
NAUSEA: 0
BLURRED VISION: 0
HEADACHES: 0
SHORTNESS OF BREATH: 0
DIZZINESS: 0
FEVER: 0

## 2022-10-08 ASSESSMENT — PAIN DESCRIPTION - PAIN TYPE
TYPE: ACUTE PAIN

## 2022-10-08 NOTE — CARE PLAN
The patient is Stable - Low risk of patient condition declining or worsening    Shift Goals  Clinical Goals: pain control, establish discharge plan   Patient Goals:    Progress made toward(s) clinical / shift goals:     Patient is not progressing towards the following goals: Patient reports uncontrolled pain, Dr. Ignacio notified. Patient unwilling to try heat or ice for non-pharmacologic pain relief; unable to determine discharge plan at this time       Problem: Pain - Standard  Goal: Alleviation of pain or a reduction in pain to the patient’s comfort goal  Outcome: Not Progressing

## 2022-10-08 NOTE — PROGRESS NOTES
Hospital Medicine Daily Progress Note    Date of Service  10/8/2022    Chief Complaint  Junior Proctor is a 72 y.o. male admitted 9/27/2022 with abd pain    Hospital Course  73yo PMHx ESRD on HD, gout, ETOH, RA on Enbril, HTN, CAD presenting with Abd pain.  Had recently been DC'd home after admission for acute pancreatitis, bacteremia/sepsis on 9/22.  On that admission had a biliary stent placed and later exchanged after it was blocked.  On re-presentation found to have acute on chronic pancreatitis.  Seen by Nicho who recommended non operative management    Interval Problem Update  Acute on chronic pancreatitis-pain is a bit better. Eating well. Feels much better overall today.     Chronic pain-no clear interval changes noted today.      I have discussed this patient's plan of care and discharge plan at IDT rounds today with Case Management, Nursing, Nursing leadership, and other members of the IDT team.    Consultants/Specialty  general surgery  Renal  GI    Code Status  Full Code    Disposition  Patient is medically cleared for discharge.   Anticipate discharge to SNF  I have placed the appropriate orders for post-discharge needs.    Review of Systems  Review of Systems   Constitutional:  Negative for fever.   Eyes:  Negative for blurred vision.   Respiratory:  Negative for shortness of breath.    Cardiovascular:  Negative for chest pain and leg swelling.   Gastrointestinal:  Positive for abdominal pain (improved). Negative for diarrhea, nausea and vomiting.        Eating without overt issues   Genitourinary:  Negative for dysuria and urgency.   Musculoskeletal:  Positive for falls (at home, apparently) and joint pain (at his baseline 2/2 RA, unchanged today). Negative for back pain.        Mostly the same   Neurological:  Negative for dizziness and headaches.   All other systems reviewed and are negative.     Physical Exam  Temp:  [36.3 °C (97.4 °F)-37.2 °C (98.9 °F)] 36.4 °C (97.5 °F)  Pulse:  [82-97] 97  Resp:   [16-18] 18  BP: (115-159)/(72-87) 115/72  SpO2:  [93 %-98 %] 97 %    Physical Exam  Constitutional:       General: He is not in acute distress.     Appearance: He is well-developed. He is obese. He is not diaphoretic.      Comments: Walking in his room, appears quite comfortable   HENT:      Head: Normocephalic and atraumatic.   Eyes:      General: No scleral icterus.     Conjunctiva/sclera: Conjunctivae normal.   Neck:      Vascular: No JVD.   Cardiovascular:      Rate and Rhythm: Normal rate.      Heart sounds: No murmur heard.    No gallop.      Comments: Dialysis catheter in [facility], insertion site is C/D/I  Pulmonary:      Effort: Pulmonary effort is normal. No respiratory distress.      Breath sounds: No wheezing.   Abdominal:      General: There is no distension.      Palpations: Abdomen is soft.      Tenderness: There is abdominal tenderness (mild, epigastrium). There is no guarding or rebound.   Musculoskeletal:         General: Tenderness (diffusely) present.      Right lower leg: Edema present.      Left lower leg: Edema present.      Comments: Unchanged   Skin:     General: Skin is warm and dry.      Capillary Refill: Capillary refill takes less than 2 seconds.      Coloration: Skin is not jaundiced.      Findings: No rash.   Neurological:      General: No focal deficit present.      Mental Status: He is oriented to person, place, and time. Mental status is at baseline.   Psychiatric:         Mood and Affect: Mood normal.         Behavior: Behavior normal.         Thought Content: Thought content normal.       Fluids    Intake/Output Summary (Last 24 hours) at 10/8/2022 1625  Last data filed at 10/8/2022 1440  Gross per 24 hour   Intake 1302 ml   Output 500 ml   Net 802 ml       Laboratory        Recent Labs     10/06/22  0623 10/08/22  1030   SODIUM 134* 136   POTASSIUM 4.7 4.0   CHLORIDE 98 99   CO2 27 27   GLUCOSE 90 110*   BUN 6* 11   CREATININE 7.20* 5.75*   CALCIUM 8.3* 8.1*                    Imaging  IR-US GUIDED PIV   Final Result    Ultrasound-guided PERIPHERAL IV INSERTION performed by    qualified nursing staff as above.      US-RUQ   Final Result   Addendum (preliminary) 1 of 1   No pancreatic duct stent is seen.      Dr. Gallardo discussed these findings with Wilmar Gallardo.      Final      1.  Cholelithiasis without discrete sonographic evidence of acute cholecystitis. Patient did demonstrate a Evans's sign throughout the course of the exam. There is strong clinical suspicion for acute cholecystitis, a HIDA scan should be obtained for    further evaluation.   2.  Hepatic steatosis.   3.  Hepatomegaly.   4.  The pancreas is obscured by overlying bowel gas.      CT-ABDOMEN-PELVIS W/O   Final Result   Addendum (preliminary) 1 of 1   Subacute right L2 and L3 transverse process fractures.      Final      1.  Acute on chronic pancreatitis. Inflammatory changes track along the left paracolic gutter.   2.  Likely reactive inflammation of the colon at the splenic flexure.   3.  Mildly dilated gallbladder with likely cholelithiasis. If there is concern for acute cholecystitis, ultrasound can be performed.   4.  Features of medical renal disease.   5.  Sigmoid diverticulosis.   6.  Atherosclerotic changes.   7.  Right hepatic lobe lesion, previously characterized as hemangioma on MRI             Assessment/Plan  * Acute on chronic pancreatitis (HCC)- (present on admission)  Assessment & Plan  Suspect related to ETOH induced, though has had Gallstone-related pancreatitis earlier this year  Also, appears to have a chronic component as well, which will provide difficulties for pain control  Per gastroenterology - conservative management of pancreatitis with IV hydration, careful advancement of diet, daily labs, agree with surgical consultation and following patient for possible cholecystectomy, Alcohol cessation, No plan for ERCP at this time given that current CT does not demonstrate stent is in place at this  time and appears to have self ejected which was expected.  Clinically doing well with normal vital signs, normal lipase, normal LFT's, normal WBC, continues to eat well with no interval changes noted  SNF referral placed, awaiting decision  Not a good candidate for LA opiates, needs outpatient chronic pain management referral on discharge  Continue oxy-IR PRN and scheduled APAP, stop IV dilaudid  Continue low dose gabapentin given ESRD      Cholelithiasis- (present on admission)  Assessment & Plan  Possible cholecystitis?  IV Rocephin and Flagyl x 5 days. Already finished  Per Surgery - Symptoms and clinical findings more consistent with acute on chronic alcoholic pancreatitis. No compelling evidence for active biliary colic or gallstone pancreatitis.  9/29/2022  No change to management at this time  LFT's and TBILI normal  Defer HIDA scan at this time    HTN (hypertension)- (present on admission)  Assessment & Plan  Carvedilol 6.25  IV labetalol and hydralazine as needed with parameters  Remains persistently high, consider adjusting above medication    Rheumatoid arthritis (HCC)- (present on admission)  Assessment & Plan  Maintained on Etanercept weekly  Progressive symptoms contributing to his chronic pain syndrome  Needs outpatient chronic pain management, will place outpatient referral  Has a high narcotic score on  review, limit opiates as much as possible    Alcohol dependence (HCC)- (present on admission)  Assessment & Plan  Outside window of withdrawal   Long discussion about importance of complete ETOH abstinence     ESRD on dialysis (HCC)- (present on admission)  Assessment & Plan  HD per Nephrology    Gout- (present on admission)  Assessment & Plan  Allopurinol    Anemia- (present on admission)  Assessment & Plan  Anemia of chronic disease (Hx RA) + CKD  Give one unit PRBCs today with HD 10/4, appropriate response noted  On EPO per Neph  No e/o overt bleeding       VTE prophylaxis: heparin ppx    I  have performed a physical exam and reviewed and updated ROS and Plan today (10/8/2022). In review of yesterday's note (10/7/2022), there are no changes except as documented above.

## 2022-10-08 NOTE — PROGRESS NOTES
Report received from RN, assumed care at 0700  Pt is A0X4, and responds appropriately   Pt declines any SOB, chest pain, new onset of numbness/ tingiling  Pt rates pain at 7/10, on a scale of 1-10, pt medicated per MAR  Pt is anuric  Pt has + flatus, + bowel sounds, + BM on 10/7/2022  Pt ambulates with a SBA and a FWW  Pt is tolerating a regular low fat diet, pt denies any nausea/vomiting  Pt has right chest, hemodialysis cath, dressing is clean, dry and intact  Pt has a RFA AV fistula         Plan of care discussed, all questions answered. Explained importance of calling before getting OOB and pt verbalizes understanding. Explained importance of oral care. Call light is within reach, treaded slipper socks on, bed in lowest/ locked position, hourly rounding in place, all needs met at this time

## 2022-10-08 NOTE — PROGRESS NOTES
[facility] -  PROGRESS NOTE               Author: Susan Tsang M.D. Date & Time: 10/8/2022  11:37 AM     HPI:  Patient is a 72 year old male with a PMHx of ESRD on HD qTTS, HTN, ETOH, CAD, presented with abdominal pain.  There is concern for pancreatitis and plan for possible intervention.  He states pain started about 3 days ago and worsening.  Hasn't been eating or drinking well. Elevated lipase and CT wit acute on chronic pancreatitis.  Hasn't had dialysis today but refusing dialysis today.    DAILY NEPHROLOGY SUMMARY:  See note from 9/30 for prior summaries  10/01: NAEO, +abd pain stabbing and cramping, no interest in eating   10/02: NAEO, feels better, wants to try to advance diet  10/03: Pt sitting up EOB, feels OK, reports some loose stools today, diet advanced and he was able to eat some, but still with abd discomfort, Hgb yesterday 6.4, no new labs today, pt unable to receive pRBC transfusion yesterday d/t lack of successful PIV access, VSS on RA, due for HD tomorrow  10/04: No events, pt seen on hemodialysis, VSS--see dialysis treatment sheet for full details, denies any CP/SOB, has LE edema, BP stable  10/05: sitting up in bed, continues with some abdominal pain but improving, no NV, tolerated iHD yesterday UF: 2L  10/06: No events, seen on dialysis this am, VSS--see dialysis treatment sheet for full details, denies any CP/SOB, still reports some mild abd pain, no n/v, tolerating PO but reports poor appetite  10/07: sitting up on side of bed eating, getting a bit of an appetite and trying to eat, continues with sharp pains in abdomen but getting better, tolerated iHD yesterday with UF: 1.5L  10/08: No events, seen on hemodialysis this am, VSS--see dialysis treatment sheet for full details, still with LUQ abd pain but improved, denies any CP/SOB, LE edema better    REVIEW OF SYSTEMS:    10 point ROS reviewed and is as per HPI/daily summary or otherwise  "negative    PMH/PSH/SH/FH:   Reviewed and unchanged since admission note    CURRENT MEDICATIONS:   Reviewed from admission to present day    VS:  /76   Pulse 84   Temp 36.7 °C (98.1 °F) (Temporal)   Resp 16   Ht 1.727 m (5' 8\")   Wt 77 kg (169 lb 12.1 oz)   SpO2 95%   BMI 25.81 kg/m²     Physical Exam  Vitals and nursing note reviewed.   Constitutional:       General: He is not in acute distress.  HENT:      Head: Normocephalic and atraumatic.   Eyes:      General: No scleral icterus.  Cardiovascular:      Rate and Rhythm: Normal rate and regular rhythm.      Comments: [facility] TDC    Pulmonary:      Effort: Pulmonary effort is normal. No respiratory distress.      Breath sounds: Normal breath sounds.   Abdominal:      General: Bowel sounds are normal. There is no distension.      Palpations: Abdomen is soft.      Tenderness: There is abdominal tenderness (LUQ).   Musculoskeletal:         General: No deformity.      Right lower leg: Edema present.      Left lower leg: Edema present.   Skin:     General: Skin is warm and dry.      Findings: No rash.      Comments: B/l knee abrasions/scabbing   Neurological:      General: No focal deficit present.      Mental Status: He is alert and oriented to person, place, and time.   Psychiatric:         Mood and Affect: Mood normal.         Behavior: Behavior normal.     Fluids:  In: 922 [P.O.:922]  Out: -     LABS:  Recent Labs     10/06/22  0623   SODIUM 134*   POTASSIUM 4.7   CHLORIDE 98   CO2 27   GLUCOSE 90   BUN 6*   CREATININE 7.20*   CALCIUM 8.3*       IMAGING:   All imaging reviewed from admission to present day    IMPRESSION:  # ESRD, dependent on HD  - RIJ TDC  - qTTS HD  # Abdominal pain/pancreatitis, slow improvement  - Management per GI and primary team  - Caution with IVF in ESRD with pancreatitis, bolus PRN  - Diet advanced   # HTN, labile control, some soft BPs  - Goal BP < 140/90  - On carvedilol  # Anemia of CKD, below target, unstable  - Goal Hgb " 10-11  - pRBCs unable to be transfused 10/2, plan to give with HD 10/4  - Continue MADAY  - % sat 13, ferritin 954  # CKD-MBD/Renal Osteodystrophy  - On sevelamer WM  - On Ergo weekly  - Phos 2.5     PLAN:  - HD today (Sat) and continue qTTS schedule  - UF with HD as tolerated  - MADAY with HD  - Transfuse PRN Hgb <7   - Ferritin precludes IV Fe, can consider PO Fe if pt can tolerate  - No dietary protein restrictions  - Diet advancement per GI/primary team  - Dose all meds per ESRD/iHD  - Holding parameters for carvedilol   - No Phos binders for now  - Adjusted gabapentin to 100mg BID TID, max dose for ESRD   - Okay to transition to outpt hemodialysis when medically cleared

## 2022-10-08 NOTE — CARE PLAN
The patient is Stable - Low risk of patient condition declining or worsening    Shift Goals  Clinical Goals: pain control  Patient Goals: pain control, rest  Family Goals: n/a    Progress made toward(s) clinical / shift goals:        Problem: Pain - Standard  Goal: Alleviation of pain or a reduction in pain to the patient’s comfort goal  Outcome: Progressing     Problem: Knowledge Deficit - Standard  Goal: Patient and family/care givers will demonstrate understanding of plan of care, disease process/condition, diagnostic tests and medications  Outcome: Progressing     Problem: Hemodynamics  Goal: Patient's hemodynamics, fluid balance and neurologic status will be stable or improve  Outcome: Progressing

## 2022-10-09 PROCEDURE — 770001 HCHG ROOM/CARE - MED/SURG/GYN PRIV*

## 2022-10-09 PROCEDURE — A9270 NON-COVERED ITEM OR SERVICE: HCPCS | Performed by: NURSE PRACTITIONER

## 2022-10-09 PROCEDURE — A9270 NON-COVERED ITEM OR SERVICE: HCPCS | Performed by: INTERNAL MEDICINE

## 2022-10-09 PROCEDURE — 99232 SBSQ HOSP IP/OBS MODERATE 35: CPT | Performed by: INTERNAL MEDICINE

## 2022-10-09 PROCEDURE — 700102 HCHG RX REV CODE 250 W/ 637 OVERRIDE(OP): Performed by: INTERNAL MEDICINE

## 2022-10-09 PROCEDURE — 700102 HCHG RX REV CODE 250 W/ 637 OVERRIDE(OP): Performed by: NURSE PRACTITIONER

## 2022-10-09 RX ADMIN — GABAPENTIN 100 MG: 100 CAPSULE ORAL at 17:23

## 2022-10-09 RX ADMIN — ACETAMINOPHEN 1000 MG: 500 TABLET ORAL at 00:44

## 2022-10-09 RX ADMIN — OXYCODONE HYDROCHLORIDE 15 MG: 10 TABLET ORAL at 04:40

## 2022-10-09 RX ADMIN — CARVEDILOL 6.25 MG: 6.25 TABLET, FILM COATED ORAL at 17:23

## 2022-10-09 RX ADMIN — OXYCODONE HYDROCHLORIDE 15 MG: 10 TABLET ORAL at 17:23

## 2022-10-09 RX ADMIN — GABAPENTIN 100 MG: 100 CAPSULE ORAL at 04:40

## 2022-10-09 RX ADMIN — CARVEDILOL 6.25 MG: 6.25 TABLET, FILM COATED ORAL at 08:07

## 2022-10-09 RX ADMIN — OXYCODONE HYDROCHLORIDE 15 MG: 10 TABLET ORAL at 08:06

## 2022-10-09 RX ADMIN — SEVELAMER CARBONATE 800 MG: 800 TABLET, FILM COATED ORAL at 12:27

## 2022-10-09 RX ADMIN — Medication 100 MG: at 04:40

## 2022-10-09 RX ADMIN — OXYCODONE HYDROCHLORIDE 15 MG: 10 TABLET ORAL at 20:29

## 2022-10-09 RX ADMIN — Medication 100 MG: at 17:23

## 2022-10-09 RX ADMIN — OXYCODONE HYDROCHLORIDE 15 MG: 10 TABLET ORAL at 12:27

## 2022-10-09 RX ADMIN — OMEPRAZOLE 20 MG: 20 CAPSULE, DELAYED RELEASE ORAL at 17:23

## 2022-10-09 RX ADMIN — SEVELAMER CARBONATE 800 MG: 800 TABLET, FILM COATED ORAL at 08:09

## 2022-10-09 RX ADMIN — ERGOCALCIFEROL 50000 UNITS: 1.25 CAPSULE ORAL at 04:40

## 2022-10-09 RX ADMIN — ATORVASTATIN CALCIUM 20 MG: 20 TABLET, FILM COATED ORAL at 20:30

## 2022-10-09 RX ADMIN — SEVELAMER CARBONATE 800 MG: 800 TABLET, FILM COATED ORAL at 17:23

## 2022-10-09 RX ADMIN — OXYCODONE HYDROCHLORIDE 15 MG: 10 TABLET ORAL at 00:45

## 2022-10-09 RX ADMIN — OMEPRAZOLE 20 MG: 20 CAPSULE, DELAYED RELEASE ORAL at 04:40

## 2022-10-09 ASSESSMENT — ENCOUNTER SYMPTOMS
DIARRHEA: 0
NAUSEA: 0
ABDOMINAL PAIN: 1
SHORTNESS OF BREATH: 0
DIZZINESS: 0
FALLS: 1
FEVER: 0
BACK PAIN: 0
HEADACHES: 0
VOMITING: 0
BLURRED VISION: 0

## 2022-10-09 ASSESSMENT — PAIN DESCRIPTION - PAIN TYPE
TYPE: ACUTE PAIN

## 2022-10-09 NOTE — CARE PLAN
The patient is Stable - Low risk of patient condition declining or worsening    Shift Goals  Clinical Goals: pain control, ambulation  Patient Goals: pain control  Family Goals: n/a    Progress made toward(s) clinical / shift goals:    Problem: Pain - Standard  Goal: Alleviation of pain or a reduction in pain to the patient’s comfort goal  Outcome: Progressing     Problem: Knowledge Deficit - Standard  Goal: Patient and family/care givers will demonstrate understanding of plan of care, disease process/condition, diagnostic tests and medications  Outcome: Progressing       Patient is able to verbalize plan of care. Patient is medicated per MAR.

## 2022-10-09 NOTE — PROGRESS NOTES
[facility] -  PROGRESS NOTE               Author: DARIN Stephenson Date & Time: 10/9/2022  12:17 PM     HPI:  Patient is a 72 year old male with a PMHx of ESRD on HD qTTS, HTN, ETOH, CAD, presented with abdominal pain.  There is concern for pancreatitis and plan for possible intervention.  He states pain started about 3 days ago and worsening.  Hasn't been eating or drinking well. Elevated lipase and CT wit acute on chronic pancreatitis.  Hasn't had dialysis today but refusing dialysis today.    DAILY NEPHROLOGY SUMMARY:  See note from 9/30 for prior summaries  10/01: NAEO, +abd pain stabbing and cramping, no interest in eating   10/02: NAEO, feels better, wants to try to advance diet  10/03: Pt sitting up EOB, feels OK, reports some loose stools today, diet advanced and he was able to eat some, but still with abd discomfort, Hgb yesterday 6.4, no new labs today, pt unable to receive pRBC transfusion yesterday d/t lack of successful PIV access, VSS on RA, due for HD tomorrow  10/04: No events, pt seen on hemodialysis, VSS--see dialysis treatment sheet for full details, denies any CP/SOB, has LE edema, BP stable  10/05: sitting up in bed, continues with some abdominal pain but improving, no NV, tolerated iHD yesterday UF: 2L  10/06: No events, seen on dialysis this am, VSS--see dialysis treatment sheet for full details, denies any CP/SOB, still reports some mild abd pain, no n/v, tolerating PO but reports poor appetite  10/07: sitting up on side of bed eating, getting a bit of an appetite and trying to eat, continues with sharp pains in abdomen but getting better, tolerated iHD yesterday with UF: 1.5L  10/08: No events, seen on hemodialysis this am, VSS--see dialysis treatment sheet for full details, still with LUQ abd pain but improved, denies any CP/SOB, LE edema better  10/09: sitting up in bed, continues with abdominal pain, feels worse this morning, tolerated iHD yesterday with  "minimal UF    REVIEW OF SYSTEMS:    10 point ROS reviewed and is as per HPI/daily summary or otherwise negative    PMH/PSH/SH/FH:   Reviewed and unchanged since admission note    CURRENT MEDICATIONS:   Reviewed from admission to present day    VS:  /74   Pulse 97   Temp 36.7 °C (98.1 °F) (Temporal)   Resp 18   Ht 1.727 m (5' 8\")   Wt 77 kg (169 lb 12.1 oz)   SpO2 97%   BMI 25.81 kg/m²     Physical Exam  Vitals and nursing note reviewed.   Constitutional:       General: He is not in acute distress.  HENT:      Head: Normocephalic and atraumatic.   Eyes:      General: No scleral icterus.  Cardiovascular:      Rate and Rhythm: Normal rate and regular rhythm.      Comments: [facility] TDC    Pulmonary:      Effort: Pulmonary effort is normal. No respiratory distress.      Breath sounds: Normal breath sounds.   Abdominal:      General: Bowel sounds are normal. There is no distension.      Palpations: Abdomen is soft.      Tenderness: There is abdominal tenderness (LUQ).   Musculoskeletal:         General: No deformity.      Right lower leg: Edema present.      Left lower leg: Edema present.   Skin:     General: Skin is warm and dry.      Findings: No rash.      Comments: B/l knee abrasions/scabbing   Neurological:      General: No focal deficit present.      Mental Status: He is alert and oriented to person, place, and time.   Psychiatric:         Mood and Affect: Mood normal.         Behavior: Behavior normal.     Fluids:  In: 1096 [P.O.:596; Dialysis:500]  Out: 500     LABS:  Recent Labs     10/08/22  1030   SODIUM 136   POTASSIUM 4.0   CHLORIDE 99   CO2 27   GLUCOSE 110*   BUN 11   CREATININE 5.75*   CALCIUM 8.1*       IMAGING:   All imaging reviewed from admission to present day    IMPRESSION:  # ESRD, dependent on HD  - RIJ TDC  - qTTS HD  # Abdominal pain/pancreatitis, slow improvement  - Management per GI and primary team  - Caution with IVF in ESRD with pancreatitis, bolus PRN  - Diet advanced   # HTN, " labile control, some soft BPs  - Goal BP < 140/90  - On carvedilol  # Anemia of CKD, below target, unstable  - Goal Hgb 10-11  - pRBCs unable to be transfused 10/2, plan to give with HD 10/4  - Continue MADAY  - % sat 13, ferritin 954  # CKD-MBD/Renal Osteodystrophy  - On sevelamer WM  - On Ergo weekly  - Phos 2.5     PLAN:  - No iHD today, next tx due (Tue) and continue qTTS schedule  - UF with HD as tolerated  - MADAY with HD  - Transfuse PRN Hgb <7   - Ferritin precludes IV Fe, can consider PO Fe if pt can tolerate  - No dietary protein restrictions  - Diet advancement per GI/primary team  - Dose all meds per ESRD/iHD  - Holding parameters for carvedilol   - No Phos binders for now  - Adjusted gabapentin to 100mg BID TID, max dose for ESRD   - Okay to transition to outpt hemodialysis when medically cleared

## 2022-10-09 NOTE — PROGRESS NOTES
Report received from Jodi GARCIA, assumed care at 1900  A0x4  Pt declines any SOB on room air, chest pain, new onset of numbness/tingling  Pt rates pain at 8/10, on a scale of 1-10, pt medicated per MAR  Anuric: dialysis pt   Pt has + flatus, + bowel sounds, + BM on 10/8  Pt ambulates with a standby assist  Pt is tolerating a regular-low fat diet, pt denies any nausea/vomiting  Plan of care discussed, all questions answered.Call light is within reach, treaded slipper socks on, bed in lowest/locked position, hourly rounding in place, all needs met at this time.

## 2022-10-09 NOTE — CARE PLAN
Problem: Pain - Standard  Goal: Alleviation of pain or a reduction in pain to the patient’s comfort goal  Outcome: Progressing     Problem: Knowledge Deficit - Standard  Goal: Patient and family/care givers will demonstrate understanding of plan of care, disease process/condition, diagnostic tests and medications  Outcome: Progressing   The patient is Stable - Low risk of patient condition declining or worsening    Shift Goals  Clinical Goals: pain control  Patient Goals: pain control  Family Goals: n/a    Progress made toward(s) clinical / shift goals:  pts pain managed with prn pain medication.  POC discussed with pt, pt verbalized understanding of plan.

## 2022-10-09 NOTE — PROGRESS NOTES
Hospital Medicine Daily Progress Note    Date of Service  10/9/2022    Chief Complaint  Junior Proctor is a 72 y.o. male admitted 9/27/2022 with abd pain    Hospital Course  73yo PMHx ESRD on HD, gout, ETOH, RA on Enbril, HTN, CAD presenting with Abd pain.  Had recently been DC'd home after admission for acute pancreatitis, bacteremia/sepsis on 9/22.  On that admission had a biliary stent placed and later exchanged after it was blocked.  On re-presentation found to have acute on chronic pancreatitis.  Seen by Nicho who recommended non operative management    Interval Problem Update  Acute on chronic pancreatitis-belly pain is ok.    Chronic pain-no clear interval changes noted today.Mostly his swollen legs and back of neck from RA. He is concerned that his RA pain is worsening since he hasnt had his etanercept injection in over a week.       I have discussed this patient's plan of care and discharge plan at IDT rounds today with Case Management, Nursing, Nursing leadership, and other members of the IDT team.    Consultants/Specialty  general surgery  Renal  GI    Code Status  Full Code    Disposition  Patient is medically cleared for discharge.   Anticipate discharge to SNF  I have placed the appropriate orders for post-discharge needs.    Review of Systems  Review of Systems   Constitutional:  Negative for fever.   Eyes:  Negative for blurred vision.   Respiratory:  Negative for shortness of breath.    Cardiovascular:  Positive for leg swelling (comes and goes). Negative for chest pain.   Gastrointestinal:  Positive for abdominal pain (stable). Negative for diarrhea, nausea and vomiting.        Eating without overt issues   Genitourinary:  Negative for dysuria and urgency.   Musculoskeletal:  Positive for falls (at home, apparently) and joint pain (at his baseline 2/2 RA, unchanged today). Negative for back pain.        Worse, especially the back of the neck   Neurological:  Negative for dizziness and headaches.    All other systems reviewed and are negative.     Physical Exam  Temp:  [36.4 °C (97.5 °F)-37.7 °C (99.8 °F)] 36.7 °C (98.1 °F)  Pulse:  [] 97  Resp:  [18] 18  BP: (105-159)/(67-89) 123/74  SpO2:  [95 %-98 %] 97 %    Physical Exam  Constitutional:       General: He is not in acute distress.     Appearance: He is well-developed. He is obese. He is not diaphoretic.      Comments: Sitting in bed, appears slightly uncomfortable today   HENT:      Head: Normocephalic and atraumatic.   Eyes:      General: No scleral icterus.     Conjunctiva/sclera: Conjunctivae normal.   Neck:      Vascular: No JVD.   Cardiovascular:      Rate and Rhythm: Normal rate.      Heart sounds: No murmur heard.    No gallop.      Comments: Dialysis catheter in [facility], insertion site is C/D/I  Pulmonary:      Effort: Pulmonary effort is normal. No respiratory distress.      Breath sounds: No wheezing.   Abdominal:      General: There is no distension.      Palpations: Abdomen is soft.      Tenderness: There is abdominal tenderness (mild, epigastrium). There is no guarding or rebound.      Comments: No changes noted today   Musculoskeletal:         General: Tenderness (back of neck primarily) present.      Right lower leg: Edema present.      Left lower leg: Edema present.      Comments: Worse bilateral leg swelling  No open sores noted today   Skin:     General: Skin is warm and dry.      Capillary Refill: Capillary refill takes less than 2 seconds.      Coloration: Skin is not jaundiced.      Findings: No rash.   Neurological:      General: No focal deficit present.      Mental Status: He is oriented to person, place, and time. Mental status is at baseline.   Psychiatric:         Mood and Affect: Mood normal.         Behavior: Behavior normal.         Thought Content: Thought content normal.       Fluids    Intake/Output Summary (Last 24 hours) at 10/9/2022 1219  Last data filed at 10/9/2022 0806  Gross per 24 hour   Intake 1096 ml   Output  500 ml   Net 596 ml       Laboratory        Recent Labs     10/08/22  1030   SODIUM 136   POTASSIUM 4.0   CHLORIDE 99   CO2 27   GLUCOSE 110*   BUN 11   CREATININE 5.75*   CALCIUM 8.1*                   Imaging  IR-US GUIDED PIV   Final Result    Ultrasound-guided PERIPHERAL IV INSERTION performed by    qualified nursing staff as above.      US-RUQ   Final Result   Addendum (preliminary) 1 of 1   No pancreatic duct stent is seen.      Dr. Gallardo discussed these findings with Wilmar Gallardo.      Final      1.  Cholelithiasis without discrete sonographic evidence of acute cholecystitis. Patient did demonstrate a Evans's sign throughout the course of the exam. There is strong clinical suspicion for acute cholecystitis, a HIDA scan should be obtained for    further evaluation.   2.  Hepatic steatosis.   3.  Hepatomegaly.   4.  The pancreas is obscured by overlying bowel gas.      CT-ABDOMEN-PELVIS W/O   Final Result   Addendum (preliminary) 1 of 1   Subacute right L2 and L3 transverse process fractures.      Final      1.  Acute on chronic pancreatitis. Inflammatory changes track along the left paracolic gutter.   2.  Likely reactive inflammation of the colon at the splenic flexure.   3.  Mildly dilated gallbladder with likely cholelithiasis. If there is concern for acute cholecystitis, ultrasound can be performed.   4.  Features of medical renal disease.   5.  Sigmoid diverticulosis.   6.  Atherosclerotic changes.   7.  Right hepatic lobe lesion, previously characterized as hemangioma on MRI             Assessment/Plan  * Acute on chronic pancreatitis (HCC)- (present on admission)  Assessment & Plan  Suspect related to ETOH induced, though has had Gallstone-related pancreatitis earlier this year  Also, appears to have a chronic component as well, which will provide difficulties for pain control  Per gastroenterology - conservative management of pancreatitis with IV hydration, careful advancement of diet, daily labs, agree  with surgical consultation and following patient for possible cholecystectomy, Alcohol cessation, No plan for ERCP at this time given that current CT does not demonstrate stent is in place at this time and appears to have self ejected which was expected.  Clinically doing well with normal vital signs, normal lipase, normal LFT's, normal WBC, no issues with eating food, keep current management  SNF referral placed, awaiting decision  Not a good candidate for LA opiates, needs outpatient chronic pain management referral on discharge  Continue oxy-IR PRN and scheduled APAP, stop IV dilaudid  Continue low dose gabapentin given ESRD      Cholelithiasis- (present on admission)  Assessment & Plan  Possible cholecystitis?  IV Rocephin and Flagyl x 5 days. Already finished  Per Surgery - Symptoms and clinical findings more consistent with acute on chronic alcoholic pancreatitis. No compelling evidence for active biliary colic or gallstone pancreatitis.  9/29/2022  No change to management at this time  LFT's and TBILI normal  Defer HIDA scan at this time    HTN (hypertension)- (present on admission)  Assessment & Plan  Carvedilol 6.25  IV labetalol and hydralazine as needed with parameters  Remains persistently high, consider adjusting above medication    Rheumatoid arthritis (HCC)- (present on admission)  Assessment & Plan  Maintained on Etanercept weekly  Progressive symptoms contributing to his chronic pain syndrome  Needs outpatient chronic pain management, will place outpatient referral  Has a high narcotic score on  review, limit opiates as much as possible  Pain is likely being exacerbated since he is in the hospital and unable to provide his home DMARD    Alcohol dependence (HCC)- (present on admission)  Assessment & Plan  Outside window of withdrawal   Long discussion about importance of complete ETOH abstinence     ESRD on dialysis (HCC)- (present on admission)  Assessment & Plan  HD per Nephrology    Gout-  (present on admission)  Assessment & Plan  Allopurinol    Anemia- (present on admission)  Assessment & Plan  Anemia of chronic disease (Hx RA) + CKD  Give one unit PRBCs today with HD 10/4, appropriate response noted  On EPO per Neph  No e/o overt bleeding       VTE prophylaxis: heparin ppx    I have performed a physical exam and reviewed and updated ROS and Plan today (10/9/2022). In review of yesterday's note (10/8/2022), there are no changes except as documented above.

## 2022-10-09 NOTE — PROGRESS NOTES
[facility] Services Progress Note      HD today x 3 hours per Dr. Tsang.   Initiated at 1035 and ended at 1335.       UF Net: 0 mL    Patient tolerated treatment. Not in distress. VSS      R chest catheter intact and patent with good flow during dialysis. No s/sx of infection, no redness nor bleeding noted on the CVC site. CVC dressing clean, dry and intact.   Blood returned and CVC port flushed with NS and Heparin 1000 units/mL used  to lock catheter given per designated amount. CVC port clamped, capped and labeled accordingly.     Dressing changed per protocol.        Report given to Primary JENNIFER Ramirez RN.

## 2022-10-09 NOTE — PROGRESS NOTES
Report received from RN, assumed care at 0645  Pt is A0X4, and responds appropriately   Pt declines any SOB, chest pain, new onset of numbness/ tingiling  Pt rates pain at 9/10, on a scale of 1-10, pt medicated per MAR  Pt is anuric: dialysis patient  Pt has + flatus, + bowel sounds, + BM on 10/9/2022 per patient  Pt ambulates with a SBA and a FWW  Pt is tolerating a regular low fat diet, pt denies any nausea/vomiting at this time  Pt has right chest, hemodialysis cath, dressing is clean, dry and intact  Pt has a RFA AV fistula   Plan of care discussed, all questions answered. Explained importance of calling before getting OOB and pt verbalizes understanding. Explained importance of oral care. Call light is within reach, treaded slipper socks on, bed in lowest/ locked position, hourly rounding in place, all needs met at this time

## 2022-10-10 LAB
ALBUMIN SERPL BCP-MCNC: 2.4 G/DL (ref 3.2–4.9)
ALBUMIN/GLOB SERPL: 0.7 G/DL
ALP SERPL-CCNC: 114 U/L (ref 30–99)
ALT SERPL-CCNC: 6 U/L (ref 2–50)
ANION GAP SERPL CALC-SCNC: 11 MMOL/L (ref 7–16)
AST SERPL-CCNC: 15 U/L (ref 12–45)
BASOPHILS # BLD AUTO: 0.4 % (ref 0–1.8)
BASOPHILS # BLD: 0.04 K/UL (ref 0–0.12)
BILIRUB SERPL-MCNC: 0.2 MG/DL (ref 0.1–1.5)
BUN SERPL-MCNC: 16 MG/DL (ref 8–22)
CALCIUM SERPL-MCNC: 8.1 MG/DL (ref 8.5–10.5)
CHLORIDE SERPL-SCNC: 99 MMOL/L (ref 96–112)
CO2 SERPL-SCNC: 26 MMOL/L (ref 20–33)
CREAT SERPL-MCNC: 6.44 MG/DL (ref 0.5–1.4)
EOSINOPHIL # BLD AUTO: 0.35 K/UL (ref 0–0.51)
EOSINOPHIL NFR BLD: 3.6 % (ref 0–6.9)
ERYTHROCYTE [DISTWIDTH] IN BLOOD BY AUTOMATED COUNT: 64.5 FL (ref 35.9–50)
GFR SERPLBLD CREATININE-BSD FMLA CKD-EPI: 9 ML/MIN/1.73 M 2
GLOBULIN SER CALC-MCNC: 3.5 G/DL (ref 1.9–3.5)
GLUCOSE SERPL-MCNC: 87 MG/DL (ref 65–99)
HCT VFR BLD AUTO: 23 % (ref 42–52)
HGB BLD-MCNC: 7.2 G/DL (ref 14–18)
IMM GRANULOCYTES # BLD AUTO: 0.04 K/UL (ref 0–0.11)
IMM GRANULOCYTES NFR BLD AUTO: 0.4 % (ref 0–0.9)
LYMPHOCYTES # BLD AUTO: 2.21 K/UL (ref 1–4.8)
LYMPHOCYTES NFR BLD: 22.6 % (ref 22–41)
MCH RBC QN AUTO: 29.9 PG (ref 27–33)
MCHC RBC AUTO-ENTMCNC: 31.3 G/DL (ref 33.7–35.3)
MCV RBC AUTO: 95.4 FL (ref 81.4–97.8)
MONOCYTES # BLD AUTO: 1.15 K/UL (ref 0–0.85)
MONOCYTES NFR BLD AUTO: 11.7 % (ref 0–13.4)
NEUTROPHILS # BLD AUTO: 6.01 K/UL (ref 1.82–7.42)
NEUTROPHILS NFR BLD: 61.3 % (ref 44–72)
NRBC # BLD AUTO: 0 K/UL
NRBC BLD-RTO: 0 /100 WBC
PLATELET # BLD AUTO: 151 K/UL (ref 164–446)
PMV BLD AUTO: 10.6 FL (ref 9–12.9)
POTASSIUM SERPL-SCNC: 4.6 MMOL/L (ref 3.6–5.5)
PROT SERPL-MCNC: 5.9 G/DL (ref 6–8.2)
RBC # BLD AUTO: 2.41 M/UL (ref 4.7–6.1)
SODIUM SERPL-SCNC: 136 MMOL/L (ref 135–145)
WBC # BLD AUTO: 9.8 K/UL (ref 4.8–10.8)

## 2022-10-10 PROCEDURE — 700102 HCHG RX REV CODE 250 W/ 637 OVERRIDE(OP): Performed by: INTERNAL MEDICINE

## 2022-10-10 PROCEDURE — 99232 SBSQ HOSP IP/OBS MODERATE 35: CPT | Performed by: INTERNAL MEDICINE

## 2022-10-10 PROCEDURE — A9270 NON-COVERED ITEM OR SERVICE: HCPCS | Performed by: INTERNAL MEDICINE

## 2022-10-10 PROCEDURE — A9270 NON-COVERED ITEM OR SERVICE: HCPCS | Performed by: NURSE PRACTITIONER

## 2022-10-10 PROCEDURE — 36415 COLL VENOUS BLD VENIPUNCTURE: CPT

## 2022-10-10 PROCEDURE — 97116 GAIT TRAINING THERAPY: CPT

## 2022-10-10 PROCEDURE — 770001 HCHG ROOM/CARE - MED/SURG/GYN PRIV*

## 2022-10-10 PROCEDURE — 85025 COMPLETE CBC W/AUTO DIFF WBC: CPT

## 2022-10-10 PROCEDURE — 80053 COMPREHEN METABOLIC PANEL: CPT

## 2022-10-10 PROCEDURE — 700102 HCHG RX REV CODE 250 W/ 637 OVERRIDE(OP): Performed by: NURSE PRACTITIONER

## 2022-10-10 RX ADMIN — OMEPRAZOLE 20 MG: 20 CAPSULE, DELAYED RELEASE ORAL at 18:00

## 2022-10-10 RX ADMIN — OXYCODONE HYDROCHLORIDE 15 MG: 10 TABLET ORAL at 04:41

## 2022-10-10 RX ADMIN — CARVEDILOL 6.25 MG: 6.25 TABLET, FILM COATED ORAL at 08:52

## 2022-10-10 RX ADMIN — ATORVASTATIN CALCIUM 20 MG: 20 TABLET, FILM COATED ORAL at 21:03

## 2022-10-10 RX ADMIN — OXYCODONE HYDROCHLORIDE 15 MG: 10 TABLET ORAL at 21:03

## 2022-10-10 RX ADMIN — SEVELAMER CARBONATE 800 MG: 800 TABLET, FILM COATED ORAL at 08:52

## 2022-10-10 RX ADMIN — CARVEDILOL 6.25 MG: 6.25 TABLET, FILM COATED ORAL at 18:00

## 2022-10-10 RX ADMIN — SEVELAMER CARBONATE 800 MG: 800 TABLET, FILM COATED ORAL at 18:00

## 2022-10-10 RX ADMIN — Medication 100 MG: at 04:40

## 2022-10-10 RX ADMIN — Medication 100 MG: at 18:00

## 2022-10-10 RX ADMIN — OXYCODONE HYDROCHLORIDE 15 MG: 10 TABLET ORAL at 08:52

## 2022-10-10 RX ADMIN — Medication 5 MG: at 21:02

## 2022-10-10 RX ADMIN — OXYCODONE HYDROCHLORIDE 15 MG: 10 TABLET ORAL at 12:52

## 2022-10-10 RX ADMIN — OXYCODONE HYDROCHLORIDE 15 MG: 10 TABLET ORAL at 18:00

## 2022-10-10 RX ADMIN — OXYCODONE HYDROCHLORIDE 15 MG: 10 TABLET ORAL at 00:45

## 2022-10-10 RX ADMIN — GABAPENTIN 100 MG: 100 CAPSULE ORAL at 18:00

## 2022-10-10 RX ADMIN — SEVELAMER CARBONATE 800 MG: 800 TABLET, FILM COATED ORAL at 12:52

## 2022-10-10 RX ADMIN — GABAPENTIN 100 MG: 100 CAPSULE ORAL at 04:41

## 2022-10-10 RX ADMIN — OMEPRAZOLE 20 MG: 20 CAPSULE, DELAYED RELEASE ORAL at 04:40

## 2022-10-10 ASSESSMENT — COGNITIVE AND FUNCTIONAL STATUS - GENERAL
WALKING IN HOSPITAL ROOM: A LITTLE
TURNING FROM BACK TO SIDE WHILE IN FLAT BAD: A LITTLE
STANDING UP FROM CHAIR USING ARMS: A LITTLE
MOVING TO AND FROM BED TO CHAIR: A LITTLE
MOVING FROM LYING ON BACK TO SITTING ON SIDE OF FLAT BED: A LITTLE
SUGGESTED CMS G CODE MODIFIER MOBILITY: CK
MOBILITY SCORE: 17
CLIMB 3 TO 5 STEPS WITH RAILING: A LOT

## 2022-10-10 ASSESSMENT — FIBROSIS 4 INDEX: FIB4 SCORE: 2.84

## 2022-10-10 ASSESSMENT — PAIN DESCRIPTION - PAIN TYPE
TYPE: ACUTE PAIN

## 2022-10-10 ASSESSMENT — ENCOUNTER SYMPTOMS
SHORTNESS OF BREATH: 0
BLURRED VISION: 0
DIZZINESS: 0
FEVER: 0
HEADACHES: 0
NAUSEA: 0
BACK PAIN: 0
VOMITING: 0
ABDOMINAL PAIN: 1
DIARRHEA: 0
FALLS: 1

## 2022-10-10 ASSESSMENT — GAIT ASSESSMENTS
GAIT LEVEL OF ASSIST: CONTACT GUARD ASSIST
DISTANCE (FEET): 50
ASSISTIVE DEVICE: QUAD CANE

## 2022-10-10 NOTE — PROGRESS NOTES
Report received from Jessica GARCIA, assumed care at 1900  A0x4  Pt declines any SOB on room air, chest pain, new onset of numbness/tingling  Pt rates pain at 9/10, on a scale of 1-10, pt medicated per MAR  Anuric: dialysis pt   Pt has + flatus, + bowel sounds, + BM on 10/9  Pt ambulates independently  Pt is tolerating a regular-low fat diet, pt denies any nausea/vomiting  Plan of care discussed, all questions answered.Call light is within reach, treaded slipper socks on, bed in lowest/locked position, hourly rounding in place, all needs met at this time.

## 2022-10-10 NOTE — PROGRESS NOTES
Hospital Medicine Daily Progress Note    Date of Service  10/10/2022    Chief Complaint  Junior Proctor is a 72 y.o. male admitted 9/27/2022 with abd pain    Hospital Course  73yo PMHx ESRD on HD, gout, ETOH, RA on Enbril, HTN, CAD presenting with Abd pain.  Had recently been DC'd home after admission for acute pancreatitis, bacteremia/sepsis on 9/22.  On that admission had a biliary stent placed and later exchanged after it was blocked.  On re-presentation found to have acute on chronic pancreatitis.  Seen by Srjoe who recommended non operative management    Interval Problem Update  Acute on chronic pancreatitis-belly pain remains tolerable. Eating food.     Chronic pain-remains about the same today  Unsteadiness continues, PT/OT recommending SNF      I have discussed this patient's plan of care and discharge plan at IDT rounds today with Case Management, Nursing, Nursing leadership, and other members of the IDT team.    Consultants/Specialty  general surgery  Renal  GI    Code Status  Full Code    Disposition  Patient is medically cleared for discharge.   Anticipate discharge to SNF  I have placed the appropriate orders for post-discharge needs.    Review of Systems  Review of Systems   Constitutional:  Negative for fever.        No appreciable change noted today   Eyes:  Negative for blurred vision.   Respiratory:  Negative for shortness of breath.    Cardiovascular:  Positive for leg swelling (comes and goes). Negative for chest pain.   Gastrointestinal:  Positive for abdominal pain (stable). Negative for diarrhea, nausea and vomiting.        Eating without overt issues   Genitourinary:  Negative for dysuria and urgency.   Musculoskeletal:  Positive for falls (at home, apparently) and joint pain (at his baseline 2/2 RA, unchanged today). Negative for back pain.        Back of the neck   Neurological:  Negative for dizziness and headaches.   All other systems reviewed and are negative.     Physical Exam  Temp:   [36.8 °C (98.3 °F)-37.7 °C (99.8 °F)] 37.3 °C (99.1 °F)  Pulse:  [] 109  Resp:  [16-18] 17  BP: (116-149)/(69-89) 133/81  SpO2:  [90 %-98 %] 94 %    Physical Exam  Constitutional:       General: He is not in acute distress.     Appearance: He is well-developed. He is obese. He is not diaphoretic.      Comments: Walking around in his room, appears comfortable   HENT:      Head: Normocephalic and atraumatic.   Eyes:      Conjunctiva/sclera: Conjunctivae normal.   Neck:      Vascular: No JVD.   Cardiovascular:      Rate and Rhythm: Normal rate.      Heart sounds: No murmur heard.    No gallop.      Comments: Dialysis catheter in [facility], insertion site is C/D/I  Pulmonary:      Effort: Pulmonary effort is normal. No respiratory distress.      Breath sounds: No wheezing.   Abdominal:      General: There is no distension.      Palpations: Abdomen is soft.      Tenderness: There is abdominal tenderness (mild, epigastrium). There is no guarding or rebound.      Comments: No changes noted today   Musculoskeletal:         General: Tenderness (back of neck primarily) present.      Right lower leg: Edema present.      Left lower leg: Edema present.      Comments: Stable B/L LE swelling   Skin:     General: Skin is warm and dry.      Capillary Refill: Capillary refill takes less than 2 seconds.      Coloration: Skin is not jaundiced.      Findings: No rash.   Neurological:      General: No focal deficit present.      Mental Status: He is oriented to person, place, and time. Mental status is at baseline.   Psychiatric:         Mood and Affect: Mood normal.         Behavior: Behavior normal.         Thought Content: Thought content normal.       Fluids    Intake/Output Summary (Last 24 hours) at 10/10/2022 1250  Last data filed at 10/10/2022 0444  Gross per 24 hour   Intake 298 ml   Output --   Net 298 ml       Laboratory  Recent Labs     10/10/22  0313   WBC 9.8   RBC 2.41*   HEMOGLOBIN 7.2*   HEMATOCRIT 23.0*   MCV 95.4   MCH  29.9   MCHC 31.3*   RDW 64.5*   PLATELETCT 151*   MPV 10.6       Recent Labs     10/08/22  1030 10/10/22  0313   SODIUM 136 136   POTASSIUM 4.0 4.6   CHLORIDE 99 99   CO2 27 26   GLUCOSE 110* 87   BUN 11 16   CREATININE 5.75* 6.44*   CALCIUM 8.1* 8.1*                   Imaging  IR-US GUIDED PIV   Final Result    Ultrasound-guided PERIPHERAL IV INSERTION performed by    qualified nursing staff as above.      US-RUQ   Final Result   Addendum (preliminary) 1 of 1   No pancreatic duct stent is seen.      Dr. Gallardo discussed these findings with Wilmar Gallardo.      Final      1.  Cholelithiasis without discrete sonographic evidence of acute cholecystitis. Patient did demonstrate a Evans's sign throughout the course of the exam. There is strong clinical suspicion for acute cholecystitis, a HIDA scan should be obtained for    further evaluation.   2.  Hepatic steatosis.   3.  Hepatomegaly.   4.  The pancreas is obscured by overlying bowel gas.      CT-ABDOMEN-PELVIS W/O   Final Result   Addendum (preliminary) 1 of 1   Subacute right L2 and L3 transverse process fractures.      Final      1.  Acute on chronic pancreatitis. Inflammatory changes track along the left paracolic gutter.   2.  Likely reactive inflammation of the colon at the splenic flexure.   3.  Mildly dilated gallbladder with likely cholelithiasis. If there is concern for acute cholecystitis, ultrasound can be performed.   4.  Features of medical renal disease.   5.  Sigmoid diverticulosis.   6.  Atherosclerotic changes.   7.  Right hepatic lobe lesion, previously characterized as hemangioma on MRI             Assessment/Plan  * Acute on chronic pancreatitis (HCC)- (present on admission)  Assessment & Plan  Suspect related to ETOH induced, though has had Gallstone-related pancreatitis earlier this year  Also, appears to have a chronic component as well, which will provide difficulties for pain control  Per gastroenterology - conservative management of  pancreatitis with IV hydration, careful advancement of diet, daily labs, agree with surgical consultation and following patient for possible cholecystectomy, Alcohol cessation, No plan for ERCP at this time given that current CT does not demonstrate stent is in place at this time and appears to have self ejected which was expected.  Clinically doing well with normal vital signs, normal lipase, normal LFT's, normal WBC, no issues with eating food, no interval changes noted today  SNF referral placed, awaiting decision  Not a good candidate for LA opiates, needs outpatient chronic pain management referral on discharge  Continue oxy-IR PRN and scheduled APAP, stop IV dilaudid  Continue low dose gabapentin given ESRD      Cholelithiasis- (present on admission)  Assessment & Plan  Possible cholecystitis?  IV Rocephin and Flagyl x 5 days. Already finished  Per Surgery - Symptoms and clinical findings more consistent with acute on chronic alcoholic pancreatitis. No compelling evidence for active biliary colic or gallstone pancreatitis.  9/29/2022  No change to management at this time  LFT's and TBILI normal  Defer HIDA scan at this time    HTN (hypertension)- (present on admission)  Assessment & Plan  Carvedilol 6.25  IV labetalol and hydralazine as needed with parameters  Remains persistently high, consider adjusting above medication    Rheumatoid arthritis (HCC)- (present on admission)  Assessment & Plan  Maintained on Etanercept weekly  Progressive symptoms contributing to his chronic pain syndrome  Needs outpatient chronic pain management, will place outpatient referral  Has a high narcotic score on  review, limit opiates as much as possible  Pain is likely being exacerbated since he is in the hospital and unable to provide his home DMARD    Alcohol dependence (HCC)- (present on admission)  Assessment & Plan  Outside window of withdrawal   Long discussion about importance of complete ETOH abstinence     ESRD on  dialysis (HCC)- (present on admission)  Assessment & Plan  HD per Nephrology    Gout- (present on admission)  Assessment & Plan  Allopurinol    Anemia- (present on admission)  Assessment & Plan  Anemia of chronic disease (Hx RA) + CKD  Give one unit PRBCs today with HD 10/4, appropriate response noted  On EPO per Neph  No e/o overt bleeding       VTE prophylaxis: heparin ppx    I have performed a physical exam and reviewed and updated ROS and Plan today (10/10/2022). In review of yesterday's note (10/9/2022), there are no changes except as documented above.

## 2022-10-10 NOTE — DISCHARGE PLANNING
Received Choice form at 1547  Agency/Facility Name: [facility],  Referral sent per Choice form @ 0811    0914  Agency/Facility Name: [facility]  Outcome: DPA left v-mail in regards to referral status. DPA requested call back     0911  Agency/Facility Name: [facility]  Outcome: DPA left v-mail in regards to referral status. DPA requested call back     1011  Agency/Facility Name: [facility]  Outcome: DPA left v-mail in regards to referral status. DPA requested call back     1013  Agency/Facility Name: [facility]  Spoke To: Kyler  Outcome: DPA confirmed referral is under review. DPA requested call back with decision      1151  Pt has been declined to all SNFs that referrals were sent for, DPA notified SW

## 2022-10-10 NOTE — CARE PLAN
The patient is Stable - Low risk of patient condition declining or worsening    Shift Goals  Clinical Goals: DC plan, pain control  Patient Goals: pain control  Family Goals: n/a    Progress made toward(s) clinical / shift goals:  medicating for pain PRN per MAR.  Social work/ working on SNF approval vs HH.    Patient is not progressing towards the following goals:

## 2022-10-10 NOTE — THERAPY
"Physical Therapy   Daily Treatment     Patient Name: Junior Proctor  Age:  72 y.o., Sex:  male  Medical Record #: [id number]  Today's Date: 10/10/2022     Precautions  Precautions: Fall Risk    Assessment    Pt with improved balance with use of quad cane this session, however, continues to furniture walk. Required multiple standing rest breaks 2/2 back pain and SOB. Pt dragging/swinging quad cane at times, will require reinforcement of proper use. Pt mobilized as detailed below. Pt also noted to have poor short term and long term memory as no recall of information regarding rehab from previous PT and OT sessions, and limited info retained from start of session. Continue to recommend placement and pt with impaired balance, activity tolerance, endurance, cognition and safety awareness. Will continue to follow to address above deficits.     Plan    Continue current treatment plan.    DC Equipment Recommendations: Unable to determine at this time  Discharge Recommendations: Recommend post-acute placement for additional physical therapy services prior to discharge home      Subjective    \"If I didn't have this cane or hold onto the wall I'd fall.\"      Objective     10/10/22 1102   Pain 0 - 10 Group   Therapist Pain Assessment Post Activity Pain Same as Prior to Activity;Nurse Notified  (back pain not rated, agreeable to mobility)   Cognition    Cognition / Consciousness X   Level of Consciousness Alert   Safety Awareness Impaired   Comments Pleasant and cooperative. Poor short and long term memory.   Passive ROM Lower Body   Passive ROM Lower Body WDL   Active ROM Lower Body    Active ROM Lower Body  WDL   Strength Lower Body   Lower Body Strength  X   Gross Strength Generalized Weakness, Equal Bilaterally   Comments however, functional   Sensation Lower Body   Lower Extremity Sensation   WDL   Lower Body Muscle Tone   Lower Body Muscle Tone  WDL   Other Treatments   Other Treatments Provided Pt continuing to ask about " basic of rehab when educated multiple times by this therapist, the OT in previous session, and other providers/RN's.   Balance   Sitting Balance (Static) Fair +   Sitting Balance (Dynamic) Fair +   Standing Balance (Static) Fair -   Standing Balance (Dynamic) Poor +   Weight Shift Sitting Good   Weight Shift Standing Fair   Skilled Intervention Verbal Cuing;Tactile Cuing;Sequencing;Compensatory Strategies   Comments w/ quad cane (declining FWW). Continues to furniture walk   Gait Analysis   Gait Level Of Assist Contact Guard Assist   Assistive Device Quad Cane   Distance (Feet) 50   # of Times Distance was Traveled 2  (however, multiple standing rest breaks 2/2 SOB and back pain)   Deviation Increased Base Of Support;Shuffled Gait;Bradykinetic;Decreased Heel Strike;Antalgic  (slow betzy, guarded posture, furniture walking, inconsistent foot placement)   Weight Bearing Status no restrictions   Skilled Intervention Verbal Cuing;Sequencing;Tactile Cuing;Compensatory Strategies   Comments Improved balance with quad cane, however, pt continues to furniture walk. Pt not using quad cane at times, swinging or dragging it. Required multiple standing rest breaks 2/2 SOB and back pain.   Bed Mobility    Comments NT, pt seated EOB pre/post   Functional Mobility   Sit to Stand Contact Guard Assist   Bed, Chair, Wheelchair Transfer Minimal Assist   Transfer Method Stand Step   Mobility w/ quad cane in hallway   Skilled Intervention Verbal Cuing;Tactile Cuing;Sequencing;Compensatory Strategies   How much difficulty does the patient currently have...   Turning over in bed (including adjusting bedclothes, sheets and blankets)? 3   Sitting down on and standing up from a chair with arms (e.g., wheelchair, bedside commode, etc.) 3   Moving from lying on back to sitting on the side of the bed? 3   How much help from another person does the patient currently need...   Moving to and from a bed to a chair (including a wheelchair)? 3    Need to walk in a hospital room? 3   Climbing 3-5 steps with a railing? 2   6 clicks Mobility Score 17   Activity Tolerance   Sitting in Chair NT   Sitting Edge of Bed EOB pre   Standing 10 min   Comments increased WOB with ambulation   Short Term Goals    Short Term Goal # 1 Pt will perform STS transfers with FWW and SPV in 6 visits to improve functional mobility   Goal Outcome # 1 goal not met   Short Term Goal # 2 Pt will ambulate 150ft with FWW and SPV in 6 visits to access household distances   Goal Outcome # 2 Goal not met   Short Term Goal # 3 Pt will negotiate 1 steps with FWW and SPV in 6 visits in order to perform curb step to access public transportation   Goal Outcome # 3 Goal not met   Education Group   Education Provided Role of Physical Therapist;Gait Training;Use of Assistive Device   Role of Physical Therapist Patient Response Patient;Acceptance;Explanation;Verbal Demonstration   Gait Training Patient Response Patient;Acceptance;Explanation;Action Demonstration;Reinforcement Needed   Use of Assistive Device Patient Response Patient;Acceptance;Explanation;Action Demonstration;Reinforcement Needed   Anticipated Discharge Equipment and Recommendations   DC Equipment Recommendations Unable to determine at this time   Discharge Recommendations Recommend post-acute placement for additional physical therapy services prior to discharge home   Interdisciplinary Plan of Care Collaboration   IDT Collaboration with  Nursing;Occupational Therapist   Patient Position at End of Therapy Edge of Bed  (with CNA getting weight)   Collaboration Comments RN updated   Session Information   Date / Session Number  10/10- 2 (2/3, 10/12)

## 2022-10-10 NOTE — DISCHARGE PLANNING
"Case Management Discharge Planning    Admission Date: 9/27/2022  GMLOS: 4.6  ALOS: 12    6-Clicks ADL Score: 19  6-Clicks Mobility Score: 17  PT and/or OT Eval ordered: Yes  Post-acute Referrals Ordered: Yes  Post-acute Choice Obtained: Yes  Has referral(s) been sent to post-acute provider:  Yes      Anticipated Discharge Dispo: Discharge Disposition: D/T to SNF with Medicare cert in anticipation of skilled care (03)    DME Needed: No    Action(s) Taken: OTHER. This case was staffed today during IDT Rounds. Per MD, patient is medically clear for transfer to SNF.    LSW explained, most skilled facilities DECLINED. Per MD, patient might eventually improve enough to return home with [facility].    LSW met with patient at bedside. Patient appeared AAOX4. LSW explained the following facilities declined: [facility], [facility], [facility], [facility], [facility], and [facility]. LSW also explained response from [facility] is pending.    LSW explained the possibility of referring to [facility]. The patient stated he lives alone and does not have any family or friends to provide support.    Patient stated his preference is SNF, \" I have a lot of furniture as he is too weak to arrange it which will make it hard for [facility] to get inside.\"    Patient requested LSW continues to pursuit SNF placement. Patient stated he will reach out to the [facility]  and request assistance organizing his apartment in anticipation of having to return home with [facility].    Escalations Completed: Yes. LSW escalated to [facility] Director, RADHA Valdes CM Manager, RADHA Green CM Supervisor, and ASHLEE Garcia Supervisor.    Medically Clear: Yes    Next Steps: As Above. Continue to pursuit post acute placement. Await further recommendations from the [facility] Leadership Team.    Barriers to Discharge: Pending Placement. Per DPA, the skilled facilities declined due to unable to accommodate dialysis.     Is the patient up for discharge tomorrow: TBD       "

## 2022-10-10 NOTE — PROGRESS NOTES
[facility] -  PROGRESS NOTE               Author: Megan Waddell M.D. Date & Time: 10/10/2022  12:27 PM     HPI:  Patient is a 72 year old male with a PMHx of ESRD on HD qTTS, HTN, ETOH, CAD, presented with abdominal pain.  There is concern for pancreatitis and plan for possible intervention.  He states pain started about 3 days ago and worsening.  Hasn't been eating or drinking well. Elevated lipase and CT wit acute on chronic pancreatitis.  Hasn't had dialysis today but refusing dialysis today.    DAILY NEPHROLOGY SUMMARY:  See note from 9/30 for prior summaries  10/01: NAEO, +abd pain stabbing and cramping, no interest in eating   10/02: NAEO, feels better, wants to try to advance diet  10/03: Pt sitting up EOB, feels OK, reports some loose stools today, diet advanced and he was able to eat some, but still with abd discomfort, Hgb yesterday 6.4, no new labs today, pt unable to receive pRBC transfusion yesterday d/t lack of successful PIV access, VSS on RA, due for HD tomorrow  10/04: No events, pt seen on hemodialysis, VSS--see dialysis treatment sheet for full details, denies any CP/SOB, has LE edema, BP stable  10/05: sitting up in bed, continues with some abdominal pain but improving, no NV, tolerated iHD yesterday UF: 2L  10/06: No events, seen on dialysis this am, VSS--see dialysis treatment sheet for full details, denies any CP/SOB, still reports some mild abd pain, no n/v, tolerating PO but reports poor appetite  10/07: sitting up on side of bed eating, getting a bit of an appetite and trying to eat, continues with sharp pains in abdomen but getting better, tolerated iHD yesterday with UF: 1.5L  10/08: No events, seen on hemodialysis this am, VSS--see dialysis treatment sheet for full details, still with LUQ abd pain but improved, denies any CP/SOB, LE edema better  10/09: sitting up in bed, continues with abdominal pain, feels worse this morning, tolerated iHD yesterday with  "minimal UF  10/10: NAEO, no complaints, feels better, pain better    REVIEW OF SYSTEMS:    10 point ROS reviewed and is as per HPI/daily summary or otherwise negative    PMH/PSH/SH/FH:   Reviewed and unchanged since admission note    CURRENT MEDICATIONS:   Reviewed from admission to present day    VS:  /81   Pulse (!) 109   Temp 37.3 °C (99.1 °F) (Temporal)   Resp 17   Ht 1.727 m (5' 8\")   Wt 77 kg (169 lb 12.1 oz)   SpO2 94%   BMI 25.81 kg/m²     Physical Exam  Nursing note reviewed.   Constitutional:       Appearance: Normal appearance.   Eyes:      General: No scleral icterus.  Cardiovascular:      Comments: No edema  Pulmonary:      Effort: Pulmonary effort is normal.   Abdominal:      General: There is no distension.      Tenderness: There is abdominal tenderness.   Musculoskeletal:         General: No deformity.   Skin:     Findings: No rash.   Neurological:      Mental Status: He is alert.   Psychiatric:         Behavior: Behavior normal.     Fluids:  In: 538 [P.O.:538]  Out: -     LABS:  Recent Labs     10/08/22  1030 10/10/22  0313   SODIUM 136 136   POTASSIUM 4.0 4.6   CHLORIDE 99 99   CO2 27 26   GLUCOSE 110* 87   BUN 11 16   CREATININE 5.75* 6.44*   CALCIUM 8.1* 8.1*       IMAGING:   All imaging reviewed from admission to present day    IMPRESSION:  # ESRD, dependent on HD  - RIJ TDC  - qTTS HD  # Abdominal pain/pancreatitis, slow improvement  - Management per GI and primary team  - Caution with IVF in ESRD with pancreatitis, bolus PRN  - Diet advanced   # HTN, labile control, some soft BPs  - Goal BP < 140/90  - On carvedilol  # Anemia of CKD, below target, unstable  - Goal Hgb 10-11  - pRBCs unable to be transfused 10/2, plan to give with HD 10/4  - Continue MADAY  - % sat 13, ferritin 954  # CKD-MBD/Renal Osteodystrophy  - On sevelamer WM  - On Ergo weekly  - Phos 2.5     PLAN:  - TTS iHD  - UF as tolerated  - MADAY with HD  - Transfuse PRN Hgb <7   - Ferritin precludes IV Fe, can consider PO " Fe if pt can tolerate  - No dietary protein restrictions  - Diet advancement per GI/primary team  - Dose all meds per ESRD/iHD  - Holding parameters for carvedilol   - No Phos binders for now  - Adjusted gabapentin to 100mg BID-TID, max dose for ESRD   - Okay to transition to outpt hemodialysis when medically cleared

## 2022-10-11 LAB
ERYTHROCYTE [DISTWIDTH] IN BLOOD BY AUTOMATED COUNT: 63.5 FL (ref 35.9–50)
HCT VFR BLD AUTO: 24.8 % (ref 42–52)
HGB BLD-MCNC: 7.7 G/DL (ref 14–18)
MCH RBC QN AUTO: 29.2 PG (ref 27–33)
MCHC RBC AUTO-ENTMCNC: 31 G/DL (ref 33.7–35.3)
MCV RBC AUTO: 93.9 FL (ref 81.4–97.8)
PLATELET # BLD AUTO: 149 K/UL (ref 164–446)
PMV BLD AUTO: 10.3 FL (ref 9–12.9)
RBC # BLD AUTO: 2.64 M/UL (ref 4.7–6.1)
WBC # BLD AUTO: 8.2 K/UL (ref 4.8–10.8)

## 2022-10-11 PROCEDURE — A9270 NON-COVERED ITEM OR SERVICE: HCPCS | Performed by: INTERNAL MEDICINE

## 2022-10-11 PROCEDURE — 700102 HCHG RX REV CODE 250 W/ 637 OVERRIDE(OP): Performed by: INTERNAL MEDICINE

## 2022-10-11 PROCEDURE — 700111 HCHG RX REV CODE 636 W/ 250 OVERRIDE (IP): Performed by: NURSE PRACTITIONER

## 2022-10-11 PROCEDURE — 99232 SBSQ HOSP IP/OBS MODERATE 35: CPT | Performed by: FAMILY MEDICINE

## 2022-10-11 PROCEDURE — 700102 HCHG RX REV CODE 250 W/ 637 OVERRIDE(OP): Performed by: NURSE PRACTITIONER

## 2022-10-11 PROCEDURE — 700111 HCHG RX REV CODE 636 W/ 250 OVERRIDE (IP): Performed by: INTERNAL MEDICINE

## 2022-10-11 PROCEDURE — 770001 HCHG ROOM/CARE - MED/SURG/GYN PRIV*

## 2022-10-11 PROCEDURE — A9270 NON-COVERED ITEM OR SERVICE: HCPCS | Performed by: NURSE PRACTITIONER

## 2022-10-11 PROCEDURE — 85027 COMPLETE CBC AUTOMATED: CPT

## 2022-10-11 PROCEDURE — 90935 HEMODIALYSIS ONE EVALUATION: CPT

## 2022-10-11 RX ADMIN — SEVELAMER CARBONATE 800 MG: 800 TABLET, FILM COATED ORAL at 08:49

## 2022-10-11 RX ADMIN — ALLOPURINOL 100 MG: 100 TABLET ORAL at 17:54

## 2022-10-11 RX ADMIN — CARVEDILOL 6.25 MG: 6.25 TABLET, FILM COATED ORAL at 17:54

## 2022-10-11 RX ADMIN — CARVEDILOL 6.25 MG: 6.25 TABLET, FILM COATED ORAL at 08:49

## 2022-10-11 RX ADMIN — OXYCODONE HYDROCHLORIDE 15 MG: 10 TABLET ORAL at 08:49

## 2022-10-11 RX ADMIN — HEPARIN SODIUM 3700 UNITS: 1000 INJECTION, SOLUTION INTRAVENOUS; SUBCUTANEOUS at 12:20

## 2022-10-11 RX ADMIN — OXYCODONE HYDROCHLORIDE 15 MG: 10 TABLET ORAL at 03:02

## 2022-10-11 RX ADMIN — Medication 5 MG: at 21:08

## 2022-10-11 RX ADMIN — OMEPRAZOLE 20 MG: 20 CAPSULE, DELAYED RELEASE ORAL at 17:52

## 2022-10-11 RX ADMIN — GABAPENTIN 100 MG: 100 CAPSULE ORAL at 17:54

## 2022-10-11 RX ADMIN — OXYCODONE HYDROCHLORIDE 15 MG: 10 TABLET ORAL at 21:07

## 2022-10-11 RX ADMIN — OXYCODONE HYDROCHLORIDE 15 MG: 10 TABLET ORAL at 13:28

## 2022-10-11 RX ADMIN — OMEPRAZOLE 20 MG: 20 CAPSULE, DELAYED RELEASE ORAL at 04:39

## 2022-10-11 RX ADMIN — TRAZODONE HYDROCHLORIDE 50 MG: 50 TABLET ORAL at 21:07

## 2022-10-11 RX ADMIN — OXYCODONE HYDROCHLORIDE 15 MG: 10 TABLET ORAL at 17:53

## 2022-10-11 RX ADMIN — ATORVASTATIN CALCIUM 20 MG: 20 TABLET, FILM COATED ORAL at 21:08

## 2022-10-11 RX ADMIN — EPOETIN ALFA-EPBX 4000 UNITS: 4000 INJECTION, SOLUTION INTRAVENOUS; SUBCUTANEOUS at 09:45

## 2022-10-11 RX ADMIN — SEVELAMER CARBONATE 800 MG: 800 TABLET, FILM COATED ORAL at 17:52

## 2022-10-11 RX ADMIN — Medication 100 MG: at 17:52

## 2022-10-11 RX ADMIN — Medication 100 MG: at 04:39

## 2022-10-11 RX ADMIN — OXYCODONE HYDROCHLORIDE 15 MG: 10 TABLET ORAL at 06:01

## 2022-10-11 RX ADMIN — GABAPENTIN 100 MG: 100 CAPSULE ORAL at 04:39

## 2022-10-11 ASSESSMENT — PAIN DESCRIPTION - PAIN TYPE
TYPE: ACUTE PAIN
TYPE: ACUTE PAIN;CHRONIC PAIN
TYPE: ACUTE PAIN
TYPE: CHRONIC PAIN;ACUTE PAIN
TYPE: ACUTE PAIN

## 2022-10-11 ASSESSMENT — ENCOUNTER SYMPTOMS
SENSORY CHANGE: 0
BLURRED VISION: 0
DIARRHEA: 0
CHILLS: 0
DIZZINESS: 0
HEADACHES: 0
FLANK PAIN: 0
DIAPHORESIS: 0
WHEEZING: 0
FEVER: 0
VOMITING: 0
BACK PAIN: 0
SPEECH CHANGE: 0
ABDOMINAL PAIN: 1
NERVOUS/ANXIOUS: 0
SORE THROAT: 0
MYALGIAS: 0
NAUSEA: 0
PALPITATIONS: 0
SHORTNESS OF BREATH: 0
HEARTBURN: 0
COUGH: 0
NECK PAIN: 0
WEAKNESS: 1
FOCAL WEAKNESS: 0

## 2022-10-11 ASSESSMENT — FIBROSIS 4 INDEX: FIB4 SCORE: 2.84

## 2022-10-11 NOTE — THERAPY
Physical Therapy Contact Note    Patient Name: Junior Proctor  Age:  72 y.o., Sex:  male  Medical Record #: [id number]  Today's Date: 10/11/2022       10/11/22 0880   Interdisciplinary Plan of Care Collaboration   IDT Collaboration with  Nursing;;;Physician   Collaboration Comments Discussion with RN and SW regarding pt's mobility; stating pt has been ambulating up self around entire RN unit. However, when therapy sessions comense pt with c/o pain and SOB limiting his distances to 5ft at a time for a total of 50ft. Suspect may be behavioral issue at hand. Will continue to follow pt, however, difficulty with d/c recommendations given fluctating display of mobility for this therapist. If pt were to d/c home with HH, would recommend SPC or quad cane for increased stability and safety.

## 2022-10-11 NOTE — PROGRESS NOTES
[facility] -  PROGRESS NOTE               Author: Megan Waddell M.D. Date & Time: 10/11/2022  10:42 AM     HPI:  Patient is a 72 year old male with a PMHx of ESRD on HD qTTS, HTN, ETOH, CAD, presented with abdominal pain.  There is concern for pancreatitis and plan for possible intervention.  He states pain started about 3 days ago and worsening.  Hasn't been eating or drinking well. Elevated lipase and CT wit acute on chronic pancreatitis.  Hasn't had dialysis today but refusing dialysis today.    DAILY NEPHROLOGY SUMMARY:  See note from 9/30 for prior summaries  10/01: NAEO, +abd pain stabbing and cramping, no interest in eating   10/02: NAEO, feels better, wants to try to advance diet  10/03: Pt sitting up EOB, feels OK, reports some loose stools today, diet advanced and he was able to eat some, but still with abd discomfort, Hgb yesterday 6.4, no new labs today, pt unable to receive pRBC transfusion yesterday d/t lack of successful PIV access, VSS on RA, due for HD tomorrow  10/04: No events, pt seen on hemodialysis, VSS--see dialysis treatment sheet for full details, denies any CP/SOB, has LE edema, BP stable  10/05: sitting up in bed, continues with some abdominal pain but improving, no NV, tolerated iHD yesterday UF: 2L  10/06: No events, seen on dialysis this am, VSS--see dialysis treatment sheet for full details, denies any CP/SOB, still reports some mild abd pain, no n/v, tolerating PO but reports poor appetite  10/07: sitting up on side of bed eating, getting a bit of an appetite and trying to eat, continues with sharp pains in abdomen but getting better, tolerated iHD yesterday with UF: 1.5L  10/08: No events, seen on hemodialysis this am, VSS--see dialysis treatment sheet for full details, still with LUQ abd pain but improved, denies any CP/SOB, LE edema better  10/09: sitting up in bed, continues with abdominal pain, feels worse this morning, tolerated iHD yesterday with  "minimal UF  10/10: NAEO, no complaints, feels better, pain better  10/11: NAEO, no complaints, SOD, 2L UF goal    REVIEW OF SYSTEMS:    10 point ROS reviewed and is as per HPI/daily summary or otherwise negative    PMH/PSH/SH/FH:   Reviewed and unchanged since admission note    CURRENT MEDICATIONS:   Reviewed from admission to present day    VS:  BP (!) 140/76   Pulse (!) 111   Temp 37 °C (98.6 °F) (Temporal)   Resp 15   Ht 1.727 m (5' 8\")   Wt 74.9 kg (165 lb 2 oz)   SpO2 90%   BMI 25.11 kg/m²     Physical Exam  Nursing note reviewed.   Constitutional:       Appearance: Normal appearance.   Eyes:      General: No scleral icterus.  Cardiovascular:      Comments: No edema  Pulmonary:      Effort: Pulmonary effort is normal.   Abdominal:      General: There is no distension.      Tenderness: There is abdominal tenderness.   Musculoskeletal:         General: No deformity.   Skin:     Findings: No rash.   Neurological:      Mental Status: He is alert.   Psychiatric:         Behavior: Behavior normal.     Fluids:  In: 238 [P.O.:238]  Out: -     LABS:  Recent Labs     10/10/22  0313   SODIUM 136   POTASSIUM 4.6   CHLORIDE 99   CO2 26   GLUCOSE 87   BUN 16   CREATININE 6.44*   CALCIUM 8.1*       IMAGING:   All imaging reviewed from admission to present day    IMPRESSION:  # ESRD, dependent on HD  - RIJ TDC  - qTTS HD  # Abdominal pain/pancreatitis, slow improvement  - Management per GI and primary team  - Caution with IVF in ESRD with pancreatitis, bolus PRN  - Diet advanced   # HTN, labile control, some soft BPs  - Goal BP < 140/90  - On carvedilol  # Anemia of CKD, below target, unstable  - Goal Hgb 10-11  - pRBCs unable to be transfused 10/2, plan to give with HD 10/4  - Continue MADAY  - % sat 13, ferritin 954  # CKD-MBD/Renal Osteodystrophy  - On sevelamer WM  - On Ergo weekly  - Phos 2.5     PLAN:  - TTS iHD  - UF as tolerated  - MADAY with HD  - Transfuse PRN Hgb <7   - Ferritin precludes IV Fe, can consider PO " Fe if pt can tolerate and needed  - No dietary protein restrictions  - Diet advancement per GI/primary team  - Dose all meds per ESRD/iHD  - Holding parameters for carvedilol   - No Phos binders for now  - Max dose gabapentin is 300mg daily in ESRD  - Okay to transition to outpt hemodialysis when medically cleared

## 2022-10-11 NOTE — DISCHARGE PLANNING
Received Choice form at 1405  Agency/Facility Name: [facility]  Referral sent per Choice form @ [phone]  Agency[facility]Fac[facility] Name: [facility]   Outcome: DPA left a voicemail regarding referral. DPA waiting on a call back for updates    1543  Agency/Facility Name: [facility]   Spoke To: [place]   Outcome: Referral is currently under review at this time. DPA waiting on a call back for updates.

## 2022-10-11 NOTE — PROGRESS NOTES
[facility] Progress Note     Hemodialysis treatment x 3 hours completed as ordered per Dr. HOLLI Waddell  Treatment started at 0915 and ended at 1216.      Net UF Removed: 2,000 mL     Patient tolerated treatment well without any issues. UF goal reached.  Right chest tunneled CVC access with good patency and flow x 2  Patient SBPs and NJ slightly elevated, asymptomatic ptherwise  Patient stable during and post treatment., no complaitns.  See Acute HD flow sheets on clinical data notes under media for details.      Post tx access: Right chest tunneled HD catheter flushed with saline then locked with Heparin 1000 units/ml per designated amount in each lumen (see MAR) then clamped, capped aseptically and labeled properly. CVC dressings clean, dry, intact. No s/s of infection to HD catheter site. Heparin lock to be aspirated prior to next dialysis/CVC use by dialysis RN only. Please do not flush or draw from ports.     Please notify Nephrologist/Dialysis for follow-up.     1240 Report given to primary care nurse MATT Denis RN.     1242 Patient transported back to room via bed by transport. Patient alert, awake ad stable.

## 2022-10-11 NOTE — DISCHARGE PLANNING
Case Management Discharge Planning    Admission Date: 9/27/2022  GMLOS: 4.6  ALOS: 13    6-Clicks ADL Score: 19  6-Clicks Mobility Score: 17  PT and/or OT Eval ordered: Yes  Post-acute Referrals Ordered: Yes  Post-acute Choice Obtained: Yes  Has referral(s) been sent to post-acute provider:  Yes      Anticipated Discharge Dispo: Discharge Disposition: D/T to SNF with Medicare cert in anticipation of skilled care (03)    DME Needed: No    Action(s) Taken: Choice obtained. This case was discussed during IDT Rounds. Per MD, patient is medically clear for discharge home with [facility].    LSW met with patient at bedside and issued [facility] choice. [facility] choice [facility], choice form faxed to DPA.    Escalations Completed: None    Medically Clear: Yes    Next Steps: Awaiting acceptance from [facility].    Barriers to Discharge: Accepting [facility] Agency.    Is the patient up for discharge tomorrow: ODALYS

## 2022-10-11 NOTE — FACE TO FACE
Face to Face Note  -  Durable Medical Equipment    Jose Amato M.D. - NPI: [id number]  I certify that this patient is under my care and that they have had a durable medical equipment(DME)face to face encounter by myself that meets the physician DME face-to-face encounter requirements with this patient on:    Date of encounter:   Patient:                    MRN:                       YOB: 2020  Junior Proctor  [id number]  1950     The encounter with the patient was in whole, or in part, for the following medical condition, which is the primary reason for durable medical equipment:  Other - Pancreatitis    I certify that, based on my findings, the following durable medical equipment is medically necessary:  Cane.        ------------------------------------------------------------------------------------------------------------------    Face to Face Supporting Documentation - Home Health    The encounter with this patient was in whole or in part the primary reason for home health admission.    Date of encounter:   Patient:                    MRN:                       YOB: 2020  Junior Proctor  [id number]  1950     Home health to see patient for:  Skilled Nursing care for assessment, interventions & education, Physical Therapy evaluation and treatment, and Occupational therapy evaluation and treatment    Skilled need for:  Exacerbation of Chronic Disease State Pancreatitis    Skilled nursing interventions to include:  Comment: PT/OT    Homebound evidenced status by:  Needs the assistance of another person in order to leave the home. Leaving home must require a considerable and taxing effort. There must exist a normal inability to leave the home.    Community Physician to provide follow up care: Pcp Pt States None     Optional Interventions    Wound information & treatment:    Home Infusion Therapy orders:    Line/Drain/Airway:    I certify the face to face  encounter for this home care referral meets the CMS requirements and the encounter/clinical assessment with the patient was, in whole, or in part, for the medical condition(s) listed above, which is the primary reason for home health care. Based on my clinical findings: the service(s) are medically necessary, support the need for home health care, and the homebound criteria are met.  I certify that this patient has had a face to face encounter by myself.  Jose Amato M.D. - NPI: [id number]    *Debility, frailty and advanced age in the absence of an acute deterioration or exacerbation of a condition do not qualify a patient for home health.

## 2022-10-11 NOTE — CARE PLAN
Problem: Pain - Standard  Goal: Alleviation of pain or a reduction in pain to the patient’s comfort goal  Outcome: Progressing     Problem: Knowledge Deficit - Standard  Goal: Patient and family/care givers will demonstrate understanding of plan of care, disease process/condition, diagnostic tests and medications  Outcome: Progressing   The patient is Stable - Low risk of patient condition declining or worsening    Shift Goals  Clinical Goals: pain control  Patient Goals: pain control, rest  Family Goals: n/a    Progress made toward(s) clinical / shift goals:  pts pain managed with prn pain medication.  POC discussed with pt, pt verbalized understanding of plan.

## 2022-10-11 NOTE — PROGRESS NOTES
Pt noted to have increased bilateral lower extremity edema and facial swelling. VSS, pt denies  any SOB.  This RN contacted Chio PLATT with pt updates, no new orders received at this time.

## 2022-10-11 NOTE — PROGRESS NOTES
Report received from Yesica GARCIA, assumed care at 1900  A0x4  Pt declines any SOB on room air, chest pain, new onset of numbness/tingling  Pt rates pain at 10/10, on a scale of 1-10, pt medicated per MAR  Anuric: dialysis pt   Pt has + flatus, + bowel sounds, + BM on 10/9  Pt ambulates independently  Pt is tolerating a regular-low fat diet, pt denies any nausea/vomiting  Plan of care discussed, all questions answered.Call light is within reach, treaded slipper socks on, bed in lowest/locked position, hourly rounding in place, all needs met at this time.

## 2022-10-11 NOTE — PROGRESS NOTES
Hospital Medicine Daily Progress Note    Date of Service  10/11/2022    Chief Complaint  Junior Proctor is a 72 y.o. male admitted 9/27/2022 with pancreatitis    Hospital Course  Admitted with acute on chronic pancreatitis.  Patient was placed on n.p.o., pain control, and IVF hydration was started.  Patient with history of pancreatic duct stones in the past.  Patient had undergone ERCP - pancreatic duct stone removal, placement of pancreatic duct stent on 6/5/2022 and ERCP - pancreatic duct stone removed by balloon sweep, placement of new Holt 4Fr x 11cm single pigtail self ejecting prophylatic pancreatic duct stent, removal of occluded in-situ pancreatic duct stent on 9/19/2022. Gastroenterology was consulted on the case.  They recommended conservative management of pancreatitis with IV hydration, careful advancement of diet, daily labs, agree with surgical consultation and following patient for possible cholecystectomy, Alcohol cessation, No plan for ERCP at this time given that current CT does not demonstrate stent is in place at this time and appears to have self ejected which was expected.  He was also noted to have cholelithiasis, with possible cholecystitis, and started on empiric coverage with IV Rocephin and Flagyl.  Surgery was also consulted on the case.  He has known history of end-stage renal disease on hemodialysis Nephrology was consulted on the case. Per surgery, symptoms and clinical findings more consistent with acute on chronic alcoholic pancreatitis, no compelling evidence for active biliary colic or gallstone pancreatitis.  When his pain was better controlled, he was started on clear liquids and slowly advanced to a regular diet which he was able to tolerate.    Interval Problem Update  Pancreatitis -tolerating regular diet  HTN - sbp 103-152    I have discussed this patient's plan of care and discharge plan at IDT rounds today with Case Management, Nursing, Nursing leadership, and other members  of the IDT team.    Consultants/Specialty  general surgery, GI, and nephrology    Code Status  Full Code    Disposition  Patient is medically cleared pending home health acceptance  for discharge.   Anticipate discharge to to home with organized home healthcare and close outpatient follow-up.  I have placed the appropriate orders for post-discharge needs.    Review of Systems  Review of Systems   Constitutional:  Negative for chills, diaphoresis, fever and malaise/fatigue.   HENT:  Negative for congestion, hearing loss and sore throat.    Eyes:  Negative for blurred vision.   Respiratory:  Negative for cough, shortness of breath and wheezing.    Cardiovascular:  Positive for leg swelling. Negative for chest pain and palpitations.   Gastrointestinal:  Positive for abdominal pain. Negative for diarrhea, heartburn, nausea and vomiting.   Genitourinary:  Negative for dysuria, flank pain and hematuria.   Musculoskeletal:  Negative for back pain, joint pain, myalgias and neck pain.   Skin:  Negative for rash.   Neurological:  Positive for weakness. Negative for dizziness, sensory change, speech change, focal weakness and headaches.   Psychiatric/Behavioral:  The patient is not nervous/anxious.       Physical Exam  Temp:  [36.2 °C (97.2 °F)-37.3 °C (99.1 °F)] 36.3 °C (97.4 °F)  Pulse:  [] 114  Resp:  [15-18] 18  BP: (103-152)/(54-98) 152/85  SpO2:  [90 %-95 %] 92 %    Physical Exam  Vitals and nursing note reviewed.   HENT:      Head: Normocephalic and atraumatic.      Nose: No congestion.      Mouth/Throat:      Mouth: Mucous membranes are moist.   Eyes:      Extraocular Movements: Extraocular movements intact.      Conjunctiva/sclera: Conjunctivae normal.   Cardiovascular:      Rate and Rhythm: Regular rhythm. Tachycardia present.   Pulmonary:      Effort: Pulmonary effort is normal.      Breath sounds: Normal breath sounds.   Abdominal:      General: There is no distension.      Tenderness: There is abdominal  tenderness. There is no guarding or rebound.   Musculoskeletal:      Cervical back: No tenderness.      Right lower leg: Edema present.      Left lower leg: Edema present.   Skin:     General: Skin is warm and dry.   Neurological:      General: No focal deficit present.      Mental Status: He is alert and oriented to person, place, and time.      Cranial Nerves: No cranial nerve deficit.       Fluids    Intake/Output Summary (Last 24 hours) at 10/11/2022 1424  Last data filed at 10/11/2022 1220  Gross per 24 hour   Intake 738 ml   Output 2500 ml   Net -1762 ml         Laboratory  Recent Labs     10/10/22  0313 10/11/22  1100   WBC 9.8 8.2   RBC 2.41* 2.64*   HEMOGLOBIN 7.2* 7.7*   HEMATOCRIT 23.0* 24.8*   MCV 95.4 93.9   MCH 29.9 29.2   MCHC 31.3* 31.0*   RDW 64.5* 63.5*   PLATELETCT 151* 149*   MPV 10.6 10.3       Recent Labs     10/10/22  0313   SODIUM 136   POTASSIUM 4.6   CHLORIDE 99   CO2 26   GLUCOSE 87   BUN 16   CREATININE 6.44*   CALCIUM 8.1*                         Imaging  IR-US GUIDED PIV   Final Result    Ultrasound-guided PERIPHERAL IV INSERTION performed by    qualified nursing staff as above.      US-RUQ   Final Result   Addendum (preliminary) 1 of 1   No pancreatic duct stent is seen.      Dr. Gallardo discussed these findings with Wilmar Gallardo.      Final      1.  Cholelithiasis without discrete sonographic evidence of acute cholecystitis. Patient did demonstrate a Evans's sign throughout the course of the exam. There is strong clinical suspicion for acute cholecystitis, a HIDA scan should be obtained for    further evaluation.   2.  Hepatic steatosis.   3.  Hepatomegaly.   4.  The pancreas is obscured by overlying bowel gas.      CT-ABDOMEN-PELVIS W/O   Final Result   Addendum (preliminary) 1 of 1   Subacute right L2 and L3 transverse process fractures.      Final      1.  Acute on chronic pancreatitis. Inflammatory changes track along the left paracolic gutter.   2.  Likely reactive inflammation of  the colon at the splenic flexure.   3.  Mildly dilated gallbladder with likely cholelithiasis. If there is concern for acute cholecystitis, ultrasound can be performed.   4.  Features of medical renal disease.   5.  Sigmoid diverticulosis.   6.  Atherosclerotic changes.   7.  Right hepatic lobe lesion, previously characterized as hemangioma on MRI             Assessment/Plan  * Acute on chronic pancreatitis (HCC)- (present on admission)  Assessment & Plan  Alcohol related causing acute episode?  per Gastroenterology - conservative management of pancreatitis with IV hydration, careful advancement of diet, daily labs, agree with surgical consultation and following patient for possible cholecystectomy, Alcohol cessation, No plan for ERCP at this time given that current CT does not demonstrate stent is in place at this time and appears to have self ejected which was expected.  Alcohol cessation      Cholelithiasis- (present on admission)  Assessment & Plan  Finished course of Rocephin and Flagyl   Per Surgery - Symptoms and clinical findings more consistent with acute on chronic alcoholic pancreatitis. No compelling evidence for active biliary colic or gallstone pancreatitis.  9/29/2022    HTN (hypertension)- (present on admission)  Assessment & Plan  Carvedilol   IV labetalol and hydralazine as needed with parameters    Rheumatoid arthritis (HCC)- (present on admission)  Assessment & Plan  Etanercept weekly    Alcohol dependence (HCC)- (present on admission)  Assessment & Plan  No withdrawal symptoms    ESRD on dialysis (HCC)- (present on admission)  Assessment & Plan  HD per Nephrology    Gout- (present on admission)  Assessment & Plan  Allopurinol    Anemia- (present on admission)  Assessment & Plan  Anemia of chronic disease   Epogen  Transfused 1 unit PRBC         VTE prophylaxis: heparin ppx    I have performed a physical exam and reviewed and updated ROS and Plan today (10/11/2022). In review of yesterday's note  (10/10/2022), there are no changes except as documented above.

## 2022-10-12 ENCOUNTER — PHARMACY VISIT (OUTPATIENT)
Dept: PHARMACY | Facility: MEDICAL CENTER | Age: 72
End: 2022-10-12
Payer: COMMERCIAL

## 2022-10-12 VITALS
RESPIRATION RATE: 15 BRPM | DIASTOLIC BLOOD PRESSURE: 68 MMHG | BODY MASS INDEX: 25.03 KG/M2 | SYSTOLIC BLOOD PRESSURE: 114 MMHG | TEMPERATURE: 97.8 F | HEIGHT: 68 IN | OXYGEN SATURATION: 99 % | WEIGHT: 165.12 LBS | HEART RATE: 53 BPM

## 2022-10-12 PROCEDURE — A9270 NON-COVERED ITEM OR SERVICE: HCPCS | Performed by: NURSE PRACTITIONER

## 2022-10-12 PROCEDURE — 700102 HCHG RX REV CODE 250 W/ 637 OVERRIDE(OP): Performed by: INTERNAL MEDICINE

## 2022-10-12 PROCEDURE — RXMED WILLOW AMBULATORY MEDICATION CHARGE: Performed by: FAMILY MEDICINE

## 2022-10-12 PROCEDURE — A9270 NON-COVERED ITEM OR SERVICE: HCPCS | Performed by: INTERNAL MEDICINE

## 2022-10-12 PROCEDURE — 700102 HCHG RX REV CODE 250 W/ 637 OVERRIDE(OP): Performed by: NURSE PRACTITIONER

## 2022-10-12 PROCEDURE — 97535 SELF CARE MNGMENT TRAINING: CPT

## 2022-10-12 PROCEDURE — 99239 HOSP IP/OBS DSCHRG MGMT >30: CPT | Performed by: FAMILY MEDICINE

## 2022-10-12 PROCEDURE — 700101 HCHG RX REV CODE 250: Performed by: INTERNAL MEDICINE

## 2022-10-12 RX ORDER — OXYCODONE HYDROCHLORIDE 10 MG/1
10 TABLET ORAL EVERY 6 HOURS PRN
Qty: 20 TABLET | Refills: 0 | Status: SHIPPED | OUTPATIENT
Start: 2022-10-12 | End: 2022-10-17

## 2022-10-12 RX ADMIN — Medication 100 MG: at 04:45

## 2022-10-12 RX ADMIN — SEVELAMER CARBONATE 800 MG: 800 TABLET, FILM COATED ORAL at 12:38

## 2022-10-12 RX ADMIN — OXYCODONE HYDROCHLORIDE 15 MG: 10 TABLET ORAL at 09:58

## 2022-10-12 RX ADMIN — GABAPENTIN 100 MG: 100 CAPSULE ORAL at 04:45

## 2022-10-12 RX ADMIN — OXYCODONE HYDROCHLORIDE 15 MG: 10 TABLET ORAL at 05:04

## 2022-10-12 RX ADMIN — OMEPRAZOLE 20 MG: 20 CAPSULE, DELAYED RELEASE ORAL at 04:45

## 2022-10-12 RX ADMIN — SEVELAMER CARBONATE 800 MG: 800 TABLET, FILM COATED ORAL at 09:55

## 2022-10-12 RX ADMIN — CARVEDILOL 6.25 MG: 6.25 TABLET, FILM COATED ORAL at 09:55

## 2022-10-12 RX ADMIN — LIDOCAINE 1 PATCH: 50 PATCH TOPICAL at 04:49

## 2022-10-12 ASSESSMENT — COGNITIVE AND FUNCTIONAL STATUS - GENERAL
DRESSING REGULAR LOWER BODY CLOTHING: A LITTLE
PERSONAL GROOMING: A LITTLE
HELP NEEDED FOR BATHING: A LITTLE
TOILETING: A LITTLE
DRESSING REGULAR UPPER BODY CLOTHING: A LITTLE
SUGGESTED CMS G CODE MODIFIER DAILY ACTIVITY: CK
EATING MEALS: A LITTLE
DAILY ACTIVITIY SCORE: 18

## 2022-10-12 ASSESSMENT — PAIN DESCRIPTION - PAIN TYPE
TYPE: ACUTE PAIN;CHRONIC PAIN
TYPE: ACUTE PAIN
TYPE: ACUTE PAIN

## 2022-10-12 NOTE — CARE PLAN
The patient is Stable - Low risk of patient condition declining or worsening    Shift Goals  Clinical Goals: Pain control  Patient Goals: Pain control, rest  Family Goals: n/a    Progress made toward(s) clinical / shift goals:  Patient pain managed with prescribed medications, repositioning, and heat packs.    Patient is not progressing towards the following goals:

## 2022-10-12 NOTE — DISCHARGE PLANNING
Case Management Discharge Planning    Admission Date: 9/27/2022  GMLOS: 4.6  ALOS: 14    6-Clicks ADL Score: 19  6-Clicks Mobility Score: 17  PT and/or OT Eval ordered: Yes  Post-acute Referrals Ordered: Yes  Post-acute Choice Obtained: Yes  Has referral(s) been sent to post-acute provider:  Yes      Anticipated Discharge Dispo: Discharge Disposition: D/T to SNF with Medicare cert in anticipation of skilled care (03)    DME Needed: No    Action(s) Taken: OTHER. Per MD, patient is medically clear for discharge home with [facility]. Per Anna SMITH [facility] ACCEPTED, MD aware.    11:30am - Per patient's request, [facility] issued a cab voucher to his home [address] [place] [address]. [facility] also issued the 2nd IMM, patient provided Verbal Consent for this LSW to sign the form on his behalf.    LSW spoke with [facility] Coordinator via phone. Per Nayeli, patient's outpatient dialysis is confirmed.    Escalations Completed: None    Medically Clear: Yes    Next Steps: Patient to discharge home today with C.    Barriers to Discharge: None    Is the patient up for discharge tomorrow:

## 2022-10-12 NOTE — DISCHARGE PLANNING
Agency/Facility Name: [facility]  Outcome: DPA left a voicemail regarding referral. DPA waiting on a call back for updates.    0844  Received Choice form at 1405  Agency/Facility Name: [facility]  Referral sent per Choice form @ 0843      0904  Agency/Facility Name: [facility]   Spoke To: Madison   Outcome: referral is under review. DPA waiting on a call back for updates.    0912  Agency/Facility Name: [facility]   Spoke To: Madison  Outcome: Per Madison, Pt is accepted with [facility].    LSW notified    1031  Agency[facility]Facility Name: [facility]   Spoke To: Madison   Outcome: DPA notifed Madison Pt is d/c home today.

## 2022-10-12 NOTE — PROGRESS NOTES
"Received report from previous shift RN.  Assessment complete.  A&O x 4. Patient calls appropriately.  Patient ambulates standby assist with FWW.  Patient denies SOB.  Patient has 9/10 pain. Pain managed per MAR.  Denies N&V. Tolerating low fat regular diet.  Anuric dialysis pt, + flatus, last BM 10/11.  SCD's on.  Patient calm and cooperative with plan of care.  Call light and personal belongings with in reach. Hourly rounding in place. All needs met at this time.      Pt later found to be A&O x 2, disoriented to time and situation. Pt looking for bathroom outside of room and stated he was \"looking for his room\" while in his room. Pt easily reoriented.  "

## 2022-10-12 NOTE — DIETARY
Nutrition Services Brief Update:    Day 14 of admit.  Junior Proctor is a 72 y.o. male with admitting DX of Acute on chronic pancreatitis (HCC) [K85.90, K86.1]  Pancreatitis, gallstone [K85.10]    Current Diet: Progressing    Problem: Nutritional:  Goal: Achieve adequate nutritional intake  Description: Patient will consume 50% of meals  Outcome: progressing  Per ADL flow sheet, PO has been % of meals.  RD will continue to monitor for consistency.

## 2022-10-12 NOTE — PROGRESS NOTES
[facility] -  PROGRESS NOTE               Author: Megan Waddell M.D. Date & Time: 10/12/2022  9:51 AM     HPI:  Patient is a 72 year old male with a PMHx of ESRD on HD qTTS, HTN, ETOH, CAD, presented with abdominal pain.  There is concern for pancreatitis and plan for possible intervention.  He states pain started about 3 days ago and worsening.  Hasn't been eating or drinking well. Elevated lipase and CT wit acute on chronic pancreatitis.  Hasn't had dialysis today but refusing dialysis today.    DAILY NEPHROLOGY SUMMARY:  See note from 9/30 for prior summaries  10/01: NAEO, +abd pain stabbing and cramping, no interest in eating   10/02: NAEO, feels better, wants to try to advance diet  10/03: Pt sitting up EOB, feels OK, reports some loose stools today, diet advanced and he was able to eat some, but still with abd discomfort, Hgb yesterday 6.4, no new labs today, pt unable to receive pRBC transfusion yesterday d/t lack of successful PIV access, VSS on RA, due for HD tomorrow  10/04: No events, pt seen on hemodialysis, VSS--see dialysis treatment sheet for full details, denies any CP/SOB, has LE edema, BP stable  10/05: sitting up in bed, continues with some abdominal pain but improving, no NV, tolerated iHD yesterday UF: 2L  10/06: No events, seen on dialysis this am, VSS--see dialysis treatment sheet for full details, denies any CP/SOB, still reports some mild abd pain, no n/v, tolerating PO but reports poor appetite  10/07: sitting up on side of bed eating, getting a bit of an appetite and trying to eat, continues with sharp pains in abdomen but getting better, tolerated iHD yesterday with UF: 1.5L  10/08: No events, seen on hemodialysis this am, VSS--see dialysis treatment sheet for full details, still with LUQ abd pain but improved, denies any CP/SOB, LE edema better  10/09: sitting up in bed, continues with abdominal pain, feels worse this morning, tolerated iHD yesterday with  "minimal UF  10/10: NAEO, no complaints, feels better, pain better  10/11: NAEO, no complaints, SOD, 2L UF goal  10/12: NAEO, wants to go home in 1-2 days; Pain about same    REVIEW OF SYSTEMS:    10 point ROS reviewed and is as per HPI/daily summary or otherwise negative    PMH/PSH/SH/FH:   Reviewed and unchanged since admission note    CURRENT MEDICATIONS:   Reviewed from admission to present day    VS:  /68   Pulse (!) 53   Temp 36.6 °C (97.8 °F) (Temporal)   Resp 15   Ht 1.727 m (5' 8\")   Wt 74.9 kg (165 lb 2 oz)   SpO2 99%   BMI 25.11 kg/m²     Physical Exam  Nursing note reviewed.   Constitutional:       Appearance: Normal appearance.   Eyes:      General: No scleral icterus.  Cardiovascular:      Comments: No edema  Pulmonary:      Effort: Pulmonary effort is normal.   Abdominal:      General: There is no distension.      Tenderness: There is abdominal tenderness.   Musculoskeletal:         General: No deformity.   Skin:     Findings: No rash.   Neurological:      Mental Status: He is alert.   Psychiatric:         Behavior: Behavior normal.     Fluids:  In: 500 [Dialysis:500]  Out: 2500     LABS:  Recent Labs     10/10/22  0313   SODIUM 136   POTASSIUM 4.6   CHLORIDE 99   CO2 26   GLUCOSE 87   BUN 16   CREATININE 6.44*   CALCIUM 8.1*       IMAGING:   All imaging reviewed from admission to present day    IMPRESSION:  # ESRD, dependent on HD  - RIJ TDC  - qTTS HD  # Abdominal pain/pancreatitis, slow improvement  - Management per GI and primary team  - Caution with IVF in ESRD with pancreatitis, bolus PRN  - Diet advanced   # HTN, labile control, some soft BPs  - Goal BP < 140/90  - On carvedilol  # Anemia of CKD, below target, unstable  - Goal Hgb 10-11  - pRBCs unable to be transfused 10/2, plan to give with HD 10/4  - Continue MADAY  - % sat 13, ferritin 954  # CKD-MBD/Renal Osteodystrophy  - On sevelamer WM  - On Ergo weekly  - Phos 2.5     PLAN:  - TTS iHD  - UF as tolerated  - MADAY with HD  - " Transfuse PRN Hgb <7   - No dietary protein restrictions  - Diet advancement per GI/primary team  - Dose all meds per ESRD/iHD  - No Phos binders for now   - Max dose gabapentin is 300mg daily in ESRD  - Okay to transition to outpt hemodialysis when medically cleared

## 2022-10-12 NOTE — DISCHARGE INSTRUCTIONS
Chronic Pancreatitis    Chronic pancreatitis is long-lasting inflammation and scarring of the pancreas. The pancreas is a gland that is located behind the stomach. It makes enzymes that help to digest food. The pancreas also releases hormones called glucagon and insulin, which help regulate blood sugar (glucose). Damage to the pancreas may affect digestion, cause pain in the upper abdomen and back, and cause diabetes. Inflammation can also irritate other organs in the abdomen near the pancreas.  At first, pancreatitis may be sudden (acute). If you have several or prolonged episodes of acute pancreatitis, the condition can turn into chronic pancreatitis.  What are the causes?  The most common cause of this condition is alcohol abuse. Other causes include:  High (elevated) levels of triglycerides in the blood (hypertriglyceridemia).  Gallstones or other conditions that can block the tube that drains the pancreas (pancreatic duct).  Pancreatic cancer.  Cystic fibrosis.  Too much calcium in the blood (hypercalcemia), which may be caused by an overactive parathyroid gland (hyperparathyroidism).  Certain medicines.  Injury to the pancreas.  Infection.  Autoimmune pancreatitis. This is when the body's disease-fighting (immune) system attacks the pancreas.  Genes that are passed from parent to child (inherited).  In some cases, the cause may not be known.  What increases the risk?  This condition is more likely to develop in:  Men.  People who are 35-55 years old.  People who have a family history of pancreatitis.  People who smoke tobacco.  People who drink large amounts of alcohol over a long period of time.  What are the signs or symptoms?  Symptoms of this condition may include:  Pain in the abdomen or upper back. Pain may get worse after eating.  Nausea and vomiting.  Fever.  Weight loss.  A change in the color and consistency of bowel movements, such as stools that are oily, fatty, or katerin-colored.  How is this  diagnosed?  This condition is diagnosed based on your symptoms, your medical history, and a physical exam. You may have tests, such as:  Blood tests.  Stool samples.  Biopsy of the pancreas. This is the removal of a small amount of pancreas tissue to be tested in a lab.  Imaging tests, such as:  X-rays.  CT scan.  MRI.  Ultrasound.  How is this treated?  You may need to be treated at a hospital. Treatment may involve:  Resting the pancreas. You may need to stop eating and drinking for a few days to give your pancreas time to recover. During this time, you will be given IV fluids to keep you hydrated.  Controlling pain. You may be given pain medicines by mouth (orally) or as injections.  Improving digestion. You may be given:  Medicines to replace your pancreatic enzymes.  Vitamin supplements.  A specific diet to follow. You may work with a diet and nutrition specialist (dietitian) to make an eating plan.  Surgery to:  Clear the pancreatic ducts of any blockages, such as gallstones.  Remove any fluid or damaged tissue from the pancreas.  Other treatments may include:  Preventing diabetes. Your health care provider may recommend that you:  Get regular screening tests for diabetes.  Monitor your blood glucose regularly.  Lifestyle changes, such as stopping alcohol use.  Steroid medicines, if your condition is caused by your immune system attacking your body's own tissues (autoimmune disease).  Follow these instructions at home:  Eating and drinking         Do not drink alcohol. If you need help quitting, ask your health care provider.  Follow a diet as told by your health care provider or dietitian, if this applies. This may include:  Limiting how much fat you eat.  Eating smaller meals more often.  Avoiding caffeine.  Drink enough fluid to keep your urine pale yellow.  General instructions  Take over-the-counter and prescription medicines only as told by your health care provider. These include vitamin  supplements.  Do not drive or use heavy machinery while taking prescription pain medicine.  If you are taking prescription pain medicine, take actions to prevent or treat constipation. Your health care provider may recommend that you:  Take an over-the-counter or prescription medicine for constipation.  Eat foods that are high in fiber such as whole grains and beans.  Limit foods that are high in fat and processed sugars, such as fried or sweet foods.  Do not use any products that contain nicotine or tobacco, such as cigarettes and e-cigarettes. If you need help quitting, ask your health care provider.  If recommended by your health care provider, monitor your blood glucose at home.  Keep all follow-up visits as told by your health care provider. This is important.  Contact a health care provider if:  You have pain that does not get better with medicine.  You have a fever.  You have sudden weight loss.  Get help right away if:  Your pain suddenly gets worse.  You have sudden swelling in your abdomen.  You start to vomit often.  You vomit blood.  You have diarrhea that does not go away.  You have blood in your stool.  You become confused or you have trouble thinking clearly.  Summary  Chronic pancreatitis is long-lasting inflammation and scarring of the pancreas. Damage to the pancreas may affect digestion, cause pain in the upper abdomen and back, and cause diabetes. Inflammation can also irritate other organs in the abdomen near the pancreas.  Common causes of this condition are alcohol abuse, gallstones, high (elevated) levels of triglycerides, and certain medicines.  This condition is sometimes treated at a hospital and may involve resting the pancreas, controlling pain, replacing enzymes, and avoiding alcohol.  This information is not intended to replace advice given to you by your health care provider. Make sure you discuss any questions you have with your health care provider.  Document Released: 01/13/2017  Document Revised: 10/07/2019 Document Reviewed: 08/17/2018  Elsevier Patient Education © 2020 [employer]

## 2022-10-12 NOTE — THERAPY
Occupational Therapy  Daily Treatment     Patient Name: Junior Proctor  Age:  72 y.o., Sex:  male  Medical Record #: [id number]  Today's Date: 10/12/2022     Precautions  Precautions: Fall Risk    Assessment    Patient seen for OT treat necessitating Min A/CG with ADLs and mobility with CG.     Plan    Continue current treatment plan.    DC Equipment Recommendations: Quad Cane  Discharge Recommendations: Recommend home health for continued occupational therapy services (would benefit from supportive community environment)       Objective     10/12/22 0910   Balance   Comments best with QC, reports and demos furniture cruising to bathroom   Activities of Daily Living   Eating Modified Independent  (EOB)   Grooming Supervision;Standing   Bathing Standby Assist  (light sponge)   Upper Body Dressing Minimal Assist  (2/2 IV lines)   Lower Body Dressing Supervision   Toileting Standby Assist   Comments cues for safety   Functional Mobility   Toilet Transfers Contact Guard Assist   Mobility QC   Patient / Family Goals   Patient / Family Goal #1 to improve his balance   Goal #1 Outcome Progressing as expected   Short Term Goals   Short Term Goal # 1 Pt will don pants with supervision   Goal Outcome # 1 Progressing as expected   Short Term Goal # 2 Pt will stand at the sink to groom with supervision   Goal Outcome # 2 Progressing as expected   Short Term Goal # 3 Pt will complete toilet transfers with supervision   Goal Outcome # 3 Progressing as expected

## 2022-10-12 NOTE — DISCHARGE SUMMARY
Discharge Summary    CHIEF COMPLAINT ON ADMISSION  Chief Complaint   Patient presents with    Flank Pain     L flank pain, worsened 40 mins ago. 8/10 pain. Ptrecently had stent removed from pancreas 2 days ago.  Pt denies n/v. Dialysis pt.        Reason for Admission  ems     Admission Date  9/27/2022    CODE STATUS  Full Code    HPI & HOSPITAL COURSE  This is a 72 y.o. male here with acute on chronic pancreatitis.  Patient was placed on n.p.o., pain control, and IVF hydration was started.  Patient with history of pancreatic duct stones in the past.  Patient had undergone ERCP - pancreatic duct stone removal, placement of pancreatic duct stent on 6/5/2022 and ERCP - pancreatic duct stone removed by balloon sweep, placement of new Holt 4Fr x 11cm single pigtail self ejecting prophylatic pancreatic duct stent, removal of occluded in-situ pancreatic duct stent on 9/19/2022. Gastroenterology was consulted on the case.  They recommended conservative management of pancreatitis with IV hydration, careful advancement of diet, daily labs, agree with surgical consultation and following patient for possible cholecystectomy, Alcohol cessation, No plan for ERCP at this time given that current CT does not demonstrate stent is in place at this time and appears to have self ejected which was expected.  He was also noted to have cholelithiasis, with possible cholecystitis, and started on empiric coverage with IV Rocephin and Flagyl.  Surgery was also consulted on the case.  He has known history of end-stage renal disease on hemodialysis Nephrology was consulted on the case. Per surgery, symptoms and clinical findings more consistent with acute on chronic alcoholic pancreatitis, no compelling evidence for active biliary colic or gallstone pancreatitis.  When his pain was better controlled, he was started on clear liquids and slowly advanced to a regular diet which he was able to tolerate.  Education and counseling was provided on  alcohol cessation.    Therefore, he is discharged in good and stable condition to home with organized home healthcare and close outpatient follow-up.    The patient met 2-midnight criteria for an inpatient stay at the time of discharge.    Discharge Date  10/12/2022    FOLLOW UP ITEMS POST DISCHARGE  [facility]    DISCHARGE DIAGNOSES  Principal Problem:    Acute on chronic pancreatitis (HCC) POA: Yes  Active Problems:    Cholelithiasis POA: Yes    Anemia POA: Yes    Gout POA: Yes    ESRD on dialysis (HCC) (Chronic) POA: Yes    Alcohol dependence (HCC) POA: Yes    Rheumatoid arthritis (HCC) POA: Yes    HTN (hypertension) POA: Yes  Resolved Problems:    * No resolved hospital problems. *      FOLLOW UP  No future appointments.  [address]  [facility] [address]  [phone]        [facility]  [address] [place] [id number]  [phone]  Follow up      MEDICATIONS ON DISCHARGE     Medication List        START taking these medications        Instructions   oxyCODONE immediate release 10 MG immediate release tablet  Commonly known as: ROXICODONE   Take 1 Tablet by mouth every 6 hours as needed for Moderate Pain or Severe Pain for up to 5 days.  Dose: 10 mg            CONTINUE taking these medications        Instructions   acetaminophen 500 MG Tabs  Commonly known as: TYLENOL   Take 1,000 mg by mouth every 8 hours as needed. Indications: Pain  Dose: 1,000 mg     allopurinol 100 MG Tabs  Commonly known as: ZYLOPRIM   Take 100 mg by mouth 3 times a week. Taken post hemodialysis THREE TIMES A WEEK  Dose: 100 mg     atorvastatin 20 MG Tabs  Commonly known as: LIPITOR   Take 20 mg by mouth every evening.  Dose: 20 mg     Buprenorphine 15 MCG/HR Ptwk   Place 1 Patch on the skin every 7 days.  Dose: 1 Patch     capsaicin 0.025 % cream  Commonly known as: Zostrix   Apply 1 Application topically 4 times a day as needed. Apply to  effected area  Dose: 1 Application     carvedilol 6.25 MG Tabs  Commonly known as: COREG   Take 6.25 mg by mouth 2 times a day with meals.  Dose: 6.25 mg     diclofenac sodium 1 % Gel  Commonly known as: Voltaren   Apply 4 g topically 4 times a day as needed (Osetoarthritis pain).  Dose: 4 g     Etanercept 50 MG/ML Sosy   Inject 50 mg under the skin every 7 days.  Dose: 50 mg     folic acid 1 MG Tabs  Commonly known as: FOLVITE   Take 1 mg by mouth every day.  Dose: 1 mg     gabapentin 300 MG Caps  Commonly known as: NEURONTIN   Take 300 mg by mouth at bedtime.  Dose: 300 mg     lidocaine 5 % Ptch  Commonly known as: LIDODERM   Apply 3 Patches topically every day. To affected area  Dose: 3 Patch     melatonin 3 MG Tabs   Take 6 mg by mouth at bedtime.  Dose: 6 mg     pantoprazole 40 MG Tbec  Commonly known as: PROTONIX   Take 40 mg by mouth 2 times a day.  Dose: 40 mg     sevelamer 800 MG Tabs  Commonly known as: RENAGEL   Take 800 mg by mouth 3 times a day with meals.  Dose: 800 mg     traZODone 50 MG Tabs  Commonly known as: DESYREL   Take 50 mg by mouth every evening. Indications: Trouble Sleeping  Dose: 50 mg     vitamin D2 (Ergocalciferol) 1.25 MG (14540 UT) Caps capsule  Commonly known as: Drisdol   Take 50,000 Units by mouth every 7 days.  Dose: 50,000 Units              Allergies  Allergies   Allergen Reactions    Motrin [Ibuprofen] Unspecified     hhx of stomach ulcers       DIET  Orders Placed This Encounter   Procedures    Diet Order Diet: Regular; Nutrient modifications: (optional): Low Fat     Standing Status:   Standing     Number of Occurrences:   1     Order Specific Question:   Diet:     Answer:   Regular [1]     Order Specific Question:   Nutrient modifications: (optional)     Answer:   Low Fat [5]       ACTIVITY  As tolerated.  Weight bearing as tolerated    CONSULTATIONS  Surgery - Ivana  Nephrology - Ramón  Gastroenterology - Devikado    PROCEDURES  None    LABORATORY  Lab Results   Component  Value Date    SODIUM 136 10/10/2022    POTASSIUM 4.6 10/10/2022    CHLORIDE 99 10/10/2022    CO2 26 10/10/2022    GLUCOSE 87 10/10/2022    BUN 16 10/10/2022    CREATININE 6.44 (HH) 10/10/2022        Lab Results   Component Value Date    WBC 8.2 10/11/2022    HEMOGLOBIN 7.7 (L) 10/11/2022    HEMATOCRIT 24.8 (L) 10/11/2022    PLATELETCT 149 (L) 10/11/2022        Total time of the discharge process exceeds 35 minutes.

## 2022-10-12 NOTE — PROGRESS NOTES
Pt transferring to [facility].  Pt instructed to give taxi voucher to NE RN to assist in calling a taxi.  Pt has cane and home meds.  All belongings with pt at time of transfer

## 2022-12-24 ENCOUNTER — HOSPITAL ENCOUNTER (OUTPATIENT)
Facility: MEDICAL CENTER | Age: 72
End: 2022-12-24
Attending: INTERNAL MEDICINE
Payer: COMMERCIAL

## 2022-12-24 LAB — VANCOMYCIN TROUGH SERPL-MCNC: 20.2 UG/ML (ref 10–20)

## 2023-08-08 ENCOUNTER — APPOINTMENT (OUTPATIENT)
Dept: RADIOLOGY | Facility: MEDICAL CENTER | Age: 73
DRG: 682 | End: 2023-08-08
Attending: EMERGENCY MEDICINE
Payer: MEDICARE

## 2023-08-08 ENCOUNTER — HOSPITAL ENCOUNTER (INPATIENT)
Facility: MEDICAL CENTER | Age: 73
LOS: 1 days | DRG: 682 | End: 2023-08-09
Attending: EMERGENCY MEDICINE | Admitting: STUDENT IN AN ORGANIZED HEALTH CARE EDUCATION/TRAINING PROGRAM
Payer: MEDICARE

## 2023-08-08 DIAGNOSIS — E87.5 HYPERKALEMIA: ICD-10-CM

## 2023-08-08 PROBLEM — E87.8 LOW BICARBONATE: Status: ACTIVE | Noted: 2023-08-08

## 2023-08-08 LAB
ANION GAP SERPL CALC-SCNC: 30 MMOL/L (ref 7–16)
ANION GAP SERPL CALC-SCNC: 31 MMOL/L (ref 7–16)
BASOPHILS # BLD AUTO: 0.5 % (ref 0–1.8)
BASOPHILS # BLD: 0.03 K/UL (ref 0–0.12)
BUN SERPL-MCNC: 105 MG/DL (ref 8–22)
BUN SERPL-MCNC: 109 MG/DL (ref 8–22)
CALCIUM SERPL-MCNC: 10.3 MG/DL (ref 8.5–10.5)
CALCIUM SERPL-MCNC: 9.3 MG/DL (ref 8.5–10.5)
CHLORIDE SERPL-SCNC: 86 MMOL/L (ref 96–112)
CHLORIDE SERPL-SCNC: 87 MMOL/L (ref 96–112)
CO2 SERPL-SCNC: 17 MMOL/L (ref 20–33)
CO2 SERPL-SCNC: 18 MMOL/L (ref 20–33)
CREAT SERPL-MCNC: 16.28 MG/DL (ref 0.5–1.4)
CREAT SERPL-MCNC: 18.05 MG/DL (ref 0.5–1.4)
EKG IMPRESSION: NORMAL
EOSINOPHIL # BLD AUTO: 0.6 K/UL (ref 0–0.51)
EOSINOPHIL NFR BLD: 9.7 % (ref 0–6.9)
ERYTHROCYTE [DISTWIDTH] IN BLOOD BY AUTOMATED COUNT: 54.5 FL (ref 35.9–50)
FERRITIN SERPL-MCNC: 1115 NG/ML (ref 22–322)
GFR SERPLBLD CREATININE-BSD FMLA CKD-EPI: 2 ML/MIN/1.73 M 2
GFR SERPLBLD CREATININE-BSD FMLA CKD-EPI: 3 ML/MIN/1.73 M 2
GLUCOSE SERPL-MCNC: 68 MG/DL (ref 65–99)
GLUCOSE SERPL-MCNC: 81 MG/DL (ref 65–99)
HAV IGM SERPL QL IA: ABNORMAL
HBV CORE IGM SER QL: ABNORMAL
HBV SURFACE AB SERPL IA-ACNC: 197 MIU/ML (ref 0–10)
HBV SURFACE AG SER QL: ABNORMAL
HCT VFR BLD AUTO: 34.2 % (ref 42–52)
HCV AB SER QL: REACTIVE
HGB BLD-MCNC: 10.7 G/DL (ref 14–18)
IMM GRANULOCYTES # BLD AUTO: 0.01 K/UL (ref 0–0.11)
IMM GRANULOCYTES NFR BLD AUTO: 0.2 % (ref 0–0.9)
IRON SATN MFR SERPL: 49 % (ref 15–55)
IRON SERPL-MCNC: 106 UG/DL (ref 50–180)
LYMPHOCYTES # BLD AUTO: 1.11 K/UL (ref 1–4.8)
LYMPHOCYTES NFR BLD: 17.9 % (ref 22–41)
MCH RBC QN AUTO: 28.8 PG (ref 27–33)
MCHC RBC AUTO-ENTMCNC: 31.3 G/DL (ref 32.3–36.5)
MCV RBC AUTO: 92.2 FL (ref 81.4–97.8)
MONOCYTES # BLD AUTO: 0.53 K/UL (ref 0–0.85)
MONOCYTES NFR BLD AUTO: 8.6 % (ref 0–13.4)
NEUTROPHILS # BLD AUTO: 3.91 K/UL (ref 1.82–7.42)
NEUTROPHILS NFR BLD: 63.1 % (ref 44–72)
NRBC # BLD AUTO: 0 K/UL
NRBC BLD-RTO: 0 /100 WBC (ref 0–0.2)
PLATELET # BLD AUTO: 133 K/UL (ref 164–446)
PMV BLD AUTO: 9.4 FL (ref 9–12.9)
POTASSIUM SERPL-SCNC: 5.6 MMOL/L (ref 3.6–5.5)
POTASSIUM SERPL-SCNC: 6.9 MMOL/L (ref 3.6–5.5)
RBC # BLD AUTO: 3.71 M/UL (ref 4.7–6.1)
SODIUM SERPL-SCNC: 134 MMOL/L (ref 135–145)
SODIUM SERPL-SCNC: 135 MMOL/L (ref 135–145)
TIBC SERPL-MCNC: 217 UG/DL (ref 250–450)
UIBC SERPL-MCNC: 111 UG/DL (ref 110–370)
WBC # BLD AUTO: 6.2 K/UL (ref 4.8–10.8)

## 2023-08-08 PROCEDURE — 700111 HCHG RX REV CODE 636 W/ 250 OVERRIDE (IP): Performed by: EMERGENCY MEDICINE

## 2023-08-08 PROCEDURE — 36415 COLL VENOUS BLD VENIPUNCTURE: CPT

## 2023-08-08 PROCEDURE — 700111 HCHG RX REV CODE 636 W/ 250 OVERRIDE (IP): Performed by: STUDENT IN AN ORGANIZED HEALTH CARE EDUCATION/TRAINING PROGRAM

## 2023-08-08 PROCEDURE — 71045 X-RAY EXAM CHEST 1 VIEW: CPT

## 2023-08-08 PROCEDURE — A9270 NON-COVERED ITEM OR SERVICE: HCPCS | Performed by: STUDENT IN AN ORGANIZED HEALTH CARE EDUCATION/TRAINING PROGRAM

## 2023-08-08 PROCEDURE — 700111 HCHG RX REV CODE 636 W/ 250 OVERRIDE (IP): Performed by: INTERNAL MEDICINE

## 2023-08-08 PROCEDURE — 5A1D70Z PERFORMANCE OF URINARY FILTRATION, INTERMITTENT, LESS THAN 6 HOURS PER DAY: ICD-10-PCS | Performed by: INTERNAL MEDICINE

## 2023-08-08 PROCEDURE — 83550 IRON BINDING TEST: CPT

## 2023-08-08 PROCEDURE — 87522 HEPATITIS C REVRS TRNSCRPJ: CPT

## 2023-08-08 PROCEDURE — 99223 1ST HOSP IP/OBS HIGH 75: CPT | Performed by: STUDENT IN AN ORGANIZED HEALTH CARE EDUCATION/TRAINING PROGRAM

## 2023-08-08 PROCEDURE — 93005 ELECTROCARDIOGRAM TRACING: CPT | Performed by: EMERGENCY MEDICINE

## 2023-08-08 PROCEDURE — 96375 TX/PRO/DX INJ NEW DRUG ADDON: CPT

## 2023-08-08 PROCEDURE — 99285 EMERGENCY DEPT VISIT HI MDM: CPT

## 2023-08-08 PROCEDURE — 85025 COMPLETE CBC W/AUTO DIFF WBC: CPT

## 2023-08-08 PROCEDURE — 96374 THER/PROPH/DIAG INJ IV PUSH: CPT

## 2023-08-08 PROCEDURE — 83540 ASSAY OF IRON: CPT

## 2023-08-08 PROCEDURE — 770020 HCHG ROOM/CARE - TELE (206)

## 2023-08-08 PROCEDURE — 80048 BASIC METABOLIC PNL TOTAL CA: CPT

## 2023-08-08 PROCEDURE — 82728 ASSAY OF FERRITIN: CPT

## 2023-08-08 PROCEDURE — 700111 HCHG RX REV CODE 636 W/ 250 OVERRIDE (IP)

## 2023-08-08 PROCEDURE — 700102 HCHG RX REV CODE 250 W/ 637 OVERRIDE(OP): Performed by: STUDENT IN AN ORGANIZED HEALTH CARE EDUCATION/TRAINING PROGRAM

## 2023-08-08 PROCEDURE — 700101 HCHG RX REV CODE 250: Performed by: EMERGENCY MEDICINE

## 2023-08-08 PROCEDURE — 86706 HEP B SURFACE ANTIBODY: CPT

## 2023-08-08 PROCEDURE — 700102 HCHG RX REV CODE 250 W/ 637 OVERRIDE(OP): Performed by: EMERGENCY MEDICINE

## 2023-08-08 PROCEDURE — 80074 ACUTE HEPATITIS PANEL: CPT

## 2023-08-08 PROCEDURE — 90935 HEMODIALYSIS ONE EVALUATION: CPT

## 2023-08-08 PROCEDURE — C9399 UNCLASSIFIED DRUGS OR BIOLOG: HCPCS | Performed by: STUDENT IN AN ORGANIZED HEALTH CARE EDUCATION/TRAINING PROGRAM

## 2023-08-08 RX ORDER — MIDODRINE HYDROCHLORIDE 5 MG/1
5 TABLET ORAL SEE ADMIN INSTRUCTIONS
Status: ON HOLD | COMMUNITY
End: 2023-08-25

## 2023-08-08 RX ORDER — AMOXICILLIN 250 MG
1 CAPSULE ORAL DAILY
COMMUNITY

## 2023-08-08 RX ORDER — POLYETHYLENE GLYCOL 3350 17 G/17G
1 POWDER, FOR SOLUTION ORAL
Status: DISCONTINUED | OUTPATIENT
Start: 2023-08-08 | End: 2023-08-09 | Stop reason: HOSPADM

## 2023-08-08 RX ORDER — DEXTROSE MONOHYDRATE 25 G/50ML
25 INJECTION, SOLUTION INTRAVENOUS ONCE
Status: COMPLETED | OUTPATIENT
Start: 2023-08-08 | End: 2023-08-08

## 2023-08-08 RX ORDER — OXYCODONE HYDROCHLORIDE 5 MG/1
5 TABLET ORAL EVERY 4 HOURS PRN
Status: ON HOLD | COMMUNITY
End: 2023-08-25 | Stop reason: SDUPTHER

## 2023-08-08 RX ORDER — FERROUS SULFATE 325(65) MG
650 TABLET ORAL DAILY
COMMUNITY

## 2023-08-08 RX ORDER — PANTOPRAZOLE SODIUM 40 MG/1
40 TABLET, DELAYED RELEASE ORAL 2 TIMES DAILY
Status: DISCONTINUED | OUTPATIENT
Start: 2023-08-08 | End: 2023-08-08

## 2023-08-08 RX ORDER — FOLIC ACID/VIT B COMPLEX AND C 0.8 MG
1 TABLET ORAL DAILY
COMMUNITY

## 2023-08-08 RX ORDER — BISACODYL 10 MG
10 SUPPOSITORY, RECTAL RECTAL
Status: DISCONTINUED | OUTPATIENT
Start: 2023-08-08 | End: 2023-08-09 | Stop reason: HOSPADM

## 2023-08-08 RX ORDER — DULOXETIN HYDROCHLORIDE 60 MG/1
60 CAPSULE, DELAYED RELEASE ORAL DAILY
COMMUNITY

## 2023-08-08 RX ORDER — CARVEDILOL 6.25 MG/1
6.25 TABLET ORAL 2 TIMES DAILY WITH MEALS
Status: DISCONTINUED | OUTPATIENT
Start: 2023-08-08 | End: 2023-08-08

## 2023-08-08 RX ORDER — AMOXICILLIN 250 MG
2 CAPSULE ORAL 2 TIMES DAILY
Status: DISCONTINUED | OUTPATIENT
Start: 2023-08-08 | End: 2023-08-09 | Stop reason: HOSPADM

## 2023-08-08 RX ORDER — ATORVASTATIN CALCIUM 20 MG/1
20 TABLET, FILM COATED ORAL NIGHTLY
Status: DISCONTINUED | OUTPATIENT
Start: 2023-08-08 | End: 2023-08-09 | Stop reason: HOSPADM

## 2023-08-08 RX ORDER — SEVELAMER CARBONATE 800 MG/1
800 TABLET, FILM COATED ORAL
Status: DISCONTINUED | OUTPATIENT
Start: 2023-08-08 | End: 2023-08-09 | Stop reason: HOSPADM

## 2023-08-08 RX ORDER — HEPARIN SODIUM 5000 [USP'U]/ML
5000 INJECTION, SOLUTION INTRAVENOUS; SUBCUTANEOUS EVERY 8 HOURS
Status: DISCONTINUED | OUTPATIENT
Start: 2023-08-09 | End: 2023-08-09 | Stop reason: HOSPADM

## 2023-08-08 RX ORDER — PANCRELIPASE 36000; 180000; 114000 [USP'U]/1; [USP'U]/1; [USP'U]/1
1 CAPSULE, DELAYED RELEASE PELLETS ORAL
COMMUNITY

## 2023-08-08 RX ORDER — LANOLIN ALCOHOL/MO/W.PET/CERES
3 CREAM (GRAM) TOPICAL
COMMUNITY

## 2023-08-08 RX ORDER — HEPARIN SODIUM 1000 [USP'U]/ML
INJECTION, SOLUTION INTRAVENOUS; SUBCUTANEOUS
Status: COMPLETED
Start: 2023-08-08 | End: 2023-08-08

## 2023-08-08 RX ORDER — GABAPENTIN 300 MG/1
300 CAPSULE ORAL
Status: DISCONTINUED | OUTPATIENT
Start: 2023-08-08 | End: 2023-08-09 | Stop reason: HOSPADM

## 2023-08-08 RX ORDER — TRAZODONE HYDROCHLORIDE 50 MG/1
50 TABLET ORAL NIGHTLY
Status: DISCONTINUED | OUTPATIENT
Start: 2023-08-08 | End: 2023-08-09 | Stop reason: HOSPADM

## 2023-08-08 RX ORDER — HEPARIN SODIUM 1000 [USP'U]/ML
3700 INJECTION, SOLUTION INTRAVENOUS; SUBCUTANEOUS
Status: DISCONTINUED | OUTPATIENT
Start: 2023-08-08 | End: 2023-08-09 | Stop reason: HOSPADM

## 2023-08-08 RX ORDER — CALCIUM GLUCONATE 20 MG/ML
2 INJECTION, SOLUTION INTRAVENOUS ONCE
Status: COMPLETED | OUTPATIENT
Start: 2023-08-08 | End: 2023-08-08

## 2023-08-08 RX ORDER — SEVELAMER CARBONATE 800 MG/1
800 TABLET, FILM COATED ORAL
COMMUNITY
End: 2023-08-21

## 2023-08-08 RX ORDER — ATORVASTATIN CALCIUM 20 MG/1
20 TABLET, FILM COATED ORAL NIGHTLY
COMMUNITY

## 2023-08-08 RX ORDER — ACETAMINOPHEN 500 MG
1000 TABLET ORAL 2 TIMES DAILY
COMMUNITY

## 2023-08-08 RX ORDER — OMEPRAZOLE 20 MG/1
20 CAPSULE, DELAYED RELEASE ORAL DAILY
Status: DISCONTINUED | OUTPATIENT
Start: 2023-08-09 | End: 2023-08-09 | Stop reason: HOSPADM

## 2023-08-08 RX ORDER — SODIUM BICARBONATE 650 MG/1
650 TABLET ORAL 3 TIMES DAILY
Status: DISCONTINUED | OUTPATIENT
Start: 2023-08-08 | End: 2023-08-09 | Stop reason: HOSPADM

## 2023-08-08 RX ORDER — BUPRENORPHINE 15 UG/H
15 PATCH TRANSDERMAL
COMMUNITY
End: 2023-09-05

## 2023-08-08 RX ORDER — HEPARIN SODIUM 1000 [USP'U]/ML
2000 INJECTION, SOLUTION INTRAVENOUS; SUBCUTANEOUS
Status: DISCONTINUED | OUTPATIENT
Start: 2023-08-08 | End: 2023-08-09 | Stop reason: HOSPADM

## 2023-08-08 RX ADMIN — DEXTROSE MONOHYDRATE 25 G: 25 INJECTION, SOLUTION INTRAVENOUS at 15:51

## 2023-08-08 RX ADMIN — SODIUM BICARBONATE 650 MG: 650 TABLET ORAL at 17:01

## 2023-08-08 RX ADMIN — CALCIUM GLUCONATE 2 G: 20 INJECTION, SOLUTION INTRAVENOUS at 15:59

## 2023-08-08 RX ADMIN — SODIUM BICARBONATE 50 MEQ: 84 INJECTION, SOLUTION INTRAVENOUS at 15:54

## 2023-08-08 RX ADMIN — HEPARIN SODIUM 2000 UNITS: 1000 INJECTION, SOLUTION INTRAVENOUS; SUBCUTANEOUS at 18:05

## 2023-08-08 RX ADMIN — SEVELAMER CARBONATE 800 MG: 800 TABLET, FILM COATED ORAL at 17:01

## 2023-08-08 RX ADMIN — SODIUM ZIRCONIUM CYCLOSILICATE 10 G: 5 POWDER, FOR SUSPENSION ORAL at 21:19

## 2023-08-08 RX ADMIN — HEPARIN SODIUM 3700 UNITS: 1000 INJECTION, SOLUTION INTRAVENOUS; SUBCUTANEOUS at 21:10

## 2023-08-08 RX ADMIN — INSULIN HUMAN 3.5 UNITS: 100 INJECTION, SOLUTION PARENTERAL at 15:51

## 2023-08-08 ASSESSMENT — PAIN DESCRIPTION - PAIN TYPE: TYPE: ACUTE PAIN

## 2023-08-08 ASSESSMENT — FIBROSIS 4 INDEX
FIB4 SCORE: 3.27
FIB4 SCORE: 2.92

## 2023-08-08 ASSESSMENT — COGNITIVE AND FUNCTIONAL STATUS - GENERAL
SUGGESTED CMS G CODE MODIFIER MOBILITY: CK
HELP NEEDED FOR BATHING: A LITTLE
TOILETING: A LITTLE
STANDING UP FROM CHAIR USING ARMS: A LITTLE
DRESSING REGULAR LOWER BODY CLOTHING: A LITTLE
DAILY ACTIVITIY SCORE: 21
MOVING FROM LYING ON BACK TO SITTING ON SIDE OF FLAT BED: A LITTLE
MOVING TO AND FROM BED TO CHAIR: A LITTLE
MOBILITY SCORE: 19
SUGGESTED CMS G CODE MODIFIER DAILY ACTIVITY: CJ
WALKING IN HOSPITAL ROOM: A LITTLE
CLIMB 3 TO 5 STEPS WITH RAILING: A LITTLE

## 2023-08-08 ASSESSMENT — LIFESTYLE VARIABLES
AVERAGE NUMBER OF DAYS PER WEEK YOU HAVE A DRINK CONTAINING ALCOHOL: 2
EVER FELT BAD OR GUILTY ABOUT YOUR DRINKING: NO
TOTAL SCORE: 0
ON A TYPICAL DAY WHEN YOU DRINK ALCOHOL HOW MANY DRINKS DO YOU HAVE: 3
DOES PATIENT WANT TO STOP DRINKING: NO
CONSUMPTION TOTAL: NEGATIVE
ALCOHOL_USE: YES
HAVE PEOPLE ANNOYED YOU BY CRITICIZING YOUR DRINKING: NO
TOTAL SCORE: 0
HOW MANY TIMES IN THE PAST YEAR HAVE YOU HAD 5 OR MORE DRINKS IN A DAY: 0
TOTAL SCORE: 0
EVER HAD A DRINK FIRST THING IN THE MORNING TO STEADY YOUR NERVES TO GET RID OF A HANGOVER: NO
HAVE YOU EVER FELT YOU SHOULD CUT DOWN ON YOUR DRINKING: NO

## 2023-08-08 ASSESSMENT — ENCOUNTER SYMPTOMS
SHORTNESS OF BREATH: 0
NAUSEA: 0
COUGH: 0
SPUTUM PRODUCTION: 0
VOMITING: 0

## 2023-08-08 NOTE — ED NOTES
Pt wheeled back to room from [facility]. Pt connected to monitor, given blankets, and given call light. Chart up for ERP.

## 2023-08-08 NOTE — ED PROVIDER NOTES
ED PHYSICIAN NOTE    CHIEF COMPLAINT  Chief Complaint   Patient presents with    Other     Pt requesting dialysis and states he missed his last 3 days        EXTERNAL RECORDS REVIEWED  Inpatient Notes most recent hospital admission with flank pain.  Patient had chronic pancreatitis    HPI/ROS      Junior Proctor is a 73 y.o. male who presents because he is feeling weak.  Reports that he missed his last 3 rounds of dialysis.  He usually goes to [facility] [address] but did not feel like he could make it.  He denies any chest pain, shortness of breath abdominal pain.  He has not had a fever.  He just feels tired.    PAST MEDICAL HISTORY  Past Medical History:   Diagnosis Date    Gout     Hemodialysis patient (HCC)     Hypertension     Kidney disease     MI (myocardial infarction) (HCC)        SOCIAL HISTORY  Social History     Tobacco Use    Smoking status: Former    Smokeless tobacco: Never   Vaping Use    Vaping Use: Never used   Substance Use Topics    Alcohol use: Yes     Comment: occ    Drug use: Never       CURRENT MEDICATIONS  Home Medications       Reviewed by Karla Jain R.N. (Registered Nurse) on 08/08/23 at 1043  Med List Status: <None>     Medication Last Dose Status   acetaminophen (TYLENOL) 500 MG Tab  Active   allopurinol (ZYLOPRIM) 100 MG Tab  Active   atorvastatin (LIPITOR) 20 MG Tab  Active   Buprenorphine 15 MCG/HR PATCH WEEKLY  Active   capsaicin (ZOSTRIX) 0.025 % cream  Active   carvedilol (COREG) 6.25 MG Tab  Active   diclofenac sodium (VOLTAREN) 1 % Gel  Active   Etanercept 50 MG/ML Solution Prefilled Syringe  Active   folic acid (FOLVITE) 1 MG Tab  Active   gabapentin (NEURONTIN) 300 MG Cap  Active   lidocaine (LIDODERM) 5 % Patch  Active   melatonin 3 MG Tab  Active   pantoprazole (PROTONIX) 40 MG Tablet Delayed Response  Active   sevelamer (RENAGEL) 800 MG Tab  Active   traZODone (DESYREL) 50 MG Tab  Active   vitamin D2, Ergocalciferol, (DRISDOL) 1.25 MG (95392 UT) Cap capsule  Active                     ALLERGIES  Allergies   Allergen Reactions    Motrin [Ibuprofen] Unspecified     hhx of stomach ulcers       PHYSICAL EXAM  VITAL SIGNS: /60   Pulse 99   Temp 37.1 °C (98.7 °F) (Temporal)   Resp 18   Wt 74.8 kg (165 lb)   SpO2 98%   BMI 25.09 kg/m²    Constitutional: Awake and alert.  Ill-appearing   HENT: Normal inspection  Eyes: Normal inspection  Neck: Grossly normal range of motion.  Cardiovascular: Normal heart rate  Thorax & Lungs: No respiratory distress  Abdomen: Bowel sounds normal, soft, non-distended, nontender, no mass  Skin: No obvious rash.  Back: No tenderness, No CVA tenderness.   Extremities: No asymmetric swelling  Neurologic: Grossly normal   Psychiatric: Normal for situation     DIAGNOSTIC STUDIES / PROCEDURES  LABS/EKG  Results for orders placed or performed during the hospital encounter of 08/08/23   CBC WITH DIFFERENTIAL   Result Value Ref Range    WBC 6.2 4.8 - 10.8 K/uL    RBC 3.71 (L) 4.70 - 6.10 M/uL    Hemoglobin 10.7 (L) 14.0 - 18.0 g/dL    Hematocrit 34.2 (L) 42.0 - 52.0 %    MCV 92.2 81.4 - 97.8 fL    MCH 28.8 27.0 - 33.0 pg    MCHC 31.3 (L) 32.3 - 36.5 g/dL    RDW 54.5 (H) 35.9 - 50.0 fL    Platelet Count 133 (L) 164 - 446 K/uL    MPV 9.4 9.0 - 12.9 fL    Neutrophils-Polys 63.10 44.00 - 72.00 %    Lymphocytes 17.90 (L) 22.00 - 41.00 %    Monocytes 8.60 0.00 - 13.40 %    Eosinophils 9.70 (H) 0.00 - 6.90 %    Basophils 0.50 0.00 - 1.80 %    Immature Granulocytes 0.20 0.00 - 0.90 %    Nucleated RBC 0.00 0.00 - 0.20 /100 WBC    Neutrophils (Absolute) 3.91 1.82 - 7.42 K/uL    Lymphs (Absolute) 1.11 1.00 - 4.80 K/uL    Monos (Absolute) 0.53 0.00 - 0.85 K/uL    Eos (Absolute) 0.60 (H) 0.00 - 0.51 K/uL    Baso (Absolute) 0.03 0.00 - 0.12 K/uL    Immature Granulocytes (abs) 0.01 0.00 - 0.11 K/uL    NRBC (Absolute) 0.00 K/uL   BASIC METABOLIC PANEL   Result Value Ref Range    Sodium 134 (L) 135 - 145 mmol/L    Potassium 6.9 (HH) 3.6 - 5.5 mmol/L    Chloride 86 (L)  96 - 112 mmol/L    Co2 17 (L) 20 - 33 mmol/L    Glucose 81 65 - 99 mg/dL    Bun 105 (H) 8 - 22 mg/dL    Creatinine 16.28 (HH) 0.50 - 1.40 mg/dL    Calcium 9.3 8.5 - 10.5 mg/dL    Anion Gap 31.0 (H) 7.0 - 16.0   ESTIMATED GFR   Result Value Ref Range    GFR (CKD-EPI) 3 (A) >60 mL/min/1.73 m 2   EKG (NOW)   Result Value Ref Range    Report       [facility] Emergency Dept.    Test Date:  2023  Pt Name:    RENE STEINBERG          Department: ER  MRN:        [id number]                      Room:       [facility]  Gender:     Male                         Technician: [id number]  :        1950                   Requested By:YAZAN ASCENCIO  Order #:    [id number]                    Reading MD:    Measurements  Intervals                                Axis  Rate:       95                           P:          67  TN:         198                          QRS:        59  QRSD:       104                          T:          70  QT:         354  QTc:        445    Interpretive Statements  Sinus rhythm  Borderline ST elevation, anterior leads  Compared to ECG 2022 07:19:40  ST (T wave) deviation now present  Sinus tachycardia no longer present        I have independently interpreted this EKG  Rhythm strip interpretation-sinus rhythm    RADIOLOGY  I have independently interpreted the diagnostic imaging associated with this visit and am waiting the final reading from the radiologist.   My preliminary interpretation is as follows: Chest x-ray without pneumothorax  Radiologist interpretation:   DX-CHEST-PORTABLE (1 VIEW)   Final Result      No acute cardiac or pulmonary abnormalities are identified.            COURSE & MEDICAL DECISION MAKING      INITIAL ASSESSMENT, COURSE AND PLAN  Care Narrative: Patient presents with weakness after missing dialysis.  His vital signs are stable.  He appears generally unwell.  Ordered laboratory data.  Order EKG.    EKG shows peaked T waves.  Suspect hyperkalemia.   Await laboratory data as patient hemodynamically stable.    CBC demonstrates mild anemia.  BMP shows critical hyperkalemia.  Ordered treatment with insulin and glucose and calcium gluconate.  Patient is mildly acidotic.  Ordered sodium bicarbonate.  Paged nephrology.    Discussed case with hospitalist.  He will admit patient.  Patient okay to the floor if going to dialysis soon given stability.    I discussed case with Dr. Vargas.  He will arrange for dialysis promptly.  Patient condition is guarded.        ADDITIONAL PROBLEM LIST  Past Medical History:   Diagnosis Date    Gout     Hemodialysis patient (HCC)     Hypertension     Kidney disease     MI (myocardial infarction) ([facility])        DISPOSITION AND DISCUSSIONS  I have discussed management of the patient with the following physicians and MAISHA's: Dr. Roman, hospitalist; Dr. Vargas, nephrology        FINAL IMPRESSION  1.  Critical hyperkalemia  2.  End-stage renal disease requiring dialysis    CRITICAL CARE  The very real possibilty of a deterioration of this patient's condition required the highest level of my preparedness for sudden, emergent intervention.  I provided critical care services, which included medication orders, frequent reevaluations of the patient's condition and response to treatment, ordering and reviewing test results, and discussing the case with various consultants.  The critical care time associated with the care of the patient was 35 minutes. Review chart for interventions. This time is exclusive of any other billable procedures.       This dictation was created using voice recognition software. The accuracy of the dictation is limited to the abilities of the software. I expect there may be some errors of grammar and possibly content. The nursing notes were reviewed and certain aspects of this information were incorporated into this note.    Electronically signed by: Mu Eng M.D., 8/8/2023

## 2023-08-08 NOTE — CONSULTS
"[facility] -  CONSULTATION NOTE      DATE & TIME:   8/8/2023  4:03 PM               AUTHOR:  Modesto Vargas D.O.      REASON FOR CONSULTATION:   - Inpatient hemodialysis management.      CHIEF COMPLAINT:   -  \"Missed Dialysis\"      HISTORY OF PRESENT ILLNESS:    Mr. Proctor is a very pleasant 73-year-old gentleman known to us from outpatient dialysis care.  He receives his maintenance hemodialysis on a Tuesday Thursday Saturday schedule.  He missed his last 2 dialysis sessions because he \"was not feeling well\".  He gives no specifics regarding this.  He says that this is not a transportation issue.  His last treatment was last Thursday.  He tells me he is unhappy at his dialysis unit which may be contributing to his lack of attendance.  He has a new right forearm AV fistula.  He says that when they attempted to cannulate his fistula his arm got \"hard\" with significant pain and carpal spasms.  He currently has a right IJ PermCath for his access.  He presents himself to the emergency department at [facility] earlier today having missed 2 dialysis sessions.  The time of presentation he was found to have blood pressure 110/82 with a heart rate 110.  Further evaluation demonstrated a BUN of 105 with a creatinine of 16.28 and a potassium of 6.9 and bicarbonate of 17 hemoglobin of 10.7.  He is having no dyspnea.  He is comfortable supine.  Chest x-ray finds no acute cardiopulmonary process.  He is awake and alert and cooperative.  We been asked to see him regarding his dialysis needs and electrolyte derangements.      No F/C/N/V/CP/SOB.    No melena, hematochezia, hematemesis.    No HA, visual changes, or abdominal pain.      REVIEW OF SYSTEMS:    10 point ROS was performed and is as per HPI or otherwise negative      PAST MEDICAL HISTORY:   - ESRD  - HTN  - Anemia of CKD  - CKD-MBD  Past Medical History:   Diagnosis Date    Gout     Hemodialysis patient (HCC)     Hypertension     " Kidney disease     MI (myocardial infarction) (HCC)         PAST SURGICAL HISTORY:   - Dialysis Access Surgery  Past Surgical History:   Procedure Laterality Date    IA ERCP,DIAGNOSTIC N/A 9/19/2022    Procedure: ERCP (ENDOSCOPIC RETROGRADE CHOLANGIOPANCREATOGRAPHY);  Surgeon: Mikie Lugo M.D.;  Location: SURGERY SAME DAY [facility];  Service: Gastroenterology    IA ERCP,DIAGNOSTIC N/A 6/5/2022    Procedure: ERCP (ENDOSCOPIC RETROGRADE CHOLANGIOPANCREATOGRAPHY);  Surgeon: Ibrahima Daly M.D.;  Location: SURGERY [facility];  Service: Gastroenterology    IA UPPER GI ENDOSCOPY,DIAGNOSIS N/A 12/10/2021    Procedure: GASTROSCOPY;  Surgeon: Paddy Hooks M.D.;  Location: SURGERY SAME DAY [facility];  Service: Gastroenterology    IA COLONOSCOPY,DIAGNOSTIC N/A 12/10/2021    Procedure: COLONOSCOPY;  Surgeon: Paddy Hooks M.D.;  Location: SURGERY SAME DAY [facility];  Service: Gastroenterology    IA UPPER GI ENDOSCOPY,BIOPSY N/A 12/10/2021    Procedure: GASTROSCOPY, WITH BIOPSY;  Surgeon: Paddy Hooks M.D.;  Location: SURGERY SAME DAY [facility];  Service: Gastroenterology    IA COLONOSCOPY,BIOPSY N/A 12/10/2021    Procedure: COLONOSCOPY, WITH BIOPSY;  Surgeon: Paddy Hooks M.D.;  Location: SURGERY SAME DAY [facility];  Service: Gastroenterology       FAMILY HISTORY:   - Reviewed and non contributory to current illness  Family History   Problem Relation Age of Onset    Diabetes Mother           SOCIAL HISTORY:   Social History     Tobacco Use    Smoking status: Former    Smokeless tobacco: Never   Vaping Use    Vaping Use: Never used   Substance Use Topics    Alcohol use: Yes     Comment: occ    Drug use: Never         HOME MEDICATIONS:   - Reviewed and documented in chart      ALLERGIES:  Motrin [ibuprofen]      VITAL SIGNS:  BP 93/50   Pulse 91   Temp 37.1 °C (98.7 °F) (Temporal)   Resp 16   Wt 74.8 kg (165 lb)   SpO2 98%   BMI 25.09 kg/m²   Physical Exam  Nursing note reviewed.   Constitutional:        Appearance: Normal appearance.   HENT:      Head: Normocephalic and atraumatic.      Nose: Nose normal.      Mouth/Throat:      Mouth: Mucous membranes are dry.   Eyes:      General: No scleral icterus.     Extraocular Movements: Extraocular movements intact.      Pupils: Pupils are equal, round, and reactive to light.   Cardiovascular:      Rate and Rhythm: Normal rate and regular rhythm.      Pulses: Normal pulses.      Heart sounds: Normal heart sounds.   Pulmonary:      Effort: Pulmonary effort is normal. No respiratory distress.      Breath sounds: Rales present. No wheezing or rhonchi.   Abdominal:      General: There is no distension.   Musculoskeletal:         General: No swelling, tenderness or deformity.   Skin:     General: Skin is warm and dry.      Findings: No rash.   Neurological:      General: No focal deficit present.      Mental Status: He is alert and oriented to person, place, and time.   Psychiatric:         Behavior: Behavior normal.            FLUID BALANCE:  No intake/output data recorded.      LABS:  Recent Labs     08/08/23  1330   SODIUM 134*   POTASSIUM 6.9*   CHLORIDE 86*   CO2 17*   GLUCOSE 81   *   CREATININE 16.28*   CALCIUM 9.3      Recent Labs     08/08/23  1330   SODIUM 134*   POTASSIUM 6.9*   CHLORIDE 86*   CO2 17*   GLUCOSE 81   *   CREATININE 16.28*          IMAGING:  - All imaging reviewed from admission to present day      ASSESSMENTS:              # ESRD                          Secondary to Hypertension                          Maintenance dialysis qTTS and as needed                          Right IJ Permcath                          Will require emergent dialysis today (TUES)     He is unhappy at his dialysis unit - may contribute to his lack of attendance       Options reviewed      Defer to discharge planner to discuss new dialysis unit              # Hyperkalemia                          Secondary to ESRD, Acidosis, missed HD      Urgent HD today (TUES)                           Low K diet                          Veltassa PO   # Dialysis access     Pain and cramp with cannulation of new AVF - likely steal     Using permcath for now     May benefit from vascular consult              # Hypertension                          Continue current medications                          Volume off with dialysis as BP tolerates              # Anemia                          Check iron stores                          MADAY with HD to goal Hgb 10-11                          Transfuse PRN              # CKD-MBD                          Low calcium                          Check PTH and Vit D                          Check Phos              # Low Serum Albumin                          No dietary protein restrictions                          Goal: 1.5g Protein/kg/day              # Low serum albumin              # Elevated troponin     SUGGESTIONS:              Emergent dialysis today (TUES)                          Will require additional dialysis tomorrow (WED) and again on THURS              Maintenance dialysis qTTS and PRN              Volume off with dialysis as BP tolerates              Continue usual anti-hypertensive medications              Low K diet              Check iron stores              MADAY with HD to goal Hgb 10-11              Transfuse PRN              Check PTH and Vit D              Check Phos              No dietary protein restrctions                          Goal: 1.5g Protein/kg/day              Follow labs with further recommendations to follow     Thank you for this interesting consult and for allowing us to participate in his care.  Will follow with you.

## 2023-08-08 NOTE — PROGRESS NOTES
4 Eyes Skin Assessment Completed by RADHA Villegas and RADHA Vasquez.    Head WDL  Ears WDL  Nose WDL  Mouth WDL  Neck WDL  Breast/Chest WDL  Shoulder Blades WDL  Spine WDL  (R) Arm/Elbow/Hand WDL  (L) Arm/Elbow/Hand WDL  Abdomen WDL  Groin WDL  Scrotum/Coccyx/Buttocks WDL  (R) Leg Scab and Edema open wound  (L) Leg Scab and Edema open wound  (R) Heel/Foot/Toe WDL  (L) Heel/Foot/Toe WDL     RLE        Devices In Places Tele Box and Pulse Ox      Interventions In Place Pillows, Heels Loaded W/Pillows, and Pressure Redistribution Mattress    Possible Skin Injury Yes    Pictures Uploaded Into Epic Yes  Wound Consult Placed Yes  RN Wound Prevention Protocol Ordered No

## 2023-08-08 NOTE — ASSESSMENT & PLAN NOTE
In setting of missed hemodialysis      Severe hypokalemia  S/p calcium gluconate, sodium bicarbonate  Added [facility]  Nephrology been consulted for the hemodialysis  Repeat labs later today  Continue monitor under telemetry

## 2023-08-08 NOTE — ED TRIAGE NOTES
Pt to triage .  Chief Complaint   Patient presents with    Other     Pt requesting dialysis and states he missed his last 3 days     Pt states he got upset with the dialysis staff and did not go to his last 3 appointments

## 2023-08-08 NOTE — ED NOTES
"Med rec updated and complete. Allergies reviewed.  [facility]  Pt received calcitriol 0.75 mcg , Haloperidol, and Micera at dialysis on Tuesday, Thursday and Saturday . Pt reports that he has not had dialysis for \" quite a few days\".        Home pharmacy  [phone]  "

## 2023-08-08 NOTE — H&P
Hospital Medicine History & Physical Note    Date of Service  8/8/2023    Primary Care Physician  Pcp Pt States None    Consultants  nephrology    Specialist Names: Dr. Cervantes-    Code Status  Full Code    Chief Complaint  Chief Complaint   Patient presents with    Other     Pt requesting dialysis and states he missed his last 3 days        History of Presenting Illness  Junior Proctor is a 73 y.o. male with past medical history of hypertension, CAD, ESRD on hemodialysis who presented 8/8/2023 with missed dialysis.  He denies chest pain, shortness of breath, nausea/vomiting.  Denies any weakness/numbness.    On arrival to ED patient vitals remained stable.  Labs significant for hyponatremia sodium 134, hyperkalemia ,potassium 6.9, bicarbonate 17.  Chest x-ray unremarkable.  EKG with nonspecific ST changes.  Patient treated hyperkalemia with calcium gluconate and insulin.      Nephrologist Dr. Cervantes-consulted for evaluation.  Patient will be going to urgent hemodialysis.    Patient admitted for significant hyperkalemia requiring urgent hemodialysis.    Case discussed with ED patient Dr. Velasquez MOORE discussed the plan of care with patient.    Review of Systems  Review of Systems   Constitutional:  Positive for malaise/fatigue.   Respiratory:  Negative for cough, sputum production and shortness of breath.    Gastrointestinal:  Negative for nausea and vomiting.       Past Medical History   has a past medical history of Gout, Hemodialysis patient (HCC), Hypertension, Kidney disease, and MI (myocardial infarction) (HCC).    Surgical History   has a past surgical history that includes pr upper gi endoscopy,diagnosis (N/A, 12/10/2021); pr colonoscopy,diagnostic (N/A, 12/10/2021); pr upper gi endoscopy,biopsy (N/A, 12/10/2021); pr colonoscopy,biopsy (N/A, 12/10/2021); pr ercp,diagnostic (N/A, 6/5/2022); and pr ercp,diagnostic (N/A, 9/19/2022).     Family History  family history includes Diabetes in his mother.    Family history reviewed with patient. There is no family history that is pertinent to the chief complaint.     Social History   reports that he has quit smoking. He has never used smokeless tobacco. He reports current alcohol use. He reports that he does not use drugs.    Allergies  Allergies   Allergen Reactions    Motrin [Ibuprofen] Unspecified     hhx of stomach ulcers       Medications  Prior to Admission Medications   Prescriptions Last Dose Informant Patient Reported? Taking?   Buprenorphine 15 MCG/HR PATCH WEEKLY  Patient's Home Pharmacy Yes No   Sig: Place 1 Patch on the skin every 7 days.   Etanercept 50 MG/ML Solution Prefilled Syringe  Patient's Home Pharmacy No No   Sig: Inject 50 mg under the skin every 7 days.   acetaminophen (TYLENOL) 500 MG Tab  Patient's Home Pharmacy Yes No   Sig: Take 1,000 mg by mouth every 8 hours as needed. Indications: Pain   allopurinol (ZYLOPRIM) 100 MG Tab  Patient's Home Pharmacy Yes No   Sig: Take 100 mg by mouth 3 times a week. Taken post hemodialysis THREE TIMES A WEEK   atorvastatin (LIPITOR) 20 MG Tab  Patient's Home Pharmacy Yes No   Sig: Take 20 mg by mouth every evening.   capsaicin (ZOSTRIX) 0.025 % cream  Patient's Home Pharmacy Yes No   Sig: Apply 1 Application topically 4 times a day as needed. Apply to effected area   carvedilol (COREG) 6.25 MG Tab  Patient's Home Pharmacy Yes No   Sig: Take 6.25 mg by mouth 2 times a day with meals.   diclofenac sodium (VOLTAREN) 1 % Gel  Patient's Home Pharmacy Yes No   Sig: Apply 4 g topically 4 times a day as needed (Osetoarthritis pain).   folic acid (FOLVITE) 1 MG Tab  Patient's Home Pharmacy Yes No   Sig: Take 1 mg by mouth every day.   gabapentin (NEURONTIN) 300 MG Cap  Patient's Home Pharmacy Yes No   Sig: Take 300 mg by mouth at bedtime.   lidocaine (LIDODERM) 5 % Patch  Patient's Home Pharmacy Yes No   Sig: Apply 3 Patches topically every day. To affected area   melatonin 3 MG Tab  Patient's Home Pharmacy Yes No    Sig: Take 6 mg by mouth at bedtime.   pantoprazole (PROTONIX) 40 MG Tablet Delayed Response  Patient's Home Pharmacy Yes No   Sig: Take 40 mg by mouth 2 times a day.   sevelamer (RENAGEL) 800 MG Tab  Patient's Home Pharmacy Yes No   Sig: Take 800 mg by mouth 3 times a day with meals.   traZODone (DESYREL) 50 MG Tab  Patient's Home Pharmacy Yes No   Sig: Take 50 mg by mouth every evening. Indications: Trouble Sleeping   vitamin D2, Ergocalciferol, (DRISDOL) 1.25 MG (35596 UT) Cap capsule  Patient's Home Pharmacy Yes No   Sig: Take 50,000 Units by mouth every 7 days.      Facility-Administered Medications: None       Physical Exam  Temp:  [37.1 °C (98.7 °F)] 37.1 °C (98.7 °F)  Pulse:  [] 99  Resp:  [18-20] 18  BP: (110-129)/(60-82) 122/60  SpO2:  [88 %-100 %] 98 %  Blood Pressure : 122/60   Temperature: 37.1 °C (98.7 °F)   Pulse: 99   Respiration: 18   Pulse Oximetry: 98 %       Physical Exam  Constitutional:       Appearance: Normal appearance.   HENT:      Head: Atraumatic.   Cardiovascular:      Rate and Rhythm: Normal rate and regular rhythm.      Pulses: Normal pulses.      Heart sounds: Normal heart sounds.   Pulmonary:      Effort: Pulmonary effort is normal. No respiratory distress.      Breath sounds: Normal breath sounds. No wheezing.   Musculoskeletal:      Right lower leg: Edema present.      Left lower leg: Edema present.   Neurological:      General: No focal deficit present.      Mental Status: He is alert and oriented to person, place, and time. Mental status is at baseline.         Laboratory:  Recent Labs     08/08/23  1330   WBC 6.2   RBC 3.71*   HEMOGLOBIN 10.7*   HEMATOCRIT 34.2*   MCV 92.2   MCH 28.8   MCHC 31.3*   RDW 54.5*   PLATELETCT 133*   MPV 9.4     Recent Labs     08/08/23  1330   SODIUM 134*   POTASSIUM 6.9*   CHLORIDE 86*   CO2 17*   GLUCOSE 81   *   CREATININE 16.28*   CALCIUM 9.3     Recent Labs     08/08/23  1330   GLUCOSE 81         No results for input(s): NTPROBNP  in the last 72 hours.      No results for input(s): TROPONINT in the last 72 hours.    Imaging:  DX-CHEST-PORTABLE (1 VIEW)   Final Result      No acute cardiac or pulmonary abnormalities are identified.          X-Ray:  I have personally reviewed the images and compared with prior images.  EKG:  I have personally reviewed the images and compared with prior images.    Assessment/Plan:  Justification for Admission Status  I anticipate this patient will require at least two midnights for appropriate medical management, necessitating inpatient admission because significant hyperkalemia requiring urgent hemodialysis and close telemetry monitoring        * Hyperkalemia- (present on admission)  Assessment & Plan  In setting of missed hemodialysis      Severe hypokalemia  S/p calcium gluconate, sodium bicarbonate  Added [facility]  Nephrology been consulted for the hemodialysis  Repeat labs later today  Continue monitor under telemetry    Low bicarbonate  Assessment & Plan  In setting of ESRD  Added sodium bicarbonate    ESRD on dialysis (HCC)- (present on admission)  Assessment & Plan  Hemodialysis on TTS  Nephrology has been consulted        Anemia- (present on admission)  Assessment & Plan  Anemia in setting of CKD  Check iron panel        VTE prophylaxis: SCDs/TEDs and heparin ppx

## 2023-08-08 NOTE — PROGRESS NOTES
Report received from ER RN. Pt transferred on zoll and hooked up to tele box once on unit. Pt A&Ox4. Pt denies pain at this time. Pt oriented to room, bed is locked in lowest position. Belongings and call light within reach. Updated on plan of care. No questions at this time. Bed alarm on

## 2023-08-09 ENCOUNTER — PATIENT OUTREACH (OUTPATIENT)
Dept: SCHEDULING | Facility: IMAGING CENTER | Age: 73
End: 2023-08-09
Payer: COMMERCIAL

## 2023-08-09 VITALS
DIASTOLIC BLOOD PRESSURE: 69 MMHG | HEART RATE: 105 BPM | WEIGHT: 160.94 LBS | OXYGEN SATURATION: 94 % | BODY MASS INDEX: 24.47 KG/M2 | TEMPERATURE: 97.6 F | SYSTOLIC BLOOD PRESSURE: 134 MMHG | RESPIRATION RATE: 18 BRPM

## 2023-08-09 LAB
ALBUMIN SERPL BCP-MCNC: 4.5 G/DL (ref 3.2–4.9)
ALBUMIN/GLOB SERPL: 1.3 G/DL
ALP SERPL-CCNC: 241 U/L (ref 30–99)
ALT SERPL-CCNC: 10 U/L (ref 2–50)
ANION GAP SERPL CALC-SCNC: 20 MMOL/L (ref 7–16)
AST SERPL-CCNC: 18 U/L (ref 12–45)
BILIRUB SERPL-MCNC: 0.4 MG/DL (ref 0.1–1.5)
BUN SERPL-MCNC: 48 MG/DL (ref 8–22)
CA-I SERPL-SCNC: 1.2 MMOL/L (ref 1.1–1.3)
CALCIUM ALBUM COR SERPL-MCNC: 9.6 MG/DL (ref 8.5–10.5)
CALCIUM SERPL-MCNC: 10 MG/DL (ref 8.5–10.5)
CHLORIDE SERPL-SCNC: 93 MMOL/L (ref 96–112)
CO2 SERPL-SCNC: 25 MMOL/L (ref 20–33)
CREAT SERPL-MCNC: 10.55 MG/DL (ref 0.5–1.4)
ERYTHROCYTE [DISTWIDTH] IN BLOOD BY AUTOMATED COUNT: 51.5 FL (ref 35.9–50)
FERRITIN SERPL-MCNC: 1071 NG/ML (ref 22–322)
GFR SERPLBLD CREATININE-BSD FMLA CKD-EPI: 5 ML/MIN/1.73 M 2
GLOBULIN SER CALC-MCNC: 3.4 G/DL (ref 1.9–3.5)
GLUCOSE SERPL-MCNC: 140 MG/DL (ref 65–99)
HCT VFR BLD AUTO: 32.7 % (ref 42–52)
HGB BLD-MCNC: 10.5 G/DL (ref 14–18)
HGB RETIC QN AUTO: 29 PG/CELL (ref 29–35)
IMM RETICS NFR: 28.7 % (ref 2.6–16.1)
IRON SATN MFR SERPL: 41 % (ref 15–55)
IRON SERPL-MCNC: 87 UG/DL (ref 50–180)
MAGNESIUM SERPL-MCNC: 2.1 MG/DL (ref 1.5–2.5)
MCH RBC QN AUTO: 28.3 PG (ref 27–33)
MCHC RBC AUTO-ENTMCNC: 32.1 G/DL (ref 32.3–36.5)
MCV RBC AUTO: 88.1 FL (ref 81.4–97.8)
PHOSPHATE SERPL-MCNC: 5.1 MG/DL (ref 2.5–4.5)
PLATELET # BLD AUTO: 114 K/UL (ref 164–446)
PMV BLD AUTO: 9.2 FL (ref 9–12.9)
POTASSIUM SERPL-SCNC: 4.6 MMOL/L (ref 3.6–5.5)
PROT SERPL-MCNC: 7.9 G/DL (ref 6–8.2)
PTH-INTACT SERPL-MCNC: 356 PG/ML (ref 14–72)
RBC # BLD AUTO: 3.71 M/UL (ref 4.7–6.1)
RETICS # AUTO: 0.07 M/UL (ref 0.04–0.12)
RETICS/RBC NFR: 1.9 % (ref 0.8–2.6)
SODIUM SERPL-SCNC: 138 MMOL/L (ref 135–145)
TIBC SERPL-MCNC: 214 UG/DL (ref 250–450)
UIBC SERPL-MCNC: 127 UG/DL (ref 110–370)
URATE SERPL-MCNC: 4.4 MG/DL (ref 2.5–8.3)
WBC # BLD AUTO: 4.4 K/UL (ref 4.8–10.8)

## 2023-08-09 PROCEDURE — 700102 HCHG RX REV CODE 250 W/ 637 OVERRIDE(OP)

## 2023-08-09 PROCEDURE — 82728 ASSAY OF FERRITIN: CPT

## 2023-08-09 PROCEDURE — 83550 IRON BINDING TEST: CPT

## 2023-08-09 PROCEDURE — 82330 ASSAY OF CALCIUM: CPT

## 2023-08-09 PROCEDURE — 84550 ASSAY OF BLOOD/URIC ACID: CPT

## 2023-08-09 PROCEDURE — A9270 NON-COVERED ITEM OR SERVICE: HCPCS

## 2023-08-09 PROCEDURE — 85027 COMPLETE CBC AUTOMATED: CPT

## 2023-08-09 PROCEDURE — 85046 RETICYTE/HGB CONCENTRATE: CPT

## 2023-08-09 PROCEDURE — 83735 ASSAY OF MAGNESIUM: CPT

## 2023-08-09 PROCEDURE — 90935 HEMODIALYSIS ONE EVALUATION: CPT

## 2023-08-09 PROCEDURE — 83970 ASSAY OF PARATHORMONE: CPT

## 2023-08-09 PROCEDURE — 84100 ASSAY OF PHOSPHORUS: CPT

## 2023-08-09 PROCEDURE — 700111 HCHG RX REV CODE 636 W/ 250 OVERRIDE (IP)

## 2023-08-09 PROCEDURE — 80053 COMPREHEN METABOLIC PANEL: CPT

## 2023-08-09 PROCEDURE — 83540 ASSAY OF IRON: CPT

## 2023-08-09 PROCEDURE — 700102 HCHG RX REV CODE 250 W/ 637 OVERRIDE(OP): Performed by: STUDENT IN AN ORGANIZED HEALTH CARE EDUCATION/TRAINING PROGRAM

## 2023-08-09 PROCEDURE — 36415 COLL VENOUS BLD VENIPUNCTURE: CPT

## 2023-08-09 PROCEDURE — 86480 TB TEST CELL IMMUN MEASURE: CPT

## 2023-08-09 PROCEDURE — A9270 NON-COVERED ITEM OR SERVICE: HCPCS | Performed by: STUDENT IN AN ORGANIZED HEALTH CARE EDUCATION/TRAINING PROGRAM

## 2023-08-09 RX ORDER — HEPARIN SODIUM 1000 [USP'U]/ML
INJECTION, SOLUTION INTRAVENOUS; SUBCUTANEOUS
Status: COMPLETED
Start: 2023-08-09 | End: 2023-08-09

## 2023-08-09 RX ORDER — CYCLOBENZAPRINE HCL 10 MG
10 TABLET ORAL 3 TIMES DAILY PRN
Status: DISCONTINUED | OUTPATIENT
Start: 2023-08-09 | End: 2023-08-09 | Stop reason: HOSPADM

## 2023-08-09 RX ADMIN — CYCLOBENZAPRINE 10 MG: 10 TABLET, FILM COATED ORAL at 02:40

## 2023-08-09 RX ADMIN — HEPARIN SODIUM 2000 UNITS: 1000 INJECTION, SOLUTION INTRAVENOUS; SUBCUTANEOUS at 13:51

## 2023-08-09 RX ADMIN — SODIUM BICARBONATE 650 MG: 650 TABLET ORAL at 09:20

## 2023-08-09 RX ADMIN — HEPARIN SODIUM 3700 UNITS: 1000 INJECTION, SOLUTION INTRAVENOUS; SUBCUTANEOUS at 16:50

## 2023-08-09 RX ADMIN — GABAPENTIN 300 MG: 300 CAPSULE ORAL at 00:15

## 2023-08-09 RX ADMIN — SEVELAMER CARBONATE 800 MG: 800 TABLET, FILM COATED ORAL at 09:20

## 2023-08-09 RX ADMIN — SODIUM BICARBONATE 650 MG: 650 TABLET ORAL at 00:14

## 2023-08-09 RX ADMIN — OMEPRAZOLE 20 MG: 20 CAPSULE, DELAYED RELEASE ORAL at 05:06

## 2023-08-09 RX ADMIN — ATORVASTATIN CALCIUM 20 MG: 20 TABLET, FILM COATED ORAL at 00:15

## 2023-08-09 RX ADMIN — TRAZODONE HYDROCHLORIDE 50 MG: 50 TABLET ORAL at 00:14

## 2023-08-09 ASSESSMENT — PAIN DESCRIPTION - PAIN TYPE: TYPE: ACUTE PAIN

## 2023-08-09 NOTE — DISCHARGE PLANNING
Outpatient Dialysis Note     Confirmed patient is active at:     [facility]   [address]  [place] [address]     Schedule: Tues, Thurs, Sat  Time: 10:45 AM     Patient is followed by Dr. Knott.      Spoke with Kelli at facility who confirmed patient information.   Forwarded records for review.     Xitlaly Reyes   Dialysis Coordinator / [facility]  Ph: [phone]

## 2023-08-09 NOTE — DISCHARGE PLANNING
Case Management Discharge Planning    Admission Date: 8/8/2023  GMLOS: 4.3  ALOS: 1    6-Clicks ADL Score: 21  6-Clicks Mobility Score: 19      Anticipated Discharge Dispo: Discharge Disposition: Discharged to home/self care (01)    DME Needed: No    Action(s) Taken: Updated Provider/Nurse on Discharge Plan    Escalations Completed: None    Medically Clear: Yes    Next Steps: Pt medically cleared to DC following HD today; CM needed to confirm OP HD chair appointment. Spoke to Remedios who states the patient goes to [facility] [address] TTS and his appointment is still valid. Update to MD and RNs,    Barriers to Discharge: None    Is the patient up for discharge tomorrow: No

## 2023-08-09 NOTE — PROGRESS NOTES
[facility] Services Progress Note         HD today x 3 hours per Dr. Vargas.  Tx initiated at 1809 and ended at 2110      NET UF: 2700 ml    Tx tolerated, but UFG was not met d/t hypotension with SBP of 70's to 90's, UFG was decreased to 2700 ml and patient was educated. Blood returned, ports flushed with NS. Heparin 1000 units/ml used to lock catheter per designated amount. CVC ports clamped and capped. Aspirate heparin prior to next CVC use. See eflow sheets for further details.    Report given to KIEL Urban RN

## 2023-08-09 NOTE — WOUND TEAM
[facility]  Inpatient Services  Wound and Skin Care Brief Evaluation    Admission Date: 8/8/2023     Last order of IP CONSULT TO WOUND CARE was found on 8/8/2023 from Hospital Encounter on 8/8/2023     HPI, PMH, SH: Reviewed    Chief Complaint   Patient presents with    Other     Pt requesting dialysis and states he missed his last 3 days      Diagnosis: Hyperkalemia [E87.5]    Unit where seen by Wound Team: T824/02     Wound consult placed regarding RLE. Chart and images reviewed. This discussed with bedside RN. This RN in to assess patient. Pt pleasant and agreeable. RLE with healed wound along the lateral side. Scar tissue present and there are no open wounds to area thus left open to air.     No pressure injuries or advanced wound care needs identified. Wound consult completed. No further follow up unless indicated and consulted.                PREVENTATIVE INTERVENTIONS:    Q shift [place] - performed per nursing policy  Q shift pressure point assessments - performed per nursing policy

## 2023-08-09 NOTE — PROGRESS NOTES
Assumed care of patient, bedside report received from Nelly day shift RN. Updated on plan of care, call light within reach and fall precautions are in place and bed lock and in lowest position. Patient instructed to call for assistance before getting out of bed. All questions answered at this time. No other needs.

## 2023-08-09 NOTE — CARE PLAN
Problem: Fall Risk  Goal: Patient will remain free from falls  Outcome: Progressing  Note: Patient is a high fall risk. Bed alarm in place and patient has been educated on fall precautions. Patient uses a cane-single point to ambulate.      Problem: Knowledge Deficit - Standard  Goal: Patient and family/care givers will demonstrate understanding of plan of care, disease process/condition, diagnostic tests and medications  Outcome: Not Progressing  Note: Patient needs reinforcement for education.      Problem: Pain - Standard  Goal: Alleviation of pain or a reduction in pain to the patient’s comfort goal  Outcome: Progressing   The patient is Stable - Low risk of patient condition declining or worsening    Shift Goals  Clinical Goals: montior vitals, monitor labs  Patient Goals: sleep  Family Goals: n/a    Progress made toward(s) clinical / shift goals:      Patient is not progressing towards the following goals:      Problem: Knowledge Deficit - Standard  Goal: Patient and family/care givers will demonstrate understanding of plan of care, disease process/condition, diagnostic tests and medications  Outcome: Not Progressing  Note: Patient needs reinforcement for education.

## 2023-08-09 NOTE — PROGRESS NOTES
[facility] Services Progress Note      HD today x 3 hours per Dr. Cortez.   Initiated at 1347 and ended at 1647.     Assessment:    Patient stable, no complaints. Denies SOB, no chest pain. + BLE edema.     Access used: Right chest catheter, clean dry intact. Patent.        Net Fluid Removed: 1,800 mL      Procedure Events/ Complications:   Low BP during treatment. UF goal adjusted. Dr. Cortez aware.   Patient tolerated treatment despite low BP trends. Asymptomatic.   Patient sleeping most of the treatment      Post Access Care:   Blood returned. Flushed with NS.  CVC locked with Heparin 1000 units/ mL   Arterial port: 1.8 mL   Venous port: 1.9 mL  Clamped, capped and secured. Labeled   Dressing change: Due on 8/12/23        Report given to Primary MATT Pizarro RN.

## 2023-08-09 NOTE — PROGRESS NOTES
Bedside report received from night shift RN. Assumed care of patient at 0700. Pt is A&O 3, patients behavior is impulsive, speech is unintelligible and confused in conversation. Mentation waxes and wanes, able to move all extremities. Patient is sitting at edge of bed. Patient reports 0/10 pain. Patient was updated on plan of care. Patient has call light, personal belongings and bedside table with in reach. Bed is in low locked position, bed alarm is on.

## 2023-08-09 NOTE — DISCHARGE INSTRUCTIONS
Acute Kidney Injury, Adult  Acute kidney injury is a sudden worsening of kidney function. The kidneys are a pair of organs that do many important jobs in the body, including:  Make urine.  Make hormones.  Keep the right amount of fluids and chemicals in the body.  This condition ranges from mild to severe. Over time, it may develop into long-lasting (chronic) kidney disease. Finding it and treating it early may keep it from becoming a long-lasting disease.  What are the causes?  Common causes of this condition include:  A problem with blood flow to the kidneys. This may be caused by:  Low blood pressure or shock.  Blood loss.  Heart and blood vessel disease.  Severe burns.  Liver disease.  Direct damage to the kidneys. This may be caused by:  Certain medicines.  A kidney infection.  Poisoning.  Being around or in contact with toxic substances.  A wound from surgery.  A hard, direct hit to the kidney area.  A sudden block in urine flow. This may be caused by:  Cancer.  Kidney stones.  An enlarged prostate.  What increases the risk?  Being older than age 65.  Being female.  Being in the hospital. This is especially true if you are very sick.  Having certain conditions, such as:  Long-lasting kidney or liver disease.  Diabetes.  Heart disease and heart failure.  Lung disease.  What are the signs or symptoms?  This condition may not cause symptoms until it becomes severe. Symptoms can include:  Feeling very tired or having trouble staying awake.  Nausea or vomiting.  Swelling (edema) of the face, legs, ankles, or feet.  Pain in the belly or pain along the side of your stomach (flank).  Urine changes, such as:  Making little or no urine.  Passing urine with a weak flow.  Muscle twitches and cramps, most often in the legs.  Confusion or trouble concentrating.  Not feeling the urge to eat.  Fever.  How is this diagnosed?  This condition may be diagnosed based on:  Your symptoms.  Your medical history.  A physical  exam.  You may have other tests, such as:  Blood tests.  Urine tests.  Imaging tests.  A kidney biopsy. This involves removing a sample of kidney tissue to be looked at under a microscope.  How is this treated?  Treatment depends on the cause and how severe the condition is. In mild cases, treatment may not be needed. The kidneys may heal on their own.  In severe cases, treatment may include:  Treating the cause of the kidney injury. This may mean that you have to change your medicines or the doses you take.  Getting fluids through an IV tube.  Having a small, thin tube (catheter) put in. This tube will drain urine and prevent blockages.  Trying to keep problems from starting. This may mean not using certain medicines or not having tests done that could cause more kidney injury.  In some cases, these treatments are also needed:  Dialysis or continuous renal replacement therapy (CRRT). This treatment uses a machine to do the job of the kidneys.  Surgery. This may be done to repair a damaged kidney. It could also be done to remove a blockage in the urinary tract.  Follow these instructions at home:  Medicines  Take over-the-counter and prescription medicines only as told by your health care provider.  Do not take any new medicines unless approved by your health care provider. Many medicines can make kidney damage worse.  Do not take any vitamin or mineral supplements unless approved by your health care provider. Some of these can make kidney damage worse.  Lifestyle    Make changes to your diet as told by your health care provider. You may need to eat less protein.  Get to, and stay at, a healthy weight. If you need help, ask your health care provider.  Start or keep up an exercise plan. Exercise at least 30 minutes a day, 5 days a week.  Do not smoke or use any products that contain nicotine or tobacco. If you need help quitting, ask your health care provider.  General instructions    Keep track of your blood  pressure. Tell your health care provider if you notice any changes.  Keep your vaccines up to date. Ask your health care provider which vaccines you need.  Keep all follow-up visits.  Where to find more information  American Association of Kidney Patients: www.aakp.org  National Kidney Foundation: www.kidney.org  American Kidney Fund: www.akfinc.org  Medical Education [place]:  LifeOptions: www.lifeoptions.org  Kidney School: www.kidneyschool.org  Contact a health care provider if:  Your symptoms get worse.  You have new symptoms such as:  Headaches.  Skin that is darker or lighter than normal.  Easy bruising.  Itchiness.  Hiccups.  Lack of menstrual periods.  You have a fever.  Get help right away if:  You have symptoms of worsening kidney disease, such as:  Chest pain.  Shortness of breath.  Seizures.  Confusion or trouble thinking.  Belly or back pain.  You have pain or bleeding when you pass urine.  You are making little or no urine.  These symptoms may be an emergency. Get help right away. Call 911.  Do not wait to see if the symptoms will go away.  Do not drive yourself to the hospital.  Summary  Acute kidney injury is a sudden worsening of kidney function.  This condition can be caused by problems with blood flow to the kidneys, damage to the kidneys, or a sudden block in urine flow.  This condition may not cause symptoms until it becomes severe.  Acute kidney injury can be diagnosed with blood tests, urine tests, imaging tests, and other tests.  Treatment depends on the cause and how severe the condition is.  This information is not intended to replace advice given to you by your health care provider. Make sure you discuss any questions you have with your health care provider.  Document Revised: 03/27/2023 Document Reviewed: 10/27/2020  Elsevier Patient Education © 2023 [employer]  Hyperkalemia  Hyperkalemia is when you have too much of a mineral called potassium in your blood. If there is too much  potassium in your blood, it can affect how your heart works. Potassium is normally removed from your body by your kidneys.  What are the causes?  Taking in too much potassium. This can happen if:  You use salt substitutes.  You take potassium supplements.  You eat too many foods that are high in potassium if you have kidney disease.  Your body not being able to get rid of potassium. This can happen if:  Your kidneys are not working properly.  You are taking certain medicines.  You have a condition called Novi's disease.  You have kidney stones.  You are on treatment to clean your blood (dialysis) and you skip a treatment.  Your cells releasing a high amount of potassium into the blood. This can happen if:  You have a muscle injury.  You have very bad burns or infections.  You have problems with your blood plasma. This can be caused by diabetes that is not well controlled.  What increases the risk?  Drinking too much alcohol.  Using drugs a lot.  What are the signs or symptoms?  In many cases, there are no symptoms. But, when your potassium level becomes high enough, you may have symptoms such as:  An irregular heartbeat.  A very slow heartbeat.  Feeling like you may vomit (nauseous).  Tiredness.  Confusion.  Tingling of your skin.  Numbness of your hands or feet.  Muscle cramps.  Muscle weakness.  Not being able to move (paralysis).  How is this treated?  Treatment depends on how bad your condition is. Treatment may need to be done in the hospital. It may include:  Receiving a fluid with sugar (glucose) in it through an IV tube, along with insulin.  Taking medicines.  Having treatment to clean your blood.  Taking calcium.  Follow these instructions at home:    Take over-the-counter and prescription medicines only as told by your doctor.  Do not take any of the following unless your doctor says it is okay:  Supplements.  Natural food products.  Herbs.  Vitamins.  If you drink alcohol, limit how much you have as  told by your doctor.  Do not use illegal drugs. If you need help quitting, ask your doctor.  If you have kidney disease, you may need to follow a low-potassium diet. A food expert (dietitian) can help you.  Keep all follow-up visits.  Contact a doctor if:  Your heartbeat is not regular or is very slow.  You feel dizzy.  You feel weak.  You feel like you may vomit.  You have tingling in your hands or feet.  You lose feeling in your hands or feet.  Get help right away if:  You are short of breath.  You have chest pain.  You faint.  You cannot move your muscles.  These symptoms may be an emergency. Get help right away. Call 911.  Do not wait to see if the symptoms will go away.  Do not drive yourself to the hospital.  Summary  Hyperkalemia is when you have too much potassium in your blood.  Take over-the-counter and prescription medicines only as told by your doctor.  Limit how much alcohol you have as told by your doctor.  Contact a doctor if your heartbeat is not regular.  This information is not intended to replace advice given to you by your health care provider. Make sure you discuss any questions you have with your health care provider.  Document Revised: 09/01/2022 Document Reviewed: 09/01/2022  Elsevier Patient Education © 2023 Elsevier Inc.

## 2023-08-09 NOTE — PROGRESS NOTES
"[facility] -  PROGRESS NOTE               Author: Tristen Cortez M.D. Date & Time: 8/9/2023  8:56 AM     HPI:  Mr. Proctor is a very pleasant 73-year-old gentleman known to us from outpatient dialysis care. He receives his maintenance hemodialysis on a Tuesday Thursday Saturday schedule. He missed his last 2 dialysis sessions because he \"was not feeling well\". He gives no specifics regarding this. He says that this is not a transportation issue. His last treatment was last Thursday. He tells me he is unhappy at his dialysis unit which may be contributing to his lack of attendance. He has a new right forearm AV fistula. He says that when they attempted to cannulate his fistula his arm got \"hard\" with significant pain and carpal spasms. He currently has a right IJ PermCath for his access. He presents himself to the emergency department at [facility] earlier today having missed 2 dialysis sessions. The time of presentation he was found to have blood pressure 110/82 with a heart rate 110. Further evaluation demonstrated a BUN of 105 with a creatinine of 16.28 and a potassium of 6.9 and bicarbonate of 17 hemoglobin of 10.7. He is having no dyspnea. He is comfortable supine. Chest x-ray finds no acute cardiopulmonary process. He is awake and alert and cooperative. We been asked to see him regarding his dialysis needs and electrolyte derangements.     DAILY NEPHROLOGY SUMMARY:  8/9 - S/p HD with net UF ot 2700 mL. K+ has normalized    REVIEW OF SYSTEMS:    10 point ROS reviewed and is as per HPI/daily summary or otherwise negative    PMH/PSH/SH/FH:   Reviewed and unchanged since admission note    CURRENT MEDICATIONS:   Reviewed from admission to present day    VS:  BP 90/53   Pulse 92   Temp 36.6 °C (97.9 °F) (Temporal)   Resp 18   Wt 73 kg (160 lb 15 oz)   SpO2 93%   BMI 24.47 kg/m²     Physical Exam  Vitals reviewed.     Nursing note reviewed.   Constitutional:       Appearance: " Normal appearance.   HENT:      Head: Normocephalic and atraumatic.      Nose: Nose normal.      Mouth/Throat:      Mouth: Mucous membranes are dry.   Eyes:      General: No scleral icterus.     Extraocular Movements: Extraocular movements intact.      Pupils: Pupils are equal, round, and reactive to light.   Cardiovascular:      Rate and Rhythm: Normal rate and regular rhythm.      Pulses: Normal pulses.      Heart sounds: Normal heart sounds.   Pulmonary:      Effort: Pulmonary effort is normal. No respiratory distress.      Breath sounds: Rales present. No wheezing or rhonchi.   Abdominal:      General: There is no distension.   Musculoskeletal:         General: No swelling, tenderness or deformity.   Skin:     General: Skin is warm and dry.      Findings: No rash.   Neurological:      General: No focal deficit present.      Mental Status: He is alert and oriented to person, place, and time.   Psychiatric:         Behavior: Behavior normal.     Fluids:  In: 650 [P.O.:150; Dialysis:500]  Out: 3200     LABS:  Recent Labs     08/08/23  1330 08/08/23  1713 08/09/23  0248   SODIUM 134* 135 138   POTASSIUM 6.9* 5.6* 4.6   CHLORIDE 86* 87* 93*   CO2 17* 18* 25   GLUCOSE 81 68 140*   * 109* 48*   CREATININE 16.28* 18.05* 10.55*   CALCIUM 9.3 10.3 10.0       IMAGING:   All imaging reviewed from admission to present day    ASSESSMENTS:              # ESRD                          Secondary to Hypertension                          Maintenance dialysis qTTS and as needed                          Right IJ Permcath                          Will require emergent dialysis today (TUES)                           He is unhappy at his dialysis unit - may contribute to his lack of attendance                                      Options reviewed                                                  Defer to discharge planner to discuss new dialysis unit              # Hyperkalemia                          Secondary to ESRD,  Acidosis, missed HD                            Urgent HD today (TUES)                          Low K diet                          Veltassa PO              # Dialysis access                           Pain and cramp with cannulation of new AVF - likely steal                           Using permcath for now                           May benefit from vascular consult              # Hypertension                          Continue current medications                          Volume off with dialysis as BP tolerates              # Anemia                          Check iron stores                          MADAY with HD to goal Hgb 10-11                          Transfuse PRN              # CKD-MBD                          Low calcium                          Check PTH and Vit D                          Check Phos              # Low Serum Albumin                          No dietary protein restrictions                          Goal: 1.5g Protein/kg/day              # Low serum albumin              # Elevated troponin     SUGGESTIONS:              HD again today then back to TTS schedule              Maintenance dialysis qTTS and PRN              Volume off with dialysis as BP tolerates              Continue usual anti-hypertensive medications              Low K diet              MADAY with HD to goal Hgb 10-11              Transfuse PRN              Phos binder               No dietary protein restrctions                          Goal: 1.5g Protein/kg/day              Follow labs with further recommendations to follow

## 2023-08-10 LAB
GAMMA INTERFERON BACKGROUND BLD IA-ACNC: 0.04 IU/ML
M TB IFN-G BLD-IMP: NEGATIVE
M TB IFN-G CD4+ BCKGRND COR BLD-ACNC: 0.04 IU/ML
MITOGEN IGNF BCKGRD COR BLD-ACNC: >10 IU/ML
QFT TB2 - NIL TBQ2: 0 IU/ML

## 2023-08-10 NOTE — PROGRESS NOTES
Patient discharged home. All personal belongings collected. IV access removed. Discharge instructions discussed. Medications reviewed with patient. Discussed with patient to follow up with dialysis tomorrow. Patient off unit via wheelchair without incident.

## 2023-08-21 ENCOUNTER — APPOINTMENT (OUTPATIENT)
Dept: RADIOLOGY | Facility: MEDICAL CENTER | Age: 73
DRG: 640 | End: 2023-08-21
Attending: EMERGENCY MEDICINE
Payer: COMMERCIAL

## 2023-08-21 ENCOUNTER — HOSPITAL ENCOUNTER (INPATIENT)
Facility: MEDICAL CENTER | Age: 73
LOS: 4 days | DRG: 640 | End: 2023-08-25
Attending: EMERGENCY MEDICINE | Admitting: INTERNAL MEDICINE
Payer: COMMERCIAL

## 2023-08-21 DIAGNOSIS — E87.20 METABOLIC ACIDOSIS: ICD-10-CM

## 2023-08-21 DIAGNOSIS — K86.1 ACUTE ON CHRONIC PANCREATITIS (HCC): ICD-10-CM

## 2023-08-21 DIAGNOSIS — K25.9 PEPTIC ULCER OF STOMACH, UNSPECIFIED CHRONICITY: ICD-10-CM

## 2023-08-21 DIAGNOSIS — K82.8 THICKENING OF WALL OF GALLBLADDER: ICD-10-CM

## 2023-08-21 DIAGNOSIS — E86.0 DEHYDRATION: ICD-10-CM

## 2023-08-21 DIAGNOSIS — D62 ACUTE BLOOD LOSS ANEMIA: ICD-10-CM

## 2023-08-21 DIAGNOSIS — Z96.89 PRESENCE OF PANCREATIC DUCT STENT: ICD-10-CM

## 2023-08-21 DIAGNOSIS — K85.90 PANCREATITIS DUE TO COMMON BILE DUCT STONE: ICD-10-CM

## 2023-08-21 DIAGNOSIS — Z99.2 ESRD ON DIALYSIS (HCC): Chronic | ICD-10-CM

## 2023-08-21 DIAGNOSIS — E16.2 HYPOGLYCEMIA: ICD-10-CM

## 2023-08-21 DIAGNOSIS — D69.6 THROMBOCYTOPENIA (HCC): ICD-10-CM

## 2023-08-21 DIAGNOSIS — R10.10 PAIN OF UPPER ABDOMEN: ICD-10-CM

## 2023-08-21 DIAGNOSIS — E87.5 HYPERKALEMIA: ICD-10-CM

## 2023-08-21 DIAGNOSIS — M54.6 ACUTE LEFT-SIDED THORACIC BACK PAIN: ICD-10-CM

## 2023-08-21 DIAGNOSIS — R74.01 TRANSAMINITIS: ICD-10-CM

## 2023-08-21 DIAGNOSIS — K83.8 DILATION OF BILIARY TRACT: ICD-10-CM

## 2023-08-21 DIAGNOSIS — I10 PRIMARY HYPERTENSION: ICD-10-CM

## 2023-08-21 DIAGNOSIS — R76.8 HEPATITIS C ANTIBODY POSITIVE IN BLOOD: ICD-10-CM

## 2023-08-21 DIAGNOSIS — F10.20 UNCOMPLICATED ALCOHOL DEPENDENCE (HCC): ICD-10-CM

## 2023-08-21 DIAGNOSIS — K80.00 CALCULUS OF GALLBLADDER WITH ACUTE CHOLECYSTITIS WITHOUT OBSTRUCTION: ICD-10-CM

## 2023-08-21 DIAGNOSIS — M06.9 RHEUMATOID ARTHRITIS INVOLVING MULTIPLE SITES, UNSPECIFIED WHETHER RHEUMATOID FACTOR PRESENT (HCC): ICD-10-CM

## 2023-08-21 DIAGNOSIS — I26.99 PULMONARY EMBOLISM, OTHER, UNSPECIFIED CHRONICITY, UNSPECIFIED WHETHER ACUTE COR PULMONALE PRESENT (HCC): ICD-10-CM

## 2023-08-21 DIAGNOSIS — R07.9 CHEST PAIN, UNSPECIFIED TYPE: ICD-10-CM

## 2023-08-21 DIAGNOSIS — G93.41: ICD-10-CM

## 2023-08-21 DIAGNOSIS — E87.6 HYPOKALEMIA: ICD-10-CM

## 2023-08-21 DIAGNOSIS — I95.9 HYPOTENSION, UNSPECIFIED HYPOTENSION TYPE: ICD-10-CM

## 2023-08-21 DIAGNOSIS — N40.0 BENIGN PROSTATIC HYPERPLASIA, UNSPECIFIED WHETHER LOWER URINARY TRACT SYMPTOMS PRESENT: ICD-10-CM

## 2023-08-21 DIAGNOSIS — R41.82 ALTERED MENTAL STATUS, UNSPECIFIED ALTERED MENTAL STATUS TYPE: ICD-10-CM

## 2023-08-21 DIAGNOSIS — M10.9: ICD-10-CM

## 2023-08-21 DIAGNOSIS — K85.90 ACUTE ON CHRONIC PANCREATITIS (HCC): ICD-10-CM

## 2023-08-21 DIAGNOSIS — K75.9 HEPATITIS: ICD-10-CM

## 2023-08-21 DIAGNOSIS — R53.1 WEAKNESS: ICD-10-CM

## 2023-08-21 DIAGNOSIS — D64.9 ANEMIA, UNSPECIFIED TYPE: ICD-10-CM

## 2023-08-21 DIAGNOSIS — K80.50 PANCREATITIS DUE TO COMMON BILE DUCT STONE: ICD-10-CM

## 2023-08-21 DIAGNOSIS — K85.20 ALCOHOL-INDUCED ACUTE PANCREATITIS, UNSPECIFIED COMPLICATION STATUS: ICD-10-CM

## 2023-08-21 DIAGNOSIS — J96.01 ACUTE RESPIRATORY FAILURE WITH HYPOXIA (HCC): ICD-10-CM

## 2023-08-21 DIAGNOSIS — E87.8 LOW BICARBONATE: ICD-10-CM

## 2023-08-21 DIAGNOSIS — K92.2 GASTROINTESTINAL HEMORRHAGE, UNSPECIFIED GASTROINTESTINAL HEMORRHAGE TYPE: ICD-10-CM

## 2023-08-21 DIAGNOSIS — Z91.158 NONCOMPLIANCE WITH RENAL DIALYSIS (HCC): ICD-10-CM

## 2023-08-21 DIAGNOSIS — N18.6 ESRD ON DIALYSIS (HCC): Chronic | ICD-10-CM

## 2023-08-21 DIAGNOSIS — R65.21: ICD-10-CM

## 2023-08-21 DIAGNOSIS — R78.81 GRAM-NEGATIVE BACTEREMIA: ICD-10-CM

## 2023-08-21 DIAGNOSIS — A41.51: ICD-10-CM

## 2023-08-21 DIAGNOSIS — R09.02 HYPOXIA: ICD-10-CM

## 2023-08-21 LAB
ALBUMIN SERPL BCP-MCNC: 4.3 G/DL (ref 3.2–4.9)
ALBUMIN SERPL BCP-MCNC: 4.6 G/DL (ref 3.2–4.9)
ALBUMIN/GLOB SERPL: 1.2 G/DL
ALBUMIN/GLOB SERPL: 1.2 G/DL
ALP SERPL-CCNC: 178 U/L (ref 30–99)
ALP SERPL-CCNC: 201 U/L (ref 30–99)
ALT SERPL-CCNC: <5 U/L (ref 2–50)
ALT SERPL-CCNC: <5 U/L (ref 2–50)
ANION GAP SERPL CALC-SCNC: 33 MMOL/L (ref 7–16)
ANION GAP SERPL CALC-SCNC: 38 MMOL/L (ref 7–16)
APTT PPP: 30.7 SEC (ref 24.7–36)
AST SERPL-CCNC: 7 U/L (ref 12–45)
AST SERPL-CCNC: 7 U/L (ref 12–45)
BASOPHILS # BLD AUTO: 0.5 % (ref 0–1.8)
BASOPHILS # BLD AUTO: 0.5 % (ref 0–1.8)
BASOPHILS # BLD: 0.04 K/UL (ref 0–0.12)
BASOPHILS # BLD: 0.04 K/UL (ref 0–0.12)
BILIRUB SERPL-MCNC: 0.9 MG/DL (ref 0.1–1.5)
BILIRUB SERPL-MCNC: 1 MG/DL (ref 0.1–1.5)
BLOOD CULTURE HOLD CXBCH: NORMAL
BUN SERPL-MCNC: 150 MG/DL (ref 8–22)
BUN SERPL-MCNC: 153 MG/DL (ref 8–22)
CALCIUM ALBUM COR SERPL-MCNC: 10.1 MG/DL (ref 8.5–10.5)
CALCIUM ALBUM COR SERPL-MCNC: 10.8 MG/DL (ref 8.5–10.5)
CALCIUM SERPL-MCNC: 10.6 MG/DL (ref 8.4–10.2)
CALCIUM SERPL-MCNC: 11 MG/DL (ref 8.4–10.2)
CHLORIDE SERPL-SCNC: 87 MMOL/L (ref 96–112)
CHLORIDE SERPL-SCNC: 88 MMOL/L (ref 96–112)
CO2 SERPL-SCNC: 15 MMOL/L (ref 20–33)
CO2 SERPL-SCNC: 20 MMOL/L (ref 20–33)
CREAT SERPL-MCNC: 31.35 MG/DL (ref 0.5–1.4)
CREAT SERPL-MCNC: 33.22 MG/DL (ref 0.5–1.4)
EKG IMPRESSION: NORMAL
EOSINOPHIL # BLD AUTO: 0.26 K/UL (ref 0–0.51)
EOSINOPHIL # BLD AUTO: 0.36 K/UL (ref 0–0.51)
EOSINOPHIL NFR BLD: 3.4 % (ref 0–6.9)
EOSINOPHIL NFR BLD: 4.5 % (ref 0–6.9)
ERYTHROCYTE [DISTWIDTH] IN BLOOD BY AUTOMATED COUNT: 54.1 FL (ref 35.9–50)
ERYTHROCYTE [DISTWIDTH] IN BLOOD BY AUTOMATED COUNT: 55.3 FL (ref 35.9–50)
GFR SERPLBLD CREATININE-BSD FMLA CKD-EPI: 1 ML/MIN/1.73 M 2
GFR SERPLBLD CREATININE-BSD FMLA CKD-EPI: 1 ML/MIN/1.73 M 2
GLOBULIN SER CALC-MCNC: 3.6 G/DL (ref 1.9–3.5)
GLOBULIN SER CALC-MCNC: 3.9 G/DL (ref 1.9–3.5)
GLUCOSE BLD STRIP.AUTO-MCNC: 232 MG/DL (ref 65–99)
GLUCOSE SERPL-MCNC: 255 MG/DL (ref 65–99)
GLUCOSE SERPL-MCNC: 77 MG/DL (ref 65–99)
HCT VFR BLD AUTO: 31.5 % (ref 42–52)
HCT VFR BLD AUTO: 34.5 % (ref 42–52)
HGB BLD-MCNC: 10.6 G/DL (ref 14–18)
HGB BLD-MCNC: 9.8 G/DL (ref 14–18)
IMM GRANULOCYTES # BLD AUTO: 0.02 K/UL (ref 0–0.11)
IMM GRANULOCYTES # BLD AUTO: 0.03 K/UL (ref 0–0.11)
IMM GRANULOCYTES NFR BLD AUTO: 0.2 % (ref 0–0.9)
IMM GRANULOCYTES NFR BLD AUTO: 0.4 % (ref 0–0.9)
INR PPP: 1.11 (ref 0.87–1.13)
LYMPHOCYTES # BLD AUTO: 1.15 K/UL (ref 1–4.8)
LYMPHOCYTES # BLD AUTO: 1.39 K/UL (ref 1–4.8)
LYMPHOCYTES NFR BLD: 15.2 % (ref 22–41)
LYMPHOCYTES NFR BLD: 17.3 % (ref 22–41)
MCH RBC QN AUTO: 28.6 PG (ref 27–33)
MCH RBC QN AUTO: 28.8 PG (ref 27–33)
MCHC RBC AUTO-ENTMCNC: 30.7 G/DL (ref 32.3–36.5)
MCHC RBC AUTO-ENTMCNC: 31.1 G/DL (ref 32.3–36.5)
MCV RBC AUTO: 92.6 FL (ref 81.4–97.8)
MCV RBC AUTO: 93 FL (ref 81.4–97.8)
MONOCYTES # BLD AUTO: 0.75 K/UL (ref 0–0.85)
MONOCYTES # BLD AUTO: 0.82 K/UL (ref 0–0.85)
MONOCYTES NFR BLD AUTO: 10.2 % (ref 0–13.4)
MONOCYTES NFR BLD AUTO: 9.9 % (ref 0–13.4)
NEUTROPHILS # BLD AUTO: 5.33 K/UL (ref 1.82–7.42)
NEUTROPHILS # BLD AUTO: 5.41 K/UL (ref 1.82–7.42)
NEUTROPHILS NFR BLD: 67.3 % (ref 44–72)
NEUTROPHILS NFR BLD: 70.6 % (ref 44–72)
NRBC # BLD AUTO: 0 K/UL
NRBC # BLD AUTO: 0 K/UL
NRBC BLD-RTO: 0 /100 WBC (ref 0–0.2)
NRBC BLD-RTO: 0 /100 WBC (ref 0–0.2)
PLATELET # BLD AUTO: 121 K/UL (ref 164–446)
PLATELET # BLD AUTO: 131 K/UL (ref 164–446)
PMV BLD AUTO: 11 FL (ref 9–12.9)
PMV BLD AUTO: 11.6 FL (ref 9–12.9)
POTASSIUM BLD-SCNC: 7.8 MMOL/L (ref 3.6–5.5)
POTASSIUM SERPL-SCNC: 6.4 MMOL/L (ref 3.6–5.5)
POTASSIUM SERPL-SCNC: 8.7 MMOL/L (ref 3.6–5.5)
PROT SERPL-MCNC: 7.9 G/DL (ref 6–8.2)
PROT SERPL-MCNC: 8.5 G/DL (ref 6–8.2)
PROTHROMBIN TIME: 14.8 SEC (ref 12–14.6)
RBC # BLD AUTO: 3.4 M/UL (ref 4.7–6.1)
RBC # BLD AUTO: 3.71 M/UL (ref 4.7–6.1)
SODIUM SERPL-SCNC: 140 MMOL/L (ref 135–145)
SODIUM SERPL-SCNC: 141 MMOL/L (ref 135–145)
WBC # BLD AUTO: 7.6 K/UL (ref 4.8–10.8)
WBC # BLD AUTO: 8 K/UL (ref 4.8–10.8)

## 2023-08-21 PROCEDURE — 99291 CRITICAL CARE FIRST HOUR: CPT

## 2023-08-21 PROCEDURE — 85025 COMPLETE CBC W/AUTO DIFF WBC: CPT

## 2023-08-21 PROCEDURE — 85730 THROMBOPLASTIN TIME PARTIAL: CPT

## 2023-08-21 PROCEDURE — 71045 X-RAY EXAM CHEST 1 VIEW: CPT

## 2023-08-21 PROCEDURE — 94640 AIRWAY INHALATION TREATMENT: CPT

## 2023-08-21 PROCEDURE — 82962 GLUCOSE BLOOD TEST: CPT

## 2023-08-21 PROCEDURE — 700101 HCHG RX REV CODE 250: Performed by: EMERGENCY MEDICINE

## 2023-08-21 PROCEDURE — 5A1D70Z PERFORMANCE OF URINARY FILTRATION, INTERMITTENT, LESS THAN 6 HOURS PER DAY: ICD-10-PCS | Performed by: INTERNAL MEDICINE

## 2023-08-21 PROCEDURE — C9399 UNCLASSIFIED DRUGS OR BIOLOG: HCPCS | Performed by: EMERGENCY MEDICINE

## 2023-08-21 PROCEDURE — 96374 THER/PROPH/DIAG INJ IV PUSH: CPT

## 2023-08-21 PROCEDURE — 700102 HCHG RX REV CODE 250 W/ 637 OVERRIDE(OP): Performed by: INTERNAL MEDICINE

## 2023-08-21 PROCEDURE — 93005 ELECTROCARDIOGRAM TRACING: CPT

## 2023-08-21 PROCEDURE — 36415 COLL VENOUS BLD VENIPUNCTURE: CPT

## 2023-08-21 PROCEDURE — 96376 TX/PRO/DX INJ SAME DRUG ADON: CPT

## 2023-08-21 PROCEDURE — 80053 COMPREHEN METABOLIC PANEL: CPT

## 2023-08-21 PROCEDURE — 700111 HCHG RX REV CODE 636 W/ 250 OVERRIDE (IP): Performed by: INTERNAL MEDICINE

## 2023-08-21 PROCEDURE — 85610 PROTHROMBIN TIME: CPT

## 2023-08-21 PROCEDURE — A9270 NON-COVERED ITEM OR SERVICE: HCPCS | Performed by: INTERNAL MEDICINE

## 2023-08-21 PROCEDURE — 770022 HCHG ROOM/CARE - ICU (200)

## 2023-08-21 PROCEDURE — 700111 HCHG RX REV CODE 636 W/ 250 OVERRIDE (IP): Mod: JZ | Performed by: EMERGENCY MEDICINE

## 2023-08-21 PROCEDURE — 700102 HCHG RX REV CODE 250 W/ 637 OVERRIDE(OP): Performed by: EMERGENCY MEDICINE

## 2023-08-21 PROCEDURE — 96375 TX/PRO/DX INJ NEW DRUG ADDON: CPT

## 2023-08-21 PROCEDURE — 90935 HEMODIALYSIS ONE EVALUATION: CPT

## 2023-08-21 PROCEDURE — 99292 CRITICAL CARE ADDL 30 MIN: CPT | Performed by: INTERNAL MEDICINE

## 2023-08-21 PROCEDURE — 84132 ASSAY OF SERUM POTASSIUM: CPT

## 2023-08-21 PROCEDURE — 99291 CRITICAL CARE FIRST HOUR: CPT | Performed by: INTERNAL MEDICINE

## 2023-08-21 RX ORDER — LABETALOL HYDROCHLORIDE 5 MG/ML
10 INJECTION, SOLUTION INTRAVENOUS EVERY 4 HOURS PRN
Status: DISCONTINUED | OUTPATIENT
Start: 2023-08-21 | End: 2023-08-25 | Stop reason: HOSPADM

## 2023-08-21 RX ORDER — ATORVASTATIN CALCIUM 20 MG/1
20 TABLET, FILM COATED ORAL NIGHTLY
Status: DISCONTINUED | OUTPATIENT
Start: 2023-08-21 | End: 2023-08-25 | Stop reason: HOSPADM

## 2023-08-21 RX ORDER — DEXTROSE MONOHYDRATE 25 G/50ML
50 INJECTION, SOLUTION INTRAVENOUS ONCE
Status: COMPLETED | OUTPATIENT
Start: 2023-08-21 | End: 2023-08-21

## 2023-08-21 RX ORDER — HYDROMORPHONE HYDROCHLORIDE 1 MG/ML
0.5 INJECTION, SOLUTION INTRAMUSCULAR; INTRAVENOUS; SUBCUTANEOUS
Status: DISCONTINUED | OUTPATIENT
Start: 2023-08-21 | End: 2023-08-25 | Stop reason: HOSPADM

## 2023-08-21 RX ORDER — FERROUS SULFATE 325(65) MG
650 TABLET ORAL DAILY
Status: DISCONTINUED | OUTPATIENT
Start: 2023-08-22 | End: 2023-08-25 | Stop reason: HOSPADM

## 2023-08-21 RX ORDER — HALOPERIDOL 5 MG/1
5 TABLET ORAL SEE ADMIN INSTRUCTIONS
Status: ON HOLD | COMMUNITY
End: 2023-08-25

## 2023-08-21 RX ORDER — CALCITRIOL 0.25 UG/1
0.25 CAPSULE, LIQUID FILLED ORAL DAILY
Status: DISCONTINUED | OUTPATIENT
Start: 2023-08-22 | End: 2023-08-25 | Stop reason: HOSPADM

## 2023-08-21 RX ORDER — SEVELAMER CARBONATE 800 MG/1
800 TABLET, FILM COATED ORAL
Status: DISCONTINUED | OUTPATIENT
Start: 2023-08-21 | End: 2023-08-25 | Stop reason: HOSPADM

## 2023-08-21 RX ORDER — DEXTROSE MONOHYDRATE 25 G/50ML
25 INJECTION, SOLUTION INTRAVENOUS ONCE
Status: COMPLETED | OUTPATIENT
Start: 2023-08-21 | End: 2023-08-21

## 2023-08-21 RX ORDER — VITAMIN C
1 TAB ORAL DAILY
Status: DISCONTINUED | OUTPATIENT
Start: 2023-08-22 | End: 2023-08-25 | Stop reason: HOSPADM

## 2023-08-21 RX ORDER — OXYCODONE HYDROCHLORIDE 10 MG/1
10 TABLET ORAL
Status: DISCONTINUED | OUTPATIENT
Start: 2023-08-21 | End: 2023-08-25 | Stop reason: HOSPADM

## 2023-08-21 RX ORDER — OXYCODONE HYDROCHLORIDE 5 MG/1
5 TABLET ORAL
Status: DISCONTINUED | OUTPATIENT
Start: 2023-08-21 | End: 2023-08-25 | Stop reason: HOSPADM

## 2023-08-21 RX ORDER — CALCITRIOL 0.25 UG/1
0.25 CAPSULE, LIQUID FILLED ORAL DAILY
COMMUNITY

## 2023-08-21 RX ORDER — BISACODYL 10 MG
10 SUPPOSITORY, RECTAL RECTAL
Status: DISCONTINUED | OUTPATIENT
Start: 2023-08-21 | End: 2023-08-25 | Stop reason: HOSPADM

## 2023-08-21 RX ORDER — FOLIC ACID/VIT B COMPLEX AND C 0.8 MG
1 TABLET ORAL DAILY
Status: DISCONTINUED | OUTPATIENT
Start: 2023-08-21 | End: 2023-08-21

## 2023-08-21 RX ORDER — POLYETHYLENE GLYCOL 3350 17 G/17G
1 POWDER, FOR SOLUTION ORAL
Status: DISCONTINUED | OUTPATIENT
Start: 2023-08-21 | End: 2023-08-25 | Stop reason: HOSPADM

## 2023-08-21 RX ORDER — AMOXICILLIN 250 MG
2 CAPSULE ORAL 2 TIMES DAILY
Status: DISCONTINUED | OUTPATIENT
Start: 2023-08-21 | End: 2023-08-25 | Stop reason: HOSPADM

## 2023-08-21 RX ORDER — MIDODRINE HYDROCHLORIDE 5 MG/1
10 TABLET ORAL
Status: DISCONTINUED | OUTPATIENT
Start: 2023-08-21 | End: 2023-08-22

## 2023-08-21 RX ORDER — CALCITRIOL 0.5 UG/1
0.5 CAPSULE, LIQUID FILLED ORAL SEE ADMIN INSTRUCTIONS
COMMUNITY
End: 2023-09-05

## 2023-08-21 RX ORDER — PANTOPRAZOLE SODIUM 40 MG/1
40 TABLET, DELAYED RELEASE ORAL DAILY
Status: DISCONTINUED | OUTPATIENT
Start: 2023-08-21 | End: 2023-08-21

## 2023-08-21 RX ORDER — HEPARIN SODIUM 1000 [USP'U]/ML
3700 INJECTION, SOLUTION INTRAVENOUS; SUBCUTANEOUS PRN
Status: DISCONTINUED | OUTPATIENT
Start: 2023-08-21 | End: 2023-08-25 | Stop reason: HOSPADM

## 2023-08-21 RX ORDER — CALCITRIOL 0.25 UG/1
0.5 CAPSULE, LIQUID FILLED ORAL
Status: DISCONTINUED | OUTPATIENT
Start: 2023-08-22 | End: 2023-08-25 | Stop reason: HOSPADM

## 2023-08-21 RX ORDER — DULOXETIN HYDROCHLORIDE 30 MG/1
60 CAPSULE, DELAYED RELEASE ORAL DAILY
Status: DISCONTINUED | OUTPATIENT
Start: 2023-08-22 | End: 2023-08-25 | Stop reason: HOSPADM

## 2023-08-21 RX ORDER — DEXTROSE MONOHYDRATE 25 G/50ML
25 INJECTION, SOLUTION INTRAVENOUS
Status: DISCONTINUED | OUTPATIENT
Start: 2023-08-21 | End: 2023-08-25 | Stop reason: HOSPADM

## 2023-08-21 RX ORDER — CALCIUM CHLORIDE 100 MG/ML
1 INJECTION INTRAVENOUS; INTRAVENTRICULAR ONCE
Status: COMPLETED | OUTPATIENT
Start: 2023-08-21 | End: 2023-08-21

## 2023-08-21 RX ORDER — SEVELAMER HYDROCHLORIDE 800 MG/1
800 TABLET, FILM COATED ORAL
Status: DISCONTINUED | OUTPATIENT
Start: 2023-08-21 | End: 2023-08-21

## 2023-08-21 RX ORDER — OMEPRAZOLE 20 MG/1
20 CAPSULE, DELAYED RELEASE ORAL DAILY
Status: DISCONTINUED | OUTPATIENT
Start: 2023-08-21 | End: 2023-08-25 | Stop reason: HOSPADM

## 2023-08-21 RX ORDER — GABAPENTIN 300 MG/1
300 CAPSULE ORAL
Status: DISCONTINUED | OUTPATIENT
Start: 2023-08-21 | End: 2023-08-24

## 2023-08-21 RX ORDER — CALCITRIOL 0.25 UG/1
0.5 CAPSULE, LIQUID FILLED ORAL SEE ADMIN INSTRUCTIONS
Status: DISCONTINUED | OUTPATIENT
Start: 2023-08-21 | End: 2023-08-21

## 2023-08-21 RX ADMIN — SODIUM BICARBONATE 50 MEQ: 84 INJECTION, SOLUTION INTRAVENOUS at 15:22

## 2023-08-21 RX ADMIN — MIDODRINE HYDROCHLORIDE 10 MG: 5 TABLET ORAL at 18:34

## 2023-08-21 RX ADMIN — DEXTROSE MONOHYDRATE 50 G: 25 INJECTION, SOLUTION INTRAVENOUS at 15:19

## 2023-08-21 RX ADMIN — APIXABAN 5 MG: 5 TABLET, FILM COATED ORAL at 17:12

## 2023-08-21 RX ADMIN — GABAPENTIN 300 MG: 300 CAPSULE ORAL at 22:42

## 2023-08-21 RX ADMIN — INSULIN HUMAN 3.5 UNITS: 100 INJECTION, SOLUTION PARENTERAL at 14:48

## 2023-08-21 RX ADMIN — SENNOSIDES AND DOCUSATE SODIUM 2 TABLET: 50; 8.6 TABLET ORAL at 17:07

## 2023-08-21 RX ADMIN — SODIUM ZIRCONIUM CYCLOSILICATE 10 G: 10 POWDER, FOR SUSPENSION ORAL at 15:28

## 2023-08-21 RX ADMIN — CALCIUM CHLORIDE 1 G: 100 INJECTION INTRAVENOUS; INTRAVENTRICULAR at 14:44

## 2023-08-21 RX ADMIN — DEXTROSE MONOHYDRATE 25 G: 25 INJECTION, SOLUTION INTRAVENOUS at 14:45

## 2023-08-21 RX ADMIN — SEVELAMER CARBONATE 800 MG: 800 TABLET, FILM COATED ORAL at 17:07

## 2023-08-21 RX ADMIN — ATORVASTATIN CALCIUM 20 MG: 20 TABLET, FILM COATED ORAL at 22:42

## 2023-08-21 RX ADMIN — ALBUTEROL SULFATE 2.5 MG: 2.5 SOLUTION RESPIRATORY (INHALATION) at 15:14

## 2023-08-21 RX ADMIN — HEPARIN SODIUM 3700 UNITS: 1000 INJECTION, SOLUTION INTRAVENOUS; SUBCUTANEOUS at 21:15

## 2023-08-21 RX ADMIN — PANCRELIPASE 72000 UNITS: 24000; 76000; 120000 CAPSULE, DELAYED RELEASE PELLETS ORAL at 17:07

## 2023-08-21 RX ADMIN — OMEPRAZOLE 20 MG: 20 CAPSULE, DELAYED RELEASE ORAL at 17:07

## 2023-08-21 RX ADMIN — SODIUM BICARBONATE 50 MEQ: 84 INJECTION, SOLUTION INTRAVENOUS at 14:44

## 2023-08-21 ASSESSMENT — FIBROSIS 4 INDEX
FIB4 SCORE: 3.55
FIB4 SCORE: 1.79

## 2023-08-21 ASSESSMENT — LIFESTYLE VARIABLES
HAVE YOU EVER FELT YOU SHOULD CUT DOWN ON YOUR DRINKING: NO
TOTAL SCORE: 0
EVER HAD A DRINK FIRST THING IN THE MORNING TO STEADY YOUR NERVES TO GET RID OF A HANGOVER: NO
EVER FELT BAD OR GUILTY ABOUT YOUR DRINKING: NO
TOTAL SCORE: 0
ALCOHOL_USE: YES
TOTAL SCORE: 0
AVERAGE NUMBER OF DAYS PER WEEK YOU HAVE A DRINK CONTAINING ALCOHOL: 2
HAVE PEOPLE ANNOYED YOU BY CRITICIZING YOUR DRINKING: NO
ON A TYPICAL DAY WHEN YOU DRINK ALCOHOL HOW MANY DRINKS DO YOU HAVE: 4
HOW MANY TIMES IN THE PAST YEAR HAVE YOU HAD 5 OR MORE DRINKS IN A DAY: 0
CONSUMPTION TOTAL: NEGATIVE

## 2023-08-21 ASSESSMENT — COGNITIVE AND FUNCTIONAL STATUS - GENERAL
TURNING FROM BACK TO SIDE WHILE IN FLAT BAD: A LITTLE
WALKING IN HOSPITAL ROOM: A LOT
MOVING FROM LYING ON BACK TO SITTING ON SIDE OF FLAT BED: A LOT
SUGGESTED CMS G CODE MODIFIER MOBILITY: CL
MOVING TO AND FROM BED TO CHAIR: A LOT
STANDING UP FROM CHAIR USING ARMS: A LITTLE
HELP NEEDED FOR BATHING: A LOT
DAILY ACTIVITIY SCORE: 12
CLIMB 3 TO 5 STEPS WITH RAILING: A LOT
PERSONAL GROOMING: A LOT
SUGGESTED CMS G CODE MODIFIER DAILY ACTIVITY: CL
TOILETING: A LOT
EATING MEALS: A LOT
DRESSING REGULAR UPPER BODY CLOTHING: A LOT
MOBILITY SCORE: 14
DRESSING REGULAR LOWER BODY CLOTHING: A LOT

## 2023-08-21 ASSESSMENT — PAIN DESCRIPTION - PAIN TYPE
TYPE: ACUTE PAIN
TYPE: ACUTE PAIN

## 2023-08-21 NOTE — DISCHARGE PLANNING
Anticipated Discharge Disposition: Home when cleared.    Action:  Permission for calling daughter Pratibha obtained from pt, he would like her kept up to date : she is in [place] Updated on condition.  Advised of Room number, unit phone number.Home condition reviewed with her. Discussed no keys and home locked. She may try to come up to assist with needs. She notes pt does not have a phone at home, he lost cell. Advised of missed dialysis.    Barriers to Discharge: unknown    Plan:

## 2023-08-21 NOTE — ED NOTES
Med rec updated and complete, on 8/8/2023   Allergies reviewed, per historical history  Pt was only able to tell me that his last treatment for dialysis was 2 weeks ago. Pt is not sure when he took his medications last.  [phone], [facility].  Called [facility] @ [phone] to verify that it has been 2 weeks since his last treatment.  Pt was getting dialysis on Tue, Thur, and Sat.

## 2023-08-21 NOTE — CONSULTS
"[facility] -  CONSULTATION NOTE               Author: Julia Amin D.O. Date & Time: 8/21/2023  4:56 PM       REASON FOR CONSULTATION:   - Inpatient hemodialysis management.    CHIEF COMPLAINT:   -  missed HD    HISTORY OF PRESENT ILLNESS:    74yo male BIB EMS, EMS called because he was heard moaning. He has missed HD x 2 weeks. EKG with peaked T waves per EMS. K noted to be 8.7 upon presentation, provided with appropriate medical management while awaiting HD, repeat 6.4. , repeat 153 with SCr 33.22. Patient is oriented to self, but not place, and provides only some answers to questions. He cannot say why he has not been attending HD, though he has previously reported not wanting to go to his current unit. He has a AHMET AVF that is not in use, utilizes R chest TDC for HD.       No F/C/N/V/CP/SOB, though he notes he had SOB prior to arrival  No melena, hematochezia, hematemesis.  No HA, visual changes, or abdominal pain.    REVIEW OF SYSTEMS:    10 point ROS was performed and is as per HPI or otherwise negative    PAST MEDICAL HISTORY:   - ESRD  - HTN  - Anemia of CKD  - CKD-MBD    PAST SURGICAL HISTORY:   - TDC placement  - AHMET AVF creation    FAMILY HISTORY:   - Reviewed and non contributory to current illness    SOCIAL HISTORY:   - No active tobacco  - occ EtOH  - No illicits    HOME MEDICATIONS:   - Reviewed and documented in chart    LABORATORY STUDIES:   - Reviewed and documented in chart    ALLERGIES:  Motrin [ibuprofen]    VS:  /75   Pulse (!) 105   Temp 36.1 °C (97 °F) (Temporal)   Resp 16   Ht 1.727 m (5' 8\")   Wt 62.7 kg (138 lb 3.7 oz)   SpO2 96%   BMI 21.02 kg/m²   Physical Exam  Vitals reviewed.   Constitutional:       General: He is not in acute distress.  HENT:      Head: Normocephalic and atraumatic.      Right Ear: External ear normal.      Left Ear: External ear normal.      Nose: Nose normal.      Mouth/Throat:      Mouth: Mucous membranes are " dry.      Pharynx: Oropharynx is clear.   Eyes:      Extraocular Movements: Extraocular movements intact.      Conjunctiva/sclera: Conjunctivae normal.   Cardiovascular:      Rate and Rhythm: Regular rhythm. Tachycardia present.      Comments: AHMET AVF +thrill, +hyperpulsatility peripheral segment  Pulmonary:      Effort: Pulmonary effort is normal.      Breath sounds: No wheezing.   Abdominal:      Palpations: Abdomen is soft.      Tenderness: There is no abdominal tenderness.   Musculoskeletal:      Right lower leg: Edema present.      Left lower leg: Edema present.   Skin:     General: Skin is warm and dry.   Neurological:      Mental Status: He is alert.      Comments: Alert, oriented to self and time   Psychiatric:         Mood and Affect: Mood normal.            FLUID BALANCE:  No intake/output data recorded.    LABS:  Recent Labs     08/21/23  1416 08/21/23  1609   SODIUM 140 141   POTASSIUM 8.7* 6.4*   CHLORIDE 87* 88*   CO2 15* 20   GLUCOSE 77 255*   * 153*   CREATININE 31.35* 33.22*   CALCIUM 10.6* 11.0*        IMAGING:  - All imaging reviewed from admission to present day    IMPRESSION:  # ESRD  - HD qTTS via R chest TDC, non adherent  # Hyperkalemia  - medical management in ED  # Acidosis  # HTN  - Goal BP < 140/90  - At goal  # Anemia of CKD  - Goal Hgb 10-11  - not at goal  - ferritin 1071  # CKD-MBD  - Ca 11, corrected 10.8 (given Ca for hyperkalemia)  - PO4 pen  # non adherence    PLAN:  - iHD now and qTTS and PRN during stay, jony require again tomorrow  - UF as tolerated  - No dietary protein restrictions  - Dose all meds per ESRD  - low K diet  - telemetry  - MADAY with HD  - needs goal of care discussion    Other management per primary service    Thank you for the consultation!

## 2023-08-21 NOTE — ED PROVIDER NOTES
ED Provider Note    CHIEF COMPLAINT  Chief Complaint   Patient presents with    Other     A call to EMS was made because pt was heard moaning.  Missed 5 dialysis appointments.  EMS EKG has peaked T waves       EXTERNAL RECORDS REVIEWED  Inpatient Notes the patient was admitted to the hospital 8/8/2023 to 8/9/2023 for hyperkalemia due to missing dialysis    HPI/ROS  LIMITATION TO HISTORY   Select: Behavior  OUTSIDE HISTORIAN(S):  EMS initial room air sat was 82% on room air which is hypoxic.    Junior Proctor is a 73 y.o. male who presents by ambulance because he was heard moaning.  He has missed 5 dialysis appointments.  EMS performed an EKG and it showed peaked T waves.  He was brought to the emergency department for evaluation for electrolyte disturbance due to renal failure and noncompliance with dialysis.  The patient's house was in disarray upon their arrival.  Patient is complaining of feeling generally weak.  He mumbles and is very difficult to understand him.  He denies any chest pain fevers or coughing.  He states that he drinks but denies any smoking or drug use.    PAST MEDICAL HISTORY   has a past medical history of Gout, Hemodialysis patient (HCC), Hypertension, Kidney disease, and MI (myocardial infarction) ([facility]).    SURGICAL HISTORY   has a past surgical history that includes upper gi endoscopy,diagnosis (N/A, 12/10/2021); colonoscopy,diagnostic (N/A, 12/10/2021); upper gi endoscopy,biopsy (N/A, 12/10/2021); colonoscopy,biopsy (N/A, 12/10/2021); ercp,diagnostic (N/A, 6/5/2022); and ercp,diagnostic (N/A, 9/19/2022).    FAMILY HISTORY  Family History   Problem Relation Age of Onset    Diabetes Mother        SOCIAL HISTORY  Social History     Tobacco Use    Smoking status: Former    Smokeless tobacco: Never   Vaping Use    Vaping Use: Never used   Substance and Sexual Activity    Alcohol use: Yes     Comment: occ    Drug use: Never    Sexual activity: Not on file       CURRENT MEDICATIONS  Home  "Medications       Reviewed by Beti Tran PhT (Pharmacy Tech) on 08/21/23 at 1448  Med List Status: Complete     Medication Last Dose Status   acetaminophen (TYLENOL) 500 MG Tab Unknown Active   apixaban (ELIQUIS) 5mg Tab Unknown Active   atorvastatin (LIPITOR) 20 MG Tab Unknown Active   B-Complex w/ C & Folic Acid (MELO-NEVAEH) Tab Unknown Active   Buprenorphine 15 MCG/HR PATCH WEEKLY Unknown Active   calcitRIOL (ROCALTROL) 0.25 MCG Cap > 2 weeks Active   calcitRIOL (ROCALTROL) 0.5 MCG Cap > 2 weeks Active   diclofenac sodium (VOLTAREN) 1 % Gel Unknown Active   DULoxetine (CYMBALTA) 60 MG Cap DR Particles delayed-release capsule Unknown Active   Etanercept 50 MG/ML Solution Prefilled Syringe Unknown Active   ferrous sulfate 325 (65 Fe) MG tablet Unknown Active   gabapentin (NEURONTIN) 300 MG Cap Unknown Active   haloperidol (HALDOL) 5 MG Tab > 2 weeks Active   lidocaine (LIDODERM) 5 % Patch Unknown Active   melatonin 3 MG Tab Unknown Active   Menthol, Topical Analgesic, 10 % Cream Unknown Active   Methoxy PEG-Epoetin Beta (MIRCERA) 100 MCG/0.3ML Solution Prefilled Syringe >2 weeks Active   midodrine (PROAMATINE) 5 MG Tab Unknown Active   oxyCODONE immediate-release (ROXICODONE) 5 MG Tab Unknown Active   Pancrelipase, Lip-Prot-Amyl, (CREON) 18541-739864 units Cap DR Particles Unknown Active   pantoprazole (PROTONIX) 40 MG Tablet Delayed Response Unknown Active   senna-docusate (PERICOLACE OR SENOKOT S) 8.6-50 MG Tab Unknown Active   sevelamer (RENAGEL) 800 MG Tab Unknown Active   traZODone (DESYREL) 50 MG Tab Unknown Active                    ALLERGIES  Allergies   Allergen Reactions    Motrin [Ibuprofen] Unspecified     hhx of stomach ulcers       PHYSICAL EXAM  VITAL SIGNS: /53   Pulse 77   Resp (!) 23   Ht 1.727 m (5' 8\")   Wt 72.6 kg (160 lb)   BMI 24.33 kg/m²      Constitutional: Well developed, thin, unkempt, Non-toxic appearance.   HEENT: Normocephalic, Atraumatic,  external ears normal, " pharynx pink,  Mucous  Membranes moist, No rhinorrhea or mucosal edema  Eyes: PERRL, EOMI, Conjunctiva normal, No discharge.   Neck: Normal range of motion, No tenderness, Supple, No stridor.   Lymphatic: No lymphadenopathy    Cardiovascular: Regular Rate and Rhythm, No murmurs,  rubs, or gallops.   Thorax & Lungs: Creased breath sounds in all lung fields no tachypnea positive Vas-Cath in the right upper chest without surrounding erythema  Abdomen: Bowel sounds normal, Soft, non tender, non distended,  No pulsatile masses., no rebound guarding or peritoneal signs.   Skin: Warm, Dry, No erythema, No rash,   Back:  No CVA tenderness,  No spinal tenderness, bony crepitance step offs or instability.   Extremities: Equal, intact distal pulses, No cyanosis, clubbing or edema,  No tenderness.   Musculoskeletal: Good range of motion in all major joints. No tenderness to palpation or major deformities noted.   Neurologic: Alert & oriented  No focal deficits noted.   Psychiatric: Affect normal, Judgment normal, Mood normal.       DIAGNOSTIC STUDIES / PROCEDURES  EKG  I have independently interpreted this EKG  See below    LABS  Results for orders placed or performed during the hospital encounter of 08/21/23   CBC WITH DIFFERENTIAL   Result Value Ref Range    WBC 8.0 4.8 - 10.8 K/uL    RBC 3.71 (L) 4.70 - 6.10 M/uL    Hemoglobin 10.6 (L) 14.0 - 18.0 g/dL    Hematocrit 34.5 (L) 42.0 - 52.0 %    MCV 93.0 81.4 - 97.8 fL    MCH 28.6 27.0 - 33.0 pg    MCHC 30.7 (L) 32.3 - 36.5 g/dL    RDW 55.3 (H) 35.9 - 50.0 fL    Platelet Count 131 (L) 164 - 446 K/uL    MPV 11.6 9.0 - 12.9 fL    Neutrophils-Polys 67.30 44.00 - 72.00 %    Lymphocytes 17.30 (L) 22.00 - 41.00 %    Monocytes 10.20 0.00 - 13.40 %    Eosinophils 4.50 0.00 - 6.90 %    Basophils 0.50 0.00 - 1.80 %    Immature Granulocytes 0.20 0.00 - 0.90 %    Nucleated RBC 0.00 0.00 - 0.20 /100 WBC    Neutrophils (Absolute) 5.41 1.82 - 7.42 K/uL    Lymphs (Absolute) 1.39 1.00 - 4.80  K/uL    Monos (Absolute) 0.82 0.00 - 0.85 K/uL    Eos (Absolute) 0.36 0.00 - 0.51 K/uL    Baso (Absolute) 0.04 0.00 - 0.12 K/uL    Immature Granulocytes (abs) 0.02 0.00 - 0.11 K/uL    NRBC (Absolute) 0.00 K/uL   COMP METABOLIC PANEL   Result Value Ref Range    Sodium 140 135 - 145 mmol/L    Potassium 8.7 (HH) 3.6 - 5.5 mmol/L    Chloride 87 (L) 96 - 112 mmol/L    Co2 15 (L) 20 - 33 mmol/L    Anion Gap 38.0 (H) 7.0 - 16.0    Glucose 77 65 - 99 mg/dL    Bun 150 (H) 8 - 22 mg/dL    Creatinine 31.35 (HH) 0.50 - 1.40 mg/dL    Calcium 10.6 (H) 8.4 - 10.2 mg/dL    Correct Calcium 10.1 8.5 - 10.5 mg/dL    AST(SGOT) 7 (L) 12 - 45 U/L    ALT(SGPT) <5 2 - 50 U/L    Alkaline Phosphatase 201 (H) 30 - 99 U/L    Total Bilirubin 1.0 0.1 - 1.5 mg/dL    Albumin 4.6 3.2 - 4.9 g/dL    Total Protein 8.5 (H) 6.0 - 8.2 g/dL    Globulin 3.9 (H) 1.9 - 3.5 g/dL    A-G Ratio 1.2 g/dL   Prothrombin Time   Result Value Ref Range    PT 14.8 (H) 12.0 - 14.6 sec    INR 1.11 0.87 - 1.13   APTT   Result Value Ref Range    APTT 30.7 24.7 - 36.0 sec   ESTIMATED GFR   Result Value Ref Range    GFR (CKD-EPI) 1 (A) >60 mL/min/1.73 m 2   EKG   Result Value Ref Range    Report       [facility] Emergency Dept.    Test Date:  2023  Pt Name:    RENE STEINBERG          Department: [facility]  MRN:        [id number]                      [facility]   Gender:     Male                         Technician: [id number]  :        1950                   Requested By:ER TRIAGE PROTOCOL  Order #:    [id number]                    Zaki MD: NANDA GREEN MD    Measurements  Intervals                                Axis  Rate:       167                          P:          71  MN:         220                          QRS:        -1  QRSD:       112                          T:          0  QT:         220  QTc:        367    Interpretive Statements  Supraventricular tachycardia  Ventricular bigeminy  Incomplete left bundle branch  block  Compared to ECG 08/08/2023 13:28:58  Ventricular premature complex(es) now present  Left bundle-branch block now present  Sinus rhythm no longer present  ST (T wave) deviation no longer present  Electronically  Signed On 08- 14:57:43 PDT by NANDA GREEN MD     POCT potassium device results   Result Value Ref Range    Istat Potassium 7.8 (HH) 3.6 - 5.5 mmol/L        RADIOLOGY  I have independently interpreted the diagnostic imaging associated with this visit and am waiting the final reading from the radiologist.   My preliminary interpretation is as follows: cxr  no infiltrate or pleural effusion  Radiologist interpretation:   DX-CHEST-PORTABLE (1 VIEW)   Final Result         1. No acute cardiopulmonary abnormalities are identified.           COURSE & MEDICAL DECISION MAKING    ED Observation Status? No; Patient does not meet criteria for ED Observation.     INITIAL ASSESSMENT, COURSE AND PLAN  Care Narrative: Junior Proctor is a 73 y.o. male who presents by ambulance because he was heard moaning.  He has missed 5 dialysis appointments.  EMS performed an EKG and it showed peaked T waves.  He was brought to the emergency department for evaluation for electrolyte disturbance due to renal failure and noncompliance with dialysis.  The patient's house was in disarray upon their arrival.  Patient is complaining of feeling generally weak.  He mumbles and is very difficult to understand him.  He denies any chest pain fevers or coughing.  He states that he drinks but denies any smoking or drug use.  On physical exam he is unkempt hypoxic and has mucus and food stuck in his mustache.  His mucous membranes are dry.  His lungs have decreased breath sounds his abdomen is soft he has bilateral lower extremity edema.    Differential diagnosis: Hyperkalemia and volume overload secondary to noncompliance with dialysis  ADDITIONAL PROBLEM LIST  End-stage renal disease on hemodialysis  Hypertension  DISPOSITION AND  DISCUSSIONS  Patient's EKG shows a sinus rhythm with peaked T waves and widened QRS complexes consistent with hyperkalemia.    Patient's i-STAT potassium is 7.8.  I have started him on the hyperkalemia protocol with calcium chloride insulin glucose and bicarb.  His coags are normal.  His CBC has a white count is normal at 8.  Awaiting the rest of his comprehensive metabolic panel.  Spoken with nephrology Dr. Tsang who is calling the dialysis team.  I will speak with the intensivist Dr. Cuadra.    I was called by lab and his potassium is markedly elevated at 8.7 CO2 is 15 anion gap is 38 BUN is 150 with a creatinine of 31.35. I added an albuterol nebulizer treatment to help lower his potassium until dialysis gets here.  I also ordered another amp of sodium bicarb another unit of insulin and some oral Lokemia.    I have discussed management of the patient with the following physicians and MAISHA's:  Nephrology Sharan will call the dialysis team to come dialyze him    Intensivist  Dr. Cuadra who will admit him to the hospital    Discussion of management with other Q or appropriate source(s): Pharmacy regarding treatment for hyperkalemia      Escalation of care considered, and ultimately not performed:none    Barriers to care at this time, including but not limited to: Patient does not have established PCP, Patient lacks financial resources, and is noncompliant with his dialysis .     Decision tools and prescription drugs considered including, but not limited to:  hyperkalemia treatment .    CRITICAL CARE  The very real possibilty of a deterioration of this patient's condition required the highest level of my preparedness for sudden, emergent intervention.  I provided critical care services, which included medication orders, frequent reevaluations of the patient's condition and response to treatment, ordering and reviewing test results, and discussing the case with various consultants.  The critical  care time associated with the care of the patient was 50 minutes. Review chart for interventions. This time is exclusive of any other billable procedures.     FINAL DIAGNOSIS  1. Hyperkalemia    2. Hypoxia    3. Noncompliance with renal dialysis    4. Metabolic acidosis           Electronically signed by: Patrica Paz M.D., 8/21/2023 2:13 PM

## 2023-08-21 NOTE — PROGRESS NOTES
4 Eyes Skin Assessment Completed by RADHA Montano and RADHA Whitfield.    Head WDL  Ears WDL  Nose WDL  Mouth WDL  Neck WDL  Breast/Chest WDL  Shoulder Blades WDL  Spine WDL  (R) Arm/Elbow/Hand WDL  (L) Arm/Elbow/Hand WDL  Abdomen WDL  Groin WDL  Scrotum/Coccyx/Buttocks Redness, Non-Blanching, and Excoriation  (R) Leg WDL  (L) Leg WDL  (R) Heel/Foot/Toe WDL  (L) Heel/Foot/Toe WDL          Devices In Places ECG, Blood Pressure Cuff, Pulse Ox, and SCD's      Interventions In Place Gray Ear Foams, Pillows, Q2 Turns, and Barrier Cream    Possible Skin Injury Yes    Pictures Uploaded Into Epic Yes  Wound Consult Placed Yes  RN Wound Prevention Protocol Ordered Yes

## 2023-08-21 NOTE — DISCHARGE PLANNING
ER CM called to ER to meet with EMS. Pt bib from home. Home in dissarray. Meds all over the floors and counters. Feces all over the floors and furniture. Alcohol in home. Papers strewn all over home.  Address at home [address]  [place]. Apt complex number   EMS unable to find keys. Apt Maintenance will lock home and needs to be contacted to allow pt back in  xt 100 . Missed 5 at least of Dialysis at [facility] Lali Reyes advised of admission. Advised may want to change dialysis location ? But this is not confirmed. He has been difficult and this has been his standard on past admission for this cm as well.  He mumbles till irritated then yells.  No pcp listed. Daughter list in past did not want a lot of involvement with father. ER CM will reach out tho if patient oks contact  Pratibha (). Will cont to follow for labs. EKG changes noted, PCP may be at [facility] notes from  appreciated.   Care Transition Team Assessment    Information Source  Orientation Level: Oriented X4  Information Given By: Patient, Other (Comments)  Informant's Name: Abran./Dora BELTRAN  Who is responsible for making decisions for patient? : Patient         Elopement Risk  Legal Hold: No  Ambulatory or Self Mobile in Wheelchair: No-Not an Elopement Risk    Interdisciplinary Discharge Planning  Does Admitting Nurse Feel This Could be a Complex Discharge?: Yes  Lives with - Patient's Self Care Capacity: Alone and Unable to Care For Self  Support Systems: Other (Comments)  Housing / Facility: 1 Story Apartment / Condo  Mobility Issues: Yes  Durable Medical Equipment: Not Applicable    Discharge Preparedness  What is your plan after discharge?: Home with help  What are your discharge supports?: Child  Prior Functional Level: Ambulatory, Independent with Activities of Daily Living, Independent with Medication Management    Functional Assesment  Prior Functional Level: Ambulatory, Independent  with Activities of Daily Living, Independent with Medication Management    Finances  Prescription Coverage: Yes                   Domestic Abuse  Have you ever been the victim of abuse or violence?: No              Anticipated Discharge Information  Discharge Disposition: Discharged to home/self care (01)

## 2023-08-21 NOTE — CARE PLAN
The patient is Watcher - Medium risk of patient condition declining or worsening         Progress made toward(s) clinical / shift goals:      Patient is not progressing towards the following goals:      Problem: Knowledge Deficit - Standard  Goal: Patient and family/care givers will demonstrate understanding of plan of care, disease process/condition, diagnostic tests and medications  Outcome: Progressing     Problem: Skin Integrity  Goal: Skin integrity is maintained or improved  Outcome: Progressing     Problem: Fall Risk  Goal: Patient will remain free from falls  Outcome: Progressing

## 2023-08-21 NOTE — ED NOTES
Additional medications given iv/po  Pt transferred to ICU  Bedside report given  No further questions from staff

## 2023-08-21 NOTE — ED TRIAGE NOTES
"Chief Complaint   Patient presents with    Other     A call to EMS was made because pt was heard moaning.  Missed 5 dialysis appointments.  EMS EKG has peaked T waves     /53   Pulse 77   Resp (!) 23   Ht 1.727 m (5' 8\")   Wt 72.6 kg (160 lb)   BMI 24.33 kg/m²     "

## 2023-08-21 NOTE — CONSULTS
Critical Care Consultation    Date of consult: 8/21/2023    Referring Physician  Patrica Paz M.D.    Reason for Consultation  Hyperkalemia    History of Presenting Illness  73 y.o. male who presented 8/21/2023 with unclear complaints.  At the time of my interview, patient was encephalopathic with an elevated BUN.  He reports to me that he recently had bleeding fistula.  He does have a permacath.  Patient has missed his last 5 HD sessions.  Per report from EMS, patient's house was severely unsafe.  He had multiple areas where he had defecated on the floor, multiple pill bottles and alcohol bottles all over the floor.     He has a h cough istory of chronic pancreatitis due to pancreatic duct stones which were removed in Aug amd Sept 2022.  He was admitted in September 2022 with acute on chronic pancreatitis and managed conservatively.    Of note, patient also was admitted on 8/8/2023 due to missed hemodialysis and elevated potassium  On admission patient's potassium was elevated to 8 he had peaked T waves on EKG.  Patient was given the hyperkalemia cocktail per ED and stat dialysis was ordered by nephrology.    Patient also has a history of alcohol abuse     Missed hd admit 8/8  K 8.7, peaked T waves on EKG     Code Status  Prior    Review of Systems   Unable to perform ROS: Mental status change     Past Medical History   has a past medical history of Gout, Hemodialysis patient (HCC), Hypertension, Kidney disease, and MI (myocardial infarction) ([facility]).    Surgical History   has a past surgical history that includes pr upper gi endoscopy,diagnosis (N/A, 12/10/2021); pr colonoscopy,diagnostic (N/A, 12/10/2021); pr upper gi endoscopy,biopsy (N/A, 12/10/2021); pr colonoscopy,biopsy (N/A, 12/10/2021); pr ercp,diagnostic (N/A, 6/5/2022); and pr ercp,diagnostic (N/A, 9/19/2022).    Family History  family history includes Diabetes in his mother.    Social History   reports that he has quit smoking. He has never used  smokeless tobacco. He reports current alcohol use. He reports that he does not use drugs.    Medications  Home Medications       Reviewed by Rishabh Fraire (Pharmacy Tech) on 08/21/23 at 1448  Med List Status: Complete     Medication Last Dose Status   acetaminophen (TYLENOL) 500 MG Tab Unknown Active   apixaban (ELIQUIS) 5mg Tab Unknown Active   atorvastatin (LIPITOR) 20 MG Tab Unknown Active   B-Complex w/ C & Folic Acid (MELO-NEVAEH) Tab Unknown Active   Buprenorphine 15 MCG/HR PATCH WEEKLY Unknown Active   calcitRIOL (ROCALTROL) 0.25 MCG Cap > 2 weeks Active   calcitRIOL (ROCALTROL) 0.5 MCG Cap > 2 weeks Active   diclofenac sodium (VOLTAREN) 1 % Gel Unknown Active   DULoxetine (CYMBALTA) 60 MG Cap DR Particles delayed-release capsule Unknown Active   Etanercept 50 MG/ML Solution Prefilled Syringe Unknown Active   ferrous sulfate 325 (65 Fe) MG tablet Unknown Active   gabapentin (NEURONTIN) 300 MG Cap Unknown Active   haloperidol (HALDOL) 5 MG Tab > 2 weeks Active   lidocaine (LIDODERM) 5 % Patch Unknown Active   melatonin 3 MG Tab Unknown Active   Menthol, Topical Analgesic, 10 % Cream Unknown Active   Methoxy PEG-Epoetin Beta (MIRCERA) 100 MCG/0.3ML Solution Prefilled Syringe >2 weeks Active   midodrine (PROAMATINE) 5 MG Tab Unknown Active   oxyCODONE immediate-release (ROXICODONE) 5 MG Tab Unknown Active   Pancrelipase, Lip-Prot-Amyl, (CREON) 22992-162605 units Cap DR Particles Unknown Active   pantoprazole (PROTONIX) 40 MG Tablet Delayed Response Unknown Active   senna-docusate (PERICOLACE OR SENOKOT S) 8.6-50 MG Tab Unknown Active   sevelamer (RENAGEL) 800 MG Tab Unknown Active   traZODone (DESYREL) 50 MG Tab Unknown Active                  Current Facility-Administered Medications   Medication Dose Route Frequency Provider Last Rate Last Admin    sodium zirconium cyclosilicate (Lokelma) packet 10 g  10 g Oral Once Patrica Paz M.D.        sodium bicarbonate 8.4 % injection 50 mEq  50 mEq  Intravenous Once Patrica Paz M.D.        dextrose 50% (D50W) injection 25 g  25 g Intravenous Q15 MIN PRN Patrica Paz M.D.         Current Outpatient Medications   Medication Sig Dispense Refill    calcitRIOL (ROCALTROL) 0.25 MCG Cap Take 0.25 mcg by mouth every day. Pt takes at dialysis on Tue, Thur, and Sat with 0.5MCG, pt takes total on 0.75MCG      calcitRIOL (ROCALTROL) 0.5 MCG Cap Take 0.5 mcg by mouth see administration instructions. Pt takes at dialysis on Tue, Thur, and Sat with 0.25MCG, pt takes total on 0.75MCG      Methoxy PEG-Epoetin Beta (MIRCERA) 100 MCG/0.3ML Solution Prefilled Syringe Inject 100 mcg as directed every 14 days. Pt receives from dialysis      haloperidol (HALDOL) 5 MG Tab Take 5 mg by mouth see administration instructions. Pt receives at dialysis      melatonin 3 MG Tab Take 6 mg by mouth at bedtime. 3 mg x 2 tablets = 6 mg      Menthol, Topical Analgesic, 10 % Cream Apply 1 Application topically 4 times a day as needed. * apply to joints*  Indications: Pain      ferrous sulfate 325 (65 Fe) MG tablet Take 650 mg by mouth every day. 325 mg x 2 tablets = 650 mg      oxyCODONE immediate-release (ROXICODONE) 5 MG Tab Take 5 mg by mouth every four hours as needed for Severe Pain.      senna-docusate (PERICOLACE OR SENOKOT S) 8.6-50 MG Tab Take 1 Tablet by mouth 1 time a day as needed for Constipation. Indications: Constipation      DULoxetine (CYMBALTA) 60 MG Cap DR Particles delayed-release capsule Take 60 mg by mouth every day.      Pancrelipase, Lip-Prot-Amyl, (CREON) 55374-121967 units Cap DR Particles Take 1 Capsule by mouth 3 times a day with meals.      Buprenorphine 15 MCG/HR PATCH WEEKLY Place 15 mcg on the skin every Tuesday.      apixaban (ELIQUIS) 5mg Tab Take 5 mg by mouth 2 times a day.      atorvastatin (LIPITOR) 20 MG Tab Take 20 mg by mouth every evening.      acetaminophen (TYLENOL) 500 MG Tab Take 1,000 mg by mouth 3 times a day. * scheduled*      midodrine  (PROAMATINE) 5 MG Tab Take 5 mg by mouth see administration instructions. * Tuesday, Thursday, Saturday* on dialysis days      B-Complex w/ C & Folic Acid (MELO-NEVAEH) Tab Take 1 Tablet by mouth every day.      Etanercept 50 MG/ML Solution Prefilled Syringe Inject 50 mg under the skin every 7 days. (Patient taking differently: Inject 50 mg under the skin every Monday.) 4.2 mL 1    gabapentin (NEURONTIN) 300 MG Cap Take 300 mg by mouth at bedtime.      pantoprazole (PROTONIX) 40 MG Tablet Delayed Response Take 40 mg by mouth every day.      lidocaine (LIDODERM) 5 % Patch Apply 3 Patches topically every day. * apply to lower back*      sevelamer (RENAGEL) 800 MG Tab Take 800 mg by mouth 3 times a day with meals.      diclofenac sodium (VOLTAREN) 1 % Gel Apply 4 g topically 4 times a day as needed. *apply to al joints *  Indications: Joint Damage causing Pain and Loss of Function      traZODone (DESYREL) 50 MG Tab Take 50 mg by mouth at bedtime as needed. Indications: Trouble Sleeping         Allergies  Allergies   Allergen Reactions    Motrin [Ibuprofen] Unspecified     hhx of stomach ulcers       Vital Signs last 24 hours  Pulse:  [77] 77  Resp:  [23] 23  BP: (111)/(53) 111/53    Physical Exam  HENT:      Head: Normocephalic.   Eyes:      Conjunctiva/sclera: Conjunctivae normal.   Cardiovascular:      Rate and Rhythm: Regular rhythm. Tachycardia present.   Pulmonary:      Effort: Pulmonary effort is normal.      Breath sounds: Normal breath sounds.   Skin:     General: Skin is warm and dry.      Comments: RUE fistula with positive bruit and palpable pulse   Neurological:      Mental Status: He is alert. He is disoriented.         Fluids  No intake or output data in the 24 hours ending 08/21/23 1522    Laboratory  Recent Results (from the past 48 hour(s))   EKG    Collection Time: 08/21/23  2:09 PM   Result Value Ref Range    Report       [facility]    Test Date:  2023-08-21  Pt  Name:    RENE STEINBERG          Department: [facility]  MRN:        [id number]                      [facility]  Gender:     Male                         Technician: [id number]  :        1950                   Requested By:ER TRIAGE PROTOCOL  Order #:    [id number]                    Reading MD: NANDA GREEN MD    Measurements  Intervals                                Axis  Rate:       167                          P:          71  AL:         220                          QRS:        -1  QRSD:       112                          T:          0  QT:         220  QTc:        367    Interpretive Statements  Supraventricular tachycardia  Ventricular bigeminy  Incomplete left bundle branch block  Compared to ECG 2023 13:28:58  Ventricular premature complex(es) now present  Left bundle-branch block now present  Sinus rhythm no longer present  ST (T wave) deviation no longer present  Electronically  Signed On 2023 14:57:43 PDT by NANDA GREEN MD     CBC WITH DIFFERENTIAL    Collection Time: 23  2:16 PM   Result Value Ref Range    WBC 8.0 4.8 - 10.8 K/uL    RBC 3.71 (L) 4.70 - 6.10 M/uL    Hemoglobin 10.6 (L) 14.0 - 18.0 g/dL    Hematocrit 34.5 (L) 42.0 - 52.0 %    MCV 93.0 81.4 - 97.8 fL    MCH 28.6 27.0 - 33.0 pg    MCHC 30.7 (L) 32.3 - 36.5 g/dL    RDW 55.3 (H) 35.9 - 50.0 fL    Platelet Count 131 (L) 164 - 446 K/uL    MPV 11.6 9.0 - 12.9 fL    Neutrophils-Polys 67.30 44.00 - 72.00 %    Lymphocytes 17.30 (L) 22.00 - 41.00 %    Monocytes 10.20 0.00 - 13.40 %    Eosinophils 4.50 0.00 - 6.90 %    Basophils 0.50 0.00 - 1.80 %    Immature Granulocytes 0.20 0.00 - 0.90 %    Nucleated RBC 0.00 0.00 - 0.20 /100 WBC    Neutrophils (Absolute) 5.41 1.82 - 7.42 K/uL    Lymphs (Absolute) 1.39 1.00 - 4.80 K/uL    Monos (Absolute) 0.82 0.00 - 0.85 K/uL    Eos (Absolute) 0.36 0.00 - 0.51 K/uL    Baso (Absolute) 0.04 0.00 - 0.12 K/uL    Immature Granulocytes (abs) 0.02 0.00 - 0.11 K/uL    NRBC (Absolute) 0.00  K/uL   COMP METABOLIC PANEL    Collection Time: 08/21/23  2:16 PM   Result Value Ref Range    Sodium 140 135 - 145 mmol/L    Potassium 8.7 (HH) 3.6 - 5.5 mmol/L    Chloride 87 (L) 96 - 112 mmol/L    Co2 15 (L) 20 - 33 mmol/L    Anion Gap 38.0 (H) 7.0 - 16.0    Glucose 77 65 - 99 mg/dL    Bun 150 (H) 8 - 22 mg/dL    Creatinine 31.35 (HH) 0.50 - 1.40 mg/dL    Calcium 10.6 (H) 8.4 - 10.2 mg/dL    Correct Calcium 10.1 8.5 - 10.5 mg/dL    AST(SGOT) 7 (L) 12 - 45 U/L    ALT(SGPT) <5 2 - 50 U/L    Alkaline Phosphatase 201 (H) 30 - 99 U/L    Total Bilirubin 1.0 0.1 - 1.5 mg/dL    Albumin 4.6 3.2 - 4.9 g/dL    Total Protein 8.5 (H) 6.0 - 8.2 g/dL    Globulin 3.9 (H) 1.9 - 3.5 g/dL    A-G Ratio 1.2 g/dL   Prothrombin Time    Collection Time: 08/21/23  2:16 PM   Result Value Ref Range    PT 14.8 (H) 12.0 - 14.6 sec    INR 1.11 0.87 - 1.13   APTT    Collection Time: 08/21/23  2:16 PM   Result Value Ref Range    APTT 30.7 24.7 - 36.0 sec   ESTIMATED GFR    Collection Time: 08/21/23  2:16 PM   Result Value Ref Range    GFR (CKD-EPI) 1 (A) >60 mL/min/1.73 m 2   POCT potassium device results    Collection Time: 08/21/23  2:52 PM   Result Value Ref Range    Istat Potassium 7.8 (HH) 3.6 - 5.5 mmol/L       Imaging  DX-CHEST-PORTABLE (1 VIEW)   Final Result         1. No acute cardiopulmonary abnormalities are identified.          Assessment/Plan  Neuro   #Encephalopathy, likely secondary to uremia  #History of alcohol abuse  Frequent reorientation  Proceed with hemodialysis for uremia  Monitor for signs of alcohol withdrawal    Cardiac   #Sinus tachycardia  #LVH, by echo  Continue to monitor  HD for fluid overload    #History of hyperlipidemia  Continue statin, although I am unclear of the benefit for this patient    #History of hypertension, not currently on any medications at home  Continue to monitor    Pulmonary   No acute issues    GI   #History of chronic pancreatitis with pancreatic duct stone status post removal in  2022  Continue to monitor for abdominal pain    Renal   #Hyperkalemia due to missed hemodialysis  #ESRD on hemodialysis Tuesday Thursday Saturday  #Anion gap metabolic acidosis due to uremia  #Uremia  Nephrology consulted  Proceed with hemodialysis  Continue home sevelamer and Mayra-Mona    Heme  #Normocytic anemia  #Mild thrombocytopenia  Continue to monitor    ID   No acute issues    Endo   No acute issues, patient was found to be hyperglycemic on chemistry panel, however this was after his hyperkalemia cocktail    MSK/Skin  #History of rheumatoid arthritis  Patient complaining of pain in his joints  Unclear if he is taking his home Enbrel    Case management consulted for patient's home situation    Discussed patient condition and risk of morbidity and/or mortality with RN and Patient.    The patient remains critically ill.  Critical care time = 96 minutes in directly providing and coordinating critical care and extensive data review.  No time overlap and excludes procedures.    Melody Cuadra, DO   Pulmonary and Critical Care

## 2023-08-21 NOTE — ED NOTES
Pt medicated as directed by ER md, poc update given to pt. Further orders and dispo pending at this time. No further questions from pt at this time

## 2023-08-22 LAB
ALBUMIN SERPL BCP-MCNC: 4.3 G/DL (ref 3.2–4.9)
ALBUMIN SERPL BCP-MCNC: 4.4 G/DL (ref 3.2–4.9)
ALBUMIN/GLOB SERPL: 1.2 G/DL
ALBUMIN/GLOB SERPL: 1.2 G/DL
ALP SERPL-CCNC: 179 U/L (ref 30–99)
ALP SERPL-CCNC: 182 U/L (ref 30–99)
ALT SERPL-CCNC: <5 U/L (ref 2–50)
ALT SERPL-CCNC: <5 U/L (ref 2–50)
ANION GAP SERPL CALC-SCNC: 21 MMOL/L (ref 7–16)
ANION GAP SERPL CALC-SCNC: 25 MMOL/L (ref 7–16)
AST SERPL-CCNC: 15 U/L (ref 12–45)
AST SERPL-CCNC: 9 U/L (ref 12–45)
BILIRUB SERPL-MCNC: 1 MG/DL (ref 0.1–1.5)
BILIRUB SERPL-MCNC: 1.1 MG/DL (ref 0.1–1.5)
BUN SERPL-MCNC: 55 MG/DL (ref 8–22)
BUN SERPL-MCNC: 58 MG/DL (ref 8–22)
CALCIUM ALBUM COR SERPL-MCNC: 10.2 MG/DL (ref 8.5–10.5)
CALCIUM ALBUM COR SERPL-MCNC: 9.9 MG/DL (ref 8.5–10.5)
CALCIUM SERPL-MCNC: 10.2 MG/DL (ref 8.4–10.2)
CALCIUM SERPL-MCNC: 10.4 MG/DL (ref 8.4–10.2)
CHLORIDE SERPL-SCNC: 91 MMOL/L (ref 96–112)
CHLORIDE SERPL-SCNC: 92 MMOL/L (ref 96–112)
CO2 SERPL-SCNC: 23 MMOL/L (ref 20–33)
CO2 SERPL-SCNC: 26 MMOL/L (ref 20–33)
CREAT SERPL-MCNC: 13.59 MG/DL (ref 0.5–1.4)
CREAT SERPL-MCNC: 14.43 MG/DL (ref 0.5–1.4)
ERYTHROCYTE [DISTWIDTH] IN BLOOD BY AUTOMATED COUNT: 53.1 FL (ref 35.9–50)
GFR SERPLBLD CREATININE-BSD FMLA CKD-EPI: 3 ML/MIN/1.73 M 2
GFR SERPLBLD CREATININE-BSD FMLA CKD-EPI: 4 ML/MIN/1.73 M 2
GLOBULIN SER CALC-MCNC: 3.6 G/DL (ref 1.9–3.5)
GLOBULIN SER CALC-MCNC: 3.7 G/DL (ref 1.9–3.5)
GLUCOSE SERPL-MCNC: 92 MG/DL (ref 65–99)
GLUCOSE SERPL-MCNC: 97 MG/DL (ref 65–99)
HCT VFR BLD AUTO: 32.6 % (ref 42–52)
HGB BLD-MCNC: 10.4 G/DL (ref 14–18)
MAGNESIUM SERPL-MCNC: 2 MG/DL (ref 1.5–2.5)
MCH RBC QN AUTO: 28.9 PG (ref 27–33)
MCHC RBC AUTO-ENTMCNC: 31.9 G/DL (ref 32.3–36.5)
MCV RBC AUTO: 90.6 FL (ref 81.4–97.8)
PHOSPHATE SERPL-MCNC: 5.9 MG/DL (ref 2.5–4.5)
PLATELET # BLD AUTO: 128 K/UL (ref 164–446)
PMV BLD AUTO: 11.7 FL (ref 9–12.9)
POTASSIUM SERPL-SCNC: 4.7 MMOL/L (ref 3.6–5.5)
POTASSIUM SERPL-SCNC: 5.6 MMOL/L (ref 3.6–5.5)
PROT SERPL-MCNC: 8 G/DL (ref 6–8.2)
PROT SERPL-MCNC: 8 G/DL (ref 6–8.2)
RBC # BLD AUTO: 3.6 M/UL (ref 4.7–6.1)
SODIUM SERPL-SCNC: 139 MMOL/L (ref 135–145)
SODIUM SERPL-SCNC: 139 MMOL/L (ref 135–145)
WBC # BLD AUTO: 6.1 K/UL (ref 4.8–10.8)

## 2023-08-22 PROCEDURE — 770020 HCHG ROOM/CARE - TELE (206)

## 2023-08-22 PROCEDURE — A9270 NON-COVERED ITEM OR SERVICE: HCPCS | Performed by: INTERNAL MEDICINE

## 2023-08-22 PROCEDURE — 700102 HCHG RX REV CODE 250 W/ 637 OVERRIDE(OP): Performed by: INTERNAL MEDICINE

## 2023-08-22 PROCEDURE — 99233 SBSQ HOSP IP/OBS HIGH 50: CPT | Performed by: INTERNAL MEDICINE

## 2023-08-22 PROCEDURE — 700111 HCHG RX REV CODE 636 W/ 250 OVERRIDE (IP): Performed by: INTERNAL MEDICINE

## 2023-08-22 PROCEDURE — 84100 ASSAY OF PHOSPHORUS: CPT

## 2023-08-22 PROCEDURE — 700111 HCHG RX REV CODE 636 W/ 250 OVERRIDE (IP): Mod: JZ

## 2023-08-22 PROCEDURE — 97162 PT EVAL MOD COMPLEX 30 MIN: CPT

## 2023-08-22 PROCEDURE — 90935 HEMODIALYSIS ONE EVALUATION: CPT

## 2023-08-22 PROCEDURE — 36415 COLL VENOUS BLD VENIPUNCTURE: CPT

## 2023-08-22 PROCEDURE — 97166 OT EVAL MOD COMPLEX 45 MIN: CPT

## 2023-08-22 PROCEDURE — 94760 N-INVAS EAR/PLS OXIMETRY 1: CPT

## 2023-08-22 PROCEDURE — 97602 WOUND(S) CARE NON-SELECTIVE: CPT

## 2023-08-22 PROCEDURE — 80053 COMPREHEN METABOLIC PANEL: CPT

## 2023-08-22 PROCEDURE — 85027 COMPLETE CBC AUTOMATED: CPT

## 2023-08-22 PROCEDURE — 83735 ASSAY OF MAGNESIUM: CPT

## 2023-08-22 RX ORDER — NOREPINEPHRINE BITARTRATE 0.03 MG/ML
0-1 INJECTION, SOLUTION INTRAVENOUS CONTINUOUS
Status: DISCONTINUED | OUTPATIENT
Start: 2023-08-22 | End: 2023-08-22

## 2023-08-22 RX ORDER — LORAZEPAM 2 MG/ML
2 INJECTION INTRAMUSCULAR ONCE
Status: COMPLETED | OUTPATIENT
Start: 2023-08-22 | End: 2023-08-22

## 2023-08-22 RX ORDER — LORAZEPAM 2 MG/ML
INJECTION INTRAMUSCULAR
Status: COMPLETED
Start: 2023-08-22 | End: 2023-08-22

## 2023-08-22 RX ORDER — HEPARIN SODIUM 1000 [USP'U]/ML
2000 INJECTION, SOLUTION INTRAVENOUS; SUBCUTANEOUS
Status: DISCONTINUED | OUTPATIENT
Start: 2023-08-22 | End: 2023-08-25 | Stop reason: HOSPADM

## 2023-08-22 RX ORDER — MIDODRINE HYDROCHLORIDE 5 MG/1
10 TABLET ORAL
Status: DISCONTINUED | OUTPATIENT
Start: 2023-08-22 | End: 2023-08-25 | Stop reason: HOSPADM

## 2023-08-22 RX ADMIN — HEPARIN SODIUM 2000 UNITS: 1000 INJECTION, SOLUTION INTRAVENOUS; SUBCUTANEOUS at 09:02

## 2023-08-22 RX ADMIN — LORAZEPAM 2 MG: 2 INJECTION INTRAMUSCULAR; INTRAVENOUS at 16:27

## 2023-08-22 RX ADMIN — OMEPRAZOLE 20 MG: 20 CAPSULE, DELAYED RELEASE ORAL at 05:15

## 2023-08-22 RX ADMIN — MIDODRINE HYDROCHLORIDE 10 MG: 5 TABLET ORAL at 20:14

## 2023-08-22 RX ADMIN — HEPARIN SODIUM 3700 UNITS: 1000 INJECTION, SOLUTION INTRAVENOUS; SUBCUTANEOUS at 11:53

## 2023-08-22 RX ADMIN — Medication 1 TABLET: at 07:19

## 2023-08-22 RX ADMIN — LORAZEPAM 2 MG: 2 INJECTION INTRAMUSCULAR at 16:27

## 2023-08-22 RX ADMIN — DULOXETINE HYDROCHLORIDE 60 MG: 30 CAPSULE, DELAYED RELEASE ORAL at 05:15

## 2023-08-22 RX ADMIN — ATORVASTATIN CALCIUM 20 MG: 20 TABLET, FILM COATED ORAL at 20:14

## 2023-08-22 RX ADMIN — CALCITRIOL 0.25 MCG: 0.25 CAPSULE ORAL at 05:15

## 2023-08-22 RX ADMIN — MIDODRINE HYDROCHLORIDE 10 MG: 5 TABLET ORAL at 07:19

## 2023-08-22 RX ADMIN — SEVELAMER CARBONATE 800 MG: 800 TABLET, FILM COATED ORAL at 07:19

## 2023-08-22 RX ADMIN — SEVELAMER CARBONATE 800 MG: 800 TABLET, FILM COATED ORAL at 11:19

## 2023-08-22 RX ADMIN — MIDODRINE HYDROCHLORIDE 10 MG: 5 TABLET ORAL at 11:19

## 2023-08-22 RX ADMIN — APIXABAN 2.5 MG: 2.5 TABLET, FILM COATED ORAL at 20:14

## 2023-08-22 RX ADMIN — SENNOSIDES AND DOCUSATE SODIUM 2 TABLET: 50; 8.6 TABLET ORAL at 05:15

## 2023-08-22 RX ADMIN — CALCITRIOL 0.5 MCG: 0.25 CAPSULE ORAL at 08:32

## 2023-08-22 RX ADMIN — FERROUS SULFATE TAB 325 MG (65 MG ELEMENTAL FE) 650 MG: 325 (65 FE) TAB at 05:15

## 2023-08-22 RX ADMIN — OXYCODONE HYDROCHLORIDE 5 MG: 5 TABLET ORAL at 09:35

## 2023-08-22 RX ADMIN — PANCRELIPASE 72000 UNITS: 24000; 76000; 120000 CAPSULE, DELAYED RELEASE PELLETS ORAL at 07:19

## 2023-08-22 RX ADMIN — PANCRELIPASE 72000 UNITS: 24000; 76000; 120000 CAPSULE, DELAYED RELEASE PELLETS ORAL at 11:19

## 2023-08-22 RX ADMIN — APIXABAN 2.5 MG: 2.5 TABLET, FILM COATED ORAL at 05:15

## 2023-08-22 ASSESSMENT — COGNITIVE AND FUNCTIONAL STATUS - GENERAL
MOBILITY SCORE: 16
STANDING UP FROM CHAIR USING ARMS: A LITTLE
DRESSING REGULAR UPPER BODY CLOTHING: A LITTLE
HELP NEEDED FOR BATHING: A LOT
TOILETING: A LOT
SUGGESTED CMS G CODE MODIFIER DAILY ACTIVITY: CL
MOVING FROM LYING ON BACK TO SITTING ON SIDE OF FLAT BED: A LITTLE
STANDING UP FROM CHAIR USING ARMS: A LITTLE
PERSONAL GROOMING: A LOT
SUGGESTED CMS G CODE MODIFIER MOBILITY: CK
CLIMB 3 TO 5 STEPS WITH RAILING: A LOT
DRESSING REGULAR LOWER BODY CLOTHING: A LOT
DAILY ACTIVITIY SCORE: 13
WALKING IN HOSPITAL ROOM: A LOT
SUGGESTED CMS G CODE MODIFIER MOBILITY: CK
EATING MEALS: A LOT
DRESSING REGULAR LOWER BODY CLOTHING: A LOT
TURNING FROM BACK TO SIDE WHILE IN FLAT BAD: A LITTLE
TOILETING: A LOT
SUGGESTED CMS G CODE MODIFIER DAILY ACTIVITY: CL
MOBILITY SCORE: 16
MOVING FROM LYING ON BACK TO SITTING ON SIDE OF FLAT BED: A LITTLE
EATING MEALS: A LOT
DAILY ACTIVITIY SCORE: 13
PERSONAL GROOMING: A LOT
DRESSING REGULAR UPPER BODY CLOTHING: A LITTLE
MOVING TO AND FROM BED TO CHAIR: A LITTLE
WALKING IN HOSPITAL ROOM: A LOT
CLIMB 3 TO 5 STEPS WITH RAILING: A LOT
MOVING TO AND FROM BED TO CHAIR: A LITTLE
TURNING FROM BACK TO SIDE WHILE IN FLAT BAD: A LITTLE
HELP NEEDED FOR BATHING: A LOT

## 2023-08-22 ASSESSMENT — PAIN DESCRIPTION - PAIN TYPE
TYPE: ACUTE PAIN

## 2023-08-22 ASSESSMENT — ENCOUNTER SYMPTOMS
COUGH: 0
CHILLS: 0
HEADACHES: 0
DIZZINESS: 0
FEVER: 0

## 2023-08-22 ASSESSMENT — ACTIVITIES OF DAILY LIVING (ADL): TOILETING: UNABLE TO DETERMINE AT THIS TIME

## 2023-08-22 ASSESSMENT — GAIT ASSESSMENTS
GAIT LEVEL OF ASSIST: MINIMAL ASSIST
DISTANCE (FEET): 12
DEVIATION: BRADYKINETIC;SHUFFLED GAIT
ASSISTIVE DEVICE: FRONT WHEEL WALKER

## 2023-08-22 NOTE — PROGRESS NOTES
"[facility] -  PROGRESS NOTE               Author: DARIN Blanco Date & Time: 8/22/2023  1:33 PM     HPI:    74yo male BIB EMS, EMS called because he was heard moaning. He has missed HD x 2 weeks. EKG with peaked T waves per EMS. K noted to be 8.7 upon presentation, provided with appropriate medical management while awaiting HD, repeat 6.4. , repeat 153 with SCr 33.22. Patient is oriented to self, but not place, and provides only some answers to questions. He cannot say why he has not been attending HD, though he has previously reported not wanting to go to his current unit. He has a AHMET AVF that is not in use, utilizes R chest TDC for HD.         No F/C/N/V/CP/SOB, though he notes he had SOB prior to arrival  No melena, hematochezia, hematemesis.  No HA, visual changes, or abdominal pain.    DAILY NEPHROLOGY SUMMARY:  8/21 consult done   8/22 VSS but BP soft at times, iHD today net UF 846ml.  Moving out of ICU soon.  Feels ok without CP SOB but does feel swollen.      REVIEW OF SYSTEMS:    10 point ROS reviewed and is as per HPI/daily summary or otherwise negative    PMH/PSH/SH/FH:   Reviewed and unchanged since admission note    CURRENT MEDICATIONS:   Reviewed from admission to present day    VS:  BP 95/52   Pulse 91   Temp 36.9 °C (98.4 °F) (Temporal)   Resp 20   Ht 1.727 m (5' 8\")   Wt 62.7 kg (138 lb 3.7 oz)   SpO2 95%   BMI 21.02 kg/m²     Physical Exam  Nursing note reviewed.   Constitutional:       General: He is not in acute distress.     Appearance: Normal appearance.   Eyes:      General: No scleral icterus.  Cardiovascular:      Rate and Rhythm: Normal rate.      Comments: edema    AHMET AVF     CVC  Pulmonary:      Effort: Pulmonary effort is normal. No respiratory distress.   Abdominal:      General: There is no distension.      Palpations: Abdomen is soft.   Musculoskeletal:         General: No deformity.   Skin:     Findings: No rash. "   Neurological:      Mental Status: He is alert. Mental status is at baseline.   Psychiatric:         Behavior: Behavior is cooperative.         Fluids:  In: 500 [Dialysis:500]  Out: 1500     LABS:  Recent Labs     08/21/23  1609 08/22/23  0003 08/22/23  0511   SODIUM 141 139 139   POTASSIUM 6.4* 4.7 5.6*   CHLORIDE 88* 91* 92*   CO2 20 23 26   GLUCOSE 255* 92 97   * 55* 58*   CREATININE 33.22* 13.59* 14.43*   CALCIUM 11.0* 10.2 10.4*       IMAGING:   All imaging reviewed from admission to present day    IMPRESSION:  # ESRD  - HD qTTS via R chest TDC, non adherent  # Hyperkalemia  - medical management in ED  -Improving  -Tx with HD   # Acidosis  -Improved   # BP  - Goal BP < 140/90 MAP >65  - At goal  -Midodrine 10mg TID  # Anemia of CKD  - Goal Hgb 10-11  - not at goal  - ferritin 1071  # CKD-MBD  - Ca 11, corrected 10.8 (given Ca for hyperkalemia) ->10.4  - PO4  5.9  - PTH recently 356  # non adherence     PLAN:  - iHD today complete, continue  and qTTS and PRN during stay   - UF as tolerated  - No dietary protein restrictions  - Dose all meds per ESRD  - low K diet  - telemetry  - MADAY with HD  - AM labs trend phos every few days  - needs goal of care discussion     Other management per primary service

## 2023-08-22 NOTE — WOUND TEAM
[facility]  Inpatient Services  Initial Wound and Skin Care Evaluation    Admission Date: 8/21/2023     Last order of IP CONSULT TO WOUND CARE was found on 8/21/2023 from Hospital Encounter on 8/21/2023     HPI, PMH, SH: Reviewed    Past Surgical History:   Procedure Laterality Date    VT ERCP,DIAGNOSTIC N/A 9/19/2022    Procedure: ERCP (ENDOSCOPIC RETROGRADE CHOLANGIOPANCREATOGRAPHY);  Surgeon: Mikie Lugo M.D.;  Location: SURGERY SAME DAY [facility];  Service: Gastroenterology    VT ERCP,DIAGNOSTIC N/A 6/5/2022    Procedure: ERCP (ENDOSCOPIC RETROGRADE CHOLANGIOPANCREATOGRAPHY);  Surgeon: Ibrahima Daly M.D.;  Location: SURGERY [facility];  Service: Gastroenterology    VT UPPER GI ENDOSCOPY,DIAGNOSIS N/A 12/10/2021    Procedure: GASTROSCOPY;  Surgeon: Paddy Hooks M.D.;  Location: SURGERY SAME DAY [facility];  Service: Gastroenterology    VT COLONOSCOPY,DIAGNOSTIC N/A 12/10/2021    Procedure: COLONOSCOPY;  Surgeon: Paddy Hooks M.D.;  Location: SURGERY SAME DAY [facility];  Service: Gastroenterology    VT UPPER GI ENDOSCOPY,BIOPSY N/A 12/10/2021    Procedure: GASTROSCOPY, WITH BIOPSY;  Surgeon: Paddy Hooks M.D.;  Location: SURGERY SAME DAY [facility];  Service: Gastroenterology    VT COLONOSCOPY,BIOPSY N/A 12/10/2021    Procedure: COLONOSCOPY, WITH BIOPSY;  Surgeon: Paddy Hooks M.D.;  Location: SURGERY SAME DAY [facility];  Service: Gastroenterology     Social History     Tobacco Use    Smoking status: Former    Smokeless tobacco: Never   Substance Use Topics    Alcohol use: Yes     Comment: occ     Chief Complaint   Patient presents with    Other     A call to EMS was made because pt was heard moaning.  Missed 5 dialysis appointments.  EMS EKG has peaked T waves     Diagnosis: Hyperkalemia [E87.5]    Unit where seen by Wound Team: 3324/00     WOUND CONSULT RELATED TO:  buttocks    WOUND TEAM PLAN OF CARE - Frequency of Follow-up:   Nursing to follow dressing orders written for wound  care. Contact wound team if area fails to progress, deteriorates or with any questions/concerns if something comes up before next scheduled follow up (See below as to whether wound is following and frequency of wound follow up)   Not following, consult as needed  - buttocks    WOUND HISTORY:   Patient has end stage renal failure with dialysis, Patient missed with dialysis appointments and was heard moaning from his apartment. When EMS arrived the apartment was covered in feces, emptying medication containers, and emptying alcohol bottles.  Patient brought to the hospital and dialysis was performed.  Patient is anuric and history of ETOH and non-compliance with dialysis.         WOUND ASSESSMENT/LDA      Moisture Associated Skin Damage 08/22/23 Buttock;Perineum (Active)   First Observed Date/First Observed Time: 08/22/23 1613   Present on Original Admission: Yes  Wound Location : Buttock;Perineum      Assessments 8/22/2023  4:00 PM   Wound Image     NEXT Weekly Photo (Inpatient Only) 08/29/23   Drainage Amount None   Periwound Assessment Pink   IAD Cleansing Dimethecone Wipes;Foam Cleanser/Washcloth   Periwound Protectant Barrier Paste;Viscopaste   IAD Containment Device None   WOUND NURSE ONLY - Time Spent with Patient (mins) 15        Vascular:    CHRISTAL:   No results found.    Lab Values:    Lab Results   Component Value Date/Time    WBC 6.1 08/22/2023 05:11 AM    RBC 3.60 (L) 08/22/2023 05:11 AM    HEMOGLOBIN 10.4 (L) 08/22/2023 05:11 AM    HEMATOCRIT 32.6 (L) 08/22/2023 05:11 AM    CREACTPROT 18.48 (H) 09/28/2022 12:19 AM    SEDRATEWES 30 (H) 12/01/2020 02:54 AM    HBA1C 4.8 03/18/2020 01:15 PM         Culture Results show:  No results found for this or any previous visit (from the past 720 hour(s)).    Pain Level/Medicated:  None, Tolerated without pain medication       INTERVENTIONS BY WOUND TEAM:  Chart and images reviewed. Discussed with bedside RN. All areas of concern (based on picture review, LDA review and  discussion with bedside RN) have been thoroughly assessed. Documentation of areas based on significant findings. This RN in to assess patient. Performed standard wound care which includes appropriate positioning, dressing removal and non-selective debridement. Pictures and measurements obtained weekly if/when required.    Wound:  gluteal folds and perineum   Cleansed/Non-selectively Debrided with:  No rinse foam soap  Nini wound: Cleansed with No rinse foam soap, Prepped with Barrier paste  Primary Dressing:  viscopaste    Advanced Wound Care Discharge Planning  Number of Clinicians necessary to complete wound care: 1  Is patient requiring IV pain medications for dressing changes:  No   Length of time for dressing change 30 min. (This does not include chart review, pre-medication time, set up, clean up or time spent charting.)    Interdisciplinary consultation: Patient, Bedside RN (prahsanth), N/A.  Pressure injury and staging reviewed with N/A.    EVALUATION / RATIONALE FOR TREATMENT:     Date:  08/22/23  Wound Status:  Initial evaluation    Gluteal folds are red and very rare from long exposure to feces, continue to provide moisture management with barrier paste and viscopaste.           Goals: Steady decrease in wound area and depth weekly.    NURSING PLAN OF CARE ORDERS:  Dressing changes: See Dressing Care orders  Skin care: See Skin Care orders  RN Prevention Protocol    NUTRITION RECOMMENDATIONS   Wound Team Recommendations:  Dietary consult    DIET ORDERS (From admission to next 24h)       Start     Ordered    08/22/23 0905  Diet Order Diet: Level 6 - Soft and Bite Sized; Liquid level: Level 0 - Thin; Second Modifier: (optional): Renal  ALL MEALS        Question Answer Comment   Diet: Level 6 - Soft and Bite Sized    Liquid level Level 0 - Thin    Second Modifier: (optional) Renal        08/22/23 0905                    PREVENTATIVE INTERVENTIONS:    Q shift Chago - performed per nursing policy  Q shift  pressure point assessments - performed per nursing policy    Surface/Positioning  ICU Low Airloss - Currently in Place  TAPs Turning system - Currently in Place    Offloading/Redistribution  Heel offloading dressing (Silicone dressing) - Currently in Place      Containment/Moisture Prevention    Barrier wipes - Currently in Place  Barrier paste - Currently in Place    Mobilization      Ambulate      Anticipated discharge plans:  TBD        Vac Discharge Needs:  Vac Discharge plan is purely a recommendation from wound team and not a requirement for discharge unless otherwise stated by physician.  Not Applicable Pt not on a wound vac

## 2023-08-22 NOTE — CARE PLAN
The patient is Watcher - Medium risk of patient condition declining or worsening         Progress made toward(s) clinical / shift goals:      Patient is not progressing towards the following goals:      Problem: Knowledge Deficit - Standard  Goal: Patient and family/care givers will demonstrate understanding of plan of care, disease process/condition, diagnostic tests and medications  8/22/2023 0821 by Charmaine Hummel R.N.  Outcome: Progressing  8/22/2023 0821 by Charmaine Hummel R.N.  Outcome: Progressing     Problem: Skin Integrity  Goal: Skin integrity is maintained or improved  8/22/2023 0821 by Charmaine Hummel R.N.  Outcome: Progressing  8/22/2023 0821 by Charmaine Hummel R.N.  Outcome: Progressing     Problem: Fall Risk  Goal: Patient will remain free from falls  8/22/2023 0821 by Charmaine Hummel R.N.  Outcome: Progressing  8/22/2023 0821 by Charmaine Hummel R.N.  Outcome: Progressing     Problem: Psychosocial  Goal: Patient's ability to re-evaluate and adapt role responsibilities will improve  8/22/2023 0821 by Charmaine Hummel R.N.  Outcome: Progressing  8/22/2023 0821 by Charmaine Hummel R.N.  Outcome: Progressing     Problem: Hemodynamics  Goal: Patient's hemodynamics, fluid balance and neurologic status will be stable or improve  8/22/2023 0821 by Charmaine Hummel R.N.  Outcome: Progressing  8/22/2023 0821 by Charmaine Hummel R.N.  Outcome: Progressing     Problem: Respiratory  Goal: Patient will achieve/maintain optimum respiratory ventilation and gas exchange  8/22/2023 0821 by Charmaine Hummel R.N.  Outcome: Progressing  8/22/2023 0821 by Charmaine Hummel R.N.  Outcome: Progressing     Problem: Risk for Aspiration  Goal: Patient's risk for aspiration will be absent or decrease  8/22/2023 0821 by Charmaine Hummel R.N.  Outcome: Progressing  8/22/2023 0821 by Charmaine Hummel R.N.  Outcome: Progressing     Problem: Venous Thromboembolism (VTE) Prevention  Goal: The patient will remain free from venous  thromboembolism (VTE)  8/22/2023 0821 by Charmaine Hummel R.N.  Outcome: Progressing  8/22/2023 0821 by Charmaine Hummel R.N.  Outcome: Progressing     Problem: Bowel Elimination  Goal: Establish and maintain regular bowel function  8/22/2023 0821 by Charmaine Hummel R.N.  Outcome: Progressing  8/22/2023 0821 by Charmaine Hummel R.N.  Outcome: Progressing

## 2023-08-22 NOTE — THERAPY
"Physical Therapy   Initial Evaluation     Patient Name: Junior Proctor  Age:  73 y.o., Sex:  male  Medical Record #: [id number]  Today's Date: 8/22/2023     Precautions  Precautions: Fall Risk    Assessment  Patient is 73 y.o. male with a diagnosis of hyperkalemia, encephalopathy secondary to uremia, ETOH, ESRD on HD- pt has missed last 5 HD asessions. History of RA. Pt is presenting with weakness and impaired balance, unable to care for himself at home. Pt was also very confused and agitated during PT eval, requesting to get dressed repeatedly and wants to go home. Given pts deficits, recommend further therapy at SNF prior to DC home.    Plan    Physical Therapy Initial Treatment Plan   Treatment Plan : Bed Mobility, Gait Training, Neuro Re-Education / Balance, Stair Training, Therapeutic Activities, Therapeutic Exercise  Treatment Frequency: 3 Times per Week  Duration: Until Therapy Goals Met    DC Equipment Recommendations: Unable to determine at this time  Discharge Recommendations: Recommend post-acute placement for additional physical therapy services prior to discharge home        08/22/23 0192   Prior Living Situation   Housing / Facility 1 Story Apartment / Condo   Equipment Owned Front-Wheel Walker   Lives with - Patient's Self Care Capacity Alone and Unable to Care For Self   Comments Pts apt was found filthy, unlivable   Prior Level of Functional Mobility   Bed Mobility Independent   Transfer Status Independent   Ambulation Independent   Assistive Devices Used Front-Wheel Walker   Cognition    Level of Consciousness Alert   Comments Pt states he knows he is in a hospital but overall is very confused, became more agitated as PT eval progressed, pt asking to leave and repeatedly saying he doesn't want to be here and will \"come back Friday\".   Strength Lower Body   Lower Body Strength  X   Comments pt is grossly weak, would not cooperate with formal strength assessment   Other Treatments   Other Treatments " Provided stdg posture, forward head, c/s deformity affecting pts ability to look up, tends to look down   Bed Mobility    Supine to Sit Minimal Assist   Gait Analysis   Gait Level Of Assist Minimal Assist   Assistive Device Front Wheel Walker   Distance (Feet) 12   # of Times Distance was Traveled 1   Deviation Bradykinetic;Shuffled Gait  (B LE buckling at times)   Functional Mobility   Sit to Stand Minimal Assist   Bed, Chair, Wheelchair Transfer Refused   Short Term Goals    Short Term Goal # 1 Pt will be able to perform bed mobility and sup < >sit Dean in 6 visits.   Short Term Goal # 2 Pt will be able to perform sit <> stand and transfer Dean in 6 visits.   Short Term Goal # 3 Pt will be able to ambulate 100 ft with FWW Dean in 6 visits.

## 2023-08-22 NOTE — PROGRESS NOTES
12-hour chart check complete.    Monitor Summary  Rhythm: SR-ST  Rate: 84-99  Ectopy: rPVC, r PAC, rTrigem  Measurements: 0.16/0.08/0.34

## 2023-08-22 NOTE — PROGRESS NOTES
Urgent HD treatment ordered by Dr Tsang started at 1740 and ended at 2110 with net UF of 1 liter as bp tolerated. Unable to remove prescribed UF due to sudden dropped of bp after 13 minutes of treatment. Dr Amin was notified and ordered to decreased temperature to 35 and turn UF off. No significant  change until midodrine was given by primary RN. Dressing changed, no s/s of infection noted. Gave report to Gayla primary RADHA. See flow sheet for details.

## 2023-08-22 NOTE — PROGRESS NOTES
Critical Care Progress Note    Date of admission  8/21/2023    Reason for Consultation  Hyperkalemia     History of Presenting Illness  73 y.o. male who presented 8/21/2023 with unclear complaints.  At the time of my interview, patient was encephalopathic with an elevated BUN.  He reports to me that he recently had bleeding fistula.  He does have a permacath.  Patient has missed his last 5 HD sessions.  Per report from EMS, patient's house was severely unsafe.  He had multiple areas where he had defecated on the floor, multiple pill bottles and alcohol bottles all over the floor.      He has a h cough istory of chronic pancreatitis due to pancreatic duct stones which were removed in Aug amd Sept 2022.  He was admitted in September 2022 with acute on chronic pancreatitis and managed conservatively.     Of note, patient also was admitted on 8/8/2023 due to missed hemodialysis and elevated potassium  On admission patient's potassium was elevated to 8 he had peaked T waves on EKG.  Patient was given the hyperkalemia cocktail per ED and stat dialysis was ordered by nephrology.     Patient also has a history of alcohol abuse      Missed hd admit 8/8  K 8.7, peaked T waves on EKG     Hospital Course  8/22 -patient underwent hemodialysis last night and again this morning.  He is very weak.  Did not want to talk to me today, but said that he was fine.    Interval Problem Update  Chart review from the past 24 hours includes imaging, laboratory studies, vital signs and notes available.  Pertinent data for today's visit includes    Neuro: A&Ox4  Cardiac: hypotension, on midodrine, sinus tachy  Pulm: On room air  Heme: Stable  /renal: Potassium 5.6 this morning, receiving hemodialysis today  GI: No acute issues  Endo: No acute issues  ID: No acute issues  I/O: -1000    Review of Systems   Constitutional:  Negative for chills and fever.   Respiratory:  Negative for cough.    Cardiovascular:  Negative for chest pain.    Musculoskeletal:  Positive for joint pain.   Neurological:  Negative for dizziness and headaches.      Vital Signs for last 24 hours   Temp:  [36.1 °C (97 °F)-37.1 °C (98.7 °F)] 36.9 °C (98.4 °F)  Pulse:  [] 91  Resp:  [7-47] 20  BP: ()/(38-75) 95/52  SpO2:  [55 %-100 %] 95 %    Physical Exam  Constitutional:       Appearance: He is ill-appearing.   HENT:      Head: Normocephalic.   Eyes:      Conjunctiva/sclera: Conjunctivae normal.   Cardiovascular:      Rate and Rhythm: Normal rate and regular rhythm.   Pulmonary:      Effort: Pulmonary effort is normal.      Breath sounds: Normal breath sounds.   Skin:     General: Skin is warm and dry.   Neurological:      General: No focal deficit present.      Mental Status: He is alert.       Medications  Current Facility-Administered Medications   Medication Dose Route Frequency Provider Last Rate Last Admin    midodrine (Proamatine) tablet 10 mg  10 mg Oral TID WITH MEALS SIERRA AguirreOIshmael   10 mg at 08/22/23 1119    heparin injection 2,000 Units  2,000 Units Intravenous ACUTE DIALYSIS PRN Susan Tsang M.D.   2,000 Units at 08/22/23 0902    dextrose 50% (D50W) injection 25 g  25 g Intravenous Q15 MIN PRN Patrica Paz M.D.        atorvastatin (Lipitor) tablet 20 mg  20 mg Oral Nightly SIERRA RutherfordOIshmael   20 mg at 08/21/23 2242    calcitRIOL (Rocaltrol) capsule 0.25 mcg  0.25 mcg Oral DAILY NELLI Rutherford.O.   0.25 mcg at 08/22/23 0515    DULoxetine (Cymbalta) capsule 60 mg  60 mg Oral DAILY SIERRA RutherfordO.   60 mg at 08/22/23 0515    ferrous sulfate tablet 650 mg  650 mg Oral DAILY SIERRA RutherfordO.   650 mg at 08/22/23 0515    gabapentin (Neurontin) capsule 300 mg  300 mg Oral QHS NELLI Rutherford.O.   300 mg at 08/21/23 2242    melatonin tablet 6 mg  6 mg Oral QHS NELLI Rutherford.OIshmael        pancrelipase (Lip-Prot-Amyl) (Creon 31338) 69788-60349 units capsule 72,000 Units  3 Capsule Oral TID WITH  MEALS Melody Cuadra D.O.   72,000 Units at 08/22/23 1119    senna-docusate (Pericolace Or Senokot S) 8.6-50 MG per tablet 2 Tablet  2 Tablet Oral BID SIERRA RutherfordO.   2 Tablet at 08/22/23 0515    And    polyethylene glycol/lytes (Miralax) PACKET 1 Packet  1 Packet Oral QDAY PRN Melody Cuadra D.O.        And    bisacodyl (Dulcolax) suppository 10 mg  10 mg Rectal QDAY PRN Melody Cuadra D.O.        Pharmacy Consult Request ...Pain Management Review 1 Each  1 Each Other PHARMACY TO DOSE Melody Cuadra D.O.        oxyCODONE immediate-release (Roxicodone) tablet 5 mg  5 mg Oral Q3HRS PRN SIERRA RutherfordOIshmael   5 mg at 08/22/23 0935    Or    oxyCODONE immediate release (Roxicodone) tablet 10 mg  10 mg Oral Q3HRS PRN SIERRA RutherfordOIshmael        Or    HYDROmorphone (Dilaudid) injection 0.5 mg  0.5 mg Intravenous Q3HRS PRN SIERRA RutherfordO.        labetalol (Normodyne/Trandate) injection 10 mg  10 mg Intravenous Q4HRS PRN SIERRA RutherfordO.        vitamin B comp+C (Allbee With C) 1 Tablet  1 Tablet Oral DAILY SIERRA RutherfordOIshmael   1 Tablet at 08/22/23 0719    omeprazole (PriLOSEC) capsule 20 mg  20 mg Oral DAILY SIERRA RutherfordO.   20 mg at 08/22/23 0515    sevelamer carbonate (Renvela) tablet 800 mg  800 mg Oral TID WITH MEALS SIERRA RutherfordOIshmael   800 mg at 08/22/23 1119    calcitRIOL (Rocaltrol) capsule 0.5 mcg  0.5 mcg Oral TUE+THU+SAT SIERRA RutherfordOIshmael   0.5 mcg at 08/22/23 0832    heparin injection 3,700 Units  3,700 Units Intracatheter PRN Julia Amin D.O.   3,700 Units at 08/22/23 1153    apixaban (Eliquis) tablet 2.5 mg  2.5 mg Oral BID Melody Cuadra D.O.   2.5 mg at 08/22/23 0515       Fluids    Intake/Output Summary (Last 24 hours) at 8/22/2023 1340  Last data filed at 8/22/2023 1210  Gross per 24 hour   Intake 1000 ml   Output 2846 ml   Net -1846 ml       Laboratory          Recent Labs     08/21/23  1603  08/22/23 0003 08/22/23  0511   SODIUM 141 139 139   POTASSIUM 6.4* 4.7 5.6*   CHLORIDE 88* 91* 92*   CO2 20 23 26   * 55* 58*   CREATININE 33.22* 13.59* 14.43*   MAGNESIUM  --   --  2.0   PHOSPHORUS  --   --  5.9*   CALCIUM 11.0* 10.2 10.4*     Recent Labs     08/21/23  1609 08/22/23 0003 08/22/23  0511   ALTSGPT <5 <5 <5   ASTSGOT 7* 9* 15   ALKPHOSPHAT 178* 182* 179*   TBILIRUBIN 0.9 1.0 1.1   GLUCOSE 255* 92 97     Recent Labs     08/21/23  1416 08/21/23  1609 08/22/23 0003 08/22/23  0511   WBC 8.0 7.6  --  6.1   NEUTSPOLYS 67.30 70.60  --   --    LYMPHOCYTES 17.30* 15.20*  --   --    MONOCYTES 10.20 9.90  --   --    EOSINOPHILS 4.50 3.40  --   --    BASOPHILS 0.50 0.50  --   --    ASTSGOT 7* 7* 9* 15   ALTSGPT <5 <5 <5 <5   ALKPHOSPHAT 201* 178* 182* 179*   TBILIRUBIN 1.0 0.9 1.0 1.1     Recent Labs     08/21/23 1416 08/21/23  1609 08/22/23  0511   RBC 3.71* 3.40* 3.60*   HEMOGLOBIN 10.6* 9.8* 10.4*   HEMATOCRIT 34.5* 31.5* 32.6*   PLATELETCT 131* 121* 128*   PROTHROMBTM 14.8*  --   --    APTT 30.7  --   --    INR 1.11  --   --        Imaging  No new imaging    Assessment/Plan  Neuro   #Encephalopathy, likely secondary to uremia, improving  #History of alcohol abuse  Frequent reorientation  Proceed with hemodialysis for uremia  Monitor for signs of alcohol withdrawal     Cardiac   #Sinus tachycardia  #LVH, by echo  Continue to monitor  HD for fluid overload    #Chronic hypotension  Continue midodrine 10 mg 3 times daily  Levophed as needed for hemodialysis while in ICU     #History of hyperlipidemia  Continue statin, although I am unclear of the benefit for this patient       Pulmonary   No acute issues     GI   #History of chronic pancreatitis with pancreatic duct stone status post removal in 2022  Continue to monitor for abdominal pain     Renal   #Hyperkalemia due to missed hemodialysis proving  #ESRD on hemodialysis Tuesday Thursday Saturday  #Anion gap metabolic acidosis due to uremia,  improving  #Uremia improving  Nephrology following  Patient had HD yesterday and is undergoing HD again today  Continue home sevelamer and Mayra-Mona  He may need Levophed support during HD today     Heme  #Normocytic anemia  #Mild thrombocytopenia  Continue to monitor     ID   No acute issues     Endo   No acute issues, patient was found to be hyperglycemic on chemistry panel, however this was after his hyperkalemia cocktail     MSK/Skin  #History of rheumatoid arthritis  Patient complaining of pain in his joints  Unclear if he is taking his home Enbrel     Case management consulted for patient's home situation    I have performed a physical exam and reviewed and updated ROS and Plan today (8/22/2023). In review of yesterday's note (8/21/2023), there are no changes except as documented above.     Discussed patient condition and risk of morbidity and/or mortality with RN and Patient.      The patient remains critically ill.  Critical care time = 62 minutes in directly providing and coordinating critical care and extensive data review.  No time overlap and excludes procedures.    Melody Cuadra, DO   Pulmonary and Critical Care

## 2023-08-22 NOTE — PROGRESS NOTES
RN called into room by PT and OT due to pt becoming more agitated. Pt is adamant that he is leaving and threatening staff to give him his clothes. Dr. Cuadra called and 2mg IV Ativan ordered and administered. Due to pt's history he is at a high risk of alcohol withdrawal. Pt now resting in bed.

## 2023-08-22 NOTE — CARE PLAN
The patient is Unstable - High likelihood or risk of patient condition declining or worsening         Progress made toward(s) clinical / shift goals:    Problem: Skin Integrity  Goal: Skin integrity is maintained or improved  Outcome: Progressing     Problem: Fall Risk  Goal: Patient will remain free from falls  Outcome: Progressing     Problem: Hemodynamics  Goal: Patient's hemodynamics, fluid balance and neurologic status will be stable or improve  8/21/2023 2250 by Gayla Tony R.N.  Outcome: Progressing  8/21/2023 2250 by MARANDA SaxenaN.  Outcome: Progressing     Problem: Risk for Aspiration  Goal: Patient's risk for aspiration will be absent or decrease  8/21/2023 2250 by MARANDA SaxenaN.  Outcome: Progressing  8/21/2023 2250 by MARANDA SaxenaN.  Outcome: Progressing     Problem: Venous Thromboembolism (VTE) Prevention  Goal: The patient will remain free from venous thromboembolism (VTE)  8/21/2023 2250 by MARANDA SaxenaN.  Outcome: Progressing  8/21/2023 2250 by MARANDA SaxenaN.  Outcome: Progressing       Patient is not progressing towards the following goals:      Problem: Knowledge Deficit - Standard  Goal: Patient and family/care givers will demonstrate understanding of plan of care, disease process/condition, diagnostic tests and medications  Outcome: Not Progressing     Problem: Psychosocial  Goal: Patient's ability to re-evaluate and adapt role responsibilities will improve  8/21/2023 2250 by Gayla Tony R.N.  Outcome: Not Progressing  8/21/2023 2250 by PRESTON Saxena.N.  Outcome: Not Progressing     Problem: Respiratory  Goal: Patient will achieve/maintain optimum respiratory ventilation and gas exchange  8/21/2023 2250 by Gayla Tony R.N.  Outcome: Not Progressing  8/21/2023 2250 by Gayla Tony R.N.  Outcome: Not Progressing     Problem: Bowel Elimination  Goal: Establish and maintain regular bowel function  8/21/2023 2250 by MARANDA SaxenaN.  Outcome: Not Progressing  8/21/2023  2250 by Gayla Tony R.N.  Outcome: Not Progressing

## 2023-08-22 NOTE — PROGRESS NOTES
12-hour chart check complete.    Monitor Summary  Rhythm: SR  Rate:   Ectopy: rPVC  Measurements: .16/.08/.36

## 2023-08-22 NOTE — PROGRESS NOTES
[facility] Progress Note     HD treatment x 3 hours today ordered by Dr. Susan Tsang.  HD tx initiated at 0902 and ended at 1206.    Pt is alert, oriented x 4, not in any signs of distress at this time. Right chest catheter with clean, dry and intact dressing. No signs of infection noted. Aspirated 3mL on both catheter ports and saline flushed, both patent. Asymptomatic hypotension noted, Midodrine taken. Pt reports cramping towards the end of HD, BP low and tachycardic, UF off. HD treatment completed and tolerated well. BP improved post HD.    UF Net: 846 mL    Post tx: Right chest catheter with clean, dry and intact dressing. No signs of infection. Both catheter ports saline flushed, heparin locked as prescribed, clamped and capped.    Report given to primary RADHA Hummel. See electronic flowsheets for additional information.

## 2023-08-22 NOTE — PROGRESS NOTES
12-hour chart check complete.    Monitor Summary  Rhythm: SR/ST  Rate: 80's-100's  Ectopy: NA  Measurements: 0.20/0.08/0.32

## 2023-08-23 PROBLEM — R53.1 WEAKNESS: Status: ACTIVE | Noted: 2023-08-23

## 2023-08-23 LAB
ALBUMIN SERPL BCP-MCNC: 4.3 G/DL (ref 3.2–4.9)
ALBUMIN/GLOB SERPL: 1.1 G/DL
ALP SERPL-CCNC: 179 U/L (ref 30–99)
ALT SERPL-CCNC: <5 U/L (ref 2–50)
ANION GAP SERPL CALC-SCNC: 17 MMOL/L (ref 7–16)
AST SERPL-CCNC: 18 U/L (ref 12–45)
BILIRUB SERPL-MCNC: 1.1 MG/DL (ref 0.1–1.5)
BUN SERPL-MCNC: 33 MG/DL (ref 8–22)
CALCIUM ALBUM COR SERPL-MCNC: 9.6 MG/DL (ref 8.5–10.5)
CALCIUM SERPL-MCNC: 9.8 MG/DL (ref 8.4–10.2)
CHLORIDE SERPL-SCNC: 91 MMOL/L (ref 96–112)
CO2 SERPL-SCNC: 26 MMOL/L (ref 20–33)
CREAT SERPL-MCNC: 9.02 MG/DL (ref 0.5–1.4)
GFR SERPLBLD CREATININE-BSD FMLA CKD-EPI: 6 ML/MIN/1.73 M 2
GLOBULIN SER CALC-MCNC: 4 G/DL (ref 1.9–3.5)
GLUCOSE SERPL-MCNC: 100 MG/DL (ref 65–99)
POTASSIUM SERPL-SCNC: 4.3 MMOL/L (ref 3.6–5.5)
PROT SERPL-MCNC: 8.3 G/DL (ref 6–8.2)
SODIUM SERPL-SCNC: 134 MMOL/L (ref 135–145)

## 2023-08-23 PROCEDURE — 770020 HCHG ROOM/CARE - TELE (206)

## 2023-08-23 PROCEDURE — A9270 NON-COVERED ITEM OR SERVICE: HCPCS | Performed by: INTERNAL MEDICINE

## 2023-08-23 PROCEDURE — 80053 COMPREHEN METABOLIC PANEL: CPT

## 2023-08-23 PROCEDURE — 700102 HCHG RX REV CODE 250 W/ 637 OVERRIDE(OP): Performed by: INTERNAL MEDICINE

## 2023-08-23 PROCEDURE — 36415 COLL VENOUS BLD VENIPUNCTURE: CPT

## 2023-08-23 PROCEDURE — 94760 N-INVAS EAR/PLS OXIMETRY 1: CPT

## 2023-08-23 PROCEDURE — 99232 SBSQ HOSP IP/OBS MODERATE 35: CPT | Performed by: INTERNAL MEDICINE

## 2023-08-23 RX ADMIN — MIDODRINE HYDROCHLORIDE 10 MG: 5 TABLET ORAL at 08:14

## 2023-08-23 RX ADMIN — GABAPENTIN 300 MG: 300 CAPSULE ORAL at 20:11

## 2023-08-23 RX ADMIN — PANCRELIPASE 72000 UNITS: 24000; 76000; 120000 CAPSULE, DELAYED RELEASE PELLETS ORAL at 17:56

## 2023-08-23 RX ADMIN — OMEPRAZOLE 20 MG: 20 CAPSULE, DELAYED RELEASE ORAL at 05:08

## 2023-08-23 RX ADMIN — CALCITRIOL 0.25 MCG: 0.25 CAPSULE ORAL at 05:08

## 2023-08-23 RX ADMIN — MIDODRINE HYDROCHLORIDE 10 MG: 5 TABLET ORAL at 17:56

## 2023-08-23 RX ADMIN — SEVELAMER CARBONATE 800 MG: 800 TABLET, FILM COATED ORAL at 11:11

## 2023-08-23 RX ADMIN — SEVELAMER CARBONATE 800 MG: 800 TABLET, FILM COATED ORAL at 17:56

## 2023-08-23 RX ADMIN — ATORVASTATIN CALCIUM 20 MG: 20 TABLET, FILM COATED ORAL at 20:11

## 2023-08-23 RX ADMIN — APIXABAN 2.5 MG: 2.5 TABLET, FILM COATED ORAL at 17:56

## 2023-08-23 RX ADMIN — DULOXETINE HYDROCHLORIDE 60 MG: 30 CAPSULE, DELAYED RELEASE ORAL at 05:08

## 2023-08-23 RX ADMIN — PANCRELIPASE 72000 UNITS: 24000; 76000; 120000 CAPSULE, DELAYED RELEASE PELLETS ORAL at 11:11

## 2023-08-23 RX ADMIN — Medication 1 TABLET: at 05:08

## 2023-08-23 RX ADMIN — SEVELAMER CARBONATE 800 MG: 800 TABLET, FILM COATED ORAL at 08:14

## 2023-08-23 RX ADMIN — PANCRELIPASE 72000 UNITS: 24000; 76000; 120000 CAPSULE, DELAYED RELEASE PELLETS ORAL at 08:14

## 2023-08-23 RX ADMIN — APIXABAN 2.5 MG: 2.5 TABLET, FILM COATED ORAL at 05:08

## 2023-08-23 RX ADMIN — MIDODRINE HYDROCHLORIDE 10 MG: 5 TABLET ORAL at 11:11

## 2023-08-23 RX ADMIN — Medication 6 MG: at 20:11

## 2023-08-23 RX ADMIN — SENNOSIDES AND DOCUSATE SODIUM 2 TABLET: 50; 8.6 TABLET ORAL at 17:57

## 2023-08-23 RX ADMIN — FERROUS SULFATE TAB 325 MG (65 MG ELEMENTAL FE) 650 MG: 325 (65 FE) TAB at 05:08

## 2023-08-23 ASSESSMENT — ENCOUNTER SYMPTOMS
COUGH: 0
SENSORY CHANGE: 0
HEADACHES: 0
INSOMNIA: 0
VOMITING: 0
DIARRHEA: 0
FEVER: 0
CONSTIPATION: 0
DIZZINESS: 0
WEAKNESS: 1
MYALGIAS: 0
PHOTOPHOBIA: 0
NERVOUS/ANXIOUS: 0
CHILLS: 0
CLAUDICATION: 0
DEPRESSION: 0
SHORTNESS OF BREATH: 0
HEARTBURN: 0
ABDOMINAL PAIN: 0
SPEECH CHANGE: 0
BLURRED VISION: 0

## 2023-08-23 ASSESSMENT — PAIN DESCRIPTION - PAIN TYPE
TYPE: ACUTE PAIN
TYPE: ACUTE PAIN

## 2023-08-23 NOTE — PROGRESS NOTES
Telemetry Shift Summary     Rhythm: SR w/ BBB  Rate: 80s  Measurements: 0.16/0.12/0.40  Ectopy (reported by Monitor Tech): rare to occasional PVC, occasional Bigeminy, rare Trigeminy     Normal Values  Rhythm: Sinus  HR:   Measurements: 0.12-0.20/0.06-0.10/0.30-0.52

## 2023-08-23 NOTE — ASSESSMENT & PLAN NOTE
Tuesday Thursday Saturday's dialysis after as needed earlier admission  [facility] nephrology following  Creatinine 10.78

## 2023-08-23 NOTE — PROGRESS NOTES
[facility] Medicine Daily Progress Note    Date of Service  8/23/2023    Chief Complaint  Junior Proctor is a 73 y.o. male admitted 8/21/2023 with confusion    Hospital Course  73-year-old male who presented with altered mental status and was initially admitted to the ICU secondary to profound hyperkalemia.  Patient with permacath secondary to end-stage renal disease and had missed 5 dialysis sessions.  Per EMS report patient's house was unsafe with feces, pill bottles and alcohol bottles all over the floor.  Patient was downgraded from the ICU to the medical floor after his potassium markedly improved with dialysis.    Interval Problem Update  8/23 patient has been downgraded from the intensive care unit to the telemetry floor and continues to show improvements.  His potassium is down to 4.3.  Creatinine is down to 9.02.  He is not showing signs of alcohol withdrawal and is pleasant upon my interview.  Anticipate 1 more day in the hospital than he will receive dialysis tomorrow and that should be cleared medically to transition to skilled nursing facility.    I have discussed this patient's plan of care and discharge plan at IDT rounds today with Case Management, Nursing, Nursing leadership, and other members of the IDT team.    Consultants/Specialty  nephrology    Code Status  Full Code    Disposition  The patient is not medically cleared for discharge to home or a post-acute facility.  Anticipate discharge to: skilled nursing facility    I have placed the appropriate orders for post-discharge needs.    Review of Systems  Review of Systems   Constitutional:  Positive for malaise/fatigue. Negative for chills and fever.   HENT:  Negative for congestion.    Eyes:  Negative for blurred vision and photophobia.   Respiratory:  Negative for cough and shortness of breath.    Cardiovascular:  Negative for chest pain, claudication and leg swelling.   Gastrointestinal:  Negative for abdominal pain, constipation, diarrhea,  heartburn and vomiting.   Genitourinary:  Negative for dysuria and hematuria.   Musculoskeletal:  Negative for joint pain and myalgias.   Skin:  Negative for itching and rash.   Neurological:  Positive for weakness. Negative for dizziness, sensory change, speech change and headaches.   Psychiatric/Behavioral:  Negative for depression. The patient is not nervous/anxious and does not have insomnia.         Physical Exam  Temp:  [36.3 °C (97.3 °F)-37.1 °C (98.7 °F)] 36.7 °C (98.1 °F)  Pulse:  [] 91  Resp:  [12-26] 16  BP: ()/(49-76) 98/49  SpO2:  [94 %-98 %] 97 %    Physical Exam  Vitals and nursing note reviewed.   Constitutional:       General: He is not in acute distress.     Appearance: Normal appearance.   HENT:      Head: Normocephalic and atraumatic.   Eyes:      General: No scleral icterus.     Extraocular Movements: Extraocular movements intact.   Cardiovascular:      Rate and Rhythm: Normal rate and regular rhythm.      Pulses: Normal pulses.      Heart sounds: Normal heart sounds. No murmur heard.  Pulmonary:      Effort: Pulmonary effort is normal. No respiratory distress.      Breath sounds: Normal breath sounds. No wheezing, rhonchi or rales.   Abdominal:      General: Abdomen is flat. Bowel sounds are normal. There is no distension.      Palpations: Abdomen is soft.      Tenderness: There is no rebound.   Musculoskeletal:         General: No swelling or tenderness.      Cervical back: Normal range of motion and neck supple.   Lymphadenopathy:      Cervical: No cervical adenopathy.   Skin:     Coloration: Skin is not jaundiced.      Findings: No erythema.   Neurological:      General: No focal deficit present.      Mental Status: He is alert and oriented to person, place, and time. Mental status is at baseline.      Cranial Nerves: No cranial nerve deficit.   Psychiatric:         Mood and Affect: Mood normal.         Behavior: Behavior normal.         Fluids    Intake/Output Summary (Last 24  hours) at 8/23/2023 1125  Last data filed at 8/22/2023 1210  Gross per 24 hour   Intake 500 ml   Output 1346 ml   Net -846 ml       Laboratory  Recent Labs     08/21/23  1416 08/21/23  1609 08/22/23  0511   WBC 8.0 7.6 6.1   RBC 3.71* 3.40* 3.60*   HEMOGLOBIN 10.6* 9.8* 10.4*   HEMATOCRIT 34.5* 31.5* 32.6*   MCV 93.0 92.6 90.6   MCH 28.6 28.8 28.9   MCHC 30.7* 31.1* 31.9*   RDW 55.3* 54.1* 53.1*   PLATELETCT 131* 121* 128*   MPV 11.6 11.0 11.7     Recent Labs     08/22/23  0003 08/22/23  0511 08/23/23  0503   SODIUM 139 139 134*   POTASSIUM 4.7 5.6* 4.3   CHLORIDE 91* 92* 91*   CO2 23 26 26   GLUCOSE 92 97 100*   BUN 55* 58* 33*   CREATININE 13.59* 14.43* 9.02*   CALCIUM 10.2 10.4* 9.8     Recent Labs     08/21/23  1416   APTT 30.7   INR 1.11               Imaging  DX-CHEST-PORTABLE (1 VIEW)   Final Result         1. No acute cardiopulmonary abnormalities are identified.           Assessment/Plan  * Hyperkalemia- (present on admission)  Assessment & Plan  Significantly elevated potassium after he missed 5 dialysis sessions prior to hospitalization  Has done well with dialysis and is back on his Tuesday Thursday Saturday schedule  Today's potassium is 4.3    Weakness  Assessment & Plan  Secondary to chronic alcoholism, unable to care for self, seen by PT OT and recommended skilled  Patient is agreeable with skilled nursing facility discharge      Alcohol dependence (HCC)- (present on admission)  Assessment & Plan  Signs of acute alcohol withdrawal at this time    Pulmonary embolism (HCC)- (present on admission)  Assessment & Plan  Continue home dose Eliquis    ESRD on dialysis (HCC)- (present on admission)  Assessment & Plan  Tuesday Thursday Saturday's dialysis after as needed earlier admission  [facility] nephrology following  Creatinine down to 9.02    Hypotension- (present on admission)  Assessment & Plan  Continue with home dose midodrine         VTE prophylaxis:    therapeutic anticoagulation with eliquis  2.5 mg BID      I have performed a physical exam and reviewed and updated ROS and Plan today (8/23/2023). In review of yesterday's note (8/22/2023), there are no changes except as documented above.

## 2023-08-23 NOTE — DISCHARGE PLANNING
Case Management Discharge Planning    Admission Date: 8/21/2023  GMLOS: 3.4  ALOS: 2    6-Clicks ADL Score: 13  6-Clicks Mobility Score: 16  PT and/or OT Eval ordered: Yes  Post-acute Referrals Ordered: Yes  Post-acute Choice Obtained: Yes  Has referral(s) been sent to post-acute provider:  Yes      Anticipated Discharge Dispo: Discharge Disposition: D/T to SNF with Medicare cert in anticipation of skilled care (03)    DME Needed: No    Action(s) Taken: Spoke to pt at bedside regarding SNF placement. Received verbal approval for blanket SNF referral. Choice form completed and faxed to DPA. Per pt, he lives alone and his daughter lives in [place], [place].    Escalations Completed: None    Medically Clear: No    Next Steps: f/u with [facility] regarding SNF acceptance    Barriers to Discharge: Medical clearance and Pending Placement

## 2023-08-23 NOTE — THERAPY
"Occupational Therapy   Initial Evaluation     Patient Name: Junior Proctor  Age:  73 y.o., Sex:  male  Medical Record #: [id number]  Today's Date: 8/22/2023     Precautions  Precautions: (P) Fall Risk    Assessment  Patient is 73 y.o. male with a diagnosis of hyperkalemia, missed 5 HD sessions. Additional factors influencing patient status / progress: ETOH abuse, HTN, MI, HD, RA, gout. Pt presents disheveled, A&Ox2, confused and easily agitated. Agreeable to activity but then declines to sit UIC or be BTB. Currently limited by impaired mentation, balance, overall strength/coordination, decreased safety judgment to complete ADL's. Difficult to obtain PLOF from pt but found in apt with pills, alcohol bottles and feces all over floor. Pt is not safe to return home alone at this time; recommend snf upon dc. OT will follow while in house.         Plan  Occupational Therapy Initial Treatment Plan   Treatment Interventions: (P) Self Care / Activities of Daily Living, Neuro Re-Education / Balance, Therapeutic Activity  Treatment Frequency: (P) 3 Times per Week  Duration: (P) Until Therapy Goals Met    DC Equipment Recommendations: (P) Unable to determine at this time  Discharge Recommendations: (P) Recommend post-acute placement for additional occupational therapy services prior to discharge home     Subjective  \"I'm coming back on Friday\"     Objective     08/22/23 1544   Prior Living Situation   Prior Services None;Unable To Determine At This Time   Housing / Facility 1 Story Apartment / Condo   Steps Into Home 0   Steps In Home 0   Equipment Owned Unable to Determine At This Time;Front-Wheel Walker   Lives with - Patient's Self Care Capacity Alone and Unable to Care For Self   Prior Level of ADL Function   Self Feeding Independent   Grooming / Hygiene Independent   Bathing Unable To Determine At This Time   Dressing Independent   Toileting Unable To Determine At This Time   Comments pt found in apt with feces on ground "   Prior Level of IADL Function   Medication Management Unable To Determine At This Time   Laundry Unable To Determine At This Time   Kitchen Mobility Unable To Determine At This Time   Finances Unable To Determine At This Time   Home Management Unable To Determine At This Time   Shopping Unable To Determine At This Time   Prior Level Of Mobility Unable to Determine At This Time;Independent With Device in Home   Driving / Transportation Unable To Determine At This Time   Occupation (Pre-Hospital Vocational) Not Employed   Leisure Interests Unable To Determine At This Time   Precautions   Precautions Fall Risk   Vitals   O2 Delivery Device None - Room Air   Cognition    Cognition / Consciousness X  (confusion)   Level of Consciousness Alert   Passive ROM Upper Body   Passive ROM Upper Body WDL   Active ROM Upper Body   Active ROM Upper Body  WDL   Strength Upper Body   Upper Body Strength  Not Tested   Sensation Upper Body   Upper Extremity Sensation  Not Tested   Upper Body Muscle Tone   Upper Body Muscle Tone  WDL   Coordination Upper Body   Coordination WDL   Balance Assessment   Sitting Balance (Static) Fair +   Sitting Balance (Dynamic) Fair   Standing Balance (Static) Fair -   Standing Balance (Dynamic) Fair -   Weight Shift Sitting Fair   Weight Shift Standing Fair   Bed Mobility    Supine to Sit Minimal Assist   ADL Assessment   Eating Minimal Assist   Grooming Minimal Assist   Lower Body Dressing Minimal Assist   How much help from another person does the patient currently need...   Putting on and taking off regular lower body clothing? 2   Bathing (including washing, rinsing, and drying)? 2   Toileting, which includes using a toilet, bedpan, or urinal? 2   Putting on and taking off regular upper body clothing? 3   Taking care of personal grooming such as brushing teeth? 2   Eating meals? 2   6 Clicks Daily Activity Score 13   Functional Mobility   Sit to Stand Minimal Assist   Bed, Chair, Wheelchair Transfer  Minimal Assist   Transfer Method Stand Step   Mobility unsteady   Comments fww   Visual Perception   Visual Perception  Not Tested   Activity Tolerance   Sitting in Chair declines   Sitting Edge of Bed 10   Standing 5   Short Term Goals   Short Term Goal # 1 ADL transfers with SBA within 5 days   Short Term Goal # 2 Toileting in br with SBA within 5 days   Short Term Goal # 3 Grooming at sink with SBA within 5 days   Short Term Goal # 4 FB dressing with SBA within 5 days   Education Group   Role of Occupational Therapist Patient Response Patient;Explanation;Nonacceptance;No Learning Evidence   Occupational Therapy Initial Treatment Plan    Treatment Interventions Self Care / Activities of Daily Living;Neuro Re-Education / Balance;Therapeutic Activity   Treatment Frequency 3 Times per Week   Duration Until Therapy Goals Met   Problem List   Problem List Decreased Active Daily Living Skills;Decreased Functional Mobility;Safety Awareness Deficits / Cognition;Decreased Activity Tolerance;Impaired Postural Control / Balance   Anticipated Discharge Equipment and Recommendations   DC Equipment Recommendations Unable to determine at this time   Discharge Recommendations Recommend post-acute placement for additional occupational therapy services prior to discharge home   Interdisciplinary Plan of Care Collaboration   IDT Collaboration with  Nursing   Patient Position at End of Therapy Edge of Bed  (RN and CNA with pt now-pt declining ANTOLIN, NADER)   Collaboration Comments aware of visit

## 2023-08-23 NOTE — CARE PLAN
The patient is Stable - Low risk of patient condition declining or worsening    Shift Goals  Clinical Goals: monitor labs, dialysis?  Patient Goals: comfort    Progress made toward(s) clinical / shift goals:  dialysis tomorrow per neph    Patient is not progressing towards the following goals:      Problem: Knowledge Deficit - Standard  Goal: Patient and family/care givers will demonstrate understanding of plan of care, disease process/condition, diagnostic tests and medications  Outcome: Progressing  Note: Dialysis tomorrow, monitoring labs     Problem: Skin Integrity  Goal: Skin integrity is maintained or improved  Outcome: Progressing  Note: Wound care interventions      Problem: Fall Risk  Goal: Patient will remain free from falls  Outcome: Progressing

## 2023-08-23 NOTE — ASSESSMENT & PLAN NOTE
Secondary to chronic alcoholism, unable to care for self, seen by PT OT and recommended skilled  Patient is agreeable with skilled nursing facility discharge

## 2023-08-23 NOTE — ASSESSMENT & PLAN NOTE
Significantly elevated potassium after he missed 5 dialysis sessions prior to hospitalization  Has done well with dialysis and is back on his Tuesday Thursday Saturday schedule  Today's potassium is 4.3

## 2023-08-23 NOTE — DISCHARGE PLANNING
1353  Received Choice form at 1219  Agency/Facility Name: [facility]in [facility]don[facility]   Referral sent per Choice form @ [phone]  Agency/Facility Name: [facility]   Spoke To: Gume   Outcome: Gume called to notify [facility] that pt can be accepted depending on dialysis schedule and once his confusion improves.

## 2023-08-23 NOTE — PROGRESS NOTES
"[facility] -  PROGRESS NOTE               Author: Julia Amin D.O. Date & Time: 8/23/2023  8:04 AM     HPI:    74yo male BIB EMS, EMS called because he was heard moaning. He has missed HD x 2 weeks. EKG with peaked T waves per EMS. K noted to be 8.7 upon presentation, provided with appropriate medical management while awaiting HD, repeat 6.4. , repeat 153 with SCr 33.22. Patient is oriented to self, but not place, and provides only some answers to questions. He cannot say why he has not been attending HD, though he has previously reported not wanting to go to his current unit. He has a AHMET AVF that is not in use, utilizes R chest TDC for HD.         No F/C/N/V/CP/SOB, though he notes he had SOB prior to arrival  No melena, hematochezia, hematemesis.  No HA, visual changes, or abdominal pain.    DAILY NEPHROLOGY SUMMARY:  8/21 consult done   8/22 VSS but BP soft at times, iHD today net UF 846ml.  Moving out of ICU soon.  Feels ok without CP SOB but does feel swollen.    8/23 - no acute events, tolerated HD, no new c/o    REVIEW OF SYSTEMS:    10 point ROS reviewed and is as per HPI/daily summary or otherwise negative    PMH/PSH/SH/FH:   Reviewed and unchanged since admission note    CURRENT MEDICATIONS:   Reviewed from admission to present day    VS:  /63   Pulse 87   Temp 36.3 °C (97.3 °F) (Oral)   Resp 16   Ht 1.727 m (5' 8\")   Wt 62.7 kg (138 lb 3.7 oz)   SpO2 96%   BMI 21.02 kg/m²     Physical Exam  Nursing note reviewed.   Constitutional:       General: He is not in acute distress.     Appearance: Normal appearance.   HENT:      Head: Normocephalic and atraumatic.      Right Ear: External ear normal.      Left Ear: External ear normal.      Nose: Nose normal.      Mouth/Throat:      Pharynx: Oropharynx is clear.   Eyes:      General: No scleral icterus.     Extraocular Movements: Extraocular movements intact.   Cardiovascular:      Rate and Rhythm: Normal rate " and regular rhythm.      Comments:     AHMET AVF +thrill/hyperpuslatile   TDC c/d/d  Pulmonary:      Effort: Pulmonary effort is normal. No respiratory distress.      Breath sounds: No wheezing.   Abdominal:      General: There is no distension.      Palpations: Abdomen is soft.   Musculoskeletal:         General: No deformity.      Comments: BLE edema improved   Skin:     Findings: No rash.   Neurological:      Mental Status: He is alert. He is disoriented.      Cranial Nerves: No cranial nerve deficit.   Psychiatric:         Mood and Affect: Mood normal.         Behavior: Behavior is cooperative.         Fluids:  In: 500 [Dialysis:500]  Out: 1346     LABS:  Recent Labs     08/22/23  0003 08/22/23  0511 08/23/23  0503   SODIUM 139 139 134*   POTASSIUM 4.7 5.6* 4.3   CHLORIDE 91* 92* 91*   CO2 23 26 26   GLUCOSE 92 97 100*   BUN 55* 58* 33*   CREATININE 13.59* 14.43* 9.02*   CALCIUM 10.2 10.4* 9.8         IMAGING:   All imaging reviewed from admission to present day    IMPRESSION:  # ESRD  - HD qTTS via R chest TDC, non adherent  # Hyperkalemia  - medical management in ED  -Improved  # Acidosis  -Improved   # Hypotension  - Goal  MAP >65  # Anemia of CKD  - Goal Hgb 10-11  - at goal  - ferritin 1071  # CKD-MBD  - Ca wnl  - PO4  5.9 8/22  - PTH recently 356  # non adherence  # encephalopathy     PLAN:  - iHD qTTS and PRN during stay   - UF as tolerated  - No dietary protein restrictions  - Dose all meds per ESRD  - low K diet  - telemetry  - no MADAY with HD for now    - needs goal of care discussion     Other management per primary service

## 2023-08-23 NOTE — HOSPITAL COURSE
73-year-old male who presented with altered mental status and was initially admitted to the ICU secondary to profound hyperkalemia.  Patient with permacath secondary to end-stage renal disease and had missed 5 dialysis sessions.  Per EMS report patient's house was unsafe with feces, pill bottles and alcohol bottles all over the floor.  Patient was downgraded from the ICU to the medical floor after his potassium markedly improved with dialysis.

## 2023-08-23 NOTE — CARE PLAN
The patient is Stable - Low risk of patient condition declining or worsening    Shift Goals  Clinical Goals: Labs, BP, Fluid Volume  Patient Goals: Comfort    Progress made toward(s) clinical / shift goals:    Problem: Knowledge Deficit - Standard  Goal: Patient and family/care givers will demonstrate understanding of plan of care, disease process/condition, diagnostic tests and medications  Outcome: Progressing     Problem: Skin Integrity  Goal: Skin integrity is maintained or improved  Outcome: Progressing     Problem: Fall Risk  Goal: Patient will remain free from falls  Outcome: Progressing     Problem: Psychosocial  Goal: Patient's ability to re-evaluate and adapt role responsibilities will improve  Outcome: Progressing     Problem: Hemodynamics  Goal: Patient's hemodynamics, fluid balance and neurologic status will be stable or improve  Outcome: Progressing       Patient is not progressing towards the following goals:

## 2023-08-23 NOTE — PROGRESS NOTES
1545: patient stated his teeth are missing   --I have called the ICU  --NO dentures, was informed he mentioned this in the ICU too  --the nurse there called the ED.   --again NO dentures.   -- We also called the ED. No loose unclaimed dentures

## 2023-08-24 LAB
ALBUMIN SERPL BCP-MCNC: 4.1 G/DL (ref 3.2–4.9)
BUN SERPL-MCNC: 46 MG/DL (ref 8–22)
CALCIUM ALBUM COR SERPL-MCNC: 9.8 MG/DL (ref 8.5–10.5)
CALCIUM SERPL-MCNC: 9.9 MG/DL (ref 8.4–10.2)
CHLORIDE SERPL-SCNC: 89 MMOL/L (ref 96–112)
CO2 SERPL-SCNC: 22 MMOL/L (ref 20–33)
CREAT SERPL-MCNC: 10.78 MG/DL (ref 0.5–1.4)
ERYTHROCYTE [DISTWIDTH] IN BLOOD BY AUTOMATED COUNT: 53.9 FL (ref 35.9–50)
GFR SERPLBLD CREATININE-BSD FMLA CKD-EPI: 5 ML/MIN/1.73 M 2
GLUCOSE SERPL-MCNC: 109 MG/DL (ref 65–99)
HCT VFR BLD AUTO: 36.6 % (ref 42–52)
HGB BLD-MCNC: 11.6 G/DL (ref 14–18)
MCH RBC QN AUTO: 29.2 PG (ref 27–33)
MCHC RBC AUTO-ENTMCNC: 31.7 G/DL (ref 32.3–36.5)
MCV RBC AUTO: 92.2 FL (ref 81.4–97.8)
PHOSPHATE SERPL-MCNC: 5.9 MG/DL (ref 2.5–4.5)
PLATELET # BLD AUTO: 124 K/UL (ref 164–446)
PMV BLD AUTO: 11 FL (ref 9–12.9)
POTASSIUM SERPL-SCNC: 4.1 MMOL/L (ref 3.6–5.5)
RBC # BLD AUTO: 3.97 M/UL (ref 4.7–6.1)
SODIUM SERPL-SCNC: 132 MMOL/L (ref 135–145)
WBC # BLD AUTO: 6.8 K/UL (ref 4.8–10.8)

## 2023-08-24 PROCEDURE — 770020 HCHG ROOM/CARE - TELE (206)

## 2023-08-24 PROCEDURE — A9270 NON-COVERED ITEM OR SERVICE: HCPCS | Performed by: INTERNAL MEDICINE

## 2023-08-24 PROCEDURE — 90935 HEMODIALYSIS ONE EVALUATION: CPT

## 2023-08-24 PROCEDURE — 700102 HCHG RX REV CODE 250 W/ 637 OVERRIDE(OP): Performed by: INTERNAL MEDICINE

## 2023-08-24 PROCEDURE — 700111 HCHG RX REV CODE 636 W/ 250 OVERRIDE (IP): Performed by: INTERNAL MEDICINE

## 2023-08-24 PROCEDURE — 80069 RENAL FUNCTION PANEL: CPT

## 2023-08-24 PROCEDURE — 36415 COLL VENOUS BLD VENIPUNCTURE: CPT

## 2023-08-24 PROCEDURE — 700111 HCHG RX REV CODE 636 W/ 250 OVERRIDE (IP): Mod: JZ | Performed by: INTERNAL MEDICINE

## 2023-08-24 PROCEDURE — 99232 SBSQ HOSP IP/OBS MODERATE 35: CPT | Performed by: INTERNAL MEDICINE

## 2023-08-24 PROCEDURE — 94760 N-INVAS EAR/PLS OXIMETRY 1: CPT

## 2023-08-24 PROCEDURE — 85027 COMPLETE CBC AUTOMATED: CPT

## 2023-08-24 RX ORDER — GABAPENTIN 100 MG/1
100 CAPSULE ORAL 3 TIMES DAILY
Status: DISCONTINUED | OUTPATIENT
Start: 2023-08-24 | End: 2023-08-25 | Stop reason: HOSPADM

## 2023-08-24 RX ORDER — GABAPENTIN 100 MG/1
100 CAPSULE ORAL 2 TIMES DAILY
Status: DISCONTINUED | OUTPATIENT
Start: 2023-08-24 | End: 2023-08-24

## 2023-08-24 RX ADMIN — OXYCODONE HYDROCHLORIDE 5 MG: 5 TABLET ORAL at 11:40

## 2023-08-24 RX ADMIN — OXYCODONE HYDROCHLORIDE 10 MG: 10 TABLET ORAL at 15:54

## 2023-08-24 RX ADMIN — APIXABAN 2.5 MG: 2.5 TABLET, FILM COATED ORAL at 17:33

## 2023-08-24 RX ADMIN — HYDROMORPHONE HYDROCHLORIDE 0.5 MG: 1 INJECTION, SOLUTION INTRAMUSCULAR; INTRAVENOUS; SUBCUTANEOUS at 21:30

## 2023-08-24 RX ADMIN — Medication 1 TABLET: at 05:10

## 2023-08-24 RX ADMIN — GABAPENTIN 100 MG: 100 CAPSULE ORAL at 12:14

## 2023-08-24 RX ADMIN — ATORVASTATIN CALCIUM 20 MG: 20 TABLET, FILM COATED ORAL at 20:21

## 2023-08-24 RX ADMIN — SEVELAMER CARBONATE 800 MG: 800 TABLET, FILM COATED ORAL at 07:31

## 2023-08-24 RX ADMIN — PANCRELIPASE 72000 UNITS: 24000; 76000; 120000 CAPSULE, DELAYED RELEASE PELLETS ORAL at 17:32

## 2023-08-24 RX ADMIN — OMEPRAZOLE 20 MG: 20 CAPSULE, DELAYED RELEASE ORAL at 05:10

## 2023-08-24 RX ADMIN — PANCRELIPASE 72000 UNITS: 24000; 76000; 120000 CAPSULE, DELAYED RELEASE PELLETS ORAL at 07:31

## 2023-08-24 RX ADMIN — OXYCODONE HYDROCHLORIDE 5 MG: 5 TABLET ORAL at 07:30

## 2023-08-24 RX ADMIN — MIDODRINE HYDROCHLORIDE 10 MG: 5 TABLET ORAL at 17:32

## 2023-08-24 RX ADMIN — DULOXETINE HYDROCHLORIDE 60 MG: 30 CAPSULE, DELAYED RELEASE ORAL at 05:10

## 2023-08-24 RX ADMIN — OXYCODONE HYDROCHLORIDE 10 MG: 10 TABLET ORAL at 20:21

## 2023-08-24 RX ADMIN — HEPARIN SODIUM 2000 UNITS: 1000 INJECTION, SOLUTION INTRAVENOUS; SUBCUTANEOUS at 09:30

## 2023-08-24 RX ADMIN — HEPARIN SODIUM 3700 UNITS: 1000 INJECTION, SOLUTION INTRAVENOUS; SUBCUTANEOUS at 12:13

## 2023-08-24 RX ADMIN — GABAPENTIN 100 MG: 100 CAPSULE ORAL at 17:32

## 2023-08-24 RX ADMIN — SEVELAMER CARBONATE 800 MG: 800 TABLET, FILM COATED ORAL at 12:15

## 2023-08-24 RX ADMIN — MIDODRINE HYDROCHLORIDE 10 MG: 5 TABLET ORAL at 07:30

## 2023-08-24 RX ADMIN — SEVELAMER CARBONATE 800 MG: 800 TABLET, FILM COATED ORAL at 17:34

## 2023-08-24 RX ADMIN — MIDODRINE HYDROCHLORIDE 10 MG: 5 TABLET ORAL at 11:40

## 2023-08-24 RX ADMIN — OXYCODONE HYDROCHLORIDE 5 MG: 5 TABLET ORAL at 04:23

## 2023-08-24 RX ADMIN — FERROUS SULFATE TAB 325 MG (65 MG ELEMENTAL FE) 650 MG: 325 (65 FE) TAB at 05:10

## 2023-08-24 RX ADMIN — APIXABAN 2.5 MG: 2.5 TABLET, FILM COATED ORAL at 05:10

## 2023-08-24 RX ADMIN — Medication 6 MG: at 20:21

## 2023-08-24 RX ADMIN — CALCITRIOL 0.25 MCG: 0.25 CAPSULE ORAL at 05:10

## 2023-08-24 RX ADMIN — PANCRELIPASE 72000 UNITS: 24000; 76000; 120000 CAPSULE, DELAYED RELEASE PELLETS ORAL at 12:15

## 2023-08-24 RX ADMIN — CALCITRIOL 0.5 MCG: 0.25 CAPSULE ORAL at 12:18

## 2023-08-24 ASSESSMENT — ENCOUNTER SYMPTOMS
WEAKNESS: 1
SPEECH CHANGE: 0
COUGH: 0
DIARRHEA: 0
PHOTOPHOBIA: 0
SHORTNESS OF BREATH: 0
HEADACHES: 0
CONSTIPATION: 0
DIZZINESS: 0
CLAUDICATION: 0
ABDOMINAL PAIN: 0
NERVOUS/ANXIOUS: 0
MYALGIAS: 1
DEPRESSION: 0
VOMITING: 0
CHILLS: 0
HEARTBURN: 0
SENSORY CHANGE: 0
INSOMNIA: 0
FEVER: 0
BLURRED VISION: 0

## 2023-08-24 ASSESSMENT — FIBROSIS 4 INDEX: FIB4 SCORE: 4.86

## 2023-08-24 ASSESSMENT — PAIN DESCRIPTION - PAIN TYPE
TYPE: ACUTE PAIN

## 2023-08-24 ASSESSMENT — PATIENT HEALTH QUESTIONNAIRE - PHQ9
SUM OF ALL RESPONSES TO PHQ9 QUESTIONS 1 AND 2: 0
1. LITTLE INTEREST OR PLEASURE IN DOING THINGS: NOT AT ALL
2. FEELING DOWN, DEPRESSED, IRRITABLE, OR HOPELESS: NOT AT ALL

## 2023-08-24 NOTE — DISCHARGE PLANNING
Case Management Discharge Planning    Admission Date: 8/21/2023  GMLOS: 3.4  ALOS: 3    6-Clicks ADL Score: 13  6-Clicks Mobility Score: 16  PT and/or OT Eval ordered: Yes  Post-acute Referrals Ordered: Yes  Post-acute Choice Obtained: Yes  Has referral(s) been sent to post-acute provider:  Yes      Anticipated Discharge Dispo: Discharge Disposition: D/T to SNF with Medicare cert in anticipation of skilled care (03)    DME Needed: No    Action(s) Taken:     Spoke to Beti from [facility]. Per Beti, they can accommodate pt's dialysis TTS 1045 at [address]. [facility] can admit pt tomorrow 08/25/2023 at 1500.        Escalations Completed: None    Medically Clear: Yes    Next Steps: DC to [facility] tomorrow 08/25/2023 at 1500 via [facility] van.    Barriers to Discharge: Pending Placement

## 2023-08-24 NOTE — PROGRESS NOTES
Telemetry Shift Summary    Rhythm SR  HR Range 76-82  Ectopy Rpac  Measurements 0.20/0.10/.34    Normal Values  Rhythm SR  HR Range:   Measurements: 0.12-0.20/0.06-0.10/0.30-0.52

## 2023-08-24 NOTE — DOCUMENTATION QUERY
"                                                                         [facility]                                                                       Query Response Note      PATIENT:               RENE STEINBERG  ACCT #:                  [id number]  MRN:                     [id number]  :                      1950  ADMIT DATE:       2023 2:07 PM  DISCH DATE:          RESPONDING  PROVIDER #:        [id number]           QUERY TEXT:    Encephalopathy likely secondary to uremia is documented in the Medical Record. Please specify type.    The patient's Clinical Indicators include:  ED Provider Notes: \"presents by ambulance because he was heard moaning ... patient's house was in disarray upon their arrival ... weak.  He mumbles and is very difficult to understand him ... unkempt hypoxic and has mucus and food stuck in his mustache\"   Pulm Consult: \"At the time of my interview, patient was encephalopathic with an elevated BUN ... Encephalopathy, likely secondary to uremia ... Frequent reorientation, Proceed with hemodialysis for uremia\"   Nephrology Consult: \"Patient is oriented to self, but not place, and provides only some answers to questions.\"   Pulm PN: \"#Encephalopathy, likely secondary to uremia, improving ... A&Ox4\"    Risk Factors: Missed last 5 dialysis sessions, uremia  Treatment: Dialysis, frequent reorientation, serial labs    Thank You,  Ivory Watson RN  Clinical    Connect via [employer] or [email or web address]  Options provided:   -- Metabolic encephalopathy   -- Toxic encephalopathy   -- Toxic metabolic encephalopathy   -- Other explanation, (please specify other explanation)   -- Unable to determine      Query created by: Ivory Watson on 2023 9:11 AM    RESPONSE TEXT:    Toxic encephalopathy          Electronically signed by:  LIZZ DSOUZA DO 2023 12:46 PM              "

## 2023-08-24 NOTE — PROGRESS NOTES
[facility] Medicine Daily Progress Note    Date of Service  8/24/2023    Chief Complaint  Junior Proctor is a 73 y.o. male admitted 8/21/2023 with confusion    Hospital Course  73-year-old male who presented with altered mental status and was initially admitted to the ICU secondary to profound hyperkalemia.  Patient with permacath secondary to end-stage renal disease and had missed 5 dialysis sessions.  Per EMS report patient's house was unsafe with feces, pill bottles and alcohol bottles all over the floor.  Patient was downgraded from the ICU to the medical floor after his potassium markedly improved with dialysis.    Interval Problem Update  8/23 patient has been downgraded from the intensive care unit to the telemetry floor and continues to show improvements.  His potassium is down to 4.3.  Creatinine is down to 9.02.  He is not showing signs of alcohol withdrawal and is pleasant upon my interview.  Anticipate 1 more day in the hospital than he will receive dialysis tomorrow and that should be cleared medically to transition to skilled nursing facility.  8/24 patient's mentation is improved, he is very conversant today and his main complaint is of right hand pain.  There is no evidence of infection or significant edema and I will trial daytime doses of gabapentin to see if this helps with the pain.  He is already on nocturnal gabapentin 300 mg nightly.  He is medically cleared to go to skilled awaiting acceptance.    I have discussed this patient's plan of care and discharge plan at IDT rounds today with Case Management, Nursing, Nursing leadership, and other members of the IDT team.    Consultants/Specialty  nephrology    Code Status  Full Code    Disposition  The patient is medically cleared for discharge to home or a post-acute facility.  Anticipate discharge to: skilled nursing facility    I have placed the appropriate orders for post-discharge needs.    Review of Systems  Review of Systems   Constitutional:   Positive for malaise/fatigue. Negative for chills and fever.   HENT:  Negative for congestion.    Eyes:  Negative for blurred vision and photophobia.   Respiratory:  Negative for cough and shortness of breath.    Cardiovascular:  Negative for chest pain, claudication and leg swelling.   Gastrointestinal:  Negative for abdominal pain, constipation, diarrhea, heartburn and vomiting.   Genitourinary:  Negative for dysuria and hematuria.   Musculoskeletal:  Positive for myalgias (right hand pain). Negative for joint pain.   Skin:  Negative for itching and rash.   Neurological:  Positive for weakness. Negative for dizziness, sensory change, speech change and headaches.   Psychiatric/Behavioral:  Negative for depression. The patient is not nervous/anxious and does not have insomnia.         Physical Exam  Temp:  [36.2 °C (97.2 °F)-36.8 °C (98.3 °F)] 36.3 °C (97.4 °F)  Pulse:  [71-94] 88  Resp:  [16-18] 18  BP: ()/(27-69) 93/27  SpO2:  [92 %-98 %] 96 %    Physical Exam  Vitals and nursing note reviewed.   Constitutional:       General: He is not in acute distress.     Appearance: Normal appearance.   HENT:      Head: Normocephalic and atraumatic.   Eyes:      General: No scleral icterus.     Extraocular Movements: Extraocular movements intact.   Cardiovascular:      Rate and Rhythm: Normal rate and regular rhythm.      Pulses: Normal pulses.      Heart sounds: Normal heart sounds. No murmur heard.  Pulmonary:      Effort: Pulmonary effort is normal. No respiratory distress.      Breath sounds: Normal breath sounds. No wheezing, rhonchi or rales.   Abdominal:      General: Abdomen is flat. Bowel sounds are normal. There is no distension.      Palpations: Abdomen is soft.      Tenderness: There is no rebound.   Musculoskeletal:         General: No swelling or tenderness.      Cervical back: Normal range of motion and neck supple.   Lymphadenopathy:      Cervical: No cervical adenopathy.   Skin:     Coloration: Skin is  not jaundiced.      Findings: No erythema.   Neurological:      General: No focal deficit present.      Mental Status: He is alert and oriented to person, place, and time. Mental status is at baseline.      Cranial Nerves: No cranial nerve deficit.   Psychiatric:         Mood and Affect: Mood normal.         Behavior: Behavior normal.         Fluids    Intake/Output Summary (Last 24 hours) at 8/24/2023 1247  Last data filed at 8/24/2023 1222  Gross per 24 hour   Intake 700 ml   Output 1800 ml   Net -1100 ml         Laboratory  Recent Labs     08/21/23  1609 08/22/23  0511 08/24/23  0435   WBC 7.6 6.1 6.8   RBC 3.40* 3.60* 3.97*   HEMOGLOBIN 9.8* 10.4* 11.6*   HEMATOCRIT 31.5* 32.6* 36.6*   MCV 92.6 90.6 92.2   MCH 28.8 28.9 29.2   MCHC 31.1* 31.9* 31.7*   RDW 54.1* 53.1* 53.9*   PLATELETCT 121* 128* 124*   MPV 11.0 11.7 11.0       Recent Labs     08/22/23  0511 08/23/23  0503 08/24/23  0435   SODIUM 139 134* 132*   POTASSIUM 5.6* 4.3 4.1   CHLORIDE 92* 91* 89*   CO2 26 26 22   GLUCOSE 97 100* 109*   BUN 58* 33* 46*   CREATININE 14.43* 9.02* 10.78*   CALCIUM 10.4* 9.8 9.9       Recent Labs     08/21/23  1416   APTT 30.7   INR 1.11                 Imaging  DX-CHEST-PORTABLE (1 VIEW)   Final Result         1. No acute cardiopulmonary abnormalities are identified.             Assessment/Plan  * Hyperkalemia- (present on admission)  Assessment & Plan  Significantly elevated potassium after he missed 5 dialysis sessions prior to hospitalization  Has done well with dialysis and is back on his Tuesday Thursday Saturday schedule  Today's potassium is 4.3    Weakness  Assessment & Plan  Secondary to chronic alcoholism, unable to care for self, seen by PT OT and recommended skilled  Patient is agreeable with skilled nursing facility discharge      Alcohol dependence (HCC)- (present on admission)  Assessment & Plan  Signs of acute alcohol withdrawal at this time    Pulmonary embolism (HCC)- (present on admission)  Assessment &  Plan  Continue home dose Eliquis    ESRD on dialysis (HCC)- (present on admission)  Assessment & Plan  Tuesday Thursday Saturday's dialysis after as needed earlier admission  [facility] nephrology following  Creatinine 10.78    Hypotension- (present on admission)  Assessment & Plan  Continue with home dose midodrine         VTE prophylaxis:   SCDs/TEDs   heparin ppx      I have performed a physical exam and reviewed and updated ROS and Plan today (8/24/2023). In review of yesterday's note (8/23/2023), there are no changes except as documented above.

## 2023-08-24 NOTE — CARE PLAN
The patient is Stable - Low risk of patient condition declining or worsening    Shift Goals  Clinical Goals: monitor labs; dialysis tomorrow; monitor blood pressures  Patient Goals: comfort, rest    Progress made toward(s) clinical / shift goals:    Problem: Knowledge Deficit - Standard  Goal: Patient and family/care givers will demonstrate understanding of plan of care, disease process/condition, diagnostic tests and medications  Outcome: Progressing  Note: Patient updated on plan of care, questions answered.      Problem: Skin Integrity  Goal: Skin integrity is maintained or improved  Outcome: Progressing  Note: Continue to monitor skin integrity, appropriate precautionary interventions in place.      Problem: Fall Risk  Goal: Patient will remain free from falls  Outcome: Progressing  Note: Bed locked and in lowest position. Bed/strip alarm present, non-slip footwear in place, call light within reach.        Patient is not progressing towards the following goals:

## 2023-08-24 NOTE — THERAPY
Occupational Therapy Contact Note    Patient Name: Junior Proctor  Age:  73 y.o., Sex:  male  Medical Record #: [id number]  Today's Date: 8/24/2023 08/24/23 0855   Treatment Variance   Reason For Missed Therapy Medical - Patient  in Procedure  (dialysis)   Interdisciplinary Plan of Care Collaboration   IDT Collaboration with  Nursing   Collaboration Comments Attempted, pt in dialysis.  Will attempt again later in day if able

## 2023-08-24 NOTE — PROGRESS NOTES
Telemetry Shift Summary     Rhythm SR with BBB  HR Range 67-87  Ectopy o-fPVC; r-oBig; oCoup  Measurements 0.16/0.12/0.44           Normal Values  Rhythm SR  HR Range    Measurements 0.12-0.20 / 0.06-0.10  / 0.30-0.52

## 2023-08-24 NOTE — PROGRESS NOTES
"[facility] -  PROGRESS NOTE               Author: Susan Tsang M.D. Date & Time: 8/24/2023  1:22 PM     HPI:    74yo male BIB EMS, EMS called because he was heard moaning. He has missed HD x 2 weeks. EKG with peaked T waves per EMS. K noted to be 8.7 upon presentation, provided with appropriate medical management while awaiting HD, repeat 6.4. , repeat 153 with SCr 33.22. Patient is oriented to self, but not place, and provides only some answers to questions. He cannot say why he has not been attending HD, though he has previously reported not wanting to go to his current unit. He has a AHMET AVF that is not in use, utilizes R chest TDC for HD.      No F/C/N/V/CP/SOB, though he notes he had SOB prior to arrival  No melena, hematochezia, hematemesis.  No HA, visual changes, or abdominal pain.    DAILY NEPHROLOGY SUMMARY:  8/21: consult done   8/22: VSS but BP soft at times, iHD today net UF 846ml.  Moving out of ICU soon.  Feels ok without CP SOB but does feel swollen.    8/23: no acute events, tolerated HD, no new c/o  8/24: No events, completed HD earlier with 0.5L UF and UF goal decreased due to intradialytic hypotension, BP stable but borderline low now, denies any CP/SOB/LE edema, does c/o right hand pain    REVIEW OF SYSTEMS:    10 point ROS reviewed and is as per HPI/daily summary or otherwise negative    PMH/PSH/SH/FH:   Reviewed and unchanged since admission note    CURRENT MEDICATIONS:   Reviewed from admission to present day    VS:  BP (!) 93/27   Pulse 88   Temp 36.3 °C (97.4 °F) (Oral)   Resp 18   Ht 1.727 m (5' 8\")   Wt 67.5 kg (148 lb 13 oz)   SpO2 96%   BMI 22.63 kg/m²     Physical Exam  Nursing note reviewed.   Constitutional:       General: He is not in acute distress.     Appearance: Normal appearance.   HENT:      Head: Normocephalic and atraumatic.   Eyes:      General: No scleral icterus.     Extraocular Movements: Extraocular movements intact. "   Cardiovascular:      Rate and Rhythm: Normal rate and regular rhythm.      Comments: +R chest  TDC c/d/d    Pulmonary:      Effort: Pulmonary effort is normal. No respiratory distress.      Breath sounds: Normal breath sounds. No wheezing.   Abdominal:      General: Bowel sounds are normal. There is no distension.      Palpations: Abdomen is soft.   Musculoskeletal:         General: No deformity.      Right lower leg: No edema.      Left lower leg: No edema.      Comments: AHMET AVF +thrill/hyperpuslatile   Skin:     General: Skin is warm and dry.      Findings: No rash.   Neurological:      General: No focal deficit present.      Mental Status: He is alert and oriented to person, place, and time.      Cranial Nerves: No cranial nerve deficit.   Psychiatric:         Mood and Affect: Mood normal.         Behavior: Behavior is slowed.         Fluids:  In: 100 [P.O.:100]  Out: 700     LABS:  Recent Labs     08/22/23  0511 08/23/23  0503 08/24/23  0435   SODIUM 139 134* 132*   POTASSIUM 5.6* 4.3 4.1   CHLORIDE 92* 91* 89*   CO2 26 26 22   GLUCOSE 97 100* 109*   BUN 58* 33* 46*   CREATININE 14.43* 9.02* 10.78*   CALCIUM 10.4* 9.8 9.9         IMAGING:   All imaging reviewed from admission to present day    IMPRESSION:  # ESRD  - HD qTTS via R chest TDC, non adherent  # Hyperkalemia  - medical management in ED  -Improved  # Acidosis  -Improved   # Hypotension  - Goal  MAP >65  # Anemia of CKD  - Goal Hgb 10-11  - at goal  - ferritin 1071  # CKD-MBD  - Ca wnl  - PO4  5.9 8/22  - PTH recently 356  # non adherence  # encephalopathy     PLAN:  - iHD today (THURS) and continue qTTS and PRN during stay   - UF as tolerated  - Continue midodrine, may need to increase dose if BP trends down  - No dietary protein restrictions  - Dose all meds per ESRD  - Max total dose of gabapentin 300mg/day in this ESRD patient  - low K diet  - no MADAY with HD for now  - Needs goal of care discussion, pt with recent admission for noncompliance,  although he states that he will now be compliant    Other management per primary service    **Discussed above with Hospitalist

## 2023-08-24 NOTE — CARE PLAN
The patient is Stable - Low risk of patient condition declining or worsening    Shift Goals  Clinical Goals: dialysis, monitor labs  Patient Goals: leave after dialysis    Progress made toward(s) clinical / shift goals:      Patient is not progressing towards the following goals:      Problem: Knowledge Deficit - Standard  Goal: Patient and family/care givers will demonstrate understanding of plan of care, disease process/condition, diagnostic tests and medications  Outcome: Progressing  Note: Pt getting dialysis now, awaiting snf placement     Problem: Skin Integrity  Goal: Skin integrity is maintained or improved  Outcome: Progressing     Problem: Psychosocial  Goal: Patient's ability to re-evaluate and adapt role responsibilities will improve  Outcome: Progressing  Flowsheets (Taken 8/24/2023 8937)  Adapt to Role Responsibilities:   Assessed family support   Encouraged support system participation in care   Discuss changes in role and responsibilities caused by patient's condition   Encouraged verbalization of feelings regarding caregiver responsibilities

## 2023-08-24 NOTE — PROGRESS NOTES
[facility] Services Progress Note     HD treatment x 3 hours today ordered by Missy PLATT/ Dr. Julia Amin.  HD tx initiated at 0921 and ended at 1222.    Pt is alert, oriented x 4, not in any signs of distress at this time. Right chest catheter with clean, dry and intact dressing. No signs of infection noted. Aspirated 3mL on both catheter ports and saline flushed, both patent. Hypotensive during HD, reports of right hand pain. PCN medicated per MAR. S/E by the attending physician during HD. HD treatment completed. UF goal not achieved, Dr. Amin aware.    UF Net: 0.5L    Post tx: Right chest catheter with clean, dry and intact dressing. No signs of infection. Both catheter ports saline flushed, heparin locked as prescribed, clamped and capped. BP remains low, no complaints. PCN aware. Pt reports of right hand pain with scale of 6/10.    Report given to primary RN RAULITO Juarez. See electronic flowsheets for additional information.

## 2023-08-24 NOTE — PROGRESS NOTES
Charge Nurse Rounding Note    Bedside rounding completed to address quality of care and overall patient experience.    Patient Satisfaction addressed including staff responsiveness. Patient/family are aware of the POC and any questions answered. Thorough safety education completed including use of call light prior to all mobility throughout the entirety of the hospital stay.     Patient/family aware of time of next Hourly Round.    No further questions/concerns currently.     Additional Notes: pt complaining of 8/10 pain in hand and arm, PRN notified.

## 2023-08-25 ENCOUNTER — PATIENT OUTREACH (OUTPATIENT)
Dept: SCHEDULING | Facility: IMAGING CENTER | Age: 73
End: 2023-08-25
Payer: COMMERCIAL

## 2023-08-25 ENCOUNTER — APPOINTMENT (OUTPATIENT)
Dept: RADIOLOGY | Facility: MEDICAL CENTER | Age: 73
DRG: 640 | End: 2023-08-25
Attending: INTERNAL MEDICINE
Payer: COMMERCIAL

## 2023-08-25 VITALS
TEMPERATURE: 98.6 F | OXYGEN SATURATION: 100 % | HEART RATE: 81 BPM | RESPIRATION RATE: 18 BRPM | SYSTOLIC BLOOD PRESSURE: 130 MMHG | HEIGHT: 68 IN | DIASTOLIC BLOOD PRESSURE: 73 MMHG | WEIGHT: 146.61 LBS | BODY MASS INDEX: 22.22 KG/M2

## 2023-08-25 LAB
ANION GAP SERPL CALC-SCNC: 15 MMOL/L (ref 7–16)
BUN SERPL-MCNC: 25 MG/DL (ref 8–22)
CALCIUM SERPL-MCNC: 10.2 MG/DL (ref 8.4–10.2)
CHLORIDE SERPL-SCNC: 90 MMOL/L (ref 96–112)
CO2 SERPL-SCNC: 28 MMOL/L (ref 20–33)
CREAT SERPL-MCNC: 7.25 MG/DL (ref 0.5–1.4)
ERYTHROCYTE [DISTWIDTH] IN BLOOD BY AUTOMATED COUNT: 53.4 FL (ref 35.9–50)
GFR SERPLBLD CREATININE-BSD FMLA CKD-EPI: 7 ML/MIN/1.73 M 2
GLUCOSE SERPL-MCNC: 106 MG/DL (ref 65–99)
HCT VFR BLD AUTO: 35.2 % (ref 42–52)
HGB BLD-MCNC: 11.2 G/DL (ref 14–18)
MCH RBC QN AUTO: 28.9 PG (ref 27–33)
MCHC RBC AUTO-ENTMCNC: 31.8 G/DL (ref 32.3–36.5)
MCV RBC AUTO: 91 FL (ref 81.4–97.8)
PLATELET # BLD AUTO: 169 K/UL (ref 164–446)
PMV BLD AUTO: 11 FL (ref 9–12.9)
POTASSIUM SERPL-SCNC: 4.3 MMOL/L (ref 3.6–5.5)
RBC # BLD AUTO: 3.87 M/UL (ref 4.7–6.1)
SODIUM SERPL-SCNC: 133 MMOL/L (ref 135–145)
WBC # BLD AUTO: 7.6 K/UL (ref 4.8–10.8)

## 2023-08-25 PROCEDURE — 85027 COMPLETE CBC AUTOMATED: CPT

## 2023-08-25 PROCEDURE — 80048 BASIC METABOLIC PNL TOTAL CA: CPT

## 2023-08-25 PROCEDURE — 93990 DOPPLER FLOW TESTING: CPT

## 2023-08-25 PROCEDURE — A9270 NON-COVERED ITEM OR SERVICE: HCPCS | Performed by: INTERNAL MEDICINE

## 2023-08-25 PROCEDURE — 94760 N-INVAS EAR/PLS OXIMETRY 1: CPT

## 2023-08-25 PROCEDURE — 93990 DOPPLER FLOW TESTING: CPT | Mod: 26 | Performed by: INTERNAL MEDICINE

## 2023-08-25 PROCEDURE — 99239 HOSP IP/OBS DSCHRG MGMT >30: CPT | Performed by: INTERNAL MEDICINE

## 2023-08-25 PROCEDURE — 700102 HCHG RX REV CODE 250 W/ 637 OVERRIDE(OP): Performed by: INTERNAL MEDICINE

## 2023-08-25 RX ORDER — GABAPENTIN 100 MG/1
100 CAPSULE ORAL 3 TIMES DAILY
Qty: 90 CAPSULE | Status: SHIPPED
Start: 2023-08-25

## 2023-08-25 RX ORDER — MIDODRINE HYDROCHLORIDE 10 MG/1
10 TABLET ORAL
Qty: 60 TABLET | Status: SHIPPED
Start: 2023-08-25

## 2023-08-25 RX ORDER — OXYCODONE HYDROCHLORIDE 5 MG/1
5 TABLET ORAL EVERY 4 HOURS PRN
Qty: 8 TABLET | Refills: 0 | Status: SHIPPED | OUTPATIENT
Start: 2023-08-25 | End: 2023-08-27

## 2023-08-25 RX ADMIN — OMEPRAZOLE 20 MG: 20 CAPSULE, DELAYED RELEASE ORAL at 05:42

## 2023-08-25 RX ADMIN — FERROUS SULFATE TAB 325 MG (65 MG ELEMENTAL FE) 650 MG: 325 (65 FE) TAB at 05:42

## 2023-08-25 RX ADMIN — CALCITRIOL 0.25 MCG: 0.25 CAPSULE ORAL at 05:44

## 2023-08-25 RX ADMIN — SEVELAMER CARBONATE 800 MG: 800 TABLET, FILM COATED ORAL at 12:45

## 2023-08-25 RX ADMIN — MIDODRINE HYDROCHLORIDE 10 MG: 5 TABLET ORAL at 08:00

## 2023-08-25 RX ADMIN — MIDODRINE HYDROCHLORIDE 10 MG: 5 TABLET ORAL at 12:45

## 2023-08-25 RX ADMIN — GABAPENTIN 100 MG: 100 CAPSULE ORAL at 05:43

## 2023-08-25 RX ADMIN — Medication 1 TABLET: at 05:44

## 2023-08-25 RX ADMIN — DULOXETINE HYDROCHLORIDE 60 MG: 30 CAPSULE, DELAYED RELEASE ORAL at 05:42

## 2023-08-25 RX ADMIN — OXYCODONE HYDROCHLORIDE 10 MG: 10 TABLET ORAL at 12:44

## 2023-08-25 RX ADMIN — GABAPENTIN 100 MG: 100 CAPSULE ORAL at 12:44

## 2023-08-25 RX ADMIN — PANCRELIPASE 72000 UNITS: 24000; 76000; 120000 CAPSULE, DELAYED RELEASE PELLETS ORAL at 12:45

## 2023-08-25 RX ADMIN — OXYCODONE HYDROCHLORIDE 10 MG: 10 TABLET ORAL at 05:53

## 2023-08-25 RX ADMIN — APIXABAN 2.5 MG: 2.5 TABLET, FILM COATED ORAL at 05:44

## 2023-08-25 RX ADMIN — SEVELAMER CARBONATE 800 MG: 800 TABLET, FILM COATED ORAL at 08:00

## 2023-08-25 RX ADMIN — PANCRELIPASE 72000 UNITS: 24000; 76000; 120000 CAPSULE, DELAYED RELEASE PELLETS ORAL at 08:00

## 2023-08-25 ASSESSMENT — PAIN DESCRIPTION - PAIN TYPE: TYPE: ACUTE PAIN

## 2023-08-25 ASSESSMENT — FIBROSIS 4 INDEX
FIB4 SCORE: 3.56
FIB4 SCORE: 3.56

## 2023-08-25 NOTE — PROGRESS NOTES
"[facility] -  PROGRESS NOTE               Author: Julia Amin D.O. Date & Time: 8/25/2023  8:57 AM     HPI:    74yo male BIB EMS, EMS called because he was heard moaning. He has missed HD x 2 weeks. EKG with peaked T waves per EMS. K noted to be 8.7 upon presentation, provided with appropriate medical management while awaiting HD, repeat 6.4. , repeat 153 with SCr 33.22. Patient is oriented to self, but not place, and provides only some answers to questions. He cannot say why he has not been attending HD, though he has previously reported not wanting to go to his current unit. He has a AHMET AVF that is not in use, utilizes R chest TDC for HD.      No F/C/N/V/CP/SOB, though he notes he had SOB prior to arrival  No melena, hematochezia, hematemesis.  No HA, visual changes, or abdominal pain.    DAILY NEPHROLOGY SUMMARY:  8/21: consult done   8/22: VSS but BP soft at times, iHD today net UF 846ml.  Moving out of ICU soon.  Feels ok without CP SOB but does feel swollen.    8/23: no acute events, tolerated HD, no new c/o  8/24: No events, completed HD earlier with 0.5L UF and UF goal decreased due to intradialytic hypotension, BP stable but borderline low now, denies any CP/SOB/LE edema, does c/o right hand pain  08/25: tolerated HD, SBP 90s-120s, planning to DC to SNF, c/o pain in R hand, no new c/o    REVIEW OF SYSTEMS:    10 point ROS reviewed and is as per HPI/daily summary or otherwise negative    PMH/PSH/SH/FH:   Reviewed and unchanged since admission note    CURRENT MEDICATIONS:   Reviewed from admission to present day    VS:  BP 94/42   Pulse 69   Temp 36.7 °C (98.1 °F) (Oral)   Resp 18   Ht 1.727 m (5' 8\")   Wt 66.6 kg (146 lb 13.2 oz)   SpO2 91%   BMI 22.32 kg/m²     Physical Exam  Nursing note reviewed.   Constitutional:       General: He is not in acute distress.     Appearance: Normal appearance.   HENT:      Head: Normocephalic and atraumatic.      Nose: Nose " normal.      Mouth/Throat:      Mouth: Mucous membranes are moist.      Pharynx: Oropharynx is clear.   Eyes:      General: No scleral icterus.     Extraocular Movements: Extraocular movements intact.   Cardiovascular:      Rate and Rhythm: Normal rate and regular rhythm.      Comments: +R chest  TDC c/d/d    AHMET AVF +thrill/hyperpuslatile  Both hands warm, L radial pulse more easily palpable than R  Note some mild contracture of R hand, muscle wasting, minor lesion dorsal aspect R third digit and dryness/cracking R thumb    Pulmonary:      Effort: Pulmonary effort is normal. No respiratory distress.      Breath sounds: Normal breath sounds. No wheezing.   Abdominal:      General: Bowel sounds are normal. There is no distension.      Palpations: Abdomen is soft.   Musculoskeletal:         General: No deformity.      Right lower leg: No edema.      Left lower leg: No edema.      Comments:        Skin:     General: Skin is warm and dry.      Findings: No rash.   Neurological:      General: No focal deficit present.      Mental Status: He is alert and oriented to person, place, and time.      Cranial Nerves: No cranial nerve deficit.   Psychiatric:         Mood and Affect: Mood normal.         Behavior: Behavior is slowed.         Fluids:  In: 1010 [P.O.:410; Dialysis:600]  Out: 1100     LABS:  Recent Labs     08/23/23  0503 08/24/23  0435 08/25/23  0314   SODIUM 134* 132* 133*   POTASSIUM 4.3 4.1 4.3   CHLORIDE 91* 89* 90*   CO2 26 22 28   GLUCOSE 100* 109* 106*   BUN 33* 46* 25*   CREATININE 9.02* 10.78* 7.25*   CALCIUM 9.8 9.9 10.2         IMAGING:   All imaging reviewed from admission to present day    IMPRESSION:  # ESRD  - HD qTTS via R chest TDC, non adherent  # Hyperkalemia  - medical management in ED  -Improved  # Acidosis  -Improved   # Hypotension  - Goal  MAP >65  # Anemia of CKD  - Goal Hgb 10-11  - at goal  - ferritin 1071  # CKD-MBD  - Ca wnl  - PO4  5.9 8/22  - PTH recently 356  # non adherence  #  encephalopathy  - resolved     PLAN:  - iHD qTTS and PRN during stay   - UF as tolerated  - Continue midodrine, may need to increase dose if BP trends down  - No dietary protein restrictions  - Dose all meds per ESRD  - Max total dose of gabapentin 300mg/day in this ESRD patient  - low K diet  - no MADAY with HD for now  - AVF US ordered, will need vascular f/u for evaluation and of AVF, possible ligation, can be done outpatient, but should not be delayed. Reviewed the patient the importance of close follow upon DC. Updated hospitalist.      Other management per primary service    **patient was able to have US prior to DC. Confirmed patient [facility] patient and has known steal since at 2021, reportedly there was plan for ligation and patient has not follow up. Reviewed with hospitalist who reminded patient of need to follow up to avoid worsening of s/s.

## 2023-08-25 NOTE — PROGRESS NOTES
Encouraged Pt during the night to change position frequently, refused turn q2hrs. Waffle overlay in place. Pt refused SCD, as well as wound care. Education provided.  Medicated for pain on L hand with Oxycodone and Hydromorphone as requested.     Bedside report given to next shift RNTherese. Care released.

## 2023-08-25 NOTE — CARE PLAN
The patient is Stable - Low risk of patient condition declining or worsening    Shift Goals  Clinical Goals: Monitor Potassium levels. Pain management. Pt safety  Patient Goals: Pain control  Family Goals: No family present    Progress made toward(s) clinical / shift goals:  Last Potassium level 4.1    Patient is not progressing towards the following goals:      Problem: Pain - Standard  Goal: Alleviation of pain or a reduction in pain to the patient’s comfort goal  Outcome: Not Progressing  Note: Pt still c/o pain on R hand, pain medication has been given as needed, gabapentin given as scheduled, and warm compresses applied to the hand. Still pain rated at 5/10      Problem: Knowledge Deficit - Standard  Goal: Patient and family/care givers will demonstrate understanding of plan of care, disease process/condition, diagnostic tests and medications  Outcome: Progressing     Problem: Skin Integrity  Goal: Skin integrity is maintained or improved  Outcome: Progressing     Problem: Fall Risk  Goal: Patient will remain free from falls  Outcome: Progressing

## 2023-08-25 NOTE — PROGRESS NOTES
Bedside report received from Therese GARCIA and assumed Pt's cares. Call light within reach. Safety measures in place.

## 2023-08-25 NOTE — FLOWSHEET NOTE
"Bed alarm going off. Patient sitting at edge of bed getting self dressed.  Patient demanding shirt and hat he came in with.  Hat was found but no record of coming in with tshirt.  Pants patient was putting on solid in feces, offered alternatives and suggested patient wear clean clothing. Patient getting increasingly agitated, using profanity towards this nurse. Patient wanted to wear pants and asked for 2 gowns.  Patient angry and asking for a \"signed letter\" regarding missing shirt. Transport arrived and patient refusing to leave until he has a letter.  Supervisor Franklin made aware and speaking to patient.   "

## 2023-08-25 NOTE — DISCHARGE SUMMARY
Discharge Summary    CHIEF COMPLAINT ON ADMISSION  Chief Complaint   Patient presents with    Other     A call to EMS was made because pt was heard moaning.  Missed 5 dialysis appointments.  EMS EKG has peaked T waves       Reason for Admission  EMS    Admission Date  8/21/2023     CODE STATUS  Full Code    HPI & HOSPITAL COURSE    73-year-old male who presented with altered mental status and was initially admitted to the ICU secondary to profound hyperkalemia.  Patient with permacath secondary to end-stage renal disease and had missed 5 dialysis sessions.  Per EMS report patient's house was unsafe with feces, pill bottles and alcohol bottles all over the floor.  Patient was downgraded from the ICU to the medical floor after his potassium markedly improved with dialysis.   Patient remains hypotensive and his midodrine dose has been adjusted from 5 mg 3 times daily to 10 mg 3 times daily and has been able to tolerate dialysis.  His potassium has improved with dialysis and he is appropriate to transition to skilled nursing to continue with physical and occupational therapy to help improve his strength before he returns back home.    Therefore, he is discharged in good and stable condition to skilled nursing facility.    The patient met 2-midnight criteria for an inpatient stay at the time of discharge.      FOLLOW UP ITEMS POST DISCHARGE  PCP, nephrology and dialysis after discharge from skilled nursing    DISCHARGE DIAGNOSES  Principal Problem:    Hyperkalemia (POA: Yes)  Active Problems:    Hypotension (POA: Yes)    ESRD on dialysis (HCC) (Chronic) (POA: Yes)    Pulmonary embolism (HCC) (POA: Yes)    Alcohol dependence (HCC) (POA: Yes)    Weakness (POA: Unknown)  Resolved Problems:    * No resolved hospital problems. *      FOLLOW UP  No future appointments.  No follow-up provider specified.    MEDICATIONS ON DISCHARGE     Medication List        CHANGE how you take these medications        Instructions   Etanercept  50 MG/ML Sosy  What changed: when to take this   Inject 50 mg under the skin every 7 days.  Dose: 50 mg     gabapentin 100 MG Caps  What changed:   medication strength  how much to take  when to take this  Commonly known as: Neurontin   Take 1 Capsule by mouth 3 times a day.  Dose: 100 mg     midodrine 10 MG tablet  What changed:   medication strength  how much to take  when to take this  additional instructions  Commonly known as: Proamatine   Take 1 Tablet by mouth 3 times a day with meals.  Dose: 10 mg            CONTINUE taking these medications        Instructions   acetaminophen 500 MG Tabs  Commonly known as: Tylenol   Take 1,000 mg by mouth 3 times a day. * scheduled*  Dose: 1,000 mg     apixaban 5mg Tabs  Commonly known as: Eliquis   Take 5 mg by mouth 2 times a day.  Dose: 5 mg     atorvastatin 20 MG Tabs  Commonly known as: Lipitor   Take 20 mg by mouth every evening.  Dose: 20 mg     B-Complex w/ C & Folic Acid Tabs   Take 1 Tablet by mouth every day.  Dose: 1 Tablet     Buprenorphine 15 MCG/HR Ptwk   Place 15 mcg on the skin every Tuesday.  Dose: 15 mcg     * calcitRIOL 0.25 MCG Caps  Commonly known as: Rocaltrol   Take 0.25 mcg by mouth every day. Pt takes at dialysis on Tue, Thur, and Sat with 0.5MCG, pt takes total on 0.75MCG  Dose: 0.25 mcg     * calcitRIOL 0.5 MCG Caps  Commonly known as: Rocaltrol   Take 0.5 mcg by mouth see administration instructions. Pt takes at dialysis on Tue, Thur, and Sat with 0.25MCG, pt takes total on 0.75MCG  Dose: 0.5 mcg     Creon 04874-351105 units Cpep  Generic drug: Pancrelipase (Lip-Prot-Amyl)   Take 1 Capsule by mouth 3 times a day with meals.  Dose: 1 Capsule     diclofenac sodium 1 % Gel  Commonly known as: Voltaren   Apply 4 g topically 4 times a day as needed. *apply to al joints *  Indications: Joint Damage causing Pain and Loss of Function  Dose: 4 g     DULoxetine 60 MG Cpep delayed-release capsule  Commonly known as: Cymbalta   Take 60 mg by mouth every  day.  Dose: 60 mg     ferrous sulfate 325 (65 Fe) MG tablet   Take 650 mg by mouth every day. 325 mg x 2 tablets = 650 mg  Dose: 650 mg     melatonin 3 MG Tabs   Take 6 mg by mouth at bedtime. 3 mg x 2 tablets = 6 mg  Dose: 6 mg     Menthol (Topical Analgesic) 10 % Crea   Apply 1 Application topically 4 times a day as needed. * apply to joints*  Indications: Pain  Dose: 1 Application     Mircera 100 MCG/0.3ML Sosy  Generic drug: Methoxy PEG-Epoetin Beta   Inject 100 mcg as directed every 14 days. Pt receives from dialysis  Dose: 100 mcg     oxyCODONE immediate-release 5 MG Tabs  Commonly known as: Roxicodone   Take 1 Tablet by mouth every four hours as needed for Severe Pain for up to 2 days.  Dose: 5 mg     pantoprazole 40 MG Tbec  Commonly known as: Protonix   Take 40 mg by mouth every day.  Dose: 40 mg     senna-docusate 8.6-50 MG Tabs  Commonly known as: Pericolace Or Senokot S   Take 1 Tablet by mouth 1 time a day as needed for Constipation. Indications: Constipation  Dose: 1 Tablet     sevelamer 800 MG Tabs  Commonly known as: Renagel   Take 800 mg by mouth 3 times a day with meals.  Dose: 800 mg     traZODone 50 MG Tabs  Commonly known as: Desyrel   Take 50 mg by mouth at bedtime as needed. Indications: Trouble Sleeping  Dose: 50 mg           * This list has 2 medication(s) that are the same as other medications prescribed for you. Read the directions carefully, and ask your doctor or other care provider to review them with you.                STOP taking these medications      haloperidol 5 MG Tabs  Commonly known as: Haldol     lidocaine 5 % Ptch  Commonly known as: Lidoderm              Allergies  Allergies   Allergen Reactions    Motrin [Ibuprofen] Unspecified     hhx of stomach ulcers       DIET  Orders Placed This Encounter   Procedures    Diet Order Diet: Level 6 - Soft and Bite Sized; Liquid level: Level 0 - Thin; Second Modifier: (optional): Renal     Standing Status:   Standing     Number of  Occurrences:   1     Order Specific Question:   Diet:     Answer:   Level 6 - Soft and Bite Sized [23]     Order Specific Question:   Liquid level     Answer:   Level 0 - Thin     Order Specific Question:   Second Modifier: (optional)     Answer:   Renal [8]       ACTIVITY  As tolerated.  Weight bearing as tolerated    LINES, DRAINS, AND WOUNDS  This is an automated list. Peripheral IVs will be removed prior to discharge.  Peripheral IV 08/21/23 20 G Anterior;Distal;Left Upper arm (Active)   Site Assessment Clean;Dry;Intact 08/24/23 2020   Dressing Type Transparent 08/24/23 2020   Line Status Flushed;Saline locked;Scrubbed the hub prior to access 08/24/23 2020   Dressing Status Clean;Dry;Intact 08/24/23 2020   Dressing Intervention N/A 08/24/23 2020   Infiltration Grading ([facility]) 0 08/24/23 2020   Phlebitis Scale ([facility] Only) 0 08/24/23 2020       Moisture Associated Skin Damage 08/22/23 Buttock;Perineum (Active)   Wound Image   08/22/23 1600   NEXT Weekly Photo (Inpatient Only) 08/29/23 08/22/23 1600   Drainage Amount None 08/23/23 1700   Periwound Assessment Clean;Dry;Intact 08/23/23 2008   IAD Cleansing Dimethecone Wipes;Foam Cleanser/Washcloth 08/22/23 1600   Periwound Protectant Viscopaste 08/23/23 1700   IAD Containment Device None 08/22/23 1600   WOUND NURSE ONLY - Time Spent with Patient (mins) 15 08/22/23 1600     HD Catheter Double Lumen (Active)   Site Assessment Clean;Dry;Intact 08/24/23 1222   Red Lumen Status Flushed;Heparin locked;Capped 08/24/23 1222   Blue Lumen Status Flushed;Heparin locked;Capped 08/24/23 1222   Site Prep Alcohol 08/24/23 1222   Treatment Performed Dialysis 08/24/23 1222   Line Care Connections replaced and tightened;Cap replaced 08/24/23 1222   Dressing Type Biopatch;Transparent 08/24/23 1222   Dressing Status Clean;Dry;Intact 08/24/23 1222   Dressing Intervention N/A 08/24/23 1222   Dressing Change Due 08/26/23 08/24/23 1222      Peripheral IV 08/21/23 20 G  Anterior;Distal;Left Upper arm (Active)   Site Assessment Clean;Dry;Intact 08/24/23 2020   Dressing Type Transparent 08/24/23 2020   Line Status Flushed;Saline locked;Scrubbed the hub prior to access 08/24/23 2020   Dressing Status Clean;Dry;Intact 08/24/23 2020   Dressing Intervention N/A 08/24/23 2020   Infiltration Grading ([facility] [place] [facility]) 0 08/24/23 2020   Phlebitis Scale ([facility] Only) 0 08/24/23 2020       Hemodialysis AV Graft/Fistula Right AV fistula Upper arm (Active)   AV Fistula Status Bruit present;Thrill present 08/24/23 0208   Site Assessment Clean;Dry;Intact 08/22/23 0800   Dressing Intervention N/A 08/21/23 2005               MENTAL STATUS ON TRANSFER             CONSULTATIONS  Nephrology-[facility] nephrology  Critical care-Dr. Cuadra    PROCEDURES  None    LABORATORY  Lab Results   Component Value Date    SODIUM 133 (L) 08/25/2023    POTASSIUM 4.3 08/25/2023    CHLORIDE 90 (L) 08/25/2023    CO2 28 08/25/2023    GLUCOSE 106 (H) 08/25/2023    BUN 25 (H) 08/25/2023    CREATININE 7.25 (HH) 08/25/2023        Lab Results   Component Value Date    WBC 7.6 08/25/2023    HEMOGLOBIN 11.2 (L) 08/25/2023    HEMATOCRIT 35.2 (L) 08/25/2023    PLATELETCT 169 08/25/2023        Total time of the discharge process exceeds 39 minutes.

## 2023-08-25 NOTE — DISCHARGE PLANNING
Case Management Discharge Planning    Admission Date: 8/21/2023  GMLOS: 3.4  ALOS: 4    6-Clicks ADL Score: 13  6-Clicks Mobility Score: 16  PT and/or OT Eval ordered: Yes  Post-acute Referrals Ordered: Yes  Post-acute Choice Obtained: Yes  Has referral(s) been sent to post-acute provider:  Yes      Anticipated Discharge Dispo: Discharge Disposition: D/T to SNF with Medicare cert in anticipation of skilled care (03)    DME Needed: No    Action(s) Taken: Spoke to Shirley from [facility], confirmed transport time of 1500 via [facility] van.    COBRA delivered to [facility].    PASRR: [id number]    Escalations Completed: None    Medically Clear: Yes    Next Steps: DC to [facility] today at 1500    Barriers to Discharge: None

## 2023-08-25 NOTE — PROGRESS NOTES
Telemetry shift summary:  Rhythm: SR     HR Range: 60's to 80's      Measurements from strip printed at 02:30:40   MD: 0.18   QRS 0.10  QT 0.42    Ectopies (As per Telemetry Tech report) Rare PAC; Occasional to frequent PVC

## 2023-09-05 ENCOUNTER — HOSPITAL ENCOUNTER (EMERGENCY)
Facility: MEDICAL CENTER | Age: 73
End: 2023-09-05
Attending: STUDENT IN AN ORGANIZED HEALTH CARE EDUCATION/TRAINING PROGRAM
Payer: COMMERCIAL

## 2023-09-05 ENCOUNTER — APPOINTMENT (OUTPATIENT)
Dept: RADIOLOGY | Facility: MEDICAL CENTER | Age: 73
End: 2023-09-05
Attending: STUDENT IN AN ORGANIZED HEALTH CARE EDUCATION/TRAINING PROGRAM
Payer: COMMERCIAL

## 2023-09-05 VITALS
DIASTOLIC BLOOD PRESSURE: 52 MMHG | SYSTOLIC BLOOD PRESSURE: 93 MMHG | TEMPERATURE: 99.3 F | RESPIRATION RATE: 18 BRPM | BODY MASS INDEX: 22.43 KG/M2 | WEIGHT: 148 LBS | HEIGHT: 68 IN | OXYGEN SATURATION: 96 % | HEART RATE: 73 BPM

## 2023-09-05 DIAGNOSIS — M06.9 RHEUMATOID ARTHRITIS FLARE (HCC): ICD-10-CM

## 2023-09-05 DIAGNOSIS — L08.9 PUSTULE: ICD-10-CM

## 2023-09-05 DIAGNOSIS — B35.1 ONYCHOMYCOSIS: ICD-10-CM

## 2023-09-05 PROCEDURE — 20600 DRAIN/INJ JOINT/BURSA W/O US: CPT

## 2023-09-05 PROCEDURE — 73130 X-RAY EXAM OF HAND: CPT | Mod: RT

## 2023-09-05 PROCEDURE — A9270 NON-COVERED ITEM OR SERVICE: HCPCS | Performed by: STUDENT IN AN ORGANIZED HEALTH CARE EDUCATION/TRAINING PROGRAM

## 2023-09-05 PROCEDURE — 99284 EMERGENCY DEPT VISIT MOD MDM: CPT

## 2023-09-05 PROCEDURE — 99285 EMERGENCY DEPT VISIT HI MDM: CPT

## 2023-09-05 PROCEDURE — 700102 HCHG RX REV CODE 250 W/ 637 OVERRIDE(OP): Performed by: STUDENT IN AN ORGANIZED HEALTH CARE EDUCATION/TRAINING PROGRAM

## 2023-09-05 RX ORDER — DOXYCYCLINE 100 MG/1
100 CAPSULE ORAL 2 TIMES DAILY
Qty: 10 CAPSULE | Refills: 0 | Status: ACTIVE | OUTPATIENT
Start: 2023-09-05 | End: 2023-09-10

## 2023-09-05 RX ORDER — OXYCODONE HYDROCHLORIDE 5 MG/1
5 TABLET ORAL EVERY 4 HOURS PRN
COMMUNITY

## 2023-09-05 RX ORDER — OMEPRAZOLE 20 MG/1
40 CAPSULE, DELAYED RELEASE ORAL DAILY
COMMUNITY

## 2023-09-05 RX ORDER — ACETAMINOPHEN 325 MG/1
650 TABLET ORAL ONCE
Status: COMPLETED | OUTPATIENT
Start: 2023-09-05 | End: 2023-09-05

## 2023-09-05 RX ORDER — HYDROCODONE BITARTRATE AND ACETAMINOPHEN 5; 325 MG/1; MG/1
1 TABLET ORAL ONCE
Status: COMPLETED | OUTPATIENT
Start: 2023-09-05 | End: 2023-09-05

## 2023-09-05 RX ORDER — TRIAMCINOLONE ACETONIDE 40 MG/ML
12 INJECTION, SUSPENSION INTRA-ARTICULAR; INTRAMUSCULAR ONCE
Status: DISCONTINUED | OUTPATIENT
Start: 2023-09-05 | End: 2023-09-05 | Stop reason: HOSPADM

## 2023-09-05 RX ADMIN — HYDROCODONE BITARTRATE AND ACETAMINOPHEN 1 TABLET: 5; 325 TABLET ORAL at 17:09

## 2023-09-05 RX ADMIN — ACETAMINOPHEN 650 MG: 325 TABLET ORAL at 17:09

## 2023-09-05 ASSESSMENT — FIBROSIS 4 INDEX: FIB4 SCORE: 3.56

## 2023-09-05 NOTE — ED PROVIDER NOTES
"ED Provider Note    CHIEF COMPLAINT  Chief Complaint   Patient presents with    Hand Pain     Pt c/o increasing pain and limited ROM in RH.  Pt states was told \"necrotic\"    Other     Pt was routinely tested for Covid today, states was told is Covid (+)       EXTERNAL RECORDS REVIEWED  Inpatient Notes 8/21/2023 inpatient stay for altered mental status and hyperkalemia secondary to missed dialysis discharged to skilled nursing facility    HPI/ROS  LIMITATION TO HISTORY   Select: : None  OUTSIDE HISTORIAN(S):  none    Junior Proctor is a 73 y.o. male who presents with worsening right hand pain for the past couple of days.  Patient also notes ulcer to his finger    PAST MEDICAL HISTORY   has a past medical history of Gout, Hemodialysis patient (HCC), Hypertension, Kidney disease, and MI (myocardial infarction) (HCC).    SURGICAL HISTORY   has a past surgical history that includes upper gi endoscopy,diagnosis (N/A, 12/10/2021); colonoscopy,diagnostic (N/A, 12/10/2021); upper gi endoscopy,biopsy (N/A, 12/10/2021); colonoscopy,biopsy (N/A, 12/10/2021); ercp,diagnostic (N/A, 6/5/2022); and ercp,diagnostic (N/A, 9/19/2022).    FAMILY HISTORY  Family History   Problem Relation Age of Onset    Diabetes Mother        SOCIAL HISTORY  Social History     Tobacco Use    Smoking status: Former    Smokeless tobacco: Never   Vaping Use    Vaping Use: Never used   Substance and Sexual Activity    Alcohol use: Yes    Drug use: Never    Sexual activity: Not on file       CURRENT MEDICATIONS  Home Medications       Reviewed by Rishabh Fraire (Pharmacy Tech) on 09/05/23 at 1718  Med List Status: Complete     Medication Last Dose Status   acetaminophen (TYLENOL) 500 MG Tab 9/5/2023 Active   apixaban (ELIQUIS) 5mg Tab 9/5/2023 Active   atorvastatin (LIPITOR) 20 MG Tab 9/4/2023 Active   B-Complex w/ C & Folic Acid (MELO-NEVAEH) Tab 9/5/2023 Active   calcitRIOL (ROCALTROL) 0.25 MCG Cap 9/5/2023 Active   DULoxetine (CYMBALTA) 60 MG Cap DR" "Particles delayed-release capsule 9/5/2023 Active   ferrous sulfate 325 (65 Fe) MG tablet 9/5/2023 Active   gabapentin (NEURONTIN) 100 MG Cap 9/5/2023 Active   melatonin 3 MG Tab 9/4/2023 Active   Methoxy PEG-Epoetin Beta 75 MCG/0.3ML Solution Prefilled Syringe 9/5/2023 Active   midodrine (PROAMATINE) 10 MG tablet 9/5/2023 Active   NON SPECIFIED 9/5/2023 Active   omeprazole (PRILOSEC) 20 MG delayed-release capsule 9/5/2023 Active   oxyCODONE immediate-release (ROXICODONE) 5 MG Tab 9/5/2023 Active   Pancrelipase, Lip-Prot-Amyl, (CREON) 68858-969614 units Cap DR Particles 9/5/2023 Active   senna-docusate (PERICOLACE OR SENOKOT S) 8.6-50 MG Tab 9/5/2023 Active   sevelamer (RENAGEL) 800 MG Tab 9/5/2023 Active   traZODone (DESYREL) 50 MG Tab 9/4/2023 Active                    ALLERGIES  Allergies   Allergen Reactions    Motrin [Ibuprofen] Unspecified     hhx of stomach ulcers       PHYSICAL EXAM  VITAL SIGNS: BP 93/52   Pulse 73   Temp 37.4 °C (99.3 °F) (Temporal)   Resp 18   Ht 1.727 m (5' 8\")   Wt 67.1 kg (148 lb)   SpO2 96%   BMI 22.50 kg/m²    Physical Exam  Vitals and nursing note reviewed.   Constitutional:       Appearance: He is well-developed.   HENT:      Head: Normocephalic.   Cardiovascular:      Rate and Rhythm: Normal rate and regular rhythm.      Heart sounds: No murmur heard.  Pulmonary:      Effort: Pulmonary effort is normal.      Breath sounds: Normal breath sounds.   Abdominal:      Palpations: Abdomen is soft.      Tenderness: There is no abdominal tenderness. There is no guarding or rebound.   Musculoskeletal:         General: Normal range of motion.      Right lower leg: No edema.      Left lower leg: No edema.      Comments: Tenderness to palpation of the left second MCP joint there is ulnar deviation and mild hypertrophy of the joint.  The patient has limited range of motion to the second through fifth MCP secondary to pain. There is no tenderness to palpation over the flexor tendons.  No " diffuse swelling of the fingers no redness, warmth.   Skin:     General: Skin is warm.      Comments: There is a small ulceration to the dorsal aspect of the left fourth digit just proximal to the proximal interphalangeal joint.  The joint itself is mobile and nontender to palpation there is no expressible purulence no surrounding erythema or warmth to suggest cellulitis   Neurological:      General: No focal deficit present.      Mental Status: He is alert and oriented to person, place, and time.           DIAGNOSTIC STUDIES / PROCEDURES    RADIOLOGY  I have independently interpreted the diagnostic imaging associated with this visit and am waiting the final reading from the radiologist.   My preliminary interpretation is as follows: DX hand loss of joint space of the right second and fifth MCPs with ulnar deviation  Radiologist interpretation:   DX-HAND 3+ RIGHT   Final Result      Arthritic changes including ulnar deviation consistent with given history of rheumatoid arthritis. Findings are most prominent in the fifth digit.          A steroid injection was performed at Right 2nd MCP using 1% plain Lidocaine and 10 mg of Kenalog. 22 G needle used. Technique was approach on dorsal aspect using landmarks with the finger in flexion. This was well tolerated.     Arthrocentesis Procedure    Indication: Joint pain    Consent: The patient was counseled regarding the procedure, it's indications, risks, potential complications and alternatives and any questions were answered. Consent was obtained.    Procedure: The right 2nd MCP joint was positioned appropriately and the landmarks were identified.  The area was then prepped and draped in the usual sterile fashion.  A needle was then introduced into the joint space at which point No fluid was able to be aspirated.  A joint injection was performed using 0.25 cc of Kenalog 40 with 2.0 cc of 1% lidocaine without epinephrine.  A sterile dressing was then applied to the  site.    The patient tolerated the procedure well.    Complications: None      COURSE & MEDICAL DECISION MAKING    ED Observation Status? No; Patient does not meet criteria for ED Observation.     INITIAL ASSESSMENT, COURSE AND PLAN  Care Narrative: 73-year-old male history of ESRD on dialysis presents from SNF for hand pain ongoing for approximately the past week.  Patient also concerned about a small ulceration on the finger of the the right hand that is hurting.  Patient also has a history of rheumatoid arthritis.  On exam the patient does have a small ulceration that appears very mildly infected but does not appear to involve the joint space and with no surrounding cellulitis.  The majority of the patient's pain on exam is located at the second MCP where there is what appears to be chronic ulnar deviation and some hypertrophy of the joint.  Differential diagnosis includes osteoarthritis, rheumatoid arthritis, septic arthritis, abscess, cellulitis, gout    We will obtain x-ray of the hand to rule out foreign body and further characterize appearance of the joint.  Suspect flare of rheumatoid arthritis.  Patient has extensive history of such.  On exam patient is mildly hypotensive here however has a chronic history of such and is on midodrine 3 times daily.  Patient reporting compliance of medications and dialysis at this time as he is at skilled nursing facility where this is coordinated for him. Will hold on labs at this time as I do not feel they will be beneficial    X-ray is highly indicative of rheumatoid arthritis and given the patient's pain I suspect this is a flare.  The patient reports a history of me to gout however I do not see that on a brief chart review.  No I did attempt an arthrocentesis however there was no effusion present I was unable to aspirate any fluid so I do believe that a gout flare is incredibly unlikely at this time.  Was able to inject Kenalog and lidocaine into the joint for pain  relief and treatment of rheumatoid flare.  Patient reports does having a rheumatologist in the past but has not followed up in quite some time.  I discussed the need for rheumatology follow-up as the patient does have significant burden of disease and could likely benefit from DMARD therapy.  Patient voiced understanding of this discussed pain management and antibiotic management for the pustule on his finger.  Also provided patient with podiatry referral for onychomycosis of the nail        ADDITIONAL PROBLEM LIST  ESRD on dialysis  DISPOSITION AND DISCUSSIONS    Escalation of care considered, and ultimately not performed:blood analysis    Barriers to care at this time, including but not limited to:  Health illiteracy .     Decision tools and prescription drugs considered including, but not limited to: Pain Medications pain management strategies for chronic pain related to rheumatoid arthritis . Abx for pustule    FINAL DIAGNOSIS  1. Rheumatoid arthritis flare (HCC)    2. Pustule    3. Onychomycosis           Electronically signed by: Gera Abbott M.D., 9/5/2023 4:54 PM

## 2023-09-05 NOTE — ED TRIAGE NOTES
"Chief Complaint   Patient presents with    Hand Pain     Pt c/o increasing pain and limited ROM in RH.  Pt states was told \"necrotic\"    Other     Pt was routinely tested for Covid today, states was told is Covid (+)     BP (!) 84/48   Pulse 84   Temp 37.4 °C (99.3 °F) (Temporal)   Resp 18   Ht 1.727 m (5' 8\")   Wt 67.1 kg (148 lb)   SpO2 95%   BMI 22.50 kg/m²     Pt BIB [facility] from [facility] for c/o increasing RH pan w/ limited ROM.  Pt completed HD today.   "

## 2023-09-06 NOTE — ED NOTES
Med rec updated and complete, per MAR from [facility] and  [facility] @ [phone]   Allergies reviewed, per pt  Called [facility] @ [phone] to verify when pt last dose of his MIRCERA and IRON infusion, per facility reports pt received today @ 5234.

## 2023-09-06 NOTE — ED NOTES
Assisted w/ pt care.  Medicated as ordered.  Pt updated on POC including pending tests and chart review by ERP.

## 2023-09-06 NOTE — ED NOTES
Called [facility] to set up transport back to [facility]. Reservation # is [id number]. ETA is [id number].

## 2024-10-17 NOTE — PROGRESS NOTES
PHYSICIAN ORDERS AND DISCHARGE INSTRUCTIONS     Wound cleansing:              Do not scrub or use excessive force.             Wash hands with soap and water before and after dressing changes.             Prior to applying a clean dressing, cleanse wound with normal saline,               wound cleanser, or mild soap and water.              Ask the physician or nurse before getting the wound(s) wet in a shower     Wound Care Notes:         Kelsey to find out about wheelchair. Reliable medical equipment to do home visit. Received 24                              Orders for this week: 10/17/2024    [facility]X TO [facility].  FAX ORDERS TO [facility]     Left Buttocks:  Healed 24. Clean with soap and water, pat dry.  Continue to apply Desitin, Clobetasol, Lidocaine and Stimulen to Wound Bed.  Apply Daily    Plan:        Follow Up Instructions: At the [facility]  4 weeks   Primary Wound Care Provider: Kelsey Marinelli CNP   Call  for any questions or concerns.  Central [phone] for imaging lab work   Report from NOC RN. Plan of care reviewed. Patient to dialysis via transport this AM. Discussed endoscopy procedure with GI- will reschedule patient for tomorrow for further bowel prep.

## 2025-04-01 NOTE — OR NURSING
Patient awake and alert and resting in bed. Pt denies pain and nausea at this time. VSS. Pt has been updated on plan of care and all questions have been addressed.     Pt does not want this RN to call and update family, and stated he would call family when he returns to his room. Pt tolerating ice chips without issue.     Report called to Kelli GARCIA. Pt taken to room in stable condition.            1 person assist

## (undated) DEVICE — TOWEL STOP TIMEOUT SAFETY FLAG (40EA/CA)

## (undated) DEVICE — FILM CASSETTE ENDO

## (undated) DEVICE — MASK PANORAMIC OXYGEN PRO2 (30EA/CA)

## (undated) DEVICE — CANISTER SUCTION 3000ML MECHANICAL FILTER AUTO SHUTOFF MEDI-VAC NONSTERILE LF DISP  (40EA/CA)

## (undated) DEVICE — BITE BLOCK ADULT 60FR (100EA/CA)

## (undated) DEVICE — FORCEP RADIAL JAW 4 STANDARD CAPACITY W/NEEDLE 240CM (40EA/BX)

## (undated) DEVICE — ELECTRODE 850 FOAM ADHESIVE - HYDROGEL RADIOTRNSPRNT (50/PK)

## (undated) DEVICE — BITE BLOCK, DISP.

## (undated) DEVICE — BALLOON RETRIEVAL EXTRACTOR PRO RX   9-12MM

## (undated) DEVICE — SYRINGE DISP. 50CC LS - (40/BX)

## (undated) DEVICE — MASK WITH FACE SHIELD (25/BX 4BX/CA)

## (undated) DEVICE — IV TUBING 60 MICRO-VOLUME - W/CLAMP  50/CA

## (undated) DEVICE — SPHINCTERTOME TRUETOME 44 20MM PRELOAD JAG 035IN

## (undated) DEVICE — SUCTION INSTRUMENT YANKAUER BULBOUS TIP W/O VENT (50EA/CA)

## (undated) DEVICE — CONTAINER, SPECIMEN, STERILE

## (undated) DEVICE — CANISTER SUCTION RIGID RED 1500CC (40EA/CA)

## (undated) DEVICE — SODIUM CHL IRRIGATION 0.9% 1000ML (12EA/CA)

## (undated) DEVICE — SNARECAPTIVATOR 13MM SMALL HEXAGONAL SNARE (10/BX)

## (undated) DEVICE — Device

## (undated) DEVICE — PROTECTOR ULNA NERVE - (36PR/CA)

## (undated) DEVICE — TUBING CLEARLINK DUO-VENT - C-FLO (48EA/CA)

## (undated) DEVICE — CANNULA O2 COMFORT SOFT EAR ADULT 7 FT TUBING (50/CA)

## (undated) DEVICE — TRUETOME 44 20MM (10/BX)

## (undated) DEVICE — FORCEP GRASPING RESCUE COMBO RAT/ALLIGATOR (5EA/BX)

## (undated) DEVICE — HEAD HOLDER JUNIOR/ADULT

## (undated) DEVICE — SENSOR OXIMETER ADULT SPO2 RD SET (20EA/BX)

## (undated) DEVICE — CANNULA W/ SUPPLY TUBING O2 - (50/CA)

## (undated) DEVICE — MASK ANESTHESIA ADULT  - (100/CA)

## (undated) DEVICE — KIT PROCEDURE DOUBLE ENDO ONLY (5/CA)

## (undated) DEVICE — SET EXTENSION WITH 2 PORTS (48EA/CA) ***PART #2C8610 IS A SUBSTITUTE*****

## (undated) DEVICE — SET LEADWIRE 5 LEAD BEDSIDE DISPOSABLE ECG (1SET OF 5/EA)

## (undated) DEVICE — TUBE SUCTION YANKAUER  1/4 X 6FT (20EA/CA)"

## (undated) DEVICE — LACTATED RINGERS INJ 1000 ML - (14EA/CA 60CA/PF)

## (undated) DEVICE — JAGTOME RX 39 PRE-LOADED .025 X 260CM

## (undated) DEVICE — MANIFOLD NEPTUNE 1 PORT (20/PK)

## (undated) DEVICE — KIT  I.V. START (100EA/CA)

## (undated) DEVICE — KIT ANESTHESIA W/CIRCUIT & 3/LT BAG W/FILTER (20EA/CA)

## (undated) DEVICE — TUBE CONNECTING SUCTION - CLEAR PLASTIC STERILE 72 IN (50EA/CA)

## (undated) DEVICE — SYRINGE DISP. 12 CC LL - (100/BX)

## (undated) DEVICE — WATER IRRIGATION STERILE 1000ML (12EA/CA)